# Patient Record
Sex: MALE | Race: WHITE | Employment: OTHER | URBAN - METROPOLITAN AREA
[De-identification: names, ages, dates, MRNs, and addresses within clinical notes are randomized per-mention and may not be internally consistent; named-entity substitution may affect disease eponyms.]

---

## 2016-07-27 LAB
ABS BASOPHILS: 0 CELLS/MM3 (ref 0–0.1)
ABS EOSINOPHILS: 0.1 CELLS/MM3 (ref 0.02–0.45)
ABS LYMPHOCYTES: 1.5 CELLS/MM3 (ref 1.1–3.4)
ABS MONOCYTE: 0.6 CELLS/MM3 (ref 0.2–0.88)
ABS NEUTROPHILS: 4.4 CELLS/MM3 (ref 1.6–6)
ANION GAP SERPL CALCULATED.3IONS-SCNC: 13.2 MMOL/L
ANION GAP SERPL CALCULATED.3IONS-SCNC: 13.2 MMOL/L (ref 9–19)
BASOPHILS NFR BLD AUTO: 0.4 %
BASOPHILS NFR BLD AUTO: 0.4 % (ref 0–2)
BUN SERPL-MCNC: 17 MG/DL
BUN SERPL-MCNC: 17 MG/DL (ref 7–18)
CALCIUM SERPL-MCNC: 8.9 MG/DL
CALCIUM SERPL-MCNC: 8.9 MG/DL (ref 8.5–10.1)
CHLORIDE SERPLBLD-SCNC: 103 MMOL/L
CHLORIDE SERPLBLD-SCNC: 103 MMOL/L (ref 98–107)
CO2 SERPL-SCNC: 26 MMOL/L
CO2 SERPL-SCNC: 26 MMOL/L (ref 21–32)
CREAT SERPL-MCNC: 1 MG/DL
CREAT SERPL-MCNC: 1 MG/DL (ref 0.6–1.3)
EOSINOPHIL NFR BLD AUTO: 1.5 %
EOSINOPHIL NFR BLD AUTO: 1.5 % (ref 0–6)
ERYTHROCYTE [DISTWIDTH] IN BLOOD BY AUTOMATED COUNT: 13.5 %
ERYTHROCYTE [DISTWIDTH] IN BLOOD BY AUTOMATED COUNT: 13.5 % (ref 11.5–14.5)
GFR SERPL CREATININE-BSD FRML MDRD: ABNORMAL ML/MIN/1.73M2
GFR SERPL CREATININE-BSD FRML MDRD: ABNORMAL ML/MIN/1.73M2
GLUCOSE SERPL-MCNC: 169 MG/DL (ref 70–99)
GLUCOSE SERPL-MCNC: 169 MG/DL (ref 70–99)
HCT VFR BLD AUTO: 45.6 %
HCT VFR BLD AUTO: 45.6 % (ref 40–51)
HEMOGLOBIN: 15.4 G/DL (ref 13.3–17.7)
HEMOGLOBIN: 15.4 G/DL (ref 13.3–17.7)
LYMPHOCYTES NFR BLD AUTO: 22.8 %
LYMPHOCYTES NFR BLD AUTO: 22.8 % (ref 20–46)
Lab: 1.42 NG/ML (ref 0.01–4)
MCH RBC QN AUTO: 30.9 PG
MCH RBC QN AUTO: 30.9 PG (ref 26.5–33)
MCHC RBC AUTO-ENTMCNC: 33.8 % (ref 31.5–36.5)
MCHC RBC AUTO-ENTMCNC: 33.8 G/DL
MCV RBC AUTO: 91.4 FL
MCV RBC AUTO: 91.4 FL (ref 78–99)
MONOCYTES NFR BLD AUTO: 8.9 %
MONOCYTES NFR BLD AUTO: 8.9 % (ref 0–12)
NEUTROPHILS NFR BLD AUTO: 66.1 %
NEUTROPHILS NFR BLD AUTO: 66.1 % (ref 38–70)
PLATELET # BLD AUTO: NORMAL 10^9/L
PLATELET COUNT - QUEST: NORMAL 10^9/L (ref 150–450)
PMV BLD AUTO: NORMAL FL
POTASSIUM SERPL-SCNC: 4.2 MMOL/L
POTASSIUM SERPL-SCNC: 4.2 MMOL/L (ref 3.5–5.1)
PSA SERPL-MCNC: 1.42 NG/ML (ref 0.13–4)
RBC # BLD AUTO: 4.99 10^12/L
RBC # BLD AUTO: 4.99 10^12/L (ref 4.4–5.7)
SODIUM SERPL-SCNC: 138 MMOL/L
SODIUM SERPL-SCNC: 138 MMOL/L (ref 136–145)
TSH SERPL-ACNC: 2.27 MCU/ML
TSH SERPL-ACNC: 2.27 MCU/ML (ref 0.36–3.74)
WBC # BLD AUTO: 6.7 10^9/L
WBC # BLD AUTO: 6.7 10^9/L (ref 4–10)

## 2016-08-06 LAB
HBA1C MFR BLD: 8.2 % (ref 0–5.7)
HBA1C MFR BLD: 8.2 % (ref 4.8–6)

## 2016-09-30 LAB
ALBUMIN SERPL-MCNC: 4.3 G/DL
ALBUMIN SERPL-MCNC: 4.3 G/DL (ref 3.4–5)
ALP SERPL-CCNC: 53 U/L
ALP SERPL-CCNC: 53 U/L (ref 0–116)
ALT SERPL-CCNC: 44 U/L
ALT SERPL-CCNC: 44 U/L (ref 0–78)
ANION GAP SERPL CALCULATED.3IONS-SCNC: ABNORMAL MMOL/L
ANION GAP SERPL CALCULATED.3IONS-SCNC: ABNORMAL MMOL/L
AST SERPL-CCNC: 23 U/L
AST SERPL-CCNC: 23 U/L (ref 0–37)
BILIRUB SERPL-MCNC: 2.05 MG/DL
BILIRUB SERPL-MCNC: 2.05 MG/DL (ref 0.2–1)
BUN SERPL-MCNC: 16 MG/DL
BUN SERPL-MCNC: 16 MG/DL (ref 7–18)
CALCIUM SERPL-MCNC: 9.2 MG/DL
CALCIUM SERPL-MCNC: 9.2 MG/DL (ref 8.5–10.1)
CHLORIDE SERPLBLD-SCNC: 100 MMOL/L
CHLORIDE SERPLBLD-SCNC: 100 MMOL/L (ref 98–107)
CHOLEST SERPL-MCNC: 170 MG/DL (ref 0–200)
CO2 SERPL-SCNC: ABNORMAL MMOL/L
CO2 SERPL-SCNC: ABNORMAL MMOL/L
CREAT SERPL-MCNC: 1 MG/DL
CREAT SERPL-MCNC: 1 MG/DL (ref 0.6–1.3)
GFR SERPL CREATININE-BSD FRML MDRD: ABNORMAL ML/MIN/1.73M2
GFR SERPL CREATININE-BSD FRML MDRD: ABNORMAL ML/MIN/1.73M2
GLUCOSE SERPL-MCNC: 134 MG/DL (ref 70–99)
GLUCOSE SERPL-MCNC: 134 MG/DL (ref 70–99)
HBA1C MFR BLD: 6.3 % (ref 0–5.7)
HBA1C MFR BLD: 6.3 % (ref 4.8–6)
HDLC SERPL-MCNC: 43 MG/DL (ref 40–96)
LDLC SERPL CALC-MCNC: 96 MG/DL (ref 0–130)
NONHDLC SERPL-MCNC: NORMAL MG/DL
POTASSIUM SERPL-SCNC: 4.7 MMOL/L
POTASSIUM SERPL-SCNC: 4.7 MMOL/L (ref 3.5–5.1)
PROT SERPL-MCNC: 7.8 G/DL
PROT SERPL-MCNC: 7.8 G/DL (ref 6.4–8.2)
SODIUM SERPL-SCNC: 140 MMOL/L
SODIUM SERPL-SCNC: 140 MMOL/L (ref 136–145)
TRIGL SERPL-MCNC: 155 MG/DL (ref 30–200)

## 2017-03-07 ENCOUNTER — OFFICE VISIT (OUTPATIENT)
Dept: INTERNAL MEDICINE | Facility: CLINIC | Age: 62
End: 2017-03-07

## 2017-03-07 VITALS
HEIGHT: 66 IN | RESPIRATION RATE: 16 BRPM | SYSTOLIC BLOOD PRESSURE: 130 MMHG | WEIGHT: 192.2 LBS | BODY MASS INDEX: 30.89 KG/M2 | OXYGEN SATURATION: 94 % | HEART RATE: 111 BPM | TEMPERATURE: 98.5 F | DIASTOLIC BLOOD PRESSURE: 84 MMHG

## 2017-03-07 DIAGNOSIS — K40.90 UNILATERAL INGUINAL HERNIA WITHOUT OBSTRUCTION OR GANGRENE, RECURRENCE NOT SPECIFIED: ICD-10-CM

## 2017-03-07 DIAGNOSIS — H91.93 DECREASED HEARING OF BOTH EARS: ICD-10-CM

## 2017-03-07 DIAGNOSIS — Z11.59 NEED FOR HEPATITIS C SCREENING TEST: ICD-10-CM

## 2017-03-07 DIAGNOSIS — E11.9 TYPE 2 DIABETES MELLITUS WITHOUT COMPLICATION, WITHOUT LONG-TERM CURRENT USE OF INSULIN (H): ICD-10-CM

## 2017-03-07 DIAGNOSIS — I25.10 CORONARY ARTERY DISEASE INVOLVING NATIVE CORONARY ARTERY OF NATIVE HEART WITHOUT ANGINA PECTORIS: ICD-10-CM

## 2017-03-07 DIAGNOSIS — K60.30 ANAL FISTULA: ICD-10-CM

## 2017-03-07 DIAGNOSIS — E11.9 TYPE 2 DIABETES MELLITUS WITHOUT COMPLICATION, WITHOUT LONG-TERM CURRENT USE OF INSULIN (H): Primary | ICD-10-CM

## 2017-03-07 DIAGNOSIS — K42.9 UMBILICAL HERNIA WITHOUT OBSTRUCTION AND WITHOUT GANGRENE: ICD-10-CM

## 2017-03-07 LAB
HBA1C MFR BLD: 7.1 % (ref 4.3–6)
TSH SERPL DL<=0.005 MIU/L-ACNC: 2.44 MU/L (ref 0.4–4)

## 2017-03-07 RX ORDER — LOSARTAN POTASSIUM 100 MG/1
TABLET ORAL
COMMUNITY
Start: 2016-09-12 | End: 2017-07-28

## 2017-03-07 RX ORDER — METFORMIN HCL 500 MG
TABLET, EXTENDED RELEASE 24 HR ORAL
COMMUNITY
Start: 2016-08-08 | End: 2017-03-30

## 2017-03-07 RX ORDER — ATORVASTATIN CALCIUM 80 MG/1
80 TABLET, FILM COATED ORAL
COMMUNITY
Start: 2016-07-27 | End: 2017-07-27

## 2017-03-07 ASSESSMENT — ACTIVITIES OF DAILY LIVING (ADL)
ARE_THERE_FIREARMS_IN_YOUR_HOME?: N
ARE_THERE_SMOKE_DETECTORS_IN_YOUR_HOME?: Y
ARE_THERE_CARBON_MONOXIDE_DETECTORS_IN_YOUR_HOME?: Y
DO_MEMBERS_OF_YOUR_HOUSEHOLD_USE_SUNSCREEN?: Y
DO_MEMBERS_OF_YOUR_HOUSEHOLD_WEAR_SEAT_BELTS?: Y
DO_MEMBERS_OF_YOUR_HOUSEHOLD_USE_SAFETY_HELMETS?: N

## 2017-03-07 ASSESSMENT — ENCOUNTER SYMPTOMS
HEADACHES: 0
INCREASED ENERGY: 0
SINUS PAIN: 0
SHORTNESS OF BREATH: 0
NERVOUS/ANXIOUS: 0
EYE PAIN: 0
SPUTUM PRODUCTION: 0
SMELL DISTURBANCE: 0
POLYPHAGIA: 0
POOR WOUND HEALING: 0
NIGHT SWEATS: 0
DIFFICULTY URINATING: 0
SYNCOPE: 0
TACHYCARDIA: 0
FATIGUE: 0
NECK PAIN: 0
WEAKNESS: 0
MYALGIAS: 0
HEMATURIA: 0
COUGH DISTURBING SLEEP: 0
SEIZURES: 0
FLANK PAIN: 0
DYSPNEA ON EXERTION: 0
EYE IRRITATION: 0
JOINT SWELLING: 0
SPEECH CHANGE: 0
ARTHRALGIAS: 0
SWOLLEN GLANDS: 0
LEG SWELLING: 0
MUSCLE WEAKNESS: 0
PARALYSIS: 0
POSTURAL DYSPNEA: 0
DECREASED APPETITE: 0
BLOOD IN STOOL: 0
INSOMNIA: 0
NAIL CHANGES: 0
DOUBLE VISION: 0
TINGLING: 0
DIZZINESS: 0
ORTHOPNEA: 0
ALTERED TEMPERATURE REGULATION: 0
BRUISES/BLEEDS EASILY: 0
WHEEZING: 0
SLEEP DISTURBANCES DUE TO BREATHING: 0
SNORES LOUDLY: 0
MEMORY LOSS: 0
VOMITING: 0
HEMOPTYSIS: 0
HALLUCINATIONS: 0
NECK MASS: 0
POLYDIPSIA: 0
EYE WATERING: 0
DYSURIA: 0
DEPRESSION: 0
CLAUDICATION: 0
STIFFNESS: 0
EXTREMITY NUMBNESS: 0
DIARRHEA: 0
BOWEL INCONTINENCE: 0
SORE THROAT: 0
SKIN CHANGES: 0
CHILLS: 0
BACK PAIN: 0
FEVER: 0
TROUBLE SWALLOWING: 0
DECREASED CONCENTRATION: 0
LOSS OF CONSCIOUSNESS: 0
JAUNDICE: 0
HYPERTENSION: 0
PALPITATIONS: 0
EYE REDNESS: 0
MUSCLE CRAMPS: 0
LIGHT-HEADEDNESS: 0
NAUSEA: 0
NUMBNESS: 0
RESPIRATORY PAIN: 0
COUGH: 0
BLOATING: 0
HYPOTENSION: 0
CONSTIPATION: 0
ABDOMINAL PAIN: 0
WEIGHT LOSS: 0
RECTAL BLEEDING: 0
RECTAL PAIN: 0
TREMORS: 0
SINUS CONGESTION: 0
LEG PAIN: 0
WEIGHT GAIN: 0
DISTURBANCES IN COORDINATION: 0
PANIC: 0
TASTE DISTURBANCE: 0
HEARTBURN: 0
HOARSE VOICE: 0
EXERCISE INTOLERANCE: 0

## 2017-03-07 ASSESSMENT — PAIN SCALES - GENERAL: PAINLEVEL: NO PAIN (0)

## 2017-03-07 NOTE — PROGRESS NOTES
HPI:       Anson Manriquez is a 61 year old male who presents for the following  Patient presents with: Establish Care (Here to establish care)      Cedric is a 61 yoM with a PMHx of T2DM, CAD (MI in 2007), HTN, HLD who is here to establish care. He previously as seen at [facility], but is transferring his care here b/c his wife is now working for the [employer]. He is doing well today and has no real concerns. Cedric works as a du. He was diagnosed with T2DM last year, and stopped drinking pop and started metformin. His A1c improved from ~8 to 6.3. He has continued to avoid pop. He also notes that he sees his metformin and atorvastatin un-dissolved in his stools. He is concerned he is not absorbing these medications.     Cedric also has a left inguinal hernia and an umbilical hernia that are bothering him. He previously had a R inguinal hernia repaired. He also has an anal fistula that has previously been evaluated by an outside colorectal surgeon. He reports being told it may be better to not have surgery than to try and fix this. He would like a second opinion.     Problem, Medication and Allergy Lists were reviewed and are current.  Patient is a new patient to this clinic and so  I reviewed/updated the Past Medical History, the Family History and the Social History.          Review of Systems:   Review of Systems     Constitutional:  Negative for fever, chills, weight loss, weight gain, fatigue, decreased appetite, night sweats, recent stressors, height gain, height loss, post-operative complications, incisional pain, hallucinations, increased energy, hyperactivity and confused.   HENT:  Negative for ear pain, hearing loss, tinnitus, nosebleeds, trouble swallowing, hoarse voice, mouth sores, sore throat, ear discharge, tooth pain, gum tenderness, taste disturbance, smell disturbance, hearing aid, bleeding gums, dry mouth, sinus pain, sinus congestion and neck mass.    Eyes:  Negative for  double vision, pain, redness, eye pain, decreased vision, eye watering, eye bulging, eye dryness, flashing lights, spots, floaters, strabismus, tunnel vision, jaundice and eye irritation.   Respiratory:   Negative for cough, hemoptysis, sputum production, shortness of breath, wheezing, sleep disturbances due to breathing, snores loudly, respiratory pain, dyspnea on exertion, cough disturbing sleep and postural dyspnea.    Cardiovascular:  Negative for chest pain, dyspnea on exertion, palpitations, orthopnea, claudication, leg swelling, fingers/toes turn blue, hypertension, hypotension, syncope, history of heart murmur, chest pain on exertion, chest pain at rest, pacemaker, few scattered varicosities, leg pain, sleep disturbances due to breathing, tachycardia, light-headedness, exercise intolerance and edema.   Gastrointestinal:  Negative for heartburn, nausea, vomiting, abdominal pain, diarrhea, constipation, blood in stool, melena, rectal pain, bloating, hemorrhoids, bowel incontinence, jaundice, rectal bleeding, coffee ground emesis and change in stool.   Genitourinary:  Negative for bladder incontinence, dysuria, urgency, hematuria, flank pain, difficulty urinating, nocturia, voiding less frequently, scrotal pain, ulcerations, penile discharge, male genitourinary complaint and reduced libido.   Musculoskeletal:  Negative for myalgias, back pain, joint swelling, arthralgias, stiffness, muscle cramps, neck pain, bone pain, muscle weakness and fracture.   Skin:  Negative for nail changes, itching, poor wound healing, rash, hair changes, skin changes, acne, warts, poor wound healing, scarring, flaky skin, Raynaud's phenomenon, sensitivity to sunlight and skin thickening.   Neurological:  Negative for dizziness, tingling, tremors, speech change, seizures, loss of consciousness, weakness, light-headedness, numbness, headaches, disturbances in coordination, extremity numbness, memory loss, difficulty walking and  "paralysis.   Endo/Heme:  Negative for anemia, swollen glands and bruises/bleeds easily.   Psychiatric/Behavioral:  Negative for depression, hallucinations, memory loss, decreased concentration, mood swings and panic attacks.    Endocrine:  Negative for altered temperature regulation, polyphagia, polydipsia, unwanted hair growth and change in facial hair.            Physical Exam:   /84 (BP Location: Right arm, Patient Position: Chair, Cuff Size: Adult Regular)  Pulse 111  Temp 98.5  F (36.9  C) (Oral)  Resp 16  Ht 1.676 m (5' 6\")  Wt 87.2 kg (192 lb 3.2 oz)  SpO2 94%  BMI 31.02 kg/m2  Body mass index is 31.02 kg/(m^2).  Vitals were reviewed       Physical Exam   Constitutional: He is oriented to person, place, and time and well-developed, well-nourished, and in no distress. No distress.   HENT:   Head: Normocephalic and atraumatic.   Right Ear: Tympanic membrane normal.   Left Ear: Tympanic membrane normal.   Mouth/Throat: Oropharynx is clear and moist.   Eyes: Conjunctivae and EOM are normal. Pupils are equal, round, and reactive to light. No scleral icterus.   Neck: Neck supple. No thyromegaly present.   Cardiovascular: Normal rate, regular rhythm, normal heart sounds and intact distal pulses.  Exam reveals no gallop and no friction rub.    No murmur heard.  Pulmonary/Chest: Effort normal and breath sounds normal. No respiratory distress.   Abdominal: Soft. Bowel sounds are normal. He exhibits no distension. There is no tenderness.   Umbilical hernia present and reducible   Genitourinary:   Genitourinary Comments: Large L inguinal hernia present   Musculoskeletal: Normal range of motion. He exhibits no edema.   Lymphadenopathy:     He has no cervical adenopathy.   Neurological: He is alert and oriented to person, place, and time. No cranial nerve deficit. He exhibits normal muscle tone. Gait normal.   Skin: Skin is warm and dry. No rash noted. He is not diaphoretic. No erythema.           Results: "     Pending  Assessment and Plan     Anson was seen today for establish care.    Diagnoses and all orders for this visit:    Type 2 diabetes mellitus without complication, without long-term current use of insulin (H)  -     TSH with free T4 reflex; Future  -     Hemoglobin A1c; Future   Continue metformin   On ASA   BP at goal   Non-smoker   On statin    Will check with our pharmacist about statin and metformin absorption.     Need for hepatitis C screening test  -     Hepatitis C Screen Reflex to HCV RNA Quant and Genotype    Anal fistula  -     COLORECTAL SURGERY REFERRAL    Umbilical hernia without obstruction and without gangrene  -     GENERAL SURG ADULT REFERRAL    Unilateral inguinal hernia without obstruction or gangrene, recurrence not specified  -     GENERAL SURG ADULT REFERRAL    Decreased hearing of both ears  -     AUDIOLOGY ADULT REFERRAL    Coronary artery disease involving native coronary artery of native heart without angina pectoris   Continue ASA, statin, bblocker, ARB      Options for treatment and follow-up care were reviewed with the patient. Anson Manriquez engaged in the decision making process and verbalized understanding of the options discussed and agreed with the final plan.    Taryn Lou MD  Mar 7, 2017

## 2017-03-07 NOTE — PATIENT INSTRUCTIONS
[facility] Medication Refill Request Information:  * Please contact your pharmacy regarding ANY request for medication refills.  ** [facility] Prescription Fax = [phone]  * Please allow 3 business days for routine medication refills.  * Please allow 5 business days for controlled substance medication refills.     [facility] Test Result notification information:  *You will be notified with in 7-10 days of your appointment day regarding the results of your test.  If you are on MyChart you will be notified as soon as the provider has reviewed the results and signed off on them.    [facility] [phone]     Go to lab today.   Let me know when you are ready for medication refills. We can send them to the pharmacy here.   Referral to general surgery and colorectal surgery.

## 2017-03-07 NOTE — NURSING NOTE
Chief Complaint   Patient presents with     Establish Care     Here to establish care     Baldemar Andrews CMA at 4:24 PM on 3/7/2017

## 2017-03-07 NOTE — MR AVS SNAPSHOT
After Visit Summary   3/7/2017    Anson Manriquez    MRN: [id number]           Patient Information     Date Of Birth          1955        Visit Information        Provider Department      3/7/2017 4:00 PM Taryn Jefferson MD [facility]        Today's Diagnoses     Type 2 diabetes mellitus without complication, without long-term current use of insulin (H)    -  1    Need for hepatitis C screening test        Anal fistula        Umbilical hernia without obstruction and without gangrene        Unilateral inguinal hernia without obstruction or gangrene, recurrence not specified        Decreased hearing of both ears          Care Instructions    [facility] Center Medication Refill Request Information:  * Please contact your pharmacy regarding ANY request for medication refills.  ** [facility] Prescription Fax = [phone]  * Please allow 3 business days for routine medication refills.  * Please allow 5 business days for controlled substance medication refills.     [facility] Test Result notification information:  *You will be notified with in 7-10 days of your appointment day regarding the results of your test.  If you are on MyChart you will be notified as soon as the provider has reviewed the results and signed off on them.    [facility] [phone]     Go to lab today.   Let me know when you are ready for medication refills. We can send them to the pharmacy here.   Referral to general surgery and colorectal surgery.         Follow-ups after your visit        Additional Services     AUDIOLOGY ADULT REFERRAL       Your provider has referred you to: [facility]: Audiology and Aural Rehab Services - Check [phone]   [email or web address]    Specialty Testing:  Hearing Aid Consultation            COLORECTAL SURGERY REFERRAL       Your provider has referred you to: P: [facility] -  [place] [phone]   [email or web address]    Referral Reason(s): Fistula  Special Concerns: None  This referral is: Elective (week +)  It is OK to leave a message on patient's voicemail.    Please be aware that coverage of these services is subject to the terms and limitations of your health insurance plan.  Call member services at your health plan with any benefit or coverage questions.      Please bring the following with you to your appointment:    (1) Any X-Rays, CTs or MRIs which have been performed.  Contact the facility where they were done to arrange for  prior to your scheduled appointment.    (2) List of current medications  (3) This referral request   (4) Any documents/labs given to you for this referral            GENERAL SURG ADULT REFERRAL       Your provider has referred you to: [facility]: General Surgery Clinic [facility] [phone]   [email or web address]    Please be aware that coverage of these services is subject to the terms and limitations of your health insurance plan.  Call member services at your health plan with any benefit or coverage questions.      Please bring the following with you to your appointment:    (1) Any X-Rays, CTs or MRIs which have been performed.  Contact the facility where they were done to arrange for  prior to your scheduled appointment.   (2) List of current medications   (3) This referral request   (4) Any documents/labs given to you for this referral                  Follow-up notes from your care team     Return in about 6 months (around 9/7/2017) for Diabetes, BP Recheck.      Your next 10 appointments already scheduled     Mar 07, 2017  5:15 PM CST   LAB with  LAB   [facility] [place])    [address]  1st Floor  [facility] [address] [phone]           Patient must bring picture ID.  Patient should be  prepared to give a urine specimen  Please do not eat 10-12 hours before your appointment if you are coming in fasting for labs on lipids, cholesterol, or glucose (sugar).  Pregnant women should follow their Care Team instructions. Water with medications is okay. Do not drink coffee or other fluids.   If you have concerns about taking  your medications, please ask at office or if scheduling via Neocoretechhart, send a message by clicking on Secure Messaging, Message Your Care Team.            Sep 07, 2017  4:30 PM CDT   (Arrive by 4:15 PM)   Return Visit with Taryn Lou MD   [facility] ([facility] [place])    [address]  4th Floor  [facility] [address] [phone]              Future tests that were ordered for you today     Open Future Orders        Priority Expected Expires Ordered    TSH with free T4 reflex Routine 3/7/2017 3/21/2017 3/7/2017    Hemoglobin A1c Routine 3/7/2017 3/21/2017 3/7/2017            Who to contact     Please call your clinic at [phone] to:    Ask questions about your health    Make or cancel appointments    Discuss your medicines    Learn about your test results    Speak to your doctor   If you have compliments or concerns about an experience at your clinic, or if you wish to file a complaint, please contact [facility] Patient Relations at [phone] or email us at [email or web address]         Additional Information About Your Visit        [employer]ocoretechhart Information     "Carmolex," is an electronic gateway that provides easy, online access to your medical records. With "Carmolex,", you can request a clinic appointment, read your test results, renew a prescription or communicate with your care team.     To sign up for ImageVisiont visit the website at [email or web address]   You will be asked to enter the access code listed below, as well as some personal information. Please follow the directions to  "create your username and password.     Your access code is: [email or web address]  Expires: 2017  6:30 AM     Your access code will  in 90 days. If you need help or a new code, please contact your [facility] or call [phone] for assistance.        Care EveryWhere ID     This is your Care EveryWhere ID. This could be used by other organizations to access your [place] medical records  [id number]        Your Vitals Were     Pulse Temperature Respirations Height Pulse Oximetry BMI (Body Mass Index)    111 98.5  F (36.9  C) (Oral) 16 1.676 m (5' 6\") 94% 31.02 kg/m2       Blood Pressure from Last 3 Encounters:   17 130/84   12 115/81   12 155/105    Weight from Last 3 Encounters:   17 87.2 kg (192 lb 3.2 oz)   12 83.9 kg (185 lb)   12 83.9 kg (185 lb)              We Performed the Following     AUDIOLOGY ADULT REFERRAL     COLORECTAL SURGERY REFERRAL     GENERAL SURG ADULT REFERRAL     Hepatitis C Screen Reflex to HCV RNA Quant and Genotype          Today's Medication Changes          These changes are accurate as of: 3/7/17  5:07 PM.  If you have any questions, ask your nurse or doctor.               Stop taking these medicines if you haven't already. Please contact your care team if you have questions.     BENICAR 40 MG tablet   Generic drug:  olmesartan   Stopped by:  Taryn Jefferson MD           oxyCODONE 5 MG IR tablet   Commonly known as:  ROXICODONE   Stopped by:  Taryn Jefferson MD           oxyCODONE-acetaminophen 5-325 MG per tablet   Commonly known as:  PERCOCET   Stopped by:  Taryn Jefferson MD                    Primary Care Provider Office Phone # Fax #    Taryn Lou [phone]       [address]  [facility] [address]        Thank you!     Thank you for choosing [facility]  for your care. Our goal is always to " provide you with excellent care. Hearing back from our patients is one way we can continue to improve our services. Please take a few minutes to complete the written survey that you may receive in the mail after your visit with us. Thank you!             Your Updated Medication List - Protect others around you: Learn how to safely use, store and throw away your medicines at www.disposemymeds.org.          This list is accurate as of: 3/7/17  5:07 PM.  Always use your most recent med list.                   Brand Name Dispense Instructions for use    aspirin 325 MG tablet      Take 325 mg by mouth daily.       atorvastatin 80 MG tablet    LIPITOR     Take 80 mg by mouth       carvedilol 6.25 MG tablet    COREG     Take 6.25 mg by mouth 2 times daily (with meals).       FUROSEMIDE PO      Take 40 mg by mouth daily.       losartan 100 MG tablet    COZAAR     Take 1 tablet by mouth daily       metFORMIN 500 MG 24 hr tablet    GLUCOPHAGE-XR         MULTIPLE VITAMIN PO      Take 1 tablet by mouth daily.

## 2017-03-07 NOTE — LETTER
Patient:  Anson Manriquez  :   1955  MRN:     [id number]        Mr. Anson Manriquez  [address] [address]        2017    Dear Mr. Manriquez,    Thank you for choosing the [facility] for your healthcare needs.  We appreciate the opportunity to serve you.    The following are your recent test results.     Anson--Your screen for hepatitis C is negative.    Results for orders placed or performed in visit on 17   Hepatitis C Screen Reflex to HCV RNA Quant and Genotype   Result Value Ref Range    Hepatitis C Antibody  NR     Nonreactive   Assay performance characteristics have not been established for newborns,   infants, and children     Lipid Profile   Result Value Ref Range    Cholesterol 170.0 0.0 - 200.0 mg/dL    Triglycerides 155.0 30.0 - 200.0 mg/dL    HDL Cholesterol 43.0 40.0 - 96.0 mg/dL    LDL Cholesterol Calculated 96.0 0.0 - 130.0 mg/dL    Non HDL Cholesterol  mg/dl   Basic metabolic panel   Result Value Ref Range    Sodium 138.0 136.0 - 145.0 mmol/L    Potassium 4.2 3.5 - 5.1 mmol/L    Chloride 103.0 98.0 - 107.0 mmol/L    Carbon Dioxide 26.0 21.0 - 32.0 mmol/L    Anion Gap 13.2 9.0 - 19.0 mmol/L    Glucose 169.0 (A) 70 - 99 mg/dL    Urea Nitrogen 17.0 7.0 - 18.0 mg/dL    Creatinine 1.0 0.6 - 1.3 mg/dL    Calcium 8.9 8.5 - 10.1 mg/dL    GFR Estimate  ml/min/1.73m2    GFR Estimate If Black  ml/min/1.73m2   CBC ([place])   Result Value Ref Range    WBC 6.7 4.0 - 10.0 10^9/L    RBC Count 4.99 4.40 - 5.70 10^12/L    Hemoglobin 15.4 13.3 - 17.7 g/dL    Hematocrit 45.6 40.0 - 51.0 %    MCV 91.4 78.0 - 99.0 fl    MCH 30.9 26.5 - 33.0 pg    MCHC [place] 33.8 31.5 - 36.5 %    RDW 13.5 11.5 - 14.5 %    Platelet Count  10^9/L    Mean Platelet Volume  fL    % Neutrophils 66.1 38.0 - 70.0 %    % Lymphocytes 22.8 20.0 - 46.0 %    % Monocytes 8.9 0.0 - 12.0 %    % Eosinophils 1.5 0.0 - 6.0 %    % Basophils 0.4 0.0 - 2.0 %    Abs Neutrophils 4.4 1.6 -  6.0 cells/mm3    Abs Lymphocytes 1.5 1.1 - 3.4 cells/mm3    Abs Monocytes 0.60 0.20 - 0.88 cells/mm3    Abs Eosinophils 0.10 0.02 - 0.45 cells/mm3    Abs Basophils 0.0 0.0 - 0.1 cells/mm3   Hemoglobin A1c   Result Value Ref Range    Hemoglobin A1C 6.3 (A) 4.8 - 6.0 %   Comprehensive metabolic panel   Result Value Ref Range    Sodium 140.0 136.0 - 145.0 mmol/L    Potassium 4.7 3.5 - 5.1 mmol/L    Chloride 100.0 98.0 - 107.0 mmol/L    Carbon Dioxide  mmol/L    Anion Gap  mmol/L    Glucose 134.0 (A) 70 - 99 mg/dL    Urea Nitrogen 16.0 7.0 - 18.0 mg/dL    Creatinine 1.0 0.6 - 1.3 mg/dL    Calcium 9.2 8.5 - 10.1 mg/dL    Protein Total 7.8 6.4 - 8.2 g/dL    Albumin 4.3 3.4 - 5.0 g/dL    Bilirubin Total 2.05 (A) 0.20 - 1.00 mg/dL    Alkaline Phosphatase 53.0 0.0 - 116.0 U/L    AST 23.0 0.0 - 37.0 U/L    ALT 44.0 0.0 - 78.0 U/L    GFR Estimate  ml/min/1.73m2    GFR Estimate If Black  ml/min/1.73m2   Hemoglobin A1c   Result Value Ref Range    Hemoglobin A1C 8.2 (A) 4.8 - 6.0 %   TSH   Result Value Ref Range    TSH 2.273 0.358 - 3.740 mcU/mL   PSA (CIM)   Result Value Ref Range    PSA 1.42 0.13 - 4.00 ng/mL       Please contact your provider if you have any questions or concerns.  We look forward to serving your needs in the future.      Sincerely,    Dr. Cadet

## 2017-03-08 ENCOUNTER — PRE VISIT (OUTPATIENT)
Dept: SURGERY | Facility: CLINIC | Age: 62
End: 2017-03-08

## 2017-03-08 LAB — HCV AB SERPL QL IA: NORMAL

## 2017-03-08 NOTE — TELEPHONE ENCOUNTER
1.  Date/reason for appt: 3/14/17 - Anal Fistula  2.  Referring provider: Dr. Cadet  3.  Call to patient (Yes / No - short description): no, internal referral  4.  Previous care at / records requested from: [facility] -- Records and referral in Epic

## 2017-03-09 PROBLEM — E78.2 MIXED HYPERLIPIDEMIA: Status: ACTIVE | Noted: 2017-03-09

## 2017-03-09 PROBLEM — I10 BENIGN ESSENTIAL HYPERTENSION: Status: ACTIVE | Noted: 2017-03-09

## 2017-03-09 PROBLEM — I25.10 CORONARY ARTERY DISEASE INVOLVING NATIVE CORONARY ARTERY OF NATIVE HEART WITHOUT ANGINA PECTORIS: Status: ACTIVE | Noted: 2017-03-09

## 2017-03-09 PROBLEM — K60.30 ANAL FISTULA: Status: ACTIVE | Noted: 2017-03-09

## 2017-03-09 PROBLEM — E11.9 TYPE 2 DIABETES MELLITUS WITHOUT COMPLICATION, WITHOUT LONG-TERM CURRENT USE OF INSULIN (H): Status: ACTIVE | Noted: 2017-03-09

## 2017-03-14 ENCOUNTER — TELEPHONE (OUTPATIENT)
Dept: SURGERY | Facility: CLINIC | Age: 62
End: 2017-03-14

## 2017-03-14 ENCOUNTER — OFFICE VISIT (OUTPATIENT)
Dept: SURGERY | Facility: CLINIC | Age: 62
End: 2017-03-14

## 2017-03-14 VITALS
DIASTOLIC BLOOD PRESSURE: 86 MMHG | TEMPERATURE: 98.3 F | SYSTOLIC BLOOD PRESSURE: 122 MMHG | WEIGHT: 192.8 LBS | HEART RATE: 99 BPM | OXYGEN SATURATION: 93 % | BODY MASS INDEX: 31.12 KG/M2

## 2017-03-14 DIAGNOSIS — K60.50 ANORECTAL FISTULA: Primary | ICD-10-CM

## 2017-03-14 ASSESSMENT — PAIN SCALES - GENERAL: PAINLEVEL: NO PAIN (0)

## 2017-03-14 NOTE — LETTER
3/14/2017       RE: Anson Manriquez  [address] [address]     Dear Colleague,    Thank you for referring your patient, Anson Manriquez, to the [facility] COLON AND RECTAL SURGERY at [facility]. Please see a copy of my visit note below.    Colon and Rectal Surgery Consult Clinic Note    Date: 3/14/2017     Referring provider:  Taryn Lou MD  [facility]  [address]  [place] [address]     RE: Anson Manriquez  : 1955  CHOLO: 3/14/2017    Anson Manriquez is a very pleasant 61 year old male with long-standing anorectal fistula who presents today for second opinion.     Cedric initially presented with a large horseshoe abscess in . He underwent an examination under anesthesia with incision and drainage with Dr. Weber. He underwent a normal colonoscopy in . In  he continued to have drainage and was noted to have a transsphincteric fistula and underwent examination under anesthesia with seton drain placement with Dr. Dey. In  he then underwent a fistula plug procedure. He initially did well after this procedure but has had continued drainage since that time. The he spoke with a family friend who is a physician who recommended not having any treatment of his fistula and he would like another opinion..  He does occasionally have some leakage of a small amount of stool but has never lost an entire bowel movement.    Assessment/Plan: 61 year old male with a past medical history of DMII, CAD, MI in , and HTN with long-standing transsphincteric anorectal fistula. He has previously undergone seton drain placement and a fistula plug with Dr. Lo in  in . He has a persistent fistula with bother some drainage and some incontinence of stool. We discussed examination under anesthesia with replacement of seton drain he states that he did not like the seton drain previously and would like more definitive  management of the fistula tract but is undecided how he would like to proceed or if he even wants any intervention. He would like to have his wife meets with the surgeon as well to discuss all of his options before making a decision if he would like any surgical intervention. I will set him up to meet with one of our surgeons with his wife in clinic. Advised him that in the meantime if he develops a recurrent abscess to return to clinic.  Patient's questions were answered to his stated satisfaction and he is in agreement with this plan.    Medical history:  Past Medical History   Diagnosis Date     Chronic infection      near rectum still leaking     Coronary artery disease      Diabetes mellitus, type 2 (H)      Heart attack (H) 4/17/2007     Hyperlipidemia      Hypertension      Stented coronary artery 2007       Surgical history:  Past Surgical History   Procedure Laterality Date     Stent, coronary, sara       Irrigation and debridement rectum, combined       abcess near rectum      Laparoscopic cholecystectomy  3/29/2012     Procedure:LAPAROSCOPIC CHOLECYSTECTOMY; LAPAROSCOPIC CHOLECYSTECTOMY; Surgeon:MARILYNN PRESSLEY; Location:[facility] OR     Hernia repair Right      Inguinal       Problem list:  Patient Active Problem List    Diagnosis Date Noted     Coronary artery disease involving native coronary artery of native heart without angina pectoris 03/09/2017     Priority: Medium     MI in 2007       Type 2 diabetes mellitus without complication, without long-term current use of insulin (H) 03/09/2017     Priority: Medium     Benign essential hypertension 03/09/2017     Priority: Medium     Mixed hyperlipidemia 03/09/2017     Priority: Medium     Anal fistula 03/09/2017     Priority: Medium       Medications:  Current Outpatient Prescriptions   Medication Sig Dispense Refill     atorvastatin (LIPITOR) 80 MG tablet Take 80 mg by mouth       metFORMIN (GLUCOPHAGE-XR) 500 MG 24 hr tablet        losartan (COZAAR) 100 MG  tablet Take 1 tablet by mouth daily       carvedilol (COREG) 6.25 MG tablet Take 6.25 mg by mouth 2 times daily (with meals).       aspirin 325 MG tablet Take 325 mg by mouth daily.       FUROSEMIDE PO Take 40 mg by mouth daily.       MULTIPLE VITAMIN PO Take 1 tablet by mouth daily.         Allergies:  No Known Allergies    Family history:  Family History   Problem Relation Age of Onset     Hypertension Mother      DIABETES Father        Social history:  Social History   Substance Use Topics     Smoking status: Never Smoker     Smokeless tobacco: Not on file     Alcohol use No    Marital status: .      Nursing Notes:   Arjun Mckoy LPN  3/14/2017  1:21 PM  Signed  No chief complaint on file.      Vitals:    03/14/17 1311   BP: 122/86   BP Location: Left arm   Pulse: 99   Temp: 98.3  F (36.8  C)   TempSrc: Oral   SpO2: 93%   Weight: 192 lb 12.8 oz       Body mass index is 31.12 kg/(m^2).      Emilee BRISENO LPN                             Physical Examination:  /86 (BP Location: Left arm)  Pulse 99  Temp 98.3  F (36.8  C) (Oral)  Wt 192 lb 12.8 oz  SpO2 93%  BMI 31.12 kg/m2  General: alert, oriented, in no acute distress, sitting comfortably  HEENT: mucous membranes moist  Perianal external examination:  External opening in the left posterior position which can be probed approximately 2 cm towards the anal opening. A small amount of purulent drainage is present. Surrounding scarring but no induration or fluctuance.  Digital rectal examination: Was performed.   Sphincter tone: Good.  Palpable lesions: Yes - Location: palpable ulceration in the posterior midline.  Prostate: Normal.  Other: None..    Anoscopy: Was performed.   Hemorrhoids: Yes. Scarring in the posterior midline with some active bleeding present. Unable to identify an internal opening on anoscopy due to presence of internal hemorrhoids as well.  Lesions: No.    Total face to face time was 30 minutes, >50% counseling.    Karina garcia  LLOYD Martinez, NP-C  Colon and Rectal Surgery   [facility]    This note was created using speech recognition software and may contain unintended word substitutions.      Again, thank you for allowing me to participate in the care of your patient.      Sincerely,    LLOYD Gomez CNP

## 2017-03-14 NOTE — PROGRESS NOTES
Colon and Rectal Surgery Consult Clinic Note    Date: 3/14/2017     Referring provider:  Taryn Lou MD  [address] [place] [id number]     RE: Anson Manriquez  : 1955  CHOLO: 3/14/2017    Anson Manriquez is a very pleasant 61 year old male with long-standing anorectal fistula who presents today for second opinion.     Cedric initially presented with a large horseshoe abscess in . He underwent an examination under anesthesia with incision and drainage with Dr. Weber. He underwent a normal colonoscopy in . In  he continued to have drainage and was noted to have a transsphincteric fistula and underwent examination under anesthesia with seton drain placement with Dr. Dey. In  he then underwent a fistula plug procedure. He initially did well after this procedure but has had continued drainage since that time. The he spoke with a family friend who is a physician who recommended not having any treatment of his fistula and he would like another opinion..  He does occasionally have some leakage of a small amount of stool but has never lost an entire bowel movement.    Assessment/Plan: 61 year old male with a past medical history of DMII, CAD, MI in , and HTN with long-standing transsphincteric anorectal fistula. He has previously undergone seton drain placement and a fistula plug with Dr. Lo in  in . He has a persistent fistula with bother some drainage and some incontinence of stool. We discussed examination under anesthesia with replacement of seton drain he states that he did not like the seton drain previously and would like more definitive management of the fistula tract but is undecided how he would like to proceed or if he even wants any intervention. He would like to have his wife meets with the surgeon as well to discuss all of his options before making a decision if he would like any surgical intervention. I will set him up  to meet with one of our surgeons with his wife in clinic. Advised him that in the meantime if he develops a recurrent abscess to return to clinic.  Patient's questions were answered to his stated satisfaction and he is in agreement with this plan.    Medical history:  Past Medical History   Diagnosis Date     Chronic infection      near rectum still leaking     Coronary artery disease      Diabetes mellitus, type 2 (H)      Heart attack (H) 4/17/2007     Hyperlipidemia      Hypertension      Stented coronary artery 2007       Surgical history:  Past Surgical History   Procedure Laterality Date     Stent, coronary, sara       Irrigation and debridement rectum, combined       abcess near rectum      Laparoscopic cholecystectomy  3/29/2012     Procedure:LAPAROSCOPIC CHOLECYSTECTOMY; LAPAROSCOPIC CHOLECYSTECTOMY; Surgeon:MARILYNN PRESSLEY; Location:[facility] OR     Hernia repair Right      Inguinal       Problem list:  Patient Active Problem List    Diagnosis Date Noted     Coronary artery disease involving native coronary artery of native heart without angina pectoris 03/09/2017     Priority: Medium     MI in 2007       Type 2 diabetes mellitus without complication, without long-term current use of insulin (H) 03/09/2017     Priority: Medium     Benign essential hypertension 03/09/2017     Priority: Medium     Mixed hyperlipidemia 03/09/2017     Priority: Medium     Anal fistula 03/09/2017     Priority: Medium       Medications:  Current Outpatient Prescriptions   Medication Sig Dispense Refill     atorvastatin (LIPITOR) 80 MG tablet Take 80 mg by mouth       metFORMIN (GLUCOPHAGE-XR) 500 MG 24 hr tablet        losartan (COZAAR) 100 MG tablet Take 1 tablet by mouth daily       carvedilol (COREG) 6.25 MG tablet Take 6.25 mg by mouth 2 times daily (with meals).       aspirin 325 MG tablet Take 325 mg by mouth daily.       FUROSEMIDE PO Take 40 mg by mouth daily.       MULTIPLE VITAMIN PO Take 1 tablet by mouth daily.          Allergies:  No Known Allergies    Family history:  Family History   Problem Relation Age of Onset     Hypertension Mother      DIABETES Father        Social history:  Social History   Substance Use Topics     Smoking status: Never Smoker     Smokeless tobacco: Not on file     Alcohol use No    Marital status: .      Nursing Notes:   Arjun Mckoy LPN  3/14/2017  1:21 PM  Signed  No chief complaint on file.      Vitals:    03/14/17 1311   BP: 122/86   BP Location: Left arm   Pulse: 99   Temp: 98.3  F (36.8  C)   TempSrc: Oral   SpO2: 93%   Weight: 192 lb 12.8 oz       Body mass index is 31.12 kg/(m^2).      Emilee RIZWANA BRISENO                             Physical Examination:  /86 (BP Location: Left arm)  Pulse 99  Temp 98.3  F (36.8  C) (Oral)  Wt 192 lb 12.8 oz  SpO2 93%  BMI 31.12 kg/m2  General: alert, oriented, in no acute distress, sitting comfortably  HEENT: mucous membranes moist  Perianal external examination:  External opening in the left posterior position which can be probed approximately 2 cm towards the anal opening. A small amount of purulent drainage is present. Surrounding scarring but no induration or fluctuance.  Digital rectal examination: Was performed.   Sphincter tone: Good.  Palpable lesions: Yes - Location: palpable ulceration in the posterior midline.  Prostate: Normal.  Other: None..    Anoscopy: Was performed.   Hemorrhoids: Yes. Scarring in the posterior midline with some active bleeding present. Unable to identify an internal opening on anoscopy due to presence of internal hemorrhoids as well.  Lesions: No.    Total face to face time was 30 minutes, >50% counseling.    LLOYD Mortensen, NP-C  Colon and Rectal Surgery   [facility]    This note was created using speech recognition software and may contain unintended word substitutions.

## 2017-03-14 NOTE — TELEPHONE ENCOUNTER
Pre Visit Call and Assessment    Date of call:  03/14/2017    Phone numbers:  Home number on file [phone] (home)    Reached patient/confirmed appointment:  Yes  Patient care team/Primary provider:  Taryn Jefferson  Preferred outpatient pharmacy:    [place], [place] - [address] [facility] [address]  [address] [facility] [address] [address]  Phone: [phone] Fax: [phone] Alternate Fax: [phone], [phone]    Referred to:  Dr. MARYLOU Tobias    Reason for visit:  Umbilical hernia     Problem List:    Patient Active Problem List   Diagnosis     Coronary artery disease involving native coronary artery of native heart without angina pectoris     Type 2 diabetes mellitus without complication, without long-term current use of insulin (H)     Benign essential hypertension     Mixed hyperlipidemia     Anal fistula       Allergies:   No Known Allergies    History:      Past Medical History   Diagnosis Date     Chronic infection      near rectum still leaking     Coronary artery disease      Diabetes mellitus, type 2 (H)      Heart attack (H) 4/17/2007     Hyperlipidemia      Hypertension      Stented coronary artery 2007       Past Surgical History   Procedure Laterality Date     Stent, coronary, sara       Irrigation and debridement rectum, combined       abcess near rectum      Laparoscopic cholecystectomy  3/29/2012     Procedure:LAPAROSCOPIC CHOLECYSTECTOMY; LAPAROSCOPIC CHOLECYSTECTOMY; Surgeon:MARILYNN PRESSLEY; Location:[facility] OR     Hernia repair Right      Inguinal       Family History   Problem Relation Age of Onset     Hypertension Mother      DIABETES Father        Social History   Substance Use Topics     Smoking status: Never Smoker     Smokeless tobacco: Not on file     Alcohol use No       Patient instructions:    *  Bring outside medical records, images/disc, and/or studies

## 2017-03-14 NOTE — MR AVS SNAPSHOT
After Visit Summary   3/14/2017    Anson Manriquez    MRN: [id number]           Patient Information     Date Of Birth          1955        Visit Information        Provider Department      3/14/2017 1:00 PM Karina Calle APRN [facility] Colon and Rectal Surgery         Follow-ups after your visit        Your next 10 appointments already scheduled     Mar 15, 2017 11:30 AM CDT   (Arrive by 11:15 AM)   NEW HERNIA SURGERY with Shwetha Tobias MD   [facility] General Surgery ([facility])    [address] [place] [address] [id number] [phone]           Do not wear perfume.            Apr 03, 2017  4:00 PM CDT   (Arrive by 3:45 PM)   New Patient Visit with Lexa Ching MD   [facility] Colon and Rectal Surgery ([facility])    [address] [place] [address] [id number] [phone]            Apr 17, 2017  1:00 PM CDT   (Arrive by 12:45 PM)   Hearing Evaluation with Lawanda Toledo   [facility] Audiology ([facility])    [address] [place] [address] [id number] [phone]           Please see your medical professional for ear cleaning prior to this appointment if you believe wax buildup may be an issue. All patients are required to have a physician's order stating the medical reason for the hearing test. Your doctor can send an electronic order, use their own form or we have provided a form (called Physician's Order for Audiology Services). It states that there is a medical reason for your exam. Without an order you may need to be rescheduled until the order can be obtained.            Sep 07, 2017  4:30 PM CDT   (Arrive by 4:15 PM)   Return Visit with Taryn Lou MD   [facility] Primary Care Clinic ([facility])    [address] [place] [address] [id number] [phone]               Who to contact     Please call your clinic at [phone] to:    Ask questions about your health    Make or cancel appointments    Discuss your medicines    Learn about your test results    Speak to your doctor   If you have compliments or concerns about an experience at your clinic, or if you wish to file a complaint, please contact [facility] Patient Relations at [phone] or email us at [email or web address]         Additional Information About Your Visit        Onc[employer] Information     [employer] is an electronic gateway that provides easy, online access to your medical records. With [employer], you can request a clinic appointment, read your test results, renew a prescription or communicate with your care team.     To sign up for [employer] visit the website at [email or web address]   You will be asked to enter the access code listed below, as well as some personal information. Please follow the directions to create your username and password.     Your access code is: [email or web address]  Expires: 2017  7:30 AM     Your access code will  in 90 days. If you need help or a new code, please contact your [facility] or call [phone] for assistance.        Care EveryWhere ID     This is your Care EveryWhere ID. This could be used by other organizations to access your [place] medical records  [id number]        Your Vitals Were     Pulse Temperature Pulse Oximetry BMI (Body Mass Index)          99 98.3  F (36.8  C) (Oral) 93% 31.12 kg/m2         Blood Pressure from Last 3 Encounters:   17 122/86   17 130/84   12 115/81    Weight from Last 3 Encounters:   17 192 lb 12.8 oz   17 192 lb 3.2 oz   12 185 lb              Today, you had the following     No orders found for display       Primary Care Provider Office Phone # Fax #    Taryn Lou [phone]       [facility]  [address]  [facility] [address]        Thank you!     Thank you for choosing [facility] COLON AND RECTAL SURGERY  for your care. Our goal is always to provide you with excellent care. Hearing back from our patients is one way we can continue to improve our services. Please take a few minutes to complete the written survey that you may receive in the mail after your visit with us. Thank you!             Your Updated Medication List - Protect others around you: Learn how to safely use, store and throw away your medicines at www.disposemymeds.org.          This list is accurate as of: 3/14/17  2:05 PM.  Always use your most recent med list.                   Brand Name Dispense Instructions for use    aspirin 325 MG tablet      Take 325 mg by mouth daily.       atorvastatin 80 MG tablet    LIPITOR     Take 80 mg by mouth       carvedilol 6.25 MG tablet    COREG     Take 6.25 mg by mouth 2 times daily (with meals).       FUROSEMIDE PO      Take 40 mg by mouth daily.       losartan 100 MG tablet    COZAAR     Take 1 tablet by mouth daily       metFORMIN 500 MG 24 hr tablet    GLUCOPHAGE-XR         MULTIPLE VITAMIN PO      Take 1 tablet by mouth daily.

## 2017-03-14 NOTE — NURSING NOTE
No chief complaint on file.      Vitals:    03/14/17 1311   BP: 122/86   BP Location: Left arm   Pulse: 99   Temp: 98.3  F (36.8  C)   TempSrc: Oral   SpO2: 93%   Weight: 192 lb 12.8 oz       Body mass index is 31.12 kg/(m^2).      Emilee BRISENO LPN

## 2017-03-15 ENCOUNTER — OFFICE VISIT (OUTPATIENT)
Dept: SURGERY | Facility: CLINIC | Age: 62
End: 2017-03-15

## 2017-03-15 VITALS
SYSTOLIC BLOOD PRESSURE: 132 MMHG | OXYGEN SATURATION: 94 % | HEART RATE: 92 BPM | WEIGHT: 133.6 LBS | HEIGHT: 66 IN | DIASTOLIC BLOOD PRESSURE: 85 MMHG | TEMPERATURE: 97.3 F | BODY MASS INDEX: 21.47 KG/M2

## 2017-03-15 DIAGNOSIS — K42.9 UMBILICAL HERNIA WITHOUT OBSTRUCTION AND WITHOUT GANGRENE: Primary | ICD-10-CM

## 2017-03-15 DIAGNOSIS — K40.90 LEFT INGUINAL HERNIA: ICD-10-CM

## 2017-03-15 ASSESSMENT — PAIN SCALES - GENERAL: PAINLEVEL: NO PAIN (0)

## 2017-03-15 NOTE — NURSING NOTE
Pre and Post op Patient Education/Teaching Flowsheet  Relevant Diagnosis:  hernias  Teaching Topic:  Pre and post op teaching  Person(s) Involved in teaching:  Patient     Motivation Level:  Asks Questions:  Yes  Eager to Learn:  Yes  Cooperative:  Yes  Receptive (willing/able to accept information):  Yes  Any cultural factors/Yazidi beliefs that may influence understanding or compliance?  No    Patient/caregiver/family demonstrates understanding of the following:  Reason for the appointment, diagnosis, and treatment plan:  Yes  Patient demonstrates understanding of the following:  Pre-op bowel prep:  No  Post-op pain management recommendations (medications, ice compress, binder/athletic supporter (if applicable), etc.:  Yes  Inguinal hernia patients:  Post-op urinary retention- discussed signs/symptoms and visit to ER for [place] catheter placement and to stay in place for at least 48 hours:  NA  Restrictions:  Yes  Medications to take the day of surgery:  Per PCP  Blood thinner medications discussed and when to stop (if applicable):  Yes  Wound care:  Yes  Diabetes medication management (if applicable):  Per PCP  Which situations necessitate calling provider and whom to contact:  Discussed how to contact the hospital, nurse, and clinic scheduling staff if necessary      Date and time of surgery:  Yes  Location of surgery:  [address] [place] [address]  History and Physical and any other testing necessary prior to surgery:  Yes  Required time line for completion of History and Physical and any pre-op testing:  Yes  Discuss need for someone to drive patient home and stay with them for 24 hours:  Yes  Pre-op showering/scrub information with PCMX Soap:  Yes  NPO Guidelines:  NPO per Anesthesia Guidelines      Infection Prevention: Patient demonstrates understanding of the following:  Patient instructed on hand hygiene:  Yes  Surgical procedure site care will be taught and will be reviewed at the time of discharge  Signs  and symptoms of infection taught:  Yes  Wound care reviewed and will be taught at the time of discharge  Central venous catheter care will be taught at the time of discharge (if applicable)    Post-op follow-up:  Instructional materials used/given/mailed:  [facility] Surgery Booklet, post op teaching sheet, Map, Soap, and arrival/location information    Surgical instructions mailed to patient

## 2017-03-15 NOTE — PROGRESS NOTES
"History and Physical - General Surgery Attending    NAME: Anson Manriquez,  61 year old male    PCP: Taryn Jefferson  MRN:  [id number]       #: [phone]    Date: March 15, 2017    History of Present Illness: Anson Manriquez 61 year old male with prediabetes and s/p AMI with subsequent stents presenting for evaluation of umbilical and Left groin hernias.  Pt states he has no pain or obstructive symptoms from either hernia.  His groin hernia has been present for \"many years\" but has not been bothersome.  Left groin hernia has gotten large over past year.  It spontaneously reduces and is not painful.  No urinary symptoms or testicular pain.  He had the Right inguinal hernia repaired several years ago.    Past Medical History:  Past Medical History   Diagnosis Date     Chronic infection      near rectum still leaking     Coronary artery disease      Diabetes mellitus, type 2 (H)      Heart attack (H) 4/17/2007     Hyperlipidemia      Hypertension      Stented coronary artery 2007     Past Surgical History:  Past Surgical History   Procedure Laterality Date     Stent, coronary, sara       Irrigation and debridement rectum, combined       abcess near rectum      Laparoscopic cholecystectomy  3/29/2012     Procedure:LAPAROSCOPIC CHOLECYSTECTOMY; LAPAROSCOPIC CHOLECYSTECTOMY; Surgeon:MARILYNN PRESSLEY; Location:[facility] OR     Hernia repair Right      Inguinal     Family History:  Family History   Problem Relation Age of Onset     Hypertension Mother      DIABETES Father      Social History:  Social History     Social History     Marital status:      Spouse name: N/A     Number of children: N/A     Years of education: N/A     Occupational History     Not on file.     Social History Main Topics     Smoking status: Never Smoker     Smokeless tobacco: Not on file     Alcohol use No     Drug use: No     Sexual activity: Not on file     Other Topics Concern     Not on file     Social History Narrative " "    Allergies:  No Known Allergies    Outpatient Medications:    Current Outpatient Prescriptions on File Prior to Visit:  atorvastatin (LIPITOR) 80 MG tablet Take 80 mg by mouth   metFORMIN (GLUCOPHAGE-XR) 500 MG 24 hr tablet    losartan (COZAAR) 100 MG tablet Take 1 tablet by mouth daily   carvedilol (COREG) 6.25 MG tablet Take 6.25 mg by mouth 2 times daily (with meals).   aspirin 325 MG tablet Take 325 mg by mouth daily.   FUROSEMIDE PO Take 40 mg by mouth daily.   MULTIPLE VITAMIN PO Take 1 tablet by mouth daily.     No current facility-administered medications on file prior to visit.   Current Outpatient Prescriptions   Medication Sig Dispense Refill     atorvastatin (LIPITOR) 80 MG tablet Take 80 mg by mouth       metFORMIN (GLUCOPHAGE-XR) 500 MG 24 hr tablet        losartan (COZAAR) 100 MG tablet Take 1 tablet by mouth daily       carvedilol (COREG) 6.25 MG tablet Take 6.25 mg by mouth 2 times daily (with meals).       aspirin 325 MG tablet Take 325 mg by mouth daily.       FUROSEMIDE PO Take 40 mg by mouth daily.       MULTIPLE VITAMIN PO Take 1 tablet by mouth daily.       Review of Systems (14 point review):  Pos for bulge at umbilicus and Left groin    Physical Exam:  Vitals: T: 97.3, P: 92, 132/85, R: Data Unavailable  Height: 5' 6\" Weight: 133 lbs 9.6 oz   Body mass index is 21.56 kg/(m^2).    General: Middle-aged M, comfortable, NAD    HEENT: <Head> NC/AT  <Eyes> Sclera anicteric, PERRLA, EOMI  <Ears> Pinna symmetric, nontender auditory canal, TMs intact b/l  <Nose> Atraumatic, no nasal discharge, WNL  <Throat> Oropharynx WNL, mucous membranes moist and pink, no petechiae, edema or exudates.  Uvula and tongue midline, good dentition    Neck:  Supple, no neck masses, no cervical or posterior lymphadenopathy   Thyroid midline, no thyromegaly or palpable nodules, no acanthosis nigricans    Cardiac: S1 S2 auscultated, no MRG.  No cardiac bruits appreciated.    Pulmonary: Clear to auscultation b/l, no WRR. "  Tactile fremitus WNL,  No dullness to percussion.    Chest/Back: Nontender chest, symmetrical rib cage, atraumatic.  No CVA tenderness.    Breast/Axilla: Symmetrical breast tissue, no breast lumps, no nipple discharge.        Axilla:  No supraclavicular or axillary lymphadenopathy.    Abdominal:    Obese, S/NT/ND   Bowel sounds: normal   Umbilical hernia present ~ 2cm in diameter at fascia; reducible & soft    Pelvic/Groin: Eight incisional scar, well healed.  Left Groin with large hernia present; partially reducible, nontender.  No femoral lymphadenopathy.    Genitourinary: External genitalia WNL for age.  Testes descended b/l, nontender.    Rectal:  Deferred    Extremity: Warm, well-perfused, no CCE    Vascular: +2 femoral, popliteal, PT/DP pulses b/l    Neurologic: Alert and oriented x3.  GCS 15.  Cranial Nerves II-XII grossly intact.    Gait WNL.    Psych: Appropriate affect     Imaging Data (I have personally reviewed the following):  None    Impression/Recommendations  62 yo M presenting for evaluation of umbilical and Left inguinal hernia.  Pt has not had obstructing symptoms or pain.  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      1. PACS referral  2. Schedule for both hernias to be repaired in open manner with mesh    Details of this case discussed in detail with:  __Pt.__    Total time spent counselling patient and/or family >50% of visit time. __25 mins__    Total visit time: 35 mins    H. Tamara Tobias MD  Pager - [phone]  Office - [phone]  Critical Care & Acute Care Surgery

## 2017-03-15 NOTE — NURSING NOTE
"Chief Complaint   Patient presents with     Consult     consult       Vitals:    03/15/17 1113   BP: 132/85   Pulse: 92   Temp: 97.3  F (36.3  C)   SpO2: 94%   Weight: 133 lb 9.6 oz   Height: 5' 6\"       Body mass index is 21.56 kg/(m^2).      Barbara Francis MA                          "

## 2017-03-15 NOTE — MR AVS SNAPSHOT
After Visit Summary   3/15/2017    Anson Manriquez    MRN: [id number]           Patient Information     Date Of Birth          1955        Visit Information        Provider Department      3/15/2017 11:30 AM Shwetha Tobias MD [facility] Surgery        Today's Diagnoses     Umbilical hernia without obstruction and without gangrene    -  1    Left inguinal hernia          Care Instructions    Open Inguinal and Umbilical Hernia Repair Teaching Sheet      1.  Wound care:  Remove gauze dressing 24 hours after surgery.  Leave the medical tape (Steri-strips) in place.  The tape should stay on for 5-7 days.  You may remove the Steri-Strips after one week.   Please wash your hands before touching your incisions or removing dressings.    2.  You may resume all of your home medications after surgery.  Please do not start Aspirin or blood thinners until the day after surgery.    3.  You may shower 24 hours after surgery. Do not submerge yourself in water for 7 days (bath, whirlpool, hot tub, pool, lake, etc).      4.  Restrictions:  No lifting in excess of 20 pounds for 3-4 weeks from the date of surgery.    5.  Pain management:  Apply ice pack to groin for 24 hours protecting the skin with a cloth.  Take prescribed pain medication as directed and only as needed.  Please do not take any additional Acetaminophen or Tylenol products while taking narcotic pain medications.  We encourage the use of Ibuprofen, Advil, or Motrin after surgery except if you have an allergy, ulcer, kidney problems, or it is contraindicated by a provider.  A TAP Block may be recommended the day of surgery (see information sheet below).    6.  Athletic supporter (male):  Wear for the first 3 days.  You may wear longer if you wish.    7.  Our office will call you 2-4 days after your procedure to review post-op teaching, answer questions, and help arrange after surgery care.    8.  Watch for signs of infection:  Redness of the  wound, drainage, increasing pain, and/or fever/chills (greater than 101 degrees F).    9.  Constipation:  Please take prescribed stool softener as directed.  You may stop taking it when you are no longer taking narcotic pain medications and your bowels have returned to normal.  If you become constipated it is safe to take an over-the-counter laxative as directed on the bottle.    10. Driving:  You may drive  when you are no longer taking prescription pain medications  and you feel comfortable operating a vehicle.       11. Diet:  You may resume your regular diet after surgery.      If you have questions or concerns please contact our office Monday through Friday during regular office hours at [phone].  If you are calling during nonbusiness hours, the weekend, or holiday please call the hospital  at [phone] and ask for the on-call General Surgeon.                  Follow-ups after your visit        Additional Services     PAC Visit Referral (For [facility] Only)       Does this visit require an Anesthesia consult?  Yes - Evaluate for medical necessity related to one of the following conditions:  H/o heart attack- length of surgery    H&P done by: PAC      Please be aware that coverage of these services is subject to the terms and limitations of your health insurance plan.  Call member services at your health plan with any benefit or coverage questions.      Please bring the following to your appointment:  >>   Any x-rays, CTs or MRIs which have been performed.  Contact the facility where they were done to arrange for  prior to your scheduled appointment.  Any new CT, MRI or other procedures ordered by your specialist must be performed at a [facility] facility or coordinated by your clinic's referral office.    >>   List of current medications  >>   This referral request   >>   Any documents/labs given to you for this referral                  Your next 10 appointments already scheduled     Apr 03,  2017  4:00 PM CDT   (Arrive by 3:45 PM)   New Patient Visit with Leax Ching MD   [facility] ([facility])    [address] [place] [id number] [phone]            Apr 17, 2017  1:00 PM CDT   (Arrive by 12:45 PM)   Hearing Evaluation with Lawanda Toledo   [facility] ([facility])    [address] [place] [id number] [phone]           Please see your medical professional for ear cleaning prior to this appointment if you believe wax buildup may be an issue. All patients are required to have a physician's order stating the medical reason for the hearing test. Your doctor can send an electronic order, use their own form or we have provided a form (called Physician's Order for Audiology Services). It states that there is a medical reason for your exam. Without an order you may need to be rescheduled until the order can be obtained.            Sep 07, 2017  4:30 PM CDT   (Arrive by 4:15 PM)   Return Visit with Taryn Lou MD   [facility] ([facility])    [address] [place] [id number] [phone]              Who to contact     Please call your clinic at [phone] to:    Ask questions about your health    Make or cancel appointments    Discuss your medicines    Learn about your test results    Speak to your doctor   If you have compliments or concerns about an experience at your clinic, or if you wish to file a complaint, please contact [facility] Patient Relations at [phone] or email us at [email or web address]         Additional Information About Your Visit        "[employer]hart Information     [email or web address] is an electronic gateway that provides easy, online access to your medical records. With [email or web address], you can request a clinic  "appointment, read your test results, renew a prescription or communicate with your care team.     To sign up for infirst Healthcarehart visit the website at [email or web address]   You will be asked to enter the access code listed below, as well as some personal information. Please follow the directions to create your username and password.     Your access code is: [email or web address]  Expires: 2017  7:30 AM     Your access code will  in 90 days. If you need help or a new code, please contact your [facility] or call [phone] for assistance.        Care EveryWhere ID     This is your Care EveryWhere ID. This could be used by other organizations to access your [facility] medical records  [id number]        Your Vitals Were     Pulse Temperature Height Pulse Oximetry BMI (Body Mass Index)       92 97.3  F (36.3  C) 1.676 m (5' 6\") 94% 21.56 kg/m2        Blood Pressure from Last 3 Encounters:   03/15/17 132/85   17 122/86   17 130/84    Weight from Last 3 Encounters:   03/15/17 60.6 kg (133 lb 9.6 oz)   17 87.5 kg (192 lb 12.8 oz)   17 87.2 kg (192 lb 3.2 oz)              We Performed the Following     PAC Visit Referral (For [facility] Only)     Jeanine-Operative Worksheet (Generic)        Primary Care Provider Office Phone # Fax #    Taryn Emma Lou [phone]       [address]  [facility] [address]        Thank you!     Thank you for choosing [facility] GENERAL SURGERY  for your care. Our goal is always to provide you with excellent care. Hearing back from our patients is one way we can continue to improve our services. Please take a few minutes to complete the written survey that you may receive in the mail after your visit with us. Thank you!             Your Updated Medication List - Protect others around you: Learn how to safely use, store and throw away your medicines at www.disposemymeds.org.          This list " is accurate as of: 3/15/17 11:59 PM.  Always use your most recent med list.                   Brand Name Dispense Instructions for use    aspirin 325 MG tablet      Take 325 mg by mouth daily.       atorvastatin 80 MG tablet    LIPITOR     Take 80 mg by mouth       carvedilol 6.25 MG tablet    COREG     Take 6.25 mg by mouth 2 times daily (with meals).       FUROSEMIDE PO      Take 40 mg by mouth daily.       losartan 100 MG tablet    COZAAR     Take 1 tablet by mouth daily       metFORMIN 500 MG 24 hr tablet    GLUCOPHAGE-XR         MULTIPLE VITAMIN PO      Take 1 tablet by mouth daily.

## 2017-03-15 NOTE — LETTER
"3/15/2017       RE: Anson Manriquez  [address] [address]     Dear Colleague,    Thank you for referring your patient, Anson Manriquez, to the [facility] GENERAL SURGERY at [facility]. Please see a copy of my visit note below.    History and Physical - General Surgery Attending    NAME: Anson Manriquez,  61 year old male    PCP: Taryn Jefferson  MRN:  [id number]       Ph#: [phone]    Date: March 15, 2017    History of Present Illness: Anson Manriquez 61 year old male with prediabetes and s/p AMI with subsequent stents presenting for evaluation of umbilical and Left groin hernias.  Pt states he has no pain or obstructive symptoms from either hernia.  His groin hernia has been present for \"many years\" but has not been bothersome.  Left groin hernia has gotten large over past year.  It spontaneously reduces and is not painful.  No urinary symptoms or testicular pain.  He had the Right inguinal hernia repaired several years ago.    Past Medical History:  Past Medical History   Diagnosis Date     Chronic infection      near rectum still leaking     Coronary artery disease      Diabetes mellitus, type 2 (H)      Heart attack (H) 4/17/2007     Hyperlipidemia      Hypertension      Stented coronary artery 2007     Past Surgical History:  Past Surgical History   Procedure Laterality Date     Stent, coronary, sara       Irrigation and debridement rectum, combined       abcess near rectum      Laparoscopic cholecystectomy  3/29/2012     Procedure:LAPAROSCOPIC CHOLECYSTECTOMY; LAPAROSCOPIC CHOLECYSTECTOMY; Surgeon:MARILYNN PRESSLEY; Location:[facility] OR     Hernia repair Right      Inguinal     Family History:  Family History   Problem Relation Age of Onset     Hypertension Mother      DIABETES Father      Social History:  Social History     Social History     Marital status:      Spouse name: N/A     Number of children: N/A     Years of education: N/A " "    Occupational History     Not on file.     Social History Main Topics     Smoking status: Never Smoker     Smokeless tobacco: Not on file     Alcohol use No     Drug use: No     Sexual activity: Not on file     Other Topics Concern     Not on file     Social History Narrative     Allergies:  No Known Allergies    Outpatient Medications:    Current Outpatient Prescriptions on File Prior to Visit:  atorvastatin (LIPITOR) 80 MG tablet Take 80 mg by mouth   metFORMIN (GLUCOPHAGE-XR) 500 MG 24 hr tablet    losartan (COZAAR) 100 MG tablet Take 1 tablet by mouth daily   carvedilol (COREG) 6.25 MG tablet Take 6.25 mg by mouth 2 times daily (with meals).   aspirin 325 MG tablet Take 325 mg by mouth daily.   FUROSEMIDE PO Take 40 mg by mouth daily.   MULTIPLE VITAMIN PO Take 1 tablet by mouth daily.     No current facility-administered medications on file prior to visit.   Current Outpatient Prescriptions   Medication Sig Dispense Refill     atorvastatin (LIPITOR) 80 MG tablet Take 80 mg by mouth       metFORMIN (GLUCOPHAGE-XR) 500 MG 24 hr tablet        losartan (COZAAR) 100 MG tablet Take 1 tablet by mouth daily       carvedilol (COREG) 6.25 MG tablet Take 6.25 mg by mouth 2 times daily (with meals).       aspirin 325 MG tablet Take 325 mg by mouth daily.       FUROSEMIDE PO Take 40 mg by mouth daily.       MULTIPLE VITAMIN PO Take 1 tablet by mouth daily.       Review of Systems (14 point review):  Pos for bulge at umbilicus and Left groin    Physical Exam:  Vitals: T: 97.3, P: 92, 132/85, R: Data Unavailable  Height: 5' 6\" Weight: 133 lbs 9.6 oz   Body mass index is 21.56 kg/(m^2).    General: Middle-aged M, comfortable, NAD    HEENT: <Head> NC/AT  <Eyes> Sclera anicteric, PERRLA, EOMI  <Ears> Pinna symmetric, nontender auditory canal, TMs intact b/l  <Nose> Atraumatic, no nasal discharge, WNL  <Throat> Oropharynx WNL, mucous membranes moist and pink, no petechiae, edema or exudates.  Uvula and tongue midline, good " dentition    Neck:  Supple, no neck masses, no cervical or posterior lymphadenopathy   Thyroid midline, no thyromegaly or palpable nodules, no acanthosis nigricans    Cardiac: S1 S2 auscultated, no MRG.  No cardiac bruits appreciated.    Pulmonary: Clear to auscultation b/l, no WRR.  Tactile fremitus WNL,  No dullness to percussion.    Chest/Back: Nontender chest, symmetrical rib cage, atraumatic.  No CVA tenderness.    Breast/Axilla: Symmetrical breast tissue, no breast lumps, no nipple discharge.        Axilla:  No supraclavicular or axillary lymphadenopathy.    Abdominal:    Obese, S/NT/ND   Bowel sounds: normal   Umbilical hernia present ~ 2cm in diameter at fascia; reducible & soft    Pelvic/Groin: Eight incisional scar, well healed.  Left Groin with large hernia present; partially reducible, nontender.  No femoral lymphadenopathy.    Genitourinary: External genitalia WNL for age.  Testes descended b/l, nontender.    Rectal:  Deferred    Extremity: Warm, well-perfused, no CCE    Vascular: +2 femoral, popliteal, PT/DP pulses b/l    Neurologic: Alert and oriented x3.  GCS 15.  Cranial Nerves II-XII grossly intact.    Gait WNL.    Psych: Appropriate affect     Imaging Data (I have personally reviewed the following):  None    Impression/Recommendations  62 yo M presenting for evaluation of umbilical and Left inguinal hernia.  Pt has not had obstructing symptoms or pain.  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      1. PACS referral  2. Schedule for both hernias to be repaired in open manner with mesh    Details of this case discussed in detail with:  __Pt.__    Total time spent counselling patient and/or family >50% of visit time. __25 mins__    Total visit time: 35 mins    H. Tamara Tobias MD  Pager - [phone]  Office - [phone]  Critical Care & Acute Care Surgery

## 2017-03-15 NOTE — PATIENT INSTRUCTIONS
Open Inguinal and Umbilical Hernia Repair Teaching Sheet      1.  Wound care:  Remove gauze dressing 24 hours after surgery.  Leave the medical tape (Steri-strips) in place.  The tape should stay on for 5-7 days.  You may remove the Steri-Strips after one week.   Please wash your hands before touching your incisions or removing dressings.    2.  You may resume all of your home medications after surgery.  Please do not start Aspirin or blood thinners until the day after surgery.    3.  You may shower 24 hours after surgery. Do not submerge yourself in water for 7 days (bath, whirlpool, hot tub, pool, lake, etc).      4.  Restrictions:  No lifting in excess of 20 pounds for 3-4 weeks from the date of surgery.    5.  Pain management:  Apply ice pack to groin for 24 hours protecting the skin with a cloth.  Take prescribed pain medication as directed and only as needed.  Please do not take any additional Acetaminophen or Tylenol products while taking narcotic pain medications.  We encourage the use of Ibuprofen, Advil, or Motrin after surgery except if you have an allergy, ulcer, kidney problems, or it is contraindicated by a provider.  A TAP Block may be recommended the day of surgery (see information sheet below).    6.  Athletic supporter (male):  Wear for the first 3 days.  You may wear longer if you wish.    7.  Our office will call you 2-4 days after your procedure to review post-op teaching, answer questions, and help arrange after surgery care.    8.  Watch for signs of infection:  Redness of the wound, drainage, increasing pain, and/or fever/chills (greater than 101 degrees F).    9.  Constipation:  Please take prescribed stool softener as directed.  You may stop taking it when you are no longer taking narcotic pain medications and your bowels have returned to normal.  If you become constipated it is safe to take an over-the-counter laxative as directed on the bottle.    10. Driving:  You may drive  when you are  no longer taking prescription pain medications  and you feel comfortable operating a vehicle.       11. Diet:  You may resume your regular diet after surgery.      If you have questions or concerns please contact our office Monday through Friday during regular office hours at [phone].  If you are calling during nonbusiness hours, the weekend, or holiday please call the hospital  at [phone] and ask for the on-call General Surgeon.

## 2017-03-30 ENCOUNTER — TELEPHONE (OUTPATIENT)
Dept: INTERNAL MEDICINE | Facility: CLINIC | Age: 62
End: 2017-03-30

## 2017-03-30 DIAGNOSIS — E11.9 DIABETES MELLITUS, TYPE 2 (H): Primary | ICD-10-CM

## 2017-03-30 RX ORDER — METFORMIN HCL 500 MG
500 TABLET, EXTENDED RELEASE 24 HR ORAL
Qty: 90 TABLET | Refills: 0 | Status: SHIPPED | OUTPATIENT
Start: 2017-03-30 | End: 2017-04-14

## 2017-03-30 NOTE — TELEPHONE ENCOUNTER
Clinic Action Needed: Yes. Please call patient at [phone]. OK to leave a message.    Reason for Call: Patient states his prescribed medications were to be transferred to a new pharmacy and not all were received at the pharmacy.     Patient is not certain of which Pharmacy and  which medications are missing, except Metformin. Patient states he is almost out of his medications and also is leaving for out of town tomorrow.      Patient has pharmacy phone number: [phone]. Writer attempted to verify with patient the pharmacy address, but  Patient is not certain if  the new pharmacy is the [facility] at  [address] [place] [address]. [place]       Patient Recommendations/Teaching:Please call back if you do not hear from clinic or any further concerns.       Routed to: RN Pool.     Thank you.     Yanni Fermin RN [facility]

## 2017-04-14 ENCOUNTER — TELEPHONE (OUTPATIENT)
Dept: INTERNAL MEDICINE | Facility: CLINIC | Age: 62
End: 2017-04-14

## 2017-04-14 DIAGNOSIS — E11.9 DIABETES MELLITUS, TYPE 2 (H): ICD-10-CM

## 2017-04-14 RX ORDER — METFORMIN HCL 500 MG
1000 TABLET, EXTENDED RELEASE 24 HR ORAL
Qty: 180 TABLET | Refills: 1 | Status: SHIPPED | OUTPATIENT
Start: 2017-04-14 | End: 2018-05-17

## 2017-04-14 NOTE — TELEPHONE ENCOUNTER
Spoke with pt, stated that he noticed his Metformin direction has changed to 1 tablet a day with his new refill dated 3/30/17 . Pt said he has been taking Metformin 500 mg 2 tablets daily up til today.  Discussed with  and authorized pt to go back to metformin 500 mg 2 tablets daily as before, new Rx sent to the pharmacy and pt notified.  Dulce Nevarez RN  -------------------------  .      ----- Message from Barbra Reyna sent at 4/14/2017  8:22 AM CDT -----  Regarding: Metformin change  Contact: [phone]  Patient calling stating he use to take 2 metformin pills and when he saw Dr. Cadet, she wrote it for 1 pill a day.  He is calling to find out why

## 2017-04-17 ENCOUNTER — OFFICE VISIT (OUTPATIENT)
Dept: AUDIOLOGY | Facility: CLINIC | Age: 62
End: 2017-04-17

## 2017-04-17 ENCOUNTER — TELEPHONE (OUTPATIENT)
Dept: NURSING | Facility: CLINIC | Age: 62
End: 2017-04-17

## 2017-04-17 DIAGNOSIS — H90.3 SENSORINEURAL HEARING LOSS, BILATERAL: Primary | ICD-10-CM

## 2017-04-17 NOTE — MR AVS SNAPSHOT
After Visit Summary   4/17/2017    Anson Manriquez    MRN: [id number]           Patient Information     Date Of Birth          1955        Visit Information        Provider Department      4/17/2017 1:00 PM Alexandra Khan AuD M [facility] Audiology         Follow-ups after your visit        Your next 10 appointments already scheduled     May 18, 2017  8:00 AM CDT   Hearing Aid Fitting with Lawanda Toledo [facility] ([facility])    [address] [place] [id number] [phone]            Jun 08, 2017  9:00 AM CDT   (Arrive by 8:45 AM)   Initial Review Program with Lawanda Toledo [facility] ([facility])    [address] [place] [id number] [phone]            Sep 07, 2017  4:30 PM CDT   (Arrive by 4:15 PM)   Return Visit with Taryn Lou MD   [facility] ([facility])    [address] [place] [id number] [phone]              Who to contact     Please call your clinic at [phone] to:    Ask questions about your health    Make or cancel appointments    Discuss your medicines    Learn about your test results    Speak to your doctor   If you have compliments or concerns about an experience at your clinic, or if you wish to file a complaint, please contact [facility] Patient Relations at [phone] or email us at [email or web address]         Additional Information About Your Visit        MyChart Information     threadsy is an electronic gateway that provides easy, online access to your medical records. With threadsy, you can request a clinic appointment, read your test results, renew a prescription or communicate with your care team.     To sign up for iRhythm [employer] visit the website at [email or web address]   You will  be asked to enter the access code listed below, as well as some personal information. Please follow the directions to create your username and password.     Your access code is: [email or web address]  Expires: 2017  7:30 AM     Your access code will  in 90 days. If you need help or a new code, please contact your [facility] or call [phone] for assistance.        Care EveryWhere ID     This is your Care EveryWhere ID. This could be used by other organizations to access your [facility] medical records  [id number]         Blood Pressure from Last 3 Encounters:   03/15/17 132/85   17 122/86   17 130/84    Weight from Last 3 Encounters:   03/15/17 60.6 kg (133 lb 9.6 oz)   17 87.5 kg (192 lb 12.8 oz)   17 87.2 kg (192 lb 3.2 oz)              Today, you had the following     No orders found for display       Primary Care Provider Office Phone # Fax #    Taryn Emma Lou [phone]       [address] [place] [address]        Thank you!     Thank you for choosing [facility] AUDIOLOGY  for your care. Our goal is always to provide you with excellent care. Hearing back from our patients is one way we can continue to improve our services. Please take a few minutes to complete the written survey that you may receive in the mail after your visit with us. Thank you!             Your Updated Medication List - Protect others around you: Learn how to safely use, store and throw away your medicines at www.disposemymeds.org.          This list is accurate as of: 17  2:24 PM.  Always use your most recent med list.                   Brand Name Dispense Instructions for use    aspirin 325 MG tablet      Take 325 mg by mouth daily.       atorvastatin 80 MG tablet    LIPITOR     Take 80 mg by mouth       carvedilol 6.25 MG tablet    COREG     Take 6.25 mg by mouth 2 times daily (with meals).       FUROSEMIDE PO       Take 40 mg by mouth daily.       losartan 100 MG tablet    COZAAR     Take 1 tablet by mouth daily       metFORMIN 500 MG 24 hr tablet    GLUCOPHAGE-XR    180 tablet    Take 2 tablets (1,000 mg) by mouth daily (with dinner)       MULTIPLE VITAMIN PO      Take 1 tablet by mouth daily.
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
## 2017-04-17 NOTE — TELEPHONE ENCOUNTER
Where is the audiology test done?  Please call him to give him directions. His appointment is at 12:45 today.   Leatha Dyer RN-[facility] Nurse Advisors

## 2017-05-18 ENCOUNTER — OFFICE VISIT (OUTPATIENT)
Dept: AUDIOLOGY | Facility: CLINIC | Age: 62
End: 2017-05-18

## 2017-05-18 DIAGNOSIS — H90.3 SENSORINEURAL HEARING LOSS, BILATERAL: Primary | ICD-10-CM

## 2017-05-18 NOTE — PROGRESS NOTES
AUDIOLOGY REPORT    SUBJECTIVE: Anson Manriquez is a 61 year old male who was seen in the Audiology Clinic at the [facility] for a fitting of binaural Signia Pure Px5 RITE hearing aids. Previous results have revealed a bilateral mild to moderately-severe sensorineural hearing loss with asymmetry noted at 2 kHz and asymmetry noted in the word recognition score.  An ENT appointment was recommended regarding the asymmetry but patient refused. The patient signed a waiver for medical examination prior to a hearing aid fitting.  He reports that he previously tried hearing aids but did not like them and they were not comfortable.    OBJECTIVE: A listening check revealed that the hearing aids sounded crisp and clear with no distortion or weakness noted.  The hearing aid conformity evaluation was completed.The hearing aids were placed and they provided a good fit. Real-ear-probe-microphone measurements were completed on the [employer] system and were a good match to NAL-NL1 target with soft sounds audible, moderate sounds comfortable, and loud sounds below discomfort. UCLs are verified through maximum power output measures and demonstrate appropriate limiting of loud inputs. Anson was oriented to proper hearing aid use, care, cleaning (no water, dry brush), batteries (size 312, insertion/removal, toxicity, low-battery signal), aid insertion/removal, user booklet, warranty information, storage cases, and other hearing aid details. The patient confirmed understanding of hearing aid use and care, and showed proper insertion of hearing aid and batteries while in the office today. Anson reported good volume and sound quality today. He reported that they were much more comfortable than his previous hearing aids.  Hearing aids were programmed as follows:  Program 1:Everyday  EARS FIT: Binaural  HEARING AID MODEL NAME:  Signia Pure 5px  HEARING AID STYLE: RITE  EARMOLDS/TIP/ LINK: Medium sleeve  click dome  SERIAL NUMBERS: Right: [id number]; Left: [id number]  WARRANTY END DATE: 5/18/2020    Anson did not want to set-up the phone application at today's visit in order to keep it simple for the first few weeks.    ASSESSMENT: Binaural hearing aids were fit today. Verification measures were performed. Anson signed the Hearing Aid Purchase Agreement and was given a copy, as well as details on his hearing aids.    PLAN:Anson will return for follow-up in 2-3 weeks for a hearing aid review appointment.  At the following appointment, use and cleaning of the hearing aids will be reviewed and the phone application will be reviewed.   Please call this clinic with questions regarding today s appointment.    Lawanda Toledo  Audiologist  [place] License  #[id number]

## 2017-05-18 NOTE — MR AVS SNAPSHOT
After Visit Summary   5/18/2017    Anson Manriquez    MRN: [id number]           Patient Information     Date Of Birth          1955        Visit Information        Provider Department      5/18/2017 8:00 AM Alexandra Khan AuD M [facility] Audiology        Today's Diagnoses     Sensorineural hearing loss, bilateral    -  1       Follow-ups after your visit        Your next 10 appointments already scheduled     Jun 08, 2017  9:00 AM CDT   (Arrive by 8:45 AM)   Initial Review Program with Lawanda Toledo [facility] Audiology ([facility])    [address] [place] [id number] [phone]            Sep 07, 2017  4:30 PM CDT   (Arrive by 4:15 PM)   Return Visit with Taryn Lou MD   [facility] ([facility])    [address] [place] [id number] [phone]              Who to contact     Please call your clinic at [phone] to:    Ask questions about your health    Make or cancel appointments    Discuss your medicines    Learn about your test results    Speak to your doctor   If you have compliments or concerns about an experience at your clinic, or if you wish to file a complaint, please contact [facility] Patient Relations at [phone] or email us at [email or web address]         Additional Information About Your Visit        MyChart Information     GPB Scientifict is an electronic gateway that provides easy, online access to your medical records. With [employer], you can request a clinic appointment, read your test results, renew a prescription or communicate with your care team.     To sign up for [employer] visit the website at [email or web address]   You will be asked to enter the access code listed below, as well as some personal information. Please follow the directions to create your username and password.      Your access code is: [email or web address]  Expires: 2017  7:30 AM     Your access code will  in 90 days. If you need help or a new code, please contact your [facility] or call [phone] for assistance.        Care EveryWhere ID     This is your Care EveryWhere ID. This could be used by other organizations to access your [place] medical records  [id number]         Blood Pressure from Last 3 Encounters:   03/15/17 132/85   17 122/86   17 130/84    Weight from Last 3 Encounters:   03/15/17 60.6 kg (133 lb 9.6 oz)   17 87.5 kg (192 lb 12.8 oz)   17 87.2 kg (192 lb 3.2 oz)              We Performed the Following     Hearing Aid Fitting        Primary Care Provider Office Phone # Fax #    Taryn Emma Lou [phone]       [address] [address]        Thank you!     Thank you for choosing [facility] AUDIOLOGY  for your care. Our goal is always to provide you with excellent care. Hearing back from our patients is one way we can continue to improve our services. Please take a few minutes to complete the written survey that you may receive in the mail after your visit with us. Thank you!             Your Updated Medication List - Protect others around you: Learn how to safely use, store and throw away your medicines at www.disposemymeds.org.          This list is accurate as of: 17  5:58 PM.  Always use your most recent med list.                   Brand Name Dispense Instructions for use    aspirin 325 MG tablet      Take 325 mg by mouth daily.       atorvastatin 80 MG tablet    LIPITOR     Take 80 mg by mouth       carvedilol 6.25 MG tablet    COREG     Take 6.25 mg by mouth 2 times daily (with meals).       FUROSEMIDE PO      Take 40 mg by mouth daily.       losartan 100 MG tablet    COZAAR     Take 1 tablet by mouth daily       metFORMIN 500 MG 24 hr tablet    GLUCOPHAGE-XR    180 tablet     Take 2 tablets (1,000 mg) by mouth daily (with dinner)       MULTIPLE VITAMIN PO      Take 1 tablet by mouth daily.

## 2017-06-08 ENCOUNTER — OFFICE VISIT (OUTPATIENT)
Dept: AUDIOLOGY | Facility: CLINIC | Age: 62
End: 2017-06-08

## 2017-06-08 DIAGNOSIS — H90.3 SENSORINEURAL HEARING LOSS, ASYMMETRICAL: Primary | ICD-10-CM

## 2017-06-08 NOTE — PROGRESS NOTES
AUDIOLOGY REPORT    BACKGROUND INFORMATION: Anson Manriquez was seen in the Audiology Clinic at the [facility] [place] on 6/8/2017 for follow-up.  The patient has been seen previously in this clinic on 4/17/2017 and results revealed a mild to moderately-severe sensorineural hearing loss in the right ear and a moderate to moderately-severe sensorineural hearing loss in the left ear.  The patient was fit with binaural Signia Pure 5px RITE hearing aids on 5/18/17.  The patient reports that things are going very well with the hearing aids.  He is very happy with the sound quality and is receiving benefit with the hearing aids.    TEST RESULTS AND PROCEDURES: A first follow-up was performed.  No adjustments were made at today's appointment.  The Flams[employer] Touch Control rodna was installed on his Android phone and a brief tutorial was performed.  A review of cleaning and hearing aid maintenance was performed, Anson was also given extra domes.    SUMMARY AND RECOMMENDATIONS: A first follow-up was performed today and the patient reports that things are going well.  He expressed that he will be keeping the hearing aids.  Patient was orientated to the phone ronda.  It was recommended that he return every 6-12 months for hearing aid programming and maintenance, sooner if concerns arise.  Call this clinic with questions regarding today s visit.    Lawanda Toledo  Audiologist  [place] License  #[id number]

## 2017-06-08 NOTE — MR AVS SNAPSHOT
After Visit Summary   2017    Anson Manriquez    MRN: [id number]           Patient Information     Date Of Birth          1955        Visit Information        Provider Department      2017 9:00 AM Alexandra Khan [facility] Audiology        Today's Diagnoses     Sensorineural hearing loss, asymmetrical    -  1       Follow-ups after your visit        Your next 10 appointments already scheduled     Sep 07, 2017  4:30 PM CDT   (Arrive by 4:15 PM)   Return Visit with Taryn Lou MD   [facility] ([facility])    [address] [place] [id number] [phone]              Who to contact     Please call your clinic at [phone] to:    Ask questions about your health    Make or cancel appointments    Discuss your medicines    Learn about your test results    Speak to your doctor   If you have compliments or concerns about an experience at your clinic, or if you wish to file a complaint, please contact [facility] Patient Relations at [phone] or email us at [email or web address]         Additional Information About Your Visit        MyChart Information     Uevoct is an electronic gateway that provides easy, online access to your medical records. With [employer], you can request a clinic appointment, read your test results, renew a prescription or communicate with your care team.     To sign up for [employer] visit the website at [email or web address]   You will be asked to enter the access code listed below, as well as some personal information. Please follow the directions to create your username and password.     Your access code is: [email or web address]  Expires: 2017  1:05 PM     Your access code will  in 90 days. If you need help or a new code, please contact your [facility] or call [phone] for assistance.        Care  EveryWhere ID     This is your Care EveryWhere ID. This could be used by other organizations to access your [place] medical records  [id number]         Blood Pressure from Last 3 Encounters:   03/15/17 132/85   03/14/17 122/86   03/07/17 130/84    Weight from Last 3 Encounters:   03/15/17 60.6 kg (133 lb 9.6 oz)   03/14/17 87.5 kg (192 lb 12.8 oz)   03/07/17 87.2 kg (192 lb 3.2 oz)              We Performed the Following     No Charge, Hearing Aid Clinic Visit        Primary Care Provider Office Phone # Fax #    Taryn Emma Lou [phone]       [address] [place] [address]        Thank you!     Thank you for choosing [facility] AUDIOLOGY  for your care. Our goal is always to provide you with excellent care. Hearing back from our patients is one way we can continue to improve our services. Please take a few minutes to complete the written survey that you may receive in the mail after your visit with us. Thank you!             Your Updated Medication List - Protect others around you: Learn how to safely use, store and throw away your medicines at www.disposemymeds.org.          This list is accurate as of: 6/8/17  1:05 PM.  Always use your most recent med list.                   Brand Name Dispense Instructions for use    aspirin 325 MG tablet      Take 325 mg by mouth daily.       atorvastatin 80 MG tablet    LIPITOR     Take 80 mg by mouth       carvedilol 6.25 MG tablet    COREG     Take 6.25 mg by mouth 2 times daily (with meals).       FUROSEMIDE PO      Take 40 mg by mouth daily.       losartan 100 MG tablet    COZAAR     Take 1 tablet by mouth daily       metFORMIN 500 MG 24 hr tablet    GLUCOPHAGE-XR    180 tablet    Take 2 tablets (1,000 mg) by mouth daily (with dinner)       MULTIPLE VITAMIN PO      Take 1 tablet by mouth daily.

## 2017-07-27 ENCOUNTER — NURSE TRIAGE (OUTPATIENT)
Dept: NURSING | Facility: CLINIC | Age: 62
End: 2017-07-27

## 2017-07-27 ENCOUNTER — TELEPHONE (OUTPATIENT)
Dept: NURSING | Facility: CLINIC | Age: 62
End: 2017-07-27

## 2017-07-27 DIAGNOSIS — E78.2 MIXED HYPERLIPIDEMIA: ICD-10-CM

## 2017-07-27 DIAGNOSIS — E11.9 TYPE 2 DIABETES MELLITUS WITHOUT COMPLICATION, WITHOUT LONG-TERM CURRENT USE OF INSULIN (H): Primary | ICD-10-CM

## 2017-07-27 DIAGNOSIS — I10 BENIGN ESSENTIAL HYPERTENSION: ICD-10-CM

## 2017-07-27 NOTE — TELEPHONE ENCOUNTER
Epic encounter sent to [facility], high priority.  Leatha Dyer RN-[facility]

## 2017-07-27 NOTE — TELEPHONE ENCOUNTER
atorvastatin (LIPITOR) 80 MG tablet   Last Written Prescription Date: 7/27/16  Last Fill Quantity: unknown,   # refills: unknown  Last Office Visit with [facility] or [facility] prescribing provider: 3/7/17  Future Office visit:   9/8/17    Routing refill request to provider for review/approval because:   atorvastatin (LIPITOR) 80 MG tablet. On med list as historical. If approved need MD entry, dx, qty, #rfs.  please verify entry.  thanks.

## 2017-07-27 NOTE — TELEPHONE ENCOUNTER
Losartan and lipitor med refills needed. Please call him.  Leatha Dyer RN-[facility] Nurse [facility]

## 2017-07-28 RX ORDER — LOSARTAN POTASSIUM 100 MG/1
100 TABLET ORAL DAILY
Qty: 90 TABLET | Refills: 1 | Status: SHIPPED | OUTPATIENT
Start: 2017-07-28 | End: 2018-03-27

## 2017-07-28 RX ORDER — ATORVASTATIN CALCIUM 80 MG/1
80 TABLET, FILM COATED ORAL DAILY
Qty: 90 TABLET | Refills: 1 | Status: SHIPPED | OUTPATIENT
Start: 2017-07-28 | End: 2018-02-16

## 2017-09-18 ENCOUNTER — OFFICE VISIT (OUTPATIENT)
Dept: INTERNAL MEDICINE | Facility: CLINIC | Age: 62
End: 2017-09-18

## 2017-09-18 VITALS
HEART RATE: 99 BPM | SYSTOLIC BLOOD PRESSURE: 133 MMHG | WEIGHT: 189 LBS | BODY MASS INDEX: 30.51 KG/M2 | DIASTOLIC BLOOD PRESSURE: 86 MMHG | RESPIRATION RATE: 18 BRPM

## 2017-09-18 DIAGNOSIS — E11.9 TYPE 2 DIABETES MELLITUS WITHOUT COMPLICATION, WITHOUT LONG-TERM CURRENT USE OF INSULIN (H): ICD-10-CM

## 2017-09-18 DIAGNOSIS — Z23 NEED FOR INFLUENZA VACCINATION: Primary | ICD-10-CM

## 2017-09-18 LAB — HBA1C MFR BLD: 7.2 % (ref 4.3–6)

## 2017-09-18 ASSESSMENT — PAIN SCALES - GENERAL: PAINLEVEL: NO PAIN (0)

## 2017-09-18 NOTE — PROGRESS NOTES
PRIMARY CARE CENTER       SUBJECTIVE:  Anson Manriquez is a 61 year old male with a PMHx of HTN and DMII presenting for follow up. He states he was initially diagnosed with prediabetes and after that stopped drinking soda. He lost 10-15 lbs and reportedly had benefit in his labs. A1C in Marchwas elevated to 7.1 so he was stated on metformin in April and has been using it since. He has no side effects from the medication and feels good.    BP today was initially elevated to 145/88. He states at home when he checks it is always in the 120s/80s. He misses medication about once a week when he forgets. He states he has not been doing as much exercise outside of work as he should and plans to increase this. Denies HA, CP, SOB or other complaints today.     Past Medical History:   Diagnosis Date     Chronic infection     near rectum still leaking     Coronary artery disease      Diabetes mellitus, type 2 (H)      Heart attack (H) 4/17/2007     Hyperlipidemia      Hypertension      Stented coronary artery 2007     Past Surgical History:   Procedure Laterality Date     HERNIA REPAIR Right     Inguinal     IRRIGATION AND DEBRIDEMENT RECTUM, COMBINED      abcess near rectum      LAPAROSCOPIC CHOLECYSTECTOMY  3/29/2012    Procedure:LAPAROSCOPIC CHOLECYSTECTOMY; LAPAROSCOPIC CHOLECYSTECTOMY; Surgeon:MARILYNN PRESSLEY; Location:[facility]     STENT, CORONARY, OLIVIA       Family History   Problem Relation Age of Onset     Hypertension Mother      DIABETES Father      Social History     Social History     Marital status:      Spouse name: N/A     Number of children: N/A     Years of education: N/A     Social History Main Topics     Smoking status: Never Smoker     Smokeless tobacco: None     Alcohol use No     Drug use: No     Sexual activity: Not Asked     Other Topics Concern     None     Social History Narrative     Current Outpatient Prescriptions   Medication     atorvastatin (LIPITOR) 80 MG tablet      losartan (COZAAR) 100 MG tablet     metFORMIN (GLUCOPHAGE-XR) 500 MG 24 hr tablet     carvedilol (COREG) 6.25 MG tablet     aspirin 325 MG tablet     FUROSEMIDE PO     MULTIPLE VITAMIN PO     No current facility-administered medications for this visit.       No Known Allergies     ROS:   Constitutional, HEENT, cardiovascular, pulmonary, gi and gu systems are negative, except as otherwise noted in HPI above      OBJECTIVE:  /88  Pulse 99  Resp 18  Wt 85.7 kg (189 lb)  BMI 30.51 kg/m2   Wt Readings from Last 1 Encounters:   09/18/17 85.7 kg (189 lb)       GENERAL APPEARANCE: healthy, alert and no distress     EYES: EOMI,  PERRL     HENT:  mouth without ulcers or lesions     NECK: no adenopathy, no asymmetry, masses, or scars and thyroid normal to palpation     RESP: lungs clear to auscultation - no rales, rhonchi or wheezes     CV: regular rate, borderline tachycardic, normal S1 S2, no S3 or S4 and no murmur, click or rub     ABDOMEN:  soft, nontender, no HSM or masses and bowel sounds normal. Reducible L of midline abdominal hernia      MS: ROM full, no edema, or deformities      SKIN: no suspicious lesions or rashes     NEURO: Normal strength and tone, sensory exam grossly normal, mentation intact and speech normal     PSYCH: mentation appears normal. and affect normal/bright    Diabetic Foot Screen:  Is there a history of foot ulcers?  no   Is there any redness or open areas? no   Does the foot have an abnormal shape? no   Are the nails thick, too long or ingrown? no     Sensation Testing done with monofilament   Right foot: Sensation to monofilament intact throughout (10/10 locations tested)  Left Foot: Sensation to monofilament intact throughout (10/10 locations tested)    Pulses  Right Dorsalis Pedis: Present  Right Posterior Tibial: Present  Left Dorsalis Pedis: Present  Left Posterior Tibial: Present      Foot exam check by: Sunshine Duran MD        ASSESSMENT/PLAN:  Anson GREG Declan is a 61 year old  male with a PMHx of HTN and DMII presenting for follow up. Problems and plans discussed today outlined below.      Type 2 diabetes mellitus without complication, without long-term current use of insulin (H)  Started on metformin earlier this year after A1c of 7.1 in March. Compliant with medication. No sx of SEs from meds. Discussed diet and exercise. Foot exam today w/o abnormalities. Refer for eye exam and will recheck a1c  -     OPHTHALMOLOGY ADULT REFERRAL  -     Hemoglobin A1c; Future     HTN  Initially elevated to 144/88, repeat BP of 136/86. Reports home pressures in 120s/80s. No change to meds at this time. Counseling on improving diet and exercise.       Need for influenza vaccination  -     FLU VACCINE PRESERVATIVE FREE, AGE >=3 YR      Pt should return to clinic for f/u with Dr. Cadet in 6 months    Sunshine Duran MD  Sep 18, 2017    Pt was seen and plan of care discussed with Dr. Cadet    Attestation:  I, Taryn Lou, saw this patient with the resident and agree with the resident s findings and plan of care as documented in the resident s note.      Taryn Lou MD

## 2017-09-18 NOTE — NURSING NOTE
Injectable Influenza Immunization Documentation      1.  Has the patient received the information for the injectable influenza vaccine? YES    2. Is the patient 6 months of age or older? YES    3. Does the patient have any of the following contraindications?          Severe allergy to eggs? No     Severe allergic reaction to previous influenza vaccines? No     Allergy to contact lens solution/thimerosol? No     History of Guillain-Stockton syndrome? No     Undergoing chemotherapy or radiation therapy?       (vaccine should be given at least 2 weeks prior or 3 weeks after)  No     Currently have moderate or severe illness? No         4.  The vaccine has been administered and the patient was instructed to wait 15 minutes before leaving the building in the event of an allergic reaction: YES      Administered Influenza Fluzone Quadrivalent (see Immunizations in Chart Review). Patient tolerated well.  Baldemar Andrews CMA at 5:56 PM on 9/18/2017

## 2017-09-18 NOTE — MR AVS SNAPSHOT
After Visit Summary   9/18/2017    Anson Manriquez    MRN: [id number]           Patient Information     Date Of Birth          1955        Visit Information        Provider Department      9/18/2017 5:00 PM Taryn Jefferson MD [facility]        Today's Diagnoses     Need for influenza vaccination    -  1    Type 2 diabetes mellitus without complication, without long-term current use of insulin (H)           Follow-ups after your visit        Additional Services     OPHTHALMOLOGY ADULT REFERRAL       Your provider has referred you to: [facility]: Eye Clinic [facility] [phone]   [email or web address]    Please be aware that coverage of these services is subject to the terms and limitations of your health insurance plan.  Call member services at your health plan with any benefit or coverage questions.      Please bring the following with you to your appointment:    (1) Any X-Rays, CTs or MRIs which have been performed.  Contact the facility where they were done to arrange for  prior to your scheduled appointment.    (2) List of current medications  (3) This referral request   (4) Any documents/labs given to you for this referral                  Future tests that were ordered for you today     Open Future Orders        Priority Expected Expires Ordered    Hemoglobin A1c Routine 9/18/2017 10/2/2017 9/18/2017            Who to contact     Please call your clinic at [phone] to:    Ask questions about your health    Make or cancel appointments    Discuss your medicines    Learn about your test results    Speak to your doctor   If you have compliments or concerns about an experience at your clinic, or if you wish to file a complaint, please contact [facility] Patient Relations at [phone] or email us at [email or web address]         Additional Information About Your Visit        Fani  Information     [employer] is an electronic gateway that provides easy, online access to your medical records. With [employer], you can request a clinic appointment, read your test results, renew a prescription or communicate with your care team.     To sign up for [employer] visit the website at [email or web address]   You will be asked to enter the access code listed below, as well as some personal information. Please follow the directions to create your username and password.     Your access code is: [email or web address]  Expires: 2017  5:50 PM     Your access code will  in 90 days. If you need help or a new code, please contact your [facility] or call [phone] for assistance.        Care EveryWhere ID     This is your Care EveryWhere ID. This could be used by other organizations to access your [facility] medical records  [id number]        Your Vitals Were     Pulse Respirations BMI (Body Mass Index)             99 18 30.51 kg/m2          Blood Pressure from Last 3 Encounters:   17 145/88   03/15/17 132/85   17 122/86    Weight from Last 3 Encounters:   17 85.7 kg (189 lb)   03/15/17 60.6 kg (133 lb 9.6 oz)   17 87.5 kg (192 lb 12.8 oz)              We Performed the Following     FLU VACCINE PRESERVATIVE FREE, AGE >=3 YR     OPHTHALMOLOGY ADULT REFERRAL        Primary Care Provider Office Phone # Fax #    Taryn Emma Lou [phone]       [address] [place] [address]        Equal Access to Services     [facility]: Hadii aad ku hadasho Soomaali, waaxda luqadaha, qaybta kaalmada israel, issac crump . So [facility] [phone].    ATENCIÓN: Si habla español, tiene a jordan disposición servicios gratuitos de asistencia lingüística. Llame al [phone].    We comply with applicable federal civil rights laws and [place] laws. We do not discriminate on the basis of race, color,  national origin, age, disability sex, sexual orientation or gender identity.            Thank you!     Thank you for choosing [facility]ARE CLINIC  for your care. Our goal is always to provide you with excellent care. Hearing back from our patients is one way we can continue to improve our services. Please take a few minutes to complete the written survey that you may receive in the mail after your visit with us. Thank you!             Your Updated Medication List - Protect others around you: Learn how to safely use, store and throw away your medicines at www.disposemymeds.org.          This list is accurate as of: 9/18/17  5:50 PM.  Always use your most recent med list.                   Brand Name Dispense Instructions for use Diagnosis    aspirin 325 MG tablet      Take 325 mg by mouth daily.        atorvastatin 80 MG tablet    LIPITOR    90 tablet    Take 1 tablet (80 mg) by mouth daily    Type 2 diabetes mellitus without complication, without long-term current use of insulin (H), Benign essential hypertension, Mixed hyperlipidemia       carvedilol 6.25 MG tablet    COREG     Take 6.25 mg by mouth 2 times daily (with meals).        FUROSEMIDE PO      Take 40 mg by mouth daily.        losartan 100 MG tablet    COZAAR    90 tablet    Take 1 tablet (100 mg) by mouth daily    Type 2 diabetes mellitus without complication, without long-term current use of insulin (H), Benign essential hypertension, Mixed hyperlipidemia       metFORMIN 500 MG 24 hr tablet    GLUCOPHAGE-XR    180 tablet    Take 2 tablets (1,000 mg) by mouth daily (with dinner)    Diabetes mellitus, type 2 (H)       MULTIPLE VITAMIN PO      Take 1 tablet by mouth daily.

## 2017-09-18 NOTE — NURSING NOTE
Chief Complaint   Patient presents with     Diabetes     Here to follow up diabetes     Baldemar Andrews CMA (AAMA) at 5:11 PM on 9/18/2017

## 2017-10-04 ENCOUNTER — OFFICE VISIT (OUTPATIENT)
Dept: OPHTHALMOLOGY | Facility: CLINIC | Age: 62
End: 2017-10-04
Attending: OPHTHALMOLOGY
Payer: COMMERCIAL

## 2017-10-04 DIAGNOSIS — E11.9 TYPE 2 DIABETES MELLITUS WITHOUT COMPLICATION, WITHOUT LONG-TERM CURRENT USE OF INSULIN (H): ICD-10-CM

## 2017-10-04 PROCEDURE — 99212 OFFICE O/P EST SF 10 MIN: CPT | Mod: ZF

## 2017-10-04 PROCEDURE — 92015 DETERMINE REFRACTIVE STATE: CPT | Mod: ZF

## 2017-10-04 ASSESSMENT — VISUAL ACUITY
OD_SC: 20/20
METHOD: SNELLEN - LINEAR
OS_SC: 20/20

## 2017-10-04 ASSESSMENT — TONOMETRY
IOP_METHOD: TONOPEN
OD_IOP_MMHG: 15
OS_IOP_MMHG: 15

## 2017-10-04 ASSESSMENT — REFRACTION_MANIFEST
OD_SPHERE: -0.50
OS_CYLINDER: +0.50
OS_SPHERE: -0.50
OD_CYLINDER: SPHERE
OS_AXIS: 100
OS_ADD: +2.50
OD_ADD: +2.50

## 2017-10-04 ASSESSMENT — CONF VISUAL FIELD
OS_NORMAL: 1
OD_NORMAL: 1
METHOD: COUNTING FINGERS

## 2017-10-04 ASSESSMENT — SLIT LAMP EXAM - LIDS
COMMENTS: NORMAL
COMMENTS: NORMAL

## 2017-10-04 ASSESSMENT — EXTERNAL EXAM - RIGHT EYE: OD_EXAM: NORMAL

## 2017-10-04 ASSESSMENT — EXTERNAL EXAM - LEFT EYE: OS_EXAM: NORMAL

## 2017-10-04 ASSESSMENT — CUP TO DISC RATIO
OS_RATIO: .6
OD_RATIO: .4

## 2017-10-04 NOTE — NURSING NOTE
"Chief Complaints and History of Present Illnesses   Patient presents with     Eye Exam For Diabetes     HPI    Affected eye(s):  Both   Symptoms:     No floaters   No flashes   No redness   No Dryness         Do you have eye pain now?:  No      Comments:  Pt states that vision is good in the distance. Pt uses OTC readers for near work. Pt notes that he plays \"Pool\" frequently and is not having trouble with his game.  DM2 BS: Pt doesn't check blood sugars.  Lab Results       Component                Value               Date                       A1C                      7.2                 09/18/2017                 A1C                      7.1                 03/07/2017                 A1C                      6.3                 09/30/2016                 A1C                      6.3                 09/30/2016                 A1C                      8.2                 08/06/2016              [facility] October 4, 2017 2:04 PM               "

## 2017-10-04 NOTE — PROGRESS NOTES
HPI  Anson Manriquez is a 62 year old male here for diabetic eye exam. He was diagnosed about a year ago and takes Metformin. He states his A1c numbers have been good. He stopped drinking soda and lost about 30 lbs. He denies changes in vision. No eye pain, redness, discharge. No flashes/floaters. He wears OTC readers occasionally.     Assessment & Plan      (E11.9) Type 2 diabetes mellitus without complication, without long-term current use of insulin (H)  Comment: Diagnosed around 2016. On oral hypoglycemics. Last a1c 7.2 9/18/17. No diabetic retinopathy.  Plan: Discussed the importance of tight blood glucose control in the prevention of diabetic retinopathy. Recommend yearly dilated eye exam.     -----------------------------------------------------------------------------------    Patient disposition:   Return in about 1 year (around 10/4/2018). or sooner as needed.    Teaching statement:  Complete documentation of historical and exam elements from today's encounter can be found in the full encounter summary report (not reduplicated in this progress note). I personally obtained the chief complaint(s) and history of present illness.  I confirmed and edited as necessary the review of systems, past medical/surgical history, family history, social history, and examination findings as documented by others; and I examined the patient myself. I personally reviewed the relevant tests, images, and reports as documented above.     I formulated and edited as necessary the assessment and plan and discussed the findings and management plan with the patient and family.    Aleyda Vitale MD  Comprehensive Ophthalmology & Ocular Pathology  Department of Ophthalmology and Visual Neurosciences  [email or web address]  Pager [phone]

## 2017-10-04 NOTE — MR AVS SNAPSHOT
After Visit Summary   10/4/2017    Anson Manriquez    MRN: [id number]           Patient Information     Date Of Birth          1955        Visit Information        Provider Department      10/4/2017 2:15 PM Aleyda Vitale MD [facility] Clinic        Today's Diagnoses     Type 2 diabetes mellitus without complication, without long-term current use of insulin (H)           Follow-ups after your visit        Follow-up notes from your care team     Return in about 1 year (around 10/4/2018).      Who to contact     Please call your clinic at [phone] to:    Ask questions about your health    Make or cancel appointments    Discuss your medicines    Learn about your test results    Speak to your doctor   If you have compliments or concerns about an experience at your clinic, or if you wish to file a complaint, please contact [facility] Patient Relations at [phone] or email us at [email or web address]         Additional Information About Your Visit        MyChart Information     [employer] is an electronic gateway that provides easy, online access to your medical records. With [employer], you can request a clinic appointment, read your test results, renew a prescription or communicate with your care team.     To sign up for [employer] visit the website at [email or web address]   You will be asked to enter the access code listed below, as well as some personal information. Please follow the directions to create your username and password.     Your access code is: [email or web address]  Expires: 2017  5:50 PM     Your access code will  in 90 days. If you need help or a new code, please contact your [facility] or call [phone] for assistance.        Care EveryWhere ID     This is your Care EveryWhere ID. This could be used by other organizations to access your [place] medical records  [id number]         Blood Pressure from Last 3  Encounters:   09/18/17 133/86   03/15/17 132/85   03/14/17 122/86    Weight from Last 3 Encounters:   09/18/17 85.7 kg (189 lb)   03/15/17 60.6 kg (133 lb 9.6 oz)   03/14/17 87.5 kg (192 lb 12.8 oz)              Today, you had the following     No orders found for display       Primary Care Provider Office Phone # Fax #    Taryn Emma Chichi Lou [phone]       [address] [place] [address] [address] [place] [address]        Equal Access to Services     [facility]: Hadii luis esteves hadfelipe Soyvette, wajacida mela, qaybta kaalmada israel, issac crump . So [facility] [phone].    ATENCIÓN: Si habla español, tiene a jordan disposición servicios gratuitos de asistencia lingüística. [facility] [phone].    We comply with applicable federal civil rights laws and [place] laws. We do not discriminate on the basis of race, color, national origin, age, disability, sex, sexual orientation, or gender identity.            Thank you!     Thank you for choosing EYE CLINIC  for your care. Our goal is always to provide you with excellent care. Hearing back from our patients is one way we can continue to improve our services. Please take a few minutes to complete the written survey that you may receive in the mail after your visit with us. Thank you!             Your Updated Medication List - Protect others around you: Learn how to safely use, store and throw away your medicines at www.disposemymeds.org.          This list is accurate as of: 10/4/17  4:54 PM.  Always use your most recent med list.                   Brand Name Dispense Instructions for use Diagnosis    aspirin 325 MG tablet      Take 325 mg by mouth daily.        atorvastatin 80 MG tablet    LIPITOR    90 tablet    Take 1 tablet (80 mg) by mouth daily    Type 2 diabetes mellitus without complication, without long-term current use of insulin (H), Benign essential hypertension, Mixed hyperlipidemia       carvedilol 6.25 MG  tablet    COREG     Take 6.25 mg by mouth 2 times daily (with meals).        FUROSEMIDE PO      Take 40 mg by mouth daily.        losartan 100 MG tablet    COZAAR    90 tablet    Take 1 tablet (100 mg) by mouth daily    Type 2 diabetes mellitus without complication, without long-term current use of insulin (H), Benign essential hypertension, Mixed hyperlipidemia       metFORMIN 500 MG 24 hr tablet    GLUCOPHAGE-XR    180 tablet    Take 2 tablets (1,000 mg) by mouth daily (with dinner)    Diabetes mellitus, type 2 (H)       MULTIPLE VITAMIN PO      Take 1 tablet by mouth daily.

## 2017-10-18 DIAGNOSIS — I25.10 CORONARY ARTERY DISEASE INVOLVING NATIVE CORONARY ARTERY OF NATIVE HEART WITHOUT ANGINA PECTORIS: Primary | ICD-10-CM

## 2017-10-18 RX ORDER — CARVEDILOL 6.25 MG/1
6.25 TABLET ORAL 2 TIMES DAILY WITH MEALS
Qty: 60 TABLET | Refills: 5 | Status: SHIPPED | OUTPATIENT
Start: 2017-10-18 | End: 2018-05-17

## 2017-10-18 NOTE — TELEPHONE ENCOUNTER
Last Written Prescription Date:    Last Fill Quantity: UNK,   # refills: UNK  Last Office Visit : 9/18/17  Future Office visit:  NONE  Routing refill request to provider for review/approval because:  Medication is reported/historical

## 2017-11-10 ENCOUNTER — TELEPHONE (OUTPATIENT)
Dept: SURGERY | Facility: CLINIC | Age: 62
End: 2017-11-10

## 2017-11-10 NOTE — TELEPHONE ENCOUNTER
----- Message from Ricci Garcia RN sent at 11/9/2017 12:37 PM CST -----  Regarding: RE: Surgery Scheduling   I think surgery would be fine if he is still interested.    He would need an H&P prior to surgery.    ThanksRicci    ----- Message -----     From: Delmy Lee     Sent: 11/9/2017  12:12 PM       To: Ricci Garcia RN  Subject: Surgery Scheduling                               Edgardo Wynne,    This patient was last seen 03/15/2017. Should I contact to schedule surgery or offer clinic appt?    ThanksDelmy

## 2017-11-10 NOTE — TELEPHONE ENCOUNTER
Per task, I called the patient and left a message for him to call me if he wants to schedule surgery with Dr. Tobias.

## 2018-02-16 ENCOUNTER — TELEPHONE (OUTPATIENT)
Dept: INTERNAL MEDICINE | Facility: CLINIC | Age: 63
End: 2018-02-16

## 2018-02-16 DIAGNOSIS — E78.2 MIXED HYPERLIPIDEMIA: ICD-10-CM

## 2018-02-16 DIAGNOSIS — I10 BENIGN ESSENTIAL HYPERTENSION: ICD-10-CM

## 2018-02-16 DIAGNOSIS — E11.9 TYPE 2 DIABETES MELLITUS WITHOUT COMPLICATION, WITHOUT LONG-TERM CURRENT USE OF INSULIN (H): ICD-10-CM

## 2018-02-16 RX ORDER — ATORVASTATIN CALCIUM 80 MG/1
80 TABLET, FILM COATED ORAL DAILY
Qty: 90 TABLET | Refills: 0 | Status: SHIPPED | OUTPATIENT
Start: 2018-02-16 | End: 2018-02-16

## 2018-02-16 RX ORDER — ATORVASTATIN CALCIUM 80 MG/1
80 TABLET, FILM COATED ORAL DAILY
Qty: 90 TABLET | Refills: 0 | Status: SHIPPED | OUTPATIENT
Start: 2018-02-16 | End: 2018-05-17

## 2018-02-16 NOTE — TELEPHONE ENCOUNTER
----- Message from Barbra Reyna sent at 2/16/2018  8:53 AM CST -----  Regarding: Med refill  Contact: [phone]  Patient calling for a refill of the following medication.  He has 2 pills left    atorvastatin (LIPITOR) 80 MG tablet    Please call it into [facility]

## 2018-03-27 DIAGNOSIS — I10 BENIGN ESSENTIAL HYPERTENSION: ICD-10-CM

## 2018-03-27 DIAGNOSIS — E78.2 MIXED HYPERLIPIDEMIA: ICD-10-CM

## 2018-03-27 DIAGNOSIS — E11.9 TYPE 2 DIABETES MELLITUS WITHOUT COMPLICATION, WITHOUT LONG-TERM CURRENT USE OF INSULIN (H): ICD-10-CM

## 2018-03-28 NOTE — TELEPHONE ENCOUNTER
losartan (COZAAR) 100 MG tablet  Last Written Prescription Date:  7/28/17  Last Fill Quantity: 90,   # refills: 1  Last Office Visit :9/18/17  Future Office visit: none      Routing  Because:  labs

## 2018-03-29 RX ORDER — LOSARTAN POTASSIUM 100 MG/1
100 TABLET ORAL DAILY
Qty: 90 TABLET | Refills: 0 | Status: SHIPPED | OUTPATIENT
Start: 2018-03-29 | End: 2018-05-17

## 2018-04-09 ENCOUNTER — TELEPHONE (OUTPATIENT)
Dept: AUDIOLOGY | Facility: CLINIC | Age: 63
End: 2018-04-09

## 2018-04-09 NOTE — TELEPHONE ENCOUNTER
Anson Manriquez was seen at the [facility] on 4/9/18 for hearing aid services.  He reported his hearing aids did not seem to be working.  Examination revealed wax in both domes.  The domes (Silk Medium Closed) were replaced as well as the  filters.  Both hearing aids were subsequently found to be working well.  Mr. Manriquez reported good sound quality and will return to the clinic as needed.

## 2018-04-17 DIAGNOSIS — I10 BENIGN ESSENTIAL HYPERTENSION: Primary | ICD-10-CM

## 2018-04-17 NOTE — TELEPHONE ENCOUNTER
FUROSEMIDE   Last Written Prescription Date:  unknown  Last Fill Quantity: unknown,   # refills: unknown  Last Office Visit : 9/18/17  Future Office visit:  none    Routing  Because: historical, dx.

## 2018-04-18 RX ORDER — FUROSEMIDE 20 MG
40 TABLET ORAL DAILY
Qty: 180 TABLET | Refills: 1 | Status: SHIPPED | OUTPATIENT
Start: 2018-04-18 | End: 2018-05-17

## 2018-05-17 ENCOUNTER — OFFICE VISIT (OUTPATIENT)
Dept: INTERNAL MEDICINE | Facility: CLINIC | Age: 63
End: 2018-05-17
Payer: COMMERCIAL

## 2018-05-17 VITALS
DIASTOLIC BLOOD PRESSURE: 80 MMHG | OXYGEN SATURATION: 97 % | HEIGHT: 66 IN | TEMPERATURE: 97.4 F | SYSTOLIC BLOOD PRESSURE: 125 MMHG | HEART RATE: 92 BPM | BODY MASS INDEX: 29.52 KG/M2 | WEIGHT: 183.7 LBS | RESPIRATION RATE: 16 BRPM

## 2018-05-17 DIAGNOSIS — E78.2 MIXED HYPERLIPIDEMIA: ICD-10-CM

## 2018-05-17 DIAGNOSIS — I25.10 CORONARY ARTERY DISEASE INVOLVING NATIVE CORONARY ARTERY OF NATIVE HEART WITHOUT ANGINA PECTORIS: ICD-10-CM

## 2018-05-17 DIAGNOSIS — I10 BENIGN ESSENTIAL HYPERTENSION: ICD-10-CM

## 2018-05-17 DIAGNOSIS — E11.9 TYPE 2 DIABETES MELLITUS WITHOUT COMPLICATION, WITHOUT LONG-TERM CURRENT USE OF INSULIN (H): ICD-10-CM

## 2018-05-17 DIAGNOSIS — Z00.00 ENCOUNTER FOR ROUTINE ADULT HEALTH EXAMINATION WITHOUT ABNORMAL FINDINGS: Primary | ICD-10-CM

## 2018-05-17 LAB
ALBUMIN SERPL-MCNC: 3.8 G/DL (ref 3.4–5)
ALP SERPL-CCNC: 50 U/L (ref 40–150)
ALT SERPL W P-5'-P-CCNC: 30 U/L (ref 0–70)
AST SERPL W P-5'-P-CCNC: 18 U/L (ref 0–45)
BILIRUB DIRECT SERPL-MCNC: 0.2 MG/DL (ref 0–0.2)
BILIRUB SERPL-MCNC: 1.4 MG/DL (ref 0.2–1.3)
CHOLEST SERPL-MCNC: 137 MG/DL
CREAT SERPL-MCNC: 0.81 MG/DL (ref 0.66–1.25)
CREAT UR-MCNC: 188 MG/DL
GFR SERPL CREATININE-BSD FRML MDRD: >90 ML/MIN/1.7M2
HBA1C MFR BLD: 7.8 % (ref 0–5.6)
HDLC SERPL-MCNC: 38 MG/DL
LDLC SERPL CALC-MCNC: 75 MG/DL
MICROALBUMIN UR-MCNC: 13 MG/L
MICROALBUMIN/CREAT UR: 6.76 MG/G CR (ref 0–17)
NONHDLC SERPL-MCNC: 98 MG/DL
PROT SERPL-MCNC: 7.4 G/DL (ref 6.8–8.8)
TRIGL SERPL-MCNC: 116 MG/DL

## 2018-05-17 RX ORDER — METFORMIN HCL 500 MG
500 TABLET, EXTENDED RELEASE 24 HR ORAL 2 TIMES DAILY WITH MEALS
Qty: 180 TABLET | Refills: 1 | Status: SHIPPED | OUTPATIENT
Start: 2018-05-17 | End: 2018-09-27

## 2018-05-17 RX ORDER — LOSARTAN POTASSIUM 100 MG/1
100 TABLET ORAL DAILY
Qty: 90 TABLET | Refills: 3 | Status: ON HOLD | OUTPATIENT
Start: 2018-05-17 | End: 2018-09-20

## 2018-05-17 RX ORDER — CARVEDILOL 6.25 MG/1
6.25 TABLET ORAL 2 TIMES DAILY WITH MEALS
Qty: 60 TABLET | Refills: 5 | Status: ON HOLD | OUTPATIENT
Start: 2018-05-17 | End: 2018-09-20

## 2018-05-17 RX ORDER — ATORVASTATIN CALCIUM 80 MG/1
80 TABLET, FILM COATED ORAL DAILY
Qty: 90 TABLET | Refills: 3 | Status: SHIPPED | OUTPATIENT
Start: 2018-05-17 | End: 2018-12-27

## 2018-05-17 RX ORDER — FUROSEMIDE 20 MG
40 TABLET ORAL DAILY
Qty: 180 TABLET | Refills: 1 | Status: ON HOLD | OUTPATIENT
Start: 2018-05-17 | End: 2018-09-20

## 2018-05-17 ASSESSMENT — PAIN SCALES - GENERAL: PAINLEVEL: NO PAIN (0)

## 2018-05-17 NOTE — PATIENT INSTRUCTIONS
[facility]         Internal Medicine Resident                   Continuity Clinic    Who We Are    Resident Continuity Clinic is a part of the [facility] Primary Care Clinic.  Resident physicians see patients independently and establish a relationship with them over the course of their three-year residency program.  As with the Primary Care Clinic, our Resident Continuity Clinic models a group practice.  If your doctor is not available, you will be able to see another resident physician.  At the end of a resident s training, patients will be transitioned to a new resident physician for ongoing care.     We treat patients with a wide array of medical needs from routine physicals, to acute illnesses, to diabetes and blood pressure management, to complex medical illness.  What is a Resident Physician?    Resident physicians hold medical degrees and are doctors. They are training to become specialists in Internal Medicine. They work under the supervision of board-certified faculty physicians.  Expectations for Your Care    We strive to provide accessible, quality care at all times.    In order to provide this care, it is best to see your primary care resident doctor consistently rather switching between providers.  In the event you do see another physician, you should schedule a follow-up visit with your usual primary care doctor.    If you are transitioning your care from another clinic, it is helpful to have your records available for your doctor to review.    We do not prescribe controlled substances, such as ADD medications or narcotic pain medications, on your first visit.  We will review your health records and concerns prior to devising a treatment plan with you in order to provide the best care.      Clinic Services     Extended clinic hours; patient  to help navigate your visit;  parking; laboratory and imaging services with evening and weekend hours    Multiple medical and  surgical specialties in one building    Complementary services, including Nutrition, Integrative Medicine, Pharmacy consultations, Mental and Behavioral Health, Sports Medicine and Physical Therapy    Thank You    We would like to thank you for choosing the [facility] Internal Medicine Resident Continuity Clinic for your primary care. You are making a priceless contribution to the training of the next generation of health care practitioners.     Contact us at [phone] for appointments or questions.    Resident Clinic Hours are Tuesdays and Thursdays, 7:30am-5:00pm    Residents  Grace Paul MD   (Female )   Sunshine Duran MD   (Female)   Mark Mcgill MD  (Male)   Adebayo Hanson MD  (Male)   Blanca Robertson MD   (Female)   Taiwo Michelle MD  (Male)    Prem Lou MD  (Male)   Jean-Paul Ghosh MD  (Male)   Adebayo Delaney MD (Male)   Jese Almanza MD  (Male)   Bree Lozano MD (Female)    Demetria Guevara MD (Female)   Mike Sanchez MD  (Male)   Sara Long MD(Female)   Chiqui Ramsey MD  (Female)    Supervising Physicians   MD Taryn Kirk MD Briar Duffy, MD James Langland, MD Mary Logeais, MD Tanya Melnik, MD Charles Moldow, MD Heather Thompson Buum, MD Kathleen Watson, MD            Primary Care Center: [phone]     [facility] Medication Refill Request Information:  * Please contact your pharmacy regarding ANY request for medication refills.  ** [facility] Prescription Fax = [phone]  * Please allow 3 business days for routine medication refills.  * Please allow 5 business days for controlled substance medication refills.     Primary Care Center Test Result notification information:  *You will be notified with in 7-10 days of your appointment day regarding the results of your test.  If you are on MyChart you will be notified as soon as the provider has reviewed the results and signed off on them.        -Follow up  with colorectal surgery. You were last seen by Karina Brewer

## 2018-05-17 NOTE — NURSING NOTE
Chief Complaint   Patient presents with     Physical     Patient is here for annual physical.        Yue Farrar LPN at 7:37 AM on 5/17/2018.

## 2018-05-17 NOTE — MR AVS SNAPSHOT
After Visit Summary   5/17/2018    Anson Manriquez    MRN: [id number]           Patient Information     Date Of Birth          1955        Visit Information        Provider Department      5/17/2018 7:30 AM Carissa Yu MD [facility]        Today's Diagnoses     Encounter for routine adult health examination without abnormal findings    -  1    Coronary artery disease involving native coronary artery of native heart without angina pectoris        Benign essential hypertension        Type 2 diabetes mellitus without complication, without long-term current use of insulin (H)        Mixed hyperlipidemia          Care Instructions           [facility]         Internal Medicine Resident                   Continuity Clinic    Who We Are    Resident Continuity Clinic is a part of the [facility] Primary Care [facility].  Resident physicians see patients independently and establish a relationship with them over the course of their three-year residency program.  As with the Primary Care Clinic, our Resident Continuity Clinic models a group practice.  If your doctor is not available, you will be able to see another resident physician.  At the end of a resident s training, patients will be transitioned to a new resident physician for ongoing care.     We treat patients with a wide array of medical needs from routine physicals, to acute illnesses, to diabetes and blood pressure management, to complex medical illness.  What is a Resident Physician?    Resident physicians hold medical degrees and are doctors. They are training to become specialists in Internal Medicine. They work under the supervision of board-certified faculty physicians.  Expectations for Your Care    We strive to provide accessible, quality care at all times.    In order to provide this care, it is best to see your primary care resident doctor consistently rather switching between providers.  In the event  you do see another physician, you should schedule a follow-up visit with your usual primary care doctor.    If you are transitioning your care from another clinic, it is helpful to have your records available for your doctor to review.    We do not prescribe controlled substances, such as ADD medications or narcotic pain medications, on your first visit.  We will review your health records and concerns prior to devising a treatment plan with you in order to provide the best care.      Clinic Services     Extended clinic hours; patient  to help navigate your visit;  parking; laboratory and imaging services with evening and weekend hours    Multiple medical and surgical specialties in one building    Complementary services, including Nutrition, Integrative Medicine, Pharmacy consultations, Mental and Behavioral Health, Sports Medicine and Physical Therapy    Thank You    We would like to thank you for choosing the [facility] Internal Medicine Resident Continuity Clinic for your primary care. You are making a priceless contribution to the training of the next generation of health care practitioners.     Contact us at [phone] for appointments or questions.    Resident Clinic Hours are Tuesdays and Thursdays, 7:30am-5:00pm    Residents  Grace Paul MD   (Female )   Sunshine Duran MD   (Female)   Mark Mcgill MD  (Male)   Adebayo Hanson MD  (Male)   Blanca Robertson MD   (Female)   Taiwo Michelle MD  (Male)    Prem Lou MD  (Male)   Jean-Paul Ghosh MD  (Male)   Adebayo Delaney MD (Male)   Jese Almanza MD  (Male)   Bree Lozano MD (Female)    Demetria Guevara MD (Female)   Mike Sanchez MD  (Male)   Sara Long MD(Female)   Chiqui Ramsey MD  (Female)    Supervising Physicians   MD Taryn Kirk MD Briar Duffy, MD James Langland, MD Mary Logeais, MD Tanya Melnik, MD Charles Moldow, MD Heather Thompson Buum,  MD Vera Lowery MD            Primary Care Center: [phone]     Primary Care Center Medication Refill Request Information:  * Please contact your pharmacy regarding ANY request for medication refills.  ** [facility] Prescription Fax = [phone]  * Please allow 3 business days for routine medication refills.  * Please allow 5 business days for controlled substance medication refills.     Primary Care Center Test Result notification information:  *You will be notified with in 7-10 days of your appointment day regarding the results of your test.  If you are on MyChart you will be notified as soon as the provider has reviewed the results and signed off on them.        -Follow up with colorectal surgery. You were last seen by Karina Brewer          Follow-ups after your visit        Follow-up notes from your care team     Return in about 6 months (around 11/17/2018).      Your next 10 appointments already scheduled     May 17, 2018  8:30 AM CDT   LAB with [facility] ([facility] [place])    [address] [place] [id number] [phone]           Please do not eat 10-12 hours before your appointment if you are coming in fasting for labs on lipids, cholesterol, or glucose (sugar). This does not apply to pregnant women. Water, hot tea and black coffee (with nothing added) are okay. Do not drink other fluids, diet soda or chew gum.              Future tests that were ordered for you today     Open Future Orders        Priority Expected Expires Ordered    CREATININE (Today) Routine 5/17/2018 5/17/2019 5/17/2018    HEMOGLOBIN A1C -(Today) Routine 5/17/2018 5/17/2019 5/17/2018    Lipid panel reflex to direct LDL Fasting Routine 5/17/2018 5/17/2019 5/17/2018    Albumin Random Urine Quantitative with Creat Ratio Routine 5/17/2018 5/17/2019 5/17/2018    Hepatic panel Routine 5/17/2018 5/31/2018 5/17/2018            Who to contact     Please call your clinic  "at [phone] to:    Ask questions about your health    Make or cancel appointments    Discuss your medicines    Learn about your test results    Speak to your doctor            Additional Information About Your Visit        [employer] Information     [employer] is an electronic gateway that provides easy, online access to your medical records. With [employer], you can request a clinic appointment, read your test results, renew a prescription or communicate with your care team.     To sign up for [employer] visit the website at [email or web address], [email or web address]"   You will be asked to enter the access code listed below, as well as some personal information. Please follow the directions to create your username and password.     Your access code is: [email or web address]  Expires: 2018  6:30 AM     Your access code will  in 90 days. If you need help or a new code, please contact your [facility] or call [phone] for assistance.        Care EveryWhere ID     This is your Care EveryWhere ID. This could be used by other organizations to access your [facility] medical records  [id number]        Your Vitals Were     Pulse Temperature Respirations Height Pulse Oximetry BMI (Body Mass Index)    92 97.4  F (36.3  C) (Oral) 16 1.676 m (5' 6\") 97% 29.65 kg/m2       Blood Pressure from Last 3 Encounters:   18 125/80   17 133/86   03/15/17 132/85    Weight from Last 3 Encounters:   18 83.3 kg (183 lb 11.2 oz)   17 85.7 kg (189 lb)   03/15/17 60.6 kg (133 lb 9.6 oz)                 Today's Medication Changes          These changes are accurate as of 18  8:16 AM.  If you have any questions, ask your nurse or doctor.               These medicines have changed or have updated prescriptions.        Dose/Directions    furosemide 20 MG tablet   Commonly known as:  LASIX   This may have changed:  Another medication with the same name was removed. Continue taking this medication, and follow " the directions you see here.   Used for:  Benign essential hypertension   Changed by:  Carissa Yu MD        Dose:  40 mg   Take 2 tablets (40 mg) by mouth daily   Quantity:  180 tablet   Refills:  1       metFORMIN 500 MG 24 hr tablet   Commonly known as:  GLUCOPHAGE-XR   This may have changed:    - how much to take  - when to take this   Used for:  Type 2 diabetes mellitus without complication, without long-term current use of insulin (H)   Changed by:  Carissa Yu MD        Dose:  500 mg   Take 1 tablet (500 mg) by mouth 2 times daily (with meals)   Quantity:  180 tablet   Refills:  1            Where to get your medicines      These medications were sent to [address] [address]     Phone:  [phone]     carvedilol 6.25 MG tablet    furosemide 20 MG tablet    losartan 100 MG tablet    metFORMIN 500 MG 24 hr tablet                Primary Care Provider Office Phone # Fax #    Taryn Emma Lou [phone]       [address] [place] [address] 741  [facility] [address]        Equal Access to Services     [facility] CenterAL[facility]INA AH: Hadii luis esteves hadasho Soyvette, waaxda luqadaha, qaybta kaalmada adefunmilayo, issac crump . So [facility] [phone].    ATENCIÓN: Si habla español, tiene a jordan disposición servicios gratuitos de asistencia lingüística. [facility] [phone].    We comply with applicable federal civil rights laws and [place] laws. We do not discriminate on the basis of race, color, national origin, age, disability, sex, sexual orientation, or gender identity.            Thank you!     Thank you for choosing [facility] PRIMARY CARE CLINIC  for your care. Our goal is always to provide you with excellent care. Hearing back from our patients is one way we can continue to improve our services. Please take a few minutes to complete the written survey that you may receive  in the mail after your visit with us. Thank you!             Your Updated Medication List - Protect others around you: Learn how to safely use, store and throw away your medicines at www.disposemymeds.org.          This list is accurate as of 5/17/18  8:16 AM.  Always use your most recent med list.                   Brand Name Dispense Instructions for use Diagnosis    aspirin 325 MG tablet      Take 325 mg by mouth daily.        atorvastatin 80 MG tablet    LIPITOR    90 tablet    Take 1 tablet (80 mg) by mouth daily Needs labs done prior to the next refill.    Type 2 diabetes mellitus without complication, without long-term current use of insulin (H), Benign essential hypertension, Mixed hyperlipidemia       carvedilol 6.25 MG tablet    COREG    60 tablet    Take 1 tablet (6.25 mg) by mouth 2 times daily (with meals)    Coronary artery disease involving native coronary artery of native heart without angina pectoris       furosemide 20 MG tablet    LASIX    180 tablet    Take 2 tablets (40 mg) by mouth daily    Benign essential hypertension       losartan 100 MG tablet    COZAAR    90 tablet    Take 1 tablet (100 mg) by mouth daily    Type 2 diabetes mellitus without complication, without long-term current use of insulin (H), Benign essential hypertension, Mixed hyperlipidemia       metFORMIN 500 MG 24 hr tablet    GLUCOPHAGE-XR    180 tablet    Take 1 tablet (500 mg) by mouth 2 times daily (with meals)    Type 2 diabetes mellitus without complication, without long-term current use of insulin (H)       MULTIPLE VITAMIN PO      Take 1 tablet by mouth daily.

## 2018-05-17 NOTE — PROGRESS NOTES
5/17/2018    63yo  Male with PMH HTN and DM2; here for routine care    States he is doing well since last clinic visit with . States his wife told him to come in.     Was having diarrhea with once daily metformin 1000mg; started taking 500mg BID with some improvement.    States he occasionally forgets PM meds: metformin and atorvastatin. Needs refills.     Continues to work as contractor. Occasionally checks BP at home and reports usually 120s/70s.    No new complaints. States he does not get SOB or chest pressure with walking up and down stairs or inclines at work.     Will have left inguinal hernia and umbilical hernia repair this summer.    Past Medical History:   Diagnosis Date     Chronic infection     near rectum still leaking     Coronary artery disease      Diabetes mellitus, type 2 (H)      Heart attack 4/17/2007     Hyperlipidemia      Hypertension      Stented coronary artery 2007     Past Surgical History:   Procedure Laterality Date     HERNIA REPAIR Right     Inguinal     IRRIGATION AND DEBRIDEMENT RECTUM, COMBINED      abcess near rectum      LAPAROSCOPIC CHOLECYSTECTOMY  3/29/2012    Procedure:LAPAROSCOPIC CHOLECYSTECTOMY; LAPAROSCOPIC CHOLECYSTECTOMY; Surgeon:MARILYNN PRESSLEY; Location:[facility] OR     STENT, CORONARY, OLIVIA       Social History     Social History     Marital status:      Spouse name: N/A     Number of children: N/A     Years of education: N/A     Occupational History     Not on file.     Social History Main Topics     Smoking status: Never Smoker     Smokeless tobacco: Never Used     Alcohol use No     Drug use: No     Sexual activity: Not on file     Other Topics Concern     Not on file     Social History Narrative     Family History   Problem Relation Age of Onset     Hypertension Mother      DIABETES Father      Glaucoma No family hx of      Macular Degeneration No family hx of       ROS: 10 point ROS neg other than the symptoms noted above in the HPI.    /80   "Pulse 92  Temp 97.4  F (36.3  C) (Oral)  Resp 16  Ht 1.676 m (5' 6\")  Wt 83.3 kg (183 lb 11.2 oz)  SpO2 97%  BMI 29.65 kg/m2     Physical Exam   Constitutional: He is oriented to person, place, and time and well-developed, well-nourished, and in no distress.   HENT:   Mouth/Throat: No oropharyngeal exudate.   Eyes: No scleral icterus.   Neck: No JVD present.   Cardiovascular: Normal rate, regular rhythm and normal heart sounds.  Exam reveals no gallop and no friction rub.    No murmur heard.  Pulmonary/Chest: Effort normal and breath sounds normal. No respiratory distress. He has no wheezes.   Abdominal: Soft. Bowel sounds are normal. There is no tenderness.   Musculoskeletal: He exhibits no edema.   Neurological: He is alert and oriented to person, place, and time.   Skin: Skin is warm and dry.       A&P      #Routine care  Up to date with colonoscopy and immunizations.    #HTN  Well controlled on current regimen: losartan, coreg, furosemide. No changes.     #Hx CAD  #Hx HLD  No medication changes, will check LFTs today and refill atorvastatin if normal.     #DM2  On metformin, has been taking BID due to diarrhea with 1000mg once daily. Last A1C was ~1yr ago prior to metformin, Will check A1C today. Foot exam on previous visit. Seen by ophthalmology in fall. Microalbuminuria check today and Cr, however patient on ARB already.      Anson was seen today for physical.    Diagnoses and all orders for this visit:    Encounter for routine adult health examination without abnormal findings    Coronary artery disease involving native coronary artery of native heart without angina pectoris  -     Lipid panel reflex to direct LDL Fasting; Future  -     carvedilol (COREG) 6.25 MG tablet; Take 1 tablet (6.25 mg) by mouth 2 times daily (with meals)    Benign essential hypertension  -     furosemide (LASIX) 20 MG tablet; Take 2 tablets (40 mg) by mouth daily  -     losartan (COZAAR) 100 MG tablet; Take 1 tablet (100 mg) " by mouth daily    Type 2 diabetes mellitus without complication, without long-term current use of insulin (H)  -     CREATININE (Today); Future  -     HEMOGLOBIN A1C -(Today); Future  -     Albumin Random Urine Quantitative with Creat Ratio; Future  -     losartan (COZAAR) 100 MG tablet; Take 1 tablet (100 mg) by mouth daily  -     metFORMIN (GLUCOPHAGE-XR) 500 MG 24 hr tablet; Take 1 tablet (500 mg) by mouth 2 times daily (with meals)    Mixed hyperlipidemia  -     Lipid panel reflex to direct LDL Fasting; Future  -     Hepatic panel; Future  -     losartan (COZAAR) 100 MG tablet; Take 1 tablet (100 mg) by mouth daily        Carissa Yu MD  Internal Medicine PGY3        Pt was seen and examined with Dr. Yu;  I agree with the A/P documentation above    Martha Ku MD

## 2018-09-13 ENCOUNTER — HOSPITAL ENCOUNTER (INPATIENT)
Facility: CLINIC | Age: 63
LOS: 6 days | Discharge: HOME | DRG: 270 | End: 2018-09-20
Attending: EMERGENCY MEDICINE | Admitting: INTERNAL MEDICINE
Payer: COMMERCIAL

## 2018-09-13 ENCOUNTER — APPOINTMENT (OUTPATIENT)
Dept: CARDIOLOGY | Facility: CLINIC | Age: 63
DRG: 270 | End: 2018-09-13
Attending: INTERNAL MEDICINE
Payer: COMMERCIAL

## 2018-09-13 DIAGNOSIS — I21.02 ST ELEVATION MYOCARDIAL INFARCTION INVOLVING LEFT ANTERIOR DESCENDING (LAD) CORONARY ARTERY (H): Primary | ICD-10-CM

## 2018-09-13 DIAGNOSIS — I50.22 CHRONIC SYSTOLIC CONGESTIVE HEART FAILURE (H): ICD-10-CM

## 2018-09-13 DIAGNOSIS — I46.9 CARDIAC ARREST (H): ICD-10-CM

## 2018-09-13 DIAGNOSIS — I48.91 ATRIAL FIBRILLATION, UNSPECIFIED TYPE (H): ICD-10-CM

## 2018-09-13 LAB
ALBUMIN SERPL-MCNC: 3.8 G/DL (ref 3.4–5)
ALP SERPL-CCNC: 59 U/L (ref 40–150)
ALT SERPL W P-5'-P-CCNC: 338 U/L (ref 0–70)
ANION GAP SERPL CALCULATED.3IONS-SCNC: 14 MMOL/L (ref 3–14)
AST SERPL W P-5'-P-CCNC: 319 U/L (ref 0–45)
B-HCG FREE SERPL-ACNC: 1.1 [IU]/L (ref 0.86–1.14)
BASOPHILS # BLD AUTO: 0 10E9/L (ref 0–0.2)
BASOPHILS NFR BLD AUTO: 0.2 %
BILIRUB SERPL-MCNC: 1.9 MG/DL (ref 0.2–1.3)
BUN SERPL-MCNC: 28 MG/DL (ref 7–30)
CA-I BLD-SCNC: 4.6 MG/DL (ref 4.4–5.2)
CALCIUM SERPL-MCNC: 8.8 MG/DL (ref 8.5–10.1)
CHLORIDE SERPL-SCNC: 103 MMOL/L (ref 94–109)
CO2 BLDCOV-SCNC: 21 MMOL/L (ref 21–28)
CO2 BLDCOV-SCNC: 22 MMOL/L (ref 21–28)
CO2 SERPL-SCNC: 21 MMOL/L (ref 20–32)
CREAT BLD-MCNC: 1.4 MG/DL (ref 0.66–1.25)
CREAT SERPL-MCNC: 1.35 MG/DL (ref 0.66–1.25)
DIFFERENTIAL METHOD BLD: ABNORMAL
EOSINOPHIL # BLD AUTO: 0.1 10E9/L (ref 0–0.7)
EOSINOPHIL NFR BLD AUTO: 0.7 %
ERYTHROCYTE [DISTWIDTH] IN BLOOD BY AUTOMATED COUNT: 14.9 % (ref 10–15)
GFR SERPL CREATININE-BSD FRML MDRD: 51 ML/MIN/1.7M2
GFR SERPL CREATININE-BSD FRML MDRD: 53 ML/MIN/1.7M2
GLUCOSE BLD-MCNC: 343 MG/DL (ref 70–99)
GLUCOSE SERPL-MCNC: 339 MG/DL (ref 70–99)
HCT VFR BLD AUTO: 45.8 % (ref 40–53)
HCT VFR BLD CALC: 43 %PCV (ref 40–53)
HGB BLD CALC-MCNC: 14.6 G/DL (ref 13.3–17.7)
HGB BLD-MCNC: 15.1 G/DL (ref 13.3–17.7)
IMM GRANULOCYTES # BLD: 0 10E9/L (ref 0–0.4)
IMM GRANULOCYTES NFR BLD: 0.3 %
LACTATE BLD-SCNC: 4.5 MMOL/L (ref 0.7–2.1)
LYMPHOCYTES # BLD AUTO: 2.5 10E9/L (ref 0.8–5.3)
LYMPHOCYTES NFR BLD AUTO: 18.6 %
MCH RBC QN AUTO: 30.9 PG (ref 26.5–33)
MCHC RBC AUTO-ENTMCNC: 33 G/DL (ref 31.5–36.5)
MCV RBC AUTO: 94 FL (ref 78–100)
MONOCYTES # BLD AUTO: 0.8 10E9/L (ref 0–1.3)
MONOCYTES NFR BLD AUTO: 5.9 %
NEUTROPHILS # BLD AUTO: 10.1 10E9/L (ref 1.6–8.3)
NEUTROPHILS NFR BLD AUTO: 74.3 %
NRBC # BLD AUTO: 0 10*3/UL
NRBC BLD AUTO-RTO: 0 /100
PCO2 BLDV: 38 MM HG (ref 40–50)
PCO2 BLDV: 40 MM HG (ref 40–50)
PH BLDV: 7.35 PH (ref 7.32–7.43)
PH BLDV: 7.36 PH (ref 7.32–7.43)
PLATELET # BLD AUTO: 260 10E9/L (ref 150–450)
PO2 BLDV: 34 MM HG (ref 25–47)
PO2 BLDV: 40 MM HG (ref 25–47)
POTASSIUM BLD-SCNC: 3.9 MMOL/L (ref 3.4–5.3)
POTASSIUM SERPL-SCNC: 3.9 MMOL/L (ref 3.4–5.3)
PROT SERPL-MCNC: 7.4 G/DL (ref 6.8–8.8)
RBC # BLD AUTO: 4.88 10E12/L (ref 4.4–5.9)
SAO2 % BLDV FROM PO2: 62 %
SAO2 % BLDV FROM PO2: 73 %
SODIUM BLD-SCNC: 141 MMOL/L (ref 133–144)
SODIUM SERPL-SCNC: 138 MMOL/L (ref 133–144)
TROPONIN I BLD-MCNC: 0.14 UG/L (ref 0–0.1)
TROPONIN I SERPL-MCNC: 0.28 UG/L (ref 0–0.04)
WBC # BLD AUTO: 13.6 10E9/L (ref 4–11)

## 2018-09-13 PROCEDURE — 82330 ASSAY OF CALCIUM: CPT

## 2018-09-13 PROCEDURE — 33967 INSERT I-AORT PERCUT DEVICE: CPT

## 2018-09-13 PROCEDURE — C9600 PERC DRUG-EL COR STENT SING: HCPCS | Mod: LD

## 2018-09-13 PROCEDURE — 94002 VENT MGMT INPAT INIT DAY: CPT

## 2018-09-13 PROCEDURE — 85014 HEMATOCRIT: CPT

## 2018-09-13 PROCEDURE — 40000497 ZZHCL STATISTIC SODIUM ED POCT

## 2018-09-13 PROCEDURE — 27210760 ZZH DEVICE INFLATION CR7

## 2018-09-13 PROCEDURE — 4A023N7 MEASUREMENT OF CARDIAC SAMPLING AND PRESSURE, LEFT HEART, PERCUTANEOUS APPROACH: ICD-10-PCS | Performed by: INTERNAL MEDICINE

## 2018-09-13 PROCEDURE — 27211089 ZZH KIT ACIST INJECTOR CR3

## 2018-09-13 PROCEDURE — 40000281 ZZH STATISTIC TRANSPORT TIME EA 15 MIN

## 2018-09-13 PROCEDURE — 99152 MOD SED SAME PHYS/QHP 5/>YRS: CPT

## 2018-09-13 PROCEDURE — 82803 BLOOD GASES ANY COMBINATION: CPT

## 2018-09-13 PROCEDURE — B2151ZZ FLUOROSCOPY OF LEFT HEART USING LOW OSMOLAR CONTRAST: ICD-10-PCS | Performed by: INTERNAL MEDICINE

## 2018-09-13 PROCEDURE — 31500 INSERT EMERGENCY AIRWAY: CPT | Mod: Z6 | Performed by: EMERGENCY MEDICINE

## 2018-09-13 PROCEDURE — 5A1945Z RESPIRATORY VENTILATION, 24-96 CONSECUTIVE HOURS: ICD-10-PCS | Performed by: EMERGENCY MEDICINE

## 2018-09-13 PROCEDURE — 40000502 ZZHCL STATISTIC GLUCOSE ED POCT

## 2018-09-13 PROCEDURE — 25000125 ZZHC RX 250: Performed by: INTERNAL MEDICINE

## 2018-09-13 PROCEDURE — 84484 ASSAY OF TROPONIN QUANT: CPT

## 2018-09-13 PROCEDURE — 25000128 H RX IP 250 OP 636: Performed by: INTERNAL MEDICINE

## 2018-09-13 PROCEDURE — 33967 INSERT I-AORT PERCUT DEVICE: CPT | Mod: XU | Performed by: INTERNAL MEDICINE

## 2018-09-13 PROCEDURE — 84484 ASSAY OF TROPONIN QUANT: CPT | Performed by: EMERGENCY MEDICINE

## 2018-09-13 PROCEDURE — 84295 ASSAY OF SERUM SODIUM: CPT

## 2018-09-13 PROCEDURE — 25000132 ZZH RX MED GY IP 250 OP 250 PS 637: Performed by: INTERNAL MEDICINE

## 2018-09-13 PROCEDURE — 27210998 ZZH ACCESS HEART CATH CR6

## 2018-09-13 PROCEDURE — 99221 1ST HOSP IP/OBS SF/LOW 40: CPT | Mod: 25 | Performed by: INTERNAL MEDICINE

## 2018-09-13 PROCEDURE — 36556 INSERT NON-TUNNEL CV CATH: CPT

## 2018-09-13 PROCEDURE — 85347 COAGULATION TIME ACTIVATED: CPT

## 2018-09-13 PROCEDURE — 82947 ASSAY GLUCOSE BLOOD QUANT: CPT

## 2018-09-13 PROCEDURE — 5A02210 ASSISTANCE WITH CARDIAC OUTPUT USING BALLOON PUMP, CONTINUOUS: ICD-10-PCS | Performed by: INTERNAL MEDICINE

## 2018-09-13 PROCEDURE — 31500 INSERT EMERGENCY AIRWAY: CPT | Performed by: EMERGENCY MEDICINE

## 2018-09-13 PROCEDURE — C1757 CATH, THROMBECTOMY/EMBOLECT: HCPCS

## 2018-09-13 PROCEDURE — C1894 INTRO/SHEATH, NON-LASER: HCPCS

## 2018-09-13 PROCEDURE — 40000498 ZZHCL STATISTIC POTASSIUM ED POCT

## 2018-09-13 PROCEDURE — 27210946 ZZH KIT HC TOTES DISP CR8

## 2018-09-13 PROCEDURE — C1887 CATHETER, GUIDING: HCPCS

## 2018-09-13 PROCEDURE — 93458 L HRT ARTERY/VENTRICLE ANGIO: CPT | Mod: XU

## 2018-09-13 PROCEDURE — 85610 PROTHROMBIN TIME: CPT

## 2018-09-13 PROCEDURE — 92941 PRQ TRLML REVSC TOT OCCL AMI: CPT | Mod: LD | Performed by: INTERNAL MEDICINE

## 2018-09-13 PROCEDURE — C1769 GUIDE WIRE: HCPCS

## 2018-09-13 PROCEDURE — 84132 ASSAY OF SERUM POTASSIUM: CPT

## 2018-09-13 PROCEDURE — 40000501 ZZHCL STATISTIC HEMATOCRIT ED POCT

## 2018-09-13 PROCEDURE — 82565 ASSAY OF CREATININE: CPT

## 2018-09-13 PROCEDURE — 25000128 H RX IP 250 OP 636: Performed by: EMERGENCY MEDICINE

## 2018-09-13 PROCEDURE — B2111ZZ FLUOROSCOPY OF MULTIPLE CORONARY ARTERIES USING LOW OSMOLAR CONTRAST: ICD-10-PCS | Performed by: INTERNAL MEDICINE

## 2018-09-13 PROCEDURE — 27210787 ZZH MANIFOLD CR2

## 2018-09-13 PROCEDURE — 85025 COMPLETE CBC W/AUTO DIFF WBC: CPT | Performed by: EMERGENCY MEDICINE

## 2018-09-13 PROCEDURE — 92921 ZZHC PRQ TRLUML CORONARY ANGIOPLASTY ADDL BRANCH: CPT

## 2018-09-13 PROCEDURE — 83605 ASSAY OF LACTIC ACID: CPT

## 2018-09-13 PROCEDURE — C9606 PERC D-E COR REVASC W AMI S: HCPCS

## 2018-09-13 PROCEDURE — C1725 CATH, TRANSLUMIN NON-LASER: HCPCS

## 2018-09-13 PROCEDURE — 80053 COMPREHEN METABOLIC PANEL: CPT | Performed by: EMERGENCY MEDICINE

## 2018-09-13 PROCEDURE — 93005 ELECTROCARDIOGRAM TRACING: CPT | Mod: 76 | Performed by: EMERGENCY MEDICINE

## 2018-09-13 PROCEDURE — 93458 L HRT ARTERY/VENTRICLE ANGIO: CPT | Mod: 26 | Performed by: INTERNAL MEDICINE

## 2018-09-13 PROCEDURE — 99291 CRITICAL CARE FIRST HOUR: CPT | Mod: 25 | Performed by: EMERGENCY MEDICINE

## 2018-09-13 PROCEDURE — 99153 MOD SED SAME PHYS/QHP EA: CPT

## 2018-09-13 PROCEDURE — 02703ZZ DILATION OF CORONARY ARTERY, ONE ARTERY, PERCUTANEOUS APPROACH: ICD-10-PCS | Performed by: INTERNAL MEDICINE

## 2018-09-13 PROCEDURE — 93005 ELECTROCARDIOGRAM TRACING: CPT | Performed by: EMERGENCY MEDICINE

## 2018-09-13 PROCEDURE — C1874 STENT, COATED/COV W/DEL SYS: HCPCS

## 2018-09-13 PROCEDURE — 027035Z DILATION OF CORONARY ARTERY, ONE ARTERY WITH TWO DRUG-ELUTING INTRALUMINAL DEVICES, PERCUTANEOUS APPROACH: ICD-10-PCS | Performed by: INTERNAL MEDICINE

## 2018-09-13 PROCEDURE — 02C03ZZ EXTIRPATION OF MATTER FROM CORONARY ARTERY, ONE ARTERY, PERCUTANEOUS APPROACH: ICD-10-PCS | Performed by: INTERNAL MEDICINE

## 2018-09-13 PROCEDURE — C9460 INJECTION, CANGRELOR: HCPCS | Performed by: INTERNAL MEDICINE

## 2018-09-13 PROCEDURE — 99285 EMERGENCY DEPT VISIT HI MDM: CPT | Mod: 25 | Performed by: EMERGENCY MEDICINE

## 2018-09-13 RX ORDER — PROTAMINE SULFATE 10 MG/ML
1-5 INJECTION, SOLUTION INTRAVENOUS
Status: DISCONTINUED | OUTPATIENT
Start: 2018-09-13 | End: 2018-09-14 | Stop reason: HOSPADM

## 2018-09-13 RX ORDER — FENTANYL CITRATE 50 UG/ML
50 INJECTION, SOLUTION INTRAMUSCULAR; INTRAVENOUS ONCE
Status: COMPLETED | OUTPATIENT
Start: 2018-09-13 | End: 2018-09-13

## 2018-09-13 RX ORDER — NICARDIPINE HYDROCHLORIDE 2.5 MG/ML
100 INJECTION INTRAVENOUS
Status: DISCONTINUED | OUTPATIENT
Start: 2018-09-13 | End: 2018-09-14 | Stop reason: HOSPADM

## 2018-09-13 RX ORDER — LORAZEPAM 2 MG/ML
.5-2 INJECTION INTRAMUSCULAR EVERY 4 HOURS PRN
Status: DISCONTINUED | OUTPATIENT
Start: 2018-09-13 | End: 2018-09-14 | Stop reason: HOSPADM

## 2018-09-13 RX ORDER — DIPHENHYDRAMINE HYDROCHLORIDE 50 MG/ML
25-50 INJECTION INTRAMUSCULAR; INTRAVENOUS
Status: DISCONTINUED | OUTPATIENT
Start: 2018-09-13 | End: 2018-09-14 | Stop reason: HOSPADM

## 2018-09-13 RX ORDER — EPTIFIBATIDE 2 MG/ML
2 INJECTION, SOLUTION INTRAVENOUS CONTINUOUS PRN
Status: DISCONTINUED | OUTPATIENT
Start: 2018-09-13 | End: 2018-09-14 | Stop reason: HOSPADM

## 2018-09-13 RX ORDER — DOPAMINE HYDROCHLORIDE 160 MG/100ML
2-20 INJECTION, SOLUTION INTRAVENOUS CONTINUOUS PRN
Status: DISCONTINUED | OUTPATIENT
Start: 2018-09-13 | End: 2018-09-14 | Stop reason: HOSPADM

## 2018-09-13 RX ORDER — DOBUTAMINE HYDROCHLORIDE 200 MG/100ML
5-40 INJECTION INTRAVENOUS CONTINUOUS PRN
Status: DISCONTINUED | OUTPATIENT
Start: 2018-09-13 | End: 2018-09-14 | Stop reason: HOSPADM

## 2018-09-13 RX ORDER — LORAZEPAM 2 MG/ML
.5-2 INJECTION INTRAMUSCULAR EVERY 10 MIN PRN
Status: DISCONTINUED | OUTPATIENT
Start: 2018-09-13 | End: 2018-09-14 | Stop reason: HOSPADM

## 2018-09-13 RX ORDER — NIFEDIPINE 10 MG/1
10 CAPSULE ORAL
Status: DISCONTINUED | OUTPATIENT
Start: 2018-09-13 | End: 2018-09-14 | Stop reason: HOSPADM

## 2018-09-13 RX ORDER — HEPARIN SODIUM 1000 [USP'U]/ML
1000-10000 INJECTION, SOLUTION INTRAVENOUS; SUBCUTANEOUS EVERY 5 MIN PRN
Status: DISCONTINUED | OUTPATIENT
Start: 2018-09-13 | End: 2018-09-14 | Stop reason: HOSPADM

## 2018-09-13 RX ORDER — ARGATROBAN 1 MG/ML
150 INJECTION, SOLUTION INTRAVENOUS
Status: DISCONTINUED | OUTPATIENT
Start: 2018-09-13 | End: 2018-09-14 | Stop reason: HOSPADM

## 2018-09-13 RX ORDER — SODIUM NITROPRUSSIDE 25 MG/ML
100-200 INJECTION INTRAVENOUS
Status: DISCONTINUED | OUTPATIENT
Start: 2018-09-13 | End: 2018-09-14 | Stop reason: HOSPADM

## 2018-09-13 RX ORDER — NALOXONE HYDROCHLORIDE 0.4 MG/ML
0.4 INJECTION, SOLUTION INTRAMUSCULAR; INTRAVENOUS; SUBCUTANEOUS EVERY 5 MIN PRN
Status: DISCONTINUED | OUTPATIENT
Start: 2018-09-13 | End: 2018-09-14 | Stop reason: HOSPADM

## 2018-09-13 RX ORDER — EPINEPHRINE 1 MG/ML
0.3 INJECTION, SOLUTION, CONCENTRATE INTRAVENOUS
Status: DISCONTINUED | OUTPATIENT
Start: 2018-09-13 | End: 2018-09-14 | Stop reason: HOSPADM

## 2018-09-13 RX ORDER — FENTANYL CITRATE 50 UG/ML
25-50 INJECTION, SOLUTION INTRAMUSCULAR; INTRAVENOUS
Status: DISCONTINUED | OUTPATIENT
Start: 2018-09-13 | End: 2018-09-14 | Stop reason: HOSPADM

## 2018-09-13 RX ORDER — DOBUTAMINE HYDROCHLORIDE 200 MG/100ML
2-20 INJECTION INTRAVENOUS CONTINUOUS PRN
Status: DISCONTINUED | OUTPATIENT
Start: 2018-09-13 | End: 2018-09-14 | Stop reason: HOSPADM

## 2018-09-13 RX ORDER — METHYLPREDNISOLONE SODIUM SUCCINATE 125 MG/2ML
125 INJECTION, POWDER, LYOPHILIZED, FOR SOLUTION INTRAMUSCULAR; INTRAVENOUS
Status: DISCONTINUED | OUTPATIENT
Start: 2018-09-13 | End: 2018-09-14 | Stop reason: HOSPADM

## 2018-09-13 RX ORDER — EPTIFIBATIDE 2 MG/ML
180 INJECTION, SOLUTION INTRAVENOUS EVERY 10 MIN PRN
Status: DISCONTINUED | OUTPATIENT
Start: 2018-09-13 | End: 2018-09-14 | Stop reason: HOSPADM

## 2018-09-13 RX ORDER — ATROPINE SULFATE 0.1 MG/ML
.5-1 INJECTION INTRAVENOUS
Status: DISCONTINUED | OUTPATIENT
Start: 2018-09-13 | End: 2018-09-14 | Stop reason: HOSPADM

## 2018-09-13 RX ORDER — ENALAPRILAT 1.25 MG/ML
1.25-2.5 INJECTION INTRAVENOUS
Status: DISCONTINUED | OUTPATIENT
Start: 2018-09-13 | End: 2018-09-14 | Stop reason: HOSPADM

## 2018-09-13 RX ORDER — POTASSIUM CHLORIDE 7.45 MG/ML
10 INJECTION INTRAVENOUS
Status: DISCONTINUED | OUTPATIENT
Start: 2018-09-13 | End: 2018-09-14 | Stop reason: HOSPADM

## 2018-09-13 RX ORDER — ONDANSETRON 2 MG/ML
4 INJECTION INTRAMUSCULAR; INTRAVENOUS EVERY 4 HOURS PRN
Status: DISCONTINUED | OUTPATIENT
Start: 2018-09-13 | End: 2018-09-14 | Stop reason: HOSPADM

## 2018-09-13 RX ORDER — METOPROLOL TARTRATE 1 MG/ML
5 INJECTION, SOLUTION INTRAVENOUS EVERY 5 MIN PRN
Status: DISCONTINUED | OUTPATIENT
Start: 2018-09-13 | End: 2018-09-14 | Stop reason: HOSPADM

## 2018-09-13 RX ORDER — KETOROLAC TROMETHAMINE 15 MG/ML
15-30 INJECTION, SOLUTION INTRAMUSCULAR; INTRAVENOUS
Status: DISCONTINUED | OUTPATIENT
Start: 2018-09-13 | End: 2018-09-14 | Stop reason: HOSPADM

## 2018-09-13 RX ORDER — PROTAMINE SULFATE 10 MG/ML
25-100 INJECTION, SOLUTION INTRAVENOUS EVERY 5 MIN PRN
Status: DISCONTINUED | OUTPATIENT
Start: 2018-09-13 | End: 2018-09-14 | Stop reason: HOSPADM

## 2018-09-13 RX ORDER — NITROGLYCERIN 5 MG/ML
100-500 VIAL (ML) INTRAVENOUS
Status: DISCONTINUED | OUTPATIENT
Start: 2018-09-13 | End: 2018-09-14 | Stop reason: HOSPADM

## 2018-09-13 RX ORDER — PROTAMINE SULFATE 10 MG/ML
5-40 INJECTION, SOLUTION INTRAVENOUS EVERY 10 MIN PRN
Status: DISCONTINUED | OUTPATIENT
Start: 2018-09-13 | End: 2018-09-14 | Stop reason: HOSPADM

## 2018-09-13 RX ORDER — ARGATROBAN 1 MG/ML
350 INJECTION, SOLUTION INTRAVENOUS
Status: DISCONTINUED | OUTPATIENT
Start: 2018-09-13 | End: 2018-09-14 | Stop reason: HOSPADM

## 2018-09-13 RX ORDER — POTASSIUM CHLORIDE 29.8 MG/ML
20 INJECTION INTRAVENOUS
Status: DISCONTINUED | OUTPATIENT
Start: 2018-09-13 | End: 2018-09-14 | Stop reason: HOSPADM

## 2018-09-13 RX ORDER — FUROSEMIDE 10 MG/ML
20-100 INJECTION INTRAMUSCULAR; INTRAVENOUS
Status: COMPLETED | OUTPATIENT
Start: 2018-09-13 | End: 2018-09-13

## 2018-09-13 RX ORDER — NITROGLYCERIN 5 MG/ML
100-200 VIAL (ML) INTRAVENOUS
Status: DISCONTINUED | OUTPATIENT
Start: 2018-09-13 | End: 2018-09-14 | Stop reason: HOSPADM

## 2018-09-13 RX ORDER — PRASUGREL 10 MG/1
10-60 TABLET, FILM COATED ORAL
Status: DISCONTINUED | OUTPATIENT
Start: 2018-09-13 | End: 2018-09-14 | Stop reason: HOSPADM

## 2018-09-13 RX ORDER — ADENOSINE 3 MG/ML
12-12000 INJECTION, SOLUTION INTRAVENOUS
Status: DISCONTINUED | OUTPATIENT
Start: 2018-09-13 | End: 2018-09-14 | Stop reason: HOSPADM

## 2018-09-13 RX ORDER — LIDOCAINE HYDROCHLORIDE 10 MG/ML
30 INJECTION, SOLUTION EPIDURAL; INFILTRATION; INTRACAUDAL; PERINEURAL
Status: DISCONTINUED | OUTPATIENT
Start: 2018-09-13 | End: 2018-09-14 | Stop reason: HOSPADM

## 2018-09-13 RX ORDER — CLOPIDOGREL BISULFATE 75 MG/1
300-600 TABLET ORAL
Status: DISCONTINUED | OUTPATIENT
Start: 2018-09-13 | End: 2018-09-14 | Stop reason: HOSPADM

## 2018-09-13 RX ORDER — MAGNESIUM HYDROXIDE 1200 MG/15ML
1000 LIQUID ORAL CONTINUOUS PRN
Status: DISCONTINUED | OUTPATIENT
Start: 2018-09-13 | End: 2018-09-14 | Stop reason: HOSPADM

## 2018-09-13 RX ORDER — HYDRALAZINE HYDROCHLORIDE 20 MG/ML
10-20 INJECTION INTRAMUSCULAR; INTRAVENOUS
Status: DISCONTINUED | OUTPATIENT
Start: 2018-09-13 | End: 2018-09-14 | Stop reason: HOSPADM

## 2018-09-13 RX ORDER — CLOPIDOGREL BISULFATE 75 MG/1
75 TABLET ORAL
Status: DISCONTINUED | OUTPATIENT
Start: 2018-09-13 | End: 2018-09-14 | Stop reason: HOSPADM

## 2018-09-13 RX ORDER — FLUMAZENIL 0.1 MG/ML
0.2 INJECTION, SOLUTION INTRAVENOUS
Status: DISCONTINUED | OUTPATIENT
Start: 2018-09-13 | End: 2018-09-14 | Stop reason: HOSPADM

## 2018-09-13 RX ORDER — IOPAMIDOL 755 MG/ML
140 INJECTION, SOLUTION INTRAVASCULAR ONCE
Status: COMPLETED | OUTPATIENT
Start: 2018-09-14 | End: 2018-09-14

## 2018-09-13 RX ORDER — DEXTROSE MONOHYDRATE 25 G/50ML
12.5-5 INJECTION, SOLUTION INTRAVENOUS EVERY 30 MIN PRN
Status: DISCONTINUED | OUTPATIENT
Start: 2018-09-13 | End: 2018-09-14 | Stop reason: HOSPADM

## 2018-09-13 RX ORDER — VERAPAMIL HYDROCHLORIDE 2.5 MG/ML
1-5 INJECTION, SOLUTION INTRAVENOUS
Status: DISCONTINUED | OUTPATIENT
Start: 2018-09-13 | End: 2018-09-14 | Stop reason: HOSPADM

## 2018-09-13 RX ORDER — ASPIRIN 81 MG/1
81-324 TABLET, CHEWABLE ORAL
Status: COMPLETED | OUTPATIENT
Start: 2018-09-13 | End: 2018-09-13

## 2018-09-13 RX ORDER — NITROGLYCERIN 0.4 MG/1
0.4 TABLET SUBLINGUAL EVERY 5 MIN PRN
Status: DISCONTINUED | OUTPATIENT
Start: 2018-09-13 | End: 2018-09-14 | Stop reason: HOSPADM

## 2018-09-13 RX ORDER — NITROGLYCERIN 20 MG/100ML
.07-2 INJECTION INTRAVENOUS CONTINUOUS PRN
Status: DISCONTINUED | OUTPATIENT
Start: 2018-09-13 | End: 2018-09-14 | Stop reason: HOSPADM

## 2018-09-13 RX ORDER — PHENYLEPHRINE HCL IN 0.9% NACL 1 MG/10 ML
20-100 SYRINGE (ML) INTRAVENOUS
Status: DISCONTINUED | OUTPATIENT
Start: 2018-09-13 | End: 2018-09-14 | Stop reason: HOSPADM

## 2018-09-13 RX ORDER — ASPIRIN 325 MG
325 TABLET ORAL
Status: DISCONTINUED | OUTPATIENT
Start: 2018-09-13 | End: 2018-09-14 | Stop reason: HOSPADM

## 2018-09-13 RX ORDER — FUROSEMIDE 10 MG/ML
20-100 INJECTION INTRAMUSCULAR; INTRAVENOUS
Status: DISCONTINUED | OUTPATIENT
Start: 2018-09-13 | End: 2018-09-14 | Stop reason: HOSPADM

## 2018-09-13 RX ORDER — ADENOSINE 3 MG/ML
6-12 INJECTION, SOLUTION INTRAVENOUS EVERY 5 MIN PRN
Status: DISCONTINUED | OUTPATIENT
Start: 2018-09-13 | End: 2018-09-14 | Stop reason: HOSPADM

## 2018-09-13 RX ADMIN — Medication 200 MCG: at 23:15

## 2018-09-13 RX ADMIN — MIDAZOLAM 5 MG/HR: 5 INJECTION INTRAMUSCULAR; INTRAVENOUS at 23:53

## 2018-09-13 RX ADMIN — FENTANYL CITRATE 50 MCG: 50 INJECTION, SOLUTION INTRAMUSCULAR; INTRAVENOUS at 22:35

## 2018-09-13 RX ADMIN — Medication 200 MCG: at 23:19

## 2018-09-13 RX ADMIN — AMIODARONE HYDROCHLORIDE 300 MG: 50 INJECTION, SOLUTION INTRAVENOUS at 23:28

## 2018-09-13 RX ADMIN — HEPARIN SODIUM 10000 UNITS: 1000 INJECTION, SOLUTION INTRAVENOUS; SUBCUTANEOUS at 23:09

## 2018-09-13 RX ADMIN — FENTANYL CITRATE 200 MCG: 50 INJECTION, SOLUTION INTRAMUSCULAR; INTRAVENOUS at 23:08

## 2018-09-13 RX ADMIN — FUROSEMIDE 160 MG: 10 INJECTION, SOLUTION INTRAVENOUS at 23:35

## 2018-09-13 RX ADMIN — NOREPINEPHRINE BITARTRATE 0.2 MCG/KG/MIN: 1 INJECTION INTRAVENOUS at 23:34

## 2018-09-13 RX ADMIN — HEPARIN SODIUM 500 UNITS: 1000 INJECTION, SOLUTION INTRAVENOUS; SUBCUTANEOUS at 23:29

## 2018-09-13 RX ADMIN — HEPARIN SODIUM 1500 UNITS: 1000 INJECTION, SOLUTION INTRAVENOUS; SUBCUTANEOUS at 23:29

## 2018-09-13 RX ADMIN — HEPARIN SODIUM 3000 UNITS: 1000 INJECTION, SOLUTION INTRAVENOUS; SUBCUTANEOUS at 23:37

## 2018-09-13 RX ADMIN — MIDAZOLAM HYDROCHLORIDE 5 MG: 1 INJECTION, SOLUTION INTRAMUSCULAR; INTRAVENOUS at 22:35

## 2018-09-13 RX ADMIN — Medication 200 MCG: at 23:10

## 2018-09-13 RX ADMIN — TICAGRELOR 180 MG: 90 TABLET ORAL at 23:56

## 2018-09-13 RX ADMIN — FENTANYL CITRATE 50 MCG: 50 INJECTION, SOLUTION INTRAMUSCULAR; INTRAVENOUS at 22:34

## 2018-09-13 RX ADMIN — CANGRELOR 4 MCG/KG/MIN: 50 INJECTION, POWDER, LYOPHILIZED, FOR SOLUTION INTRAVENOUS at 23:24

## 2018-09-13 RX ADMIN — ASPIRIN 81 MG 324 MG: 81 TABLET ORAL at 23:55

## 2018-09-13 RX ADMIN — MIDAZOLAM HYDROCHLORIDE 4 MG: 1 INJECTION, SOLUTION INTRAMUSCULAR; INTRAVENOUS at 23:09

## 2018-09-13 ASSESSMENT — ENCOUNTER SYMPTOMS
DIAPHORESIS: 1
VOMITING: 1
SHORTNESS OF BREATH: 1
DIARRHEA: 1

## 2018-09-13 NOTE — IP AVS SNAPSHOT
[address] [address]    Phone:  [phone]                                       After Visit Summary   9/13/2018    Anson Manriquez    MRN: [id number]           After Visit Summary Signature Page     I have received my discharge instructions, and my questions have been answered. I have discussed any challenges I see with this plan with the nurse or doctor.    ..........................................................................................................................................  Patient/Patient Representative Signature      ..........................................................................................................................................  Patient Representative Print Name and Relationship to Patient    ..................................................               ................................................  Date                                   Time    ..........................................................................................................................................  Reviewed by Signature/Title    ...................................................              ..............................................  Date                                               Time          22EPIC Rev 08/18

## 2018-09-13 NOTE — LETTER
Transition Communication Hand-off for Care Transitions to Next Level of Care Provider    Name: Anson Manriquez  : 1955  MRN #: [id number]  Primary Care Provider: Taryn Lou     Primary Clinic: [address] [place] [address] 741  [facility] [address]     Reason for Hospitalization:  Cardiac arrest (H) [I46.9]  Admit Date/Time: 2018 10:29 PM  Discharge Date: 2018  Payor Source: Payor: MEDICA / Plan: MEDICA ESSENTIAL / Product Type: Indemnity /     Readmission Assessment Measure (MANDO) Risk Score/category: Average    Reason for Communication Hand-off Referral: Continuity of care    Discharge Plan: Discharge to home with life vest and CORE clinic f/u       Concern for non-adherence with plan of care:   No  Discharge Needs Assessment:  Needs       Most Recent Value    Equipment Currently Used at Home none    Transportation Available car, family or friend will provide        Follow-up specialty is recommended: Yes    Follow-up plan:  Future Appointments  Date Time Provider Department Center   2018 7:55 AM Daniele Tucker MD [facility]   10/2/2018 6:30 AM  LAB [facility]   10/2/2018 7:00 AM Nela March NP [facility]   10/31/2018 7:30 AM Jason Matos MD [facility]   10/31/2018 7:00 PM  LAB [facility]       Any outstanding tests or procedures:        Referrals     Future Labs/Procedures    Cardiac Rehab Referral     Process Instructions:    Advance to Wellness and Exercise for Life (WEL) Program or to another maintenance exercise program upon completion of supervised exercise therapy.    Weight mgt program is only offered at [facility].    *This therapy referral will be filtered to a centralized scheduling office at [facility] and the patient will receive a call to schedule an appointment at a [place] location most convenient for them. *        Comments:    If you have not heard from the scheduling office within 2 business days, please call  [phone] for all locations, with the exception of [place], please call [phone] and [place], please call [phone].    Please be aware that coverage of these services is subject to the terms and limitations of your health insurance plan.  Call member services at your health plan with any benefit or coverage questions.            Key Recommendations:  Post hospitalization follow up  Smitha Mcintosh RN BSN  6C Unit Care Coordinator  Phone number: [phone]  Pager: [phone]

## 2018-09-13 NOTE — IP AVS SNAPSHOT
MRN:[id number]                      After Visit Summary   9/13/2018    Anson Manriquez    MRN: [id number]           Thank you!     Thank you for choosing [facility] for your care. Our goal is always to provide you with excellent care. Hearing back from our patients is one way we can continue to improve our services. Please take a few minutes to complete the written survey that you may receive in the mail after you visit with us. Thank you!        Patient Information     Date Of Birth          1955        Designated Caregiver       Most Recent Value    Caregiver    Will someone help with your care after discharge? yes    Name of designated caregiver Jolly    Phone number of caregiver [phone]    Caregiver address home      About your hospital stay     You were admitted on:  September 14, 2018 You last received care in the:  [address] [facility]    You were discharged on:  September 20, 2018        Reason for your hospital stay       You presented with a heart attack that caused a cardiac arrest. Your ejection fraction is low; therefore, you will be started on medications to help improve your heart  Function. You will be on 1. Coreg 3.125mg twice a day, 2. Lisinopril 10mg in the morning and 5 mg in the evening 3. Lasix 40mg twice a day 4. Aspirin 81mg once a day 5. Brilinta 90mg twice a day.     You will also have a Lifevest to help prevent sudden cardiac death. Also, you will be enrolled into Cardiac Rehab.                  Who to Call     For medical emergencies, please call 911.  For non-urgent questions about your medical care, please call your primary care provider or clinic, [phone]          Attending Provider     Provider Specialty    Marcy Faust MD Emergency Medicine    Jomar Georges MD Cardiology       Primary Care Provider Office Phone # Fax #    Taryn Emma Lou [phone]      After Care Instructions     Activity       Your activity  upon discharge: activity as tolerated            Diet       Follow this diet upon discharge: Cardiac                  Follow-up Appointments     Adult [facility] Follow-up and recommended labs and tests       Follow up with primary care provider, Taryn Lou, within 7 days for hospital follow- up.  The following labs/tests are recommended: BMP.      Appointments on [place] and/or [facility] (with [facility] or [facility] provider or service). Call [phone] if you haven't heard regarding these appointments within 7 days of discharge.                  Your next 10 appointments already scheduled     Sep 27, 2018  7:55 AM CDT   (Arrive by 7:40 AM)   Return Visit with Daniele Tucker MD   [facility] ([facility])    [address] [place] [address]  4th Floor  [facility] [id number] [phone]            Oct 02, 2018  6:30 AM CDT   Lab with [facility]   [facility] ([facility])    [address] [place] [address]  1st Floor  [facility] [id number] [phone]            Oct 02, 2018  7:00 AM CDT   (Arrive by 6:45 AM)   CORE NEW with Nela March NP   [facility] ([facility])    [address] [place] [address]  [address]  [facility] [address] [phone]            Oct 31, 2018  7:30 AM CDT   (Arrive by 7:15 AM)   NEW HEART FAILURE with Jason Matos MD   [facility] ([facility])    [address] [place] [address]  [address]  [facility] [id number] [phone]            Oct 31, 2018  7:00 PM CDT   Lab with [facility]    [facility] ([facility])    [address] [place] [address]  1st Floor  [facility] [id number] [phone]              Additional Services     Cardiac Rehab Referral       If you have not heard from the scheduling office within 2 business days, please call [phone] for all locations, with the exception of [place], please call [phone] and [place]  please call [phone].    Please be aware that coverage of these services is subject to the terms and limitations of your health insurance plan.  Call member services at your health plan with any benefit or coverage questions.                  General Recommendations To Control Heart Failure When You Get Home     Heart Failure Instructions for Patients and Families: Please read and check off each of these important instructions as you do them when you get home.     Weight and Symptoms    ___ Put a scale in your bathroom.    ___ Post a weight chart or calendar next to your scale.    ___ Weigh yourself everyday as soon as you get up in the morning (before breakfast).  You should only be wearing your pajamas.  Write your weight on the chart/calendar.    ___ Bring your weight chart/calendar with you to all appointments.    ___ Call your doctor or nurse practitioner if you gain 2 pounds (in 1 day) or 5 pounds in (1 week) from your goal  good  weight.  Your good weight is also called your  dry  weight.  Your doctor or nurse will tell you what your good weight should be.    ___ Call your doctor or nurse practitioner if you have shortness of breath that gets worse over time, leg swelling or fatigue.    Medications and Diet    ___ Make sure to take your medication as prescribed.    ___ Bring a current list of your medication and all of your medicine bottles with you to all appointments.    ___ Limit fluids if you still have swelling or shortness of breath, or if your doctor tells you to do so.      ___ Eat less than 2000 mg of sodium (salt) every day. Read food labels, and do not add salt to meals. Remember, if you eat less salt you retain less fluid.    ___ Follow a heart healthy diet that is low in saturated fat.         Activity and Suggested Lifestyle Changes   ___ Stay active. Talk to your doctor about an exercise program that is safe for your heart.    ___ Stop smoking. Reduce alcohol use.      ___ Lose weight if  you are overweight. Extra weight puts a lot of stress on the heart.    Control for Leg Swelling  ___ Keep your legs elevated (raised) as needed for swelling. If swelling is uncomfortable or elevation doesn t help, ask your doctor about using ACE wraps or support stockings.      What is the C.O.R.E. Clinic?     Cardiomyopathy  Optimization  Rehabilitation  Education    The C.O.R.E. Clinic is a heart failure specialty clinic within [facility].  It is an outpatient disease management program that is based on a phase-by-phase approach, which is tailored to each patient s individual needs.  The cardiologist, nurse practitioner, physician assistant and nurses provide an ongoing outpatient care and treatment plan that guides heart failure and cardiomyopathy patients from evaluation and education to stabilization. This team works with your current primary care doctor and cardiologist to help you:      Avoid hospitalizations    Slow the progression of the disease    Improve length and quality of life    Know who and when to call if heart failure symptoms appear    Receive easy access to quality health care and advice    Better understand your condition and treatment    Decrease the tremendous cost burden of heart failure care    Detect future heart problems before they become life threatening    Your C.O.R.E. Clinic Team will continue to educate you on your heart failure and may adjust medications based on your vital signs, lab work, and how you are feeling.  Therefore, it is very important to bring the following to all C.O.R.E. appointments:    - An accurate list of your medications  - Your medicine bottles  - Your weight chart/calendar    [facility]):    [phone]  [facility]):    [phone]  [facility] ([place]):  [phone]        Pending Results     Date and Time Order Name Status Description    9/15/2018 0754 Blood culture  "Preliminary     9/15/2018 0754 Blood culture Preliminary             Statement of Approval     Ordered          18 1353  I have reviewed and agree with all the recommendations and orders detailed in this document.  EFFECTIVE NOW     Approved and electronically signed by:  Javi Ortiz MD             Admission Information     Date & Time Provider Department Dept. Phone    2018 Jomar Georges MD [address] [facility] [phone]      Your Vitals Were     Blood Pressure Pulse Temperature Respirations Height Weight    120/86 219 98.1  F (36.7  C) (Oral) 18 1.676 m (5' 6\") 78.5 kg (173 lb 1.6 oz)    Pulse Oximetry BMI (Body Mass Index)                92% 27.94 kg/m2          MyChart Information     [employer] lets you send messages to your doctor, view your test results, renew your prescriptions, schedule appointments and more. To sign up, go to [email or web address] . Click on \"Log in\" on the left side of the screen, which will take you to the Welcome page. Then click on \"Sign up Now\" on the right side of the page.     You will be asked to enter the access code listed below, as well as some personal information. Please follow the directions to create your username and password.     Your access code is: [email or web address]  Expires: 2018  1:06 PM     Your access code will  in 90 days. If you need help or a new code, please call your [place] clinic or [phone].        Care EveryWhere ID     This is your Care EveryWhere ID. This could be used by other organizations to access your [facility] medical records  [id number]        Equal Access to Services     [facility] : Hadii luis Reeder, [address], qasarah venturaalissac [place]. So [facility] [phone].    ATENCIÓN: Si habla español, tiene a jordan disposición servicios gratuitos de asistencia lingüística. Llame al [phone].    We comply with applicable federal civil rights laws and [place] " laws. We do not discriminate on the basis of race, color, national origin, age, disability, sex, sexual orientation, or gender identity.               Review of your medicines      START taking        Dose / Directions    aspirin 81 MG chewable tablet   Replaces:  aspirin 325 MG tablet        Dose:  81 mg   Start taking on:  9/21/2018   Take 1 tablet (81 mg) by mouth daily   Quantity:  30 tablet   Refills:  11       lisinopril 5 MG tablet   Commonly known as:  PRINIVIL/ZESTRIL        Take 10 mg in the morning and 5mg at night   Quantity:  90 tablet   Refills:  3       ticagrelor 90 MG tablet   Commonly known as:  BRILINTA   Used for:  Cardiac arrest (H)        Dose:  90 mg   Take 1 tablet (90 mg) by mouth 2 times daily   Quantity:  60 tablet   Refills:  3         CONTINUE these medicines which may have CHANGED, or have new prescriptions. If we are uncertain of the size of tablets/capsules you have at home, strength may be listed as something that might have changed.        Dose / Directions    carvedilol 3.125 MG tablet   Commonly known as:  COREG   This may have changed:    - medication strength  - how much to take  - how to take this        Dose:  3.125 mg   1 tablet (3.125 mg) by Oral or Feeding Tube route 2 times daily (with meals)   Quantity:  60 tablet   Refills:  3       furosemide 40 MG tablet   Commonly known as:  LASIX   This may have changed:    - medication strength  - when to take this   Used for:  Chronic systolic congestive heart failure (H)        Dose:  40 mg   Take 1 tablet (40 mg) by mouth 2 times daily   Quantity:  60 tablet   Refills:  3         CONTINUE these medicines which have NOT CHANGED        Dose / Directions    atorvastatin 80 MG tablet   Commonly known as:  LIPITOR   Used for:  Type 2 diabetes mellitus without complication, without long-term current use of insulin (H), Benign essential hypertension, Mixed hyperlipidemia        Dose:  80 mg   Take 1 tablet (80 mg) by mouth daily Needs  labs done prior to the next refill.   Quantity:  90 tablet   Refills:  3       metFORMIN 500 MG 24 hr tablet   Commonly known as:  GLUCOPHAGE-XR   Used for:  Type 2 diabetes mellitus without complication, without long-term current use of insulin (H)        Dose:  500 mg   Take 1 tablet (500 mg) by mouth 2 times daily (with meals)   Quantity:  180 tablet   Refills:  1       MULTIPLE VITAMIN PO        Dose:  1 tablet   Take 1 tablet by mouth daily.   Refills:  0         STOP taking     aspirin 325 MG tablet   Replaced by:  aspirin 81 MG chewable tablet           losartan 100 MG tablet   Commonly known as:  COZAAR                Where to get your medicines      These medications were sent to [facility] [place], [place] - [address]  [address] [address]     Phone:  [phone]     aspirin 81 MG chewable tablet    carvedilol 3.125 MG tablet    furosemide 40 MG tablet    lisinopril 5 MG tablet    ticagrelor 90 MG tablet                Protect others around you: Learn how to safely use, store and throw away your medicines at www.disposemymeds.org.             Medication List: This is a list of all your medications and when to take them. Check marks below indicate your daily home schedule. Keep this list as a reference.      Medications           Morning Afternoon Evening Bedtime As Needed    aspirin 81 MG chewable tablet   Take 1 tablet (81 mg) by mouth daily   Start taking on:  9/21/2018   Last time this was given:  81 mg on 9/20/2018  8:06 AM                                atorvastatin 80 MG tablet   Commonly known as:  LIPITOR   Take 1 tablet (80 mg) by mouth daily Needs labs done prior to the next refill.   Last time this was given:  80 mg on 9/19/2018  9:55 PM                                carvedilol 3.125 MG tablet   Commonly known as:  COREG   1 tablet (3.125 mg) by Oral or Feeding Tube route 2 times daily (with meals)   Last time this was given:  3.125 mg on  9/20/2018  8:06 AM                                furosemide 40 MG tablet   Commonly known as:  LASIX   Take 1 tablet (40 mg) by mouth 2 times daily   Last time this was given:  40 mg on 9/20/2018  8:06 AM                                lisinopril 5 MG tablet   Commonly known as:  PRINIVIL/ZESTRIL   Take 10 mg in the morning and 5mg at night   Last time this was given:  10 mg on 9/20/2018  8:10 AM                                metFORMIN 500 MG 24 hr tablet   Commonly known as:  GLUCOPHAGE-XR   Take 1 tablet (500 mg) by mouth 2 times daily (with meals)                                MULTIPLE VITAMIN PO   Take 1 tablet by mouth daily.                                ticagrelor 90 MG tablet   Commonly known as:  BRILINTA   Take 1 tablet (90 mg) by mouth 2 times daily   Last time this was given:  90 mg on 9/20/2018  8:06 AM

## 2018-09-14 ENCOUNTER — APPOINTMENT (OUTPATIENT)
Dept: CARDIOLOGY | Facility: CLINIC | Age: 63
DRG: 270 | End: 2018-09-14
Attending: INTERNAL MEDICINE
Payer: COMMERCIAL

## 2018-09-14 ENCOUNTER — APPOINTMENT (OUTPATIENT)
Dept: CT IMAGING | Facility: CLINIC | Age: 63
DRG: 270 | End: 2018-09-14
Attending: INTERNAL MEDICINE
Payer: COMMERCIAL

## 2018-09-14 ENCOUNTER — APPOINTMENT (OUTPATIENT)
Dept: GENERAL RADIOLOGY | Facility: CLINIC | Age: 63
DRG: 270 | End: 2018-09-14
Attending: INTERNAL MEDICINE
Payer: COMMERCIAL

## 2018-09-14 ENCOUNTER — APPOINTMENT (OUTPATIENT)
Dept: GENERAL RADIOLOGY | Facility: CLINIC | Age: 63
DRG: 270 | End: 2018-09-14
Payer: COMMERCIAL

## 2018-09-14 ENCOUNTER — APPOINTMENT (OUTPATIENT)
Dept: CARDIOLOGY | Facility: CLINIC | Age: 63
DRG: 270 | End: 2018-09-14
Payer: COMMERCIAL

## 2018-09-14 PROBLEM — I21.3 STEMI (ST ELEVATION MYOCARDIAL INFARCTION) (H): Status: ACTIVE | Noted: 2018-09-14

## 2018-09-14 LAB
ABO + RH BLD: NORMAL
ABO + RH BLD: NORMAL
ALBUMIN SERPL-MCNC: 3.4 G/DL (ref 3.4–5)
ALBUMIN SERPL-MCNC: 3.4 G/DL (ref 3.4–5)
ALBUMIN SERPL-MCNC: 3.6 G/DL (ref 3.4–5)
ALBUMIN UR-MCNC: 10 MG/DL
ALP SERPL-CCNC: 52 U/L (ref 40–150)
ALP SERPL-CCNC: 55 U/L (ref 40–150)
ALP SERPL-CCNC: 60 U/L (ref 40–150)
ALT SERPL W P-5'-P-CCNC: 327 U/L (ref 0–70)
ALT SERPL W P-5'-P-CCNC: 338 U/L (ref 0–70)
ALT SERPL W P-5'-P-CCNC: 393 U/L (ref 0–70)
AMYLASE SERPL-CCNC: 38 U/L (ref 30–110)
ANION GAP SERPL CALCULATED.3IONS-SCNC: 10 MMOL/L (ref 3–14)
ANION GAP SERPL CALCULATED.3IONS-SCNC: 10 MMOL/L (ref 3–14)
ANION GAP SERPL CALCULATED.3IONS-SCNC: 11 MMOL/L (ref 3–14)
ANION GAP SERPL CALCULATED.3IONS-SCNC: 12 MMOL/L (ref 3–14)
APPEARANCE UR: ABNORMAL
AST SERPL W P-5'-P-CCNC: 289 U/L (ref 0–45)
AST SERPL W P-5'-P-CCNC: 332 U/L (ref 0–45)
AST SERPL W P-5'-P-CCNC: 392 U/L (ref 0–45)
BASE DEFICIT BLDA-SCNC: 3.2 MMOL/L
BASE DEFICIT BLDV-SCNC: 0.7 MMOL/L
BASE DEFICIT BLDV-SCNC: 1.8 MMOL/L
BASE EXCESS BLDA CALC-SCNC: 2.1 MMOL/L
BASE EXCESS BLDA CALC-SCNC: 3.1 MMOL/L
BASE EXCESS BLDA CALC-SCNC: 4.2 MMOL/L
BASE EXCESS BLDA CALC-SCNC: 4.8 MMOL/L
BASE EXCESS BLDV CALC-SCNC: 2.3 MMOL/L
BASE EXCESS BLDV CALC-SCNC: 2.9 MMOL/L
BASE EXCESS BLDV CALC-SCNC: 3 MMOL/L
BASE EXCESS BLDV CALC-SCNC: 6.7 MMOL/L
BASOPHILS # BLD AUTO: 0 10E9/L (ref 0–0.2)
BASOPHILS # BLD AUTO: 0 10E9/L (ref 0–0.2)
BASOPHILS NFR BLD AUTO: 0 %
BASOPHILS NFR BLD AUTO: 0.2 %
BILIRUB DIRECT SERPL-MCNC: 0.4 MG/DL (ref 0–0.2)
BILIRUB DIRECT SERPL-MCNC: 0.6 MG/DL (ref 0–0.2)
BILIRUB SERPL-MCNC: 1.6 MG/DL (ref 0.2–1.3)
BILIRUB SERPL-MCNC: 2.1 MG/DL (ref 0.2–1.3)
BILIRUB SERPL-MCNC: 2.4 MG/DL (ref 0.2–1.3)
BILIRUB UR QL STRIP: NEGATIVE
BLD GP AB SCN SERPL QL: NORMAL
BLOOD BANK CMNT PATIENT-IMP: NORMAL
BUN SERPL-MCNC: 24 MG/DL (ref 7–30)
BUN SERPL-MCNC: 24 MG/DL (ref 7–30)
BUN SERPL-MCNC: 29 MG/DL (ref 7–30)
BUN SERPL-MCNC: 30 MG/DL (ref 7–30)
CA-I BLD-MCNC: 4.2 MG/DL (ref 4.4–5.2)
CA-I BLD-MCNC: 4.5 MG/DL (ref 4.4–5.2)
CA-I BLD-MCNC: 4.5 MG/DL (ref 4.4–5.2)
CA-I BLD-SCNC: 4.1 MG/DL (ref 4.4–5.2)
CA-I BLD-SCNC: 4.6 MG/DL (ref 4.4–5.2)
CALCIUM SERPL-MCNC: 7.8 MG/DL (ref 8.5–10.1)
CALCIUM SERPL-MCNC: 7.9 MG/DL (ref 8.5–10.1)
CALCIUM SERPL-MCNC: 8.1 MG/DL (ref 8.5–10.1)
CALCIUM SERPL-MCNC: 8.5 MG/DL (ref 8.5–10.1)
CHLORIDE SERPL-SCNC: 103 MMOL/L (ref 94–109)
CHLORIDE SERPL-SCNC: 105 MMOL/L (ref 94–109)
CHLORIDE SERPL-SCNC: 107 MMOL/L (ref 94–109)
CHLORIDE SERPL-SCNC: 98 MMOL/L (ref 94–109)
CK SERPL-CCNC: 335 U/L (ref 30–300)
CO2 BLD-SCNC: 18 MMOL/L (ref 21–28)
CO2 BLD-SCNC: 27 MMOL/L (ref 21–28)
CO2 SERPL-SCNC: 25 MMOL/L (ref 20–32)
CO2 SERPL-SCNC: 26 MMOL/L (ref 20–32)
CO2 SERPL-SCNC: 28 MMOL/L (ref 20–32)
CO2 SERPL-SCNC: 29 MMOL/L (ref 20–32)
COLOR UR AUTO: ABNORMAL
CREAT SERPL-MCNC: 1.29 MG/DL (ref 0.66–1.25)
CREAT SERPL-MCNC: 1.45 MG/DL (ref 0.66–1.25)
CREAT SERPL-MCNC: 1.47 MG/DL (ref 0.66–1.25)
CREAT SERPL-MCNC: 1.54 MG/DL (ref 0.66–1.25)
DIFFERENTIAL METHOD BLD: ABNORMAL
DIFFERENTIAL METHOD BLD: ABNORMAL
EOSINOPHIL # BLD AUTO: 0 10E9/L (ref 0–0.7)
EOSINOPHIL # BLD AUTO: 0 10E9/L (ref 0–0.7)
EOSINOPHIL NFR BLD AUTO: 0.1 %
EOSINOPHIL NFR BLD AUTO: 0.2 %
ERYTHROCYTE [DISTWIDTH] IN BLOOD BY AUTOMATED COUNT: 15.2 % (ref 10–15)
ERYTHROCYTE [DISTWIDTH] IN BLOOD BY AUTOMATED COUNT: 15.4 % (ref 10–15)
ERYTHROCYTE [DISTWIDTH] IN BLOOD BY AUTOMATED COUNT: 15.5 % (ref 10–15)
ERYTHROCYTE [DISTWIDTH] IN BLOOD BY AUTOMATED COUNT: 15.5 % (ref 10–15)
FIBRINOGEN PPP-MCNC: 226 MG/DL (ref 200–420)
GFR SERPL CREATININE-BSD FRML MDRD: 46 ML/MIN/1.7M2
GFR SERPL CREATININE-BSD FRML MDRD: 48 ML/MIN/1.7M2
GFR SERPL CREATININE-BSD FRML MDRD: 49 ML/MIN/1.7M2
GFR SERPL CREATININE-BSD FRML MDRD: 56 ML/MIN/1.7M2
GLUCOSE BLD-MCNC: 365 MG/DL (ref 70–99)
GLUCOSE BLD-MCNC: 413 MG/DL (ref 70–99)
GLUCOSE BLDC GLUCOMTR-MCNC: 118 MG/DL (ref 70–99)
GLUCOSE BLDC GLUCOMTR-MCNC: 123 MG/DL (ref 70–99)
GLUCOSE BLDC GLUCOMTR-MCNC: 124 MG/DL (ref 70–99)
GLUCOSE BLDC GLUCOMTR-MCNC: 126 MG/DL (ref 70–99)
GLUCOSE BLDC GLUCOMTR-MCNC: 132 MG/DL (ref 70–99)
GLUCOSE BLDC GLUCOMTR-MCNC: 140 MG/DL (ref 70–99)
GLUCOSE BLDC GLUCOMTR-MCNC: 157 MG/DL (ref 70–99)
GLUCOSE BLDC GLUCOMTR-MCNC: 162 MG/DL (ref 70–99)
GLUCOSE BLDC GLUCOMTR-MCNC: 166 MG/DL (ref 70–99)
GLUCOSE BLDC GLUCOMTR-MCNC: 181 MG/DL (ref 70–99)
GLUCOSE BLDC GLUCOMTR-MCNC: 247 MG/DL (ref 70–99)
GLUCOSE BLDC GLUCOMTR-MCNC: 265 MG/DL (ref 70–99)
GLUCOSE BLDC GLUCOMTR-MCNC: 340 MG/DL (ref 70–99)
GLUCOSE BLDC GLUCOMTR-MCNC: 403 MG/DL (ref 70–99)
GLUCOSE BLDC GLUCOMTR-MCNC: 425 MG/DL (ref 70–99)
GLUCOSE BLDC GLUCOMTR-MCNC: 80 MG/DL (ref 70–99)
GLUCOSE SERPL-MCNC: 120 MG/DL (ref 70–99)
GLUCOSE SERPL-MCNC: 149 MG/DL (ref 70–99)
GLUCOSE SERPL-MCNC: 256 MG/DL (ref 70–99)
GLUCOSE SERPL-MCNC: 428 MG/DL (ref 70–99)
GLUCOSE UR STRIP-MCNC: NEGATIVE MG/DL
GRAM STN SPEC: NORMAL
GRAM STN SPEC: NORMAL
HBA1C MFR BLD: 8.4 % (ref 0–5.6)
HCO3 BLD-SCNC: 22 MMOL/L (ref 21–28)
HCO3 BLD-SCNC: 26 MMOL/L (ref 21–28)
HCO3 BLD-SCNC: 27 MMOL/L (ref 21–28)
HCO3 BLD-SCNC: 29 MMOL/L (ref 21–28)
HCO3 BLD-SCNC: 29 MMOL/L (ref 21–28)
HCO3 BLDV-SCNC: 26 MMOL/L (ref 21–28)
HCO3 BLDV-SCNC: 27 MMOL/L (ref 21–28)
HCO3 BLDV-SCNC: 27 MMOL/L (ref 21–28)
HCO3 BLDV-SCNC: 28 MMOL/L (ref 21–28)
HCO3 BLDV-SCNC: 30 MMOL/L (ref 21–28)
HCO3 BLDV-SCNC: 32 MMOL/L (ref 21–28)
HCT VFR BLD AUTO: 40.1 % (ref 40–53)
HCT VFR BLD AUTO: 41.6 % (ref 40–53)
HCT VFR BLD AUTO: 42.4 % (ref 40–53)
HCT VFR BLD AUTO: 42.4 % (ref 40–53)
HCT VFR BLD CALC: 37 %PCV (ref 40–53)
HCT VFR BLD CALC: 38 %PCV (ref 40–53)
HGB BLD CALC-MCNC: 12.6 G/DL (ref 13.3–17.7)
HGB BLD CALC-MCNC: 12.9 G/DL (ref 13.3–17.7)
HGB BLD-MCNC: 13.3 G/DL (ref 13.3–17.7)
HGB BLD-MCNC: 13.9 G/DL (ref 13.3–17.7)
HGB BLD-MCNC: 13.9 G/DL (ref 13.3–17.7)
HGB BLD-MCNC: 14.1 G/DL (ref 13.3–17.7)
HGB UR QL STRIP: ABNORMAL
IMM GRANULOCYTES # BLD: 0 10E9/L (ref 0–0.4)
IMM GRANULOCYTES # BLD: 0.1 10E9/L (ref 0–0.4)
IMM GRANULOCYTES NFR BLD: 0.3 %
IMM GRANULOCYTES NFR BLD: 0.4 %
INR PPP: 1.34 (ref 0.86–1.14)
INTERPRETATION ECG - MUSE: NORMAL
KCT BLD-ACNC: 229 SEC (ref 75–150)
KETONES UR STRIP-MCNC: NEGATIVE MG/DL
LACTATE BLD-SCNC: 1 MMOL/L (ref 0.7–2)
LACTATE BLD-SCNC: 1.1 MMOL/L (ref 0.7–2)
LACTATE BLD-SCNC: 3.1 MMOL/L (ref 0.7–2)
LACTATE BLD-SCNC: 4.3 MMOL/L (ref 0.7–2)
LACTATE BLD-SCNC: 4.6 MMOL/L (ref 0.7–2)
LEUKOCYTE ESTERASE UR QL STRIP: NEGATIVE
LIPASE SERPL-CCNC: 310 U/L (ref 73–393)
LMWH PPP CHRO-ACNC: 0.29 IU/ML
LMWH PPP CHRO-ACNC: 0.81 IU/ML
LYMPHOCYTES # BLD AUTO: 1.4 10E9/L (ref 0.8–5.3)
LYMPHOCYTES # BLD AUTO: 1.5 10E9/L (ref 0.8–5.3)
LYMPHOCYTES NFR BLD AUTO: 11 %
LYMPHOCYTES NFR BLD AUTO: 11.2 %
MAGNESIUM SERPL-MCNC: 2.1 MG/DL (ref 1.6–2.3)
MAGNESIUM SERPL-MCNC: 2.2 MG/DL (ref 1.6–2.3)
MAGNESIUM SERPL-MCNC: 2.2 MG/DL (ref 1.6–2.3)
MAGNESIUM SERPL-MCNC: 2.4 MG/DL (ref 1.6–2.3)
MCH RBC QN AUTO: 30.6 PG (ref 26.5–33)
MCH RBC QN AUTO: 30.7 PG (ref 26.5–33)
MCH RBC QN AUTO: 30.8 PG (ref 26.5–33)
MCH RBC QN AUTO: 31.1 PG (ref 26.5–33)
MCHC RBC AUTO-ENTMCNC: 32.8 G/DL (ref 31.5–36.5)
MCHC RBC AUTO-ENTMCNC: 33.2 G/DL (ref 31.5–36.5)
MCHC RBC AUTO-ENTMCNC: 33.3 G/DL (ref 31.5–36.5)
MCHC RBC AUTO-ENTMCNC: 33.4 G/DL (ref 31.5–36.5)
MCV RBC AUTO: 92 FL (ref 78–100)
MCV RBC AUTO: 92 FL (ref 78–100)
MCV RBC AUTO: 93 FL (ref 78–100)
MCV RBC AUTO: 94 FL (ref 78–100)
MONOCYTES # BLD AUTO: 1.1 10E9/L (ref 0–1.3)
MONOCYTES # BLD AUTO: 1.6 10E9/L (ref 0–1.3)
MONOCYTES NFR BLD AUTO: 12 %
MONOCYTES NFR BLD AUTO: 8.8 %
MRSA DNA SPEC QL NAA+PROBE: NEGATIVE
MUCOUS THREADS #/AREA URNS LPF: PRESENT /LPF
NEUTROPHILS # BLD AUTO: 10.4 10E9/L (ref 1.6–8.3)
NEUTROPHILS # BLD AUTO: 9.9 10E9/L (ref 1.6–8.3)
NEUTROPHILS NFR BLD AUTO: 76.3 %
NEUTROPHILS NFR BLD AUTO: 79.5 %
NITRATE UR QL: NEGATIVE
NRBC # BLD AUTO: 0 10*3/UL
NRBC BLD AUTO-RTO: 0 /100
O2/TOTAL GAS SETTING VFR VENT: 100 %
O2/TOTAL GAS SETTING VFR VENT: 100 %
O2/TOTAL GAS SETTING VFR VENT: 40 %
O2/TOTAL GAS SETTING VFR VENT: 40 %
O2/TOTAL GAS SETTING VFR VENT: 50 %
O2/TOTAL GAS SETTING VFR VENT: 50 %
O2/TOTAL GAS SETTING VFR VENT: 60 %
OXYHGB MFR BLD: 95 % (ref 92–100)
OXYHGB MFR BLD: 97 % (ref 92–100)
OXYHGB MFR BLDV: 31 %
OXYHGB MFR BLDV: 38 %
OXYHGB MFR BLDV: 41 %
OXYHGB MFR BLDV: 55 %
OXYHGB MFR BLDV: 64 %
OXYHGB MFR BLDV: 70 %
PCO2 BLD: 36 MM HG (ref 35–45)
PCO2 BLD: 37 MM HG (ref 35–45)
PCO2 BLD: 38 MM HG (ref 35–45)
PCO2 BLD: 40 MM HG (ref 35–45)
PCO2 BLD: 42 MM HG (ref 35–45)
PCO2 BLD: 52 MM HG (ref 35–45)
PCO2 BLD: 53 MM HG (ref 35–45)
PCO2 BLDV: 39 MM HG (ref 40–50)
PCO2 BLDV: 42 MM HG (ref 40–50)
PCO2 BLDV: 49 MM HG (ref 40–50)
PCO2 BLDV: 53 MM HG (ref 40–50)
PCO2 BLDV: 56 MM HG (ref 40–50)
PCO2 BLDV: 58 MM HG (ref 40–50)
PH BLD: 7.15 PH (ref 7.35–7.45)
PH BLD: 7.32 PH (ref 7.35–7.45)
PH BLD: 7.37 PH (ref 7.35–7.45)
PH BLD: 7.45 PH (ref 7.35–7.45)
PH BLD: 7.46 PH (ref 7.35–7.45)
PH BLD: 7.47 PH (ref 7.35–7.45)
PH BLD: 7.47 PH (ref 7.35–7.45)
PH BLDV: 7.27 PH (ref 7.32–7.43)
PH BLDV: 7.31 PH (ref 7.32–7.43)
PH BLDV: 7.33 PH (ref 7.32–7.43)
PH BLDV: 7.43 PH (ref 7.32–7.43)
PH BLDV: 7.43 PH (ref 7.32–7.43)
PH BLDV: 7.45 PH (ref 7.32–7.43)
PH UR STRIP: 5 PH (ref 5–7)
PHOSPHATE SERPL-MCNC: 4 MG/DL (ref 2.5–4.5)
PHOSPHATE SERPL-MCNC: 4.6 MG/DL (ref 2.5–4.5)
PHOSPHATE SERPL-MCNC: 5.4 MG/DL (ref 2.5–4.5)
PLATELET # BLD AUTO: 193 10E9/L (ref 150–450)
PLATELET # BLD AUTO: 215 10E9/L (ref 150–450)
PLATELET # BLD AUTO: 232 10E9/L (ref 150–450)
PLATELET # BLD AUTO: 245 10E9/L (ref 150–450)
PO2 BLD: 153 MM HG (ref 80–105)
PO2 BLD: 208 MM HG (ref 80–105)
PO2 BLD: 66 MM HG (ref 80–105)
PO2 BLD: 69 MM HG (ref 80–105)
PO2 BLD: 74 MM HG (ref 80–105)
PO2 BLD: 84 MM HG (ref 80–105)
PO2 BLD: 94 MM HG (ref 80–105)
PO2 BLDV: 24 MM HG (ref 25–47)
PO2 BLDV: 26 MM HG (ref 25–47)
PO2 BLDV: 27 MM HG (ref 25–47)
PO2 BLDV: 31 MM HG (ref 25–47)
PO2 BLDV: 35 MM HG (ref 25–47)
PO2 BLDV: 39 MM HG (ref 25–47)
POTASSIUM BLD-SCNC: 5.3 MMOL/L (ref 3.4–5.3)
POTASSIUM BLD-SCNC: 5.5 MMOL/L (ref 3.4–5.3)
POTASSIUM BLD-SCNC: 6.1 MMOL/L (ref 3.4–5.3)
POTASSIUM SERPL-SCNC: 3.3 MMOL/L (ref 3.4–5.3)
POTASSIUM SERPL-SCNC: 4 MMOL/L (ref 3.4–5.3)
POTASSIUM SERPL-SCNC: 4.1 MMOL/L (ref 3.4–5.3)
POTASSIUM SERPL-SCNC: 6.4 MMOL/L (ref 3.4–5.3)
PROT SERPL-MCNC: 6.6 G/DL (ref 6.8–8.8)
PROT SERPL-MCNC: 6.7 G/DL (ref 6.8–8.8)
PROT SERPL-MCNC: 7 G/DL (ref 6.8–8.8)
RBC # BLD AUTO: 4.34 10E12/L (ref 4.4–5.9)
RBC # BLD AUTO: 4.51 10E12/L (ref 4.4–5.9)
RBC # BLD AUTO: 4.53 10E12/L (ref 4.4–5.9)
RBC # BLD AUTO: 4.54 10E12/L (ref 4.4–5.9)
RBC #/AREA URNS AUTO: 3 /HPF (ref 0–2)
SAO2 % BLDA FROM PO2: 100 % (ref 92–100)
SAO2 % BLDA FROM PO2: 92 % (ref 92–100)
SODIUM BLD-SCNC: 128 MMOL/L (ref 133–144)
SODIUM BLD-SCNC: 138 MMOL/L (ref 133–144)
SODIUM SERPL-SCNC: 135 MMOL/L (ref 133–144)
SODIUM SERPL-SCNC: 141 MMOL/L (ref 133–144)
SODIUM SERPL-SCNC: 143 MMOL/L (ref 133–144)
SODIUM SERPL-SCNC: 144 MMOL/L (ref 133–144)
SOURCE: ABNORMAL
SP GR UR STRIP: 1.03 (ref 1–1.03)
SPECIMEN EXP DATE BLD: NORMAL
SPECIMEN SOURCE: NORMAL
SPECIMEN SOURCE: NORMAL
TROPONIN I SERPL-MCNC: 12.24 UG/L (ref 0–0.04)
TROPONIN I SERPL-MCNC: 17.77 UG/L (ref 0–0.04)
TROPONIN I SERPL-MCNC: 7.81 UG/L (ref 0–0.04)
URATE CRY #/AREA URNS HPF: ABNORMAL /HPF
UROBILINOGEN UR STRIP-MCNC: NORMAL MG/DL (ref 0–2)
WBC # BLD AUTO: 13 10E9/L (ref 4–11)
WBC # BLD AUTO: 13.2 10E9/L (ref 4–11)
WBC # BLD AUTO: 13.5 10E9/L (ref 4–11)
WBC # BLD AUTO: 15.1 10E9/L (ref 4–11)
WBC #/AREA URNS AUTO: 3 /HPF (ref 0–5)

## 2018-09-14 PROCEDURE — 86901 BLOOD TYPING SEROLOGIC RH(D): CPT | Performed by: INTERNAL MEDICINE

## 2018-09-14 PROCEDURE — 85027 COMPLETE CBC AUTOMATED: CPT | Performed by: INTERNAL MEDICINE

## 2018-09-14 PROCEDURE — 70450 CT HEAD/BRAIN W/O DYE: CPT

## 2018-09-14 PROCEDURE — 93306 TTE W/DOPPLER COMPLETE: CPT | Mod: 26 | Performed by: INTERNAL MEDICINE

## 2018-09-14 PROCEDURE — 93503 INSERT/PLACE HEART CATHETER: CPT

## 2018-09-14 PROCEDURE — 86900 BLOOD TYPING SEROLOGIC ABO: CPT | Performed by: INTERNAL MEDICINE

## 2018-09-14 PROCEDURE — 99291 CRITICAL CARE FIRST HOUR: CPT | Mod: GC | Performed by: INTERNAL MEDICINE

## 2018-09-14 PROCEDURE — 87205 SMEAR GRAM STAIN: CPT | Performed by: INTERNAL MEDICINE

## 2018-09-14 PROCEDURE — 86850 RBC ANTIBODY SCREEN: CPT | Performed by: INTERNAL MEDICINE

## 2018-09-14 PROCEDURE — 93325 DOPPLER ECHO COLOR FLOW MAPG: CPT | Mod: 26 | Performed by: INTERNAL MEDICINE

## 2018-09-14 PROCEDURE — 83605 ASSAY OF LACTIC ACID: CPT | Performed by: INTERNAL MEDICINE

## 2018-09-14 PROCEDURE — 85025 COMPLETE CBC W/AUTO DIFF WBC: CPT | Performed by: INTERNAL MEDICINE

## 2018-09-14 PROCEDURE — 40000081 ZZH STATISTIC INSERT IABP

## 2018-09-14 PROCEDURE — 40000014 ZZH STATISTIC ARTERIAL MONITORING DAILY

## 2018-09-14 PROCEDURE — C9113 INJ PANTOPRAZOLE SODIUM, VIA: HCPCS | Performed by: INTERNAL MEDICINE

## 2018-09-14 PROCEDURE — 87640 STAPH A DNA AMP PROBE: CPT | Performed by: INTERNAL MEDICINE

## 2018-09-14 PROCEDURE — 99291 CRITICAL CARE FIRST HOUR: CPT | Mod: 25 | Performed by: INTERNAL MEDICINE

## 2018-09-14 PROCEDURE — 36415 COLL VENOUS BLD VENIPUNCTURE: CPT | Performed by: INTERNAL MEDICINE

## 2018-09-14 PROCEDURE — 25000125 ZZHC RX 250: Performed by: INTERNAL MEDICINE

## 2018-09-14 PROCEDURE — 93308 TTE F-UP OR LMTD: CPT

## 2018-09-14 PROCEDURE — 80076 HEPATIC FUNCTION PANEL: CPT | Performed by: INTERNAL MEDICINE

## 2018-09-14 PROCEDURE — 40000986 XR CHEST PORT 1 VW

## 2018-09-14 PROCEDURE — 83690 ASSAY OF LIPASE: CPT | Performed by: INTERNAL MEDICINE

## 2018-09-14 PROCEDURE — 4A1239Z MONITORING OF CARDIAC OUTPUT, PERCUTANEOUS APPROACH: ICD-10-PCS | Performed by: INTERNAL MEDICINE

## 2018-09-14 PROCEDURE — 40000048 ZZH STATISTIC DAILY SWAN MONITORING

## 2018-09-14 PROCEDURE — 25000128 H RX IP 250 OP 636: Performed by: INTERNAL MEDICINE

## 2018-09-14 PROCEDURE — 36620 INSERTION CATHETER ARTERY: CPT | Mod: GC | Performed by: INTERNAL MEDICINE

## 2018-09-14 PROCEDURE — 93005 ELECTROCARDIOGRAM TRACING: CPT

## 2018-09-14 PROCEDURE — 85384 FIBRINOGEN ACTIVITY: CPT | Performed by: INTERNAL MEDICINE

## 2018-09-14 PROCEDURE — 84100 ASSAY OF PHOSPHORUS: CPT | Performed by: INTERNAL MEDICINE

## 2018-09-14 PROCEDURE — 85520 HEPARIN ASSAY: CPT | Performed by: INTERNAL MEDICINE

## 2018-09-14 PROCEDURE — 74176 CT ABD & PELVIS W/O CONTRAST: CPT

## 2018-09-14 PROCEDURE — 82803 BLOOD GASES ANY COMBINATION: CPT | Performed by: INTERNAL MEDICINE

## 2018-09-14 PROCEDURE — 87641 MR-STAPH DNA AMP PROBE: CPT | Performed by: INTERNAL MEDICINE

## 2018-09-14 PROCEDURE — 4A133B3 MONITORING OF ARTERIAL PRESSURE, PULMONARY, PERCUTANEOUS APPROACH: ICD-10-PCS | Performed by: INTERNAL MEDICINE

## 2018-09-14 PROCEDURE — 84132 ASSAY OF SERUM POTASSIUM: CPT | Performed by: INTERNAL MEDICINE

## 2018-09-14 PROCEDURE — 83735 ASSAY OF MAGNESIUM: CPT | Performed by: INTERNAL MEDICINE

## 2018-09-14 PROCEDURE — 36620 INSERTION CATHETER ARTERY: CPT

## 2018-09-14 PROCEDURE — 3E043XZ INTRODUCTION OF VASOPRESSOR INTO CENTRAL VEIN, PERCUTANEOUS APPROACH: ICD-10-PCS | Performed by: INTERNAL MEDICINE

## 2018-09-14 PROCEDURE — 40000076 ZZH STATISTIC IABP MONITORING

## 2018-09-14 PROCEDURE — 82330 ASSAY OF CALCIUM: CPT | Performed by: INTERNAL MEDICINE

## 2018-09-14 PROCEDURE — 00000146 ZZHCL STATISTIC GLUCOSE BY METER IP

## 2018-09-14 PROCEDURE — 80048 BASIC METABOLIC PNL TOTAL CA: CPT | Performed by: INTERNAL MEDICINE

## 2018-09-14 PROCEDURE — 93010 ELECTROCARDIOGRAM REPORT: CPT | Performed by: INTERNAL MEDICINE

## 2018-09-14 PROCEDURE — 84484 ASSAY OF TROPONIN QUANT: CPT | Performed by: INTERNAL MEDICINE

## 2018-09-14 PROCEDURE — 40000196 ZZH STATISTIC RAPCV CVP MONITORING

## 2018-09-14 PROCEDURE — 85610 PROTHROMBIN TIME: CPT | Performed by: INTERNAL MEDICINE

## 2018-09-14 PROCEDURE — 82150 ASSAY OF AMYLASE: CPT | Performed by: INTERNAL MEDICINE

## 2018-09-14 PROCEDURE — 25500064 ZZH RX 255 OP 636: Performed by: INTERNAL MEDICINE

## 2018-09-14 PROCEDURE — 27210136 ZZH KIT CATH ARTERIAL EXT SUPPLY

## 2018-09-14 PROCEDURE — 93321 DOPPLER ECHO F-UP/LMTD STD: CPT | Mod: 26 | Performed by: INTERNAL MEDICINE

## 2018-09-14 PROCEDURE — 83036 HEMOGLOBIN GLYCOSYLATED A1C: CPT | Performed by: INTERNAL MEDICINE

## 2018-09-14 PROCEDURE — 25000128 H RX IP 250 OP 636: Performed by: EMERGENCY MEDICINE

## 2018-09-14 PROCEDURE — 36600 WITHDRAWAL OF ARTERIAL BLOOD: CPT

## 2018-09-14 PROCEDURE — 80053 COMPREHEN METABOLIC PANEL: CPT | Performed by: INTERNAL MEDICINE

## 2018-09-14 PROCEDURE — 25000128 H RX IP 250 OP 636

## 2018-09-14 PROCEDURE — 71045 X-RAY EXAM CHEST 1 VIEW: CPT

## 2018-09-14 PROCEDURE — 82550 ASSAY OF CK (CPK): CPT | Performed by: INTERNAL MEDICINE

## 2018-09-14 PROCEDURE — 93503 INSERT/PLACE HEART CATHETER: CPT | Mod: 59 | Performed by: INTERNAL MEDICINE

## 2018-09-14 PROCEDURE — 94003 VENT MGMT INPAT SUBQ DAY: CPT

## 2018-09-14 PROCEDURE — 87070 CULTURE OTHR SPECIMN AEROBIC: CPT | Performed by: INTERNAL MEDICINE

## 2018-09-14 PROCEDURE — 02HQ32Z INSERTION OF MONITORING DEVICE INTO RIGHT PULMONARY ARTERY, PERCUTANEOUS APPROACH: ICD-10-PCS | Performed by: INTERNAL MEDICINE

## 2018-09-14 PROCEDURE — 82805 BLOOD GASES W/O2 SATURATION: CPT | Performed by: INTERNAL MEDICINE

## 2018-09-14 PROCEDURE — 93308 TTE F-UP OR LMTD: CPT | Mod: 26 | Performed by: INTERNAL MEDICINE

## 2018-09-14 PROCEDURE — 27210140 ZZH KIT CATH SWAN VIP SUPPLY

## 2018-09-14 PROCEDURE — 85004 AUTOMATED DIFF WBC COUNT: CPT | Performed by: INTERNAL MEDICINE

## 2018-09-14 PROCEDURE — 25000132 ZZH RX MED GY IP 250 OP 250 PS 637: Performed by: INTERNAL MEDICINE

## 2018-09-14 PROCEDURE — 40000275 ZZH STATISTIC RCP TIME EA 10 MIN

## 2018-09-14 PROCEDURE — 81001 URINALYSIS AUTO W/SCOPE: CPT | Performed by: INTERNAL MEDICINE

## 2018-09-14 PROCEDURE — 87040 BLOOD CULTURE FOR BACTERIA: CPT | Performed by: INTERNAL MEDICINE

## 2018-09-14 PROCEDURE — 20000004 ZZH R&B ICU UMMC

## 2018-09-14 RX ORDER — POTASSIUM CHLORIDE 29.8 MG/ML
20 INJECTION INTRAVENOUS
Status: DISCONTINUED | OUTPATIENT
Start: 2018-09-14 | End: 2018-09-20 | Stop reason: HOSPADM

## 2018-09-14 RX ORDER — MIDAZOLAM (PF) 1 MG/ML IN 0.9 % SODIUM CHLORIDE INTRAVENOUS SOLUTION
1-8 CONTINUOUS
Status: DISCONTINUED | OUTPATIENT
Start: 2018-09-14 | End: 2018-09-18

## 2018-09-14 RX ORDER — FENTANYL CITRATE 50 UG/ML
INJECTION, SOLUTION INTRAMUSCULAR; INTRAVENOUS
Status: COMPLETED
Start: 2018-09-14 | End: 2018-09-14

## 2018-09-14 RX ORDER — PIPERACILLIN SODIUM, TAZOBACTAM SODIUM 4; .5 G/20ML; G/20ML
4.5 INJECTION, POWDER, LYOPHILIZED, FOR SOLUTION INTRAVENOUS EVERY 6 HOURS
Status: DISCONTINUED | OUTPATIENT
Start: 2018-09-14 | End: 2018-09-18

## 2018-09-14 RX ORDER — FUROSEMIDE 10 MG/ML
80 INJECTION INTRAMUSCULAR; INTRAVENOUS ONCE
Status: COMPLETED | OUTPATIENT
Start: 2018-09-14 | End: 2018-09-14

## 2018-09-14 RX ORDER — HEPARIN SODIUM 10000 [USP'U]/100ML
0-3500 INJECTION, SOLUTION INTRAVENOUS CONTINUOUS
Status: DISCONTINUED | OUTPATIENT
Start: 2018-09-14 | End: 2018-09-16 | Stop reason: CLARIF

## 2018-09-14 RX ORDER — DOPAMINE HYDROCHLORIDE 160 MG/100ML
INJECTION, SOLUTION INTRAVENOUS
Status: COMPLETED
Start: 2018-09-14 | End: 2018-09-14

## 2018-09-14 RX ORDER — POTASSIUM CL/LIDO/0.9 % NACL 10MEQ/0.1L
10 INTRAVENOUS SOLUTION, PIGGYBACK (ML) INTRAVENOUS
Status: DISCONTINUED | OUTPATIENT
Start: 2018-09-14 | End: 2018-09-20 | Stop reason: HOSPADM

## 2018-09-14 RX ORDER — AMOXICILLIN 250 MG
2 CAPSULE ORAL 2 TIMES DAILY PRN
Status: DISCONTINUED | OUTPATIENT
Start: 2018-09-14 | End: 2018-09-20 | Stop reason: HOSPADM

## 2018-09-14 RX ORDER — ASPIRIN 81 MG/1
81 TABLET, CHEWABLE ORAL DAILY
Status: DISCONTINUED | OUTPATIENT
Start: 2018-09-14 | End: 2018-09-20 | Stop reason: HOSPADM

## 2018-09-14 RX ORDER — NICOTINE POLACRILEX 4 MG
15-30 LOZENGE BUCCAL
Status: DISCONTINUED | OUTPATIENT
Start: 2018-09-14 | End: 2018-09-18

## 2018-09-14 RX ORDER — ATORVASTATIN CALCIUM 80 MG/1
80 TABLET, FILM COATED ORAL EVERY EVENING
Status: DISCONTINUED | OUTPATIENT
Start: 2018-09-14 | End: 2018-09-20 | Stop reason: HOSPADM

## 2018-09-14 RX ORDER — AMOXICILLIN 250 MG
1 CAPSULE ORAL 2 TIMES DAILY PRN
Status: DISCONTINUED | OUTPATIENT
Start: 2018-09-14 | End: 2018-09-20 | Stop reason: HOSPADM

## 2018-09-14 RX ORDER — POTASSIUM CHLORIDE 750 MG/1
20-40 TABLET, EXTENDED RELEASE ORAL
Status: DISCONTINUED | OUTPATIENT
Start: 2018-09-14 | End: 2018-09-20 | Stop reason: HOSPADM

## 2018-09-14 RX ORDER — NALOXONE HYDROCHLORIDE 0.4 MG/ML
.1-.4 INJECTION, SOLUTION INTRAMUSCULAR; INTRAVENOUS; SUBCUTANEOUS
Status: DISCONTINUED | OUTPATIENT
Start: 2018-09-14 | End: 2018-09-14

## 2018-09-14 RX ORDER — POTASSIUM CHLORIDE 1.5 G/1.58G
20-40 POWDER, FOR SOLUTION ORAL
Status: DISCONTINUED | OUTPATIENT
Start: 2018-09-14 | End: 2018-09-20 | Stop reason: HOSPADM

## 2018-09-14 RX ORDER — POTASSIUM CHLORIDE 7.45 MG/ML
10 INJECTION INTRAVENOUS
Status: DISCONTINUED | OUTPATIENT
Start: 2018-09-14 | End: 2018-09-20 | Stop reason: HOSPADM

## 2018-09-14 RX ORDER — NALOXONE HYDROCHLORIDE 0.4 MG/ML
.1-.4 INJECTION, SOLUTION INTRAMUSCULAR; INTRAVENOUS; SUBCUTANEOUS
Status: DISCONTINUED | OUTPATIENT
Start: 2018-09-14 | End: 2018-09-20 | Stop reason: HOSPADM

## 2018-09-14 RX ORDER — ALBUTEROL SULFATE 0.83 MG/ML
2.5 SOLUTION RESPIRATORY (INHALATION)
Status: DISCONTINUED | OUTPATIENT
Start: 2018-09-14 | End: 2018-09-20 | Stop reason: HOSPADM

## 2018-09-14 RX ORDER — DOPAMINE HYDROCHLORIDE 160 MG/100ML
2.5 INJECTION, SOLUTION INTRAVENOUS CONTINUOUS
Status: DISCONTINUED | OUTPATIENT
Start: 2018-09-14 | End: 2018-09-14

## 2018-09-14 RX ORDER — DEXTROSE MONOHYDRATE 25 G/50ML
25-50 INJECTION, SOLUTION INTRAVENOUS
Status: DISCONTINUED | OUTPATIENT
Start: 2018-09-14 | End: 2018-09-18

## 2018-09-14 RX ADMIN — Medication 3 MG/HR: at 18:04

## 2018-09-14 RX ADMIN — Medication 50 MCG/HR: at 01:56

## 2018-09-14 RX ADMIN — PIPERACILLIN AND TAZOBACTAM 4.5 G: 4; .5 INJECTION, POWDER, FOR SOLUTION INTRAVENOUS at 04:08

## 2018-09-14 RX ADMIN — Medication 50 MCG: at 23:15

## 2018-09-14 RX ADMIN — ATORVASTATIN CALCIUM 80 MG: 80 TABLET, FILM COATED ORAL at 03:36

## 2018-09-14 RX ADMIN — MIDAZOLAM 2 MG: 1 INJECTION INTRAMUSCULAR; INTRAVENOUS at 22:00

## 2018-09-14 RX ADMIN — PIPERACILLIN AND TAZOBACTAM 4.5 G: 4; .5 INJECTION, POWDER, FOR SOLUTION INTRAVENOUS at 16:59

## 2018-09-14 RX ADMIN — PIPERACILLIN AND TAZOBACTAM 4.5 G: 4; .5 INJECTION, POWDER, FOR SOLUTION INTRAVENOUS at 09:35

## 2018-09-14 RX ADMIN — EPINEPHRINE 0.03 MCG/KG/MIN: 1 INJECTION PARENTERAL at 02:37

## 2018-09-14 RX ADMIN — FENTANYL CITRATE 50 MCG: 50 INJECTION, SOLUTION INTRAMUSCULAR; INTRAVENOUS at 01:32

## 2018-09-14 RX ADMIN — HEPARIN SODIUM 1100 UNITS/HR: 10000 INJECTION, SOLUTION INTRAVENOUS at 01:50

## 2018-09-14 RX ADMIN — ATORVASTATIN CALCIUM 80 MG: 80 TABLET, FILM COATED ORAL at 19:29

## 2018-09-14 RX ADMIN — ASPIRIN 81 MG CHEWABLE TABLET 81 MG: 81 TABLET CHEWABLE at 07:28

## 2018-09-14 RX ADMIN — PIPERACILLIN AND TAZOBACTAM 4.5 G: 4; .5 INJECTION, POWDER, FOR SOLUTION INTRAVENOUS at 22:08

## 2018-09-14 RX ADMIN — VANCOMYCIN HYDROCHLORIDE 2000 MG: 10 INJECTION, POWDER, LYOPHILIZED, FOR SOLUTION INTRAVENOUS at 03:07

## 2018-09-14 RX ADMIN — DOPAMINE HYDROCHLORIDE 2.5 MCG/KG/MIN: 160 INJECTION, SOLUTION INTRAVENOUS at 05:45

## 2018-09-14 RX ADMIN — SODIUM NITROPRUSSIDE 0.25 MCG/KG/MIN: 25 INJECTION INTRAVENOUS at 16:47

## 2018-09-14 RX ADMIN — HUMAN ALBUMIN MICROSPHERES AND PERFLUTREN 6 ML: 10; .22 INJECTION, SOLUTION INTRAVENOUS at 07:45

## 2018-09-14 RX ADMIN — AMIODARONE HYDROCHLORIDE 1 MG/MIN: 50 INJECTION, SOLUTION INTRAVENOUS at 02:16

## 2018-09-14 RX ADMIN — POTASSIUM CHLORIDE 40 MEQ: 1.5 POWDER, FOR SOLUTION ORAL at 19:29

## 2018-09-14 RX ADMIN — FUROSEMIDE 80 MG: 10 INJECTION, SOLUTION INTRAVENOUS at 11:11

## 2018-09-14 RX ADMIN — TICAGRELOR 90 MG: 90 TABLET ORAL at 07:28

## 2018-09-14 RX ADMIN — HUMAN INSULIN 6 UNITS/HR: 100 INJECTION, SOLUTION SUBCUTANEOUS at 07:02

## 2018-09-14 RX ADMIN — TICAGRELOR 90 MG: 90 TABLET ORAL at 19:29

## 2018-09-14 RX ADMIN — SODIUM NITROPRUSSIDE 2.5 MCG/KG/MIN: 25 INJECTION INTRAVENOUS at 23:51

## 2018-09-14 RX ADMIN — MIDAZOLAM 2 MG: 1 INJECTION INTRAMUSCULAR; INTRAVENOUS at 21:00

## 2018-09-14 RX ADMIN — PANTOPRAZOLE SODIUM 40 MG: 40 INJECTION, POWDER, FOR SOLUTION INTRAVENOUS at 07:28

## 2018-09-14 RX ADMIN — MIDAZOLAM 2 MG: 1 INJECTION INTRAMUSCULAR; INTRAVENOUS at 23:00

## 2018-09-14 RX ADMIN — HUMAN INSULIN 4 UNITS/HR: 100 INJECTION, SOLUTION SUBCUTANEOUS at 02:02

## 2018-09-14 RX ADMIN — IOPAMIDOL 140 ML: 755 INJECTION, SOLUTION INTRAVASCULAR at 00:00

## 2018-09-14 ASSESSMENT — ACTIVITIES OF DAILY LIVING (ADL)
ADLS_ACUITY_SCORE: 12
ADLS_ACUITY_SCORE: 13
ADLS_ACUITY_SCORE: 14
ADLS_ACUITY_SCORE: 13
ADLS_ACUITY_SCORE: 13

## 2018-09-14 NOTE — PROCEDURES
PROCEDURES PERFORMED:   Coronary Angiography  Percutaneous Coronary Intervention of LAD  Left heart catheterization  IABP  Femoral central line placement    PHYSICIANS:  Attending Physician: Dr. Georges  Interventional Cardiology Fellow: BLAIR Tate    INDICATION:  60 y/o male with a history of type 2 diabetes, MI in 4/2007, hypertension, CAD, hyperlipidemia, prior PCI to the proximal LAD.  The patient, per the ER notes, was with his friends earlier tonight when he developed nausea chest pain and shortness of breath. Then he went home, complained of increased chest pain then lost consciousness.  The wife called EMS on their arrival he had no pulse, CPR was initiated, he was shocked twice and had ROSC. He was awake and conversant in the ER. Then, he had worsening pulmonary edema, and was intubated before coming to the cath lab.    DESCRIPTION:  1. Consent obtained with discussion of risks.  All questions were answered.  2. Sterile prep and procedure.  3. Location with Sheaths:   8 Israeli RFA  4. Access: Local anesthetic with lidocaine.  A micropuncture 18 gauge needle with ultrasound guidance was used to establish vascular access using a modified Seldinger technique.  5. Diagnostic Catheters:   6 Fr 3DRC and JL 4, XB 3.5 guide  6. Estimated blood loss: < 5 ml    The procedure was performed under conscious sedation for 52 minutes from 2308 to 0000.  The patient was assessed immediately before the first sedation medication was administered.  Midazolam 4 mg and Fentanyl 200 mcg were administered.  Heart rate, BP, respiration, oxygen saturation and patient responses were monitored throughout the procedure with the assistance of the RN under my supervision.    CORONARY ANGIOGRAM:   -Both coronary arteries arise from their respective cusps.  -Dominance: Right  -LM has no evidence of coronary artery disease.  -LAD: Type 3 [LAD supplies the entire apex]. The LAD gives rise to septal perforators, multiple diagonal  branches. The proximal LAD has an acute thrombotic lesion within the prior stent. Distal to the stent, there is a 70-80% stenosis. The distal vessel has HALIMA 2 flow. The D1 has thrombus at the ostium likely from embolization. The apical LAD also has thrombus from embolization.  -LCX gives rise to a large branching OM. The prox Cx has a 20% stenosis. The rest of the Cx system has mild disease.  -RCA (dominant) gives rise to PL branches and supplies PDA. There is minimal disease in the RCA system.    PERCUTANEOUS CORONARY INTERVENTION:  Prox LAD  XB 3.5    A kristie blue wire was advanced across the proximal LAD and positioned in the apical LAD.  A Pronto aspiration thrombectomy catheter was used to evacuate the clots in the proximal LAD which resulted in improved flow.  There was thrombus, likely from embolization in the ostium of the first diagonal branch.  Because of this we placed a second kristie blue wire into the D1 which was jailed by the prior stent in 2007.  We placed a 2.0 x 12 mm emerge balloon across the ostium of the diagonal and dilated.  This resulted in better flow down the diagonal branch.  We then placed a 2.75 x 28 mm Synergy STEVO across the mid LAD, inflation to 12 johanne.  We placed a 4.0 x 12 mm Synergy STEVO proximal and overlapping with the initial stent, beginning at the ostium of the LAD.  We inflated this to 12 johanne.  Final angiography showed no evidence of perforation or dissection with residual stenosis of less than 10% and HALIMA-3 flow.  No complications.    Left heart catheterization:  We used an angled pigtailed across the aortic valve into the left ventricle.  LVEDP of 40 mmHg    IABP placement:  The 6 Armenian RFA sheath was traded out for a 9 Armenian sheath.  A 50 cc balloon pump was placed through the sheath and positioned in the proximal descending aorta, set at 1:1.    Central line placement:  The right femoral vein was accessed with an 18-gauge needle.  A triple-lumen central line was  placed.    Sheath Removal:  The balloon pump 9 Vincentian sheath and RFV triple-lumen central line were sutured in place.    Contrast: Isovue, 140 ml     Fluoroscopy Time: 12.8 min; 0.530Gy    COMPLICATIONS:  None    SUMMARY:   Single-vessel CAD.  Acute thrombotic lesion in the proximal LAD.  PCI of the LAD.  Elevated LVEDP- 160 milligrams of IV Lasix were given in the Cath Lab.  IABP to help offload the LV and provide hemodynamic support.  IV Cangrelor was used because no P2Y12 agent was given due to lack of access.  After OG tube was placed, 180 mg of ticagrelor was given.  Aspirin 325 given Cath Lab.  Patient placed on ACS protocol heparin drip while balloon pump is in place.    After the balloon pump was placed, the patient had decreasing pressor requirements. Norepi was decreased from 0.2 to 0.1mcg/kg/min.    PLAN:   ASA 81mg daily  High dose statin  Bedrest per protocol  No BB or ACE given need for hemodynamic support.    Patient admitted to CCU on CSI service    The attending interventional cardiologist was present and supervised all critical aspects the procedure.    See CVIS report for final draft.    BLAIR Tate MD, PhD   Cardiology Fellow

## 2018-09-14 NOTE — PROGRESS NOTES
[facility], [place]  Procedure Note           Intubation:       Anson Manriquez  MRN# [id number]   September 13, 2018, 9:32 PM Indication: Cardiac arrest           Patient intubated at: September 13, 2018, 9:55 PM   Patient and family informed of: Why intubation was required   Informed consent: Unknown   Cervical spine: Was stabilized during the procedure   Sedative medication: Was administered during the procedure   Technique used: Fiberoptic visualization with GlideScope   Endotracheal tube size: 8.0 cm with cuff   Number of attempts: 1   Placement confirmed by: Visualization of bilateral chest wall rise  End-tidal CO2 monitor   ET tube repositioning: Was edith performed   Tube secured at: 26 cm      This procedure was performed without difficulty and he tolerated the procedure fairly well with no complications.      Recorded by Edward Wilson

## 2018-09-14 NOTE — H&P
Cardiology CSI Admission Note    CC: STEMI, Wide Complex Tachycardia    HPI: 62M with HTN, DM, iCM (EF 30), MI in 2007,prior remote pLAD stenting, admitted with pLAD STEMI and likely ventricular tachycardia. The patient was in his usual state of health until this evening. He was out playing pool with friends and felt nauseated and chest pain for 40 mins. He then went home and complained of severe chest pain and shortly after became unconscious and EMS was called. Bystander CPR No. On arrival EMS gave about 30 sec CPR then AED administered 1 shock. He was brought to [facility] in [place] with a pulse for most of transport. During the last 5 mins en route to ED, pt went into wide complex tachycardia at 220 bpm likely ventricular tachycardia though he maintained a pulse, acceptable BP, and mental status. In ER, Amiodarone 150 was administered, he was still intact but appearing more gray and more hypoxic, so he was given 100 fentanyl and 5 versed in preparation for cardioversion, then self-converted to sinus rhythm. He was intubated at bedside. He was then transported to [facility].     In [facility], he was found to have acute thrombotic lesion of the proximal LAD within the prior stent with HALIMA 2 flow distally and thrombus in the D1 ostium. This was treated with aspiration thrombectomy and STEVO x2 to prox and mid LAD and POBA to D1. LVEDP was 40 mmHg. IABP was placed.R fem  TLC was placed. Pt was given an additional 300 mg amiodarone, Lasix 160 mg,  Cangrelor gtt (since no Asa or P2Y12 had been given), Asa 325, Brilinta 180 (after the case), and required NE infusion to maintain MAP.     He went to CT scanner, then to  for further management.    SHx: Lives in community, unable to obtain additional details  FHx: NC to CC  NKDA  Home meds:   Asa 325  Lipitor 80  Coreg 6.25 BID  Losartan 100  Metformin      Current IABP 1:1  Vent 500/22/100/10    /48  Pulse (!) 219  Temp (!) 36.6  F (2.6  C)  Resp 15  Wt 90.3 kg (199 lb 1.6 oz)  SpO2  98%  BMI 32.14 kg/m2  Intubated, sedated  Bluish-red discoloration of upper chest and head improving  Pupils 4mm and reactive  Equal breath sounds B/L  S1s2, IABP sounds  SNTND  No cce warm    Labs and studies reviewed, notable for:  Cr 1.5 from 1.3  K 6.4  Lactate 5  AST/ALT 300s  Trop 8  VBG pending  ABG pending  15>14<232  INR 1.3  CT head and CAP pending: prelim nothing acute    Prior echo 2012 EF 25-30  EKG 03/2012: SR, QRS 100ms, non spec lateral ST-T  EKG (WCT): wide qrs tachycardia , RBBB morphology, LAD  EKG (SR pre angio): ST, LAD,  NIVD, 1mm lateral ELEAZAR and nonspecific changes anteriorly  EKG (SR post angio): SR LAD LVH with QRS widening    A/P: 62M with HTN, DM, iCM (30) and prior remote LAD stenting, admitted with prox LAD STEMI and wide complex tachycardia likely VT, now s/p DESx2 to prox and mid LAD, IABP, intubated, in 4E ICU for further management.    # Neuro  - pt was alert in ER and made spontaneous grabs towards tube on way up from cath lab  - CT head prelim nothing acute  - cont versed and fentanyl to RAAS -3--4 while intubated, daily sedation holiday    # CV-- STEMI with WCT and mixed cardiogenic and distributive shock s/p STEVO to prox and mid LAD  - IABP 1:1    - currently on NE 0.04 to maintain MAP> 65 but minimal pulsatility on O2 waveform when IABP paused and SvO2 from Fem TLC only 31. Change to epi for B1. Reassess lactate and SvO2 shortly. Low threshold to escalate MCS   - decrease amio to 0.5 to avoid negative inotropy and complete 24 hrs infusion then discontinue if no further VT  - complete cangrelor gtt  - cont asa 81 , brilinta 90 BID, atorvastatin 80  - no BB or ACEi due to CS  - diuresed with lasix 160 in cath lab,  in 1 hour since arrival  - low intensity heparin for IABP    # Vent  - /22/100/10, oxygenating well, check ABG    # ANETTE  - monitor Cr and UOP    # DM, a1c 8, FS 400s  - Insulin gtt    # ID  - Start Vanc and Edis for possible aspiration pna  and distributive shock    # PPx  - PPI  - on hep for IABP    # CS  - Full Code    # Dispo  - admit to I in 4E ICU    D/w Dr Destini Hernandez MD  Cardiology Fellow  [phone]    I have seen and examined the patient and agree with the finding and plan.       Jomar Georges MD  Cardiology-[facility]  [phone]

## 2018-09-14 NOTE — PLAN OF CARE
Problem: Patient Care Overview  Goal: Plan of Care/Patient Progress Review  Outcome: Improving  Patient arrived on unit from cathlab/cardiac intervention post placement of 2 stents at 0115. Arrived on Amioderone,  levophed and versed infusions, heparin in line with instructions to start, IABP 1:1. Levophed switched to Epinephrine and low dose dopamine added for cardiac support, amiodarone turned to 0.5 per cardiologist bedside, to be stopped after 24hour per orders. Critical potassium and elevated lactate discussed with cardiology bedside, labs trending down, continued monitoring. Insulin infusion initiated. Heparin per order, on hold to restart at 0740.  Radial Arterial line placement attempted, to be attempted later today, consent in chart. Patient woke gripped hands, denied pain, wiggled toes upon request, became restless and shook with decreased sedation, sedation resumed at prior rate. Urinary output increased with epinephrine infusion. Wife, son, and daughter bedside and updated by medical team. Bedside ECHO completed overnight and planned for again today. Plan to rest and optimize as tolerates. Patient will continue to be monitored and assessed. Please see MAR, flow sheets, and remainder of chart for further details.

## 2018-09-14 NOTE — PROGRESS NOTES
[facility], [place]  Procedure Note          Intra-Aortic Balloon Pump Insertion:       Anson Manriquez  MRN# [id number]   September 14, 2018, 1:08 AM Indication: Cardiogenic shock           Procedure performed: September 13, 2018, 23:08 PM   Location: Cath lab   Catheter size: 8fr     Inserted: 11 inch introducer sheath   Catheter placed: Right femoral artery   Complications:: None   Assist initiated: 1:1 ratio   Percent augmentation: 100   Timing adjusted: Adjusted to achieve optimal assist   Verification of position: Fluoroscopy   Comments: None      Recorded by Edward Wilson

## 2018-09-14 NOTE — ED TRIAGE NOTES
Pt was out playing pool with friends tonight when he felt nauseated. Pt went home and developed chest pain, SOB, and had diarrhea. Pt was able to go to bed and per pt's wife was complaining of severe chest pain. Shortly after, pt became unconscious and EMS was called. At 2138 pt arrested and was found without a pulse. CPR was started and given for approx 30 seconds. Pt was shocked by AED and achieved ROSC at 2140. En route to ED, pt EKG went from sinus tach with PVCs to v-tach. Pt is alert and oriented x4 upon arrival. EKG shows v-tach with a rate of 219. Amiodarone bolus started at 2227.

## 2018-09-14 NOTE — ED NOTES
ETT placed by Dr. Faust, bilateral breath sounds present, positive color change. OG tube placed by Dr. Burger prior to transfer to cath lab.

## 2018-09-14 NOTE — PROGRESS NOTES
Admitted/transferred from: Cath lab    Reason for admission/transfer: cardiac arrest    Patient status upon admission/transfer: critical stable  Interventions: MD bedside, orders patrick entered, family updated by MD   Plan: IABP support, Rest overnight  2 RN skin assessment: completed by Eddie Monsalve RN and Haritha Mchugh, RN on 9/14/2018   Result of skin assessment and interventions/actions:RN noted: skin tear right leg, dusky head and limbs, fistula at rectum, lines see flow sheet , sacral mepilex placed, skin cleansed with yunior bath pack  Height, weight, drug calc weight: done  Patient belongings: socks , given to wife   MDRO education (if applicable): N/A

## 2018-09-14 NOTE — PROCEDURES
"Procedure/Surgery Information   [facility], [place]    Bedside Procedure Note  Date of Service (when I performed the procedure): 09/14/2018    Anson Manriquez is a 62 year old male patient.  1. ST elevation myocardial infarction involving left anterior descending (LAD) coronary artery (H)    2. Cardiac arrest (H)      Past Medical History:   Diagnosis Date     Chronic infection     near rectum still leaking     Coronary artery disease      Diabetes mellitus, type 2 (H)      Heart attack 4/17/2007     Hyperlipidemia      Hypertension      Stented coronary artery 2007     Temp: 100.6  F (38.1  C) Temp src: Bladder BP: (!) 89/69 Pulse: (!) 219 Heart Rate: 93 Resp: 16 SpO2: 94 % O2 Device: Mechanical Ventilator Oxygen Delivery: 3 LPM    Insert arterial line  Date/Time: 9/14/2018 5:11 PM  Performed by: MIKE NAVARRO  Authorized by: MIKE NAVARRO   Consent: The procedure was performed in an emergent situation.  Patient identity confirmed: arm band  Time out: Immediately prior to procedure a \"time out\" was called to verify the correct patient, procedure, equipment, support staff and site/side marked as required.  Preparation: Patient was prepped and draped in the usual sterile fashion.  Indications: multiple ABGs and hemodynamic monitoring  Location: left radial    Sedation:  Patient sedated: yes  Sedation type: moderate (conscious) sedation  Sedatives: midazolam  Analgesia: fentanyl  Vitals: Vital signs were monitored during sedation.  Alexander's test normal: yes  Needle gauge: 20  Seldinger technique: Seldinger technique used  Number of attempts: 2  Post-procedure: line sutured and dressing applied  Post-procedure CMS: normal and unchanged  Patient tolerance: Patient tolerated the procedure well with no immediate complications  Comments: Procedure was ultrasound-guided.           Mike Navarro MD  Internal Medicine, PGY1  Pager [phone]  "

## 2018-09-14 NOTE — PROCEDURES
Procedure/Surgery Information   [facility], [place]    Bedside Procedure Note  Date of Service (when I performed the procedure): 09/14/2018     Indication: Cardiogenic Shock     Anson Manriquez is a 62 year old male patient.  1. Cardiac arrest (H)      Past Medical History:   Diagnosis Date     Chronic infection     near rectum still leaking     Coronary artery disease      Diabetes mellitus, type 2 (H)      Heart attack 4/17/2007     Hyperlipidemia      Hypertension      Stented coronary artery 2007     Temp: (!) 36.9  F (2.7  C) Temp src: Bladder BP: 90/62 Pulse: (!) 219 Heart Rate: 81 Resp: 16 SpO2: 90 % O2 Device: Mechanical Ventilator Oxygen Delivery: 3 LPM    Central line  Date/Time: 9/14/2018 4:05 PM  Performed by: JAVI ORTIZ  Authorized by: JAVI ORTIZ   Consent: Verbal consent obtained. Written consent obtained.  Consent given by: power of   Patient understanding: patient states understanding of the procedure being performed  Patient consent: the patient's understanding of the procedure matches consent given  Procedure consent: procedure consent matches procedure scheduled  Relevant documents: relevant documents present and verified  Test results: test results available and properly labeled  Site marked: the operative site was marked  Imaging studies: imaging studies available  Patient identity confirmed: provided demographic data  Indications: central pressure monitoring    Anesthesia:  Local Anesthetic: lidocaine 1% without epinephrine    Sedation:  Patient sedated: no  Location details: right internal jugular  Patient position: flat  Ultrasound guidance: yes  Number of attempts: 1  Successful placement: yes  Post-procedure: line sutured  Assessment: blood return through all parts  Comments: After the Cordis was placed, a SG catheter was placed using Fluro guidance. After PCWP was confirmed, balloon was deflated and catheter was secured.            Javi Ortiz

## 2018-09-14 NOTE — ED NOTES
100mcg fentanyl and 5mg versed given in preparation for cardioversion. Prior to the procedure, pt's rhythm changed and pulse dropped to 110s. EKG shows sinus tachycardia. Pt sats began to drop. Oral airway placed. RSI began

## 2018-09-14 NOTE — ED PROVIDER NOTES
History     Chief Complaint   Patient presents with     Cardiac Arrest     HPI  Anson Manriquez is a 62 year old male with a history of type II diabetes, MI (4/17/2007), hypertension, CAD and hyperlipidemia, who presents to the Emergency Department by ambulance in cardiac arrest. Patient was reportedly at the pool [place] with his friends earlier tonight, during which time he developed nausea. He reportedly played another round before going home and once there, he felt more nauseous and began to experience chest pain and shortness of breath. He had one episode of diarrhea. Patient's wife reported to paramedics that patient went to bed and complained of increased chest pain and became diaphoretic before losing consciousness, prompting her to contact EMS. Upon their arrival, patient was found to have no pulse around 21:38, and CPR was subsequently initiated. Patient was shocked once with AED, so initial rhythm unknown.  Subsequent rhythm was sinus tachycardia with PVCs then later went into stable ventricular tachycardia with a rate of 213. He continued to have mentation and a measurable BP with this.  His blood glucose was 318 just prior to his arrival. Patient was alert upon his arrival and endorses some chest pain and feeling diaphoretic as well as having discomfort in a supine position.     I have reviewed the Medications, Allergies, Past Medical and Surgical History, and Social History in the [employer] system.     PAST MEDICAL HISTORY:   Past Medical History:   Diagnosis Date     Chronic infection     near rectum still leaking     Coronary artery disease      Diabetes mellitus, type 2 (H)      Heart attack 4/17/2007     Hyperlipidemia      Hypertension      Stented coronary artery 2007       PAST SURGICAL HISTORY:   Past Surgical History:   Procedure Laterality Date     HERNIA REPAIR Right     Inguinal     IRRIGATION AND DEBRIDEMENT RECTUM, COMBINED      abcess near rectum      LAPAROSCOPIC CHOLECYSTECTOMY  3/29/2012     Procedure:LAPAROSCOPIC CHOLECYSTECTOMY; LAPAROSCOPIC CHOLECYSTECTOMY; Surgeon:MARILYNN PRESSLEY; Location:[facility] OR     STENT, CORONARY, OLIVIA         FAMILY HISTORY:   Family History   Problem Relation Age of Onset     Hypertension Mother      Diabetes Father      Glaucoma No family hx of      Macular Degeneration No family hx of        SOCIAL HISTORY:   Social History   Substance Use Topics     Smoking status: Never Smoker     Smokeless tobacco: Never Used     Alcohol use No     Current Facility-Administered Medications   Medication     albuterol neb solution 2.5 mg     amiodarone (NEXTERONE) 250 mg in D5W 250 mL infusion     amiodarone (NEXTERONE) 250 mg in D5W 250 mL infusion     amiodarone (NEXTERONE) bolus 150 mg     aspirin chewable tablet 81 mg     atorvastatin (LIPITOR) tablet 80 mg     cangrelor (KENGREAL) 50 mg in sodium chloride 0.9 % 250 mL infusion     dextrose 10 % 1,000 mL infusion     glucose gel 15-30 g    Or     dextrose 50 % injection 25-50 mL    Or     glucagon injection 1 mg     fentaNYL (SUBLIMAZE) bolus from infusion pump-ADULT 25-50 mcg     fentaNYL (SUBLIMAZE) infusion     heparin  drip 25,000 units in 0.45% NaCl 250 mL (see additional administration details for dose)     heparin bolus from infusion pump     insulin 1 unit/mL in saline (NovoLIN, HumuLIN Regular) drip - ADULT IV Infusion     Medication Considerations - To maintain platelet inhibition after discontinuation of cangrelor (KENGREAL) [see admin instructions]     midazolam (VERSED) 100 mg in sodium chloride 0.9% 100 mL pre-mix infusion     midazolam (VERSED) injection 1-3 mg     naloxone (NARCAN) injection 0.1-0.4 mg     naloxone (NARCAN) injection 0.1-0.4 mg     norepinephrine (LEVOPHED) 16 mg in D5W 250 mL infusion     pantoprazole (PROTONIX) 40 mg IV push injection     Patient is already receiving anticoagulation with heparin, enoxaparin (LOVENOX), warfarin (COUMADIN)  or other anticoagulant medication     senna-docusate  (SENOKOT-S;PERICOLACE) 8.6-50 MG per tablet 1 tablet    Or     senna-docusate (SENOKOT-S;PERICOLACE) 8.6-50 MG per tablet 2 tablet     ticagrelor (BRILINTA) tablet 90 mg      No Known Allergies    Review of Systems   Constitutional: Positive for diaphoresis.   Respiratory: Positive for shortness of breath.    Cardiovascular: Positive for chest pain.   Gastrointestinal: Positive for diarrhea (x1) and vomiting.   All other systems reviewed and are negative.      Physical Exam   BP: 105/65  Pulse: (!) 219  Heart Rate: 197  Temp: 97.6  F (36.4  C)  Resp: 27  Weight: 90.3 kg (199 lb 1.6 oz)  SpO2: 96 %      Physical Exam   Gen: alert, diaphoretic, pale  HEENT:PERRL, no facial tenderness or wounds, head atraumatic  CV: Marked tachycardia, regular rate.  PULM:Clear to auscultation bilaterally  Abd:soft, nontender, nondistended. Bowel sounds present and normal  UE:No traumatic injuries, skin normal, perfusion poor  LE:no traumatic injuries, skin normal  Neuro:CN II-XII intact, strength 5/5 throughout  Skin: no rashes or ecchymoses      ED Course     ED Course     Procedures    EKG #1: Wide complex, regular tachycardia, concerning for ventricular tachycardia with rate 218.  EKG #2: NSR without STEMI, slight widening of the QRS.     Critical Care time:  was 40 minutes for this patient excluding procedures.     The Lactic acid level is elevated due to cardiac arrect, at this time there is no sign of severe sepsis or septic shock.          Rapid Sequence Intubation Procedure note         Indication:  Respirator Failure      The patient was premedicated with Midazolam (Versed) and Fentanyl, followed by etomidate and succinylcholine, and     intubated by Dr. Faust with a     #8.0 cuffed, ET Tube and a     CMAC.  Following intubation the patient's breath     sounds were equal and absent over the epigastrium.  ET Tube placement    was  confirmed with auscultation and CO2 detector device.     MONITORING:   Monitoring consisted of :  heart rate, cardiac monitor, continuous pulse oximetry, blood pressure checks, level of consciousness, IV access, constant attendance by RN, and MD attendance until patient stable or transfer of care    PATIENT STATUS:   Complications from this procedure included transient hypoxia. Oxygen saturations following intubation were 92%.          Labs Ordered and Resulted from Time of ED Arrival Up to the Time of Departure from the ED   CREATININE POCT - Abnormal; Notable for the following:        Result Value    Creatinine 1.4 (*)     GFR Estimate 51 (*)     All other components within normal limits   CBC WITH PLATELETS DIFFERENTIAL - Abnormal; Notable for the following:     WBC 13.6 (*)     Absolute Neutrophil 10.1 (*)     All other components within normal limits   ISTAT  GASES LACTATE GERMANIA POCT - Abnormal; Notable for the following:     Lactic Acid 4.5 (*)     All other components within normal limits   TROPONIN POCT - Abnormal; Notable for the following:     Troponin I 0.14 (*)     All other components within normal limits   ISTAT GASES ELEC ICA GLUC GERMANIA POCT - Abnormal; Notable for the following:     PCO2 Venous 38 (*)     Glucose 343 (*)     All other components within normal limits   ISTAT INR POCT            Assessments & Plan (with Medical Decision Making)   62-year-old male with a history of atrial fibrillation, coronary artery disease, diabetes.  Presenting with cardiac arrest.  He presented to the ED after chest pain followed by witnessed cardiac arrest. Treated in the field with CPR, defibrillation ×1.  Had ROSC, and was alert and mentating on arrival despite returning to a wide complex tachycardia concerning for stable VT.  Started on 150 mg IV amiodarone bolus followed by infusion.  He had worsening mentation, diaphoresis, pallor, and chest pain so we prepared to sedate and cardiovert.  He was treated with IV fentanyl and Versed.  Sedation was successful and he spontaneously converted back to normal sinus  rhythm, but he had apnea with hypoxia. Treated with BVM ventilation, preoxygenated, and intubated by RSI while performing epic oxygenation with nasal cannula.  Cardiology consulted and evaluated the patient in the ED.  Taken to the Cath Lab.      I have reviewed the nursing notes.    I have reviewed the findings, diagnosis, plan and need for follow up with the patient.    Current Discharge Medication List          Final diagnoses:   Cardiac arrest (H)     I, Jane Roper, am serving as a trained medical scribe to document services personally performed by Marcy Faust MD, based on the provider's statements to me.   I, Marcy Faust MD, was physically present and have reviewed and verified the accuracy of this note documented by Jane Roper.    9/13/2018   [facility], [place], EMERGENCY DEPARTMENT    MD TONY Kendrick Katrina Anne, MD  09/14/18 0405

## 2018-09-14 NOTE — PLAN OF CARE
Problem: Patient Care Overview  Goal: Plan of Care/Patient Progress Review  PT 4E: PT orders received. Pt with IABP, not appropriate for OOB mobility, PT will reschedule.

## 2018-09-14 NOTE — PHARMACY-VANCOMYCIN DOSING SERVICE
Pharmacy Vancomycin Initial Note  Date of Service 2018  Patient's  1955  62 year old, male    Indication: Aspiration Pneumonia    Current estimated CrCl = Estimated Creatinine Clearance: 54.8 mL/min (based on Cr of 1.47).    Creatinine for last 3 days  2018: 10:30 PM Creatinine 1.35 mg/dL  2018:  1:02 AM Creatinine 1.47 mg/dL    Recent Vancomycin Level(s) for last 3 days  No results found for requested labs within last 72 hours.      Vancomycin IV Administrations (past 72 hours)      No vancomycin orders with administrations in past 72 hours.                Nephrotoxins and other renal medications (Future)    Start     Dose/Rate Route Frequency Ordered Stop    18 1530  vancomycin (VANCOCIN) 1,750 mg in sodium chloride 0.9 % 500 mL intermittent infusion      1,750 mg  over 2 Hours Intravenous EVERY 12 HOURS 18 0241      18 0330  vancomycin (VANCOCIN) 2,000 mg in sodium chloride 0.9 % 500 mL intermittent infusion      2,000 mg  over 2 Hours Intravenous ONCE 18 0235      18 0300  piperacillin-tazobactam (ZOSYN) 4.5 g vial to attach to  mL bag      4.5 g  over 30 Minutes Intravenous EVERY 6 HOURS 18 0230      18 2345  norepinephrine (LEVOPHED) 16 mg in D5W 250 mL infusion      0.03-0.4 mcg/kg/min × 90.3 kg  2.5-33.9 mL/hr  Intravenous CONTINUOUS 18 2333            Contrast Orders - past 72 hours (72h ago through future)    Start     Dose/Rate Route Frequency Ordered Stop    18 0000  iopamidol (ISOVUE-370) solution 140 mL      140 mL Intravenous ONCE 18 2357 18 0000        Plan:  1.  Start vancomycin 2000 mg IV x 1 dose then, 1750 mg IV q12h.   2.  Goal Trough Level: 15-20 mg/L   3.  Pharmacy will check trough levels as appropriate in 1-3 Days.    4. Serum creatinine levels will be ordered daily for the first week of therapy and at least twice weekly for subsequent weeks.    5. Boyd method utilized to dose  vancomycin therapy: Method 2    Marielena Jimenez, PharmD, BCPS      ADDENDUM 9/14/18 at 6:10am  - Patient's SCr worsening - empiric dose change from q12h regimen to q24h regimen. Will obtain level prior to third dose.     Valencia Espinoza, PharmD, BCPS

## 2018-09-14 NOTE — PLAN OF CARE
Problem: Patient Care Overview  Goal: Plan of Care/Patient Progress Review  OT 4E: OT orders received. Pt with IABP, not appropriate for OOB mobility, OT will reschedule.

## 2018-09-14 NOTE — CONSULTS
"[facility] Pulmonology Consultation    Anson Manriquez MRN# [id number]   Age: 62 year old YOB: 1955     Date of Admission: 9/13/2018    Reason for consult: Hypoxia       Requesting physician: Cardiology       Level of consult: Consult and follow for daily recommendations           Impression and Plan:   Assessment:           1. Acute Hypoxic Respiratory Failure secondary to Congestive Heart Failure    2. Ventricular Tachycardia with Cardiac Arrest    3. Myocardial infarction with LAD lesion post STEVO x 2    4. Status Post Intra-aortic Balloon Pump placement       Recommendations:   -Current Vent settings Tidal Volume 450, Set Rate 16, FIO2 100%, PEEP 12    -Wean FIO2 and PEEP as tolerated. Patient is PEEP responsive and may require higher PEEP temporarily to oxygenate    -I lowered the Tidal Volume on the vent from 500 to 450. 450 is approximately 7 mL/kg for his predicted body weight of 140 pounds for a 5'6\" individual. His lung compliance is good, plateau pressures in mid 20s. He is overbreathing the set rate on the vent of 14, which is fine based on below ABG.     -ABG 7.45/42/94/29 which is also good. We lowered the PEEP to 12 after seeing this result, O2 sats remained in high 90s. Goal O2 should be > 90%. Our feeling is his hypoxia is driven by his being significantly overweight as we all as having some pulmonary edema and he is likely requiring a higher PEEP to oxygenate. CXR surprisingly is not bad.     -Agree with plan for continued diuresis.      -If hypoxia worsens, would recommend paralysis as next step to improve oxygenation             Chief Complaint:   Myocardial Infarction with Hypoxic Respiratory Failure     HPI - The patient is unable to provide a HPI secondary to intubation and sedation. I discussed his current situation with his son/wife. He is a 62 year old male with a history of HTN,DM, and ischemic cardiomyopathy with a known EF of 25%. He apparently had an MI approximately " 10 years ago and was stented x 1. Last night he developed some chest pain and nausea and shortly thereafter became unconscious and EMS was called. Bystander CPR was started and an AED administered a shock. He was brought to the [facility] in [place] and was hemodynamically stable during the transport. During his transport to the ED he went into a wide complex tachycardia that was presumed to be ventricular tachycardia and he maintained a pulse. In the ER he was given amiodarone and in preparation for cardioversion he converted into sinus rhythm. He was transported to the cath lab. He was found to have an acute thrombotic lesion of the proximal LAD. The prior stent was identified. HALIMA 2 flow distal ot the stent. He was given STEVO x 2 in the LAD and POBA to D1. LVEDP were 40. An IABP was placed in the right femoral artery. He was given amiodarone, lasix, and placed on pressors. He is currently on dopamine for pressor and amiodarone drip.           Past Medical History:   I have reviewed this patient's past medical history  This patient  has a past medical history of Chronic infection; Coronary artery disease; Diabetes mellitus, type 2 (H); Heart attack (4/17/2007); Hyperlipidemia; Hypertension; and Stented coronary artery (2007).          Past Surgical History:   I have reviewed this patient's past surgical history  This patient  has a past surgical history that includes stent, coronary, sara; Irrigation and debridement rectum, combined; Laparoscopic cholecystectomy (3/29/2012); and hernia repair (Right).          Social History:     I have reviewed this patient's social history  Social History   Substance Use Topics     Smoking status: Never Smoker     Smokeless tobacco: Never Used     Alcohol use No             Family History:     I have reviewed this patient's family history  Family History   Problem Relation Age of Onset     Hypertension Mother      Diabetes Father      Glaucoma No family hx of      Macular Degeneration No  family hx of      Family history reviewed and updated in [facility]          Immunizations:     Immunization History   Administered Date(s) Administered     Influenza (IIV3) PF 12/14/2006, 10/22/2007, 12/09/2009, 10/09/2010, 11/10/2014, 09/18/2017     Influenza (intradermal) 11/27/2012, 10/03/2013     Influenza Vaccine, 3 YRS +, IM (QUADRIVALENT W/PRESERVATIVES) 03/14/2016, 09/19/2016     Pneumo Conj 13-V (2010&after) 03/14/2016     Pneumococcal 23 valent 01/26/2010     TDAP Vaccine (Boostrix) 10/28/2013     Zoster vaccine, live 10/28/2013             Allergies:   All allergies reviewed and addressed  This patient is allergic to has No Known Allergies.          Medications:     Current Facility-Administered Medications   Medication     albuterol neb solution 2.5 mg     amiodarone (NEXTERONE) 250 mg in D5W 250 mL infusion     aspirin chewable tablet 81 mg     atorvastatin (LIPITOR) tablet 80 mg     dextrose 10 % 1,000 mL infusion     glucose gel 15-30 g    Or     dextrose 50 % injection 25-50 mL    Or     glucagon injection 1 mg     DOPamine 400 mg in dextrose 5% 250 mL (adult std) - premix     EPINEPHrine (ADRENALIN) 5 mg in sodium chloride 0.9 % 250 mL infusion     fentaNYL (SUBLIMAZE) bolus from infusion pump-ADULT 25-50 mcg     fentaNYL (SUBLIMAZE) infusion     heparin  drip 25,000 units in 0.45% NaCl 250 mL (see additional administration details for dose)     heparin bolus from infusion pump     insulin 1 unit/mL in saline (NovoLIN, HumuLIN Regular) drip - ADULT IV Infusion     Medication Considerations - To maintain platelet inhibition after discontinuation of cangrelor (KENGREAL) [see admin instructions]     midazolam (VERSED) 100 mg in sodium chloride 0.9% 100 mL pre-mix infusion     midazolam (VERSED) injection 1-3 mg     naloxone (NARCAN) injection 0.1-0.4 mg     norepinephrine (LEVOPHED) 16 mg in D5W 250 mL infusion     [START ON 9/15/2018] pantoprazole (PROTONIX) 2 mg/mL suspension 40 mg     Patient is already  "receiving anticoagulation with heparin, enoxaparin (LOVENOX), warfarin (COUMADIN)  or other anticoagulant medication     piperacillin-tazobactam (ZOSYN) 4.5 g vial to attach to  mL bag     senna-docusate (SENOKOT-S;PERICOLACE) 8.6-50 MG per tablet 1 tablet    Or     senna-docusate (SENOKOT-S;PERICOLACE) 8.6-50 MG per tablet 2 tablet     sodium chloride (PF) 0.9% PF flush 10 mL     ticagrelor (BRILINTA) tablet 90 mg     [START ON 9/15/2018] vancomycin (VANCOCIN) 1,750 mg in sodium chloride 0.9 % 250 mL intermittent infusion             Review of Systems:   Review of systems is not obtainable due to patient factors - intubation          Physical Exam:     Vitals were reviewed  Patient Vitals for the past 8 hrs:   BP Temp Temp src Heart Rate Resp SpO2 Height   09/14/18 0800 90/62 (!) 36.9  F (2.7  C) Bladder 81 16 90 % -   09/14/18 0745 132/66 - - - - - -   09/14/18 0730 121/61 (!) 36.8  F (2.7  C) - 78 - 90 % -   09/14/18 0715 119/63 (!) 36.8  F (2.7  C) - 76 - - -   09/14/18 0700 125/60 (!) 36.8  F (2.7  C) - 75 14 91 % -   09/14/18 0645 122/65 (!) 36.7  F (2.6  C) - 79 - 92 % -   09/14/18 0630 119/69 (!) 36.8  F (2.7  C) - 73 - 92 % -   09/14/18 0615 99/64 (!) 36.9  F (2.7  C) - 75 - 91 % -   09/14/18 0600 (!) 76/56 (!) 36.8  F (2.7  C) - 76 14 92 % 1.676 m (5' 6\")   09/14/18 0545 120/66 (!) 36.8  F (2.7  C) - 74 - 94 % -     Constitutional: Intubated, sedated  Lungs: Breathing above set rate on vent. Lungs are mostly clear anteriorly. No wheezing, rhonchi, or rales.  Cardiovascular: IABP sounds noted. Rate 70s and regular. Could not appreciate a murmur   Abdomen: Overweight abdomen, no obvious tenderness  Musculoskeletal: Extremities present x 4 and grossly normal. I do not see significant peripheral edema  Neurologic: Intubated, sedated, eyes do not open to command  Vascular: Pulses are faint but palpable and symmetric in bilateral upper extremities  Skin: No rashes noted            Data:   All laboratory " data reviewed    Lab Results   Component Value Date    WBC 13.2 (H) 09/14/2018    HGB 14.1 09/14/2018    HCT 42.4 09/14/2018     09/14/2018     09/14/2018    POTASSIUM 4.0 09/14/2018    CHLORIDE 103 09/14/2018    CO2 29 09/14/2018    BUN 30 09/14/2018    CR 1.54 (H) 09/14/2018     (H) 09/14/2018    TROPONIN 0.14 (HH) 09/13/2018    TROPI 17.774 (HH) 09/14/2018     (H) 09/14/2018     (H) 09/14/2018    ALKPHOS 60 09/14/2018    BILITOTAL 2.1 (H) 09/14/2018    INR 1.34 (H) 09/14/2018     EKG -  Rate 78, regular, , NH interval 210. Non specific intraventricular conduction delay. ST elevation of 1 mm and upsloping in Septal leads.     Imaging -  CXR - Bilateral perihilar airspace opacities.     Laboratory - Above    Attestation:  I have reviewed today's vital signs, notes, medications, labs and imaging.    Julius Cote MD

## 2018-09-15 ENCOUNTER — APPOINTMENT (OUTPATIENT)
Dept: GENERAL RADIOLOGY | Facility: CLINIC | Age: 63
DRG: 270 | End: 2018-09-15
Attending: INTERNAL MEDICINE
Payer: COMMERCIAL

## 2018-09-15 ENCOUNTER — APPOINTMENT (OUTPATIENT)
Dept: ULTRASOUND IMAGING | Facility: CLINIC | Age: 63
DRG: 270 | End: 2018-09-15
Attending: INTERNAL MEDICINE
Payer: COMMERCIAL

## 2018-09-15 LAB
ALBUMIN SERPL-MCNC: 3.1 G/DL (ref 3.4–5)
ALBUMIN SERPL-MCNC: 3.4 G/DL (ref 3.4–5)
ALBUMIN UR-MCNC: 30 MG/DL
ALP SERPL-CCNC: 46 U/L (ref 40–150)
ALP SERPL-CCNC: 51 U/L (ref 40–150)
ALT SERPL W P-5'-P-CCNC: 240 U/L (ref 0–70)
ALT SERPL W P-5'-P-CCNC: 297 U/L (ref 0–70)
ANION GAP SERPL CALCULATED.3IONS-SCNC: 10 MMOL/L (ref 3–14)
ANION GAP SERPL CALCULATED.3IONS-SCNC: 8 MMOL/L (ref 3–14)
APPEARANCE UR: ABNORMAL
AST SERPL W P-5'-P-CCNC: 129 U/L (ref 0–45)
AST SERPL W P-5'-P-CCNC: 203 U/L (ref 0–45)
BASE EXCESS BLDA CALC-SCNC: 1.2 MMOL/L
BASE EXCESS BLDA CALC-SCNC: 1.2 MMOL/L
BASE EXCESS BLDA CALC-SCNC: 1.7 MMOL/L
BASE EXCESS BLDA CALC-SCNC: 2.6 MMOL/L
BASE EXCESS BLDA CALC-SCNC: 2.8 MMOL/L
BASE EXCESS BLDA CALC-SCNC: 2.9 MMOL/L
BASE EXCESS BLDA CALC-SCNC: 3 MMOL/L
BASE EXCESS BLDA CALC-SCNC: 3.4 MMOL/L
BASE EXCESS BLDA CALC-SCNC: 3.7 MMOL/L
BASE EXCESS BLDA CALC-SCNC: 3.7 MMOL/L
BASE EXCESS BLDA CALC-SCNC: 3.8 MMOL/L
BASE EXCESS BLDV CALC-SCNC: 0.9 MMOL/L
BASE EXCESS BLDV CALC-SCNC: 1.6 MMOL/L
BASE EXCESS BLDV CALC-SCNC: 2.2 MMOL/L
BASE EXCESS BLDV CALC-SCNC: 3 MMOL/L
BASE EXCESS BLDV CALC-SCNC: 3.4 MMOL/L
BASE EXCESS BLDV CALC-SCNC: 3.8 MMOL/L
BASOPHILS # BLD AUTO: 0 10E9/L (ref 0–0.2)
BASOPHILS # BLD AUTO: 0 10E9/L (ref 0–0.2)
BASOPHILS NFR BLD AUTO: 0.2 %
BASOPHILS NFR BLD AUTO: 0.2 %
BILIRUB DIRECT SERPL-MCNC: 0.3 MG/DL (ref 0–0.2)
BILIRUB DIRECT SERPL-MCNC: 0.4 MG/DL (ref 0–0.2)
BILIRUB SERPL-MCNC: 1.5 MG/DL (ref 0.2–1.3)
BILIRUB SERPL-MCNC: 2 MG/DL (ref 0.2–1.3)
BILIRUB UR QL STRIP: NEGATIVE
BUN SERPL-MCNC: 25 MG/DL (ref 7–30)
BUN SERPL-MCNC: 28 MG/DL (ref 7–30)
CA-I BLD-MCNC: 4.6 MG/DL (ref 4.4–5.2)
CALCIUM SERPL-MCNC: 7.9 MG/DL (ref 8.5–10.1)
CALCIUM SERPL-MCNC: 8.2 MG/DL (ref 8.5–10.1)
CHLORIDE SERPL-SCNC: 108 MMOL/L (ref 94–109)
CHLORIDE SERPL-SCNC: 111 MMOL/L (ref 94–109)
CHOLEST SERPL-MCNC: 121 MG/DL
CO2 SERPL-SCNC: 26 MMOL/L (ref 20–32)
CO2 SERPL-SCNC: 27 MMOL/L (ref 20–32)
COLOR UR AUTO: YELLOW
CREAT SERPL-MCNC: 1.31 MG/DL (ref 0.66–1.25)
CREAT SERPL-MCNC: 1.44 MG/DL (ref 0.66–1.25)
DIFFERENTIAL METHOD BLD: ABNORMAL
DIFFERENTIAL METHOD BLD: ABNORMAL
EOSINOPHIL # BLD AUTO: 0 10E9/L (ref 0–0.7)
EOSINOPHIL # BLD AUTO: 0 10E9/L (ref 0–0.7)
EOSINOPHIL NFR BLD AUTO: 0.2 %
EOSINOPHIL NFR BLD AUTO: 0.3 %
ERYTHROCYTE [DISTWIDTH] IN BLOOD BY AUTOMATED COUNT: 15.5 % (ref 10–15)
ERYTHROCYTE [DISTWIDTH] IN BLOOD BY AUTOMATED COUNT: 15.7 % (ref 10–15)
GFR SERPL CREATININE-BSD FRML MDRD: 50 ML/MIN/1.7M2
GFR SERPL CREATININE-BSD FRML MDRD: 55 ML/MIN/1.7M2
GLUCOSE BLDC GLUCOMTR-MCNC: 108 MG/DL (ref 70–99)
GLUCOSE BLDC GLUCOMTR-MCNC: 114 MG/DL (ref 70–99)
GLUCOSE BLDC GLUCOMTR-MCNC: 116 MG/DL (ref 70–99)
GLUCOSE BLDC GLUCOMTR-MCNC: 121 MG/DL (ref 70–99)
GLUCOSE BLDC GLUCOMTR-MCNC: 132 MG/DL (ref 70–99)
GLUCOSE BLDC GLUCOMTR-MCNC: 133 MG/DL (ref 70–99)
GLUCOSE BLDC GLUCOMTR-MCNC: 134 MG/DL (ref 70–99)
GLUCOSE BLDC GLUCOMTR-MCNC: 139 MG/DL (ref 70–99)
GLUCOSE BLDC GLUCOMTR-MCNC: 144 MG/DL (ref 70–99)
GLUCOSE BLDC GLUCOMTR-MCNC: 145 MG/DL (ref 70–99)
GLUCOSE BLDC GLUCOMTR-MCNC: 150 MG/DL (ref 70–99)
GLUCOSE BLDC GLUCOMTR-MCNC: 158 MG/DL (ref 70–99)
GLUCOSE BLDC GLUCOMTR-MCNC: 163 MG/DL (ref 70–99)
GLUCOSE BLDC GLUCOMTR-MCNC: 96 MG/DL (ref 70–99)
GLUCOSE BLDC GLUCOMTR-MCNC: 98 MG/DL (ref 70–99)
GLUCOSE SERPL-MCNC: 103 MG/DL (ref 70–99)
GLUCOSE SERPL-MCNC: 130 MG/DL (ref 70–99)
GLUCOSE UR STRIP-MCNC: NEGATIVE MG/DL
HCO3 BLD-SCNC: 26 MMOL/L (ref 21–28)
HCO3 BLD-SCNC: 27 MMOL/L (ref 21–28)
HCO3 BLD-SCNC: 28 MMOL/L (ref 21–28)
HCO3 BLD-SCNC: 29 MMOL/L (ref 21–28)
HCO3 BLD-SCNC: 29 MMOL/L (ref 21–28)
HCO3 BLDV-SCNC: 26 MMOL/L (ref 21–28)
HCO3 BLDV-SCNC: 26 MMOL/L (ref 21–28)
HCO3 BLDV-SCNC: 28 MMOL/L (ref 21–28)
HCO3 BLDV-SCNC: 28 MMOL/L (ref 21–28)
HCO3 BLDV-SCNC: 29 MMOL/L (ref 21–28)
HCO3 BLDV-SCNC: 29 MMOL/L (ref 21–28)
HCT VFR BLD AUTO: 38 % (ref 40–53)
HCT VFR BLD AUTO: 39.7 % (ref 40–53)
HDLC SERPL-MCNC: 41 MG/DL
HGB BLD-MCNC: 12.4 G/DL (ref 13.3–17.7)
HGB BLD-MCNC: 12.9 G/DL (ref 13.3–17.7)
HGB UR QL STRIP: ABNORMAL
IMM GRANULOCYTES # BLD: 0 10E9/L (ref 0–0.4)
IMM GRANULOCYTES # BLD: 0 10E9/L (ref 0–0.4)
IMM GRANULOCYTES NFR BLD: 0.2 %
IMM GRANULOCYTES NFR BLD: 0.2 %
INR PPP: 1.24 (ref 0.86–1.14)
KETONES UR STRIP-MCNC: 10 MG/DL
LACTATE BLD-SCNC: 0.9 MMOL/L (ref 0.7–2)
LACTATE BLD-SCNC: 1 MMOL/L (ref 0.7–2)
LACTATE BLD-SCNC: 1.1 MMOL/L (ref 0.7–2)
LDLC SERPL CALC-MCNC: 48 MG/DL
LEUKOCYTE ESTERASE UR QL STRIP: ABNORMAL
LMWH PPP CHRO-ACNC: 0.1 IU/ML
LMWH PPP CHRO-ACNC: 0.2 IU/ML
LYMPHOCYTES # BLD AUTO: 1.2 10E9/L (ref 0.8–5.3)
LYMPHOCYTES # BLD AUTO: 1.3 10E9/L (ref 0.8–5.3)
LYMPHOCYTES NFR BLD AUTO: 11 %
LYMPHOCYTES NFR BLD AUTO: 12.3 %
MAGNESIUM SERPL-MCNC: 2.4 MG/DL (ref 1.6–2.3)
MAGNESIUM SERPL-MCNC: 2.5 MG/DL (ref 1.6–2.3)
MCH RBC QN AUTO: 30.4 PG (ref 26.5–33)
MCH RBC QN AUTO: 30.8 PG (ref 26.5–33)
MCHC RBC AUTO-ENTMCNC: 32.5 G/DL (ref 31.5–36.5)
MCHC RBC AUTO-ENTMCNC: 32.6 G/DL (ref 31.5–36.5)
MCV RBC AUTO: 93 FL (ref 78–100)
MCV RBC AUTO: 95 FL (ref 78–100)
MONOCYTES # BLD AUTO: 1 10E9/L (ref 0–1.3)
MONOCYTES # BLD AUTO: 1.1 10E9/L (ref 0–1.3)
MONOCYTES NFR BLD AUTO: 8.9 %
MONOCYTES NFR BLD AUTO: 9.8 %
MUCOUS THREADS #/AREA URNS LPF: PRESENT /LPF
NEUTROPHILS # BLD AUTO: 7.5 10E9/L (ref 1.6–8.3)
NEUTROPHILS # BLD AUTO: 9.6 10E9/L (ref 1.6–8.3)
NEUTROPHILS NFR BLD AUTO: 77.2 %
NEUTROPHILS NFR BLD AUTO: 79.5 %
NITRATE UR QL: NEGATIVE
NONHDLC SERPL-MCNC: 79 MG/DL
NRBC # BLD AUTO: 0 10*3/UL
NRBC # BLD AUTO: 0 10*3/UL
NRBC BLD AUTO-RTO: 0 /100
NRBC BLD AUTO-RTO: 0 /100
O2/TOTAL GAS SETTING VFR VENT: 50 %
O2/TOTAL GAS SETTING VFR VENT: 70 %
OXYHGB MFR BLD: 92 % (ref 92–100)
OXYHGB MFR BLD: 94 % (ref 92–100)
OXYHGB MFR BLD: 96 % (ref 92–100)
OXYHGB MFR BLDV: 56 %
OXYHGB MFR BLDV: 58 %
OXYHGB MFR BLDV: 60 %
OXYHGB MFR BLDV: 62 %
OXYHGB MFR BLDV: 63 %
OXYHGB MFR BLDV: 66 %
PCO2 BLD: 39 MM HG (ref 35–45)
PCO2 BLD: 40 MM HG (ref 35–45)
PCO2 BLD: 40 MM HG (ref 35–45)
PCO2 BLD: 41 MM HG (ref 35–45)
PCO2 BLD: 42 MM HG (ref 35–45)
PCO2 BLD: 43 MM HG (ref 35–45)
PCO2 BLDV: 41 MM HG (ref 40–50)
PCO2 BLDV: 43 MM HG (ref 40–50)
PCO2 BLDV: 45 MM HG (ref 40–50)
PCO2 BLDV: 46 MM HG (ref 40–50)
PCO2 BLDV: 47 MM HG (ref 40–50)
PCO2 BLDV: 47 MM HG (ref 40–50)
PH BLD: 7.41 PH (ref 7.35–7.45)
PH BLD: 7.41 PH (ref 7.35–7.45)
PH BLD: 7.42 PH (ref 7.35–7.45)
PH BLD: 7.42 PH (ref 7.35–7.45)
PH BLD: 7.43 PH (ref 7.35–7.45)
PH BLD: 7.44 PH (ref 7.35–7.45)
PH BLD: 7.44 PH (ref 7.35–7.45)
PH BLD: 7.45 PH (ref 7.35–7.45)
PH BLD: 7.45 PH (ref 7.35–7.45)
PH BLDV: 7.39 PH (ref 7.32–7.43)
PH BLDV: 7.39 PH (ref 7.32–7.43)
PH BLDV: 7.4 PH (ref 7.32–7.43)
PH BLDV: 7.4 PH (ref 7.32–7.43)
PH BLDV: 7.42 PH (ref 7.32–7.43)
PH BLDV: 7.42 PH (ref 7.32–7.43)
PH UR STRIP: 5.5 PH (ref 5–7)
PHOSPHATE SERPL-MCNC: 4.3 MG/DL (ref 2.5–4.5)
PLATELET # BLD AUTO: 156 10E9/L (ref 150–450)
PLATELET # BLD AUTO: 168 10E9/L (ref 150–450)
PO2 BLD: 108 MM HG (ref 80–105)
PO2 BLD: 112 MM HG (ref 80–105)
PO2 BLD: 129 MM HG (ref 80–105)
PO2 BLD: 69 MM HG (ref 80–105)
PO2 BLD: 70 MM HG (ref 80–105)
PO2 BLD: 77 MM HG (ref 80–105)
PO2 BLD: 82 MM HG (ref 80–105)
PO2 BLD: 83 MM HG (ref 80–105)
PO2 BLD: 84 MM HG (ref 80–105)
PO2 BLD: 89 MM HG (ref 80–105)
PO2 BLD: 92 MM HG (ref 80–105)
PO2 BLDV: 32 MM HG (ref 25–47)
PO2 BLDV: 33 MM HG (ref 25–47)
PO2 BLDV: 34 MM HG (ref 25–47)
PO2 BLDV: 36 MM HG (ref 25–47)
POTASSIUM BLD-SCNC: 3.8 MMOL/L (ref 3.4–5.3)
POTASSIUM BLD-SCNC: 4.4 MMOL/L (ref 3.4–5.3)
POTASSIUM BLD-SCNC: NORMAL MMOL/L (ref 3.4–5.3)
POTASSIUM SERPL-SCNC: 3.4 MMOL/L (ref 3.4–5.3)
POTASSIUM SERPL-SCNC: 4 MMOL/L (ref 3.4–5.3)
PROT SERPL-MCNC: 6.3 G/DL (ref 6.8–8.8)
PROT SERPL-MCNC: 6.7 G/DL (ref 6.8–8.8)
RBC # BLD AUTO: 4.02 10E12/L (ref 4.4–5.9)
RBC # BLD AUTO: 4.25 10E12/L (ref 4.4–5.9)
RBC #/AREA URNS AUTO: >182 /HPF (ref 0–2)
SODIUM SERPL-SCNC: 145 MMOL/L (ref 133–144)
SODIUM SERPL-SCNC: 145 MMOL/L (ref 133–144)
SOURCE: ABNORMAL
SP GR UR STRIP: 1.02 (ref 1–1.03)
SQUAMOUS #/AREA URNS AUTO: 7 /HPF (ref 0–1)
TRANS CELLS #/AREA URNS HPF: 2 /HPF (ref 0–1)
TRIGL SERPL-MCNC: 158 MG/DL
TROPONIN I SERPL-MCNC: 10.79 UG/L (ref 0–0.04)
TROPONIN I SERPL-MCNC: 5.91 UG/L (ref 0–0.04)
UROBILINOGEN UR STRIP-MCNC: NORMAL MG/DL (ref 0–2)
WBC # BLD AUTO: 12.1 10E9/L (ref 4–11)
WBC # BLD AUTO: 9.7 10E9/L (ref 4–11)
WBC #/AREA URNS AUTO: >182 /HPF (ref 0–5)

## 2018-09-15 PROCEDURE — 80048 BASIC METABOLIC PNL TOTAL CA: CPT | Performed by: INTERNAL MEDICINE

## 2018-09-15 PROCEDURE — 80076 HEPATIC FUNCTION PANEL: CPT | Performed by: INTERNAL MEDICINE

## 2018-09-15 PROCEDURE — 85025 COMPLETE CBC W/AUTO DIFF WBC: CPT | Performed by: INTERNAL MEDICINE

## 2018-09-15 PROCEDURE — 25000128 H RX IP 250 OP 636: Performed by: INTERNAL MEDICINE

## 2018-09-15 PROCEDURE — 25000128 H RX IP 250 OP 636: Performed by: STUDENT IN AN ORGANIZED HEALTH CARE EDUCATION/TRAINING PROGRAM

## 2018-09-15 PROCEDURE — 25000132 ZZH RX MED GY IP 250 OP 250 PS 637: Performed by: INTERNAL MEDICINE

## 2018-09-15 PROCEDURE — 84132 ASSAY OF SERUM POTASSIUM: CPT | Performed by: INTERNAL MEDICINE

## 2018-09-15 PROCEDURE — 83735 ASSAY OF MAGNESIUM: CPT | Performed by: INTERNAL MEDICINE

## 2018-09-15 PROCEDURE — 82805 BLOOD GASES W/O2 SATURATION: CPT | Performed by: INTERNAL MEDICINE

## 2018-09-15 PROCEDURE — 85520 HEPARIN ASSAY: CPT | Performed by: INTERNAL MEDICINE

## 2018-09-15 PROCEDURE — 84484 ASSAY OF TROPONIN QUANT: CPT | Performed by: INTERNAL MEDICINE

## 2018-09-15 PROCEDURE — 40000076 ZZH STATISTIC IABP MONITORING

## 2018-09-15 PROCEDURE — 83605 ASSAY OF LACTIC ACID: CPT | Performed by: INTERNAL MEDICINE

## 2018-09-15 PROCEDURE — 81001 URINALYSIS AUTO W/SCOPE: CPT | Performed by: INTERNAL MEDICINE

## 2018-09-15 PROCEDURE — 94644 CONT INHLJ TX 1ST HOUR: CPT

## 2018-09-15 PROCEDURE — 25000125 ZZHC RX 250: Performed by: INTERNAL MEDICINE

## 2018-09-15 PROCEDURE — 99291 CRITICAL CARE FIRST HOUR: CPT | Mod: GC | Performed by: INTERNAL MEDICINE

## 2018-09-15 PROCEDURE — 40000196 ZZH STATISTIC RAPCV CVP MONITORING

## 2018-09-15 PROCEDURE — 80061 LIPID PANEL: CPT | Performed by: INTERNAL MEDICINE

## 2018-09-15 PROCEDURE — 36415 COLL VENOUS BLD VENIPUNCTURE: CPT | Performed by: INTERNAL MEDICINE

## 2018-09-15 PROCEDURE — 00000146 ZZHCL STATISTIC GLUCOSE BY METER IP

## 2018-09-15 PROCEDURE — 27211040 ZZH CONTINUOUS NEBULIZER MICRO PUMP

## 2018-09-15 PROCEDURE — 94003 VENT MGMT INPAT SUBQ DAY: CPT

## 2018-09-15 PROCEDURE — 82330 ASSAY OF CALCIUM: CPT | Performed by: INTERNAL MEDICINE

## 2018-09-15 PROCEDURE — 87633 RESP VIRUS 12-25 TARGETS: CPT | Performed by: INTERNAL MEDICINE

## 2018-09-15 PROCEDURE — 71045 X-RAY EXAM CHEST 1 VIEW: CPT

## 2018-09-15 PROCEDURE — 82803 BLOOD GASES ANY COMBINATION: CPT | Performed by: INTERNAL MEDICINE

## 2018-09-15 PROCEDURE — 94645 CONT INHLJ TX EACH ADDL HOUR: CPT

## 2018-09-15 PROCEDURE — 93970 EXTREMITY STUDY: CPT

## 2018-09-15 PROCEDURE — 87040 BLOOD CULTURE FOR BACTERIA: CPT | Performed by: INTERNAL MEDICINE

## 2018-09-15 PROCEDURE — 84100 ASSAY OF PHOSPHORUS: CPT | Performed by: INTERNAL MEDICINE

## 2018-09-15 PROCEDURE — 40000048 ZZH STATISTIC DAILY SWAN MONITORING

## 2018-09-15 PROCEDURE — 87070 CULTURE OTHR SPECIMN AEROBIC: CPT | Performed by: INTERNAL MEDICINE

## 2018-09-15 PROCEDURE — 20000004 ZZH R&B ICU UMMC

## 2018-09-15 PROCEDURE — 85610 PROTHROMBIN TIME: CPT | Performed by: INTERNAL MEDICINE

## 2018-09-15 PROCEDURE — 85347 COAGULATION TIME ACTIVATED: CPT

## 2018-09-15 PROCEDURE — 40000275 ZZH STATISTIC RCP TIME EA 10 MIN

## 2018-09-15 PROCEDURE — 87086 URINE CULTURE/COLONY COUNT: CPT | Performed by: INTERNAL MEDICINE

## 2018-09-15 PROCEDURE — 40000014 ZZH STATISTIC ARTERIAL MONITORING DAILY

## 2018-09-15 RX ORDER — HEPARIN SODIUM 10000 [USP'U]/100ML
0-3500 INJECTION, SOLUTION INTRAVENOUS CONTINUOUS
Status: DISCONTINUED | OUTPATIENT
Start: 2018-09-15 | End: 2018-09-15

## 2018-09-15 RX ORDER — DOBUTAMINE HCL IN DEXTROSE 5 % 500MG/250
2.5 INTRAVENOUS SOLUTION INTRAVENOUS CONTINUOUS
Status: DISCONTINUED | OUTPATIENT
Start: 2018-09-15 | End: 2018-09-15

## 2018-09-15 RX ORDER — FUROSEMIDE 10 MG/ML
120 INJECTION INTRAMUSCULAR; INTRAVENOUS ONCE
Status: COMPLETED | OUTPATIENT
Start: 2018-09-15 | End: 2018-09-15

## 2018-09-15 RX ORDER — DOPAMINE HYDROCHLORIDE 160 MG/100ML
2-20 INJECTION, SOLUTION INTRAVENOUS CONTINUOUS
Status: DISCONTINUED | OUTPATIENT
Start: 2018-09-15 | End: 2018-09-16

## 2018-09-15 RX ORDER — ACETAMINOPHEN 325 MG/1
325 TABLET ORAL EVERY 6 HOURS PRN
Status: DISCONTINUED | OUTPATIENT
Start: 2018-09-15 | End: 2018-09-20 | Stop reason: HOSPADM

## 2018-09-15 RX ORDER — DEXMEDETOMIDINE HYDROCHLORIDE 4 UG/ML
.2-1.2 INJECTION, SOLUTION INTRAVENOUS CONTINUOUS
Status: DISCONTINUED | OUTPATIENT
Start: 2018-09-15 | End: 2018-09-19

## 2018-09-15 RX ADMIN — POTASSIUM CHLORIDE 20 MEQ: 29.8 INJECTION, SOLUTION INTRAVENOUS at 04:47

## 2018-09-15 RX ADMIN — HEPARIN SODIUM 800 UNITS/HR: 10000 INJECTION, SOLUTION INTRAVENOUS at 06:08

## 2018-09-15 RX ADMIN — DEXMEDETOMIDINE HYDROCHLORIDE 0.7 MCG/KG/HR: 4 INJECTION, SOLUTION INTRAVENOUS at 08:17

## 2018-09-15 RX ADMIN — PIPERACILLIN AND TAZOBACTAM 4.5 G: 4; .5 INJECTION, POWDER, FOR SOLUTION INTRAVENOUS at 10:10

## 2018-09-15 RX ADMIN — ACETAMINOPHEN 325 MG: 325 TABLET, FILM COATED ORAL at 01:23

## 2018-09-15 RX ADMIN — DOBUTAMINE HYDROCHLORIDE 2.5 MCG/KG/MIN: 400 INJECTION INTRAVENOUS at 17:26

## 2018-09-15 RX ADMIN — TICAGRELOR 90 MG: 90 TABLET ORAL at 07:55

## 2018-09-15 RX ADMIN — ACETAMINOPHEN 325 MG: 325 TABLET, FILM COATED ORAL at 17:12

## 2018-09-15 RX ADMIN — MIDAZOLAM 2 MG: 1 INJECTION INTRAMUSCULAR; INTRAVENOUS at 23:00

## 2018-09-15 RX ADMIN — Medication 2 MG/HR: at 11:38

## 2018-09-15 RX ADMIN — SODIUM NITROPRUSSIDE 1.75 MCG/KG/MIN: 25 INJECTION INTRAVENOUS at 06:08

## 2018-09-15 RX ADMIN — POTASSIUM CHLORIDE 20 MEQ: 1.5 POWDER, FOR SOLUTION ORAL at 18:43

## 2018-09-15 RX ADMIN — PANTOPRAZOLE SODIUM 40 MG: 40 TABLET, DELAYED RELEASE ORAL at 07:55

## 2018-09-15 RX ADMIN — PIPERACILLIN AND TAZOBACTAM 4.5 G: 4; .5 INJECTION, POWDER, FOR SOLUTION INTRAVENOUS at 22:03

## 2018-09-15 RX ADMIN — ATORVASTATIN CALCIUM 80 MG: 80 TABLET, FILM COATED ORAL at 19:34

## 2018-09-15 RX ADMIN — POTASSIUM CHLORIDE 20 MEQ: 1.5 POWDER, FOR SOLUTION ORAL at 19:35

## 2018-09-15 RX ADMIN — MIDAZOLAM 3 MG: 1 INJECTION INTRAMUSCULAR; INTRAVENOUS at 11:40

## 2018-09-15 RX ADMIN — FUROSEMIDE 80 MG: 10 INJECTION, SOLUTION INTRAVENOUS at 18:48

## 2018-09-15 RX ADMIN — ASPIRIN 81 MG CHEWABLE TABLET 81 MG: 81 TABLET CHEWABLE at 07:55

## 2018-09-15 RX ADMIN — EPOPROSTENOL SODIUM 10 NG/KG/MIN: 1500000 INJECTION, POWDER, LYOPHILIZED, FOR SOLUTION INTRAVENOUS at 00:27

## 2018-09-15 RX ADMIN — DOPAMINE HYDROCHLORIDE 2.5 MCG/KG/MIN: 160 INJECTION, SOLUTION INTRAVENOUS at 14:54

## 2018-09-15 RX ADMIN — MIDAZOLAM 2 MG: 1 INJECTION INTRAMUSCULAR; INTRAVENOUS at 22:00

## 2018-09-15 RX ADMIN — SODIUM NITROPRUSSIDE 1 MCG/KG/MIN: 25 INJECTION INTRAVENOUS at 20:15

## 2018-09-15 RX ADMIN — POTASSIUM CHLORIDE 20 MEQ: 1.5 POWDER, FOR SOLUTION ORAL at 04:50

## 2018-09-15 RX ADMIN — Medication 50 MCG: at 11:43

## 2018-09-15 RX ADMIN — TICAGRELOR 90 MG: 90 TABLET ORAL at 19:34

## 2018-09-15 RX ADMIN — Medication 50 MCG/HR: at 02:25

## 2018-09-15 RX ADMIN — AMIODARONE HYDROCHLORIDE 0.5 MG/MIN: 50 INJECTION, SOLUTION INTRAVENOUS at 11:05

## 2018-09-15 RX ADMIN — FUROSEMIDE 40 MG: 10 INJECTION, SOLUTION INTRAVENOUS at 20:14

## 2018-09-15 RX ADMIN — VANCOMYCIN HYDROCHLORIDE 1750 MG: 10 INJECTION, POWDER, LYOPHILIZED, FOR SOLUTION INTRAVENOUS at 04:43

## 2018-09-15 RX ADMIN — HUMAN INSULIN 1.5 UNITS/HR: 100 INJECTION, SOLUTION SUBCUTANEOUS at 09:54

## 2018-09-15 RX ADMIN — PIPERACILLIN AND TAZOBACTAM 4.5 G: 4; .5 INJECTION, POWDER, FOR SOLUTION INTRAVENOUS at 17:05

## 2018-09-15 RX ADMIN — PIPERACILLIN AND TAZOBACTAM 4.5 G: 4; .5 INJECTION, POWDER, FOR SOLUTION INTRAVENOUS at 04:04

## 2018-09-15 RX ADMIN — POTASSIUM CHLORIDE 20 MEQ: 1.5 POWDER, FOR SOLUTION ORAL at 10:36

## 2018-09-15 ASSESSMENT — ACTIVITIES OF DAILY LIVING (ADL)
ADLS_ACUITY_SCORE: 14
ADLS_ACUITY_SCORE: 15
ADLS_ACUITY_SCORE: 15
ADLS_ACUITY_SCORE: 13
ADLS_ACUITY_SCORE: 15
ADLS_ACUITY_SCORE: 13

## 2018-09-15 NOTE — PROGRESS NOTES
[facility] Cardiology Progress Note           Assessment and Plan:   62 year old with a PMHx of ICM (EF around 30%) from LAD MI s/p LAD PCI, HTN, DM2 who presented with anterior STEMI and cardiac arrest. Patient complained of chest pain at home and had a cardiac arrest. There was no bystander CPR, but EMS arrived and delivered one shock with ROSC. However, right before arrival, patient went into VT with pulse. Amio was given and patient was cardioverted. Patient then went to the cath lab where he was found to have proximal LAD STEMI in the previous stent and thrombus in the D1 ostium. Patient underwent STEVO of the LAD and POBA to D1. LVEDP was 40, so IABP was placed. Patient was given an additional Amio bolus, Lasix 160mg, started on Cangrelor gtt, ASA, and Brillenta. When patient was on the floor, he was in cardiogenic shock and required DPA and Epi for cardiac support.      SG catheter was placed and showed RA at 9, PA 36/24, MVO2 70, CO 5.6, and CI 2.8. Patient's Epi and DPA were stopped. Patient was started on Nipride with improvement in wedge and hemodynamics.     Plan:   1. Continue Nitroprusside.   2. Stop Flolan. Wean down Ventilator settings.   3. IABP to 1:3, if patient tolerates this, will then pull IABP today.     #Cardiogenic Shock   -EF around 23% with LAD WMA.   -Continue Nitroprusside to help wean off the IABP.  -Likely pull IABP today.   -Continue Vanc and Zosyn. Will stop if Blood Cultures are negative.     #Acute Respiratory Failure   -Likely 2/2 pulmonary edema and elevated PCWP. However, could be related to infection given fevers.   -Continue afterload reduction and diuresis.   -Wean ventilator Settings.   -Wean off Flolan today.  -Continue Vanc and Zosyn    #STEMI  -s/p LAD stent.   -ECHO showed unchanged EF around 23% with LAD WMA.  -Continue ASA and Brilinta.  -Start GDMT once patient is stable.     #Fevers   -Check Blood Cultures today.  -Continue Vanc/Zosyn  -Check UA      #ANETTE  -Improving.   -NTD     Javi Angel PGY-4   Cardiology Fellow   Pager: [phone]      Patient was seen by and the above plan discussed with Dr. Matos.     Subjective:     Overnight, patients's pao2 were in the 60's. Patient's saturations were above 90%. Flolan was started.            Review of Systems:   A comprehensive review of systems was performed and found to be negative except as described in this note          Medications:     Current Facility-Administered Medications   Medication     acetaminophen (TYLENOL) tablet 325 mg     albuterol neb solution 2.5 mg     aspirin chewable tablet 81 mg     atorvastatin (LIPITOR) tablet 80 mg     dexmedetomidine (PRECEDEX) 400 mcg in 0.9% sodium chloride 100 mL     dextrose 10 % 1,000 mL infusion     glucose gel 15-30 g    Or     dextrose 50 % injection 25-50 mL    Or     glucagon injection 1 mg     epoprostenol (VELETRI) 20 mcg/mL in sterile water inhalation solution     fentaNYL (SUBLIMAZE) infusion     heparin  drip 25,000 units in 0.45% NaCl 250 mL (see additional administration details for dose)     heparin bolus from infusion pump     insulin 1 unit/mL in saline (NovoLIN, HumuLIN Regular) drip - ADULT IV Infusion     Medication Considerations - To maintain platelet inhibition after discontinuation of cangrelor (KENGREAL) [see admin instructions]     midazolam (VERSED) 100 mg in sodium chloride 0.9% 100 mL pre-mix infusion     midazolam (VERSED) injection 1-3 mg     naloxone (NARCAN) injection 0.1-0.4 mg     nitroPRUsside (NIPRIDE) 50 mg, sodium thiosulfate 500 mg in D5W 125 mL IV infusion (conc: 0.4mg/mL)     pantoprazole (PROTONIX) 2 mg/mL suspension 40 mg     Patient is already receiving anticoagulation with heparin, enoxaparin (LOVENOX), warfarin (COUMADIN)  or other anticoagulant medication     piperacillin-tazobactam (ZOSYN) 4.5 g vial to attach to  mL bag     potassium chloride (KLOR-CON) Packet 20-40 mEq     potassium chloride 10 mEq in 100 mL  intermittent infusion with 10 mg lidocaine     potassium chloride 10 mEq in 100 mL sterile water intermittent infusion (premix)     potassium chloride 20 mEq in 50 mL intermittent infusion     potassium chloride SA (K-DUR/KLOR-CON M) CR tablet 20-40 mEq     senna-docusate (SENOKOT-S;PERICOLACE) 8.6-50 MG per tablet 1 tablet    Or     senna-docusate (SENOKOT-S;PERICOLACE) 8.6-50 MG per tablet 2 tablet     sodium chloride (PF) 0.9% PF flush 10 mL     ticagrelor (BRILINTA) tablet 90 mg     vancomycin (VANCOCIN) 1,750 mg in sodium chloride 0.9 % 250 mL intermittent infusion               Objective:   Temp: 99  F (37.2  C) Temp src: Bladder BP: 109/61  Heart Rate: 98 Resp: 14 SpO2: 93 % O2 Device: Mechanical Ventilator      Gen: Intubated and Sedated   HEENT: ET Tube in place   CV: Regular Rate and Rhythm   Pulm: Equal Chest Rise   Abdomen: s/nt/nd   Ext: No edema. IABP in the right leg.           Data:     Lab Results   Component Value Date    WBC 12.1 (H) 09/15/2018    WBC 13.5 (H) 09/14/2018    WBC 13.0 (H) 09/14/2018    HGB 12.9 (L) 09/15/2018    HGB 13.3 09/14/2018    HGB 13.9 09/14/2018    HCT 39.7 (L) 09/15/2018    HCT 40.1 09/14/2018    HCT 41.6 09/14/2018     09/15/2018     09/14/2018     09/14/2018     (H) 09/15/2018     09/14/2018     09/14/2018    POTASSIUM 3.8 09/15/2018    POTASSIUM  09/15/2018     Results questioned - new specimen has been requested    POTASSIUM 3.4 09/15/2018    CHLORIDE 108 09/15/2018    CHLORIDE 107 09/14/2018    CHLORIDE 105 09/14/2018    CO2 27 09/15/2018    CO2 25 09/14/2018    CO2 28 09/14/2018    BUN 25 09/15/2018    BUN 24 09/14/2018    BUN 24 09/14/2018    CR 1.44 (H) 09/15/2018    CR 1.45 (H) 09/14/2018    CR 1.29 (H) 09/14/2018     (H) 09/15/2018     (H) 09/14/2018     (H) 09/14/2018    TROPONIN 0.14 (HH) 09/13/2018    TROPI 10.788 () 09/15/2018    TROPI 12.241 () 09/14/2018    TROPI 17.774 () 09/14/2018    AST  203 (H) 09/15/2018     (H) 09/14/2018     (H) 09/14/2018     (H) 09/15/2018     (H) 09/14/2018     (H) 09/14/2018    ALKPHOS 51 09/15/2018    ALKPHOS 52 09/14/2018    ALKPHOS 60 09/14/2018    BILITOTAL 2.0 (H) 09/15/2018    BILITOTAL 1.6 (H) 09/14/2018    BILITOTAL 2.1 (H) 09/14/2018    INR 1.24 (H) 09/15/2018    INR 1.34 (H) 09/14/2018    INR 1.24 (H) 09/05/2005

## 2018-09-15 NOTE — PLAN OF CARE
Problem: Patient Care Overview  Goal: Plan of Care/Patient Progress Review  OT: Per discussion w/ RN pt not appropriate for therapy at this time.  OT wrote pager on board for RN if pt becomes appropriate for therapy today. OT will reschedule for 9/16/18

## 2018-09-15 NOTE — PLAN OF CARE
Problem: Patient Care Overview  Goal: Plan of Care/Patient Progress Review  Outcome: No Change  Assessment:   Cardiac: [place]/ST 90-110s with occasional PVCs, CVP 6, PA 20/10s, -100, CO 4.9, CI 2.4. Nipride titrated to keep MAP 60-70. IABP 1:1 via EKG. Heparin gtt infusing.      Resp: CMV 70%/450/14/8, iFlolan @ 10 ng/kg/min.  Neuro: Fentanyl and Versed providing adequate sedation. Moves all extremities to command.   : [place] patent, UO 25-55 cc/hr.  GI: OG to LIS.  Endocrine: Insulin gtt infusing.     Interventions: See previous note for events and interventions done during shift.       Plan: Will continue to monitor/assess and update MD as needed.

## 2018-09-15 NOTE — PROGRESS NOTES
2030 Dr. Ortiz notified hemodynamic numbers mainly CI 1.7 via VIVI, SvO2  55, and PO2 74. Plan to increasing FiO2 to 50% and increase Nipride gtt for MAP goal 65-70. Plan to recheck hemodynamic numbers and ABG at 2200.     2215 Dr. Manzo notified patient PO2 66, decreased from previous ABG despite increasing FiO2. Dr. Manzo notified hemodynamic numbers, CVP 7. CXR done at 2000 d/t [place] repositioning. Plan to increase FiO2 to 60% and will send sputum sample. Will recheck ABG at 2300.     2305 Dr. Manzo updated that PO2 69, plan to start on iFlolan @ 10. Updated son, Shaheen who will notified wife, Jolly.     0420 Dr. Manzo notified ABG PO2 84, downtrending on 70% and 10 of iFlolan. Plan to recheck ABG at 0600.    0500 Dr. Manzo notified pt having increase ectopy, longest was run of 8 beats of VT, actively replacing K.     0605 Dr. Manzo notified ABG PO2 83, no changes made to current plan of care.

## 2018-09-15 NOTE — PROGRESS NOTES
[facility] Cardiology Progress Note           Assessment and Plan:   62 year old with a PMHx of ICM (EF around 30%) from LAD MI s/p LAD PCI, HTN, DM2 who presented with anterior STEMI and cardiac arrest. Patient complained of chest pain at home and had a cardiac arrest. There was no bystander CPR, but EMS arrived and delivered one shock with ROSC. However, right before arrival, patient went into VT with pulse. Amio was given and patient was cardioverted. Patient then went to the cath lab where he was found to have proximal LAD STEMI in the previous stent and thrombus in the D1 ostium. Patient underwent STEVO of the LAD and POBA to D1. LVEDP was 40, so IABP was placed. Patient was given an additional Amio bolus, Lasix 160mg, started on Cangrelor gtt, ASA, and Brillenta. When patient was on the floor, he was in cardiogenic shock and required DPA and Epi for cardiac support.      SG catheter was placed and showed RA at 9, PA 36/24, MVO2 70, CO 5.6, and CI 2.8. Patient's Epi and DPA were stopped. Wedge increased from 18 to 24 when IABP was stopped.     Plan:   1. SG Catheter placement  2. Stop DPA and EPI  3. Start Nipride. Can add  if low CI.   4. Wean IABP tomorrow.     #Cardiogenic Shock   -EF around 23% with LAD WMA.   -Based on [place] Numbers, patient has normal CI. Therefore, Epi and DPA were stopped.  -Start Nitroprusside to help wean off the IABP.  -Wean IABP tomorrow.   -If patient's CI drops, will start .   -Continue Vanc and Zosyn. Will stop if Blood Cultures are negative.     #Acute Respiratory Failure   -Likely 2/2 pulmonary edema and elevated PCWP.  -Continue afterload reduction and diuresis.   -Wean ventilator Settings.     #STEMI  -s/p LAD stent.   -ECHO showed unchanged EF around 23% with LAD WMA.  -Continue ASA and Brilinta.  -Start GDMT once patient is stable.     #ANETTE  -Improving.   -SAI Ortiz PGY-4   Cardiology Fellow   Pager: [phone]      Patient was seen by and the above  plan discussed with Dr. Matos.     Subjective:     See above for interval events.            Review of Systems:   A comprehensive review of systems was performed and found to be negative except as described in this note          Medications:     Current Facility-Administered Medications   Medication     albuterol neb solution 2.5 mg     aspirin chewable tablet 81 mg     atorvastatin (LIPITOR) tablet 80 mg     dextrose 10 % 1,000 mL infusion     glucose gel 15-30 g    Or     dextrose 50 % injection 25-50 mL    Or     glucagon injection 1 mg     fentaNYL (SUBLIMAZE) bolus from infusion pump-ADULT 25-50 mcg     fentaNYL (SUBLIMAZE) infusion     heparin  drip 25,000 units in 0.45% NaCl 250 mL (see additional administration details for dose)     heparin bolus from infusion pump     insulin 1 unit/mL in saline (NovoLIN, HumuLIN Regular) drip - ADULT IV Infusion     Medication Considerations - To maintain platelet inhibition after discontinuation of cangrelor (KENGREAL) [see admin instructions]     midazolam (VERSED) 100 mg in sodium chloride 0.9% 100 mL pre-mix infusion     midazolam (VERSED) injection 1-3 mg     naloxone (NARCAN) injection 0.1-0.4 mg     nitroPRUsside (NIPRIDE) 50 mg, sodium thiosulfate 500 mg in D5W 125 mL IV infusion (conc: 0.4mg/mL)     [START ON 9/15/2018] pantoprazole (PROTONIX) 2 mg/mL suspension 40 mg     Patient is already receiving anticoagulation with heparin, enoxaparin (LOVENOX), warfarin (COUMADIN)  or other anticoagulant medication     piperacillin-tazobactam (ZOSYN) 4.5 g vial to attach to  mL bag     potassium chloride (KLOR-CON) Packet 20-40 mEq     potassium chloride 10 mEq in 100 mL intermittent infusion with 10 mg lidocaine     potassium chloride 10 mEq in 100 mL sterile water intermittent infusion (premix)     potassium chloride 20 mEq in 50 mL intermittent infusion     potassium chloride SA (K-DUR/KLOR-CON M) CR tablet 20-40 mEq     senna-docusate (SENOKOT-S;PERICOLACE) 8.6-50  MG per tablet 1 tablet    Or     senna-docusate (SENOKOT-S;PERICOLACE) 8.6-50 MG per tablet 2 tablet     sodium chloride (PF) 0.9% PF flush 10 mL     ticagrelor (BRILINTA) tablet 90 mg     [START ON 9/15/2018] vancomycin (VANCOCIN) 1,750 mg in sodium chloride 0.9 % 250 mL intermittent infusion               Objective:   Temp: 100.6  F (38.1  C) Temp src: Bladder BP: (!) 70/54 Pulse: (!) 219 Heart Rate: 94 Resp: 14 SpO2: 91 % O2 Device: Mechanical Ventilator Oxygen Delivery: 3 LPM    Gen: Intubated and Sedated   HEENT: ET Tube in place   CV: Regular Rate and Rhythm   Pulm: Equal Chest Rise   Abdomen: s/nt/nd   Ext: No edema. IABP in the right leg.           Data:     Lab Results   Component Value Date    WBC 13.0 (H) 09/14/2018    WBC 13.2 (H) 09/14/2018    WBC 15.1 (H) 09/14/2018    HGB 13.9 09/14/2018    HGB 14.1 09/14/2018    HGB 13.9 09/14/2018    HCT 41.6 09/14/2018    HCT 42.4 09/14/2018    HCT 42.4 09/14/2018     09/14/2018     09/14/2018     09/14/2018     09/14/2018     09/14/2018     09/14/2018    POTASSIUM 3.3 (L) 09/14/2018    POTASSIUM 4.0 09/14/2018    POTASSIUM 6.1 (HH) 09/14/2018    CHLORIDE 105 09/14/2018    CHLORIDE 103 09/14/2018    CHLORIDE 98 09/14/2018    CO2 28 09/14/2018    CO2 29 09/14/2018    CO2 26 09/14/2018    BUN 24 09/14/2018    BUN 30 09/14/2018    BUN 29 09/14/2018    CR 1.29 (H) 09/14/2018    CR 1.54 (H) 09/14/2018    CR 1.47 (H) 09/14/2018     (H) 09/14/2018     (H) 09/14/2018     (H) 09/14/2018    TROPONIN 0.14 (HH) 09/13/2018    TROPI 12.241 (HH) 09/14/2018    TROPI 17.774 (HH) 09/14/2018    TROPI 7.811 (HH) 09/14/2018     (H) 09/14/2018     (H) 09/14/2018     (H) 09/14/2018     (H) 09/14/2018     (H) 09/14/2018     (H) 09/14/2018    ALKPHOS 52 09/14/2018    ALKPHOS 60 09/14/2018    ALKPHOS 55 09/14/2018    BILITOTAL 1.6 (H) 09/14/2018    BILITOTAL 2.1 (H) 09/14/2018    BILITOTAL  2.4 (H) 09/14/2018    INR 1.34 (H) 09/14/2018    INR 1.24 (H) 09/05/2005

## 2018-09-15 NOTE — PLAN OF CARE
Problem: Patient Care Overview  Goal: Plan of Care/Patient Progress Review  PT 4E: PT continues to have IABP, not appropriate for OOB activity, PT will reschedule.

## 2018-09-16 ENCOUNTER — APPOINTMENT (OUTPATIENT)
Dept: GENERAL RADIOLOGY | Facility: CLINIC | Age: 63
DRG: 270 | End: 2018-09-16
Attending: INTERNAL MEDICINE
Payer: COMMERCIAL

## 2018-09-16 LAB
ALBUMIN SERPL-MCNC: 3 G/DL (ref 3.4–5)
ALBUMIN SERPL-MCNC: 3.2 G/DL (ref 3.4–5)
ALP SERPL-CCNC: 47 U/L (ref 40–150)
ALP SERPL-CCNC: 51 U/L (ref 40–150)
ALT SERPL W P-5'-P-CCNC: 169 U/L (ref 0–70)
ALT SERPL W P-5'-P-CCNC: 205 U/L (ref 0–70)
ANION GAP SERPL CALCULATED.3IONS-SCNC: 10 MMOL/L (ref 3–14)
ANION GAP SERPL CALCULATED.3IONS-SCNC: 7 MMOL/L (ref 3–14)
AST SERPL W P-5'-P-CCNC: 67 U/L (ref 0–45)
AST SERPL W P-5'-P-CCNC: 94 U/L (ref 0–45)
BACTERIA SPEC CULT: NO GROWTH
BACTERIA SPEC CULT: NORMAL
BASE EXCESS BLDA CALC-SCNC: 1.8 MMOL/L
BASE EXCESS BLDA CALC-SCNC: 2.4 MMOL/L
BASE EXCESS BLDA CALC-SCNC: 2.8 MMOL/L
BASE EXCESS BLDA CALC-SCNC: 4.2 MMOL/L
BASE EXCESS BLDV CALC-SCNC: 0.4 MMOL/L
BASE EXCESS BLDV CALC-SCNC: 2 MMOL/L
BASE EXCESS BLDV CALC-SCNC: 2.7 MMOL/L
BASE EXCESS BLDV CALC-SCNC: 2.9 MMOL/L
BASE EXCESS BLDV CALC-SCNC: 3.6 MMOL/L
BASOPHILS # BLD AUTO: 0 10E9/L (ref 0–0.2)
BASOPHILS # BLD AUTO: 0 10E9/L (ref 0–0.2)
BASOPHILS NFR BLD AUTO: 0.2 %
BASOPHILS NFR BLD AUTO: 0.2 %
BILIRUB DIRECT SERPL-MCNC: 0.3 MG/DL (ref 0–0.2)
BILIRUB DIRECT SERPL-MCNC: 0.4 MG/DL (ref 0–0.2)
BILIRUB SERPL-MCNC: 1.4 MG/DL (ref 0.2–1.3)
BILIRUB SERPL-MCNC: 2 MG/DL (ref 0.2–1.3)
BUN SERPL-MCNC: 25 MG/DL (ref 7–30)
BUN SERPL-MCNC: 27 MG/DL (ref 7–30)
CALCIUM SERPL-MCNC: 7.8 MG/DL (ref 8.5–10.1)
CALCIUM SERPL-MCNC: 8.1 MG/DL (ref 8.5–10.1)
CHLORIDE SERPL-SCNC: 111 MMOL/L (ref 94–109)
CHLORIDE SERPL-SCNC: 112 MMOL/L (ref 94–109)
CO2 SERPL-SCNC: 26 MMOL/L (ref 20–32)
CO2 SERPL-SCNC: 28 MMOL/L (ref 20–32)
CREAT SERPL-MCNC: 1.18 MG/DL (ref 0.66–1.25)
CREAT SERPL-MCNC: 1.32 MG/DL (ref 0.66–1.25)
DIFFERENTIAL METHOD BLD: ABNORMAL
DIFFERENTIAL METHOD BLD: ABNORMAL
EOSINOPHIL # BLD AUTO: 0.1 10E9/L (ref 0–0.7)
EOSINOPHIL # BLD AUTO: 0.1 10E9/L (ref 0–0.7)
EOSINOPHIL NFR BLD AUTO: 0.6 %
EOSINOPHIL NFR BLD AUTO: 1.1 %
ERYTHROCYTE [DISTWIDTH] IN BLOOD BY AUTOMATED COUNT: 15.3 % (ref 10–15)
ERYTHROCYTE [DISTWIDTH] IN BLOOD BY AUTOMATED COUNT: 15.3 % (ref 10–15)
FLUAV H1 2009 PAND RNA SPEC QL NAA+PROBE: NEGATIVE
FLUAV H1 RNA SPEC QL NAA+PROBE: NEGATIVE
FLUAV H3 RNA SPEC QL NAA+PROBE: NEGATIVE
FLUAV RNA SPEC QL NAA+PROBE: NEGATIVE
FLUBV RNA SPEC QL NAA+PROBE: NEGATIVE
GFR SERPL CREATININE-BSD FRML MDRD: 55 ML/MIN/1.7M2
GFR SERPL CREATININE-BSD FRML MDRD: 62 ML/MIN/1.7M2
GLUCOSE BLDC GLUCOMTR-MCNC: 119 MG/DL (ref 70–99)
GLUCOSE BLDC GLUCOMTR-MCNC: 126 MG/DL (ref 70–99)
GLUCOSE BLDC GLUCOMTR-MCNC: 129 MG/DL (ref 70–99)
GLUCOSE BLDC GLUCOMTR-MCNC: 132 MG/DL (ref 70–99)
GLUCOSE BLDC GLUCOMTR-MCNC: 137 MG/DL (ref 70–99)
GLUCOSE BLDC GLUCOMTR-MCNC: 137 MG/DL (ref 70–99)
GLUCOSE BLDC GLUCOMTR-MCNC: 139 MG/DL (ref 70–99)
GLUCOSE BLDC GLUCOMTR-MCNC: 140 MG/DL (ref 70–99)
GLUCOSE BLDC GLUCOMTR-MCNC: 144 MG/DL (ref 70–99)
GLUCOSE BLDC GLUCOMTR-MCNC: 144 MG/DL (ref 70–99)
GLUCOSE BLDC GLUCOMTR-MCNC: 150 MG/DL (ref 70–99)
GLUCOSE BLDC GLUCOMTR-MCNC: 152 MG/DL (ref 70–99)
GLUCOSE SERPL-MCNC: 118 MG/DL (ref 70–99)
GLUCOSE SERPL-MCNC: 137 MG/DL (ref 70–99)
HADV DNA SPEC QL NAA+PROBE: NEGATIVE
HADV DNA SPEC QL NAA+PROBE: NEGATIVE
HCO3 BLD-SCNC: 27 MMOL/L (ref 21–28)
HCO3 BLD-SCNC: 28 MMOL/L (ref 21–28)
HCO3 BLD-SCNC: 28 MMOL/L (ref 21–28)
HCO3 BLD-SCNC: 30 MMOL/L (ref 21–28)
HCO3 BLDV-SCNC: 26 MMOL/L (ref 21–28)
HCO3 BLDV-SCNC: 28 MMOL/L (ref 21–28)
HCO3 BLDV-SCNC: 29 MMOL/L (ref 21–28)
HCT VFR BLD AUTO: 36 % (ref 40–53)
HCT VFR BLD AUTO: 38.2 % (ref 40–53)
HGB BLD-MCNC: 11.5 G/DL (ref 13.3–17.7)
HGB BLD-MCNC: 12.3 G/DL (ref 13.3–17.7)
HMPV RNA SPEC QL NAA+PROBE: NEGATIVE
HPIV1 RNA SPEC QL NAA+PROBE: NEGATIVE
HPIV2 RNA SPEC QL NAA+PROBE: NEGATIVE
HPIV3 RNA SPEC QL NAA+PROBE: NEGATIVE
IMM GRANULOCYTES # BLD: 0 10E9/L (ref 0–0.4)
IMM GRANULOCYTES # BLD: 0 10E9/L (ref 0–0.4)
IMM GRANULOCYTES NFR BLD: 0.2 %
IMM GRANULOCYTES NFR BLD: 0.2 %
INR PPP: 1.25 (ref 0.86–1.14)
KCT BLD-ACNC: 127 SEC (ref 75–150)
KCT BLD-ACNC: 140 SEC (ref 75–150)
LMWH PPP CHRO-ACNC: 0.25 IU/ML
LYMPHOCYTES # BLD AUTO: 0.9 10E9/L (ref 0.8–5.3)
LYMPHOCYTES # BLD AUTO: 1.4 10E9/L (ref 0.8–5.3)
LYMPHOCYTES NFR BLD AUTO: 10.3 %
LYMPHOCYTES NFR BLD AUTO: 12.3 %
Lab: NORMAL
MAGNESIUM SERPL-MCNC: 2.4 MG/DL (ref 1.6–2.3)
MAGNESIUM SERPL-MCNC: 2.4 MG/DL (ref 1.6–2.3)
MCH RBC QN AUTO: 30.7 PG (ref 26.5–33)
MCH RBC QN AUTO: 30.8 PG (ref 26.5–33)
MCHC RBC AUTO-ENTMCNC: 31.9 G/DL (ref 31.5–36.5)
MCHC RBC AUTO-ENTMCNC: 32.2 G/DL (ref 31.5–36.5)
MCV RBC AUTO: 95 FL (ref 78–100)
MCV RBC AUTO: 97 FL (ref 78–100)
MICROBIOLOGIST REVIEW: NORMAL
MONOCYTES # BLD AUTO: 1.2 10E9/L (ref 0–1.3)
MONOCYTES # BLD AUTO: 1.2 10E9/L (ref 0–1.3)
MONOCYTES NFR BLD AUTO: 11.3 %
MONOCYTES NFR BLD AUTO: 13.2 %
NEUTROPHILS # BLD AUTO: 6.7 10E9/L (ref 1.6–8.3)
NEUTROPHILS # BLD AUTO: 8.3 10E9/L (ref 1.6–8.3)
NEUTROPHILS NFR BLD AUTO: 75 %
NEUTROPHILS NFR BLD AUTO: 75.4 %
NRBC # BLD AUTO: 0 10*3/UL
NRBC # BLD AUTO: 0 10*3/UL
NRBC BLD AUTO-RTO: 0 /100
NRBC BLD AUTO-RTO: 0 /100
O2/TOTAL GAS SETTING VFR VENT: 40 %
O2/TOTAL GAS SETTING VFR VENT: 40 %
O2/TOTAL GAS SETTING VFR VENT: 50 %
OXYHGB MFR BLD: 92 % (ref 92–100)
OXYHGB MFR BLD: 92 % (ref 92–100)
OXYHGB MFR BLD: 93 % (ref 92–100)
OXYHGB MFR BLDV: 63 %
OXYHGB MFR BLDV: 67 %
OXYHGB MFR BLDV: 67 %
OXYHGB MFR BLDV: 68 %
OXYHGB MFR BLDV: 68 %
PCO2 BLD: 42 MM HG (ref 35–45)
PCO2 BLD: 43 MM HG (ref 35–45)
PCO2 BLD: 45 MM HG (ref 35–45)
PCO2 BLD: 48 MM HG (ref 35–45)
PCO2 BLDV: 44 MM HG (ref 40–50)
PCO2 BLDV: 45 MM HG (ref 40–50)
PCO2 BLDV: 45 MM HG (ref 40–50)
PCO2 BLDV: 46 MM HG (ref 40–50)
PCO2 BLDV: 48 MM HG (ref 40–50)
PH BLD: 7.38 PH (ref 7.35–7.45)
PH BLD: 7.41 PH (ref 7.35–7.45)
PH BLD: 7.42 PH (ref 7.35–7.45)
PH BLD: 7.42 PH (ref 7.35–7.45)
PH BLDV: 7.37 PH (ref 7.32–7.43)
PH BLDV: 7.39 PH (ref 7.32–7.43)
PH BLDV: 7.39 PH (ref 7.32–7.43)
PH BLDV: 7.4 PH (ref 7.32–7.43)
PH BLDV: 7.41 PH (ref 7.32–7.43)
PHOSPHATE SERPL-MCNC: 4 MG/DL (ref 2.5–4.5)
PLATELET # BLD AUTO: 148 10E9/L (ref 150–450)
PLATELET # BLD AUTO: 157 10E9/L (ref 150–450)
PO2 BLD: 67 MM HG (ref 80–105)
PO2 BLD: 72 MM HG (ref 80–105)
PO2 BLD: 74 MM HG (ref 80–105)
PO2 BLD: 76 MM HG (ref 80–105)
PO2 BLDV: 35 MM HG (ref 25–47)
PO2 BLDV: 37 MM HG (ref 25–47)
PO2 BLDV: 38 MM HG (ref 25–47)
POTASSIUM BLD-SCNC: 3.5 MMOL/L (ref 3.4–5.3)
POTASSIUM SERPL-SCNC: 3.7 MMOL/L (ref 3.4–5.3)
POTASSIUM SERPL-SCNC: 4 MMOL/L (ref 3.4–5.3)
PROT SERPL-MCNC: 6.5 G/DL (ref 6.8–8.8)
PROT SERPL-MCNC: 6.8 G/DL (ref 6.8–8.8)
RBC # BLD AUTO: 3.73 10E12/L (ref 4.4–5.9)
RBC # BLD AUTO: 4.01 10E12/L (ref 4.4–5.9)
RHINOVIRUS RNA SPEC QL NAA+PROBE: NEGATIVE
RSV RNA SPEC QL NAA+PROBE: NEGATIVE
RSV RNA SPEC QL NAA+PROBE: NEGATIVE
SODIUM SERPL-SCNC: 147 MMOL/L (ref 133–144)
SODIUM SERPL-SCNC: 147 MMOL/L (ref 133–144)
SPECIMEN SOURCE: NORMAL
TROPONIN I SERPL-MCNC: 4.28 UG/L (ref 0–0.04)
TROPONIN I SERPL-MCNC: 4.7 UG/L (ref 0–0.04)
VANCOMYCIN SERPL-MCNC: 7.5 MG/L
WBC # BLD AUTO: 11 10E9/L (ref 4–11)
WBC # BLD AUTO: 8.9 10E9/L (ref 4–11)

## 2018-09-16 PROCEDURE — 80076 HEPATIC FUNCTION PANEL: CPT | Performed by: INTERNAL MEDICINE

## 2018-09-16 PROCEDURE — 82803 BLOOD GASES ANY COMBINATION: CPT | Performed by: INTERNAL MEDICINE

## 2018-09-16 PROCEDURE — 40000048 ZZH STATISTIC DAILY SWAN MONITORING

## 2018-09-16 PROCEDURE — 40000076 ZZH STATISTIC IABP MONITORING

## 2018-09-16 PROCEDURE — 25000125 ZZHC RX 250: Performed by: INTERNAL MEDICINE

## 2018-09-16 PROCEDURE — 20000004 ZZH R&B ICU UMMC

## 2018-09-16 PROCEDURE — 85520 HEPARIN ASSAY: CPT | Performed by: INTERNAL MEDICINE

## 2018-09-16 PROCEDURE — 71045 X-RAY EXAM CHEST 1 VIEW: CPT

## 2018-09-16 PROCEDURE — 85610 PROTHROMBIN TIME: CPT | Performed by: INTERNAL MEDICINE

## 2018-09-16 PROCEDURE — 84100 ASSAY OF PHOSPHORUS: CPT | Performed by: INTERNAL MEDICINE

## 2018-09-16 PROCEDURE — 84484 ASSAY OF TROPONIN QUANT: CPT | Performed by: INTERNAL MEDICINE

## 2018-09-16 PROCEDURE — 25000128 H RX IP 250 OP 636: Performed by: STUDENT IN AN ORGANIZED HEALTH CARE EDUCATION/TRAINING PROGRAM

## 2018-09-16 PROCEDURE — 33968 REMOVE AORTIC ASSIST DEVICE: CPT | Performed by: INTERNAL MEDICINE

## 2018-09-16 PROCEDURE — 00000146 ZZHCL STATISTIC GLUCOSE BY METER IP

## 2018-09-16 PROCEDURE — 94003 VENT MGMT INPAT SUBQ DAY: CPT

## 2018-09-16 PROCEDURE — 85347 COAGULATION TIME ACTIVATED: CPT

## 2018-09-16 PROCEDURE — 25000132 ZZH RX MED GY IP 250 OP 250 PS 637: Performed by: INTERNAL MEDICINE

## 2018-09-16 PROCEDURE — 33968 REMOVE AORTIC ASSIST DEVICE: CPT

## 2018-09-16 PROCEDURE — 83735 ASSAY OF MAGNESIUM: CPT | Performed by: INTERNAL MEDICINE

## 2018-09-16 PROCEDURE — 25000128 H RX IP 250 OP 636: Performed by: INTERNAL MEDICINE

## 2018-09-16 PROCEDURE — 25000132 ZZH RX MED GY IP 250 OP 250 PS 637: Performed by: STUDENT IN AN ORGANIZED HEALTH CARE EDUCATION/TRAINING PROGRAM

## 2018-09-16 PROCEDURE — 82805 BLOOD GASES W/O2 SATURATION: CPT | Performed by: INTERNAL MEDICINE

## 2018-09-16 PROCEDURE — 40000014 ZZH STATISTIC ARTERIAL MONITORING DAILY

## 2018-09-16 PROCEDURE — 40000275 ZZH STATISTIC RCP TIME EA 10 MIN

## 2018-09-16 PROCEDURE — 40000196 ZZH STATISTIC RAPCV CVP MONITORING

## 2018-09-16 PROCEDURE — 80202 ASSAY OF VANCOMYCIN: CPT | Performed by: INTERNAL MEDICINE

## 2018-09-16 PROCEDURE — 99291 CRITICAL CARE FIRST HOUR: CPT | Mod: 25 | Performed by: INTERNAL MEDICINE

## 2018-09-16 PROCEDURE — 80048 BASIC METABOLIC PNL TOTAL CA: CPT | Performed by: INTERNAL MEDICINE

## 2018-09-16 PROCEDURE — 85025 COMPLETE CBC W/AUTO DIFF WBC: CPT | Performed by: INTERNAL MEDICINE

## 2018-09-16 RX ORDER — FUROSEMIDE 10 MG/ML
120 INJECTION INTRAMUSCULAR; INTRAVENOUS ONCE
Status: COMPLETED | OUTPATIENT
Start: 2018-09-16 | End: 2018-09-16

## 2018-09-16 RX ORDER — HYDRALAZINE HYDROCHLORIDE 25 MG/1
25 TABLET, FILM COATED ORAL 4 TIMES DAILY
Status: DISCONTINUED | OUTPATIENT
Start: 2018-09-16 | End: 2018-09-17

## 2018-09-16 RX ADMIN — PIPERACILLIN AND TAZOBACTAM 4.5 G: 4; .5 INJECTION, POWDER, FOR SOLUTION INTRAVENOUS at 10:16

## 2018-09-16 RX ADMIN — PIPERACILLIN AND TAZOBACTAM 4.5 G: 4; .5 INJECTION, POWDER, FOR SOLUTION INTRAVENOUS at 04:03

## 2018-09-16 RX ADMIN — MIDAZOLAM 3 MG: 1 INJECTION INTRAMUSCULAR; INTRAVENOUS at 03:00

## 2018-09-16 RX ADMIN — PANTOPRAZOLE SODIUM 40 MG: 40 TABLET, DELAYED RELEASE ORAL at 07:59

## 2018-09-16 RX ADMIN — VANCOMYCIN HYDROCHLORIDE 1500 MG: 10 INJECTION, POWDER, LYOPHILIZED, FOR SOLUTION INTRAVENOUS at 15:49

## 2018-09-16 RX ADMIN — PIPERACILLIN AND TAZOBACTAM 4.5 G: 4; .5 INJECTION, POWDER, FOR SOLUTION INTRAVENOUS at 21:50

## 2018-09-16 RX ADMIN — PIPERACILLIN AND TAZOBACTAM 4.5 G: 4; .5 INJECTION, POWDER, FOR SOLUTION INTRAVENOUS at 16:23

## 2018-09-16 RX ADMIN — SODIUM NITROPRUSSIDE 2 MCG/KG/MIN: 25 INJECTION INTRAVENOUS at 16:02

## 2018-09-16 RX ADMIN — TICAGRELOR 90 MG: 90 TABLET ORAL at 19:31

## 2018-09-16 RX ADMIN — POTASSIUM CHLORIDE 40 MEQ: 1.5 POWDER, FOR SOLUTION ORAL at 21:58

## 2018-09-16 RX ADMIN — SODIUM NITROPRUSSIDE 2 MCG/KG/MIN: 25 INJECTION INTRAVENOUS at 20:14

## 2018-09-16 RX ADMIN — SODIUM NITROPRUSSIDE 2 MCG/KG/MIN: 25 INJECTION INTRAVENOUS at 10:38

## 2018-09-16 RX ADMIN — SODIUM NITROPRUSSIDE 1.25 MCG/KG/MIN: 25 INJECTION INTRAVENOUS at 04:34

## 2018-09-16 RX ADMIN — MIDAZOLAM 3 MG: 1 INJECTION INTRAMUSCULAR; INTRAVENOUS at 05:00

## 2018-09-16 RX ADMIN — Medication 4 MG/HR: at 06:42

## 2018-09-16 RX ADMIN — Medication 50 MCG/HR: at 02:46

## 2018-09-16 RX ADMIN — FUROSEMIDE 120 MG: 10 INJECTION, SOLUTION INTRAVENOUS at 20:50

## 2018-09-16 RX ADMIN — MIDAZOLAM 2 MG: 1 INJECTION INTRAMUSCULAR; INTRAVENOUS at 01:00

## 2018-09-16 RX ADMIN — Medication 100 MCG/HR: at 19:41

## 2018-09-16 RX ADMIN — POTASSIUM CHLORIDE 20 MEQ: 1.5 POWDER, FOR SOLUTION ORAL at 18:33

## 2018-09-16 RX ADMIN — POTASSIUM CHLORIDE 40 MEQ: 1.5 POWDER, FOR SOLUTION ORAL at 01:31

## 2018-09-16 RX ADMIN — ASPIRIN 81 MG CHEWABLE TABLET 81 MG: 81 TABLET CHEWABLE at 07:59

## 2018-09-16 RX ADMIN — TICAGRELOR 90 MG: 90 TABLET ORAL at 07:59

## 2018-09-16 RX ADMIN — MIDAZOLAM 2 MG: 1 INJECTION INTRAMUSCULAR; INTRAVENOUS at 00:00

## 2018-09-16 RX ADMIN — VANCOMYCIN HYDROCHLORIDE 1750 MG: 10 INJECTION, POWDER, LYOPHILIZED, FOR SOLUTION INTRAVENOUS at 05:54

## 2018-09-16 RX ADMIN — ATORVASTATIN CALCIUM 80 MG: 80 TABLET, FILM COATED ORAL at 19:31

## 2018-09-16 RX ADMIN — ACETAMINOPHEN 325 MG: 325 TABLET, FILM COATED ORAL at 23:02

## 2018-09-16 RX ADMIN — HYDRALAZINE HYDROCHLORIDE 25 MG: 25 TABLET ORAL at 20:50

## 2018-09-16 RX ADMIN — MIDAZOLAM 3 MG: 1 INJECTION INTRAMUSCULAR; INTRAVENOUS at 06:00

## 2018-09-16 ASSESSMENT — ACTIVITIES OF DAILY LIVING (ADL)
ADLS_ACUITY_SCORE: 17
ADLS_ACUITY_SCORE: 17
ADLS_ACUITY_SCORE: 14
ADLS_ACUITY_SCORE: 14
ADLS_ACUITY_SCORE: 17
ADLS_ACUITY_SCORE: 17

## 2018-09-16 NOTE — PLAN OF CARE
Problem: Patient Care Overview  Goal: Plan of Care/Patient Progress Review  Outcome: Improving  S:  Pt neurologically intact. Precedex started and Versed weaned off. Pt became agitated requiring 5 staff members to restrain pt and avoid harm to self and discontinuation of equipment/lines so Precedex d/c'd and Versed restarted.  Able to wean vent settings to Fi02 from 70-50% and PEEP from 10-5.  Flolan discontinued.  Nipride off and Dopamine started for low MAP's. SVR increased to 2143-0266 with CI 1.7-1.9. Dopamine stopped and Dobutamine initiated.  Within ~30minutes pt became hyperdynamic with MAP's in 90's-100's, PA 50's-30's, CVP 12 and wedge 30.  Dobutamine stopped and MAP's slowly came down to 70's with IABP on hold.  MAP's now 90's with IABP 1:1, lasix given for CVP 9, and wedge 30.  BM x1. Troponin, WBC, Creatinine improving.  Tmax 100.6. K 20meq po given x2.  Amio restarted this am for occasional short runs VT overnight.  B:  Pt s/p MI with stents, subsequent cardiogenic shock requiring IABP.  A:  Pt's respiratory status improved throughout shift.  Pt's wedge, MAP's, CVP and PA all increased dramatically with initiation of Dobutamine.  Pt given lasix for CVP 12 and wedge 30. Pt calm and easily arousable with RASS of -2 on 2mg Versed/hour at change of shift.  SR with occasional-frequent PAC's.    P:  Continue to monitor hemodynamics, respiratory status, labs, neuro status and cardiac rhythm closely overnight.

## 2018-09-16 NOTE — PROGRESS NOTES
SPIRITUAL HEALTH SERVICES  SPIRITUAL ASSESSMENT Progress Note  [facility] ([place] Unit 4E   ON-CALL VISIT    REFERRAL SOURCE: Epic consult order (family request)     I visited with the patient's family in the patient's room. Patient was asleep, and family asked that he not be woken.  Family members said they would like a prayer before they leave the hospital. I provided support and prayer. I invited the family to join in the Lord's Prayer, in closing, which they did. Family expressed gratitude for the visit.    PLAN: Will advise the unit  of the visit, for any follow-up.    Maxim Simon  Chaplain Resident  Pager [phone]

## 2018-09-16 NOTE — PROGRESS NOTES
IABP Removal Note:     With RN and Respiratory therapy in the room, IABP was paused. IABP was removed by me with some bleed back to prevent thrombus from going distally. Manual pressure was held for 30 minutes. There was no hematoma post-removal. Patient had dopplerable pulse during the IABP pull.     Javi Ortiz PGY-5   Cardiology Fellow   Pager: [phone]

## 2018-09-16 NOTE — PLAN OF CARE
Problem: Patient Care Overview  Goal: Plan of Care/Patient Progress Review  OT: Per discussion w/ RN pt not appropriate for therapy at this time, plan for balloon pump to be pulled later today at which pt will be on bed rest for 4 hours after.  OT wrote pager on board for RN if pt becomes appropriate for therapy today. OT will reschedule for 9/17/18

## 2018-09-16 NOTE — PLAN OF CARE
Problem: Patient Care Overview  Goal: Plan of Care/Patient Progress Review  PT 4E: Pt continues to have IABP and be inappropriate for therapy or out of bed activity. PT will hold at this time, OT will continue to check in and alert PT when services are indicated.

## 2018-09-16 NOTE — PROGRESS NOTES
[facility] Cardiology Progress Note           Assessment and Plan:   62 year old with a PMHx of ICM (EF around 30%) from LAD MI s/p LAD PCI, HTN, DM2 who presented with anterior STEMI and cardiac arrest. Patient complained of chest pain at home and had a cardiac arrest. There was no bystander CPR, but EMS arrived and delivered one shock with ROSC. However, right before arrival, patient went into VT with pulse. Amio was given and patient was cardioverted. Patient then went to the cath lab where he was found to have proximal LAD STEMI in the previous stent and thrombus in the D1 ostium. Patient underwent STEVO of the LAD and POBA to D1. LVEDP was 40, so IABP was placed. Patient was given an additional Amio bolus, Lasix 160mg, started on Cangrelor gtt, ASA, and Brillenta. When patient was on the floor, he was in cardiogenic shock and required DPA and Epi for cardiac support.     Patient was able to be weaned off pressors. However, when IABP was turned down, patient became hypotensive and PCWP increased. Therefore, IABP returned to 1:1. Patient was restarted on Nitroprusside and . PCWP remained around 10-14 when IABP was weaned off and pressure remained stable. Therefore, IABP was removed on 9/16.     Plan:   1. Pull IABP at bedside. Bedrest for 6 hours afterwards   2. Will wean off  depending on swan numbers.     #Cardiogenic Shock   -EF around 23% with LAD WMA.   -Removed IABP. Will check a set of numbers afterwards.   -Continue Vanc and Zosyn for likely 7 day course given fevers.     #Acute Respiratory Failure   -Likely 2/2 pulmonary edema and elevated PCWP. Has improved with most recent PCWP around 10-14.   -Continue afterload reduction and diuresis. Goal CVP around 8.   -Wean ventilator Settings. Will likely extubate tomorrow.     #STEMI  -s/p LAD stent.   -ECHO showed unchanged EF around 23% with LAD WMA.  -Continue ASA and Brilinta.  -Start GDMT once patient is stable.     #Fevers   -Continue  Osito/Zosyn  -Follow up cultures     #ANETTE  -Improving.   -NTD     Javi Angel PGY-5  Cardiology Fellow   Pager: [phone]      Patient was seen by and the above plan discussed with Dr. Matos.     Subjective:     No acute events overnight.          Review of Systems:   A comprehensive review of systems was performed and found to be negative except as described in this note          Medications:     Current Facility-Administered Medications   Medication     acetaminophen (TYLENOL) tablet 325 mg     albuterol neb solution 2.5 mg     amiodarone (NEXTERONE) 1,500 mg in D5W 250 mL infusion     aspirin chewable tablet 81 mg     atorvastatin (LIPITOR) tablet 80 mg     dexmedetomidine (PRECEDEX) 400 mcg in 0.9% sodium chloride 100 mL     dextrose 10 % 1,000 mL infusion     glucose gel 15-30 g    Or     dextrose 50 % injection 25-50 mL    Or     glucagon injection 1 mg     DOBUTamine (DOBUTREX) 1000 mg in dextrose 5% 250 mL (adult MAX CONC) premix     DOPamine 400 mg in dextrose 5% 250 mL (adult std) - premix     EPINEPHrine (ADRENALIN) 5 mg in sodium chloride 0.9 % 250 mL infusion     fentaNYL (SUBLIMAZE) infusion     heparin  drip 25,000 units in 0.45% NaCl 250 mL (see additional administration details for dose)     heparin bolus from infusion pump     insulin 1 unit/mL in saline (NovoLIN, HumuLIN Regular) drip - ADULT IV Infusion     Medication Considerations - To maintain platelet inhibition after discontinuation of cangrelor (KENGREAL) [see admin instructions]     midazolam (VERSED) 100 mg in sodium chloride 0.9% 100 mL pre-mix infusion     midazolam (VERSED) injection 1-3 mg     naloxone (NARCAN) injection 0.1-0.4 mg     nitroPRUsside (NIPRIDE) 50 mg, sodium thiosulfate 500 mg in D5W 125 mL IV infusion (conc: 0.4mg/mL)     pantoprazole (PROTONIX) 2 mg/mL suspension 40 mg     Patient is already receiving anticoagulation with heparin, enoxaparin (LOVENOX), warfarin (COUMADIN)  or other anticoagulant medication      piperacillin-tazobactam (ZOSYN) 4.5 g vial to attach to  mL bag     potassium chloride (KLOR-CON) Packet 20-40 mEq     potassium chloride 10 mEq in 100 mL intermittent infusion with 10 mg lidocaine     potassium chloride 10 mEq in 100 mL sterile water intermittent infusion (premix)     potassium chloride 20 mEq in 50 mL intermittent infusion     potassium chloride SA (K-DUR/KLOR-CON M) CR tablet 20-40 mEq     senna-docusate (SENOKOT-S;PERICOLACE) 8.6-50 MG per tablet 1 tablet    Or     senna-docusate (SENOKOT-S;PERICOLACE) 8.6-50 MG per tablet 2 tablet     sodium chloride (PF) 0.9% PF flush 10 mL     ticagrelor (BRILINTA) tablet 90 mg     vancomycin (VANCOCIN) 1,500 mg in sodium chloride 0.9 % 250 mL intermittent infusion               Objective:   Temp: 99  F (37.2  C) Temp src: Bladder    Heart Rate: 95 Resp: 13 SpO2: 94 % O2 Device: Mechanical Ventilator      Gen: Intubated and Sedated   HEENT: ET Tube in place   CV: Regular Rate and Rhythm   Pulm: Equal Chest Rise   Abdomen: s/nt/nd   Ext: No edema. IABP in the right leg.           Data:     Lab Results   Component Value Date    WBC 8.9 09/16/2018    WBC 11.0 09/16/2018    WBC 9.7 09/15/2018    HGB 11.5 (L) 09/16/2018    HGB 12.3 (L) 09/16/2018    HGB 12.4 (L) 09/15/2018    HCT 36.0 (L) 09/16/2018    HCT 38.2 (L) 09/16/2018    HCT 38.0 (L) 09/15/2018     (L) 09/16/2018     09/16/2018     09/15/2018     (H) 09/16/2018     (H) 09/15/2018     (H) 09/15/2018    POTASSIUM 4.0 09/16/2018    POTASSIUM 3.5 09/15/2018    POTASSIUM 4.4 09/15/2018    CHLORIDE 111 (H) 09/16/2018    CHLORIDE 111 (H) 09/15/2018    CHLORIDE 108 09/15/2018    CO2 26 09/16/2018    CO2 26 09/15/2018    CO2 27 09/15/2018    BUN 27 09/16/2018    BUN 28 09/15/2018    BUN 25 09/15/2018    CR 1.32 (H) 09/16/2018    CR 1.31 (H) 09/15/2018    CR 1.44 (H) 09/15/2018     (H) 09/16/2018     (H) 09/15/2018     (H) 09/15/2018    TROPONIN 0.14  (HH) 09/13/2018    TROPI 4.700 (HH) 09/16/2018    TROPI 5.913 (HH) 09/15/2018    TROPI 10.788 (HH) 09/15/2018    AST 94 (H) 09/16/2018     (H) 09/15/2018     (H) 09/15/2018     (H) 09/16/2018     (H) 09/15/2018     (H) 09/15/2018    ALKPHOS 51 09/16/2018    ALKPHOS 46 09/15/2018    ALKPHOS 51 09/15/2018    BILITOTAL 2.0 (H) 09/16/2018    BILITOTAL 1.5 (H) 09/15/2018    BILITOTAL 2.0 (H) 09/15/2018    INR 1.25 (H) 09/16/2018    INR 1.24 (H) 09/15/2018    INR 1.34 (H) 09/14/2018

## 2018-09-16 NOTE — PLAN OF CARE
Problem: Patient Care Overview  Goal: Plan of Care/Patient Progress Review  Outcome: No Change  Assessment:   Cardiac: SR/ST occasional PVCs, CVP 3-6 Dr. Matos aware. PA 20s/10s. Nipride MAP 65-80. Dobutamine gtt started at 0600 per v/o from Dr. Matos, see MAR/doc flow sheet. IABP 1:1 via EKG, Heparin gtt infusing.   Resp: CMV 50%/450/10/5.  Neuro: Fentanyl and Versed providing adequate sedation.  : Haider patent, UO  cc/hr.  GI: OG to LIS.  Endocrine: Insulin gtt infusing.    Interventions: No significant events overnight.     Plan: Will continue to monitor/assess and update MD as needed.      Problem: Restraint for Non-Violent/Non-Self-Destructive Behavior  Goal: Prevent/Manage Potential Problems  Maintain safety of patient and others during period of restraint.  Promote psychological and physical wellbeing.  Prevent injury to skin and involved body parts.  Promote nutrition, hydration, and elimination.   Outcome: No Change  Pt remains intubated, sedated, and restrained.  Restraints monitored q2hrs, continue use of restraints for patient safety.

## 2018-09-16 NOTE — PHARMACY-VANCOMYCIN DOSING SERVICE
Pharmacy Vancomycin Note  Date of Service 2018  Patient's  1955   62 year old, male    Indication: Aspiration Pneumonia  Goal Trough Level: 15-20 mg/L  Day of Therapy: 3  Current Vancomycin regimen:  1750 mg IV q24h    Estimated Creatinine Clearance: 58.8 mL/min (based on Cr of 1.32).    Creatinine for last 3 days  2018: 10:30 PM Creatinine 1.35 mg/dL  2018:  1:02 AM Creatinine 1.47 mg/dL;  5:35 AM Creatinine 1.54 mg/dL;  2:46 PM Creatinine 1.29 mg/dL; 10:01 PM Creatinine 1.45 mg/dL  9/15/2018:  3:55 AM Creatinine 1.44 mg/dL;  4:21 PM Creatinine 1.31 mg/dL  2018:  3:55 AM Creatinine 1.32 mg/dL    Recent Vancomycin Levels (past 3 days)  2018:  3:55 AM Vancomycin Level 7.5 mg/L    Vancomycin IV Administrations (past 72 hours)                   vancomycin (VANCOCIN) 1,750 mg in sodium chloride 0.9 % 250 mL intermittent infusion (mg) 1,750 mg New Bag 18 0554     1,750 mg New Bag 09/15/18 0443                Nephrotoxins and other renal medications (Future)    Start     Dose/Rate Route Frequency Ordered Stop    18 1500  vancomycin (VANCOCIN) 1,500 mg in sodium chloride 0.9 % 250 mL intermittent infusion      1,500 mg  over 90 Minutes Intravenous EVERY 12 HOURS 18 0743      18 0300  piperacillin-tazobactam (ZOSYN) 4.5 g vial to attach to  mL bag      4.5 g  over 30 Minutes Intravenous EVERY 6 HOURS 18 0230               Contrast Orders - past 72 hours (72h ago through future)    Start     Dose/Rate Route Frequency Ordered Stop    18 0745  perflutren diluted 1mL to 2mL with saline (OPTISON) diluted injection 6 mL      6 mL Intravenous ONCE 18 0733 18 0745    18 0000  iopamidol (ISOVUE-370) solution 140 mL      140 mL Intravenous ONCE 18 2357 18 0000          Interpretation of levels and current regimen:  Trough level is  Subtherapeutic    Has serum creatinine changed > 50% in last 72 hours: No    Urine output:   good urine output    Renal Function: Stable    Plan:  1.  Increase Dose to 1500 mg IV q12h  2.  Pharmacy will check trough levels as appropriate in 1-3 Days.    3. Serum creatinine levels will be ordered daily for the first week of therapy and at least twice weekly for subsequent weeks.          Eleanor Broadbent, PharmD, [facility]  PGY-2 Infectious Diseases Pharmacy Resident          .

## 2018-09-17 ENCOUNTER — APPOINTMENT (OUTPATIENT)
Dept: GENERAL RADIOLOGY | Facility: CLINIC | Age: 63
DRG: 270 | End: 2018-09-17
Attending: THORACIC SURGERY (CARDIOTHORACIC VASCULAR SURGERY)
Payer: COMMERCIAL

## 2018-09-17 LAB
ALBUMIN SERPL-MCNC: 3.2 G/DL (ref 3.4–5)
ALBUMIN SERPL-MCNC: 3.4 G/DL (ref 3.4–5)
ALP SERPL-CCNC: 53 U/L (ref 40–150)
ALP SERPL-CCNC: 59 U/L (ref 40–150)
ALT SERPL W P-5'-P-CCNC: 128 U/L (ref 0–70)
ALT SERPL W P-5'-P-CCNC: 149 U/L (ref 0–70)
ANION GAP SERPL CALCULATED.3IONS-SCNC: 6 MMOL/L (ref 3–14)
ANION GAP SERPL CALCULATED.3IONS-SCNC: 7 MMOL/L (ref 3–14)
AST SERPL W P-5'-P-CCNC: 45 U/L (ref 0–45)
AST SERPL W P-5'-P-CCNC: 51 U/L (ref 0–45)
BACTERIA SPEC CULT: NO GROWTH
BASE DEFICIT BLDV-SCNC: NORMAL MMOL/L
BASE EXCESS BLDA CALC-SCNC: 3.8 MMOL/L
BASE EXCESS BLDA CALC-SCNC: 4.5 MMOL/L
BASE EXCESS BLDA CALC-SCNC: 4.5 MMOL/L
BASE EXCESS BLDV CALC-SCNC: 0.8 MMOL/L
BASE EXCESS BLDV CALC-SCNC: 1.4 MMOL/L
BASE EXCESS BLDV CALC-SCNC: 3.2 MMOL/L
BASE EXCESS BLDV CALC-SCNC: 3.9 MMOL/L
BASE EXCESS BLDV CALC-SCNC: 3.9 MMOL/L
BASE EXCESS BLDV CALC-SCNC: 4.6 MMOL/L
BASE EXCESS BLDV CALC-SCNC: 4.9 MMOL/L
BASE EXCESS BLDV CALC-SCNC: 5.1 MMOL/L
BASE EXCESS BLDV CALC-SCNC: NORMAL MMOL/L
BASOPHILS # BLD AUTO: 0 10E9/L (ref 0–0.2)
BASOPHILS # BLD AUTO: 0 10E9/L (ref 0–0.2)
BASOPHILS NFR BLD AUTO: 0.2 %
BASOPHILS NFR BLD AUTO: 0.3 %
BILIRUB DIRECT SERPL-MCNC: 0.3 MG/DL (ref 0–0.2)
BILIRUB DIRECT SERPL-MCNC: 0.3 MG/DL (ref 0–0.2)
BILIRUB SERPL-MCNC: 1.7 MG/DL (ref 0.2–1.3)
BILIRUB SERPL-MCNC: 1.8 MG/DL (ref 0.2–1.3)
BUN SERPL-MCNC: 20 MG/DL (ref 7–30)
BUN SERPL-MCNC: 23 MG/DL (ref 7–30)
C DIFF TOX B STL QL: NEGATIVE
CALCIUM SERPL-MCNC: 8.4 MG/DL (ref 8.5–10.1)
CALCIUM SERPL-MCNC: 8.5 MG/DL (ref 8.5–10.1)
CHLORIDE SERPL-SCNC: 110 MMOL/L (ref 94–109)
CHLORIDE SERPL-SCNC: 112 MMOL/L (ref 94–109)
CO2 SERPL-SCNC: 27 MMOL/L (ref 20–32)
CO2 SERPL-SCNC: 29 MMOL/L (ref 20–32)
CREAT SERPL-MCNC: 1.23 MG/DL (ref 0.66–1.25)
CREAT SERPL-MCNC: 1.3 MG/DL (ref 0.66–1.25)
DIFFERENTIAL METHOD BLD: ABNORMAL
DIFFERENTIAL METHOD BLD: ABNORMAL
EOSINOPHIL # BLD AUTO: 0.2 10E9/L (ref 0–0.7)
EOSINOPHIL # BLD AUTO: 0.2 10E9/L (ref 0–0.7)
EOSINOPHIL NFR BLD AUTO: 1.5 %
EOSINOPHIL NFR BLD AUTO: 2 %
ERYTHROCYTE [DISTWIDTH] IN BLOOD BY AUTOMATED COUNT: 15.2 % (ref 10–15)
ERYTHROCYTE [DISTWIDTH] IN BLOOD BY AUTOMATED COUNT: 15.5 % (ref 10–15)
GFR SERPL CREATININE-BSD FRML MDRD: 56 ML/MIN/1.7M2
GFR SERPL CREATININE-BSD FRML MDRD: 59 ML/MIN/1.7M2
GLUCOSE BLDC GLUCOMTR-MCNC: 103 MG/DL (ref 70–99)
GLUCOSE BLDC GLUCOMTR-MCNC: 104 MG/DL (ref 70–99)
GLUCOSE BLDC GLUCOMTR-MCNC: 116 MG/DL (ref 70–99)
GLUCOSE BLDC GLUCOMTR-MCNC: 116 MG/DL (ref 70–99)
GLUCOSE BLDC GLUCOMTR-MCNC: 123 MG/DL (ref 70–99)
GLUCOSE BLDC GLUCOMTR-MCNC: 130 MG/DL (ref 70–99)
GLUCOSE BLDC GLUCOMTR-MCNC: 130 MG/DL (ref 70–99)
GLUCOSE BLDC GLUCOMTR-MCNC: 136 MG/DL (ref 70–99)
GLUCOSE BLDC GLUCOMTR-MCNC: 138 MG/DL (ref 70–99)
GLUCOSE BLDC GLUCOMTR-MCNC: 146 MG/DL (ref 70–99)
GLUCOSE BLDC GLUCOMTR-MCNC: 151 MG/DL (ref 70–99)
GLUCOSE BLDC GLUCOMTR-MCNC: 160 MG/DL (ref 70–99)
GLUCOSE BLDC GLUCOMTR-MCNC: 161 MG/DL (ref 70–99)
GLUCOSE BLDC GLUCOMTR-MCNC: 166 MG/DL (ref 70–99)
GLUCOSE BLDC GLUCOMTR-MCNC: 176 MG/DL (ref 70–99)
GLUCOSE BLDC GLUCOMTR-MCNC: 90 MG/DL (ref 70–99)
GLUCOSE BLDC GLUCOMTR-MCNC: 95 MG/DL (ref 70–99)
GLUCOSE SERPL-MCNC: 127 MG/DL (ref 70–99)
GLUCOSE SERPL-MCNC: 138 MG/DL (ref 70–99)
HCO3 BLD-SCNC: 27 MMOL/L (ref 21–28)
HCO3 BLD-SCNC: 29 MMOL/L (ref 21–28)
HCO3 BLD-SCNC: 29 MMOL/L (ref 21–28)
HCO3 BLDV-SCNC: 25 MMOL/L (ref 21–28)
HCO3 BLDV-SCNC: 25 MMOL/L (ref 21–28)
HCO3 BLDV-SCNC: 28 MMOL/L (ref 21–28)
HCO3 BLDV-SCNC: 29 MMOL/L (ref 21–28)
HCO3 BLDV-SCNC: 30 MMOL/L (ref 21–28)
HCO3 BLDV-SCNC: NORMAL MMOL/L (ref 21–28)
HCT VFR BLD AUTO: 38.1 % (ref 40–53)
HCT VFR BLD AUTO: 38.9 % (ref 40–53)
HGB BLD-MCNC: 12.2 G/DL (ref 13.3–17.7)
HGB BLD-MCNC: 12.8 G/DL (ref 13.3–17.7)
IMM GRANULOCYTES # BLD: 0 10E9/L (ref 0–0.4)
IMM GRANULOCYTES # BLD: 0 10E9/L (ref 0–0.4)
IMM GRANULOCYTES NFR BLD: 0.3 %
IMM GRANULOCYTES NFR BLD: 0.4 %
INR PPP: 1.13 (ref 0.86–1.14)
INTERPRETATION ECG - MUSE: NORMAL
LACTATE BLD-SCNC: 1.4 MMOL/L (ref 0.7–2)
LYMPHOCYTES # BLD AUTO: 1.3 10E9/L (ref 0.8–5.3)
LYMPHOCYTES # BLD AUTO: 1.3 10E9/L (ref 0.8–5.3)
LYMPHOCYTES NFR BLD AUTO: 12.7 %
LYMPHOCYTES NFR BLD AUTO: 13.1 %
Lab: NORMAL
MAGNESIUM SERPL-MCNC: 2.5 MG/DL (ref 1.6–2.3)
MAGNESIUM SERPL-MCNC: 2.5 MG/DL (ref 1.6–2.3)
MCH RBC QN AUTO: 30.3 PG (ref 26.5–33)
MCH RBC QN AUTO: 31.1 PG (ref 26.5–33)
MCHC RBC AUTO-ENTMCNC: 32 G/DL (ref 31.5–36.5)
MCHC RBC AUTO-ENTMCNC: 32.9 G/DL (ref 31.5–36.5)
MCV RBC AUTO: 95 FL (ref 78–100)
MCV RBC AUTO: 95 FL (ref 78–100)
MONOCYTES # BLD AUTO: 1.1 10E9/L (ref 0–1.3)
MONOCYTES # BLD AUTO: 1.2 10E9/L (ref 0–1.3)
MONOCYTES NFR BLD AUTO: 11.4 %
MONOCYTES NFR BLD AUTO: 11.7 %
NEUTROPHILS # BLD AUTO: 7.1 10E9/L (ref 1.6–8.3)
NEUTROPHILS # BLD AUTO: 7.2 10E9/L (ref 1.6–8.3)
NEUTROPHILS NFR BLD AUTO: 73 %
NEUTROPHILS NFR BLD AUTO: 73.4 %
NRBC # BLD AUTO: 0 10*3/UL
NRBC # BLD AUTO: 0 10*3/UL
NRBC BLD AUTO-RTO: 0 /100
NRBC BLD AUTO-RTO: 0 /100
O2/TOTAL GAS SETTING VFR VENT: 40 %
O2/TOTAL GAS SETTING VFR VENT: ABNORMAL %
O2/TOTAL GAS SETTING VFR VENT: NORMAL %
OXYHGB MFR BLD: 91 % (ref 92–100)
OXYHGB MFR BLD: 94 % (ref 92–100)
OXYHGB MFR BLDV: 59 %
OXYHGB MFR BLDV: 60 %
OXYHGB MFR BLDV: 61 %
OXYHGB MFR BLDV: 63 %
OXYHGB MFR BLDV: 66 %
OXYHGB MFR BLDV: 68 %
OXYHGB MFR BLDV: NORMAL %
PCO2 BLD: 34 MM HG (ref 35–45)
PCO2 BLD: 40 MM HG (ref 35–45)
PCO2 BLD: 41 MM HG (ref 35–45)
PCO2 BLDV: 37 MM HG (ref 40–50)
PCO2 BLDV: 38 MM HG (ref 40–50)
PCO2 BLDV: 40 MM HG (ref 40–50)
PCO2 BLDV: 41 MM HG (ref 40–50)
PCO2 BLDV: 42 MM HG (ref 40–50)
PCO2 BLDV: 43 MM HG (ref 40–50)
PCO2 BLDV: 43 MM HG (ref 40–50)
PCO2 BLDV: 44 MM HG (ref 40–50)
PCO2 BLDV: NORMAL MM HG (ref 40–50)
PH BLD: 7.45 PH (ref 7.35–7.45)
PH BLD: 7.46 PH (ref 7.35–7.45)
PH BLD: 7.5 PH (ref 7.35–7.45)
PH BLDV: 7.43 PH (ref 7.32–7.43)
PH BLDV: 7.43 PH (ref 7.32–7.43)
PH BLDV: 7.44 PH (ref 7.32–7.43)
PH BLDV: 7.45 PH (ref 7.32–7.43)
PH BLDV: 7.45 PH (ref 7.32–7.43)
PH BLDV: 7.47 PH (ref 7.32–7.43)
PH BLDV: NORMAL PH (ref 7.32–7.43)
PLATELET # BLD AUTO: 133 10E9/L (ref 150–450)
PLATELET # BLD AUTO: 145 10E9/L (ref 150–450)
PO2 BLD: 120 MM HG (ref 80–105)
PO2 BLD: 63 MM HG (ref 80–105)
PO2 BLD: 81 MM HG (ref 80–105)
PO2 BLDV: 31 MM HG (ref 25–47)
PO2 BLDV: 32 MM HG (ref 25–47)
PO2 BLDV: 33 MM HG (ref 25–47)
PO2 BLDV: 34 MM HG (ref 25–47)
PO2 BLDV: 35 MM HG (ref 25–47)
PO2 BLDV: 36 MM HG (ref 25–47)
PO2 BLDV: NORMAL MM HG (ref 25–47)
POTASSIUM BLD-SCNC: 3.8 MMOL/L (ref 3.4–5.3)
POTASSIUM SERPL-SCNC: 3.4 MMOL/L (ref 3.4–5.3)
POTASSIUM SERPL-SCNC: 4.2 MMOL/L (ref 3.4–5.3)
PROT SERPL-MCNC: 7.2 G/DL (ref 6.8–8.8)
PROT SERPL-MCNC: 7.4 G/DL (ref 6.8–8.8)
RBC # BLD AUTO: 4.03 10E12/L (ref 4.4–5.9)
RBC # BLD AUTO: 4.11 10E12/L (ref 4.4–5.9)
SODIUM SERPL-SCNC: 144 MMOL/L (ref 133–144)
SODIUM SERPL-SCNC: 148 MMOL/L (ref 133–144)
SPECIMEN SOURCE: NORMAL
SPECIMEN SOURCE: NORMAL
TROPONIN I SERPL-MCNC: 2.54 UG/L (ref 0–0.04)
TROPONIN I SERPL-MCNC: 3.32 UG/L (ref 0–0.04)
WBC # BLD AUTO: 9.7 10E9/L (ref 4–11)
WBC # BLD AUTO: 9.9 10E9/L (ref 4–11)

## 2018-09-17 PROCEDURE — 85025 COMPLETE CBC W/AUTO DIFF WBC: CPT | Performed by: INTERNAL MEDICINE

## 2018-09-17 PROCEDURE — 25000128 H RX IP 250 OP 636: Performed by: INTERNAL MEDICINE

## 2018-09-17 PROCEDURE — 94003 VENT MGMT INPAT SUBQ DAY: CPT

## 2018-09-17 PROCEDURE — 99291 CRITICAL CARE FIRST HOUR: CPT | Mod: GC | Performed by: INTERNAL MEDICINE

## 2018-09-17 PROCEDURE — 25000132 ZZH RX MED GY IP 250 OP 250 PS 637: Performed by: INTERNAL MEDICINE

## 2018-09-17 PROCEDURE — 25000132 ZZH RX MED GY IP 250 OP 250 PS 637: Performed by: STUDENT IN AN ORGANIZED HEALTH CARE EDUCATION/TRAINING PROGRAM

## 2018-09-17 PROCEDURE — 25000128 H RX IP 250 OP 636

## 2018-09-17 PROCEDURE — 80076 HEPATIC FUNCTION PANEL: CPT | Performed by: INTERNAL MEDICINE

## 2018-09-17 PROCEDURE — 83605 ASSAY OF LACTIC ACID: CPT | Performed by: INTERNAL MEDICINE

## 2018-09-17 PROCEDURE — 93005 ELECTROCARDIOGRAM TRACING: CPT

## 2018-09-17 PROCEDURE — 87493 C DIFF AMPLIFIED PROBE: CPT | Performed by: STUDENT IN AN ORGANIZED HEALTH CARE EDUCATION/TRAINING PROGRAM

## 2018-09-17 PROCEDURE — 00000146 ZZHCL STATISTIC GLUCOSE BY METER IP

## 2018-09-17 PROCEDURE — 71045 X-RAY EXAM CHEST 1 VIEW: CPT

## 2018-09-17 PROCEDURE — 82805 BLOOD GASES W/O2 SATURATION: CPT | Performed by: INTERNAL MEDICINE

## 2018-09-17 PROCEDURE — 25000125 ZZHC RX 250: Performed by: INTERNAL MEDICINE

## 2018-09-17 PROCEDURE — 84132 ASSAY OF SERUM POTASSIUM: CPT | Performed by: INTERNAL MEDICINE

## 2018-09-17 PROCEDURE — 20000004 ZZH R&B ICU UMMC

## 2018-09-17 PROCEDURE — 40000196 ZZH STATISTIC RAPCV CVP MONITORING

## 2018-09-17 PROCEDURE — 82803 BLOOD GASES ANY COMBINATION: CPT | Performed by: INTERNAL MEDICINE

## 2018-09-17 PROCEDURE — 85610 PROTHROMBIN TIME: CPT | Performed by: INTERNAL MEDICINE

## 2018-09-17 PROCEDURE — 40000275 ZZH STATISTIC RCP TIME EA 10 MIN

## 2018-09-17 PROCEDURE — 83735 ASSAY OF MAGNESIUM: CPT | Performed by: INTERNAL MEDICINE

## 2018-09-17 PROCEDURE — 84484 ASSAY OF TROPONIN QUANT: CPT | Performed by: INTERNAL MEDICINE

## 2018-09-17 PROCEDURE — 40000048 ZZH STATISTIC DAILY SWAN MONITORING

## 2018-09-17 PROCEDURE — 80048 BASIC METABOLIC PNL TOTAL CA: CPT | Performed by: INTERNAL MEDICINE

## 2018-09-17 PROCEDURE — 93010 ELECTROCARDIOGRAM REPORT: CPT | Mod: 76 | Performed by: INTERNAL MEDICINE

## 2018-09-17 PROCEDURE — 40000014 ZZH STATISTIC ARTERIAL MONITORING DAILY

## 2018-09-17 RX ORDER — HYDRALAZINE HYDROCHLORIDE 25 MG/1
25 TABLET, FILM COATED ORAL EVERY 8 HOURS SCHEDULED
Status: DISCONTINUED | OUTPATIENT
Start: 2018-09-17 | End: 2018-09-17

## 2018-09-17 RX ORDER — PROPOFOL 10 MG/ML
5-75 INJECTION, EMULSION INTRAVENOUS CONTINUOUS
Status: DISCONTINUED | OUTPATIENT
Start: 2018-09-17 | End: 2018-09-18

## 2018-09-17 RX ORDER — HEPARIN SODIUM 5000 [USP'U]/.5ML
5000 INJECTION, SOLUTION INTRAVENOUS; SUBCUTANEOUS EVERY 8 HOURS
Status: DISCONTINUED | OUTPATIENT
Start: 2018-09-17 | End: 2018-09-20 | Stop reason: HOSPADM

## 2018-09-17 RX ORDER — QUETIAPINE FUMARATE 25 MG/1
25 TABLET, FILM COATED ORAL 2 TIMES DAILY
Status: DISCONTINUED | OUTPATIENT
Start: 2018-09-17 | End: 2018-09-20

## 2018-09-17 RX ORDER — PROPOFOL 10 MG/ML
INJECTION, EMULSION INTRAVENOUS
Status: COMPLETED
Start: 2018-09-17 | End: 2018-09-17

## 2018-09-17 RX ORDER — FUROSEMIDE 10 MG/ML
60 INJECTION INTRAMUSCULAR; INTRAVENOUS ONCE
Status: COMPLETED | OUTPATIENT
Start: 2018-09-17 | End: 2018-09-17

## 2018-09-17 RX ORDER — ISOSORBIDE DINITRATE 10 MG/1
10 TABLET ORAL 3 TIMES DAILY
Status: DISCONTINUED | OUTPATIENT
Start: 2018-09-17 | End: 2018-09-17

## 2018-09-17 RX ADMIN — HYDRALAZINE HYDROCHLORIDE 25 MG: 25 TABLET ORAL at 13:14

## 2018-09-17 RX ADMIN — PROPOFOL 15 MCG/KG/MIN: 10 INJECTION, EMULSION INTRAVENOUS at 12:04

## 2018-09-17 RX ADMIN — MIDAZOLAM 3 MG: 1 INJECTION INTRAMUSCULAR; INTRAVENOUS at 06:00

## 2018-09-17 RX ADMIN — ACETAMINOPHEN 650 MG: 325 SOLUTION ORAL at 13:14

## 2018-09-17 RX ADMIN — PANTOPRAZOLE SODIUM 40 MG: 40 TABLET, DELAYED RELEASE ORAL at 08:10

## 2018-09-17 RX ADMIN — Medication 2 MG/HR: at 04:01

## 2018-09-17 RX ADMIN — ACETAMINOPHEN 325 MG: 325 TABLET, FILM COATED ORAL at 06:35

## 2018-09-17 RX ADMIN — POTASSIUM CHLORIDE 20 MEQ: 1.5 POWDER, FOR SOLUTION ORAL at 02:07

## 2018-09-17 RX ADMIN — FUROSEMIDE 60 MG: 10 INJECTION, SOLUTION INTRAVENOUS at 09:41

## 2018-09-17 RX ADMIN — TICAGRELOR 90 MG: 90 TABLET ORAL at 21:11

## 2018-09-17 RX ADMIN — VANCOMYCIN HYDROCHLORIDE 1500 MG: 10 INJECTION, POWDER, LYOPHILIZED, FOR SOLUTION INTRAVENOUS at 02:46

## 2018-09-17 RX ADMIN — PIPERACILLIN AND TAZOBACTAM 4.5 G: 4; .5 INJECTION, POWDER, FOR SOLUTION INTRAVENOUS at 16:08

## 2018-09-17 RX ADMIN — QUETIAPINE FUMARATE 25 MG: 25 TABLET ORAL at 08:10

## 2018-09-17 RX ADMIN — PIPERACILLIN AND TAZOBACTAM 4.5 G: 4; .5 INJECTION, POWDER, FOR SOLUTION INTRAVENOUS at 22:06

## 2018-09-17 RX ADMIN — PIPERACILLIN AND TAZOBACTAM 4.5 G: 4; .5 INJECTION, POWDER, FOR SOLUTION INTRAVENOUS at 09:41

## 2018-09-17 RX ADMIN — SODIUM NITROPRUSSIDE 2 MCG/KG/MIN: 25 INJECTION INTRAVENOUS at 00:11

## 2018-09-17 RX ADMIN — HUMAN INSULIN 1.5 UNITS/HR: 100 INJECTION, SOLUTION SUBCUTANEOUS at 03:45

## 2018-09-17 RX ADMIN — ATORVASTATIN CALCIUM 80 MG: 80 TABLET, FILM COATED ORAL at 21:11

## 2018-09-17 RX ADMIN — HYDRALAZINE HYDROCHLORIDE 25 MG: 25 TABLET ORAL at 21:11

## 2018-09-17 RX ADMIN — HUMAN INSULIN 1 UNITS/HR: 100 INJECTION, SOLUTION SUBCUTANEOUS at 21:11

## 2018-09-17 RX ADMIN — MIDAZOLAM 3 MG: 1 INJECTION INTRAMUSCULAR; INTRAVENOUS at 04:01

## 2018-09-17 RX ADMIN — TICAGRELOR 90 MG: 90 TABLET ORAL at 08:10

## 2018-09-17 RX ADMIN — Medication 37.5 MG: at 08:11

## 2018-09-17 RX ADMIN — ACETAMINOPHEN 650 MG: 325 SOLUTION ORAL at 08:43

## 2018-09-17 RX ADMIN — Medication 37.5 MG: at 02:34

## 2018-09-17 RX ADMIN — DEXMEDETOMIDINE HYDROCHLORIDE 0.7 MCG/KG/HR: 4 INJECTION, SOLUTION INTRAVENOUS at 04:23

## 2018-09-17 RX ADMIN — DEXMEDETOMIDINE HYDROCHLORIDE 1.2 MCG/KG/HR: 4 INJECTION, SOLUTION INTRAVENOUS at 13:56

## 2018-09-17 RX ADMIN — PIPERACILLIN AND TAZOBACTAM 4.5 G: 4; .5 INJECTION, POWDER, FOR SOLUTION INTRAVENOUS at 03:53

## 2018-09-17 RX ADMIN — Medication 12.5 MG: at 23:13

## 2018-09-17 RX ADMIN — VANCOMYCIN HYDROCHLORIDE 1500 MG: 10 INJECTION, POWDER, LYOPHILIZED, FOR SOLUTION INTRAVENOUS at 15:11

## 2018-09-17 RX ADMIN — POTASSIUM CHLORIDE 20 MEQ: 29.8 INJECTION, SOLUTION INTRAVENOUS at 17:16

## 2018-09-17 RX ADMIN — HEPARIN SODIUM 5000 UNITS: 5000 INJECTION, SOLUTION INTRAVENOUS; SUBCUTANEOUS at 23:13

## 2018-09-17 RX ADMIN — ASPIRIN 81 MG CHEWABLE TABLET 81 MG: 81 TABLET CHEWABLE at 08:10

## 2018-09-17 RX ADMIN — HEPARIN SODIUM 5000 UNITS: 5000 INJECTION, SOLUTION INTRAVENOUS; SUBCUTANEOUS at 16:07

## 2018-09-17 ASSESSMENT — ACTIVITIES OF DAILY LIVING (ADL)
ADLS_ACUITY_SCORE: 17
ADLS_ACUITY_SCORE: 14
ADLS_ACUITY_SCORE: 17
ADLS_ACUITY_SCORE: 14
ADLS_ACUITY_SCORE: 17
ADLS_ACUITY_SCORE: 17

## 2018-09-17 NOTE — PLAN OF CARE
Problem: Patient Care Overview  Goal: Plan of Care/Patient Progress Review  Outcome: Improving  D/I: Patient has been weaned down to 2 liters NC and tolerating it well. O2 sats at 96% and respiratory rate is regular. MAP's have been 10, post extubation and sedation off. PAP last 48/21. Patient making about 50ml urine output per hour. Blood sugars stable on insulin gtt. Patient tolerating water swabs by mouth anf able to swallow small amount of water on swab without any concerns. Patient is becoming more awake and alert now as precedex has been weaned off. He has been asking appropriate questions and is starting to remember more. Remains calm so far off of precedex. Temperature now down to 98.6.  A: Patient improving mental status.  P: Continue with treatment plan. Continue to monitor and assess.

## 2018-09-17 NOTE — PLAN OF CARE
Problem: Cardiac: ACS (Acute Coronary Syndrome) (Adult)  Goal: Signs and Symptoms of Listed Potential Problems Will be Absent, Minimized or Managed (Cardiac: ACS)  Signs and symptoms of listed potential problems will be absent, minimized or managed by discharge/transition of care (reference Cardiac: ACS (Acute Coronary Syndrome) (Adult) CPG).   Outcome: Improving   09/17/18 1600   Cardiac: ACS (Acute Coronary Syndrome)   Problems Assessed (Acute Coronary Syndrome) all   Problems Present (Acute Coronary Syn) heart failure/shock;situational response     D/I/A:  STEMI 9/13 s/p PCI, IABP. Somnolent w/ sedation in AM. Versed, precedex off by 7:30. Fent decreased to 25 then off. Aggressively agitated once responsive (~12:00), resedated w/ versed. Switched to propofol. Agitated again ~14:00, able to get team, RT available, extubated to oxy plus at 5 lpm. Precedex increased to 1.2. Restraints DC'ed. SR/ST (, <140 when agitated). Tmax 101, improving w/ PRN tylenol. PAOP 18, CVP 8. Diuresed well w/ 60 mg IV lasix. Voiding well via Haider. Incontinent of stool.     Plan:  Monitor HR/R and respiratory status, manage mentation, wean precedex, encourage rehab once appropriate. Notify provider of changes.

## 2018-09-17 NOTE — PLAN OF CARE
Problem: Patient Care Overview  Goal: Plan of Care/Patient Progress Review  OT: Per discussion w/ RN, pt not medically appropriate at this time, pt extubated, OT will reschedule for 9/18/18

## 2018-09-17 NOTE — PLAN OF CARE
Problem: Patient Care Overview  Goal: Plan of Care/Patient Progress Review  Outcome: Improving  S:  Pt neurologically intact, following commands, nodding yes and no to questions.  IABP out @1500.  Gtt's unchanged after IABP d/c'd.  Afebrile.  SR/ST occasional PAC's.  CVP 6, PA 38/20 with SV02 68 post IABP.  LS clear/dim.  Incontinent of stool x2 this shift.  Fentanyl gtt increased to 100mcg/hour for restlessness and agitation.    B: Pt s/p MI with cardiogenic shock requiring IABP.  A:  Pt hemodynamically stable and swan numbers unchanged after IABP d/c'd.  Fi02 weaned to 40% on vent.  Pt's cardiac rhythm stable.    P:  Pressure support in am if pt's respiratory status adequate. Continue to monitor closely overnight.

## 2018-09-17 NOTE — PROGRESS NOTES
Care Coordinator Progress Note    Admission Date/Time:  9/13/2018  Attending MD:  Jomar Georges MD    Data  Chart reviewed, discussed with interdisciplinary team.   Patient was admitted for:    Cardiac arrest (H)  ST elevation myocardial infarction involving left anterior descending (LAD) coronary artery (H)  Atrial fibrillation, unspecified type (H).    Concerns with insurance coverage for discharge needs: None.  Current Living Situation: Patient lives with spouse.  Support System: Spouse, son and dtr- Supportive and Involved  Services Involved: None  Transportation at Discharge: Family or friend will provide  Barriers to Discharge: Pt is not medically ready for discharge.    Assessment  Pt in ICU vented and sedated.  Pt IABP d/c'd yesterday.  Met pt spouse, dtr and son to introduce RNCC role and for support.  RNCC role discussed and contact info provided.  Pt family stated the team have been updating them well about the plan of care.     Plan  Anticipated Discharge Date:  TBD.  Anticipated Discharge Plan:   TBD.  RNCC will cont to follow plan of care.      Gerri Pierce RN, PHN, BSN  4A and 4E/ ICU  Care Coordinator  Phone: [phone]  Pager: [phone]

## 2018-09-17 NOTE — PLAN OF CARE
Problem: Patient Care Overview  Goal: Plan of Care/Patient Progress Review  Outcome: No Change  Assessment:   Cardiac: SR/ST, CVP 4, PA 40s/10s, SVR 1000, CO 5.7, CI 2.9. Nipride MAP < 80. Started on oral hydralazine. Amiodarone gtt infusing, see MAR/doc flow sheet.  Resp: CMV 40%/450/10/5, unable to PS at 0600 due to extreme agitation and MAP 100s  Neuro: Precedex started in attempt to wean off Fentanyl and Versed gtt, unsuccessful.  : Haider patent, given 120 mg IV lasix at 2050 for a wedge of 24.  GI: OG to LIS.  Endocrine: Insulin gtt infusing.    Interventions:  One time dose of lasix given and oral hydralazine given.     Plan:  Will continue to monitor/assess and update MD as needed.    Problem: Restraint for Non-Violent/Non-Self-Destructive Behavior  Goal: Prevent/Manage Potential Problems  Maintain safety of patient and others during period of restraint.  Promote psychological and physical wellbeing.  Prevent injury to skin and involved body parts.  Promote nutrition, hydration, and elimination.   Outcome: No Change  Pt remains intubated, sedated, and restrained.  Restraints monitored q2hrs, continue use of restraints for patient safety.

## 2018-09-17 NOTE — PROGRESS NOTES
[facility] Cardiology Progress Note           Assessment and Plan:   62 year old with a PMHx of ICM (EF around 30%) from LAD MI s/p LAD PCI, HTN, DM2 who presented with anterior STEMI and cardiac arrest. Patient complained of chest pain at home and had a cardiac arrest. There was no bystander CPR, but EMS arrived and delivered one shock with ROSC. However, right before arrival, patient went into VT with pulse. Amio was given and patient was cardioverted. Patient then went to the cath lab where he was found to have proximal LAD STEMI in the previous stent and thrombus in the D1 ostium. Patient underwent STEVO of the LAD and POBA to D1. LVEDP was 40, so IABP was placed. Patient was given an additional Amio bolus, Lasix 160mg, started on Cangrelor gtt, ASA, and Brillenta. When patient was on the floor, he was in cardiogenic shock and required DPA and Epi for cardiac support.     Patient was able to be weaned off pressors. However, when IABP was turned down, patient became hypotensive and PCWP increased. Therefore, IABP returned to 1:1. Patient was restarted on Nitroprusside and . PCWP remained around 10-14 when IABP was weaned off and pressure remained stable. Therefore, IABP was removed on 9/16.     Plan:   1. Extubate     #Cardiogenic Shock c/b Cardiac Arrest from Ischemia   -EF around 23% with LAD WMA.   -s/p IABP. Patient is currently stable off ionotropes.   -Continue Vanc and Zosyn for likely 7 day course given fevers.     #Acute Respiratory Failure   -Likely 2/2 pulmonary edema and elevated PCWP. Has improved with most recent PCWP around 10-14.   -Continue afterload reduction and diuresis. Goal CVP around 8.   -Extubate today.     #STEMI  -s/p LAD stent.   -ECHO showed unchanged EF around 23% with LAD WMA.  -Continue ASA and Brilinta.  -Start GDMT once patient is stable.     #Fevers   -Continue Vanc/Zosyn  -Follow up cultures     #ANETTE  -Improving.   -SAI Ortiz PGY-5  Cardiology Fellow   Pager:  [phone]      Patient was seen by and the above plan discussed with Dr. Matos.     Subjective:     No acute events overnight.          Review of Systems:   A comprehensive review of systems was performed and found to be negative except as described in this note          Medications:     Current Facility-Administered Medications   Medication     acetaminophen (TYLENOL) solution 650 mg     acetaminophen (TYLENOL) tablet 325 mg     albuterol neb solution 2.5 mg     amiodarone (NEXTERONE) 1,500 mg in D5W 250 mL infusion     aspirin chewable tablet 81 mg     atorvastatin (LIPITOR) tablet 80 mg     dexmedetomidine (PRECEDEX) 400 mcg in 0.9% sodium chloride 100 mL     dextrose 10 % 1,000 mL infusion     glucose gel 15-30 g    Or     dextrose 50 % injection 25-50 mL    Or     glucagon injection 1 mg     DOBUTamine (DOBUTREX) 1000 mg in dextrose 5% 250 mL (adult MAX CONC) premix     fentaNYL (SUBLIMAZE) infusion     heparin sodium PF injection 5,000 Units     hydrALAZINE (APRESOLINE) tablet 25 mg     insulin 1 unit/mL in saline (NovoLIN, HumuLIN Regular) drip - ADULT IV Infusion     Medication Considerations - To maintain platelet inhibition after discontinuation of cangrelor (KENGREAL) [see admin instructions]     midazolam (VERSED) 100 mg in sodium chloride 0.9% 100 mL pre-mix infusion     midazolam (VERSED) injection 1-3 mg     naloxone (NARCAN) injection 0.1-0.4 mg     nitroPRUsside (NIPRIDE) 50 mg, sodium thiosulfate 500 mg in D5W 125 mL IV infusion (conc: 0.4mg/mL)     pantoprazole (PROTONIX) 2 mg/mL suspension 40 mg     Patient is already receiving anticoagulation with heparin, enoxaparin (LOVENOX), warfarin (COUMADIN)  or other anticoagulant medication     piperacillin-tazobactam (ZOSYN) 4.5 g vial to attach to  mL bag     potassium chloride (KLOR-CON) Packet 20-40 mEq     potassium chloride 10 mEq in 100 mL intermittent infusion with 10 mg lidocaine     potassium chloride 10 mEq in 100 mL sterile water  intermittent infusion (premix)     potassium chloride 20 mEq in 50 mL intermittent infusion     potassium chloride SA (K-DUR/KLOR-CON M) CR tablet 20-40 mEq     propofol (DIPRIVAN) infusion     QUEtiapine (SEROquel) tablet 25 mg     senna-docusate (SENOKOT-S;PERICOLACE) 8.6-50 MG per tablet 1 tablet    Or     senna-docusate (SENOKOT-S;PERICOLACE) 8.6-50 MG per tablet 2 tablet     sodium chloride (PF) 0.9% PF flush 10 mL     ticagrelor (BRILINTA) tablet 90 mg     vancomycin (VANCOCIN) 1,500 mg in sodium chloride 0.9 % 250 mL intermittent infusion               Objective:   Temp: 99.7  F (37.6  C) Temp src: Bladder    Heart Rate: 78 Resp: 16 SpO2: 96 % O2 Device: Nasal cannula Oxygen Delivery: 4 LPM    Gen: Intubated and Sedated   HEENT: ET Tube in place   CV: Regular Rate and Rhythm   Pulm: Equal Chest Rise   Abdomen: s/nt/nd   Ext: No edema. IABP in the right leg.           Data:     Lab Results   Component Value Date    WBC 9.7 09/17/2018    WBC 8.9 09/16/2018    WBC 11.0 09/16/2018    HGB 12.2 (L) 09/17/2018    HGB 11.5 (L) 09/16/2018    HGB 12.3 (L) 09/16/2018    HCT 38.1 (L) 09/17/2018    HCT 36.0 (L) 09/16/2018    HCT 38.2 (L) 09/16/2018     (L) 09/17/2018     (L) 09/16/2018     09/16/2018     09/17/2018     (H) 09/16/2018     (H) 09/16/2018    POTASSIUM 4.2 09/17/2018    POTASSIUM 3.8 09/17/2018    POTASSIUM 3.7 09/16/2018    CHLORIDE 110 (H) 09/17/2018    CHLORIDE 112 (H) 09/16/2018    CHLORIDE 111 (H) 09/16/2018    CO2 27 09/17/2018    CO2 28 09/16/2018    CO2 26 09/16/2018    BUN 20 09/17/2018    BUN 25 09/16/2018    BUN 27 09/16/2018    CR 1.23 09/17/2018    CR 1.18 09/16/2018    CR 1.32 (H) 09/16/2018     (H) 09/17/2018     (H) 09/16/2018     (H) 09/16/2018    TROPONIN 0.14 (HH) 09/13/2018    TROPI 3.324 (HH) 09/17/2018    TROPI 4.280 (HH) 09/16/2018    TROPI 4.700 (HH) 09/16/2018    AST 51 (H) 09/17/2018    AST 67 (H) 09/16/2018    AST 94 (H)  09/16/2018     (H) 09/17/2018     (H) 09/16/2018     (H) 09/16/2018    ALKPHOS 53 09/17/2018    ALKPHOS 47 09/16/2018    ALKPHOS 51 09/16/2018    BILITOTAL 1.7 (H) 09/17/2018    BILITOTAL 1.4 (H) 09/16/2018    BILITOTAL 2.0 (H) 09/16/2018    INR 1.13 09/17/2018    INR 1.25 (H) 09/16/2018    INR 1.24 (H) 09/15/2018

## 2018-09-18 ENCOUNTER — APPOINTMENT (OUTPATIENT)
Dept: SPEECH THERAPY | Facility: CLINIC | Age: 63
DRG: 270 | End: 2018-09-18
Attending: INTERNAL MEDICINE
Payer: COMMERCIAL

## 2018-09-18 ENCOUNTER — APPOINTMENT (OUTPATIENT)
Dept: GENERAL RADIOLOGY | Facility: CLINIC | Age: 63
DRG: 270 | End: 2018-09-18
Attending: INTERNAL MEDICINE
Payer: COMMERCIAL

## 2018-09-18 ENCOUNTER — APPOINTMENT (OUTPATIENT)
Dept: OCCUPATIONAL THERAPY | Facility: CLINIC | Age: 63
DRG: 270 | End: 2018-09-18
Attending: INTERNAL MEDICINE
Payer: COMMERCIAL

## 2018-09-18 LAB
ALBUMIN SERPL-MCNC: 3 G/DL (ref 3.4–5)
ALP SERPL-CCNC: 56 U/L (ref 40–150)
ALT SERPL W P-5'-P-CCNC: 100 U/L (ref 0–70)
ANION GAP SERPL CALCULATED.3IONS-SCNC: 10 MMOL/L (ref 3–14)
AST SERPL W P-5'-P-CCNC: 33 U/L (ref 0–45)
BASE DEFICIT BLDV-SCNC: 0.3 MMOL/L
BASE EXCESS BLDV CALC-SCNC: 1.3 MMOL/L
BASE EXCESS BLDV CALC-SCNC: 1.7 MMOL/L
BASOPHILS # BLD AUTO: 0 10E9/L (ref 0–0.2)
BASOPHILS NFR BLD AUTO: 0.1 %
BILIRUB DIRECT SERPL-MCNC: 0.3 MG/DL (ref 0–0.2)
BILIRUB SERPL-MCNC: 1.8 MG/DL (ref 0.2–1.3)
BUN SERPL-MCNC: 17 MG/DL (ref 7–30)
BUN SERPL-MCNC: 19 MG/DL (ref 7–30)
BUN SERPL-MCNC: 20 MG/DL (ref 7–30)
CALCIUM SERPL-MCNC: 7.3 MG/DL (ref 8.5–10.1)
CALCIUM SERPL-MCNC: 8 MG/DL (ref 8.5–10.1)
CALCIUM SERPL-MCNC: 8.4 MG/DL (ref 8.5–10.1)
CHLORIDE SERPL-SCNC: 110 MMOL/L (ref 94–109)
CHLORIDE SERPL-SCNC: 111 MMOL/L (ref 94–109)
CHLORIDE SERPL-SCNC: 113 MMOL/L (ref 94–109)
CO2 SERPL-SCNC: 21 MMOL/L (ref 20–32)
CO2 SERPL-SCNC: 22 MMOL/L (ref 20–32)
CO2 SERPL-SCNC: 22 MMOL/L (ref 20–32)
CREAT SERPL-MCNC: 0.86 MG/DL (ref 0.66–1.25)
CREAT SERPL-MCNC: 0.94 MG/DL (ref 0.66–1.25)
CREAT SERPL-MCNC: 1.01 MG/DL (ref 0.66–1.25)
DIFFERENTIAL METHOD BLD: ABNORMAL
EOSINOPHIL # BLD AUTO: 0.2 10E9/L (ref 0–0.7)
EOSINOPHIL NFR BLD AUTO: 2.4 %
ERYTHROCYTE [DISTWIDTH] IN BLOOD BY AUTOMATED COUNT: 14.9 % (ref 10–15)
GFR SERPL CREATININE-BSD FRML MDRD: 75 ML/MIN/1.7M2
GFR SERPL CREATININE-BSD FRML MDRD: 81 ML/MIN/1.7M2
GFR SERPL CREATININE-BSD FRML MDRD: 89 ML/MIN/1.7M2
GLUCOSE BLDC GLUCOMTR-MCNC: 108 MG/DL (ref 70–99)
GLUCOSE BLDC GLUCOMTR-MCNC: 116 MG/DL (ref 70–99)
GLUCOSE BLDC GLUCOMTR-MCNC: 133 MG/DL (ref 70–99)
GLUCOSE BLDC GLUCOMTR-MCNC: 134 MG/DL (ref 70–99)
GLUCOSE BLDC GLUCOMTR-MCNC: 146 MG/DL (ref 70–99)
GLUCOSE BLDC GLUCOMTR-MCNC: 99 MG/DL (ref 70–99)
GLUCOSE SERPL-MCNC: 111 MG/DL (ref 70–99)
GLUCOSE SERPL-MCNC: 125 MG/DL (ref 70–99)
GLUCOSE SERPL-MCNC: 128 MG/DL (ref 70–99)
HCO3 BLDV-SCNC: 23 MMOL/L (ref 21–28)
HCO3 BLDV-SCNC: 25 MMOL/L (ref 21–28)
HCO3 BLDV-SCNC: 25 MMOL/L (ref 21–28)
HCT VFR BLD AUTO: 38.6 % (ref 40–53)
HGB BLD-MCNC: 12.4 G/DL (ref 13.3–17.7)
IMM GRANULOCYTES # BLD: 0 10E9/L (ref 0–0.4)
IMM GRANULOCYTES NFR BLD: 0.3 %
INR PPP: 1.12 (ref 0.86–1.14)
INTERPRETATION ECG - MUSE: NORMAL
LYMPHOCYTES # BLD AUTO: 1.1 10E9/L (ref 0.8–5.3)
LYMPHOCYTES NFR BLD AUTO: 12.3 %
MAGNESIUM SERPL-MCNC: 2.3 MG/DL (ref 1.6–2.3)
MCH RBC QN AUTO: 30.7 PG (ref 26.5–33)
MCHC RBC AUTO-ENTMCNC: 32.1 G/DL (ref 31.5–36.5)
MCV RBC AUTO: 96 FL (ref 78–100)
MONOCYTES # BLD AUTO: 1 10E9/L (ref 0–1.3)
MONOCYTES NFR BLD AUTO: 10.8 %
NEUTROPHILS # BLD AUTO: 6.7 10E9/L (ref 1.6–8.3)
NEUTROPHILS NFR BLD AUTO: 74.1 %
NRBC # BLD AUTO: 0 10*3/UL
NRBC BLD AUTO-RTO: 0 /100
O2/TOTAL GAS SETTING VFR VENT: 21 %
O2/TOTAL GAS SETTING VFR VENT: ABNORMAL %
O2/TOTAL GAS SETTING VFR VENT: ABNORMAL %
OXYHGB MFR BLDV: 66 %
OXYHGB MFR BLDV: 70 %
OXYHGB MFR BLDV: 75 %
PCO2 BLDV: 32 MM HG (ref 40–50)
PCO2 BLDV: 33 MM HG (ref 40–50)
PCO2 BLDV: 37 MM HG (ref 40–50)
PH BLDV: 7.44 PH (ref 7.32–7.43)
PH BLDV: 7.46 PH (ref 7.32–7.43)
PH BLDV: 7.48 PH (ref 7.32–7.43)
PLATELET # BLD AUTO: 134 10E9/L (ref 150–450)
PO2 BLDV: 36 MM HG (ref 25–47)
PO2 BLDV: 38 MM HG (ref 25–47)
PO2 BLDV: 41 MM HG (ref 25–47)
POTASSIUM SERPL-SCNC: 2.8 MMOL/L (ref 3.4–5.3)
POTASSIUM SERPL-SCNC: 3.1 MMOL/L (ref 3.4–5.3)
POTASSIUM SERPL-SCNC: 3.9 MMOL/L (ref 3.4–5.3)
PROT SERPL-MCNC: 6.8 G/DL (ref 6.8–8.8)
RBC # BLD AUTO: 4.04 10E12/L (ref 4.4–5.9)
SODIUM SERPL-SCNC: 142 MMOL/L (ref 133–144)
SODIUM SERPL-SCNC: 144 MMOL/L (ref 133–144)
SODIUM SERPL-SCNC: 144 MMOL/L (ref 133–144)
TROPONIN I SERPL-MCNC: 1.91 UG/L (ref 0–0.04)
VANCOMYCIN SERPL-MCNC: 16.9 MG/L
WBC # BLD AUTO: 9 10E9/L (ref 4–11)

## 2018-09-18 PROCEDURE — 25000132 ZZH RX MED GY IP 250 OP 250 PS 637: Performed by: STUDENT IN AN ORGANIZED HEALTH CARE EDUCATION/TRAINING PROGRAM

## 2018-09-18 PROCEDURE — 93010 ELECTROCARDIOGRAM REPORT: CPT | Performed by: INTERNAL MEDICINE

## 2018-09-18 PROCEDURE — 25000132 ZZH RX MED GY IP 250 OP 250 PS 637: Performed by: INTERNAL MEDICINE

## 2018-09-18 PROCEDURE — 97166 OT EVAL MOD COMPLEX 45 MIN: CPT | Mod: GO | Performed by: OCCUPATIONAL THERAPIST

## 2018-09-18 PROCEDURE — 84484 ASSAY OF TROPONIN QUANT: CPT | Performed by: INTERNAL MEDICINE

## 2018-09-18 PROCEDURE — 20600001 ZZH R&B ICU INTERMEDIATE UMMC

## 2018-09-18 PROCEDURE — 97535 SELF CARE MNGMENT TRAINING: CPT | Mod: GO | Performed by: OCCUPATIONAL THERAPIST

## 2018-09-18 PROCEDURE — 97110 THERAPEUTIC EXERCISES: CPT | Mod: GO | Performed by: OCCUPATIONAL THERAPIST

## 2018-09-18 PROCEDURE — 80048 BASIC METABOLIC PNL TOTAL CA: CPT | Performed by: INTERNAL MEDICINE

## 2018-09-18 PROCEDURE — 85610 PROTHROMBIN TIME: CPT | Performed by: INTERNAL MEDICINE

## 2018-09-18 PROCEDURE — 25000128 H RX IP 250 OP 636: Performed by: INTERNAL MEDICINE

## 2018-09-18 PROCEDURE — 25000125 ZZHC RX 250: Performed by: INTERNAL MEDICINE

## 2018-09-18 PROCEDURE — 80076 HEPATIC FUNCTION PANEL: CPT | Performed by: INTERNAL MEDICINE

## 2018-09-18 PROCEDURE — 85025 COMPLETE CBC W/AUTO DIFF WBC: CPT | Performed by: INTERNAL MEDICINE

## 2018-09-18 PROCEDURE — 99233 SBSQ HOSP IP/OBS HIGH 50: CPT | Mod: 25 | Performed by: INTERNAL MEDICINE

## 2018-09-18 PROCEDURE — 00000146 ZZHCL STATISTIC GLUCOSE BY METER IP

## 2018-09-18 PROCEDURE — 83735 ASSAY OF MAGNESIUM: CPT | Performed by: INTERNAL MEDICINE

## 2018-09-18 PROCEDURE — 40000225 ZZH STATISTIC SLP WARD VISIT

## 2018-09-18 PROCEDURE — 80202 ASSAY OF VANCOMYCIN: CPT | Performed by: INTERNAL MEDICINE

## 2018-09-18 PROCEDURE — 99221 1ST HOSP IP/OBS SF/LOW 40: CPT | Mod: GC | Performed by: INTERNAL MEDICINE

## 2018-09-18 PROCEDURE — 92610 EVALUATE SWALLOWING FUNCTION: CPT | Mod: GN

## 2018-09-18 PROCEDURE — 40000133 ZZH STATISTIC OT WARD VISIT: Performed by: OCCUPATIONAL THERAPIST

## 2018-09-18 PROCEDURE — 40000556 ZZH STATISTIC PERIPHERAL IV START W US GUIDANCE

## 2018-09-18 PROCEDURE — 97530 THERAPEUTIC ACTIVITIES: CPT | Mod: GO | Performed by: OCCUPATIONAL THERAPIST

## 2018-09-18 PROCEDURE — 92526 ORAL FUNCTION THERAPY: CPT | Mod: GN

## 2018-09-18 PROCEDURE — 82805 BLOOD GASES W/O2 SATURATION: CPT | Performed by: INTERNAL MEDICINE

## 2018-09-18 PROCEDURE — 40000893 ZZH STATISTIC PT IP EVAL DEFER

## 2018-09-18 PROCEDURE — 71045 X-RAY EXAM CHEST 1 VIEW: CPT

## 2018-09-18 PROCEDURE — 93005 ELECTROCARDIOGRAM TRACING: CPT

## 2018-09-18 RX ORDER — DEXTROSE MONOHYDRATE 25 G/50ML
25-50 INJECTION, SOLUTION INTRAVENOUS
Status: DISCONTINUED | OUTPATIENT
Start: 2018-09-18 | End: 2018-09-20 | Stop reason: HOSPADM

## 2018-09-18 RX ORDER — CAPTOPRIL 25 MG/1
25 TABLET ORAL 3 TIMES DAILY
Status: DISCONTINUED | OUTPATIENT
Start: 2018-09-18 | End: 2018-09-19

## 2018-09-18 RX ORDER — NICOTINE POLACRILEX 4 MG
15-30 LOZENGE BUCCAL
Status: DISCONTINUED | OUTPATIENT
Start: 2018-09-18 | End: 2018-09-20 | Stop reason: HOSPADM

## 2018-09-18 RX ORDER — ONDANSETRON 2 MG/ML
4 INJECTION INTRAMUSCULAR; INTRAVENOUS EVERY 6 HOURS PRN
Status: DISCONTINUED | OUTPATIENT
Start: 2018-09-18 | End: 2018-09-20 | Stop reason: HOSPADM

## 2018-09-18 RX ORDER — CAPTOPRIL 12.5 MG/1
12.5 TABLET ORAL ONCE
Status: COMPLETED | OUTPATIENT
Start: 2018-09-18 | End: 2018-09-18

## 2018-09-18 RX ORDER — CAPTOPRIL 12.5 MG/1
12.5 TABLET ORAL 3 TIMES DAILY
Status: DISCONTINUED | OUTPATIENT
Start: 2018-09-18 | End: 2018-09-18

## 2018-09-18 RX ORDER — CARVEDILOL 3.12 MG/1
3.12 TABLET ORAL 2 TIMES DAILY WITH MEALS
Status: DISCONTINUED | OUTPATIENT
Start: 2018-09-18 | End: 2018-09-20 | Stop reason: HOSPADM

## 2018-09-18 RX ORDER — HYDRALAZINE HYDROCHLORIDE 50 MG/1
50 TABLET, FILM COATED ORAL EVERY 8 HOURS SCHEDULED
Status: DISCONTINUED | OUTPATIENT
Start: 2018-09-18 | End: 2018-09-18

## 2018-09-18 RX ORDER — LOPERAMIDE HCL 2 MG
2 CAPSULE ORAL 4 TIMES DAILY PRN
Status: DISCONTINUED | OUTPATIENT
Start: 2018-09-18 | End: 2018-09-20 | Stop reason: HOSPADM

## 2018-09-18 RX ORDER — FUROSEMIDE 10 MG/ML
80 INJECTION INTRAMUSCULAR; INTRAVENOUS ONCE
Status: COMPLETED | OUTPATIENT
Start: 2018-09-18 | End: 2018-09-18

## 2018-09-18 RX ADMIN — TICAGRELOR 90 MG: 90 TABLET ORAL at 08:01

## 2018-09-18 RX ADMIN — CARVEDILOL 3.12 MG: 3.12 TABLET, FILM COATED ORAL at 18:32

## 2018-09-18 RX ADMIN — HEPARIN SODIUM 5000 UNITS: 5000 INJECTION, SOLUTION INTRAVENOUS; SUBCUTANEOUS at 23:30

## 2018-09-18 RX ADMIN — CAPTOPRIL 12.5 MG: 12.5 TABLET ORAL at 08:01

## 2018-09-18 RX ADMIN — VANCOMYCIN HYDROCHLORIDE 1500 MG: 10 INJECTION, POWDER, LYOPHILIZED, FOR SOLUTION INTRAVENOUS at 02:41

## 2018-09-18 RX ADMIN — POTASSIUM CHLORIDE 20 MEQ: 750 TABLET, EXTENDED RELEASE ORAL at 19:42

## 2018-09-18 RX ADMIN — POTASSIUM CHLORIDE 20 MEQ: 29.8 INJECTION, SOLUTION INTRAVENOUS at 08:09

## 2018-09-18 RX ADMIN — ONDANSETRON 4 MG: 2 INJECTION INTRAMUSCULAR; INTRAVENOUS at 15:50

## 2018-09-18 RX ADMIN — HYDRALAZINE HYDROCHLORIDE 75 MG: 50 TABLET ORAL at 22:00

## 2018-09-18 RX ADMIN — VANCOMYCIN HYDROCHLORIDE 1500 MG: 10 INJECTION, POWDER, LYOPHILIZED, FOR SOLUTION INTRAVENOUS at 15:16

## 2018-09-18 RX ADMIN — POTASSIUM CHLORIDE 20 MEQ: 29.8 INJECTION, SOLUTION INTRAVENOUS at 05:37

## 2018-09-18 RX ADMIN — CAPTOPRIL 25 MG: 25 TABLET ORAL at 14:07

## 2018-09-18 RX ADMIN — HYDRALAZINE HYDROCHLORIDE 50 MG: 50 TABLET ORAL at 06:17

## 2018-09-18 RX ADMIN — LOPERAMIDE HYDROCHLORIDE 2 MG: 2 CAPSULE ORAL at 23:34

## 2018-09-18 RX ADMIN — QUETIAPINE FUMARATE 25 MG: 25 TABLET ORAL at 00:41

## 2018-09-18 RX ADMIN — CAPTOPRIL 25 MG: 25 TABLET ORAL at 19:42

## 2018-09-18 RX ADMIN — FUROSEMIDE 80 MG: 10 INJECTION, SOLUTION INTRAVENOUS at 08:36

## 2018-09-18 RX ADMIN — QUETIAPINE FUMARATE 25 MG: 25 TABLET ORAL at 22:01

## 2018-09-18 RX ADMIN — CAPTOPRIL 12.5 MG: 12.5 TABLET ORAL at 11:28

## 2018-09-18 RX ADMIN — HEPARIN SODIUM 5000 UNITS: 5000 INJECTION, SOLUTION INTRAVENOUS; SUBCUTANEOUS at 08:10

## 2018-09-18 RX ADMIN — POTASSIUM CHLORIDE 40 MEQ: 1.5 POWDER, FOR SOLUTION ORAL at 15:12

## 2018-09-18 RX ADMIN — POTASSIUM CHLORIDE 20 MEQ: 29.8 INJECTION, SOLUTION INTRAVENOUS at 14:11

## 2018-09-18 RX ADMIN — TICAGRELOR 90 MG: 90 TABLET ORAL at 19:42

## 2018-09-18 RX ADMIN — LOPERAMIDE HYDROCHLORIDE 2 MG: 2 CAPSULE ORAL at 04:16

## 2018-09-18 RX ADMIN — PANTOPRAZOLE SODIUM 40 MG: 40 TABLET, DELAYED RELEASE ORAL at 08:07

## 2018-09-18 RX ADMIN — PIPERACILLIN AND TAZOBACTAM 4.5 G: 4; .5 INJECTION, POWDER, FOR SOLUTION INTRAVENOUS at 09:42

## 2018-09-18 RX ADMIN — HYDRALAZINE HYDROCHLORIDE 50 MG: 50 TABLET ORAL at 14:07

## 2018-09-18 RX ADMIN — ASPIRIN 81 MG CHEWABLE TABLET 81 MG: 81 TABLET CHEWABLE at 08:01

## 2018-09-18 RX ADMIN — HEPARIN SODIUM 5000 UNITS: 5000 INJECTION, SOLUTION INTRAVENOUS; SUBCUTANEOUS at 15:56

## 2018-09-18 RX ADMIN — ATORVASTATIN CALCIUM 80 MG: 80 TABLET, FILM COATED ORAL at 19:42

## 2018-09-18 RX ADMIN — PIPERACILLIN AND TAZOBACTAM 4.5 G: 4; .5 INJECTION, POWDER, FOR SOLUTION INTRAVENOUS at 04:16

## 2018-09-18 RX ADMIN — SODIUM NITROPRUSSIDE 0.25 MCG/KG/MIN: 25 INJECTION INTRAVENOUS at 07:57

## 2018-09-18 ASSESSMENT — ACTIVITIES OF DAILY LIVING (ADL)
ADLS_ACUITY_SCORE: 15
ADLS_ACUITY_SCORE: 14
ADLS_ACUITY_SCORE: 14
ADLS_ACUITY_SCORE: 13

## 2018-09-18 NOTE — PLAN OF CARE
Problem: Patient Care Overview  Goal: Plan of Care/Patient Progress Review  Outcome: Improving  D: STEMI  I/A: Pt AOx4 today. VSS. Nipride turned off this morning, MAP stable around 80's this afternoon. Lasix given this morning. Arterial, and central line discontinued today. Afebrile. Room air. Low fat diet, poor appetite. Haider pulled, pt voiding, see flowsheet for output. Pt walked in hallway with therapy today, tolerated well. 6C transfer orders placed.  P: Continue plan of care. Report to be given to oncoming RN.

## 2018-09-18 NOTE — PROGRESS NOTES
SPIRITUAL HEALTH SERVICES  SPIRITUAL ASSESSMENT Progress Note  [facility] ([place]) 4E     Pt had been seen per pt/family request by on-call  over the weekend. This was a follow-up 4E unit  visit with pt's spouse - spouse said this was not a good time to visit with pt as he was having cares done, but spouse came out of room to talk with me. Spouse shared that she feels pt is making good progress. Ongoing  support is appreciated.    PLAN:  Will check in on pt/family again later this week.    Kyler Escamilla) Kiana Lawrence M.Div., [facility]  Staff   Pager [phone]

## 2018-09-18 NOTE — PROGRESS NOTES
[facility] Cardiology Progress Note           Assessment and Plan:   62 year old with a PMHx of ICM (EF around 30%) from LAD MI s/p LAD PCI, HTN, DM2 who presented with anterior STEMI and cardiac arrest. Patient complained of chest pain at home and had a cardiac arrest. There was no bystander CPR, but EMS arrived and delivered one shock with ROSC. However, right before arrival, patient went into VT with pulse. Amio was given and patient was cardioverted. Patient then went to the cath lab where he was found to have proximal LAD STEMI in the previous stent and thrombus in the D1 ostium. Patient underwent STEVO of the LAD and POBA to D1. LVEDP was 40, so IABP was placed. Patient was given an additional Amio bolus, Lasix 160mg, started on Cangrelor gtt, ASA, and Brillenta. When patient was on the floor, he was in cardiogenic shock and required DPA and Epi for cardiac support.     SG was placed and showed patient to have elevated PCWP. Patient was started on Nitroprusside and low dose . Patient was able to be extubated. Currently, patient is hypertensive with mildly elevated biventricular filling pressures and normal cardiac index.     Plan:   1. Restart Nitroprusside given elevated PCWP (around 20) in order to allow oral medications time to work.  2. Increase Hydralazine to 50mg TID   3. Start Captopril 12.5 mg TID  4. Give intermittent Lasix 80mg doses to help decrease CVP and PCWP.     #Cardiogenic Shock c/b Cardiac Arrest from Ischemia   -EF around 23% with LAD WMA.   -Patient is now out of cardiogenic shock given his normal index and signs of end organ perfusion.    -Continue Vanc and Zosyn for likely 7 day course given fevers.    #Ischemic Cardiomyopathy   -Given that patient is more hemodynamically stable, will increase GDMT. Will increase Hydralazine to 50mg TID.   -Start Captopril 12.5mg TID given normal renal function.   -Lasix 80mg IV to help decrease CVP and PCWP. Will likely need low dose lasix to  discharge home.   -If BP is stable, will increase Captopril.   -Nitroprusside to help keep MAP <80 given elevated PCWP and recent intubation.     #Acute Respiratory Failure   -Likely 2/2 pulmonary edema and elevated PCWP. Once numbers were better controlled, patient was able to be extubated.   -PCWP is starting to increase with elevated BP.  -Afterload management as above.    #STEMI c/b Cardiac Arrest   -s/p LAD stent in the area of previous instent thrombosis. Patient's peak troponin was around 17. This is likely due to LAD territory being non-viable after initial MI years ago.   -ECHO showed unchanged EF around 23% with LAD WMA.  -Continue ASA and Brilinta.    #Fevers   -Likely due to post-arrest inflammation. Cultures have been negative.   -Continue Vanc/Zosyn. Given persistent fevers, would treat empirically for 7 days.     Javi Ortiz PGY-5  Cardiology Fellow   Pager: [phone]      Patient was seen by and the above plan discussed with Dr. Matos.     Subjective:     Patient was extubated yesterday. He is now alert and partially oriented. He is responsive to most questions.          Review of Systems:   A comprehensive review of systems was performed and found to be negative except as described in this note          Medications:     Current Facility-Administered Medications   Medication     acetaminophen (TYLENOL) solution 650 mg     acetaminophen (TYLENOL) tablet 325 mg     albuterol neb solution 2.5 mg     aspirin chewable tablet 81 mg     atorvastatin (LIPITOR) tablet 80 mg     captopril (CAPOTEN) tablet 12.5 mg     dexmedetomidine (PRECEDEX) 400 mcg in 0.9% sodium chloride 100 mL     dextrose 10 % 1,000 mL infusion     glucose gel 15-30 g    Or     dextrose 50 % injection 25-50 mL    Or     glucagon injection 1 mg     DOBUTamine (DOBUTREX) 1000 mg in dextrose 5% 250 mL (adult MAX CONC) premix     furosemide (LASIX) injection 80 mg     heparin sodium PF injection 5,000 Units     hydrALAZINE (APRESOLINE)  tablet 50 mg     insulin 1 unit/mL in saline (NovoLIN, HumuLIN Regular) drip - ADULT IV Infusion     loperamide (IMODIUM) capsule 2 mg     Medication Considerations - To maintain platelet inhibition after discontinuation of cangrelor (KENGREAL) [see admin instructions]     midazolam (VERSED) injection 1-3 mg     naloxone (NARCAN) injection 0.1-0.4 mg     nitroPRUsside (NIPRIDE) 50 mg, sodium thiosulfate 500 mg in D5W 125 mL IV infusion (conc: 0.4mg/mL)     pantoprazole (PROTONIX) 2 mg/mL suspension 40 mg     Patient is already receiving anticoagulation with heparin, enoxaparin (LOVENOX), warfarin (COUMADIN)  or other anticoagulant medication     piperacillin-tazobactam (ZOSYN) 4.5 g vial to attach to  mL bag     potassium chloride (KLOR-CON) Packet 20-40 mEq     potassium chloride 10 mEq in 100 mL intermittent infusion with 10 mg lidocaine     potassium chloride 10 mEq in 100 mL sterile water intermittent infusion (premix)     potassium chloride 20 mEq in 50 mL intermittent infusion     potassium chloride SA (K-DUR/KLOR-CON M) CR tablet 20-40 mEq     QUEtiapine (SEROquel) tablet 25 mg     senna-docusate (SENOKOT-S;PERICOLACE) 8.6-50 MG per tablet 1 tablet    Or     senna-docusate (SENOKOT-S;PERICOLACE) 8.6-50 MG per tablet 2 tablet     sodium chloride (PF) 0.9% PF flush 10 mL     ticagrelor (BRILINTA) tablet 90 mg     vancomycin (VANCOCIN) 1,500 mg in sodium chloride 0.9 % 250 mL intermittent infusion               Objective:   Temp: 99  F (37.2  C) Temp src: Bladder    Heart Rate: 100 Resp: 14 SpO2: 97 % O2 Device: Nasal cannula Oxygen Delivery: 3 LPM    Gen: No acute distress   HEENT: NC/AT   CV: Regular Rate and Rhythm   Pulm: Equal Chest Rise   Abdomen: s/nt/nd   Ext: No edema.           Data:     Lab Results   Component Value Date    WBC 9.0 09/18/2018    WBC 9.9 09/17/2018    WBC 9.7 09/17/2018    HGB 12.4 (L) 09/18/2018    HGB 12.8 (L) 09/17/2018    HGB 12.2 (L) 09/17/2018    HCT 38.6 (L) 09/18/2018     HCT 38.9 (L) 09/17/2018    HCT 38.1 (L) 09/17/2018     (L) 09/18/2018     (L) 09/17/2018     (L) 09/17/2018     09/18/2018     (H) 09/17/2018     09/17/2018    POTASSIUM 3.1 (L) 09/18/2018    POTASSIUM 3.4 09/17/2018    POTASSIUM 4.2 09/17/2018    CHLORIDE 111 (H) 09/18/2018    CHLORIDE 112 (H) 09/17/2018    CHLORIDE 110 (H) 09/17/2018    CO2 22 09/18/2018    CO2 29 09/17/2018    CO2 27 09/17/2018    BUN 19 09/18/2018    BUN 23 09/17/2018    BUN 20 09/17/2018    CR 0.94 09/18/2018    CR 1.30 (H) 09/17/2018    CR 1.23 09/17/2018     (H) 09/18/2018     (H) 09/17/2018     (H) 09/17/2018    TROPONIN 0.14 (HH) 09/13/2018    TROPI 1.914 (HH) 09/18/2018    TROPI 2.542 (HH) 09/17/2018    TROPI 3.324 (HH) 09/17/2018    AST 33 09/18/2018    AST 45 09/17/2018    AST 51 (H) 09/17/2018     (H) 09/18/2018     (H) 09/17/2018     (H) 09/17/2018    ALKPHOS 56 09/18/2018    ALKPHOS 59 09/17/2018    ALKPHOS 53 09/17/2018    BILITOTAL 1.8 (H) 09/18/2018    BILITOTAL 1.8 (H) 09/17/2018    BILITOTAL 1.7 (H) 09/17/2018    INR 1.12 09/18/2018    INR 1.13 09/17/2018    INR 1.25 (H) 09/16/2018

## 2018-09-18 NOTE — PLAN OF CARE
Problem: Patient Care Overview  Goal: Plan of Care/Patient Progress Review  4E - Per chart review and discussion with OT, Pt moving well with CGA, no unsteadiness or LOB noted, able to complete quick stop and look around in hallways without LOB. Pt ambulated 300' with CGA and no AD. OT following for cardiac rehab and education on precautions as appropriate. Pt with no skilled inpatient PT needs, Will complete consult and defer evaluation, please reconsult as appropriate if patient has decline in functional mobility requiring further skilled inpatient PT needs.

## 2018-09-18 NOTE — PLAN OF CARE
Problem: Patient Care Overview  Goal: Plan of Care/Patient Progress Review  Discharge Planner SLP   Patient plan for discharge: none stated  Current status: SLP: Clinical swallow eval completed per MD orders. Pt presents with minimal oropharyngeal dysphagia s/p extubation. Pt was only agreeable to small amount of PO trials. Pt tolerated thin liquids via straw, pureed textures, and regular solid textures with no overt s/sx of aspiration. Regular solid textures resulted in mildly prolonged but functional time for mastication. No evidence of significant oral residuals. Recommend pt initiate regular textures and thin liquids. Pt should be fully upright for all PO, take small single sips/bites, slow pacing, and alternate between consistencies. ST to continue to follow for 1-2 follow up sessions to assess diet tolerance. Anticipate pt will meet goals prior to discharge.   Barriers to return to prior living situation: none per ST  Recommendations for discharge: anticipate pt will meet goals prior to discharge  Rationale for recommendations: suspect pt is close to baseline swallow function        Entered by: Lesa Kaufman 09/18/2018 11:34 AM

## 2018-09-18 NOTE — PLAN OF CARE
Problem: Patient Care Overview  Goal: Plan of Care/Patient Progress Review  Discharge Planner OT   Patient plan for discharge: home  Current status: Pt ambulated 300ft w/o AD w/ CGA. Pt w/ no overt LOBs throughout. Pt's pre-vitals /87, HR 116bpm, Pt's HR up to 121bpm noted as higher per monitor throughout ambulation. After 100ft /98, HR 119bpm, Post vitals /94, HR 113bpm. After a few minutes vitals rechecked and /56, HR 105bpm. Pt asymptomatic beyond light fatigue throughout.   Barriers to return to prior living situation: medical status  Recommendations for discharge: Home w/ OP CR  Rationale for recommendations: to increase ind in ADLS/IADLS       Entered by: Tiffanie Smith 09/18/2018 11:51 AM

## 2018-09-18 NOTE — PROGRESS NOTES
"   09/18/18 1000   Quick Adds   Type of Visit Initial Occupational Therapy Evaluation   Living Environment   Lives With spouse;child(brijesh), dependent   Living Arrangements house   Home Accessibility stairs to enter home   Number of Stairs to Enter Home 5   Number of Stairs Within Home (1 flight to basement)   Transportation Available car;family or friend will provide   Self-Care   Dominant Hand right   Usual Activity Tolerance good   Current Activity Tolerance moderate   Equipment Currently Used at Home none   Functional Level Prior   Ambulation 0-->independent   Transferring 0-->independent   Toileting 0-->independent   Bathing 0-->independent   Dressing 0-->independent   Eating 0-->independent   Communication 0-->understands/communicates without difficulty   Swallowing 0-->swallows foods/liquids without difficulty   Cognition 0 - no cognition issues reported   Fall history within last six months no   Which of the above functional risks had a recent onset or change? none   General Information   Onset of Illness/Injury or Date of Surgery - Date 09/13/18   Referring Physician Chris Hernandez MD   Additional Occupational Profile Info/Pertinent History of Current Problem per chart pt \"62 year old with a PMHx of ICM (EF around 30%) from LAD MI s/p LAD PCI, HTN, DM2 who presented with anterior STEMI and cardiac arrest. Patient complained of chest pain at home and had a cardiac arrest. There was no bystander CPR, but EMS arrived and delivered one shock with ROSC. However, right before arrival, patient went into VT with pulse. Amio was given and patient was cardioverted. Patient then went to the cath lab where he was found to have proximal LAD STEMI in the previous stent and thrombus in the D1 ostium. Patient underwent STEVO of the LAD and POBA to D1. LVEDP was 40, so IABP was placed. Patient was given an additional Amio bolus, Lasix 160mg, started on Cangrelor gtt, ASA, and Brillenta. When patient was on the floor, he was in " "cardiogenic shock and required DPA and Epi for cardiac support. \"   Precautions/Limitations no known precautions/limitations   Cognitive Status Examination   Orientation orientation to person, place and time   Cognitive Comment OT: No concerns at this time   Visual Perception   Visual Perception No deficits were identified   Range of Motion (ROM)   ROM Comment OT: WFL   Strength   Strength Comments OT: Overall deconditioned 2/2 hospitalization   Muscle Tone Assessment   Muscle Tone Comments OT: WNL   Mobility   Bed Mobility Comments OT: Min A   Transfer Skills   Transfer Comments OT: CGA   Balance   Balance Comments OT: CGA in room and in stuart ambulation w/o AD   Instrumental Activities of Daily Living (IADL)   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   Activities of Daily Living Analysis   Impairments Contributing to Impaired Activities of Daily Living balance impaired;strength decreased   General Therapy Interventions   Planned Therapy Interventions ADL retraining;IADL retraining;balance training;strengthening;transfer training;home program guidelines;progressive activity/exercise   Clinical Impression   Criteria for Skilled Therapeutic Interventions Met yes, treatment indicated   OT Diagnosis decreased ind in ADLS/IADLS   Influenced by the following impairments generalized weakness    Assessment of Occupational Performance 3-5 Performance Deficits   Identified Performance Deficits decreased ind in ADLS/IADLS   Clinical Decision Making (Complexity) Moderate complexity   Therapy Frequency daily   Predicted Duration of Therapy Intervention (days/wks) 9/25/18   Anticipated Discharge Disposition Home with Assist;Home with Outpatient Therapy   Risks and Benefits of Treatment have been explained. Yes   Patient, Family & other staff in agreement with plan of care Yes   [facility] AM-PAC TM \"6 Clicks\"   2016, Trustees of [facility], under license to [employer].  All rights reserved.   6 " "Clicks Short Forms Daily Activity Inpatient Short Form   [facility] AM-PAC  \"6 Clicks\" Daily Activity Inpatient Short Form   1. Putting on and taking off regular lower body clothing? 4 - None   2. Bathing (including washing, rinsing, drying)? 4 - None   3. Toileting, which includes using toilet, bedpan or urinal? 4 - None   4. Putting on and taking off regular upper body clothing? 4 - None   5. Taking care of personal grooming such as brushing teeth? 4 - None   6. Eating meals? 4 - None   Daily Activity Raw Score (Score out of 24.Lower scores equate to lower levels of function) 24   Total Evaluation Time   Total Evaluation Time (Minutes) 5     "

## 2018-09-18 NOTE — PROGRESS NOTES
09/18/18 1127   General Information   Onset Date 09/13/18   Start of Care Date 09/18/18   Referring Physician Antoni Venegas MD   Patient Profile Review/OT: Additional Occupational Profile Info See Profile for full history and prior level of function   Patient/Family Goals Statement Pt did not state   Swallowing Evaluation Bedside swallow evaluation   Behaviorial Observations Alert   Mode of current nutrition NPO   Respiratory Status Room air   Comments 62 year old with a PMHx of ICM (EF around 30%) from LAD MI s/p LAD PCI, HTN, DM2 who presented with anterior STEMI and cardiac arrest. Patient complained of chest pain at home and had a cardiac arrest. There was no bystander CPR, but EMS arrived and delivered one shock with ROSC. However, right before arrival, patient went into VT with pulse. Amio was given and patient was cardioverted. Patient then went to the cath lab where he was found to have proximal LAD STEMI in the previous stent and thrombus in the D1 ostium. Patient underwent STEVO of the LAD and POBA to D1. LVEDP was 40, so IABP was placed. Patient was given an additional Amio bolus, Lasix 160mg, started on Cangrelor gtt, ASA, and Brillenta. When patient was on the floor, he was in cardiogenic shock and required DPA and Epi for cardiac support. Pt was extubated yestday 9/17 and RN reports good tolerance of thin liquids. Pt denies any trouble swallowing PTA. Clinical swallow eval completed per MD orders to further assess oropharyngeal swallow function.    Clinical Swallow Evaluation   Oral Musculature generally intact   Structural Abnormalities none present   Dentition present and adequate   Mucosal Quality adequate   Mandibular Strength and Mobility intact   Oral Labial Strength and Mobility WFL   Lingual Strength and Mobility WFL   Velar Elevation intact   Buccal Strength and Mobility intact   Laryngeal Function Cough;Throat clear;Swallow;Voicing initiated   Additional Documentation Yes   Clinical  Swallow Eval: Thin Liquid Texture Trial   Mode of Presentation, Thin Liquids straw;self-fed   Volume of Liquid or Food Presented 4 oz   Oral Phase of Swallow WFL   Pharyngeal Phase of Swallow intact   Diagnostic Statement Pt tolerated thin liquids via straw with no overt s/sx of aspiration    Clinical Swallow Eval: Puree Solid Texture Trial   Mode of Presentation, Puree spoon;self-fed   Volume of Puree Presented 3 tbsp   Oral Phase, Puree WFL   Pharyngeal Phase, Puree intact   Diagnostic Statement Pt tolerated pureed textures via spoon with no overt s/sx of aspiration    Clinical Swallow Eval: Solid Food Texture Trial   Mode of Presentation, Solid self-fed   Volume of Solid Food Presented 2 tbsp   Oral Phase, Solid other (see comments)  (prolonged but functional time for mastication)   Pharyngeal Phase, Solid intact   Diagnostic Statement Pt tolerated regular solid textures with no overt s/sx of aspiration. Pt required mildly prolonged but functioanl time for mastication   VFSS Evaluation   VFSS Additional Documentation No   FEES Evaluation   Additional Documentation No   Swallow Compensations   Swallow Compensations Alternate viscosity of consistencies;Pacing;Multiple swallow;Reduce amounts   Results Oral difficulties only   General Therapy Interventions   Planned Therapy Interventions Dysphagia Treatment   Dysphagia treatment Compensatory strategies for swallowing;Instruction of safe swallow strategies   Swallow Eval: Clinical Impressions   Skilled Criteria for Therapy Intervention Skilled criteria met.  Treatment indicated.   Functional Assessment Scale (FAS) 6   Treatment Diagnosis Minimal oropharyngeal dysphagia   Diet texture recommendations Regular diet;Thin liquids   Recommended Feeding/Eating Techniques alternate between small bites and sips of food/liquid;check mouth frequently for oral residue/pocketing;hard swallow w/ each bite or sip;maintain upright posture during/after eating for 30 mins;small  sips/bites   Demonstrates Need for Referral to Another Service occupational therapy;physical therapy   Therapy Frequency 3 times/wk   Predicted Duration of Therapy Intervention (days/wks) 1 week   Anticipated Discharge Disposition other (see comments)  (defer to PT/OT)   Risks and Benefits of Treatment have been explained. Yes   Patient, family and/or staff in agreement with Plan of Care Yes   Clinical Impression Comments SLP: Clinical swallow eval completed per MD orders. Pt presents with minimal oropharyngeal dysphagia s/p extubation. Pt was only agreeable to small amount of PO trials. Pt tolerated thin liquids via straw, pureed textures, and regular solid textures with no overt s/sx of aspiration. Regular solid textures resulted in mildly prolonged but functional time for mastication. No evidence of significant oral residuals. Recommend pt initiate regular textures and thin liquids. Pt should be fully upright for all PO, take small single sips/bites, slow pacing, and alternate between consistencies. ST to continue to follow for 1-2 follow up sessions to assess diet tolerance. Anticipate pt will meet goals prior to discharge.    Total Evaluation Time   Total Evaluation Time (Minutes) 8

## 2018-09-18 NOTE — CONSULTS
Cardiac Electrophysiology Consultation    Reason for consultation:  Consideration of primary prevention ICD for ischemic cardiomyopathy.  HPI:  Mr. Manriquez is a 63 yo male with an ischemic cardiomyopathy, LV EF 20-25% on echocardiography in 2012 following an MI in 2007, who was hospitalized with an anterior STEMI on 9/14/18 complicated by VT arrest and cardiogenic shock.  Coronary angiography showed an acute occlusion of the proximal LAD.  He underwent PCI of the culprit vessel and subsequently has done well - he is no longer in shock and has not had any NSVT or VT beyond 48 hours after his MI.  Echocardiography on 9/14 following PCI, on pressors and an IABP, showed an LV EF of <20%.    He reports feeling well prior to this hospitalization, performing some construction work, able to walk a block holding his infant grandson, and able to climb a flight of stairs without limitation.  He denied experiencing chest pain, increasing dyspnea or falling exercise capacity, palpitations, syncope, peripheral edema, and orthopnea.      No current facility-administered medications on file prior to encounter.   Current Outpatient Prescriptions on File Prior to Encounter:  atorvastatin (LIPITOR) 80 MG tablet Take 1 tablet (80 mg) by mouth daily Needs labs done prior to the next refill.   carvedilol (COREG) 6.25 MG tablet Take 1 tablet (6.25 mg) by mouth 2 times daily (with meals)   furosemide (LASIX) 20 MG tablet Take 2 tablets (40 mg) by mouth daily   losartan (COZAAR) 100 MG tablet Take 1 tablet (100 mg) by mouth daily   metFORMIN (GLUCOPHAGE-XR) 500 MG 24 hr tablet Take 1 tablet (500 mg) by mouth 2 times daily (with meals)   aspirin 325 MG tablet Take 325 mg by mouth daily.   MULTIPLE VITAMIN PO Take 1 tablet by mouth daily.     No Known Allergies    Past Medical History:   Diagnosis Date     Chronic infection     near rectum still leaking     Coronary artery disease      Diabetes mellitus, type 2 (H)      Heart attack  "4/17/2007     Hyperlipidemia      Hypertension      Stented coronary artery 2007     Past Surgical History:   Procedure Laterality Date     HERNIA REPAIR Right     Inguinal     IRRIGATION AND DEBRIDEMENT RECTUM, COMBINED      abcess near rectum      LAPAROSCOPIC CHOLECYSTECTOMY  3/29/2012    Procedure:LAPAROSCOPIC CHOLECYSTECTOMY; LAPAROSCOPIC CHOLECYSTECTOMY; Surgeon:MARILYNN PRESSLEY; Location:[facility] OR     STENT, CORONARY, OLIVIA       Family History   Problem Relation Age of Onset     Hypertension Mother      Diabetes Father      Glaucoma No family hx of      Macular Degeneration No family hx of      Social History     Social History     Marital status:      Spouse name: N/A     Number of children: N/A     Years of education: N/A     Occupational History     Not on file.     Social History Main Topics     Smoking status: Never Smoker     Smokeless tobacco: Never Used     Alcohol use No     Drug use: No     Sexual activity: Not on file     Other Topics Concern     Not on file     Social History Narrative       A complete review of systems is negative except as noted above in the HPI.    Physical Examination:  Vitals: /84  Pulse (!) 219  Temp 99.1  F (37.3  C) (Axillary)  Resp 18  Ht 1.676 m (5' 6\")  Wt 81.6 kg (179 lb 14.3 oz)  SpO2 94%  BMI 29.04 kg/m2  GENERAL APPEARANCE: comfortable appearing male lying in bed with unlabored breathing.  HEENT: no scleral icterus, no xanthelasmas  CHEST: lungs clear to auscultation  CARDIOVASCULAR:  RRR with normal s1 and s2.  No murmur or rub.  Warm extremities with no peripheral edema.  ABDOMEN: soft, not tender  NEURO: alert and fully oriented.  Intact short & long term memory.  PSYCH:  Pleasant & cooperative.  SKIN: no peripheral petechiae/ecchymoses, not jaundiced.      Labs, imaging, and procedures were reviewed:  Recent Labs   Lab Test  09/18/18   1631  09/18/18   1330  09/18/18   0429  09/17/18   1559   03/07/17   1721   CR  1.01  0.86  0.94  1.30*   < >   -- "    BUN  20  17  19  23   < >   --    TSH   --    --    --    --    --   2.44   HGB   --    --   12.4*  12.8*   < >   --    INR   --    --   1.12   --    < >   --     < > = values in this interval not displayed.     Echocardiogram, limited 9/14/18:  STEMI s/p prox-mid LAD PCI c/b cardiogenic shock and vasoplegia. Bedside echo performed in ICU on IABP 1:1, NE and Epi.     LV function is severely reduced, LVEF <20%.  RV function cannot be assessed.  Mild MR.  No pericardial effusion.     Echocardiogram, complete 9/14/18:  Ischemic cardiomyopathy, LVEF=23% with extensive regional wall motion abnormalities as described below. Resting wall motion abnormalities and LV function are not significantly changed from the rest images on the 11/27/12 stress echocardiogram.     Mildly dilated LV with severely reduced LV function, LVEF=23%. There is akinesis involving the [id number] anteroseptal, [id number] anterior, mid anterolateral, and all apical myocardial segments. The basal anterior, basal anterolateral, and basal inferolateral segments are relatively spared.  RV size and function are normal.  No significant valvular abnormalities.  No pericardial effusion.  Normal IVC with abnormal respiratory variation, estimated RA pressure 8 mmHg.    EKG today showed sinus tachycardia at 112 bpm, IVCD with QRS of 130-140 msec, and evidence of prior anterior infarction without acute ischemic changes.  QTc was 510 msec without accounting for the IVCD.        Impression:  63 yo male with long-standing ischemic cardiomyopathy, LV EF 25-30% on optimal medical therapy therapy prior to admission, probably NYHA class I-II, who presented with an anterior STEMI on 9/14, went into VT arrest with a monomorphic VT at 220 bpm which spontaneously terminated, underwent coronary angiography and stenting of an acute proximal LAD occlusion, and has been recovering well since.  We were consulted to provide an opinion on whether he meets indication for a  primary prevention ICD and/or CRT-D now on the basis of his LV EF prior to the current MI (and his current conduction delay).     Recommendations:  Although he would have met indication for ICD implantation prior to his presentation with an MI, and very likely stands to benefit from ICD implantation for primary prevention of sudden cardiac death, the 2017 HRS guidelines on use of implantable devices to manage ventricular arrhythmias recommend waiting until 40 days following an MI or 90 days following revascularization, on the basis of two randomized controlled trials that showed no survival benefit from ICDs implanted sooner after MI or revascularization.  While this may be partially attributed to some of those patients recovering LV function with medical therapy and thus not needing ICD therapy, the observed result may also be due to an attenuated impact of ICDs on all cause mortality in the short term following myocardial infarctions.  We therefore recommend eventual discharge with a LifeVest and follow up in EP clinic 3 months after discharge to re-evaluate LV EF on echo and discuss ICD implantation.  We did review the indication for ICD, procedural details and risks, and alternatives to ICD implantation.  He is leaning toward having an ICD implanted eventually.    He has a weak indication for CRT based on his QRS between 120-150 and his IVCD.  We will repeat an EKG when he returns to clinic and make a determination on whether he will benefit from a CRT-D device or ICD alone.    It was our pleasure to assist in Mr. Manriquez's care.  Please page [phone] with any questions.    I discussed the patient with Dr. Mick Mckeon.    Arjun Colindres MD  Cardiovascular disease fellow    EP STAFF NOTE  Patient seen and examined and management plan personally reviewed with the patient. I agree with the note above by the CV/EP fellow.  Mick Mckeon MD [facility]  Cardiology - Electrophysiology

## 2018-09-18 NOTE — PLAN OF CARE
Problem: Patient Care Overview  Goal: Plan of Care/Patient Progress Review  Outcome: Improving  DX: STEMI    Neuro: Alert, confused at times about time and situation. Able to move self in bed with little assistance.  CV: SR 80-90's, map to be between 65-80, hydralazine PO increased, Seroquel given for anxiousness and unable to sleep. Nipride restarted per MD's advice. Increased PO hydralazine again this AM. Afebrile. See caridad flow sheets.  Pulm: 2-3L NC, LS clear.   GI: Tolerating water with pills. Numerous loose BM's, incontinent. C.Diff negative. Giving Imodium PRN.   : Haider in place with adequate UOP.  IV: RIJ with swan at 58. Left arterial. Right PIV SL. Insulin gtt off at 2200, Nipride started at 0145.   Skin: right shin abrasion, right groin site from IABP.  Pain: No complaints.    Plan: Increase BP medications, wean off Nipride. Given potassium for 3.1 result. Remove swan if able and transfer to floor.

## 2018-09-18 NOTE — PROGRESS NOTES
Antimicrobial Stewardship Team Note    Antimicrobial Stewardship Program - A joint venture between [facility] and  Physicians to optimize antibiotic management.  NOT a formal consult - Restricted Antimicrobial Review     Patient: Anson Manriquez  MRN: [id number]  Allergies: Review of patient's allergies indicates no known allergies.    Brief Summary:   DG is a 62 year old with a PMHx of ICM (EF around 30%) from LAD MI s/p LAD PCI, HTN, and DM2 who presented with anterior STEMI and cardiac arrest. Patient complained of chest pain at home and had a cardiac arrest. There was no bystander CPR, but EMS arrived and delivered one shock with ROSC. Right before arrival, patient went into VT with pulse. Amio was given and patient was cardioverted. Patient then went to the cath lab where he was found to have proximal LAD STEMI in the previous stent and thrombus in the D1 ostium. Patient underwent STEVO of the LAD and POBA to D1. IABP was placed. When patient was on the floor, he was in cardiogenic shock and required dopa and epinephrine for cardiac support. On 9/14 pt was initiated on vancomycin and piperacillin-tazobactam for possible aspiration pneumonia and distibutive shock (Tmax 100.9). Blood cultures were drawn after initiation of antibiotics. Pt now extubated and off pressors, and is hypertensive on nitroprusside. Has remained afebrile. WBC cound has decreased from 13.6 on 9/13 to 9.0 on 9/18. Lactate has also downtrended from 4.1 on 9/13 to 1.4 on 9/17. A urinalysis from 9/15 had >182 RBCs and WBCs, large LE, (-) nitrites. Culture data including blood, sputum, and urine cultures as well as a respiratory virus panel and C.diff toxin PCR were negative. MRSA nares swab is negative. CT Chest from 9/14 showed moderate dependent pulmonary opacities suggesting moderate atelectasis, versus aspiration or less likely infection. CXR from 9//15 showed stable to slightly decreased biperihilar opacities suggesting  improving atelectasis or pulmonary edema.         Active Anti-infective Medications                Start     Stop      09/16/18 1500  vancomycin (VANCOCIN) injection  1,500 mg,   Intravenous,   EVERY 12 HOURS     Aspiration Pneumonia        --    09/14/18 0300  piperacillin-tazobactam  4.5 g,   Intravenous,   EVERY 6 HOURS     Aspiration Pneumonia        --          Assessment:   #Aspiration PNA i/s/o cardiac arrest: Given downtrending WBC count, negative culture data, and imaging findings suggesting atelectasis over infection, would favor discontinuation of broad spectrum antibiotics at this time.      Recommendations:  1. Discontinue piperacillin-tazobactam  2. Discontinue vancomycin     Discussed with ID Staff: Luci Banegas MD; Jerilyn Bowels, PharmD        Eleanor Broadbent, PharmD, [facility]  PGY-2 Infectious Diseases Pharmacy Resident  Pager: [phone]  Ext: [phone]    Vital Signs/Clinical Features:  Vitals       09/16 0700  -  09/17 0659 09/17 0700  -  09/18 0659 09/18 0700  -  09/18 1513   Most Recent    Temp ( F) 98.4 -  101.3    98.2 -  102    99 -  99.1     99.1 (37.3)    Heart Rate 86 -  110    71 -  116    100 -  115     109    Resp 12 -  19    12 -  (!)33      18     18    BP     121/74 -  164/84     164/84    SpO2 (%) (!)89 -  98    90 -  98    94 -  97     95          Labs  Estimated Creatinine Clearance: 89.3 mL/min (based on Cr of 0.86).  Recent Labs   Lab Test  09/16/18   0355  09/16/18   1538  09/17/18   0350  09/17/18   1559  09/18/18   0429  09/18/18   1330   CR  1.32*  1.18  1.23  1.30*  0.94  0.86       Recent Labs   Lab Test  09/15/18   1621  09/16/18   0355  09/16/18   1538  09/17/18   0350  09/17/18   1559  09/18/18   0429   WBC  9.7  11.0  8.9  9.7  9.9  9.0   ANEU  7.5  8.3  6.7  7.1  7.2  6.7   ALYM  1.2  1.4  0.9  1.3  1.3  1.1   YENNI  1.0  1.2  1.2  1.1  1.2  1.0   AEOS  0.0  0.1  0.1  0.2  0.2  0.2   HGB  12.4*  12.3*  11.5*  12.2*  12.8*  12.4*   HCT  38.0*  38.2*  36.0*  38.1*  38.9*   38.6*   MCV  95  95  97  95  95  96   PLT  156  157  148*  145*  133*  134*       Recent Labs   Lab Test  09/15/18   1621  09/16/18   0355  09/16/18   1538  09/17/18   0350  09/17/18   1559  09/18/18   0429   BILITOTAL  1.5*  2.0*  1.4*  1.7*  1.8*  1.8*   ALKPHOS  46  51  47  53  59  56   ALBUMIN  3.1*  3.2*  3.0*  3.4  3.2*  3.0*   AST  129*  94*  67*  51*  45  33   ALT  240*  205*  169*  149*  128*  100*       Recent Labs   Lab Test  09/14/18   1446  09/14/18   2201  09/15/18   0001  09/15/18   0355  09/15/18   1810  09/17/18   0201   LACT  1.1  1.0  1.1  1.0  0.9  1.4       Recent Labs   Lab Test  09/18/18   1330   VANCOMYCIN  16.9       Culture Results:  7-Day Micro Results     Procedure Component Value Units Date/Time    Clostridium difficile toxin B PCR [[id number]] Collected:  09/17/18 2241    Order Status:  Completed Lab Status:  Final result Updated:  09/17/18 2349    Specimen:  Feces      Specimen Description Feces     C Diff Toxin B PCR Negative      Negative: Clostridium difficile target DNA sequences NOT detected, presumed   negative for Clostridium difficile toxin B or the number of bacteria present   may be below the limit of detection for the test.  FDA approved assay performed using [employer] GeneXpert real-time PCR.  A negative result does not exclude actual disease due to Clostridium difficile   and may be due to improper collection, handling and storage of the specimen   or the number of organisms in the specimen is below the detection limit of the   assay.         Sputum Culture Aerobic Bacterial [[id number]] Collected:  09/15/18 1049    Order Status:  Completed Lab Status:  Final result Updated:  09/17/18 0913    Specimen:  Sputum      Specimen Description Sputum     Special Requests Endotracheal     Culture Micro No growth    Respiratory Virus Panel by PCR [[id number]] Collected:  09/15/18 1046    Order Status:  Completed Lab Status:  Final result Updated:  09/16/18 1359    Specimen:  Nasopharyngeal       Respiratory Virus Source Nasopharyngeal     Influenza A Negative     Influenza A, H1 Negative     Influenza A, H3 Negative     Influenza A 2009 H1N1 Negative     Influenza B Negative     Respiratory Syncytial Virus A Negative     Respiratory Syncytial Virus B Negative     Parainfluenza Virus 1 Negative     Parainfluenza Virus 2 Negative     Parainfluenza Virus 3 Negative     Human Metapneumovirus Negative     Human Rhinovirus Negative     Adenovirus Species B/E Negative     Adenovirus Species C Negative     Respiratory Virus Comment See Comment Below         The [employer] Respiratory Viral Panel (RVP) is a qualitative, multiplex,   diagnostic test for simultaneous detection and identification of multiple   respiratory viral nucleic acids in individuals exhibiting signs and symptoms   of respiratory infection. It uses innovative eSensor technology to provide   sensitive and specific respiratory virus detection.  The test detects Influenza A (H1 and H3), Influenza A 2009 H1N1, Influenza B,   Respiratory Syncytial Virus A, Respiratory Syncytial Virus B, Parainfluenza   Virus (1, 2 and 3), Human Metapneumovirus, Human Rhinovirus (HRV), Adenovirus   B/E and Adenovirus C.  The assay has received FDA approval for the testing of nasopharyngeal (NP)   swabs only. The [facility] Diagnostic Laboratory at [facility], [place], has validated the performance   characteristics of the [employer] Respiratory Viral Panel for NP swabs, bronchial   alveolar lavage/wash, nasopharyngeal aspirate/wash and nasal wash.          Blood culture [[id number]] Collected:  09/15/18 1001    Order Status:  Completed Lab Status:  Preliminary result Updated:  09/18/18 0315    Specimen:  Blood      Specimen Description Blood Left Hand     Special Requests Received in aerobic bottle only     Culture Micro No growth after 3 days    Blood culture [[id number]] Collected:  09/15/18 0954    Order Status:  Completed Lab Status:   Preliminary result Updated:  09/18/18 0315    Specimen:  Blood      Specimen Description Blood Right Hand     Special Requests Received in aerobic bottle only     Culture Micro No growth after 3 days    Urine Culture Aerobic Bacterial [[id number]] Collected:  09/15/18 0908    Order Status:  Completed Lab Status:  Final result Updated:  09/16/18 2114    Specimen:  Catheterized Urine      Specimen Description Catheterized Urine     Special Requests Specimen received in preservative     Culture Micro No growth    Blood culture     Order Status:  Canceled Lab Status:  No result     Specimen:  Blood     Blood culture     Order Status:  Canceled Lab Status:  No result     Specimen:  Blood     Sputum Culture Aerobic Bacterial [[id number]] Collected:  09/14/18 2223    Order Status:  Completed Lab Status:  Final result Updated:  09/16/18 1048    Specimen:  Sputum      Specimen Description Sputum     Culture Micro Light growth  Normal ivett      Gram stain [[id number]] Collected:  09/14/18 2223    Order Status:  Completed Lab Status:  Final result Updated:  09/14/18 2352    Specimen:  Sputum      Specimen Description Sputum Endotracheal     Gram Stain >25 PMNs/low power field      No organisms seen    Blood culture [] Collected:  09/14/18 0504    Order Status:  Completed Lab Status:  Preliminary result Updated:  09/18/18 0314    Specimen:  Blood      Specimen Description Blood Left Hand     Special Requests Received in aerobic bottle only     Culture Micro No growth after 4 days    Blood culture [] Collected:  09/14/18 0456    Order Status:  Completed Lab Status:  Preliminary result Updated:  09/18/18 0314    Specimen:  Blood      Specimen Description Blood Right Hand     Special Requests Received in aerobic bottle only     Culture Micro No growth after 4 days    Methicillin Resist/Sens S. aureus PCR [[id number]] Collected:  09/14/18 0108    Order Status:  Completed Lab Status:  Final result Updated:  09/14/18 0344    Specimen:   Nares      Specimen Description Nares     Methicillin Resist/Sens S. aureus PCR Negative      MRSA Negative: SA Negative  MRSA and Staphylococcus aureus target DNA not   detected, presumed negative for MRSA and SA colonization or the number of   bacteria present may be below the limit of detection for the assay. FDA   approved assay performed using [employer] GeneXpert(R) real-time PCR.               Recent Labs   Lab Test  03/04/12   0225  09/14/18   0535  09/15/18   0908   URINEPH  7.0  5.0  5.5   NITRITE  Negative  Negative  Negative   LEUKEST  Negative  Negative  Large*   WBCU  1  3  >182*             Recent Labs   Lab Test  09/15/18   1046   RVSPEC  Nasopharyngeal   IFLUA  Negative   FLUAH1  Negative   FLUAH3  Negative   QC4511  Negative   IFLUB  Negative   RSVA  Negative   RSVB  Negative   PIV1  Negative   PIV2  Negative   PIV3  Negative   HMPV  Negative   HRVS  Negative   ADVBE  Negative   ADVC  Negative       Recent Labs   Lab Test  09/17/18   2241   CDBPCT  Negative       Imaging: Xr Chest 1 View    Result Date: 9/16/2018  EXAM: XR CHEST 1 VW  9/16/2018 4:04 AM  HISTORY: intubated; COMPARISON: 9/15/2018 FINDINGS: AP image of the chest. Endotracheal tube tip projects 4.2 cm from the avani. Right IJ [place]Susan catheter tip projects over the right pulmonary artery. Stable position of intra-aortic balloon pump marker. Enteric tube courses down the margins of the film. The trachea is midline. The cardiac silhouette is enlarged. No pleural effusion or pneumothorax. Slightly decreased biperihilar opacities and right upper lobe opacities, especially given degree of underaeration. Upper abdomen is unremarkable. Cholecystectomy clips. No acute bony findings.     IMPRESSION: 1. Stable support devices. 2. Low lung volumes with decreasing biperihilar opacities compatible with improving atelectasis and/or pulmonary edema. 3. Stable basilar opacities, likely atelectasis. I have personally reviewed the examination and initial  interpretation and I agree with the findings. RUBEN MEDEIROS MD    Xr Chest 1 View    Result Date: 9/15/2018  EXAM: XR CHEST 1 VW  9/15/2018 3:55 AM  HISTORY: intubated; COMPARISON: 9/14/2018 FINDINGS: AP image of the chest. Endotracheal tube tip projects 6.3 cm from the avani. Right IJ [place]-Susan catheter tip projects over the right pulmonary artery. Enteric tube courses beyond the margins of the film. Unchanged position of intra-aortic balloon pump marker. The trachea is midline. The cardiac silhouette is mildly enlarged. Shallow inspiration. Stable to slightly decreased biperihilar opacities. Upper abdomen is unremarkable. No acute bony findings.     IMPRESSION: 1. Stable support devices. 2. Stable to slightly decreased biperihilar opacities suggesting improving atelectasis or pulmonary edema. I have personally reviewed the examination and initial interpretation and I agree with the findings. RUBEN MEDEIROS MD    Us Lower Extremity Venous Duplex Bilateral    Result Date: 9/15/2018  EXAMINATION: DOPPLER VENOUS ULTRASOUND OF BILATERAL LOWER EXTREMITIES, 9/15/2018 8:02 AM COMPARISON: None available. HISTORY: Hypoxia and concern for pulmonary embolism. TECHNIQUE:  Gray-scale evaluation with compression, spectral flow and color Doppler assessment of the deep venous system of both legs from groin to knee, and then at the ankles. FINDINGS: In both lower extremities, the femoral, popliteal and posterior tibial veins demonstrate normal compressibility and blood flow. The left common femoral vein demonstrates normal compressibility and blood flow. The right common femoral vein is not visualized due to overlying bandages.     IMPRESSION: No evidence of deep vein thrombosis in either lower extremity although the right common femoral vein is not visualized. I have personally reviewed the examination and initial interpretation and I agree with the findings. RUBEN MEDEIROS MD    Xr Chest Port 1 View    Result Date:  9/18/2018  Exam: 1 view chest x-ray, 9/18/2018 5:46 AM Indication: Intubated Comparison: 9/17/2018 Findings: Portable AP view of the chest. Interval extubation. Right IJ central venous catheter tip in the right pulmonary artery. Interval removal of the gastric tube. Improved lung volumes decreased basilar atelectasis. No pneumothorax or substantial pleural fluid. The heart size is unchanged.     Impression: Interval extubation with decreased bibasilar atelectasis. I have personally reviewed the examination and initial interpretation and I agree with the findings. HILDA CORTES MD    Xr Chest Port 1 View    Result Date: 9/17/2018  XR CHEST PORT 1 VW  9/17/2018 2:09 AM  HISTORY: ett; COMPARISON: 9/16/2018 FINDINGS: Portable AP view the chest. Endotracheal tube tip in the mid thoracic trachea. Right IJ central venous catheter tip projects over the right pulmonary artery. Gastric tube coursing below the field-of-view of this study. Streaky bibasilar opacities. No pneumothorax or substantial pleural fluid. Heart size is normal.     IMPRESSION: 1. Interval removal of the intra-aortic balloon pump. Otherwise stable support devices. 2. Streaky bibasilar atelectasis or infection. I have personally reviewed the examination and initial interpretation and I agree with the findings. LASHAUN AHN MD    Xr Chest Port 1 View    Result Date: 9/14/2018  Exam: XR CHEST PORT 1 VW, 9/14/2018 8:00 PM Indication: PA Catheter Positioning, IABP Positioning; Comparison: Earlier same day Findings: Single AP view of the chest. Stable positioning of the endotracheal tube in the mid thoracic trachea. The intra-aortic balloon pump marker now projects within the aortic arch. The right IJ [place]-Susan catheter has been retracted, and is now likely in the main right pulmonary artery. Enteric tube courses below the diaphragm, with tip collimated out of the field-of-view. The trachea is midline. The cardiomediastinal silhouette is unchanged. Stable  perihilar predominant airspace opacities and bibasilar atelectasis. No substantial pleural effusion or pneumothorax.     Impression: 1. Right IJ [place]-Susan catheter tip has been retracted, and is now in appropriate position within the right main pulmonary artery. 2. Improved positioning of the intra-aortic balloon pump, now with radiopaque marker projecting at the aortic arch. 3. Unchanged perihilar airspace opacities, favored to represent pulmonary edema versus infection. 4. Persistent low lung volumes and bibasilar linear opacities, favored to represent atelectasis. I have personally reviewed the examination and initial interpretation and I agree with the findings. LASHAUN AHN MD    Xr Chest Port 1 View    Result Date: 9/14/2018  Exam: XR CHEST PORT 1 VW, 9/14/2018 5:30 PM Indication: [place] susan placed, evaluate position; Comparison: Earlier same day Findings: Single AP view of the chest. Stable positioning of endotracheal tube in the mid thoracic trachea. Stable positioning of intra-aortic balloon pump, low in position approximately 7 cm below the aortic arch, this likely is including the renal arteries.. Right IJ [place]-Susan catheter has been placed, with tip in the right interlobar pulmonary artery. Enteric tube courses below the diaphragm, tip collimated out of the field-of-view. The trachea is midline. The cardiomediastinal silhouette is unchanged. No substantial change in perihilar predominant airspace opacities and bibasilar atelectasis. No substantial pleural effusion or pneumothorax.     Impression: 1. Right IJ [place]-Susan catheter tip projects in the interlobar right pulmonary artery. Recommend retraction about a centimeter. 2. Unchanged bilateral perihilar airspace opacities, favored to represent pulmonary edema versus infection. 3. IABP device should be repositioned as the tip is about 7 cm below the aortic arch and there is likely renal artery partial obstruction. 4. Unchanged low lung volumes with  bibasilar linear opacities, favored to represent atelectasis. I have personally reviewed the examination and initial interpretation and I agree with the findings. LASHAUN AHN MD    Xr Chest Port 1 View    Result Date: 9/14/2018  Exam: XR CHEST PORT 1 VW, 9/14/2018 10:38 AM Indication: increased fio2; Comparison: 9/14/2018 Findings: AP view of the chest. Endotracheal tube tip projects 5.2 cm above the avani. Gastric tube tip courses beyond field-of-view. Aortic balloon pump marker projects 1 cm below the avani. The trachea is midline. The cardiomediastinal silhouette is enlarged but stable. Pulmonary vasculature is indistinct. Low lung volumes. Improved bilateral perihilar airspace opacities. Bibasilar atelectasis. No pneumothorax.     Impression: 1. Intra-aortic balloon pump marker is approximately 1 cm to low, consider advancing. 2. Improved bilateral perihilar airspace opacities, favor resolving pulmonary edema versus infection. 3. Low lung volumes with bibasilar linear opacities, likely representing atelectasis. I have personally reviewed the examination and initial interpretation and I agree with the findings. BETO MENDOZA MD    Xr Chest Port 1 View    Result Date: 9/14/2018  EXAM: XR CHEST PORT 1 VW  9/14/2018 12:50 AM  HISTORY: intubated; COMPARISON: CT CAP 9/14/2018 FINDINGS: AP supine image of the chest. Endotracheal tube tip projects 3.5 cm from the avani. Intra-aortic balloon pump. Enteric tube courses down the margins of the film. The trachea is midline. The cardiac silhouette is enlarged. No pleural effusion or pneumothorax. Patchy bibasilar right midlung airspace opacities, correlating with the dependent opacities seen on earlier CT. Upper abdomen is unremarkable.     IMPRESSION: 1. Endotracheal tube tip projects 3.5 cm from the avani. 2. Patchy airspace opacities bilaterally suggesting atelectasis, versus aspiration or infection. 3. IABP tip is approximately 2 cm too low. Consider advancing  1.5 cm. I have personally reviewed the examination and initial interpretation and I agree with the findings. LASHAUN AHN MD    Ct Chest Abdomen Pelvis W/o Contrast    Result Date: 9/14/2018  EXAMINATION: CT CHEST ABDOMEN PELVIS W/O CONTRAST, 9/14/2018 12:57 AM TECHNIQUE:  Helical CT images from the thoracic inlet through the symphysis pubis were obtained without IV contrast. COMPARISON: CT abdomen 9/6/2005, chest x-ray 3/4/2012 HISTORY: Post cardiac arrest FINDINGS: CHEST: LUNGS: Patchy dependent consolidative opacities in the lung bases bilaterally with air bronchograms and mild peribronchial cuffing. No pleural effusion or pneumothorax. Endotracheal tube tip terminates about 3.3 cm from the avani. MEDIASTINUM: Cardiomegaly. Intra-aortic balloon pump. Stent in the left anterior descending artery. Apical myocardial calcifications in the left ventricle. No pericardial effusion. Normal caliber of the thoracic aorta and main pulmonary artery. No enlarged mediastinal lymph nodes. CHEST WALL: No enlarged axillary lymph nodes. Chest wall soft tissues are unremarkable. ABDOMEN/PELVIS: LIVER: Geographic hypoattenuation throughout the left lobe and posterior right lobe segments. BILIARY: Status post cholecystectomy. No biliary ductal dilatation. PANCREAS: Within normal limits. SPLEEN: Within normal limits. ADRENAL GLANDS: Within normal limits. URINARY TRACT: Increased attenuation of the renal cortices bilaterally with patchy areas of scarring. Calcifications in the calyceal tips. No obstructing stone identified. No hydronephrosis. Bladder is decompressed with Haider catheter present. REPRODUCTIVE ORGANS: Within normal limits. GI/PERITONEUM: Normal caliber of the small large bowel. Small periumbilical hernia which contains fat and fluid with 1.9 cm neck. Large left inguinal hernia containing loops of nondilated small bowel. No peritoneal fluid collection. No retroperitoneal fluid collection or hematoma. VESSELS: Right  femoral line tip terminates in the right common iliac vein. Moderate aortoiliac atherosclerotic disease. LYMPH NODES: Mildly conspicuous nonenlarged retroperitoneal lymph nodes. No enlarged lymph nodes identified. BONES/SOFT TISSUES: No acute fracture. Multilevel degenerative changes of the spine.     IMPRESSION: 1. Moderate dependent pulmonary opacities suggesting moderate atelectasis, versus aspiration or less likely infection. 2. Slightly low intra-aortic balloon pump, which extends below the renal arteries. This could be raised 2 cm. 3. No hematoma or fluid collection in the chest, abdomen, or pelvis. 4. Retained contrast in the kidneys bilaterally suggesting shocked kidney. 5. Hepatic steatosis. 6. Incidental large left inguinal hernia containing loops of nonobstructed small bowel.  I have personally reviewed the examination and initial interpretation and I agree with the findings. LASHAUN AHN MD    Ct Head W/o Contrast    Result Date: 9/14/2018  CT HEAD W/O CONTRAST 9/14/2018 1:06 AM Provided History: cardiac arrest, now stable, coming from cath lab; Comparison: None.. Technique: Using multidetector thin collimation helical acquisition technique, axial, coronal and sagittal CT images from the skull base to the vertex were obtained without intravenous contrast. Findings:  No intracranial hemorrhage, mass effect, or midline shift. The ventricles are proportionate to the cerebral sulci. The gray to white matter differentiation of the cerebral hemispheres is preserved. The basal cisterns are patent. Prominence of the extra-axial space in the posterior cranial fossa, possibly a thinly walled arachnoid cyst with small septations (series 3, image 23). Bilateral medial antrectomies. Bilaterally there is moderate mucosal thickening in the maxillary sinuses... The mastoid air cells are clear.      Impression: No acute intracranial pathology. I have personally reviewed the examination and initial interpretation and I  agree with the findings. ATIF HUMMEL MD

## 2018-09-19 ENCOUNTER — APPOINTMENT (OUTPATIENT)
Dept: OCCUPATIONAL THERAPY | Facility: CLINIC | Age: 63
DRG: 270 | End: 2018-09-19
Payer: COMMERCIAL

## 2018-09-19 ENCOUNTER — APPOINTMENT (OUTPATIENT)
Dept: SPEECH THERAPY | Facility: CLINIC | Age: 63
DRG: 270 | End: 2018-09-19
Payer: COMMERCIAL

## 2018-09-19 LAB
ALBUMIN SERPL-MCNC: 3.4 G/DL (ref 3.4–5)
ALP SERPL-CCNC: 60 U/L (ref 40–150)
ALT SERPL W P-5'-P-CCNC: 81 U/L (ref 0–70)
ANION GAP SERPL CALCULATED.3IONS-SCNC: 8 MMOL/L (ref 3–14)
ANION GAP SERPL CALCULATED.3IONS-SCNC: 8 MMOL/L (ref 3–14)
AST SERPL W P-5'-P-CCNC: 26 U/L (ref 0–45)
BASOPHILS # BLD AUTO: 0 10E9/L (ref 0–0.2)
BASOPHILS NFR BLD AUTO: 0.3 %
BILIRUB DIRECT SERPL-MCNC: 0.3 MG/DL (ref 0–0.2)
BILIRUB SERPL-MCNC: 1.6 MG/DL (ref 0.2–1.3)
BUN SERPL-MCNC: 20 MG/DL (ref 7–30)
BUN SERPL-MCNC: 24 MG/DL (ref 7–30)
CALCIUM SERPL-MCNC: 8.7 MG/DL (ref 8.5–10.1)
CALCIUM SERPL-MCNC: 8.7 MG/DL (ref 8.5–10.1)
CHLORIDE SERPL-SCNC: 109 MMOL/L (ref 94–109)
CHLORIDE SERPL-SCNC: 111 MMOL/L (ref 94–109)
CO2 SERPL-SCNC: 21 MMOL/L (ref 20–32)
CO2 SERPL-SCNC: 23 MMOL/L (ref 20–32)
CREAT SERPL-MCNC: 0.92 MG/DL (ref 0.66–1.25)
CREAT SERPL-MCNC: 0.98 MG/DL (ref 0.66–1.25)
DIFFERENTIAL METHOD BLD: ABNORMAL
EOSINOPHIL # BLD AUTO: 0.2 10E9/L (ref 0–0.7)
EOSINOPHIL NFR BLD AUTO: 2.2 %
ERYTHROCYTE [DISTWIDTH] IN BLOOD BY AUTOMATED COUNT: 15.5 % (ref 10–15)
GFR SERPL CREATININE-BSD FRML MDRD: 78 ML/MIN/1.7M2
GFR SERPL CREATININE-BSD FRML MDRD: 84 ML/MIN/1.7M2
GLUCOSE BLDC GLUCOMTR-MCNC: 153 MG/DL (ref 70–99)
GLUCOSE BLDC GLUCOMTR-MCNC: 177 MG/DL (ref 70–99)
GLUCOSE BLDC GLUCOMTR-MCNC: 204 MG/DL (ref 70–99)
GLUCOSE SERPL-MCNC: 138 MG/DL (ref 70–99)
GLUCOSE SERPL-MCNC: 150 MG/DL (ref 70–99)
HCT VFR BLD AUTO: 41.6 % (ref 40–53)
HGB BLD-MCNC: 13.6 G/DL (ref 13.3–17.7)
IMM GRANULOCYTES # BLD: 0 10E9/L (ref 0–0.4)
IMM GRANULOCYTES NFR BLD: 0.5 %
INR PPP: 1.09 (ref 0.86–1.14)
INTERPRETATION ECG - MUSE: NORMAL
LYMPHOCYTES # BLD AUTO: 0.7 10E9/L (ref 0.8–5.3)
LYMPHOCYTES NFR BLD AUTO: 9.4 %
MAGNESIUM SERPL-MCNC: 2.6 MG/DL (ref 1.6–2.3)
MCH RBC QN AUTO: 30.8 PG (ref 26.5–33)
MCHC RBC AUTO-ENTMCNC: 32.7 G/DL (ref 31.5–36.5)
MCV RBC AUTO: 94 FL (ref 78–100)
MONOCYTES # BLD AUTO: 0.8 10E9/L (ref 0–1.3)
MONOCYTES NFR BLD AUTO: 9.6 %
NEUTROPHILS # BLD AUTO: 6.2 10E9/L (ref 1.6–8.3)
NEUTROPHILS NFR BLD AUTO: 78 %
NRBC # BLD AUTO: 0 10*3/UL
NRBC BLD AUTO-RTO: 0 /100
PLATELET # BLD AUTO: 163 10E9/L (ref 150–450)
POTASSIUM SERPL-SCNC: 3.7 MMOL/L (ref 3.4–5.3)
POTASSIUM SERPL-SCNC: 4.3 MMOL/L (ref 3.4–5.3)
PROT SERPL-MCNC: 7.8 G/DL (ref 6.8–8.8)
RBC # BLD AUTO: 4.41 10E12/L (ref 4.4–5.9)
SODIUM SERPL-SCNC: 140 MMOL/L (ref 133–144)
SODIUM SERPL-SCNC: 141 MMOL/L (ref 133–144)
WBC # BLD AUTO: 7.9 10E9/L (ref 4–11)

## 2018-09-19 PROCEDURE — 99232 SBSQ HOSP IP/OBS MODERATE 35: CPT | Mod: GC | Performed by: INTERNAL MEDICINE

## 2018-09-19 PROCEDURE — 80076 HEPATIC FUNCTION PANEL: CPT | Performed by: INTERNAL MEDICINE

## 2018-09-19 PROCEDURE — 80048 BASIC METABOLIC PNL TOTAL CA: CPT | Performed by: INTERNAL MEDICINE

## 2018-09-19 PROCEDURE — 25000128 H RX IP 250 OP 636: Performed by: INTERNAL MEDICINE

## 2018-09-19 PROCEDURE — 25000132 ZZH RX MED GY IP 250 OP 250 PS 637: Performed by: INTERNAL MEDICINE

## 2018-09-19 PROCEDURE — 25000132 ZZH RX MED GY IP 250 OP 250 PS 637: Performed by: STUDENT IN AN ORGANIZED HEALTH CARE EDUCATION/TRAINING PROGRAM

## 2018-09-19 PROCEDURE — 97535 SELF CARE MNGMENT TRAINING: CPT | Mod: GO | Performed by: OCCUPATIONAL THERAPIST

## 2018-09-19 PROCEDURE — 21400006 ZZH R&B CCU INTERMEDIATE UMMC

## 2018-09-19 PROCEDURE — 97530 THERAPEUTIC ACTIVITIES: CPT | Mod: GO | Performed by: OCCUPATIONAL THERAPIST

## 2018-09-19 PROCEDURE — 25000128 H RX IP 250 OP 636: Performed by: STUDENT IN AN ORGANIZED HEALTH CARE EDUCATION/TRAINING PROGRAM

## 2018-09-19 PROCEDURE — 40000133 ZZH STATISTIC OT WARD VISIT: Performed by: OCCUPATIONAL THERAPIST

## 2018-09-19 PROCEDURE — 36415 COLL VENOUS BLD VENIPUNCTURE: CPT | Performed by: INTERNAL MEDICINE

## 2018-09-19 PROCEDURE — 85610 PROTHROMBIN TIME: CPT | Performed by: INTERNAL MEDICINE

## 2018-09-19 PROCEDURE — 25000131 ZZH RX MED GY IP 250 OP 636 PS 637: Performed by: INTERNAL MEDICINE

## 2018-09-19 PROCEDURE — 92526 ORAL FUNCTION THERAPY: CPT | Mod: GN

## 2018-09-19 PROCEDURE — 83735 ASSAY OF MAGNESIUM: CPT | Performed by: INTERNAL MEDICINE

## 2018-09-19 PROCEDURE — 40000225 ZZH STATISTIC SLP WARD VISIT

## 2018-09-19 PROCEDURE — 00000146 ZZHCL STATISTIC GLUCOSE BY METER IP

## 2018-09-19 PROCEDURE — 85025 COMPLETE CBC W/AUTO DIFF WBC: CPT | Performed by: INTERNAL MEDICINE

## 2018-09-19 RX ORDER — HYDRALAZINE HYDROCHLORIDE 20 MG/ML
5 INJECTION INTRAMUSCULAR; INTRAVENOUS ONCE
Status: COMPLETED | OUTPATIENT
Start: 2018-09-19 | End: 2018-09-19

## 2018-09-19 RX ORDER — HYDRALAZINE HYDROCHLORIDE 25 MG/1
25 TABLET, FILM COATED ORAL EVERY 8 HOURS SCHEDULED
Status: DISCONTINUED | OUTPATIENT
Start: 2018-09-19 | End: 2018-09-19

## 2018-09-19 RX ORDER — PANTOPRAZOLE SODIUM 40 MG/1
40 TABLET, DELAYED RELEASE ORAL
Status: DISCONTINUED | OUTPATIENT
Start: 2018-09-20 | End: 2018-09-19

## 2018-09-19 RX ORDER — HYDRALAZINE HYDROCHLORIDE 25 MG/1
25 TABLET, FILM COATED ORAL 4 TIMES DAILY
Status: DISCONTINUED | OUTPATIENT
Start: 2018-09-19 | End: 2018-09-19

## 2018-09-19 RX ORDER — LISINOPRIL 10 MG/1
10 TABLET ORAL DAILY
Status: DISCONTINUED | OUTPATIENT
Start: 2018-09-19 | End: 2018-09-20

## 2018-09-19 RX ORDER — HYDRALAZINE HYDROCHLORIDE 50 MG/1
50 TABLET, FILM COATED ORAL EVERY 8 HOURS SCHEDULED
Status: DISCONTINUED | OUTPATIENT
Start: 2018-09-19 | End: 2018-09-19

## 2018-09-19 RX ORDER — PANTOPRAZOLE SODIUM 40 MG/1
40 TABLET, DELAYED RELEASE ORAL
Status: DISCONTINUED | OUTPATIENT
Start: 2018-09-19 | End: 2018-09-20 | Stop reason: HOSPADM

## 2018-09-19 RX ORDER — CAPTOPRIL 50 MG/1
50 TABLET ORAL 3 TIMES DAILY
Status: DISCONTINUED | OUTPATIENT
Start: 2018-09-19 | End: 2018-09-19

## 2018-09-19 RX ORDER — HYDRALAZINE HYDROCHLORIDE 50 MG/1
100 TABLET, FILM COATED ORAL EVERY 8 HOURS SCHEDULED
Status: DISCONTINUED | OUTPATIENT
Start: 2018-09-19 | End: 2018-09-19

## 2018-09-19 RX ORDER — HYDRALAZINE HYDROCHLORIDE 25 MG/1
25 TABLET, FILM COATED ORAL ONCE
Status: COMPLETED | OUTPATIENT
Start: 2018-09-19 | End: 2018-09-19

## 2018-09-19 RX ORDER — FUROSEMIDE 40 MG
40 TABLET ORAL
Status: DISCONTINUED | OUTPATIENT
Start: 2018-09-19 | End: 2018-09-20 | Stop reason: HOSPADM

## 2018-09-19 RX ADMIN — FUROSEMIDE 40 MG: 40 TABLET ORAL at 16:08

## 2018-09-19 RX ADMIN — ONDANSETRON 4 MG: 2 INJECTION INTRAMUSCULAR; INTRAVENOUS at 07:47

## 2018-09-19 RX ADMIN — POTASSIUM CHLORIDE 20 MEQ: 750 TABLET, EXTENDED RELEASE ORAL at 05:04

## 2018-09-19 RX ADMIN — CARVEDILOL 3.12 MG: 3.12 TABLET, FILM COATED ORAL at 08:14

## 2018-09-19 RX ADMIN — INSULIN ASPART 1 UNITS: 100 INJECTION, SOLUTION INTRAVENOUS; SUBCUTANEOUS at 11:37

## 2018-09-19 RX ADMIN — HYDRALAZINE HYDROCHLORIDE 5 MG: 20 INJECTION INTRAMUSCULAR; INTRAVENOUS at 00:15

## 2018-09-19 RX ADMIN — CARVEDILOL 3.12 MG: 3.12 TABLET, FILM COATED ORAL at 18:10

## 2018-09-19 RX ADMIN — HEPARIN SODIUM 5000 UNITS: 5000 INJECTION, SOLUTION INTRAVENOUS; SUBCUTANEOUS at 08:19

## 2018-09-19 RX ADMIN — PANTOPRAZOLE SODIUM 40 MG: 40 TABLET, DELAYED RELEASE ORAL at 10:07

## 2018-09-19 RX ADMIN — HYDRALAZINE HYDROCHLORIDE 100 MG: 50 TABLET ORAL at 05:37

## 2018-09-19 RX ADMIN — ASPIRIN 81 MG CHEWABLE TABLET 81 MG: 81 TABLET CHEWABLE at 08:14

## 2018-09-19 RX ADMIN — TICAGRELOR 90 MG: 90 TABLET ORAL at 08:14

## 2018-09-19 RX ADMIN — QUETIAPINE FUMARATE 25 MG: 25 TABLET ORAL at 21:55

## 2018-09-19 RX ADMIN — FUROSEMIDE 40 MG: 40 TABLET ORAL at 10:07

## 2018-09-19 RX ADMIN — HYDRALAZINE HYDROCHLORIDE 25 MG: 25 TABLET ORAL at 01:12

## 2018-09-19 RX ADMIN — CAPTOPRIL 50 MG: 50 TABLET ORAL at 08:15

## 2018-09-19 RX ADMIN — TICAGRELOR 90 MG: 90 TABLET ORAL at 21:55

## 2018-09-19 RX ADMIN — LISINOPRIL 10 MG: 10 TABLET ORAL at 13:47

## 2018-09-19 RX ADMIN — HEPARIN SODIUM 5000 UNITS: 5000 INJECTION, SOLUTION INTRAVENOUS; SUBCUTANEOUS at 16:06

## 2018-09-19 RX ADMIN — ATORVASTATIN CALCIUM 80 MG: 80 TABLET, FILM COATED ORAL at 21:55

## 2018-09-19 ASSESSMENT — ACTIVITIES OF DAILY LIVING (ADL)
ADLS_ACUITY_SCORE: 11

## 2018-09-19 NOTE — PROGRESS NOTES
Care Coordinator - Discharge Planning    Admission Date/Time:  9/13/2018  Attending MD:  Jomar Georges MD     Data  Date of initial CC assessment:  9/17/2018  Chart reviewed, discussed with interdisciplinary team.   Patient was admitted for:   1. ST elevation myocardial infarction involving left anterior descending (LAD) coronary artery (H)    2. Cardiac arrest (H)    3. Atrial fibrillation, unspecified type (H)         Assessment   Full assessment completed in previous note.    Pt extubated on 9/17.  Pt has been evaluated by PT/OT and home with OP CR is rec.  Cards 2 team reported pt will be going home with Life vest and need life vest ordered.    Coordination of Care and Referrals:   RNCC visited pt and spouse to discuss about the plan.  Pt and spouse stated the team have been updating them well about the plan of care.  RNCC discussed about Life vest and referral process.  Pt and spouse are in agreement with the plan.  Life vest referral have been faxed to [facility].  RNCC called [facility] rep # [phone] and [facility] regarding the referral.    Plan  Anticipated Discharge Date:  1-2 days  Anticipated Discharge Plan:   Home with OP CR and life vest.  Referral have been sent to [facility] for life vest.  Awaiting insurance approval and fitting of Life vest.  RNCC will cont to follow plan of care.      Gerri Pierce RN, PHN, BSN  4A and 4E/ ICU  Care Coordinator  Phone: [phone]  Pager: [phone]

## 2018-09-19 NOTE — PLAN OF CARE
Problem: Patient Care Overview  Goal: Plan of Care/Patient Progress Review  Outcome: No Change  Neuro - A/Ox4, afebrile, denies pain  Cardiac - -130's, goal MAP less than 80, MAPs 's, MD notified multiple times (increased hydralazine, IV hydralazine given). K replaced.  Respiratory - 2L NC while sleeping  GI - several BM's during shift, loose/watery, imodium given x1, low fat diet.   - voiding adequately  Skin - coccyx reddened, barrier applied.  Family - updated  Plan - Awaiting bed on 6c. Continue to monitor and notify MD of questions or concerns.

## 2018-09-19 NOTE — PLAN OF CARE
Problem: Patient Care Overview  Goal: Plan of Care/Patient Progress Review  Outcome: Improving  D: STEMI  I/A: Pt AOx4. Denying pain. Afebrile. Room air while awake, requiring 2L nasal cannula while asleep. VSS. Pt walked in hallway with therapy, tolerated well. Low fat diet, poor appetite today but tolerating solid food.  P: Continue plan of care. Possible discharge tomorrow.

## 2018-09-19 NOTE — PROGRESS NOTES
[facility] Cardiology Progress Note           Assessment and Plan:   62 year old with a PMHx of ICM (EF around 30%) from LAD MI s/p LAD PCI, HTN, DM2 who presented with anterior STEMI and cardiac arrest. Patient complained of chest pain at home and had a cardiac arrest. There was no bystander CPR, but EMS arrived and delivered one shock with ROSC. However, right before arrival, patient went into VT with pulse. Amio was given and patient was cardioverted. Patient then went to the cath lab where he was found to have proximal LAD STEMI in the previous stent and thrombus in the D1 ostium. Patient underwent STEVO of the LAD and POBA to D1. LVEDP was 40, so IABP was placed. Patient was given an additional Amio bolus, Lasix 160mg, started on Cangrelor gtt, ASA, and Brillenta. When patient was on the floor, he was in cardiogenic shock and required DPA and Epi for cardiac support.     SG was placed and showed patient to have elevated PCWP. Patient was started on Nitroprusside and low dose . Patient was able to be extubated. Currently, patient is hypertensive with mildly elevated biventricular filling pressures and normal cardiac index.     Plan:   1. Increase Captopril to 50mg TID and decrease Hydralazine to 50mg TID   2. Start Lasix 40mg PO BID.     #Cardiogenic Shock c/b Cardiac Arrest from Ischemia   -EF around 23% with LAD WMA.   -Patient is now out of cardiogenic shock given his normal index and signs of end organ perfusion.   -Last set of Elim Numbers before pull showed RA of 3, PA 30/15, MVO2 70%, CI 2.9.   -Will hold diuretics today.     #Ischemic Cardiomyopathy   -Given that patient is more hemodynamically stable, will increase GDMT.   -Increase Captopril to 50mg TID. Decrease Hydralazine to 50mg TID.   -Start Captopril 12.5mg TID given normal renal function.   -Start Lasix 40mg PO BID.   -If BP is stable, will increase Captopril.     #STEMI c/b Cardiac Arrest   -s/p LAD stent in the area of previous instent  thrombosis. Patient's peak troponin was around 17. This is likely due to LAD territory being non-viable after initial MI years ago.   -ECHO showed unchanged EF around 23% with LAD WMA.  -EP was consulted about ICD. Given recent MI and PCI, they recommend LifeVest for 3 months.   -Continue ASA and Brilinta.    #Fevers   -Likely due to post-arrest inflammation. Cultures have been negative.   -Stopping Abx at this time.     Javi Ortiz PGY-5  Cardiology Fellow   Pager: [phone]      Patient was seen by and the above plan discussed with Dr. Matos.     Subjective:     No acute events.          Review of Systems:   A comprehensive review of systems was performed and found to be negative except as described in this note          Medications:     Current Facility-Administered Medications   Medication     acetaminophen (TYLENOL) solution 650 mg     acetaminophen (TYLENOL) tablet 325 mg     albuterol neb solution 2.5 mg     aspirin chewable tablet 81 mg     atorvastatin (LIPITOR) tablet 80 mg     captopril (CAPOTEN) tablet 50 mg     carvedilol (COREG) tablet 3.125 mg     dexmedetomidine (PRECEDEX) 400 mcg in 0.9% sodium chloride 100 mL     dextrose 10 % 1,000 mL infusion     glucose gel 15-30 g    Or     dextrose 50 % injection 25-50 mL    Or     glucagon injection 1 mg     heparin sodium PF injection 5,000 Units     hydrALAZINE (APRESOLINE) tablet 50 mg     insulin aspart (NovoLOG) inj (RAPID ACTING)     insulin aspart (NovoLOG) inj (RAPID ACTING)     loperamide (IMODIUM) capsule 2 mg     Medication Considerations - To maintain platelet inhibition after discontinuation of cangrelor (KENGREAL) [see admin instructions]     midazolam (VERSED) injection 1-3 mg     naloxone (NARCAN) injection 0.1-0.4 mg     nitroPRUsside (NIPRIDE) 50 mg, sodium thiosulfate 500 mg in D5W 125 mL IV infusion (conc: 0.4mg/mL)     ondansetron (ZOFRAN) injection 4 mg     pantoprazole (PROTONIX) 2 mg/mL suspension 40 mg     Patient is already  receiving anticoagulation with heparin, enoxaparin (LOVENOX), warfarin (COUMADIN)  or other anticoagulant medication     potassium chloride (KLOR-CON) Packet 20-40 mEq     potassium chloride 10 mEq in 100 mL intermittent infusion with 10 mg lidocaine     potassium chloride 10 mEq in 100 mL sterile water intermittent infusion (premix)     potassium chloride 20 mEq in 50 mL intermittent infusion     potassium chloride SA (K-DUR/KLOR-CON M) CR tablet 20-40 mEq     QUEtiapine (SEROquel) tablet 25 mg     senna-docusate (SENOKOT-S;PERICOLACE) 8.6-50 MG per tablet 1 tablet    Or     senna-docusate (SENOKOT-S;PERICOLACE) 8.6-50 MG per tablet 2 tablet     sodium chloride (PF) 0.9% PF flush 10 mL     ticagrelor (BRILINTA) tablet 90 mg               Objective:   Temp: 98.6  F (37  C) Temp src: Oral BP: 135/77  Heart Rate: 115 Resp: 18 SpO2: 96 % O2 Device: Nasal cannula Oxygen Delivery: 2 LPM    Gen: No acute distress   HEENT: NC/AT   CV: Regular Rate and Rhythm   Pulm: Equal Chest Rise   Abdomen: s/nt/nd   Ext: No edema.           Data:     Lab Results   Component Value Date    WBC 7.9 09/19/2018    WBC 9.0 09/18/2018    WBC 9.9 09/17/2018    HGB 13.6 09/19/2018    HGB 12.4 (L) 09/18/2018    HGB 12.8 (L) 09/17/2018    HCT 41.6 09/19/2018    HCT 38.6 (L) 09/18/2018    HCT 38.9 (L) 09/17/2018     09/19/2018     (L) 09/18/2018     (L) 09/17/2018     09/19/2018     09/18/2018     09/18/2018    POTASSIUM 3.7 09/19/2018    POTASSIUM 3.9 09/18/2018    POTASSIUM 2.8 (L) 09/18/2018    CHLORIDE 111 (H) 09/19/2018    CHLORIDE 110 (H) 09/18/2018    CHLORIDE 113 (H) 09/18/2018    CO2 21 09/19/2018    CO2 22 09/18/2018    CO2 21 09/18/2018    BUN 20 09/19/2018    BUN 20 09/18/2018    BUN 17 09/18/2018    CR 0.92 09/19/2018    CR 1.01 09/18/2018    CR 0.86 09/18/2018     (H) 09/19/2018     (H) 09/18/2018     (H) 09/18/2018    TROPONIN 0.14 (HH) 09/13/2018    TROPI 1.914 ()  09/18/2018    TROPI 2.542 (HH) 09/17/2018    TROPI 3.324 (HH) 09/17/2018    AST 26 09/19/2018    AST 33 09/18/2018    AST 45 09/17/2018    ALT 81 (H) 09/19/2018     (H) 09/18/2018     (H) 09/17/2018    ALKPHOS 60 09/19/2018    ALKPHOS 56 09/18/2018    ALKPHOS 59 09/17/2018    BILITOTAL 1.6 (H) 09/19/2018    BILITOTAL 1.8 (H) 09/18/2018    BILITOTAL 1.8 (H) 09/17/2018    INR 1.09 09/19/2018    INR 1.12 09/18/2018    INR 1.13 09/17/2018

## 2018-09-19 NOTE — PLAN OF CARE
Problem: Patient Care Overview  Goal: Plan of Care/Patient Progress Review  Discharge Planner SLP   Patient plan for discharge: Unknown  Current status: Pt tolerating current diet. No overt s/sx of aspiration in today's session. Regular diet/thin liquids remains appropriate. Encourage pt to sit upright for all PO, take small bites/sips, and slow rate. No additional SLP services indicated.  Barriers to return to prior living situation: None from ST perspective  Recommendations for discharge: Defer to PT/OT  Rationale for recommendations: Baseline swallow function       Entered by: Kalyn Coburn 09/19/2018 12:18 PM     Speech Language Therapy Discharge Summary    Reason for therapy discharge:    All goals and outcomes met, no further needs identified.    Progress towards therapy goal(s). See goals on Care Plan in [facility] electronic health record for goal details.  Goals met    Therapy recommendation(s):    No further therapy is recommended. Regular diet/thin liquids.

## 2018-09-19 NOTE — PLAN OF CARE
Problem: Patient Care Overview  Goal: Plan of Care/Patient Progress Review  Discharge Planner OT   Patient plan for discharge: home w/ OP rehab  Current status: Pt completed full shower w/ min A for washing/drying back and BLEs. Pt ambulated 600ft w/ VSS throughout on RA, HR 110s up to 124bpm at the highest.   Barriers to return to prior living situation: medical status  Recommendations for discharge: home w/ OP CR  Rationale for recommendations: to increase ind in ADLS/IADLS       Entered by: Tiffanie Smith 09/19/2018 1:59 PM

## 2018-09-20 ENCOUNTER — PATIENT OUTREACH (OUTPATIENT)
Dept: CARE COORDINATION | Facility: CLINIC | Age: 63
End: 2018-09-20

## 2018-09-20 ENCOUNTER — APPOINTMENT (OUTPATIENT)
Dept: OCCUPATIONAL THERAPY | Facility: CLINIC | Age: 63
DRG: 270 | End: 2018-09-20
Payer: COMMERCIAL

## 2018-09-20 VITALS
RESPIRATION RATE: 18 BRPM | SYSTOLIC BLOOD PRESSURE: 120 MMHG | TEMPERATURE: 98.1 F | OXYGEN SATURATION: 92 % | WEIGHT: 173.1 LBS | HEART RATE: 219 BPM | DIASTOLIC BLOOD PRESSURE: 86 MMHG | HEIGHT: 66 IN | BODY MASS INDEX: 27.82 KG/M2

## 2018-09-20 DIAGNOSIS — I25.5 ISCHEMIC CARDIOMYOPATHY: Primary | ICD-10-CM

## 2018-09-20 LAB
ALBUMIN SERPL-MCNC: 3.4 G/DL (ref 3.4–5)
ALP SERPL-CCNC: 60 U/L (ref 40–150)
ALT SERPL W P-5'-P-CCNC: 68 U/L (ref 0–70)
ANION GAP SERPL CALCULATED.3IONS-SCNC: 9 MMOL/L (ref 3–14)
ANISOCYTOSIS BLD QL SMEAR: SLIGHT
AST SERPL W P-5'-P-CCNC: 28 U/L (ref 0–45)
BACTERIA SPEC CULT: NO GROWTH
BACTERIA SPEC CULT: NO GROWTH
BASOPHILS # BLD AUTO: 0 10E9/L (ref 0–0.2)
BASOPHILS NFR BLD AUTO: 0.3 %
BILIRUB DIRECT SERPL-MCNC: 0.2 MG/DL (ref 0–0.2)
BILIRUB SERPL-MCNC: 1.1 MG/DL (ref 0.2–1.3)
BUN SERPL-MCNC: 24 MG/DL (ref 7–30)
CALCIUM SERPL-MCNC: 8.7 MG/DL (ref 8.5–10.1)
CHLORIDE SERPL-SCNC: 110 MMOL/L (ref 94–109)
CO2 SERPL-SCNC: 23 MMOL/L (ref 20–32)
CREAT SERPL-MCNC: 0.91 MG/DL (ref 0.66–1.25)
DIFFERENTIAL METHOD BLD: ABNORMAL
EOSINOPHIL # BLD AUTO: 0.3 10E9/L (ref 0–0.7)
EOSINOPHIL NFR BLD AUTO: 3 %
ERYTHROCYTE [DISTWIDTH] IN BLOOD BY AUTOMATED COUNT: 15.5 % (ref 10–15)
GFR SERPL CREATININE-BSD FRML MDRD: 84 ML/MIN/1.7M2
GLUCOSE BLDC GLUCOMTR-MCNC: 150 MG/DL (ref 70–99)
GLUCOSE BLDC GLUCOMTR-MCNC: 174 MG/DL (ref 70–99)
GLUCOSE BLDC GLUCOMTR-MCNC: 198 MG/DL (ref 70–99)
GLUCOSE SERPL-MCNC: 173 MG/DL (ref 70–99)
HCT VFR BLD AUTO: 43 % (ref 40–53)
HGB BLD-MCNC: 13.8 G/DL (ref 13.3–17.7)
IMM GRANULOCYTES # BLD: 0 10E9/L (ref 0–0.4)
IMM GRANULOCYTES NFR BLD: 0.3 %
INR PPP: 1.04 (ref 0.86–1.14)
LYMPHOCYTES # BLD AUTO: 1.3 10E9/L (ref 0.8–5.3)
LYMPHOCYTES NFR BLD AUTO: 14 %
Lab: NORMAL
Lab: NORMAL
MAGNESIUM SERPL-MCNC: 2.5 MG/DL (ref 1.6–2.3)
MCH RBC QN AUTO: 30.6 PG (ref 26.5–33)
MCHC RBC AUTO-ENTMCNC: 32.1 G/DL (ref 31.5–36.5)
MCV RBC AUTO: 95 FL (ref 78–100)
MONOCYTES # BLD AUTO: 1 10E9/L (ref 0–1.3)
MONOCYTES NFR BLD AUTO: 10.9 %
NEUTROPHILS # BLD AUTO: 6.7 10E9/L (ref 1.6–8.3)
NEUTROPHILS NFR BLD AUTO: 71.5 %
NRBC # BLD AUTO: 0 10*3/UL
NRBC BLD AUTO-RTO: 0 /100
PLATELET # BLD AUTO: 210 10E9/L (ref 150–450)
PLATELET # BLD EST: ABNORMAL 10*3/UL
POTASSIUM SERPL-SCNC: 3.8 MMOL/L (ref 3.4–5.3)
PROT SERPL-MCNC: 7.8 G/DL (ref 6.8–8.8)
RBC # BLD AUTO: 4.51 10E12/L (ref 4.4–5.9)
SODIUM SERPL-SCNC: 142 MMOL/L (ref 133–144)
SPECIMEN SOURCE: NORMAL
SPECIMEN SOURCE: NORMAL
WBC # BLD AUTO: 9.4 10E9/L (ref 4–11)

## 2018-09-20 PROCEDURE — 80076 HEPATIC FUNCTION PANEL: CPT | Performed by: INTERNAL MEDICINE

## 2018-09-20 PROCEDURE — 85025 COMPLETE CBC W/AUTO DIFF WBC: CPT | Performed by: INTERNAL MEDICINE

## 2018-09-20 PROCEDURE — 99239 HOSP IP/OBS DSCHRG MGMT >30: CPT | Mod: GC | Performed by: INTERNAL MEDICINE

## 2018-09-20 PROCEDURE — 25000132 ZZH RX MED GY IP 250 OP 250 PS 637: Performed by: INTERNAL MEDICINE

## 2018-09-20 PROCEDURE — 00000146 ZZHCL STATISTIC GLUCOSE BY METER IP

## 2018-09-20 PROCEDURE — 36415 COLL VENOUS BLD VENIPUNCTURE: CPT | Performed by: INTERNAL MEDICINE

## 2018-09-20 PROCEDURE — 25000128 H RX IP 250 OP 636: Performed by: INTERNAL MEDICINE

## 2018-09-20 PROCEDURE — 97110 THERAPEUTIC EXERCISES: CPT | Mod: GO | Performed by: OCCUPATIONAL THERAPIST

## 2018-09-20 PROCEDURE — 80048 BASIC METABOLIC PNL TOTAL CA: CPT | Performed by: INTERNAL MEDICINE

## 2018-09-20 PROCEDURE — 40000133 ZZH STATISTIC OT WARD VISIT: Performed by: OCCUPATIONAL THERAPIST

## 2018-09-20 PROCEDURE — 90682 RIV4 VACC RECOMBINANT DNA IM: CPT | Performed by: INTERNAL MEDICINE

## 2018-09-20 PROCEDURE — 85610 PROTHROMBIN TIME: CPT | Performed by: INTERNAL MEDICINE

## 2018-09-20 PROCEDURE — 83735 ASSAY OF MAGNESIUM: CPT | Performed by: INTERNAL MEDICINE

## 2018-09-20 RX ORDER — LISINOPRIL 10 MG/1
10 TABLET ORAL DAILY
Status: DISCONTINUED | OUTPATIENT
Start: 2018-09-21 | End: 2018-09-20 | Stop reason: HOSPADM

## 2018-09-20 RX ORDER — LISINOPRIL 10 MG/1
10 TABLET ORAL 2 TIMES DAILY
Status: DISCONTINUED | OUTPATIENT
Start: 2018-09-20 | End: 2018-09-20

## 2018-09-20 RX ORDER — LISINOPRIL 5 MG/1
5 TABLET ORAL DAILY
Status: DISCONTINUED | OUTPATIENT
Start: 2018-09-20 | End: 2018-09-20 | Stop reason: HOSPADM

## 2018-09-20 RX ORDER — CARVEDILOL 3.12 MG/1
3.12 TABLET ORAL 2 TIMES DAILY WITH MEALS
Qty: 60 TABLET | Refills: 3 | Status: SHIPPED | OUTPATIENT
Start: 2018-09-20 | End: 2018-10-02

## 2018-09-20 RX ORDER — ASPIRIN 81 MG/1
81 TABLET, CHEWABLE ORAL DAILY
Qty: 30 TABLET | Refills: 11 | Status: SHIPPED | OUTPATIENT
Start: 2018-09-21 | End: 2018-12-27

## 2018-09-20 RX ORDER — FUROSEMIDE 40 MG
40 TABLET ORAL
Qty: 60 TABLET | Refills: 3 | Status: SHIPPED | OUTPATIENT
Start: 2018-09-20 | End: 2018-12-27

## 2018-09-20 RX ORDER — LISINOPRIL 5 MG/1
TABLET ORAL
Qty: 90 TABLET | Refills: 3 | Status: SHIPPED | OUTPATIENT
Start: 2018-09-20 | End: 2018-10-02

## 2018-09-20 RX ADMIN — PANTOPRAZOLE SODIUM 40 MG: 40 TABLET, DELAYED RELEASE ORAL at 08:06

## 2018-09-20 RX ADMIN — FUROSEMIDE 40 MG: 40 TABLET ORAL at 08:06

## 2018-09-20 RX ADMIN — POTASSIUM CHLORIDE 20 MEQ: 750 TABLET, EXTENDED RELEASE ORAL at 09:37

## 2018-09-20 RX ADMIN — CARVEDILOL 3.12 MG: 3.12 TABLET, FILM COATED ORAL at 08:06

## 2018-09-20 RX ADMIN — TICAGRELOR 90 MG: 90 TABLET ORAL at 08:06

## 2018-09-20 RX ADMIN — HEPARIN SODIUM 5000 UNITS: 5000 INJECTION, SOLUTION INTRAVENOUS; SUBCUTANEOUS at 08:06

## 2018-09-20 RX ADMIN — INFLUENZA A VIRUS A/MICHIGAN/45/2015 (H1N1) RECOMBINANT HEMAGGLUTININ ANTIGEN, INFLUENZA A VIRUS A/SINGAPORE/INFIMH-16-0019/2016 (H3N2) RECOMBINANT HEMAGGLUTININ ANTIGEN, INFLUENZA B VIRUS B/MARYLAND/15/2016 RECOMBINANT HEMAGGLUTININ ANTIGEN, AND INFLUENZA B VIRUS B/PHUKET/3073/2013 RECOMBINANT HEMAGGLUTININ ANTIGEN 0.5 ML: 45; 45; 45; 45 INJECTION INTRAMUSCULAR at 14:08

## 2018-09-20 RX ADMIN — ASPIRIN 81 MG CHEWABLE TABLET 81 MG: 81 TABLET CHEWABLE at 08:06

## 2018-09-20 RX ADMIN — LISINOPRIL 10 MG: 10 TABLET ORAL at 08:10

## 2018-09-20 RX ADMIN — HEPARIN SODIUM 5000 UNITS: 5000 INJECTION, SOLUTION INTRAVENOUS; SUBCUTANEOUS at 00:33

## 2018-09-20 ASSESSMENT — ACTIVITIES OF DAILY LIVING (ADL)
ADLS_ACUITY_SCORE: 11
ADLS_ACUITY_SCORE: 11
ADLS_ACUITY_SCORE: 12
ADLS_ACUITY_SCORE: 12

## 2018-09-20 NOTE — PLAN OF CARE
Problem: Patient Care Overview  Goal: Plan of Care/Patient Progress Review  Outcome: Improving    [facility] 4. VSSA. Denies pain. SR 80s. Patient pleasant and cooperative with cares. 1-2 L NC when asleep. Patient pleasant and cooperatives with cares.   P: Continue to monitor. Notify MD with any changes. Life vest fitting prior to discharge.

## 2018-09-20 NOTE — PLAN OF CARE
Problem: Patient Care Overview  Goal: Plan of Care/Patient Progress Review  Outcome: Improving  Pt admitted 9/13 with CP at home and went into cardiac arrest.  S/p STEVO to LAD and POBA to DI c/b cardiogenic shock s/p Epi.  SR, VS'S on RA and denied pain.  Right ankle skin tear on Mepilex on.  Forgetful.  Plan is to discharge home with life vest for three months.  Otherwise, pt slept well and up SBA.  Continue to monitor and with POC.

## 2018-09-20 NOTE — PROGRESS NOTES
DISCHARGE   Discharged to: Home  Via: Automobile  Accompanied by: Wife  Discharge Instructions: diet, activity, medications, follow up appointments, when to call the MD, and what to watchout for (i.e. s/s of infection, increasing SOB, palpitations, chest pain,)  Prescriptions: To be filled by [facility] pharmacy per pt's request; medication list reviewed & sent with pt  Follow Up Appointments: arranged; information given  Belongings: All sent with pt  IV: out  Telemetry: off  Pt exhibits understanding of above discharge instructions; all questions answered. Patient requested printout of PTA medications to compare to new medications. Instructed patient importance of taking all medications and pill box for home.  Discharge Paperwork: faxed    Patient admitted for CP and cardiac arrest. Patient SR on tele, no chest pain or other pain this am. VSS, room air, sats stable. No other complaints at this time. Up SBA, voiding. Replaced K per protocol x1 (3.8). BG a little high this am patient states he ate quite a few grapes early in the am. Patient to be fitted with LifeVest at 11 am with discharge to follow today. Continue to monitor and notify Cards 2 with changes/concerns.

## 2018-09-20 NOTE — PROGRESS NOTES
[facility] Discharge Summary    Anson Manriquez MRN# [id number]   Age: 63 year old YOB: 1955     Date of Admission:  9/13/2018  Date of Discharge::  9/20/2018  3:20 PM  Admitting Physician:  Jomar Georges MD  Discharge Physician:  Javi Ortiz MD           Discharge Diagnosis:   Out of hospital Cardiac arrest  STEMI, LAD  Cardiogenic Shock requiring mechanical support and inotrope therapy  Acute respiratory failure requiring mechanical ventilation  Post-MI non sustained ventricular tachycardia  Hyperlipidemia          Procedures:   Coronary Angiogram, thrombectomy, LAD territory PCI  SG Placement   IABP Placement   Mechanical ventilation          Discharge Medications:      Anson Manriquez   Home Medication Instructions ANTONETTE:[id number]    Printed on:09/20/18 3125   Medication Information                      aspirin 81 MG chewable tablet  Take 1 tablet (81 mg) by mouth daily             atorvastatin (LIPITOR) 80 MG tablet  Take 1 tablet (80 mg) by mouth daily Needs labs done prior to the next refill.             carvedilol (COREG) 3.125 MG tablet  1 tablet (3.125 mg) by Oral or Feeding Tube route 2 times daily (with meals)             furosemide (LASIX) 40 MG tablet  Take 1 tablet (40 mg) by mouth 2 times daily             lisinopril (PRINIVIL/ZESTRIL) 5 MG tablet  Take 10 mg in the morning and 5mg at night             metFORMIN (GLUCOPHAGE-XR) 500 MG 24 hr tablet  Take 1 tablet (500 mg) by mouth 2 times daily (with meals)             MULTIPLE VITAMIN PO  Take 1 tablet by mouth daily.             ticagrelor (BRILINTA) 90 MG tablet  Take 1 tablet (90 mg) by mouth 2 times daily                       Brief History of Illness:   HPI: 62M with HTN, DM, iCM (EF 30), MI in 2007,prior remote pLAD stenting, admitted with pLAD STEMI and likely ventricular tachycardia. The patient was in his usual state of health until this evening. He was out playing pool with friends and felt nauseated and chest  pain for 40 mins. He then went home and complained of severe chest pain and shortly after became unconscious and EMS was called. Bystander CPR No. On arrival EMS gave about 30 sec CPR then AED administered 1 shock. He was brought to [facility] in [place] with a pulse for most of transport. During the last 5 mins en route to ED, pt went into wide complex tachycardia at 220 bpm likely ventricular tachycardia though he maintained a pulse, acceptable BP, and mental status. In ER, Amiodarone 150 was administered, he was still intact but appearing more gray and more hypoxic, so he was given 100 fentanyl and 5 versed in preparation for cardioversion, then self-converted to sinus rhythm. He was intubated at bedside. He was then transported to [facility]      In CCL, he was found to have acute thrombotic lesion of the proximal LAD within the prior stent with AHLIMA 2 flow distally and thrombus in the D1 ostium. This was treated with aspiration thrombectomy and STEVO x2 to prox and mid LAD and POBA to D1. LVEDP was 40 mmHg. IABP was placed.R fem  TLC was placed. Pt was given an additional 300 mg amiodarone, Lasix 160 mg,  Cangrelor gtt (since no Asa or P2Y12 had been given), Asa 325, Brilinta 180 (after the case), and required NE infusion to maintain MAP.      He went to CT scanner, then to  for further management.     SHx: Lives in community, unable to obtain additional details  FHx: NC to CC  NKDA  Home meds:   Asa 325  Lipitor 80  Coreg 6.25 BID  Losartan 100  Metformin          Hospital Course:     As stated above, patient presented with an acute STEMI. Peak troponin was around 17 and patient had an ECHO which showed an EF of 23% with LAD territory akinesis. Patient's previous ECHO showed similar findings in 2012. Therefore, patient's LAD likely had very limited viability from a prevous MI which explains the relatively low troponin and unchanged EF. During the case, patient had an elevated LVEDP; therefore, IABP was placed. Patient was also  on ionotropes to help support heart function. Patient had SG cath placed and showed elevated SVR. Patient was started on afterload reduction and switched to  and was able to decrease his PCWP and facilitate extubation. He was able to transition to oral medications including Lisinopril 10/5mg BID and Coreg 3.125mg BID. Regarding his CAD, he will be on DAPT with ASA and Brillenta as well as Atorvastatin 80mg. Regarding his volume status, patient will on Lasix 40mg BID at home.     EP was consulted for ICD placement. Given recent MI, patient will discharge with [facility] with repeat ECHO in 3 months.     He will need to follow with Core for uptitration of CHF medications.        Discharge diet: Cardiac   Discharge activity: Activity as tolerated   Discharge follow-up: Follow up with primary care provider in 7 days           Discharge Disposition:   Discharged to home      Javi Ortiz MD     Thank you for allowing me to care for this patient. This has been discussed with the attending physician.    Javi Ortiz PGY-4   Cardiology Fellow   Pager: [phone]      I have seen and examined the patient on September 20, 2018 and agree with the above outlined hospital course and discharge plan.    Greater than 30 minutes were spent in discharge planning between patient education, medication teaching and in he coordination of care with outpatient providers.    Jason Matos MD   of Medicine  [facility] Division of Cardiology

## 2018-09-20 NOTE — PROGRESS NOTES
Care Coordinator - Discharge Planning    Admission Date/Time:  9/13/2018  Attending MD:  Jomar Georges MD     Data  Date of initial CC assessment: 9/17/2018  Chart reviewed, discussed with interdisciplinary team.   Patient was admitted for:   1. ST elevation myocardial infarction involving left anterior descending (LAD) coronary artery (H)    2. Cardiac arrest (H)    3. Atrial fibrillation, unspecified type (H)         Assessment   Full assessment completed in previous note    Coordination of Care and Referrals: Provided patient/family with options for home life vest. This writer received update from cardiology team that pt is ready for discharge today and asked this writer to follow up on life vest referral that was sent by RNCC yesterday. This writer spoke with Shannon Leyva [facility] (Ph: [phone]) who confirmed they received script and plan is to fit today at 11am.   OT recommending discharge to home with OP CR.    Plan  Anticipated Discharge Date: 9/20/2018  Anticipated Discharge Plan: Discharge to home with OP CR  CC will continue to monitor patient's medical condition and progress towards discharge.    Smitha Micntosh RN BSN  6C Unit Care Coordinator  Phone number: [phone]  Pager: [phone]

## 2018-09-20 NOTE — CONSULTS
"Anson is a 63 year old male presenting with anterior STEMI and cardiac arrest. CORE consult requested. Anson is currently admitted with STEMI c/b cardiac arrest, ischemic cardiomyopathy    I reviewed the importance of daily weights, 2 gm sodium diet, 2L fluid restriction and compliance with medications upon hospital discharge.  CORE contact phone numbers provided and patient is encouraged to call with any questions or concerns, including any weight gain or loss of 2 or more pounds in 24 hours or 5 or more pounds in 1 week.      Appointment made in CORE clinic on  at 7:00am with labs at 6:30. I also scheduled him to see Dr Jason Matos on  at 7:30 with labs at 7:00.  I will follow-up with the patient at that time, sooner if needed.  Thank you for the consult.    Haritha Lovell RN CHFN  Cardiology Care Coordinator - [facility]  Questions and [phone]  First press #1 for the [employer] and then press #3 for \"Medical Advise\" to reach us Cardiology Nurses.    "

## 2018-09-20 NOTE — PLAN OF CARE
Problem: Patient Care Overview  Goal: Plan of Care/Patient Progress Review  OT/6C:  Discharge Planner OT   Patient plan for discharge: home  Current status: Pt completed 10 minutes on NuStep, SBA ambulation. Pt requiring min VCs for sequencing and pacing.  Barriers to return to prior living situation: medical status  Recommendations for discharge: home with OP Cardiac Rehab   Rationale for recommendations: Pt would benefit from OP Cardiac Rehab to progress cardiac endurance and activity tolerance.        Entered by: Cari Domínguez 09/20/2018 10:25 AM

## 2018-09-21 LAB
BACTERIA SPEC CULT: NO GROWTH
BACTERIA SPEC CULT: NO GROWTH
Lab: NORMAL
Lab: NORMAL
SPECIMEN SOURCE: NORMAL
SPECIMEN SOURCE: NORMAL

## 2018-09-24 ENCOUNTER — CARE COORDINATION (OUTPATIENT)
Dept: CARDIOLOGY | Facility: CLINIC | Age: 63
End: 2018-09-24

## 2018-09-24 NOTE — PROGRESS NOTES
Patient was recently in the hospital for anterior STEMI and cardiac arrest.  He was found to have acute thrombotic lesion of the proximal LAD within the prior stent with HALIMA 2 flow distally and thrombus in the D1 ostium. This was treated with aspiration thrombectomy and STEVO x2 to prox and mid LAD and POBA to D1. Patient was called and voice message left with request to call back to discuss symptoms and to answer any questions or concerns. Upcoming appointment information left on voice mail. Awaiting a call back.

## 2018-09-27 ENCOUNTER — OFFICE VISIT (OUTPATIENT)
Dept: INTERNAL MEDICINE | Facility: CLINIC | Age: 63
End: 2018-09-27
Payer: COMMERCIAL

## 2018-09-27 VITALS
RESPIRATION RATE: 16 BRPM | BODY MASS INDEX: 28.88 KG/M2 | HEART RATE: 89 BPM | OXYGEN SATURATION: 97 % | SYSTOLIC BLOOD PRESSURE: 130 MMHG | WEIGHT: 178.9 LBS | DIASTOLIC BLOOD PRESSURE: 83 MMHG

## 2018-09-27 DIAGNOSIS — E11.9 TYPE 2 DIABETES MELLITUS WITHOUT COMPLICATION, WITHOUT LONG-TERM CURRENT USE OF INSULIN (H): ICD-10-CM

## 2018-09-27 RX ORDER — METFORMIN HCL 500 MG
1000 TABLET, EXTENDED RELEASE 24 HR ORAL 2 TIMES DAILY WITH MEALS
Qty: 180 TABLET | Refills: 1 | Status: SHIPPED | OUTPATIENT
Start: 2018-09-27 | End: 2018-12-27

## 2018-09-27 ASSESSMENT — PAIN SCALES - GENERAL: PAINLEVEL: NO PAIN (0)

## 2018-09-27 NOTE — NURSING NOTE
Chief Complaint   Patient presents with     Hospital F/U     pt is here for a hospital follow up        Grace Astorga CMA at 7:48 AM on 9/27/2018

## 2018-09-27 NOTE — MR AVS SNAPSHOT
After Visit Summary   9/27/2018    Anson Manriqeuz    MRN: [id number]           Patient Information     Date Of Birth          1955        Visit Information        Provider Department      9/27/2018 7:55 AM Danilee Tucker MD [facility]        Today's Diagnoses     Type 2 diabetes mellitus without complication, without long-term current use of insulin (H)          Care Instructions    Primary Care Center Phone Number [phone]  Primary Care Center Medication Refill Request Information:  * Please contact your pharmacy regarding ANY request for medication refills.  ** [facility] Prescription Fax = [phone]  * Please allow 3 business days for routine medication refills.  * Please allow 5 business days for controlled substance medication refills.     Primary Care Center Test Result notification information:  *You will be notified with in 7-10 days of your appointment day regarding the results of your test.  If you are on MyChart you will be notified as soon as the provider has reviewed the results and signed off on them.                 [facility]         Internal Medicine Resident                   Continuity Clinic    Who We Are    Resident Continuity Clinic is a part of the [facility].  Resident physicians see patients independently and establish a relationship with them over the course of their three-year residency program.  As with the Primary Care Clinic, our Resident Continuity Clinic models a group practice.  If your doctor is not available, you will be able to see another resident physician.  At the end of a resident s training, patients will be transitioned to a new resident physician for ongoing care.     We treat patients with a wide array of medical needs from routine physicals, to acute illnesses, to diabetes and blood pressure management, to complex medical illness.  What is a Resident Physician?    Resident physicians hold  medical degrees and are doctors. They are training to become specialists in Internal Medicine. They work under the supervision of board-certified faculty physicians.  Expectations for Your Care    We strive to provide accessible, quality care at all times.    In order to provide this care, it is best to see your primary care resident doctor consistently rather switching between providers.  In the event you do see another physician, you should schedule a follow-up visit with your usual primary care doctor.    If you are transitioning your care from another clinic, it is helpful to have your records available for your doctor to review.    We do not prescribe controlled substances, such as ADD medications or narcotic pain medications, on your first visit.  We will review your health records and concerns prior to devising a treatment plan with you in order to provide the best care.      Clinic Services     Extended clinic hours; patient  to help navigate your visit;  parking; laboratory and imaging services with evening and weekend hours    Multiple medical and surgical specialties in one building    Complementary services, including Nutrition, Integrative Medicine, Pharmacy consultations, Mental and Behavioral Health, Sports Medicine and Physical Therapy    Thank You    We would like to thank you for choosing the [facility] Resident Continuity Clinic for your primary care. You are making a priceless contribution to the training of the next generation of health care practitioners.     Contact us at [phone] for appointments or questions.    Resident Clinic Hours are Tuesdays and Thursdays, 7:30am-5:00pm    Residents  Grace Paul MD   (Female )   Sunshine Duran MD   (Female)   Mark Mcgill MD  (Male)   Adebayo Hanson MD  (Male)   Blanca Robertson MD   (Female)   Taiwo Michelle MD  (Male)    Prem Lou MD  (Male)   Jean-Paul Ghosh MD  (Male)   Adebayo Delaney,  MD (Male)   Jese Almanza MD  (Male)   Bree Lozano MD (Female)    Demetria Guevara MD (Female)   Mike Sanchez MD  (Male)   Sara Long MD(Female)   Chiqui Ramsey MD  (Female)    Supervising Physicians   MD Taryn Kirk, MD Eros Mahan, MD Gutierrez Chacon, MD Nancy Martines, MD Stephanie Doan, MD Martha Grewal MD Kathleen Watson, MD                    Follow-ups after your visit        Follow-up notes from your care team     Return in about 2 months (around 11/27/2018).      Your next 10 appointments already scheduled     Oct 02, 2018  6:30 AM CDT   Lab with  LAB    [facility] ([facility])    [address] [place] [id number]   [phone]            Oct 02, 2018  7:00 AM CDT   (Arrive by 6:45 AM)   CORE NEW with Nela March NP   [facility] ([facility])    [address] [place] [id number]   [phone]            Oct 31, 2018  7:30 AM CDT   (Arrive by 7:15 AM)   NEW HEART FAILURE with Jason Matos MD   [facility] ([facility])    [address] [place] [id number]   [phone]            Oct 31, 2018  7:00 PM CDT   Lab with [facility]    [facility] ([facility])    [address] [place] [id number]   [phone]            Jan 02, 2019  8:00 AM CST   Ech Complete with [id number]    Health Echo ([facility])    [address] [place] [id number]   [phone]           1.  Please bring or wear a comfortable two-piece outfit. 2.  You may eat, drink and take your normal medicines. 3.  For any questions that cannot be answered, please contact the ordering physician 4.  Please do not wear perfumes or scented lotions on the day of your exam.            Jan 02, 2019   9:00 AM CST   (Arrive by 8:45 AM)   NEW ARRHYTHMIA with Mick Mckeon MD   [facility] [[email or web address] [place])    [address] [facility]  [address] [place] [address] [phone]              Who to contact     Please call your clinic at [phone] to:    Ask questions about your health    Make or cancel appointments    Discuss your medicines    Learn about your test results    Speak to your doctor            Additional Information About Your Visit        MyChart Information     [employer] is an electronic gateway that provides easy, online access to your medical records. With TERMINALFOUR, you can request a clinic appointment, read your test results, renew a prescription or communicate with your care team.     To sign up for [employer]OUR visit the website at [email or web address]   You will be asked to enter the access code listed below, as well as some personal information. Please follow the directions to create your username and password.     Your access code is: [email or web address]  Expires: 2018  1:06 PM     Your access code will  in 90 days. If you need help or a new code, please contact your [facility] or call [phone] for assistance.        Care EveryWhere ID     This is your Care EveryWhere ID. This could be used by other organizations to access your [place] medical records  [id number]        Your Vitals Were     Pulse Respirations Pulse Oximetry BMI (Body Mass Index)          89 16 97% 28.88 kg/m2         Blood Pressure from Last 3 Encounters:   18 130/83   18 120/86   18 125/80    Weight from Last 3 Encounters:   18 81.1 kg (178 lb 14.4 oz)   18 78.5 kg (173 lb 1.6 oz)   18 83.3 kg (183 lb 11.2 oz)              Today, you had the following     No orders found for display         Today's Medication Changes          These changes are accurate as of 18  8:34 AM.  If you have any questions, ask your  nurse or doctor.               These medicines have changed or have updated prescriptions.        Dose/Directions    metFORMIN 500 MG 24 hr tablet   Commonly known as:  GLUCOPHAGE-XR   This may have changed:  how much to take   Used for:  Type 2 diabetes mellitus without complication, without long-term current use of insulin (H)   Changed by:  Daniele Tucker MD        Dose:  1000 mg   Take 2 tablets (1,000 mg) by mouth 2 times daily (with meals)   Quantity:  180 tablet   Refills:  1            Where to get your medicines      These medications were sent to [facility] [address] [address]     Phone:  [phone]     metFORMIN 500 MG 24 hr tablet                Primary Care Provider Office Phone # Fax #    Taryn Emma Chichi Lou [phone]       [address] [place] [phone]  [facility] [address]        Equal Access to Services     [facility]: Hadii luis rhoadeso Soyvette, waaxda luqadaha, qaybta kaalmada adebeckyyateresita, issac crump . So [facility] [phone].    ATENCIÓN: Si habla español, tiene a jordan disposición servicios gratuitos de asistencia lingüística. Danishkatharine al [phone].    We comply with applicable federal civil rights laws and [place] laws. We do not discriminate on the basis of race, color, national origin, age, disability, sex, sexual orientation, or gender identity.            Thank you!     Thank you for choosing [facility]  for your care. Our goal is always to provide you with excellent care. Hearing back from our patients is one way we can continue to improve our services. Please take a few minutes to complete the written survey that you may receive in the mail after your visit with us. Thank you!             Your Updated Medication List - Protect others around you: Learn how to safely use, store and throw away your medicines at www.disposemymeds.org.           This list is accurate as of 9/27/18  8:34 AM.  Always use your most recent med list.                   Brand Name Dispense Instructions for use Diagnosis    aspirin 81 MG chewable tablet     30 tablet    Take 1 tablet (81 mg) by mouth daily    ST elevation myocardial infarction involving left anterior descending (LAD) coronary artery (H)       atorvastatin 80 MG tablet    LIPITOR    90 tablet    Take 1 tablet (80 mg) by mouth daily Needs labs done prior to the next refill.    Type 2 diabetes mellitus without complication, without long-term current use of insulin (H), Benign essential hypertension, Mixed hyperlipidemia       carvedilol 3.125 MG tablet    COREG    60 tablet    1 tablet (3.125 mg) by Oral or Feeding Tube route 2 times daily (with meals)    ST elevation myocardial infarction involving left anterior descending (LAD) coronary artery (H)       furosemide 40 MG tablet    LASIX    60 tablet    Take 1 tablet (40 mg) by mouth 2 times daily    Chronic systolic congestive heart failure (H)       lisinopril 5 MG tablet    PRINIVIL/ZESTRIL    90 tablet    Take 10 mg in the morning and 5mg at night    ST elevation myocardial infarction involving left anterior descending (LAD) coronary artery (H)       metFORMIN 500 MG 24 hr tablet    GLUCOPHAGE-XR    180 tablet    Take 2 tablets (1,000 mg) by mouth 2 times daily (with meals)    Type 2 diabetes mellitus without complication, without long-term current use of insulin (H)       MULTIPLE VITAMIN PO      Take 1 tablet by mouth daily.        ticagrelor 90 MG tablet    BRILINTA    60 tablet    Take 1 tablet (90 mg) by mouth 2 times daily    Cardiac arrest (H), ST elevation myocardial infarction involving left anterior descending (LAD) coronary artery (H)

## 2018-09-27 NOTE — PROGRESS NOTES
Anson Manriquez MRN# [id number]   Age: 63 year old YOB: 1955     Date of Visit: September 27, 2018    Chief Complaint:  Hospital discharge follow up        History of Present Illness:   Pt is a 63 y/o male with a PMH of HTN, DM, MI in 2007 with prior remote pLAD stenting who was recently admitted on 9/13/18 for pLAD STEMI s/p aspiration thrombectomy and STEVO x2 who presents for follow up after hospital discharge.  Pt was discharged with a life vest with plans for an Echo in 3 months with possible ICD placement after.  Pt reports that he has been feeling good and having increased energy since he left the hospital.  He reports that he initially had a sore throat after the intubation but say that it has improved.  He denies fevers, chills, chest pain, SOB, lightheadedness, syncope, abdominal pain, n/v.          Past Medical History:     Past Medical History:   Diagnosis Date     Chronic infection     near rectum still leaking     Coronary artery disease      Diabetes mellitus, type 2 (H)      Heart attack 4/17/2007     Hyperlipidemia      Hypertension      STEMI (ST elevation myocardial infarction) (H) 09/13/2018    s/p STEVO x2     Stented coronary artery 2007           Past Surgical History:     Past Surgical History:   Procedure Laterality Date     HERNIA REPAIR Right     Inguinal     IRRIGATION AND DEBRIDEMENT RECTUM, COMBINED      abcess near rectum      LAPAROSCOPIC CHOLECYSTECTOMY  3/29/2012    Procedure:LAPAROSCOPIC CHOLECYSTECTOMY; LAPAROSCOPIC CHOLECYSTECTOMY; Surgeon:MARILYNN PRESSLEY; Location:[facility] OR     STENT, CORONARY, OLIVIA           Allergies:    No Known Allergies     Home Medications:     Current Outpatient Prescriptions   Medication     aspirin 81 MG chewable tablet     atorvastatin (LIPITOR) 80 MG tablet     carvedilol (COREG) 3.125 MG tablet     furosemide (LASIX) 40 MG tablet     lisinopril (PRINIVIL/ZESTRIL) 5 MG tablet     metFORMIN (GLUCOPHAGE-XR) 500 MG 24 hr tablet      MULTIPLE VITAMIN PO     ticagrelor (BRILINTA) 90 MG tablet     No current facility-administered medications for this visit.             Family History:     Family History   Problem Relation Age of Onset     Hypertension Mother      Diabetes Father      Glaucoma No family hx of      Macular Degeneration No family hx of           Social History:     Social History     Social History     Marital status:      Spouse name: N/A     Number of children: N/A     Years of education: N/A     Occupational History     Not on file.     Social History Main Topics     Smoking status: Never Smoker     Smokeless tobacco: Never Used     Alcohol use No     Drug use: No     Sexual activity: Not on file     Other Topics Concern     Not on file     Social History Narrative          Review of Systems:   10 point ROS was negative except as noted in HPI          Physical Exam:   Vitals:  /83  Pulse 89  Resp 16  Wt 81.1 kg (178 lb 14.4 oz)  SpO2 97%  BMI 28.88 kg/m2    Wt Readings from Last 4 Encounters:   09/27/18 81.1 kg (178 lb 14.4 oz)   09/20/18 78.5 kg (173 lb 1.6 oz)   05/17/18 83.3 kg (183 lb 11.2 oz)   09/18/17 85.7 kg (189 lb)       Gen:  NAD, sitting up in chair conversing easily   HEENT: nc/at, EOMI, PERRL, anicteric sclera  Resp: non-labored respirations, lungs CTAB, no wheezing, crackles   CV: RRR, nml s1/s2, no m/r/g appreciated, life vest in place  Abdominal: Soft, nontender, nondistended, reducible umbilical hernia  Skin: No rashes or cyanosis  Extremities: wwp, no edema in BLE  Neurological: alert and oriented, grossly moving al extremities         Assessment and Plan:   Pt is a 63 y/o male with a PMH of HTN, DM, MI in 2007 with prior remote pLAD stenting who was recently admitted on 9/13/18 for pLAD STEMI s/p aspiration thrombectomy and STEVO x2 who presents for follow up after hospital discharge.    #discharge f/u for  pLAD STEMI s/p aspiration thrombectomy and STEVO x2   #hx of MI in 2007  - pt denies  any complaints since his discharge.  No signs or episodes of angina, SOB, syncope since he was discharged    - currently on ASA 81mg daily, ticagrelor 90mg BID, carvedilol 3.125 BID, lisinopril 10mg qAM and 5mg qPM, and furosemide 40mg BID per cardiology recs  - Plan  -- continue current management per cardiology with ASA, ticagrelor, carvedilol, lisinopril, and furosemide  -- pt has f/u appointment with cardiology on 10/02    #DM2  - A1C of 8.4 on 9/20/18, up from 7.8 on 5/17/18  - currently on metformin 500mg BID  - Plan  -- increase metformin 500mg BID to 1000mg BID    RTC: 2 months    Daniele Tucker MD  Internal Medicine, PGY-1  [phone]      Pt was seen and examined with Dr. Tucker;  I agree with the A/P documentation above    Martha Ku MD

## 2018-09-28 ENCOUNTER — TELEPHONE (OUTPATIENT)
Dept: INTERNAL MEDICINE | Facility: CLINIC | Age: 63
End: 2018-09-28

## 2018-09-28 DIAGNOSIS — I25.5 ISCHEMIC CARDIOMYOPATHY: Primary | ICD-10-CM

## 2018-09-28 NOTE — TELEPHONE ENCOUNTER
[facility]    Phone Message    May a detailed message be left on voicemail: yes    Reason for Call: Other: pt concerned about his medication furosemide (LASIX) 40 MG tablet making him Urinate more. He was up 7 times during the nite. What time should he be taking the pills? please call pt to discuss.     Action Taken: Message routed to:  [facility] ([facility] Primary Care

## 2018-09-28 NOTE — TELEPHONE ENCOUNTER
Spoke to patient regarding taking Lasix in early morning and at noon to avoid frequent urination at at bedtime.  He verbalized understanding and agreed to this.  Shaheen Joseph LPN at 1:38 PM on 9/28/2018

## 2018-10-02 ENCOUNTER — OFFICE VISIT (OUTPATIENT)
Dept: CARDIOLOGY | Facility: CLINIC | Age: 63
End: 2018-10-02
Attending: NURSE PRACTITIONER
Payer: COMMERCIAL

## 2018-10-02 VITALS
SYSTOLIC BLOOD PRESSURE: 120 MMHG | BODY MASS INDEX: 28.33 KG/M2 | HEART RATE: 88 BPM | WEIGHT: 176.3 LBS | DIASTOLIC BLOOD PRESSURE: 85 MMHG | HEIGHT: 66 IN | OXYGEN SATURATION: 96 %

## 2018-10-02 DIAGNOSIS — I21.02 ST ELEVATION MYOCARDIAL INFARCTION INVOLVING LEFT ANTERIOR DESCENDING (LAD) CORONARY ARTERY (H): ICD-10-CM

## 2018-10-02 DIAGNOSIS — I25.5 ISCHEMIC CARDIOMYOPATHY: ICD-10-CM

## 2018-10-02 LAB
ANION GAP SERPL CALCULATED.3IONS-SCNC: 8 MMOL/L (ref 3–14)
BUN SERPL-MCNC: 18 MG/DL (ref 7–30)
CALCIUM SERPL-MCNC: 9 MG/DL (ref 8.5–10.1)
CHLORIDE SERPL-SCNC: 100 MMOL/L (ref 94–109)
CO2 SERPL-SCNC: 26 MMOL/L (ref 20–32)
CREAT SERPL-MCNC: 0.79 MG/DL (ref 0.66–1.25)
GFR SERPL CREATININE-BSD FRML MDRD: >90 ML/MIN/1.7M2
GLUCOSE SERPL-MCNC: 164 MG/DL (ref 70–99)
NT-PROBNP SERPL-MCNC: 812 PG/ML (ref 0–125)
POTASSIUM SERPL-SCNC: 4.5 MMOL/L (ref 3.4–5.3)
SODIUM SERPL-SCNC: 134 MMOL/L (ref 133–144)

## 2018-10-02 PROCEDURE — 80048 BASIC METABOLIC PNL TOTAL CA: CPT | Performed by: NURSE PRACTITIONER

## 2018-10-02 PROCEDURE — 83880 ASSAY OF NATRIURETIC PEPTIDE: CPT | Performed by: NURSE PRACTITIONER

## 2018-10-02 PROCEDURE — G0463 HOSPITAL OUTPT CLINIC VISIT: HCPCS | Mod: ZF

## 2018-10-02 PROCEDURE — 99214 OFFICE O/P EST MOD 30 MIN: CPT | Mod: ZP | Performed by: NURSE PRACTITIONER

## 2018-10-02 PROCEDURE — 36415 COLL VENOUS BLD VENIPUNCTURE: CPT | Performed by: NURSE PRACTITIONER

## 2018-10-02 RX ORDER — CARVEDILOL 3.12 MG/1
6.25 TABLET ORAL 2 TIMES DAILY WITH MEALS
Qty: 120 TABLET | Refills: 1 | Status: SHIPPED | OUTPATIENT
Start: 2018-10-02 | End: 2018-10-31

## 2018-10-02 RX ORDER — LISINOPRIL 10 MG/1
10 TABLET ORAL 2 TIMES DAILY
Qty: 60 TABLET | Refills: 1 | Status: SHIPPED | OUTPATIENT
Start: 2018-10-02 | End: 2018-10-02

## 2018-10-02 RX ORDER — LISINOPRIL 10 MG/1
10 TABLET ORAL 2 TIMES DAILY
Qty: 60 TABLET | Refills: 1 | Status: SHIPPED | OUTPATIENT
Start: 2018-10-02 | End: 2018-10-31

## 2018-10-02 ASSESSMENT — ENCOUNTER SYMPTOMS
PALPITATIONS: 0
SYNCOPE: 0
POLYDIPSIA: 0
ALTERED TEMPERATURE REGULATION: 0
SLEEP DISTURBANCES DUE TO BREATHING: 0
EXERCISE INTOLERANCE: 0
WEIGHT LOSS: 0
FATIGUE: 0
LIGHT-HEADEDNESS: 0
FEVER: 0
NIGHT SWEATS: 0
INCREASED ENERGY: 0
ORTHOPNEA: 0
POLYPHAGIA: 0
HYPOTENSION: 0
HYPERTENSION: 1
CHILLS: 0
DECREASED APPETITE: 0
HALLUCINATIONS: 0
WEIGHT GAIN: 0
LEG PAIN: 0

## 2018-10-02 ASSESSMENT — PAIN SCALES - GENERAL: PAINLEVEL: NO PAIN (0)

## 2018-10-02 NOTE — NURSING NOTE
Diet: Patient instructed regarding a heart failure healthy diet, including discussion of reduced fat and 2000 mg daily sodium restriction, daily weights, medication purpose and compliance, fluid restrictions and resources for patient and family to access for assistance with heart failure management.       Labs: Patient was given results of the laboratory testing obtained today and patient was instructed about when to return for the next laboratory testing.     Med Reconcile: Reviewed and verified all current medications with the patient. The updated medication list was printed and given to the patient. Increase coreg to 6.25mg BID. Increase lisinopril on 10/9 to 10mg BID    Return Appointment: Patient given instructions regarding scheduling next clinic visit. Dr Matos on 10/31 as scheduled    Patient stated he understood all health information given and agreed to call with further questions or concerns.       Meli Alfred RN

## 2018-10-02 NOTE — LETTER
10/2/2018      RE: Anson Manriquez  [address] [facility] [address] [facility] [address]       Dear Colleague,    Thank you for the opportunity to participate in the care of your patient, Anson Manriquez, at the [facility]. Please see a copy of my visit note below.    Advanced Heart Failure Clinic -- CORE Evaluation  October 2, 2018    HPI:   Mr. Anson Manriquez is a 63yr old male with a history of HTN, DMII, CAD (s/p MI in 2007, prior remote pLAD stenting, and recent STEMI 9/2018), and ICM who presents to CORE clinic for evaluation following recent hospital discharge.    On 9/13, Mr. Manriquez was was out with friends and developed chest pain and nausea.  He went home, where he developed severe chest pain and became unconscious.  EMS was called, and on arrival provided ~30 seconds of CPR and AED delivered 1 shock.  He was then brought to [facility] in sinus rhythm with a pulse for most of the transport.  During the last 5 mins en route to the ED, he went into a wide-complex tachycardia at a rate of 220bpm, likely VT.  He maintained a pulse, acceptable BP, and mental status.  He received amiodarone 150mg in the ED, and converted to NSR as he was being prepared for cardioversion.  He was then sent to the cath lab, where he was found to have an acute thrombotic lesion of the pLAD within the prior stent.  He was treated with aspiration thrombectomy, and STEVO x2 to pLAD and mLAD, and POBA to D1.  His LVEDP was 40mmHg, so IABP was placed and he was started on NE to maintain adequate BPs.    His peak troponin was ~17.  Echo showed LVEF 23% with LAD territory akinesis, consistent with echo results from 2012.  Therefore, his LAD had limited viability from his prior MI, which explains his relatively low troponin and unchanged LVEF.    He was ultimately started on DAPT with aspirin and Brilinta, lisinopril, carvedilol, and lasix, and discharged home on 9/20/18.    Since discharge, he states that  he feels much better, especially when compared to his prior heart attack.  He is not tired anymore, and notes that he is sleeping well and feels more rested.  He has been weighing himself daily, and notes that his weight has been stable at 173-175#.  He has been trying to monitor his salt intake, and states that he does not add salt to any foods.  He drinks less than 2L per day.  He notes that his abdominal distension has greatly improved, and denies LE edema.  He can lie flat, with no PND or orthopnea.  He has been walking, and can walk 8 blocks without exertional concerns.  He has not started cardiac rehab.  He has a good appetite, with no nausea, vomiting, diarrhea, or constipation.  He denies concerns with his mood.  He otherwise denies chest pain, palpitations, shortness of breath, dizziness, headaches, fevers, chills, cough, and sore throat.      PAST MEDICAL HISTORY:  Past Medical History:   Diagnosis Date     Chronic infection     near rectum still leaking     Coronary artery disease      Diabetes mellitus, type 2 (H)      Heart attack 4/17/2007     Hyperlipidemia      Hypertension      STEMI (ST elevation myocardial infarction) (H) 09/13/2018    s/p STEVO x2     Stented coronary artery 2007       FAMILY HISTORY:  Family History   Problem Relation Age of Onset     Hypertension Mother      Diabetes Father      Glaucoma No family hx of      Macular Degeneration No family hx of        SOCIAL HISTORY:  Social History     Social History     Marital status:      Spouse name: N/A     Number of children: N/A     Years of education: N/A     Social History Main Topics     Smoking status: Never Smoker     Smokeless tobacco: Never Used     Alcohol use No     Drug use: No     Sexual activity: Not on file     Other Topics Concern     Not on file     Social History Narrative       CURRENT MEDICATIONS:  Current Outpatient Prescriptions   Medication Sig Dispense Refill     aspirin 81 MG chewable tablet Take 1 tablet (81  "mg) by mouth daily 30 tablet 11     atorvastatin (LIPITOR) 80 MG tablet Take 1 tablet (80 mg) by mouth daily Needs labs done prior to the next refill. 90 tablet 3     carvedilol (COREG) 3.125 MG tablet 1 tablet (3.125 mg) by Oral or Feeding Tube route 2 times daily (with meals) 60 tablet 3     furosemide (LASIX) 40 MG tablet Take 1 tablet (40 mg) by mouth 2 times daily 60 tablet 3     lisinopril (PRINIVIL/ZESTRIL) 5 MG tablet Take 10 mg in the morning and 5mg at night 90 tablet 3     metFORMIN (GLUCOPHAGE-XR) 500 MG 24 hr tablet Take 2 tablets (1,000 mg) by mouth 2 times daily (with meals) 180 tablet 1     MULTIPLE VITAMIN PO Take 1 tablet by mouth daily.       ticagrelor (BRILINTA) 90 MG tablet Take 1 tablet (90 mg) by mouth 2 times daily 60 tablet 3       ROS:   Constitutional: No fever, chills, or sweats. Stable weight.   ENT: No visual disturbance, ear ache, epistaxis, sore throat.   Allergies/Immunologic: Negative.   Respiratory: No cough, hemoptysis.   Cardiovascular: As per HPI.   GI: No nausea, vomiting, hematemesis, melena, or hematochezia.   : No urinary frequency, dysuria, or hematuria.   Integument: Negative.   Psychiatric: Negative.   Neuro: Negative.   Endocrinology: Negative.   Musculoskeletal: Negative.    EXAM:  /85 (BP Location: Left arm, Patient Position: Chair, Cuff Size: Adult Regular)  Pulse 88  Ht 1.676 m (5' 6\")  Wt 80 kg (176 lb 4.8 oz)  SpO2 96%  BMI 28.46 kg/m2  General: appears comfortable, alert and articulate  Head: normocephalic, atraumatic  Eyes: anicteric sclera, EOMI  Neck: no adenopathy  Orophyarynx: moist mucosa, no lesions, dentition intact  Heart: regular, S1/S2, no murmur, gallop, rub, JVD at clavicle when sitting upright  Lungs: clear, no rales or wheezing  Abdomen: soft, non-tender, bowel sounds present, no hepatosplenomegaly  Extremities: no clubbing, cyanosis or LE edema  Neurological: normal speech and affect, no gross motor deficits    Labs:  CBC " RESULTS:  Lab Results   Component Value Date    WBC 9.4 09/20/2018    RBC 4.51 09/20/2018    HGB 13.8 09/20/2018    HCT 43.0 09/20/2018    MCV 95 09/20/2018    MCH 30.6 09/20/2018    MCHC 32.1 09/20/2018    RDW 15.5 (H) 09/20/2018     09/20/2018       CMP RESULTS:  Lab Results   Component Value Date     09/20/2018    POTASSIUM 3.8 09/20/2018    CHLORIDE 110 (H) 09/20/2018    CO2 23 09/20/2018    ANIONGAP 9 09/20/2018     (H) 09/20/2018    BUN 24 09/20/2018    CR 0.91 09/20/2018    GFRESTIMATED 84 09/20/2018    GFRESTBLACK >90 09/20/2018    RENO 8.7 09/20/2018    BILITOTAL 1.1 09/20/2018    ALBUMIN 3.4 09/20/2018    ALKPHOS 60 09/20/2018    ALT 68 09/20/2018    AST 28 09/20/2018        INR RESULTS:  Lab Results   Component Value Date    INR 1.04 09/20/2018       Lab Results   Component Value Date    MAG 2.5 (H) 09/20/2018       Lab Results   Component Value Date    NTBNP 812 (H) 10/02/2018       Procedures:  Coronary angio:  9/13/18  -Both coronary arteries arise from their respective cusps.  -Dominance: Right  -LM has no evidence of coronary artery disease.  -LAD: Type 3 [LAD supplies the entire apex]. The LAD gives rise to septal perforators, multiple diagonal branches. The proximal LAD has an acute thrombotic lesion within the prior stent. Distal to the stent, there is a 70-80% stenosis. The distal vessel has HALIMA 2 flow. The D1 has thrombus at the ostium likely from embolization. The apical LAD also has thrombus from embolization.  -LCX gives rise to a large branching OM. The prox Cx has a 20% stenosis. The rest of the Cx system has mild disease.  -RCA (dominant) gives rise to PL branches and supplies PDA. There is minimal disease in the RCA system.    Echo:  9/14/18  Ischemic cardiomyopathy, LVEF=23% with extensive regional wall motion abnormalities as described below. Resting wall motion abnormalities and LV function are not significantly changed from the rest images on the 11/27/12 stress  echocardiogram.  Mildly dilated LV with severely reduced LV function, LVEF=23%. There is akinesis involving the zcwvo-uf-jso anteroseptal, ngz-zj-uzewja anterior, mid anterolateral, and all apical myocardial segments. The basal anterior, basal anterolateral, and basal inferolateral segments are relatively spared. RV size and function are normal. No significant valvular abnormalities. No pericardial effusion. Normal IVC with abnormal respiratory variation, estimated RA pressure 8 mmHg.      Assessment and Plan:   Mr. Anson Manriquez is a 63yr old male with a history of HTN, DMII, CAD (s/p MI in 2007, prior remote pLAD stenting, and recent STEMI 9/2018), and ICM who presents to CORE clinic for evaluation following recent hospital discharge.    On 9/13, Mr. Manriquez was was out with friends and developed chest pain and nausea.  He went home, where he developed severe chest pain and became unconscious.  EMS was called, and on arrival provided ~30 seconds of CPR and AED delivered 1 shock.  He was then brought to [facility] in sinus rhythm with a pulse for most of the transport.  During the last 5 mins en route to the ED, he went into a wide-complex tachycardia at a rate of 220bpm, likely VT.  He maintained a pulse, acceptable BP, and mental status.  He received amiodarone 150mg in the ED, and converted to NSR as he was being prepared for cardioversion.  He was then sent to the cath lab, where he was found to have an acute thrombotic lesion of the pLAD within the prior stent.  He was treated with aspiration thrombectomy, and STEVO x2 to pLAD and mLAD, and POBA to D1.  His LVEDP was 40mmHg, so IABP was placed and he was started on NE to maintain adequate BPs.    His peak troponin was ~17.  Echo showed LVEF 23% with LAD territory akinesis, consistent with echo results from 2012.  Therefore, his LAD had limited viability from his prior MI, which explains his relatively low troponin and unchanged LVEF.    Labs today show stable  electrolytes and kidney function.  His weight remains stable, and he is euvolemic on current diuretics.  His BP remains stable.  He was not entirely sure of his medication dosages, but reports that he is taking what his discharge instructions outlined.  Discussed that he should increase carvedilol to 6.25mg twice daily, starting today.  If he remains asymptomatic, discussed that he increase lisinopril to 10mg twice daily in one week.  He will follow up with Dr. Matos 10/31, and will return to CORE clinic thereafter.    He states that he has been wearing his LifeVest consistently.  He will follow up with EP in January 2019, with echocardiogram prior, where decision re: ICD implantation will be reviewed.    Chronic systolic heart failure secondary to ICM  Stage C  NYHA Class II-III  ACEi/ARB:  Yes, increase lisinopril to 10mg po twice daily (in one week)  BB:  Yes, increase carvedilol to 6.25mg po twice daily  Aldosterone antagonist:  Deferred during medication titration  SCD prophylaxis:  Life-Vest.  Scheduled to follow up with Dr. Mckeon (re: ICD placement) 1/2019.  Fluid status:  Euvolemic, continue lasix 40mg po twice daily  Follow-up:  Dr. Matos, 10/31      CAD  Remains on DAPT (aspirin 81mg daily and ticagrelor 90mg twice daily), carvedilol, and atorvastatin.      DMII  Note that HgbA1c 9/14/18 was 8.4%.  He saw his PCP recently, who doubled his metformin.  He will follow up with PCP in 3 months.  Discussed importance of glycemic control.          The above was reviewed with Mr. Manriquez, who verbalized understanding and will call with further questions/concerns.    Nela March, DNP, APRN, FNP-BC  Advanced Heart Failure Nurse Practitioner  [facility]  [phone]      CC  Patient Care Team:  Taryn Jefferson MD as PCP - General (Internal Medicine)  Nela March NP as Nurse Practitioner (Nurse Practitioner)  Jason Matos MD as MD (Internal Medicine)  Meli Alfred RN as  Nurse Coordinator (Cardiology)

## 2018-10-02 NOTE — PROGRESS NOTES
Advanced Heart Failure Clinic -- CORE Evaluation  October 2, 2018    HPI:   Mr. Anson Manriquez is a 63yr old male with a history of HTN, DMII, CAD (s/p MI in 2007, prior remote pLAD stenting, and recent STEMI 9/2018), and ICM who presents to CORE clinic for evaluation following recent hospital discharge.    On 9/13, Mr. Manriquez was was out with friends and developed chest pain and nausea.  He went home, where he developed severe chest pain and became unconscious.  EMS was called, and on arrival provided ~30 seconds of CPR and AED delivered 1 shock.  He was then brought to [facility] in sinus rhythm with a pulse for most of the transport.  During the last 5 mins en route to the ED, he went into a wide-complex tachycardia at a rate of 220bpm, likely VT.  He maintained a pulse, acceptable BP, and mental status.  He received amiodarone 150mg in the ED, and converted to NSR as he was being prepared for cardioversion.  He was then sent to the cath lab, where he was found to have an acute thrombotic lesion of the pLAD within the prior stent.  He was treated with aspiration thrombectomy, and STEVO x2 to pLAD and mLAD, and POBA to D1.  His LVEDP was 40mmHg, so IABP was placed and he was started on NE to maintain adequate BPs.    His peak troponin was ~17.  Echo showed LVEF 23% with LAD territory akinesis, consistent with echo results from 2012.  Therefore, his LAD had limited viability from his prior MI, which explains his relatively low troponin and unchanged LVEF.    He was ultimately started on DAPT with aspirin and Brilinta, lisinopril, carvedilol, and lasix, and discharged home on 9/20/18.    Since discharge, he states that he feels much better, especially when compared to his prior heart attack.  He is not tired anymore, and notes that he is sleeping well and feels more rested.  He has been weighing himself daily, and notes that his weight has been stable at 173-175#.  He has been trying to monitor his salt intake, and states that  he does not add salt to any foods.  He drinks less than 2L per day.  He notes that his abdominal distension has greatly improved, and denies LE edema.  He can lie flat, with no PND or orthopnea.  He has been walking, and can walk 8 blocks without exertional concerns.  He has not started cardiac rehab.  He has a good appetite, with no nausea, vomiting, diarrhea, or constipation.  He denies concerns with his mood.  He otherwise denies chest pain, palpitations, shortness of breath, dizziness, headaches, fevers, chills, cough, and sore throat.      PAST MEDICAL HISTORY:  Past Medical History:   Diagnosis Date     Chronic infection     near rectum still leaking     Coronary artery disease      Diabetes mellitus, type 2 (H)      Heart attack 4/17/2007     Hyperlipidemia      Hypertension      STEMI (ST elevation myocardial infarction) (H) 09/13/2018    s/p STEVO x2     Stented coronary artery 2007       FAMILY HISTORY:  Family History   Problem Relation Age of Onset     Hypertension Mother      Diabetes Father      Glaucoma No family hx of      Macular Degeneration No family hx of        SOCIAL HISTORY:  Social History     Social History     Marital status:      Spouse name: N/A     Number of children: N/A     Years of education: N/A     Social History Main Topics     Smoking status: Never Smoker     Smokeless tobacco: Never Used     Alcohol use No     Drug use: No     Sexual activity: Not on file     Other Topics Concern     Not on file     Social History Narrative       CURRENT MEDICATIONS:  Current Outpatient Prescriptions   Medication Sig Dispense Refill     aspirin 81 MG chewable tablet Take 1 tablet (81 mg) by mouth daily 30 tablet 11     atorvastatin (LIPITOR) 80 MG tablet Take 1 tablet (80 mg) by mouth daily Needs labs done prior to the next refill. 90 tablet 3     carvedilol (COREG) 3.125 MG tablet 1 tablet (3.125 mg) by Oral or Feeding Tube route 2 times daily (with meals) 60 tablet 3     furosemide (LASIX)  "40 MG tablet Take 1 tablet (40 mg) by mouth 2 times daily 60 tablet 3     lisinopril (PRINIVIL/ZESTRIL) 5 MG tablet Take 10 mg in the morning and 5mg at night 90 tablet 3     metFORMIN (GLUCOPHAGE-XR) 500 MG 24 hr tablet Take 2 tablets (1,000 mg) by mouth 2 times daily (with meals) 180 tablet 1     MULTIPLE VITAMIN PO Take 1 tablet by mouth daily.       ticagrelor (BRILINTA) 90 MG tablet Take 1 tablet (90 mg) by mouth 2 times daily 60 tablet 3       ROS:   Constitutional: No fever, chills, or sweats. Stable weight.   ENT: No visual disturbance, ear ache, epistaxis, sore throat.   Allergies/Immunologic: Negative.   Respiratory: No cough, hemoptysis.   Cardiovascular: As per HPI.   GI: No nausea, vomiting, hematemesis, melena, or hematochezia.   : No urinary frequency, dysuria, or hematuria.   Integument: Negative.   Psychiatric: Negative.   Neuro: Negative.   Endocrinology: Negative.   Musculoskeletal: Negative.    EXAM:  /85 (BP Location: Left arm, Patient Position: Chair, Cuff Size: Adult Regular)  Pulse 88  Ht 1.676 m (5' 6\")  Wt 80 kg (176 lb 4.8 oz)  SpO2 96%  BMI 28.46 kg/m2  General: appears comfortable, alert and articulate  Head: normocephalic, atraumatic  Eyes: anicteric sclera, EOMI  Neck: no adenopathy  Orophyarynx: moist mucosa, no lesions, dentition intact  Heart: regular, S1/S2, no murmur, gallop, rub, JVD at clavicle when sitting upright  Lungs: clear, no rales or wheezing  Abdomen: soft, non-tender, bowel sounds present, no hepatosplenomegaly  Extremities: no clubbing, cyanosis or LE edema  Neurological: normal speech and affect, no gross motor deficits    Labs:  CBC RESULTS:  Lab Results   Component Value Date    WBC 9.4 09/20/2018    RBC 4.51 09/20/2018    HGB 13.8 09/20/2018    HCT 43.0 09/20/2018    MCV 95 09/20/2018    MCH 30.6 09/20/2018    MCHC 32.1 09/20/2018    RDW 15.5 (H) 09/20/2018     09/20/2018       CMP RESULTS:  Lab Results   Component Value Date     " 09/20/2018    POTASSIUM 3.8 09/20/2018    CHLORIDE 110 (H) 09/20/2018    CO2 23 09/20/2018    ANIONGAP 9 09/20/2018     (H) 09/20/2018    BUN 24 09/20/2018    CR 0.91 09/20/2018    GFRESTIMATED 84 09/20/2018    GFRESTBLACK >90 09/20/2018    RENO 8.7 09/20/2018    BILITOTAL 1.1 09/20/2018    ALBUMIN 3.4 09/20/2018    ALKPHOS 60 09/20/2018    ALT 68 09/20/2018    AST 28 09/20/2018        INR RESULTS:  Lab Results   Component Value Date    INR 1.04 09/20/2018       Lab Results   Component Value Date    MAG 2.5 (H) 09/20/2018       Lab Results   Component Value Date    NTBNP 812 (H) 10/02/2018       Procedures:  Coronary angio:  9/13/18  -Both coronary arteries arise from their respective cusps.  -Dominance: Right  -LM has no evidence of coronary artery disease.  -LAD: Type 3 [LAD supplies the entire apex]. The LAD gives rise to septal perforators, multiple diagonal branches. The proximal LAD has an acute thrombotic lesion within the prior stent. Distal to the stent, there is a 70-80% stenosis. The distal vessel has HALIMA 2 flow. The D1 has thrombus at the ostium likely from embolization. The apical LAD also has thrombus from embolization.  -LCX gives rise to a large branching OM. The prox Cx has a 20% stenosis. The rest of the Cx system has mild disease.  -RCA (dominant) gives rise to PL branches and supplies PDA. There is minimal disease in the RCA system.    Echo:  9/14/18  Ischemic cardiomyopathy, LVEF=23% with extensive regional wall motion abnormalities as described below. Resting wall motion abnormalities and LV function are not significantly changed from the rest images on the 11/27/12 stress echocardiogram.  Mildly dilated LV with severely reduced LV function, LVEF=23%. There is akinesis involving the hnehz-zm-ezv anteroseptal, cbe-yo-rmjkab anterior, mid anterolateral, and all apical myocardial segments. The basal anterior, basal anterolateral, and basal inferolateral segments are relatively spared. RV  size and function are normal. No significant valvular abnormalities. No pericardial effusion. Normal IVC with abnormal respiratory variation, estimated RA pressure 8 mmHg.      Assessment and Plan:   Mr. Anson Manriquez is a 63yr old male with a history of HTN, DMII, CAD (s/p MI in 2007, prior remote pLAD stenting, and recent STEMI 9/2018), and ICM who presents to CORE clinic for evaluation following recent hospital discharge.    On 9/13, Mr. Manriquez was was out with friends and developed chest pain and nausea.  He went home, where he developed severe chest pain and became unconscious.  EMS was called, and on arrival provided ~30 seconds of CPR and AED delivered 1 shock.  He was then brought to [facility] in sinus rhythm with a pulse for most of the transport.  During the last 5 mins en route to the ED, he went into a wide-complex tachycardia at a rate of 220bpm, likely VT.  He maintained a pulse, acceptable BP, and mental status.  He received amiodarone 150mg in the ED, and converted to NSR as he was being prepared for cardioversion.  He was then sent to the cath lab, where he was found to have an acute thrombotic lesion of the pLAD within the prior stent.  He was treated with aspiration thrombectomy, and STEVO x2 to pLAD and mLAD, and POBA to D1.  His LVEDP was 40mmHg, so IABP was placed and he was started on NE to maintain adequate BPs.    His peak troponin was ~17.  Echo showed LVEF 23% with LAD territory akinesis, consistent with echo results from 2012.  Therefore, his LAD had limited viability from his prior MI, which explains his relatively low troponin and unchanged LVEF.    Labs today show stable electrolytes and kidney function.  His weight remains stable, and he is euvolemic on current diuretics.  His BP remains stable.  He was not entirely sure of his medication dosages, but reports that he is taking what his discharge instructions outlined.  Discussed that he should increase carvedilol to 6.25mg twice daily,  starting today.  If he remains asymptomatic, discussed that he increase lisinopril to 10mg twice daily in one week.  He will follow up with Dr. Matos 10/31, and will return to CORE clinic thereafter.    He states that he has been wearing his LifeVest consistently.  He will follow up with EP in January 2019, with echocardiogram prior, where decision re: ICD implantation will be reviewed.    Chronic systolic heart failure secondary to ICM  Stage C  NYHA Class II-III  ACEi/ARB:  Yes, increase lisinopril to 10mg po twice daily (in one week)  BB:  Yes, increase carvedilol to 6.25mg po twice daily  Aldosterone antagonist:  Deferred during medication titration  SCD prophylaxis:  Life-Vest.  Scheduled to follow up with Dr. Mckeon (re: ICD placement) 1/2019.  Fluid status:  Euvolemic, continue lasix 40mg po twice daily  Follow-up:  Dr. Matos, 10/31      CAD  Remains on DAPT (aspirin 81mg daily and ticagrelor 90mg twice daily), carvedilol, and atorvastatin.      DMII  Note that HgbA1c 9/14/18 was 8.4%.  He saw his PCP recently, who doubled his metformin.  He will follow up with PCP in 3 months.  Discussed importance of glycemic control.          The above was reviewed with  Declan, who verbalized understanding and will call with further questions/concerns.    Nela Herrera DNP, APRN, FNP-BC  Advanced Heart Failure Nurse Practitioner  [facility]  [phone]      CC  Patient Care Team:  Taryn Jefferson MD as PCP - General (Internal Medicine)  Nela Herrera, NP as Nurse Practitioner (Nurse Practitioner)  Jason Matos MD as MD (Internal Medicine)  Meli Alfred RN as Nurse Coordinator (Cardiology)  NELA HERRERA

## 2018-10-02 NOTE — DISCHARGE SUMMARY
[facility] Discharge Summary    Anson Manriquez MRN# [id number]   Age: 63 year old YOB: 1955     Date of Admission:  9/13/2018  Date of Discharge::  9/20/2018  3:20 PM  Admitting Physician:  Jomar Georges MD  Discharge Physician:  Jason Matos MD           Discharge Diagnosis:   Out of hospital Cardiac arrest  STEMI, LAD  Cardiogenic Shock requiring mechanical support and inotrope therapy  Acute respiratory failure requiring mechanical ventilation  Post-MI non sustained ventricular tachycardia  Hyperlipidemia          Procedures:   Coronary Angiogram, thrombectomy, LAD territory PCI  SG Placement   IABP Placement   Mechanical ventilation          Discharge Medications:      Anson Manriquez   Home Medication Instructions ANTONETTE:[id number]    Printed on:09/20/18 0332   Medication Information                      aspirin 81 MG chewable tablet  Take 1 tablet (81 mg) by mouth daily             atorvastatin (LIPITOR) 80 MG tablet  Take 1 tablet (80 mg) by mouth daily Needs labs done prior to the next refill.             carvedilol (COREG) 3.125 MG tablet  1 tablet (3.125 mg) by Oral or Feeding Tube route 2 times daily (with meals)             furosemide (LASIX) 40 MG tablet  Take 1 tablet (40 mg) by mouth 2 times daily             lisinopril (PRINIVIL/ZESTRIL) 5 MG tablet  Take 10 mg in the morning and 5mg at night             metFORMIN (GLUCOPHAGE-XR) 500 MG 24 hr tablet  Take 1 tablet (500 mg) by mouth 2 times daily (with meals)             MULTIPLE VITAMIN PO  Take 1 tablet by mouth daily.             ticagrelor (BRILINTA) 90 MG tablet  Take 1 tablet (90 mg) by mouth 2 times daily                       Brief History of Illness:   HPI: 62M with HTN, DM, iCM (EF 30), MI in 2007,prior remote pLAD stenting, admitted with pLAD STEMI and likely ventricular tachycardia. The patient was in his usual state of health until this evening. He was out playing pool with friends and felt nauseated and chest  pain for 40 mins. He then went home and complained of severe chest pain and shortly after became unconscious and EMS was called. Bystander CPR No. On arrival EMS gave about 30 sec CPR then AED administered 1 shock. He was brought to [facility] in [place] with a pulse for most of transport. During the last 5 mins en route to ED, pt went into wide complex tachycardia at 220 bpm likely ventricular tachycardia though he maintained a pulse, acceptable BP, and mental status. In ER, Amiodarone 150 was administered, he was still intact but appearing more gray and more hypoxic, so he was given 100 fentanyl and 5 versed in preparation for cardioversion, then self-converted to sinus rhythm. He was intubated at bedside. He was then transported to [facility].      In CCL, he was found to have acute thrombotic lesion of the proximal LAD within the prior stent with HALIMA 2 flow distally and thrombus in the D1 ostium. This was treated with aspiration thrombectomy and STEVO x2 to prox and mid LAD and POBA to D1. LVEDP was 40 mmHg. IABP was placed.R fem  TLC was placed. Pt was given an additional 300 mg amiodarone, Lasix 160 mg,  Cangrelor gtt (since no Asa or P2Y12 had been given), Asa 325, Brilinta 180 (after the case), and required NE infusion to maintain MAP.      He went to CT scanner, then to  for further management.     SHx: Lives in community, unable to obtain additional details  FHx: NC to CC  NKDA  Home meds:   Asa 325  Lipitor 80  Coreg 6.25 BID  Losartan 100  Metformin          Hospital Course:     As stated above, patient presented with an acute STEMI. Peak troponin was around 17 and patient had an ECHO which showed an EF of 23% with LAD territory akinesis. Patient's previous ECHO showed similar findings in 2012. Therefore, patient's LAD likely had very limited viability from a prevous MI which explains the relatively low troponin and unchanged EF. During the case, patient had an elevated LVEDP; therefore, IABP was placed. Patient was also  on ionotropes to help support heart function. Patient had SG cath placed and showed elevated SVR. Patient was started on afterload reduction and switched to  and was able to decrease his PCWP and facilitate extubation. He was able to transition to oral medications including Lisinopril 10/5mg BID and Coreg 3.125mg BID. Regarding his CAD, he will be on DAPT with ASA and Brillenta as well as Atorvastatin 80mg. Regarding his volume status, patient will on Lasix 40mg BID at home.     EP was consulted for ICD placement. Given recent MI, patient will discharge with [facility] with repeat ECHO in 3 months.     He will need to follow with [facility] for uptitration of CHF medications.        Discharge diet: Cardiac   Discharge activity: Activity as tolerated   Discharge follow-up: Follow up with primary care provider in 7 days           Discharge Disposition:   Discharged to home      Jason Matos MD     Thank you for allowing me to care for this patient. This has been discussed with the attending physician.    Javi Ortiz PGY-4   Cardiology Fellow   Pager: [phone]      I have seen and examined the patient on September 20, 2018 and agree with the above outlined hospital course and discharge plan.    Greater than 30 minutes were spent in discharge planning between patient education, medication teaching and in he coordination of care with outpatient providers.    Jason Matos MD   of Medicine  [facility] Division of Cardiology

## 2018-10-02 NOTE — MR AVS SNAPSHOT
After Visit Summary   10/2/2018    Kelsy Nolan    MRN: [id number]           Patient Information     Date Of Birth          1955        Visit Information        Provider Department      10/2/2018 7:00 AM Nela March NP [facility]        Today's Diagnoses     ST elevation myocardial infarction involving left anterior descending (LAD) coronary artery (H)          Care Instructions    You were seen today in the Cardiovascular Clinic at the [facility].     Cardiology Providers you saw during your visit: Nela March NP     Follow up and medication changes:  1. Increase coreg (carvedilol) to 6.25mg twice daily  2. In one week (Tuesday 10/9), increase lisinopril to 10mg twice daily  3. Follow up with your primary care provider re: diabetes  4. Return to clinic to see Dr. Matos 10/31.      Results for KELSY NOLAN (MRN [id number]) as of 10/2/2018 07:15   Ref. Range 10/2/2018 06:46   Sodium Latest Ref Range: 133 - 144 mmol/L 134   Potassium Latest Ref Range: 3.4 - 5.3 mmol/L 4.5   Chloride Latest Ref Range: 94 - 109 mmol/L 100   Carbon Dioxide Latest Ref Range: 20 - 32 mmol/L 26   Urea Nitrogen Latest Ref Range: 7 - 30 mg/dL 18   Creatinine Latest Ref Range: 0.66 - 1.25 mg/dL 0.79   GFR Estimate Latest Ref Range: >60 mL/min/1.7m2 >90   GFR Estimate If Black Latest Ref Range: >60 mL/min/1.7m2 >90   Calcium Latest Ref Range: 8.5 - 10.1 mg/dL 9.0   Anion Gap Latest Ref Range: 3 - 14 mmol/L 8   N-Terminal Pro Bnp Latest Ref Range: 0 - 125 pg/mL 812 (H)   Glucose Latest Ref Range: 70 - 99 mg/dL 164 (H)       Please limit your fluid intake to 2 L (68 ounces) daily.  2 Liters a day = 8.5 cups, or 68 ounces.  Please limit your salt intake to 2 grams a day or less.     If you gain 2# in 24 hours or 5# in one week call Meli Alfred RN so we can adjust your medications as needed over the phone.     Please feel free to call me with any questions or concerns.       Meli Alfred  "RN  [facility]  Cardiology Care Coordinator-Heart Failure Clinic     Questions and [phone].   First press #1 for the University and then press #3 for \"Medical Questions\" to reach us Cardiology Nurses.      On Call Cardiologist for after hours or on weekends: [phone]   option #4 and ask to speak to the on-call Cardiologist. Inform them you are a CORE/heart failure patient at the [facility].        If you need a medication refill please contact your pharmacy.  Please allow 3 business days for your refill to be completed.  _______________________________________________________  C.O.R.E. CLINIC Cardiomyopathy, Optimization, Rehabilitation, Education   The C.O.R.E. CLINIC is a heart failure specialty clinic within the [facility] where you will work with specialized nurse practitioners dedicated to helping patients with heart failure carefully adjust medications, receive education, and learn who and when to call if symptoms develop. They specialize in helping you better understand your condition, slow the progression of your disease, improve the length and quality of your life, help you detect future heart problems before they become life threatening, and avoid hospitalizations.  As always, thank you for trusting us with your health care needs!          Follow-ups after your visit        Your next 10 appointments already scheduled     Oct 31, 2018  7:30 AM CDT   (Arrive by 7:15 AM)   NEW HEART FAILURE with Jason Matos MD   [facility] ([facility])    [address]  [address]  [place] [facility] [place] [facility] [id number] [phone]            Oct 31, 2018  7:00 PM CDT   Lab with  LAB   [facility] ([facility])    [address]  1st Floor  [place] [id number] [phone]            2018  3:30 PM CST   (Arrive by 3:15 PM)   Return Visit with Taryn Lou, " "MD   [facility] ([facility])    [address]  4th Floor  [facility] [id number] [phone]            Jan 02, 2019  8:00 AM CST   Ech Complete with [id number]   [facility] ([facility])    [address]  [address] [facility] [id number] [phone]           1.  Please bring or wear a comfortable two-piece outfit. 2.  You may eat, drink and take your normal medicines. 3.  For any questions that cannot be answered, please contact the ordering physician 4.  Please do not wear perfumes or scented lotions on the day of your exam.            Jan 02, 2019  9:00 AM CST   (Arrive by 8:45 AM)   NEW ARRHYTHMIA with Mick Mckeon MD   [facility] ([facility])    [address] [place] [address]  [address]  [facility] [id number] [phone]              Who to contact     If you have questions or need follow up information about today's clinic visit or your schedule please contact [facility] directly at [phone].  Normal or non-critical lab and imaging results will be communicated to you by [facility], letter or phone within 4 business days after the clinic has received the results. If you do not hear from us within 7 days, please contact the clinic through [employer] or phone. If you have a critical or abnormal lab result, we will notify you by phone as soon as possible.  Submit refill requests through [employer] or call your pharmacy and they will forward the refill request to us. Please allow 3 business days for your refill to be completed.          Additional Information About Your Visit        WebLayers Information     WebLayers lets you send messages to your doctor, view your test results, renew your prescriptions, schedule appointments and more. To sign up, go to [email or web address] . Click on \"Log in\" on the left side of the screen, which will take you to the Welcome page. Then click on \"Sign " "up Now\" on the right side of the page.     You will be asked to enter the access code listed below, as well as some personal information. Please follow the directions to create your username and password.     Your access code is: [email or web address]  Expires: 2018  1:06 PM     Your access code will  in 90 days. If you need help or a new code, please call your [facility] clinic or [phone].        Care EveryWhere ID     This is your Care EveryWhere ID. This could be used by other organizations to access your [facility] medical records  [id number]        Your Vitals Were     Pulse Height Pulse Oximetry BMI (Body Mass Index)          88 1.676 m (5' 6\") 96% 28.46 kg/m2         Blood Pressure from Last 3 Encounters:   10/02/18 120/85   18 130/83   18 120/86    Weight from Last 3 Encounters:   10/02/18 80 kg (176 lb 4.8 oz)   18 81.1 kg (178 lb 14.4 oz)   18 78.5 kg (173 lb 1.6 oz)              Today, you had the following     No orders found for display         Today's Medication Changes          These changes are accurate as of 10/2/18  7:59 AM.  If you have any questions, ask your nurse or doctor.               These medicines have changed or have updated prescriptions.        Dose/Directions    carvedilol 3.125 MG tablet   Commonly known as:  COREG   This may have changed:  how much to take   Used for:  ST elevation myocardial infarction involving left anterior descending (LAD) coronary artery (H)   Changed by:  Nela March NP        Dose:  6.25 mg   2 tablets (6.25 mg) by Oral or Feeding Tube route 2 times daily (with meals)   Quantity:  120 tablet   Refills:  1       lisinopril 10 MG tablet   Commonly known as:  PRINIVIL/ZESTRIL   This may have changed:    - medication strength  - how much to take  - how to take this  - when to take this   Used for:  ST elevation myocardial infarction involving left anterior descending (LAD) coronary artery (H)   Changed by:  Nela March NP "        Dose:  10 mg   Take 1 tablet (10 mg) by mouth 2 times daily Take 10 mg in the morning and 5mg at night   Quantity:  60 tablet   Refills:  1            Where to get your medicines      These medications were sent to [facility] [place], [place] - [address]  [address], [facility] [address]     Phone:  [phone]     carvedilol 3.125 MG tablet    lisinopril 10 MG tablet                Primary Care Provider Office Phone # Fax #    Taryn Carter Chichi Lou [phone]       [address]  [place] [place] [address]        Equal Access to Services     [facility]: Hadii luis esteves hadfelipe Soyvette, waaxda lukeyannaadaha, qaybta kaalmada adebeckyyada, issac mina. So [facility] [phone].    ATENCIÓN: Si habla español, tiene a jordan disposición servicios gratuitos de asistencia lingüística. [facility] [phone].    We comply with applicable federal civil rights laws and [place] laws. We do not discriminate on the basis of race, color, national origin, age, disability, sex, sexual orientation, or gender identity.            Thank you!     Thank you for choosing [facility]  for your care. Our goal is always to provide you with excellent care. Hearing back from our patients is one way we can continue to improve our services. Please take a few minutes to complete the written survey that you may receive in the mail after your visit with us. Thank you!             Your Updated Medication List - Protect others around you: Learn how to safely use, store and throw away your medicines at www.disposemymeds.org.          This list is accurate as of 10/2/18  7:59 AM.  Always use your most recent med list.                   Brand Name Dispense Instructions for use Diagnosis    aspirin 81 MG chewable tablet     30 tablet    Take 1 tablet (81 mg) by mouth daily    ST elevation myocardial infarction involving left anterior descending (LAD) coronary  artery (H)       atorvastatin 80 MG tablet    LIPITOR    90 tablet    Take 1 tablet (80 mg) by mouth daily Needs labs done prior to the next refill.    Type 2 diabetes mellitus without complication, without long-term current use of insulin (H), Benign essential hypertension, Mixed hyperlipidemia       carvedilol 3.125 MG tablet    COREG    120 tablet    2 tablets (6.25 mg) by Oral or Feeding Tube route 2 times daily (with meals)    ST elevation myocardial infarction involving left anterior descending (LAD) coronary artery (H)       furosemide 40 MG tablet    LASIX    60 tablet    Take 1 tablet (40 mg) by mouth 2 times daily    Chronic systolic congestive heart failure (H)       lisinopril 10 MG tablet    PRINIVIL/ZESTRIL    60 tablet    Take 1 tablet (10 mg) by mouth 2 times daily Take 10 mg in the morning and 5mg at night    ST elevation myocardial infarction involving left anterior descending (LAD) coronary artery (H)       metFORMIN 500 MG 24 hr tablet    GLUCOPHAGE-XR    180 tablet    Take 2 tablets (1,000 mg) by mouth 2 times daily (with meals)    Type 2 diabetes mellitus without complication, without long-term current use of insulin (H)       MULTIPLE VITAMIN PO      Take 1 tablet by mouth daily.        ticagrelor 90 MG tablet    BRILINTA    60 tablet    Take 1 tablet (90 mg) by mouth 2 times daily    Cardiac arrest (H), ST elevation myocardial infarction involving left anterior descending (LAD) coronary artery (H)

## 2018-10-02 NOTE — NURSING NOTE
Chief Complaint   Patient presents with     New Patient     Reason for visit: NEW CORE: 63 year old male presents s/p STEMI and cardiac arrest with ICM for establishment of care with labs prior.     Vitals were taken and medications were reconciled.     JAROD Calderón  7:03 AM

## 2018-10-17 ENCOUNTER — TELEPHONE (OUTPATIENT)
Dept: CARDIOLOGY | Facility: CLINIC | Age: 63
End: 2018-10-17

## 2018-10-17 NOTE — TELEPHONE ENCOUNTER
Called Cedric to discuss. He states his wife is taking care of this and that it should be discussed with her. Called Jolly to discuss. Per message received, patient needs PT orders. 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 Per wife Jolly, therapy was discussed at visit with Nela March NP. After discussion with Nela March NP; patient should be scheduled for Cardiac Rehab. Let Jolly know that we can fax the cardiac rehab order/referral to anywhere. She will discuss with their insurance and call us back to let us know where they would like the order faxed. Will await call back.

## 2018-10-17 NOTE — TELEPHONE ENCOUNTER
Health Call Center    Phone Message    May a detailed message be left on voicemail: no    Reason for Call: Other: Pt's wife Jolly called to request PT orders sent to [facility] in [place],  # [phone]. Please send urgently as Pt has not had PT yet. Please call Pt on his cell phone, [phone]    Action Taken: Message routed to:  [facility]ARDIOLOGY ADULT [facility]

## 2018-10-17 NOTE — TELEPHONE ENCOUNTER
[facility]    Phone Message    May a detailed message be left on voicemail: yes    Reason for Call: Other: Pt's wife would like a call back regarding the orders. She said pt gets confused so please call her back. Thanks!     Action Taken: Message routed to:  [facility] Cardiology

## 2018-10-26 ENCOUNTER — OFFICE VISIT (OUTPATIENT)
Dept: AUDIOLOGY | Facility: CLINIC | Age: 63
End: 2018-10-26
Payer: COMMERCIAL

## 2018-10-26 DIAGNOSIS — H90.3 SENSORINEURAL HEARING LOSS, ASYMMETRICAL: Primary | ICD-10-CM

## 2018-10-26 NOTE — MR AVS SNAPSHOT
After Visit Summary   10/26/2018    Anson Manriquez    MRN: [id number]           Patient Information     Date Of Birth          1955        Visit Information        Provider Department      10/26/2018 11:30 AM Alexandra Khan AuD [facility] Audiology        Today's Diagnoses     Sensorineural hearing loss, asymmetrical    -  1       Follow-ups after your visit        Your next 10 appointments already scheduled     Oct 30, 2018  9:30 AM CDT   Cardiac Evaluation with  Cardiac Rehab 1   [facility] [place], Cardiac Rehabilitation ([facility])    [facility] 1st Floor 19  [facility] [address]   [phone]            Oct 31, 2018  7:30 AM CDT   (Arrive by 7:15 AM)   NEW HEART FAILURE with Jason Matos MD   [facility] Heart Care ([facility])    [address] [place] [address]  [address] [place] [address]   [phone]            Oct 31, 2018  7:00 PM CDT   Lab with  LAB   [facility] Lab ([facility])    [address] [place] [address]  1st [facility] [address]   [phone]            Nov 27, 2018  3:30 PM CST   (Arrive by 3:15 PM)   Return Visit with Taryn Lou MD   [facility] ([facility])    [address] [place] [address]  4th Floor  [facility] [address]   [phone]            Jan 02, 2019  8:00 AM CST   Ech Complete with UCECHCR4   [facility] Echo ([facility])    [address] [place] [address]  3rd Floor  [facility] [address] [phone]           1.  Please bring or wear a comfortable two-piece outfit. 2.  You may eat, drink and take your normal medicines. 3.  For any questions that cannot be answered, please contact the ordering physician 4.  Please do not wear perfumes or scented lotions on the day of your exam.            Jan 02, 2019  9:00 AM CST   (Arrive by 8:45 AM)   NEW ARRHYTHMIA with  Mick Mckeon MD   [facility] ([facility])    [address] [facility] [address] [facility] [address]  [address]  [facility] [address] [phone]              Who to contact     Please call your clinic at [phone] to:    Ask questions about your health    Make or cancel appointments    Discuss your medicines    Learn about your test results    Speak to your doctor            Additional Information About Your Visit        MyChart Information     [employer] is an electronic gateway that provides easy, online access to your medical records. With [employer], you can request a clinic appointment, read your test results, renew a prescription or communicate with your care team.     To sign up for [employer] visit the website at [email or web address]   You will be asked to enter the access code listed below, as well as some personal information. Please follow the directions to create your username and password.     Your access code is: [email or web address]  Expires: 2018  1:06 PM     Your access code will  in 90 days. If you need help or a new code, please contact your [facility] or call [phone] for assistance.        Care EveryWhere ID     This is your Care EveryWhere ID. This could be used by other organizations to access your [place] medical records  [id number]         Blood Pressure from Last 3 Encounters:   10/02/18 120/85   18 130/83   18 120/86    Weight from Last 3 Encounters:   10/02/18 80 kg (176 lb 4.8 oz)   18 81.1 kg (178 lb 14.4 oz)   18 78.5 kg (173 lb 1.6 oz)              We Performed the Following     No Charge, Hearing Aid Clinic Visit        Primary Care Provider Office Phone # Fax #    Taryn Lou [phone]       [address] [place] [address]        Equal Access to Services     JEFFREY KEYS AH: Lillian Reeder, hannah plaza, petrona wood, issac belcher  dang moyangocsummer salas'aan ah. So [facility] [phone].    ATENCIÓN: Si emilla tami, tiene a jordan disposición servicios gratuitos de asistencia lingüística. Toan al [phone].    We comply with applicable federal civil rights laws and [place] laws. We do not discriminate on the basis of race, color, national origin, age, disability, sex, sexual orientation, or gender identity.            Thank you!     Thank you for choosing [facility] AUDIOLOGY  for your care. Our goal is always to provide you with excellent care. Hearing back from our patients is one way we can continue to improve our services. Please take a few minutes to complete the written survey that you may receive in the mail after your visit with us. Thank you!             Your Updated Medication List - Protect others around you: Learn how to safely use, store and throw away your medicines at www.disposemymeds.org.          This list is accurate as of 10/26/18  4:44 PM.  Always use your most recent med list.                   Brand Name Dispense Instructions for use Diagnosis    aspirin 81 MG chewable tablet     30 tablet    Take 1 tablet (81 mg) by mouth daily    ST elevation myocardial infarction involving left anterior descending (LAD) coronary artery (H)       atorvastatin 80 MG tablet    LIPITOR    90 tablet    Take 1 tablet (80 mg) by mouth daily Needs labs done prior to the next refill.    Type 2 diabetes mellitus without complication, without long-term current use of insulin (H), Benign essential hypertension, Mixed hyperlipidemia       carvedilol 3.125 MG tablet    COREG    120 tablet    2 tablets (6.25 mg) by Oral or Feeding Tube route 2 times daily (with meals)    ST elevation myocardial infarction involving left anterior descending (LAD) coronary artery (H)       furosemide 40 MG tablet    LASIX    60 tablet    Take 1 tablet (40 mg) by mouth 2 times daily    Chronic systolic congestive heart failure (H)       lisinopril 10 MG tablet     PRINIVIL/ZESTRIL    60 tablet    Take 1 tablet (10 mg) by mouth 2 times daily    ST elevation myocardial infarction involving left anterior descending (LAD) coronary artery (H)       metFORMIN 500 MG 24 hr tablet    GLUCOPHAGE-XR    180 tablet    Take 2 tablets (1,000 mg) by mouth 2 times daily (with meals)    Type 2 diabetes mellitus without complication, without long-term current use of insulin (H)       MULTIPLE VITAMIN PO      Take 1 tablet by mouth daily.        ticagrelor 90 MG tablet    BRILINTA    60 tablet    Take 1 tablet (90 mg) by mouth 2 times daily    Cardiac arrest (H), ST elevation myocardial infarction involving left anterior descending (LAD) coronary artery (H)

## 2018-10-26 NOTE — PROGRESS NOTES
"AUDIOLOGY REPORT    BACKGROUND INFORMATION: Anson Manriquez was seen in the Audiology Clinic at the [facility] on 10/26/2018 for follow-up.  The patient has been seen previously in this clinic on 4/17/17 and results revealed a bilateral asymmetric sensorineural hearing loss.  The patient was fit with binaural Signia Pure 5px on 5/18/17.  The patient reports that the hearing aids are not working. He presented to the clinic initially to use the \"walk-in\" services.    TEST RESULTS AND PROCEDURES: A hearing aid check was performed. The hearing aids were thoroughly cleaned, debris was noted to be in the microphones.  Once the debris was removed, the hearing aids sounded crisp and clear with no distortion or weakness noted.    Following the cleaning, he reported good sound quality and did not want any changes made to the programming.     He had questions regarding using the devices with his new cell phone. The hearing aids were connected and worked with the phone ronda.    SUMMARY AND RECOMMENDATIONS: A hearing aid check was performed today and the patient reports the devices are not working.  Adjustments were made as noted above and the patient will return as needed or at least every 4-6 months for cleaning and assessment of hearing aid.  Call this clinic with questions regarding today s visit.      Lawanda Toledo  Audiologist  [place] License  #[id number]    "

## 2018-10-29 ENCOUNTER — CARE COORDINATION (OUTPATIENT)
Dept: CARDIOLOGY | Facility: CLINIC | Age: 63
End: 2018-10-29

## 2018-10-29 DIAGNOSIS — I42.9 CARDIOMYOPATHY, UNSPECIFIED TYPE (H): Primary | ICD-10-CM

## 2018-10-30 ENCOUNTER — PATIENT OUTREACH (OUTPATIENT)
Dept: CARE COORDINATION | Facility: CLINIC | Age: 63
End: 2018-10-30

## 2018-10-31 ENCOUNTER — TELEPHONE (OUTPATIENT)
Dept: CARDIOLOGY | Facility: CLINIC | Age: 63
End: 2018-10-31

## 2018-10-31 ENCOUNTER — OFFICE VISIT (OUTPATIENT)
Dept: CARDIOLOGY | Facility: CLINIC | Age: 63
End: 2018-10-31
Attending: INTERNAL MEDICINE
Payer: COMMERCIAL

## 2018-10-31 VITALS
BODY MASS INDEX: 29.17 KG/M2 | DIASTOLIC BLOOD PRESSURE: 78 MMHG | OXYGEN SATURATION: 98 % | WEIGHT: 181.5 LBS | SYSTOLIC BLOOD PRESSURE: 125 MMHG | HEIGHT: 66 IN | HEART RATE: 87 BPM

## 2018-10-31 DIAGNOSIS — I21.02 ST ELEVATION MYOCARDIAL INFARCTION INVOLVING LEFT ANTERIOR DESCENDING (LAD) CORONARY ARTERY (H): ICD-10-CM

## 2018-10-31 DIAGNOSIS — N18.30 CKD (CHRONIC KIDNEY DISEASE) STAGE 3, GFR 30-59 ML/MIN (H): ICD-10-CM

## 2018-10-31 DIAGNOSIS — E78.2 MIXED HYPERLIPIDEMIA: ICD-10-CM

## 2018-10-31 DIAGNOSIS — I50.20 HEART FAILURE WITH REDUCED EJECTION FRACTION, NYHA CLASS III (H): Primary | ICD-10-CM

## 2018-10-31 DIAGNOSIS — I42.9 CARDIOMYOPATHY, UNSPECIFIED TYPE (H): ICD-10-CM

## 2018-10-31 DIAGNOSIS — I10 BENIGN ESSENTIAL HYPERTENSION: ICD-10-CM

## 2018-10-31 LAB
ALBUMIN SERPL-MCNC: 4.1 G/DL (ref 3.4–5)
ALP SERPL-CCNC: 53 U/L (ref 40–150)
ALT SERPL W P-5'-P-CCNC: 26 U/L (ref 0–70)
ANION GAP SERPL CALCULATED.3IONS-SCNC: 10 MMOL/L (ref 3–14)
AST SERPL W P-5'-P-CCNC: 16 U/L (ref 0–45)
BILIRUB SERPL-MCNC: 0.7 MG/DL (ref 0.2–1.3)
BUN SERPL-MCNC: 18 MG/DL (ref 7–30)
CALCIUM SERPL-MCNC: 9 MG/DL (ref 8.5–10.1)
CHLORIDE SERPL-SCNC: 105 MMOL/L (ref 94–109)
CO2 SERPL-SCNC: 24 MMOL/L (ref 20–32)
CREAT SERPL-MCNC: 0.88 MG/DL (ref 0.66–1.25)
ERYTHROCYTE [DISTWIDTH] IN BLOOD BY AUTOMATED COUNT: 14.6 % (ref 10–15)
GFR SERPL CREATININE-BSD FRML MDRD: 88 ML/MIN/1.7M2
GLUCOSE SERPL-MCNC: 152 MG/DL (ref 70–99)
HCT VFR BLD AUTO: 39 % (ref 40–53)
HGB BLD-MCNC: 12.9 G/DL (ref 13.3–17.7)
MCH RBC QN AUTO: 30.9 PG (ref 26.5–33)
MCHC RBC AUTO-ENTMCNC: 33.1 G/DL (ref 31.5–36.5)
MCV RBC AUTO: 93 FL (ref 78–100)
NT-PROBNP SERPL-MCNC: 934 PG/ML (ref 0–125)
PLATELET # BLD AUTO: 257 10E9/L (ref 150–450)
POTASSIUM SERPL-SCNC: 4.4 MMOL/L (ref 3.4–5.3)
PROT SERPL-MCNC: 8 G/DL (ref 6.8–8.8)
RBC # BLD AUTO: 4.18 10E12/L (ref 4.4–5.9)
SODIUM SERPL-SCNC: 139 MMOL/L (ref 133–144)
WBC # BLD AUTO: 8.6 10E9/L (ref 4–11)

## 2018-10-31 PROCEDURE — G0463 HOSPITAL OUTPT CLINIC VISIT: HCPCS | Mod: ZF

## 2018-10-31 PROCEDURE — 36415 COLL VENOUS BLD VENIPUNCTURE: CPT | Performed by: INTERNAL MEDICINE

## 2018-10-31 PROCEDURE — 99214 OFFICE O/P EST MOD 30 MIN: CPT | Mod: ZP | Performed by: INTERNAL MEDICINE

## 2018-10-31 PROCEDURE — 83880 ASSAY OF NATRIURETIC PEPTIDE: CPT | Performed by: INTERNAL MEDICINE

## 2018-10-31 PROCEDURE — 85027 COMPLETE CBC AUTOMATED: CPT | Performed by: INTERNAL MEDICINE

## 2018-10-31 PROCEDURE — 80053 COMPREHEN METABOLIC PANEL: CPT | Performed by: INTERNAL MEDICINE

## 2018-10-31 RX ORDER — CARVEDILOL 12.5 MG/1
12.5 TABLET ORAL 2 TIMES DAILY WITH MEALS
Qty: 90 TABLET | Refills: 3 | Status: SHIPPED | OUTPATIENT
Start: 2018-10-31 | End: 2018-12-27

## 2018-10-31 RX ORDER — CARVEDILOL 12.5 MG/1
12.5 TABLET ORAL 2 TIMES DAILY WITH MEALS
Qty: 90 TABLET | Refills: 3 | Status: SHIPPED | OUTPATIENT
Start: 2018-10-31 | End: 2018-10-31

## 2018-10-31 ASSESSMENT — PAIN SCALES - GENERAL: PAINLEVEL: NO PAIN (0)

## 2018-10-31 NOTE — NURSING NOTE
Chief Complaint   Patient presents with     Follow Up For     history of HTN, DMII, CAD (s/p MI in 2007, prior remote pLAD stenting, and recent STEMI 9/2018), and ICM     Vitals were taken and medications were reconciled.    Demetria Reed MA    7:22 AM

## 2018-10-31 NOTE — TELEPHONE ENCOUNTER
Prior Authorization Retail Medication Request    Medication/Dose: Entresto 24-26 MG twice daily.  ICD code (if different than what is on RX):    Previously Tried and Failed:  Switching from Lisinopril for better effect  Rationale:  Better effect    Insurance Name:    Primary Payor: [employer] Plan: [employer]   Subscriber: Jolly Manriquez Group #: [id number]   Subscriber Sex: Female       Subscriber : 1958 Patient Rel'ship: Spouse   Subscriber Effective Date:   Member Effective Date: 2017    Subscriber ID [id number] Member ID: [id number]       Insurance ID:        Pharmacy Information (if different than what is on RX)  Name:    Phone:

## 2018-10-31 NOTE — MR AVS SNAPSHOT
After Visit Summary   10/31/2018    Anson Manriquez    MRN: [id number]           Patient Information     Date Of Birth          1955        Visit Information        Provider Department      10/31/2018 7:30 AM Jason Matos MD [facility] Care        Today's Diagnoses     Heart failure with reduced ejection fraction, NYHA class III (H)    -  1    ST elevation myocardial infarction involving left anterior descending (LAD) coronary artery (H)          Care Instructions    Please increase Coreg to 12.5 MG twice daily.    Please stop taking Lisinopril today.  Start taking Entresto twice daily on Saturday.    Thank you for your visit today.  Please call me with any questions or concerns.   Vladimir Servin RN  Cardiology Care Coordinator  [phone], press option 1 then option 3          Follow-ups after your visit        Additional Services     Follow-Up with Electrophysiologist       ICD placement assessment/discussion            Follow-Up with Cardiologist       Ignore the order for EP appointment,  patient is already scheduled. Please schedule a follow up with Dr. Matos at the end of January.                  Your next 10 appointments already scheduled     Oct 31, 2018  7:00 PM CDT   Lab with  LAB   [facility] Lab ([facility])    [address] [place] [address]   [phone]            Nov 01, 2018  3:00 PM CDT   Cardiac Evaluation with  Cardiac Rehab 1   [facility] [place], Cardiac Rehabilitation ([facility])    [address] [place] [address] 1st Floor 19  [facility] [address]   [phone]            Nov 27, 2018  3:30 PM CST   (Arrive by 3:15 PM)   Return Visit with Taryn Lou MD   [facility] Clinic ([facility])    [address] [place] [address]  4th Floor  [facility] [address]   [phone]            Jan 02, 2019  8:00 AM CST   Ech  Complete with UCECHCR4    Health Echo ([facility])    [address]  3rd Floor  [facility] [address] [phone]           1.  Please bring or wear a comfortable two-piece outfit. 2.  You may eat, drink and take your normal medicines. 3.  For any questions that cannot be answered, please contact the ordering physician 4.  Please do not wear perfumes or scented lotions on the day of your exam.            Jan 02, 2019  9:00 AM CST   (Arrive by 8:45 AM)   NEW ARRHYTHMIA with Mick Mckeon MD   [facility] Heart Care ([facility])    [address]  [address]  [facility] [address] [phone]            Jan 31, 2019 10:00 AM CST   (Arrive by 9:45 AM)   RETURN HEART FAILURE with Jason Matos MD   [facility] Heart Care ([facility])    [address]  [address]  [facility] [id number] [phone]              Future tests that were ordered for you today     Open Future Orders        Priority Expected Expires Ordered    Follow-Up with Cardiologist Routine 1/29/2019 10/31/2019 10/31/2018    Follow-Up with Electrophysiologist Routine 11/7/2018 10/31/2019 10/31/2018            Who to contact     If you have questions or need follow up information about today's clinic visit or your schedule please contact [facility] directly at [phone].  Normal or non-critical lab and imaging results will be communicated to you by MyChart, letter or phone within 4 business days after the clinic has received the results. If you do not hear from us within 7 days, please contact the clinic through MyChart or phone. If you have a critical or abnormal lab result, we will notify you by phone as soon as possible.  Submit refill requests through [employer] or call your pharmacy and they will forward the refill request to us. Please allow 3 business days for your refill to be completed.          Additional Information About Your  "Visit        Care EveryWhere ID     This is your Care EveryWhere ID. This could be used by other organizations to access your [place] medical records  [id number]        Your Vitals Were     Pulse Height Pulse Oximetry BMI (Body Mass Index)          87 1.676 m (5' 6\") 98% 29.29 kg/m2         Blood Pressure from Last 3 Encounters:   10/31/18 125/78   10/02/18 120/85   09/27/18 130/83    Weight from Last 3 Encounters:   10/31/18 82.3 kg (181 lb 8 oz)   10/02/18 80 kg (176 lb 4.8 oz)   09/27/18 81.1 kg (178 lb 14.4 oz)                 Today's Medication Changes          These changes are accurate as of 10/31/18  8:48 AM.  If you have any questions, ask your nurse or doctor.               Start taking these medicines.        Dose/Directions    carvedilol 12.5 MG tablet   Commonly known as:  COREG   Used for:  ST elevation myocardial infarction involving left anterior descending (LAD) coronary artery (H), Heart failure with reduced ejection fraction, NYHA class III (H)   Started by:  Jason Matos MD        Dose:  12.5 mg   1 tablet (12.5 mg) by Oral or Feeding Tube route 2 times daily (with meals)   Quantity:  90 tablet   Refills:  3       sacubitril-valsartan 24-26 MG per tablet   Commonly known as:  ENTRESTO   Used for:  Heart failure with reduced ejection fraction, NYHA class III (H)   Started by:  Jason Matos MD        Dose:  1 tablet   Take 1 tablet by mouth 2 times daily   Quantity:  180 tablet   Refills:  1         Stop taking these medicines if you haven't already. Please contact your care team if you have questions.     lisinopril 10 MG tablet   Commonly known as:  PRINIVIL/ZESTRIL   Stopped by:  Jason Matos MD                Where to get your medicines      These medications were sent to [facility] [place], [place] - [address] [place] [address]     Phone:  [phone]     carvedilol 12.5 MG tablet         These medications were sent to [facility] " [place], [place] - [address]  [address], [facility] [address]    Hours:  TRANSPLANT PHONE NUMBER [phone] Phone:  [phone]     sacubitril-valsartan 24-26 MG per tablet                Primary Care Provider Office Phone # Fax #    Taryn Emma Lou [phone]       [address] [facility] [address]        Equal Access to Services     JEFFREY KEYS : Hadii aad ku hadasho Soomaali, waaxda luqadaha, qaybta kaalmada adeegyada, waxay idiin hayaan adeeg kharash lageri mina. So [facility] [phone].    ATENCIÓN: Si habla español, tiene a jordan disposición servicios gratuitos de asistencia lingüística. Llame al [phone].    We comply with applicable federal civil rights laws and [place] laws. We do not discriminate on the basis of race, color, national origin, age, disability, sex, sexual orientation, or gender identity.            Thank you!     Thank you for choosing [facility]  for your care. Our goal is always to provide you with excellent care. Hearing back from our patients is one way we can continue to improve our services. Please take a few minutes to complete the written survey that you may receive in the mail after your visit with us. Thank you!             Your Updated Medication List - Protect others around you: Learn how to safely use, store and throw away your medicines at www.disposemymeds.org.          This list is accurate as of 10/31/18  8:48 AM.  Always use your most recent med list.                   Brand Name Dispense Instructions for use Diagnosis    aspirin 81 MG chewable tablet     30 tablet    Take 1 tablet (81 mg) by mouth daily    ST elevation myocardial infarction involving left anterior descending (LAD) coronary artery (H)       atorvastatin 80 MG tablet    LIPITOR    90 tablet    Take 1 tablet (80 mg) by mouth daily Needs labs done prior to the next refill.    Type 2 diabetes mellitus without  complication, without long-term current use of insulin (H), Benign essential hypertension, Mixed hyperlipidemia       carvedilol 12.5 MG tablet    COREG    90 tablet    1 tablet (12.5 mg) by Oral or Feeding Tube route 2 times daily (with meals)    ST elevation myocardial infarction involving left anterior descending (LAD) coronary artery (H), Heart failure with reduced ejection fraction, NYHA class III (H)       furosemide 40 MG tablet    LASIX    60 tablet    Take 1 tablet (40 mg) by mouth 2 times daily    Chronic systolic congestive heart failure (H)       metFORMIN 500 MG 24 hr tablet    GLUCOPHAGE-XR    180 tablet    Take 2 tablets (1,000 mg) by mouth 2 times daily (with meals)    Type 2 diabetes mellitus without complication, without long-term current use of insulin (H)       MULTIPLE VITAMIN PO      Take 1 tablet by mouth daily.        sacubitril-valsartan 24-26 MG per tablet    ENTRESTO    180 tablet    Take 1 tablet by mouth 2 times daily    Heart failure with reduced ejection fraction, NYHA class III (H)       ticagrelor 90 MG tablet    BRILINTA    60 tablet    Take 1 tablet (90 mg) by mouth 2 times daily    Cardiac arrest (H), ST elevation myocardial infarction involving left anterior descending (LAD) coronary artery (H)       vitamin B complex with vitamin C Tabs tablet      Take 1 tablet by mouth daily

## 2018-10-31 NOTE — LETTER
10/31/2018      RE: Anson Manriquez  [address] [facility] [address]       Dear Colleague,    Thank you for the opportunity to participate in the care of your patient, Anson Manriquez, at the [facility] HE[facility]. Please see a copy of my visit note below.    October 31, 2018    Mr. Anson Manriquez is a 63yr old male with a history of HTN, DMII, CAD (s/p STEMI in 2007 with pLAD stenting, and recent STEMI 9/2018), and ICM complicated by cardiogenic shock requiring IABP support with slow wean. Mr. Manriquez was was out with friends and developed acute chest pain and after going home his pain worsened and had syncope, then cardiac arrest. EMS was called, and on arrival provided ~30 seconds of CPR and AED delivered 1 shock.  He was then brought to [facility] in sinus rhythm with a pulse for most of the transport.  During the last 5 mins en route to the ED, he went into a wide-complex tachycardia at a rate of 220bpm, likely VT.  He maintained a pulse, acceptable BP, and mental status.  He received amiodarone 150mg in the ED, and converted to NSR as he was being prepared for cardioversion.  He was then sent to the cath lab, where he was found to have an acute thrombotic lesion of the pLAD within the prior stent.  He was treated with aspiration thrombectomy, and STEVO x2 to pLAD and mLAD, and POBA to D1.  His LVEDP was 40mmHg. Peak troponin was ~17.  Echo showed LVEF 23% with LAD territory akinesis, consistent with echo results from 2012.  Therefore, his LAD had limited viability from his prior MI, which explains his relatively low troponin and unchanged LVEF. He was ultimately started on DAPT with aspirin and Brilinta, lisinopril, carvedilol, and lasix, and discharged home on 9/20/18. His medications were further titrated in core clinic recently.  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  He takes it easy not getting heavy  equipment however feels that he is almost back at his baseline prior to the most recent STEMI.  His weight has been stable and denies any issues with taking medications.  No nausea or dizziness no lightheadedness no syncope and no shocks from the LifeVest.  He maintains no fluid intake.  He is able to lay flat no PND no orthopnea and he has not done cardiac rehab yet.  He has good appetite no other new complaints.      PAST MEDICAL HISTORY:  Past Medical History:   Diagnosis Date     Chronic infection     near rectum still leaking     Coronary artery disease      Diabetes mellitus, type 2 (H)      Heart attack (H) 4/17/2007     Hyperlipidemia      Hypertension      STEMI (ST elevation myocardial infarction) (H) 09/13/2018    s/p STEVO x2     Stented coronary artery 2007     FAMILY HISTORY:  Family History   Problem Relation Age of Onset     Hypertension Mother      Diabetes Father      Glaucoma No family hx of      Macular Degeneration No family hx of       SOCIAL HISTORY:  Social History     Social History     Marital status:      Spouse name: N/A     Number of children: N/A     Years of education: N/A     Social History Main Topics     Smoking status: Never Smoker     Smokeless tobacco: Never Used     Alcohol use No     Drug use: No     Sexual activity: Not on file     Other Topics Concern     Not on file     Social History Narrative     CURRENT MEDICATIONS:  Current Outpatient Prescriptions   Medication     atorvastatin (LIPITOR) 80 MG tablet     aspirin 81 MG chewable tablet     carvedilol (COREG) 3.125 MG tablet     furosemide (LASIX) 40 MG tablet     lisinopril (PRINIVIL/ZESTRIL) 10 MG tablet     metFORMIN (GLUCOPHAGE-XR) 500 MG 24 hr tablet     MULTIPLE VITAMIN PO     ticagrelor (BRILINTA) 90 MG tablet     No current facility-administered medications for this visit.      ROS:   Constitutional: No fever, chills, or sweats. Weight is 181 lbs 8 oz  ENT: No visual disturbance, ear ache, epistaxis, sore  "throat.   Allergies/Immunologic: Negative.   Respiratory: No cough, hemoptysis.   Cardiovascular: As per HPI.   GI: No nausea, vomiting, hematemesis, melena, or hematochezia.   : No urinary frequency, dysuria, or hematuria.   Integument: Negative.   Psychiatric: Pleasant, no major depression noted  Neuro: No focal neurological deficits noted  Endocrinology: Negative.   Musculoskeletal: As per HPI.      EXAM:  Ht 1.676 m (5' 6\")  Wt 82.3 kg (181 lb 8 oz)  BMI 29.29 kg/m2  General: appears comfortable, alert and oriented  Head: normocephalic, atraumatic  Eyes: anicteric sclera, EOMI , PERRL  Neck: no adenopathy  Orophyarynx: moist mucosa, no lesions noted  Heart: regular, S1/S2, no murmurs, rubs or gallop. Estimated JVP at 5 cmH2O  Lungs: CTAB, No wheezing.   Abdomen: soft, non-tender, bowel sounds present, no hepatosplenomegaly  Extremities: No LE edema today  Skin: no open lesions noted  Neuro: grossly non-focal     Labs:  Lab Results   Component Value Date    WBC 8.6 10/31/2018    HGB 12.9 (L) 10/31/2018    HCT 39.0 (L) 10/31/2018     10/31/2018     10/02/2018    POTASSIUM 4.5 10/02/2018    CHLORIDE 100 10/02/2018    CO2 26 10/02/2018    BUN 18 10/02/2018    CR 0.79 10/02/2018     (H) 10/02/2018    NTBNP 812 (H) 10/02/2018    TROPONIN 0.14 (HH) 09/13/2018    TROPI 1.914 () 09/18/2018    AST 28 09/20/2018    ALT 68 09/20/2018    ALKPHOS 60 09/20/2018    BILITOTAL 1.1 09/20/2018    INR 1.04 09/20/2018     Procedures:  Coronary angio:  9/13/18  -Both coronary arteries arise from their respective cusps.  -Dominance: Right  -LM has no evidence of coronary artery disease.  -LAD: Type 3 [LAD supplies the entire apex]. The LAD gives rise to septal perforators, multiple diagonal branches. The proximal LAD has an acute thrombotic lesion within the prior stent. Distal to the stent, there is a 70-80% stenosis. The distal vessel has HALIMA 2 flow. The D1 has thrombus at the ostium likely from " embolization. The apical LAD also has thrombus from embolization.  -LCX gives rise to a large branching OM. The prox Cx has a 20% stenosis. The rest of the Cx system has mild disease.  -RCA (dominant) gives rise to PL branches and supplies PDA. There is minimal disease in the RCA system.     Echo:  9/14/18  Ischemic cardiomyopathy, LVEF=23% with extensive regional wall motion abnormalities as described below. Resting wall motion abnormalities and LV function are not significantly changed from the rest images on the 11/27/12 stress echocardiogram.  Mildly dilated LV with severely reduced LV function, LVEF=23%. There is akinesis involving the fpsco-tr-acj anteroseptal, rei-xj-ecbyav anterior, mid anterolateral, and all apical myocardial segments. The basal anterior, basal anterolateral, and basal inferolateral segments are relatively spared. RV size and function are normal. No significant valvular abnormalities. No pericardial effusion. Normal IVC with abnormal respiratory variation, estimated RA pressure 8 mmHg.      ASSESSMENT AND PLAN:  Assessment and Plan:   Mr. Anson Manriquez is a 63yr old male with a history of HTN, DMII, CAD (s/p MI in 2007, prior remote pLAD stenting, and recent STEMI 9/2018), ICM complicated by cardiac arrest and ventricular tachycardia who presented to the cardiology clinic for further outpatient management and medication titration.  He is followed in core clinic.   Labs from today are essentially in the normal range and offers no complaints.  We will continue to titrate his medications, we will increase his carvedilol to 12.5 mg twice daily dosing as he tolerated the recent increase well.  In addition we will discontinue his lisinopril and start him on Entresto 24 mg twice daily dosing.  This is a little bit on the dosing of the Entresto compared to the lisinopril he tolerated however we will also increase his carvedilol.  As such if his blood pressure remains normal or there is room to  further titrate and I would increase his Entresto to 49 mg in the next 2 weeks.  We discussed that he is to hold lisinopril for 2 days prior to initiating Entresto.  Patient will have repeat echocardiogram early January and afterwards he  Will need to follow-up with our electrophysiology colleagues to see defibrillator options.  I do believe he will need defibrillator implantation given his ejection fraction is very unlikely to improve.  Until that time he is continue his LifeVest.    Continue current diuretic dose and all medications for his ischemic cardiomyopathy and recent PCI including aspirin and Brilinta.  He reports no bleeding.  He does have a small hernia that he seeks surgical treatment options for in the near future however we discussed that the he will need to stay on Brilinta for a year after the PCI.  DMII: HgbA1c 9/14/18 was 8.4%.  He saw his PCP recently, who doubled his metformin.  He will follow up with PCP in 3 months.      I appreciate the opportunity to participate in the care of Anson Manriquez . Please do not hesitate to contact me with any further questions.    Sincerely,   Jason Matos MD     [facility] Division of Cardiology

## 2018-10-31 NOTE — TELEPHONE ENCOUNTER
Central Prior Authorization Team   Phone: [phone]      [place] Initiation    Medication: Entresto 24-26 MG twice daily.  Insurance Company: [employer] - Phone [phone] Fax [phone]  Pharmacy Filling the Rx: [facility] [place], [place] - [address] [place] [address] [phone]  Filling Pharmacy Phone: [phone]  Filling Pharmacy Fax:    Start Date: 10/31/2018

## 2018-10-31 NOTE — PATIENT INSTRUCTIONS
Please increase Coreg to 12.5 MG twice daily.    Please stop taking Lisinopril today.  Start taking Entresto twice daily on Saturday.    Thank you for your visit today.  Please call me with any questions or concerns.   Vladimir Servin RN  Cardiology Care Coordinator  [phone], press option 1 then option 3

## 2018-10-31 NOTE — PROGRESS NOTES
October 31, 2018    Mr. Anson Manriquez is a 63yr old male with a history of HTN, DMII, CAD (s/p STEMI in 2007 with pLAD stenting, and recent STEMI 9/2018), and ICM complicated by cardiogenic shock requiring IABP support with slow wean. Mr. Manriquez was was out with friends and developed acute chest pain and after going home his pain worsened and had syncope, then cardiac arrest. EMS was called, and on arrival provided ~30 seconds of CPR and AED delivered 1 shock.  He was then brought to [facility] in sinus rhythm with a pulse for most of the transport.  During the last 5 mins en route to the ED, he went into a wide-complex tachycardia at a rate of 220bpm, likely VT.  He maintained a pulse, acceptable BP, and mental status.  He received amiodarone 150mg in the ED, and converted to NSR as he was being prepared for cardioversion.  He was then sent to the cath lab, where he was found to have an acute thrombotic lesion of the pLAD within the prior stent.  He was treated with aspiration thrombectomy, and STEVO x2 to pLAD and mLAD, and POBA to D1.  His LVEDP was 40mmHg. Peak troponin was ~17.  Echo showed LVEF 23% with LAD territory akinesis, consistent with echo results from 2012.  Therefore, his LAD had limited viability from his prior MI, which explains his relatively low troponin and unchanged LVEF. He was ultimately started on DAPT with aspirin and Brilinta, lisinopril, carvedilol, and lasix, and discharged home on 9/20/18. His medications were further titrated in core clinic recently.  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  His weight has been stable and denies any issues with taking medications.  No nausea or dizziness no lightheadedness no syncope and no shocks from the LifeVest.  He maintains no fluid intake.  He is able to lay flat no  PND no orthopnea and he has not done cardiac rehab yet.  He has good appetite no other new complaints.      PAST MEDICAL HISTORY:  Past Medical History:   Diagnosis Date     Chronic infection     near rectum still leaking     Coronary artery disease      Diabetes mellitus, type 2 (H)      Heart attack (H) 4/17/2007     Hyperlipidemia      Hypertension      STEMI (ST elevation myocardial infarction) (H) 09/13/2018    s/p STEVO x2     Stented coronary artery 2007     FAMILY HISTORY:  Family History   Problem Relation Age of Onset     Hypertension Mother      Diabetes Father      Glaucoma No family hx of      Macular Degeneration No family hx of       SOCIAL HISTORY:  Social History     Social History     Marital status:      Spouse name: N/A     Number of children: N/A     Years of education: N/A     Social History Main Topics     Smoking status: Never Smoker     Smokeless tobacco: Never Used     Alcohol use No     Drug use: No     Sexual activity: Not on file     Other Topics Concern     Not on file     Social History Narrative     CURRENT MEDICATIONS:  Current Outpatient Prescriptions   Medication     atorvastatin (LIPITOR) 80 MG tablet     aspirin 81 MG chewable tablet     carvedilol (COREG) 3.125 MG tablet     furosemide (LASIX) 40 MG tablet     lisinopril (PRINIVIL/ZESTRIL) 10 MG tablet     metFORMIN (GLUCOPHAGE-XR) 500 MG 24 hr tablet     MULTIPLE VITAMIN PO     ticagrelor (BRILINTA) 90 MG tablet     No current facility-administered medications for this visit.      ROS:   Constitutional: No fever, chills, or sweats. Weight is 181 lbs 8 oz  ENT: No visual disturbance, ear ache, epistaxis, sore throat.   Allergies/Immunologic: Negative.   Respiratory: No cough, hemoptysis.   Cardiovascular: As per HPI.   GI: No nausea, vomiting, hematemesis, melena, or hematochezia.   : No urinary frequency, dysuria, or hematuria.   Integument: Negative.   Psychiatric: Pleasant, no major depression noted  Neuro: No focal  "neurological deficits noted  Endocrinology: Negative.   Musculoskeletal: As per HPI.      EXAM:  Ht 1.676 m (5' 6\")  Wt 82.3 kg (181 lb 8 oz)  BMI 29.29 kg/m2  General: appears comfortable, alert and oriented  Head: normocephalic, atraumatic  Eyes: anicteric sclera, EOMI , PERRL  Neck: no adenopathy  Orophyarynx: moist mucosa, no lesions noted  Heart: regular, S1/S2, no murmurs, rubs or gallop. Estimated JVP at 5 cmH2O  Lungs: CTAB, No wheezing.   Abdomen: soft, non-tender, bowel sounds present, no hepatosplenomegaly  Extremities: No LE edema today  Skin: no open lesions noted  Neuro: grossly non-focal     Labs:  Lab Results   Component Value Date    WBC 8.6 10/31/2018    HGB 12.9 (L) 10/31/2018    HCT 39.0 (L) 10/31/2018     10/31/2018     10/02/2018    POTASSIUM 4.5 10/02/2018    CHLORIDE 100 10/02/2018    CO2 26 10/02/2018    BUN 18 10/02/2018    CR 0.79 10/02/2018     (H) 10/02/2018    NTBNP 812 (H) 10/02/2018    TROPONIN 0.14 (HH) 09/13/2018    TROPI 1.914 () 09/18/2018    AST 28 09/20/2018    ALT 68 09/20/2018    ALKPHOS 60 09/20/2018    BILITOTAL 1.1 09/20/2018    INR 1.04 09/20/2018     Procedures:  Coronary angio:  9/13/18  -Both coronary arteries arise from their respective cusps.  -Dominance: Right  -LM has no evidence of coronary artery disease.  -LAD: Type 3 [LAD supplies the entire apex]. The LAD gives rise to septal perforators, multiple diagonal branches. The proximal LAD has an acute thrombotic lesion within the prior stent. Distal to the stent, there is a 70-80% stenosis. The distal vessel has HALIMA 2 flow. The D1 has thrombus at the ostium likely from embolization. The apical LAD also has thrombus from embolization.  -LCX gives rise to a large branching OM. The prox Cx has a 20% stenosis. The rest of the Cx system has mild disease.  -RCA (dominant) gives rise to PL branches and supplies PDA. There is minimal disease in the RCA system.     Echo:  9/14/18  Ischemic " cardiomyopathy, LVEF=23% with extensive regional wall motion abnormalities as described below. Resting wall motion abnormalities and LV function are not significantly changed from the rest images on the 11/27/12 stress echocardiogram.  Mildly dilated LV with severely reduced LV function, LVEF=23%. There is akinesis involving the yuouq-gh-zpn anteroseptal, ogu-nj-wagzpa anterior, mid anterolateral, and all apical myocardial segments. The basal anterior, basal anterolateral, and basal inferolateral segments are relatively spared. RV size and function are normal. No significant valvular abnormalities. No pericardial effusion. Normal IVC with abnormal respiratory variation, estimated RA pressure 8 mmHg.      ASSESSMENT AND PLAN:  Assessment and Plan:   Mr. Anson Manriquez is a 63yr old male with a history of HTN, DMII, CAD (s/p MI in 2007, prior remote pLAD stenting, and recent STEMI 9/2018), ICM complicated by cardiac arrest and ventricular tachycardia who presented to the cardiology clinic for further outpatient management and medication titration.  He is followed in core clinic.   Labs from today are essentially in the normal range and offers no complaints.  We will continue to titrate his medications, we will increase his carvedilol to 12.5 mg twice daily dosing as he tolerated the recent increase well.  In addition we will discontinue his lisinopril and start him on Entresto 24 mg twice daily dosing.  This is a little bit on the dosing of the Entresto compared to the lisinopril he tolerated however we will also increase his carvedilol.  As such if his blood pressure remains normal or there is room to further titrate and I would increase his Entresto to 49 mg in the next 2 weeks.  We discussed that he is to hold lisinopril for 2 days prior to initiating Entresto.  Patient will have repeat echocardiogram early January and afterwards he  Will need to follow-up with our electrophysiology colleagues to see defibrillator  options.  I do believe he will need defibrillator implantation given his ejection fraction is very unlikely to improve.  Until that time he is continue his LifeVest.    Continue current diuretic dose and all medications for his ischemic cardiomyopathy and recent PCI including aspirin and Brilinta.  He reports no bleeding.  He does have a small hernia that he seeks surgical treatment options for in the near future however we discussed that the he will need to stay on Brilinta for a year after the PCI.  DMII: HgbA1c 9/14/18 was 8.4%.  He saw his PCP recently, who doubled his metformin.  He will follow up with PCP in 3 months.      I appreciate the opportunity to participate in the care of Anson Manriquez . Please do not hesitate to contact me with any further questions.    Sincerely,   Jason Matos MD     [facility] Division of Cardiology

## 2018-10-31 NOTE — NURSING NOTE
Med Reconcile: Reviewed and verified all current medications with the patient. The updated medication list was printed and given to the patient.  New Medication: Entresto 24-26 MG twice daily. Patient was educated regarding newly prescribed medication, including discussion of  the indication, administration, side effects, and when to report to MD or RN. Patient demonstrated understanding of this information and agreed to call with further questions or concerns.  Return Appointment:Follow up in 10 weeks.  Patient given instructions regarding scheduling next clinic visit. Patient demonstrated understanding of this information and agreed to call with further questions or concerns.  Medication Change: Increase Coreg to 12.5 MG twice daily, discontinue Lisinopril. Patient was educated regarding prescribed medication change, including discussion of the indication, administration, side effects, and when to report to MD or RN. Patient demonstrated understanding of this information and agreed to call with further questions or concerns.  Patient stated he understood all health information given and agreed to call with further questions or concerns.

## 2018-11-01 ENCOUNTER — HOSPITAL ENCOUNTER (OUTPATIENT)
Dept: CARDIAC REHAB | Facility: CLINIC | Age: 63
End: 2018-11-01
Attending: INTERNAL MEDICINE
Payer: COMMERCIAL

## 2018-11-01 VITALS — BODY MASS INDEX: 29.39 KG/M2 | HEIGHT: 66 IN | WEIGHT: 182.9 LBS

## 2018-11-01 PROCEDURE — 40000116 ZZH STATISTIC OP CR VISIT

## 2018-11-01 PROCEDURE — 93797 PHYS/QHP OP CAR RHAB WO ECG: CPT

## 2018-11-01 PROCEDURE — 93798 PHYS/QHP OP CAR RHAB W/ECG: CPT

## 2018-11-01 PROCEDURE — 40000575 ZZH STATISTIC OP CARDIAC VISIT #2

## 2018-11-01 ASSESSMENT — 6 MINUTE WALK TEST (6MWT)
FEMALE CALC: 1529.13
GENDER SELECTION: MALE
PREDICTED: 1641.6
TOTAL DISTANCE WALKED (FT): 1375
MALE CALC: 1631.65

## 2018-11-01 NOTE — PROGRESS NOTES
Physician cosignature/electronic signature indicates approval of this ITP document. I have established, reviewed and made necessary changes to the individualized treatment plan and exercise prescription for this patient.     11/01/18 1400   Session   Session Initial Evaluation and Exercise Prescription   Certified through this date 11/30/18   Cardiac Rehab Assessment   Cardiac Rehab Assessment Pt presents today s/p STEMI, Stent placement to mLAD and D1, and ICD placement, and Intra-aortic balloon pump placed. Pt wife had called EMS after syncope episode. Pt went into cardiac arrest,  CPR performed and EMS administered AED shock.  En route to ED pt went into probable V tach. Upon arrival to ED Pt was intubated at bedside and transferred to cath where lesion of proximal LD within prior stent and Thrombus in D1.  Pt has h/o HTN, DM, ICM, previous MI (2007). 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 pt notes he is very active with work but 'knows his limits'. Pt was unsure about rehab but is motivated to increase his confidence in his return to work and his walking routine with his wife. The patient's history and clinical status including hemodynamics and ECG were evaluated. The patient was assessed to be stable and appropriate to begin exercise.   The patient's functional capacity and exercise prescription were determined by the completion of the 6 minute walk test. See results above. The patient was oriented to the program.  Risk factor profile was completed. Goals and objectives were discussed. CV response was WNL. No symptoms, complaints or pain were reported. Good prognosis for reaching goals below. Skilled therapy is necessary in order to monitor CV response to exercise, educate pt on exercise principles, and assist pt in establishing home exercise per rehab intensity.   Plan to progress to 30-40 minutes of exercise prior to discharge from cardiac rehab.  Initial THR of 20-30 beats above RHR;  Effort rating of 4-6. Initiate muscle conditioning as appropriate. Provide risk factor education and behavior change counseling.    General Information   Treatment Diagnosis STEMI   Secondary Treatment Diagnosis Stent  External Defibrillator, HFrEF   Significant Past CV History Previous MI;Previous PCI   Comorbidities DM   Lead up symptoms Syncope, Cardiac Arrest   Hospital Location [facility]   Hospital Discharge Date 09/20/18   Signs and Symptoms Post Hospital Discharge None   Outpatient Cardiac Rehab Start Date 11/01/18   Primary Physician Dr. Chichi Lou   Primary Physician Follow Up Completed   Ejection Fraction 23   Risk Stratification High   Living and Work Status    Living Arrangements and Social Status house   Support System Live with an adult   Return to Employment Yes   Occupation Contractor- Supervisor   Preventative Medications   CMS recommended medications Ace inhibitors;Antiplatelets;Beta Blocker;Lipid Lowering   Fall Risk Screen   Fall screen completed by Cardiac Rehab   Have you fallen 2 or more times in the past year? No   Have you fallen and had an injury in the past year? No   Is patient a fall risk? No   Abuse Risk Screen   QUESTION TO PATIENT:  Has a member of your family or a partner(now or in the past) intimidated, hurt, manipulated, or controlled you in any way? no   QUESTION TO PATIENT: Do you feel safe going back to the place where you are living? yes   OBSERVATION: Is there reason to believe there has been maltreatment of a vulnerable adult (ie. Physical/Sexual/Emotional abuse, self neglect, lack of adequate food, shelter, medical care, or financial exploitation)? no   Pain   Patient Currently in Pain No   Physical Assessments   Incisions WNL   Edema None   Right Lung Sounds not assessed   Left Lung Sounds not assessed   Limitations No limitations   Individualized Treatment Plan   Monitored Sessions Scheduled 18   Monitored Sessions Attended 1   Oxygen   Supplemental Oxygen needed No  "  Nutrition Management - Weight Management   Assessment Initial Assessment   Age 63   Weight 83 kg (182 lb 14.4 oz)   Height 1.676 m (5' 6\")   BMI (Calculated) 29.58   Initial Rate Your Plate Score. Dietary tool to assess eating patterns. Scores range from 24 to 72. The higher the score the healthier the eating pattern. 61   Weight Management Comments Pt notes he has lost a lot of weight over the past years and is satisfied with his current weight. Discussed importance of weight management.    Nutrition Management - Lipids   Lipids Labs Available   Date 09/13/18   Total Cholesterol 121   Triglycerides 158   HDL 41   LDL 48   Prescribed Lipid Medication Yes   Statin Intensity High Intensity   Nutrition Management - Diabetes   Diabetes Type II   Do you Monitor BS at Home? No   Diabetes Medication Prescribed Yes, Oral Medication   Nutrition Management Summary   Dietary Recommendations Low Fat;Low Sodium;Low Cholesterol   Interventions Planned Educate on Benefits of Exercise   Nutrition Target Outcome Weight loss .5-1 lb/week (if BMI > 25);BMI < 25   Psychosocial Management   Psychosocial Assessment Initial   Is there history of clinical depression or increased risk of depression? No previous history   Current Level of Stress per Patient Report Moderate    Current Coping Skills Has Positive Support System   Initial Patient Health Questionnaire -9 Score (PHQ-9) for depression. 5-9 Minimal symptoms, 10-14 Minor depression, 15-19 Major depression, moderately severe, > 20 Major depression, severe  0   Initial [facility] Survey score.  Quality of Life:   If total score > 25 review individual areas where patient rated a 4 or 5.  Consider patients current medical condition and what role that plays on the score.   Adjust treatment protocol to improve areas of concern.  Consider the following:  PHQ9 score, DASI, and re-assessment within the next 30 days to assist with developing treatments.  10   Stages of Change Maintenance "   Interventions Planned Patient to verbalize understanding of behavioral assessment results;Patient to verbalize understanding of negative impact of stress to personal health   Psychosocial Target Outcome Maximize coping skills   Other Core Components - Hypertension   History of or Diagnosis of Hypertension Yes   Currently taking Anti-Hypertensives Yes;Beta blocker;Ace Inhibitor   Other Core Components - Tobacco   History of Tobacco Use Never   Other Core Components Summary   Interventions Planned Instruct and educate to self manage Heart Failure symptoms;Educate on signs/symptoms and when to call the doctor (i.e. increased edema, dyspnea, fatigue, dizziness/lightheadedness, change in sleep patterns, chest discomfort);Educate on the use of 'Stop Light' tool;Educate on importance of monitoring daily weight   Activity/Exercise History   Activity/Exercise Assessment Initial   Activity/Exercise Status prior to event? Was Physically Active   Number of Days Currently participating in Moderate Physical Activity? 7   Number of Days Currently performing  Aerobic Exercise (including rehab)? 0   Number of Minutes per Session Currently of Aerobic Exercise (average)? 0   Current Stage of Change (Physical Activity) Maintenance   Current Stage of Change (Aerobic Exercise) Preparation   Patient Goals Goal #1;Goal #2   Goal #1 Description Pt would like to establish exercise with his wife 3x/week before completing rehab program   Goal #1 Target Date 01/03/19   Goal #2 Description Pt would like to tolerate 30 minutes of walking with his wife at brisk pace of 3mph  per rehab intensity by participating in rehab 2x/week.    Goal #2 Target Date 01/03/19   Activity/Exercise Target Outcome An Accumulation of 150  Minutes of Aerobic Activity per Week   Exercise Assessment   6 Minute Walk Predicted - Gender Selection Male   6 Minute Walk Predicted (Male) 1631.65   6 Minute Walk Predicted (Female) 1529.13   Initial 6 Minute Walk Distance  (Feet) 1375 ft   Resting HR 79 bpm   Exercise  bpm   Post Exercise  bpm   Resting /62   Exercise /64   Post Exercise /64   Pre  mg/dL   Effort Rating 5   Current MET Level 3.0   MET Level Goal 5   ECG Rhythm Sinus rhythm;Right bundle branch block   Ectopy PVCs   Current Symptoms Denies symptoms   Limitations/Restrictions None   Exercise Prescription   Mode Treadmill;Nustep;Recumbent bike;Weights;LE strengthening   Duration/Time 15-30 min;Intermittent bouts   Frequency 3 daysweek   THR (85% of age predicted max HR) 133.45   OMNI Effort Rating (0-10 Scale) 4-6/10   Progression Intermittent bouts;Total exercise time of 20-30 minutes;Progress peak intensity by 1/4 MET per week   Recommended Home Exercise   Type of Exercise Walking   Frequency (days per week) Daily on non rehab days   Duration (minutes per session) 15-30 min;Intermittent   Effort Rating Recommended 4-6/10   Current Home Exercise   Type of Exercise None   Frequency (days per week) 0   Duration (minutes per session) 0   Follow-up/On-going Support   Provider follow-up needed on the following No follow-up needed   Learning Assessment   Learner Patient   Primary Language English   Preferred Learning Style Listening;Reading;Demonstration   Barriers to Learning No barriers noted   Patient Education   Education recommended Exercise Principles;Medication Overview;Anatomy and Physiology of the Heart;Patient Attended Education in the Past

## 2018-11-02 NOTE — TELEPHONE ENCOUNTER
Prior Authorization Approval    Authorization Effective Date: 10/31/2018  Authorization Expiration Date: 10/31/2019  Medication: Entresto 24-26 MG twice daily- Approved  Approved Dose/Quantity:   Reference #:     Insurance Company: [employer] - Phone [phone] Fax [phone]  Expected CoPay:       CoPay Card Available:      Foundation Assistance Needed:    Which Pharmacy is filling the prescription (Not needed for infusion/clinic administered): [facility] [address] [phone]  Pharmacy Notified: Yes  Patient Notified: Yes

## 2018-11-08 ENCOUNTER — HOSPITAL ENCOUNTER (OUTPATIENT)
Dept: CARDIAC REHAB | Facility: CLINIC | Age: 63
End: 2018-11-08
Attending: INTERNAL MEDICINE
Payer: COMMERCIAL

## 2018-11-08 PROCEDURE — 40000116 ZZH STATISTIC OP CR VISIT: Performed by: REHABILITATION PRACTITIONER

## 2018-11-08 PROCEDURE — 93798 PHYS/QHP OP CAR RHAB W/ECG: CPT | Performed by: REHABILITATION PRACTITIONER

## 2018-11-13 ENCOUNTER — HOSPITAL ENCOUNTER (OUTPATIENT)
Dept: CARDIAC REHAB | Facility: CLINIC | Age: 63
End: 2018-11-13
Attending: INTERNAL MEDICINE
Payer: COMMERCIAL

## 2018-11-13 VITALS — WEIGHT: 182.2 LBS | BODY MASS INDEX: 29.41 KG/M2

## 2018-11-13 PROCEDURE — 93798 PHYS/QHP OP CAR RHAB W/ECG: CPT | Performed by: REHABILITATION PRACTITIONER

## 2018-11-13 PROCEDURE — 40000116 ZZH STATISTIC OP CR VISIT: Performed by: REHABILITATION PRACTITIONER

## 2018-11-13 ASSESSMENT — 6 MINUTE WALK TEST (6MWT)
GENDER SELECTION: MALE
TOTAL DISTANCE WALKED (FT): 1375

## 2018-11-14 NOTE — PROGRESS NOTES
"Physician cosignature/electronic signature indicates approval of this ITP document. I have established, reviewed and made necessary changes to the individualized treatment plan and exercise prescription for this patient.   11/13/18 1200   Session   Session 30 Day Individualized Treatment Plan   Certified through this date 12/12/18   Cardiac Rehab Assessment   Cardiac Rehab Assessment Pt presents today s/p STEMI, Stent placement to mLAD and D1, and ICD placement, and Intra-aortic baloon pump placed. Pt wife had called EMS after syncope episode. Pt went into cardiac arrest,  CPR performed and EMS administered AED shock.  En route to ED pt went into probable V tach. Upon arrival to ED Pt was intubated at bedside and transfered to cath where lesion of proximal LD within prior stent and Thrombus in D1.  Pt has h/o HTN, DM, iCM, previous MI (2007). 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 pt notes he is very active with work but 'knows his limits'. Pt was unsure about rehab but is motivated to increase his confidence in his return to work and his walking routine with his wife. 11/13 Pt has attended 3 sessions, now states \"I really like coming here\". Appears to have gained confidence and comfort level in participating in program. Pt will benefit from skilled services to provide ex progression per stable cv response, ed on risk factors and HF and counseling to make changes in lifestyle.    General Information   Treatment Diagnosis STEMI   Date of Treatment Diagnosis 09/13/18   Secondary Treatment Diagnosis Stent  (External Defibrillator)   Significant Past CV History Previous MI;Previous PCI;History of Heart Failure   Comorbidities DM   Lead up symptoms Syncope, Cardiac Arrest   Hospital [facility]   Hospital Discharge Date 09/20/18   Signs and Symptoms Post Hospital Discharge None   Outpatient Cardiac Rehab Start Date 11/01/18   Primary Physician Dr. Chichi Lou   Primary Physician Follow Up " Completed   Surgeon    Cardiologist Follow Up Advised to schedule appointment   Ejection Fraction 23   Risk Stratification High   Summary of Cath Report   Summary of Cath Report Available   Date Performed 09/13/18   LAD pLAD 80%, restented 0%   D1 90%, Resulted to 10%   LCX pCx 20% stenosis   RCA minimal disease   Living and Work Status    Living Arrangements and Social Status house   Support System Live with an adult   Return to Employment Yes   Occupation Contractor- Supervisor   Preventative Medications   CMS recommended medications Ace inhibitors;Antiplatelets;Beta Blocker;Lipid Lowering   Fall Risk Screen   Fall screen completed by Cardiac Rehab   Have you fallen 2 or more times in the past year? No   Have you fallen and had an injury in the past year? No   Is patient a fall risk? No   Pain   Patient Currently in Pain No   Physical Assessments   Incisions WNL   Edema None   Right Lung Sounds not assessed   Left Lung Sounds not assessed   Limitations No limitations   Individualized Treatment Plan   Monitored Sessions Scheduled 18   Monitored Sessions Attended 3   Oxygen   Supplemental Oxygen needed No   Nutrition Management - Weight Management   Assessment Re-assessment   Age 63   Weight 82.6 kg (182 lb 3.2 oz)   Initial Rate Your Plate Score. Dietary tool to assess eating patterns. Scores range from 24 to 72. The higher the score the healthier the eating pattern. 61   Weight Management Comments Pt notes he has lost a lot of weight over the past years and is satisfied with his current weight. Discussed importance of weight management.    Nutrition Management - Lipids   Lipids Labs Available   Date 09/13/18   Total Cholesterol 121   Triglycerides 158   HDL 41   LDL 48   Prescribed Lipid Medication Yes   Statin Intensity High Intensity   Nutrition Management - Diabetes   Diabetes Type II   Do you Monitor BS at Home? No   Diabetes Medication Prescribed Yes, Oral Medication   Nutrition Management  Summary   Dietary Recommendations Low Fat;Low Sodium;Low Cholesterol   Interventions Planned Educate on Benefits of Exercise   Nutrition Target Outcome Weight loss .5-1 lb/week (if BMI > 25);BMI < 25   Psychosocial Management   Psychosocial Assessment Re-assessment   Is there history of clinical depression or increased risk of depression? No previous history   Current Level of Stress per Patient Report Moderate    Current Coping Skills Has Positive Support System   Initial Patient Health Questionnaire -9 Score (PHQ-9) for depression. 5-9 Minimal symptoms, 10-14 Minor depression, 15-19 Major depression, moderately severe, > 20 Major depression, severe  0   Initial [facility] Survey score.  Quality of Life:   If total score > 25 review individual areas where patient rated a 4 or 5.  Consider patients current medical condition and what role that plays on the score.   Adjust treatment protocol to improve areas of concern.  Consider the following:  PHQ9 score, DASI, and re-assessment within the next 30 days to assist with developing treatments.  10   Stages of Change Maintenance   Interventions Planned Patient to verbalize understanding of behavioral assessment results;Patient to verbalize understanding of negative impact of stress to personal health   Psychosocial Target Outcome Maximize coping skills   Other Core Components - Hypertension   History of or Diagnosis of Hypertension Yes   Currently taking Anti-Hypertensives Yes;Beta blocker;Ace Inhibitor   Other Core Components - Tobacco   History of Tobacco Use Never   Other Core Components Summary   Interventions Planned Instruct and educate to self manage Heart Failure symptoms;Educate on signs/symptoms and when to call the doctor (i.e. increased edema, dyspnea, fatigue, dizziness/lightheadedness, change in sleep patterns, chest discomfort);Educate on the use of 'Stop Light' tool;Educate on importance of monitoring daily weight   Activity/Exercise History    Activity/Exercise Assessment Re-assessment   Activity/Exercise Status prior to event? Was Physically Active   Number of Days Currently participating in Moderate Physical Activity? 7   Number of Days Currently performing  Aerobic Exercise (including rehab)? 2   Number of Minutes per Session Currently of Aerobic Exercise (average)? 20   Current Stage of Change (Physical Activity) Maintenance   Current Stage of Change (Aerobic Exercise) Action   Patient Goals Goal #1;Goal #2   Goal #1 Description Pt would like to establish exercise with his wife 3x/week before completing rehab program   Goal #1 Target Date 01/03/19   Goal #1 Progress Towards Goal 11/13 no indep exercise yet.    Goal #2 Description Pt would like to tolerate 30 minutes of walking with his wife at brisk pace of 3mph  per rehab intensity by participating in rehab 2x/week.    Goal #2 Target Date 01/03/19   Goal #2 Progress Towards Goal 11/13 Pt has started regular ex in CR walking at 2mph with stable cv response.    Activity/Exercise Target Outcome An Accumulation of 150  Minutes of Aerobic Activity per Week   Exercise Assessment   6 Minute Walk Predicted - Gender Selection Male   Initial 6 Minute Walk Distance (Feet) 1375 ft   Resting  bpm   Exercise  bpm   Post Exercise  bpm   Resting /62   Exercise /64   Post Exercise /70   Pre  mg/dL   Effort Rating 5   Current MET Level 2.5   MET Level Goal 5   ECG Rhythm Sinus rhythm;Right bundle branch block   Ectopy None   Current Symptoms Denies symptoms   Limitations/Restrictions None   Exercise Prescription   Mode Treadmill;Nustep;Recumbant bike;Weights;LE strengthening   Duration/Time 15-30 min;Intermittent bouts   Frequency 3 daysweek   THR (85% of age predicted max HR) 133.45   OMNI Effort Rating (0-10 Scale) 4-6/10   Progression Intermittent bouts;Total exercise time of 20-30 minutes;Progress peak intensity by 1/4 MET per week   Recommended Home Exercise   Type of  Exercise Walking   Frequency (days per week) Daily on non rehab daysj   Duration (minutes per session) 15-30 min;Intermittent   Effort Rating Recommended 4-6/10   Current Home Exercise   Type of Exercise None   Frequency (days per week) 0   Duration (minutes per session) 0   Follow-up/On-going Support   Provider follow-up needed on the following No follow-up needed   Learning Assessment   Learner Patient   Primary Language English   Preferred Learning Style Listening;Reading;Demonstration   Barriers to Learning No barriers noted   Patient Education   Education recommended Exercise Principles;Medication Overview;Anatomy and Physiology of the Heart;Patient Attended Education in the Past

## 2018-11-15 ENCOUNTER — HOSPITAL ENCOUNTER (OUTPATIENT)
Dept: CARDIAC REHAB | Facility: CLINIC | Age: 63
End: 2018-11-15
Attending: INTERNAL MEDICINE
Payer: COMMERCIAL

## 2018-11-15 PROCEDURE — 93798 PHYS/QHP OP CAR RHAB W/ECG: CPT | Performed by: REHABILITATION PRACTITIONER

## 2018-11-15 PROCEDURE — 40000116 ZZH STATISTIC OP CR VISIT: Performed by: REHABILITATION PRACTITIONER

## 2018-12-04 ENCOUNTER — HOSPITAL ENCOUNTER (OUTPATIENT)
Dept: CARDIAC REHAB | Facility: CLINIC | Age: 63
End: 2018-12-04
Attending: INTERNAL MEDICINE
Payer: COMMERCIAL

## 2018-12-04 PROCEDURE — 93798 PHYS/QHP OP CAR RHAB W/ECG: CPT

## 2018-12-04 PROCEDURE — 40000116 ZZH STATISTIC OP CR VISIT

## 2018-12-06 ENCOUNTER — HOSPITAL ENCOUNTER (EMERGENCY)
Facility: CLINIC | Age: 63
Discharge: HOME | End: 2018-12-06
Attending: EMERGENCY MEDICINE | Admitting: EMERGENCY MEDICINE
Payer: COMMERCIAL

## 2018-12-06 VITALS
RESPIRATION RATE: 18 BRPM | BODY MASS INDEX: 27.92 KG/M2 | WEIGHT: 173 LBS | DIASTOLIC BLOOD PRESSURE: 68 MMHG | SYSTOLIC BLOOD PRESSURE: 122 MMHG | HEART RATE: 99 BPM | OXYGEN SATURATION: 98 % | TEMPERATURE: 98 F

## 2018-12-06 DIAGNOSIS — R04.0 EPISTAXIS: ICD-10-CM

## 2018-12-06 LAB
APTT PPP: 29 SEC (ref 22–37)
BASOPHILS # BLD AUTO: 0 10E9/L (ref 0–0.2)
BASOPHILS NFR BLD AUTO: 0.3 %
DIFFERENTIAL METHOD BLD: ABNORMAL
EOSINOPHIL # BLD AUTO: 0.1 10E9/L (ref 0–0.7)
EOSINOPHIL NFR BLD AUTO: 1.5 %
ERYTHROCYTE [DISTWIDTH] IN BLOOD BY AUTOMATED COUNT: 14.9 % (ref 10–15)
HCT VFR BLD AUTO: 40 % (ref 40–53)
HGB BLD-MCNC: 13.3 G/DL (ref 13.3–17.7)
IMM GRANULOCYTES # BLD: 0 10E9/L (ref 0–0.4)
IMM GRANULOCYTES NFR BLD: 0.2 %
INR PPP: 1.01 (ref 0.86–1.14)
LYMPHOCYTES # BLD AUTO: 1.5 10E9/L (ref 0.8–5.3)
LYMPHOCYTES NFR BLD AUTO: 17.5 %
MCH RBC QN AUTO: 31.8 PG (ref 26.5–33)
MCHC RBC AUTO-ENTMCNC: 33.3 G/DL (ref 31.5–36.5)
MCV RBC AUTO: 96 FL (ref 78–100)
MONOCYTES # BLD AUTO: 0.7 10E9/L (ref 0–1.3)
MONOCYTES NFR BLD AUTO: 8.3 %
NEUTROPHILS # BLD AUTO: 6.3 10E9/L (ref 1.6–8.3)
NEUTROPHILS NFR BLD AUTO: 72.2 %
NRBC # BLD AUTO: 0 10*3/UL
NRBC BLD AUTO-RTO: 0 /100
PLATELET # BLD AUTO: 267 10E9/L (ref 150–450)
RBC # BLD AUTO: 4.18 10E12/L (ref 4.4–5.9)
WBC # BLD AUTO: 8.7 10E9/L (ref 4–11)

## 2018-12-06 PROCEDURE — 85025 COMPLETE CBC W/AUTO DIFF WBC: CPT | Performed by: STUDENT IN AN ORGANIZED HEALTH CARE EDUCATION/TRAINING PROGRAM

## 2018-12-06 PROCEDURE — 99282 EMERGENCY DEPT VISIT SF MDM: CPT | Mod: GC | Performed by: EMERGENCY MEDICINE

## 2018-12-06 PROCEDURE — 85610 PROTHROMBIN TIME: CPT | Performed by: STUDENT IN AN ORGANIZED HEALTH CARE EDUCATION/TRAINING PROGRAM

## 2018-12-06 PROCEDURE — 99283 EMERGENCY DEPT VISIT LOW MDM: CPT | Performed by: EMERGENCY MEDICINE

## 2018-12-06 PROCEDURE — 85730 THROMBOPLASTIN TIME PARTIAL: CPT | Performed by: STUDENT IN AN ORGANIZED HEALTH CARE EDUCATION/TRAINING PROGRAM

## 2018-12-06 ASSESSMENT — ENCOUNTER SYMPTOMS
CHEST TIGHTNESS: 0
BRUISES/BLEEDS EASILY: 0
FEVER: 0
PALPITATIONS: 0
LIGHT-HEADEDNESS: 0
HEADACHES: 0
FACIAL ASYMMETRY: 0
ABDOMINAL PAIN: 0
CHILLS: 0
DIZZINESS: 0
WEAKNESS: 0
CONFUSION: 0

## 2018-12-06 NOTE — ED AVS SNAPSHOT
[facility], Emergency Department    [address] [facility] [address]    Phone:  [phone]                                       Anson Manriquez   MRN: [id number]    Department:  [facility], Emergency Department   Date of Visit:  12/6/2018           Patient Information     Date Of Birth          1955        Your diagnoses for this visit were:     Epistaxis        You were seen by Ron Conway MD.        Discharge Instructions         Use your ointment and Vaseline at least 2x daily to help keep your scab moisturized in your left nose. A nighttime humidifier will be helpful to keep the air in your room full of moisture and help your nosebleeds. Do not pick your scab, let it heal naturally with the help of the ointments!    Nosebleed (Adult)    Bleeding from the nose most commonly occurs because of injury or drying and cracking of the inner lining of the nose. Most nosebleeds are because of dry air or nose-picking. They can occur during a common cold or an allergy attack. They can also occur on a very hot day, or from dry air in the winter.  If the bleeding site is found, it may be cauterized. This means it is treated to cause a blood clot to form. This may be done with a chemical, heat, or electricity. If the bleeding continues after the site is cauterized, or if the site cannot be found, packing may be put in your nose. This is to apply pressure and stop the bleeding. The packing may be made of gauze or sponge. A small balloon catheter is sometimes used. These must be removed by your healthcare provider. Some types of packing dissolve on their own. If you are taking blood thinning (anticoagulant) medicine, you may have a blood test.  Home care    If packing was put in your nose, unless told otherwise, do not pull on it or try to remove it yourself. You will be given an appointment to have it removed. You may also have been given antibiotics to prevent a sinus infection. If so, finish  all of the medicine.    Don't blow your nose for 12 hours after the bleeding stops. This will allow a strong blood clot to form. Don't pick your nose. This may restart bleeding.    Don't drink alcohol or hot liquids for the next 2 days. Alcohol or hot liquids in your mouth can dilate blood vessels in your nose. This can cause bleeding to start again.    Don't take ibuprofen, naproxen, or medicines that contain aspirin. These thin the blood and may cause your nose to bleed. You may take acetaminophen for pain, unless another pain medicine was prescribed.    If the bleeding starts again, sit up and lean forward to prevent swallowing blood. Pinch your nose tightly on both sides, as shown above, for 10 to 15 minutes. Time yourself. Don t release the pressure on your nose until 10 minutes is up. If bleeding does not stop, continue to pinch your nose and call your healthcare provider or return to this facility.    If you have a cold, allergies, or dry nasal membranes, lubricate the nasal passages. Apply a small amount of petroleum jelly inside the nose with a cotton swab twice a day (morning and night).    Don't overheat your home. This can dry the air and make your condition worse.    Put a humidifier in the room where you sleep. This will add moisture to the air. Clean the humidifier as advised by the .    Use a saline nasal spray to keep nasal passages moist.    Don't pick your nose. Keep fingernails trimmed to decrease risk of bleeds.    Don't smoke.  Follow-up care  Follow up with your healthcare provider, or as advised. Nasal packing should be rechecked or removed within 2 to 3 days.  When to seek medical advice  Call your healthcare provider right away if any of these occur.    You have another nosebleed that you cannot control    Dizziness, weakness, or fainting    You become tired or confused    Fever of 100.4 F (38 C) or higher, or as directed by your healthcare provider    Headache    Sinus or  facial pain    Shortness of breath or trouble breathing  Date Last Reviewed: 11/1/2017 2000-2018 [employer]. [address] [place] [address], [place] [address]. All rights reserved. This information is not intended as a substitute for professional medical care. Always follow your healthcare professional's instructions.          Your next 10 appointments already scheduled     Dec 11, 2018  4:30 PM CST   Cardiac Treatment with Ur Cardiac Rehab 1   [facility], Cardiac Rehabilitation ([facility])    [address] [place] [address] 1st Floor [address] [address]   [phone]            Dec 13, 2018  4:00 PM CST   CONSULT with Ur Cardiac Rehab 2   [facility], Cardiac Rehabilitation ([facility])    [address] [place] [address] 1st Floor [address] [address]   [phone]            Dec 13, 2018  4:30 PM CST   Cardiac Treatment with Ur Cardiac Rehab 1   [facility], Cardiac Rehabilitation ([facility])    [address] [place] [address] 1st Floor [address] [address]   [phone]            Dec 18, 2018  4:30 PM CST   Cardiac Treatment with Ur Cardiac Rehab 1   [facility])    [address] [place] [address] 1st Floor [address] [address]   [phone]            Dec 20, 2018  4:30 PM CST   Cardiac Treatment with Ur Cardiac Rehab 1   [facility])    [address] [place] [address] 1st Floor [address] [address]   [phone]            Dec 27, 2018  4:30 PM CST   Cardiac Treatment with Ur Cardiac Rehab 1   [facility], Cardiac Rehabilitation ([facility])    [facility]  [address]st [place] [id number]  [facility] [address]   [phone]            Jan 02, 2019  8:00 AM CST   Echo Complete with [email or web address]   [facility] ([facility])    [address]  3rd Floor  [facility] [address]   [phone]           1. Please bring or wear a comfortable two-piece outfit. 2. You may eat, drink and take your normal medicines. 3. For any questions that cannot be answered, please contact the ordering physician            Jan 02, 2019  9:00 AM CST   (Arrive by 8:45 AM)   NEW ARRHYTHMIA with Mick Mckeon MD   [facility] ([facility])    [address]  [address] [place] [address]   [phone]            Jan 03, 2019  4:30 PM CST   Cardiac Treatment with Ur Cardiac Rehab 1   [facility], Cardiac Rehabilitation ([facility])    [address] [place] [address]   [phone]            Jan 08, 2019  4:30 PM CST   Cardiac Treatment with Ur Cardiac Rehab 1   [facility], Cardiac Rehabilitation ([facility])    [address] [place] [address]   [phone]              24 Hour Appointment Hotline       To make an appointment at any [facility], call [phone] ([phone]). If you don't have a family doctor or clinic, we will help you find one. [facility] are conveniently located to serve the needs of you and your family.             Review of your medicines      Our records show that you are taking the medicines listed below. If these are incorrect, please call your family doctor or clinic.        Dose / Directions Last dose taken    aspirin 81 MG chewable tablet   Commonly known as:  ASA   Dose:  81 mg   Quantity:  30 tablet        Take 1 tablet (81 mg) by mouth daily   Refills:   11        atorvastatin 80 MG tablet   Commonly known as:  LIPITOR   Dose:  80 mg   Quantity:  90 tablet        Take 1 tablet (80 mg) by mouth daily Needs labs done prior to the next refill.   Refills:  3        carvedilol 12.5 MG tablet   Commonly known as:  COREG   Dose:  12.5 mg   Quantity:  90 tablet        1 tablet (12.5 mg) by Oral or Feeding Tube route 2 times daily (with meals)   Refills:  3        furosemide 40 MG tablet   Commonly known as:  LASIX   Dose:  40 mg   Quantity:  60 tablet        Take 1 tablet (40 mg) by mouth 2 times daily   Refills:  3        metFORMIN 500 MG 24 hr tablet   Commonly known as:  GLUCOPHAGE-XR   Dose:  1000 mg   Quantity:  180 tablet        Take 2 tablets (1,000 mg) by mouth 2 times daily (with meals)   Refills:  1        MULTIPLE VITAMIN PO   Dose:  1 tablet        Take 1 tablet by mouth daily.   Refills:  0        sacubitril-valsartan 24-26 MG per tablet   Commonly known as:  ENTRESTO   Dose:  1 tablet   Quantity:  180 tablet        Take 1 tablet by mouth 2 times daily   Refills:  1        ticagrelor 90 MG tablet   Commonly known as:  BRILINTA   Dose:  90 mg   Quantity:  60 tablet        Take 1 tablet (90 mg) by mouth 2 times daily   Refills:  3        vitamin B complex with vitamin C tablet   Dose:  1 tablet        Take 1 tablet by mouth daily   Refills:  0                Procedures and tests performed during your visit     CBC with platelets differential    INR    Partial thromboplastin time      Orders Needing Specimen Collection     None      Pending Results     Date and Time Order Name Status Description    12/6/2018 1741 Partial thromboplastin time In process     12/6/2018 1741 INR In process             Pending Culture Results     No orders found from 12/4/2018 to 12/7/2018.            Pending Results Instructions     If you had any lab results that were not finalized at the time of your Discharge, you can call the ED Lab Result RN at [phone]. You will be contacted  by this team for any positive Lab results or changes in treatment. The nurses are available 7 days a week from 10A to 6:30P.  You can leave a message 24 hours per day and they will return your call.        Thank you for choosing [facility]       Thank you for choosing [facility] for your care. Our goal is always to provide you with excellent care. Hearing back from our patients is one way we can continue to improve our services. Please take a few minutes to complete the written survey that you may receive in the mail after you visit with us. Thank you!        Care EveryWhere ID     This is your Care EveryWhere ID. This could be used by other organizations to access your [facility] medical records  [id number]        Equal Access to Services     JEFFREY KEYS : Lillian Reeder, hannah plaza, petrona wood, issac mina. So [facility] [phone].    ATENCIÓN: Si habla español, tiene a jordan disposición servicios gratuitos de asistencia lingüística. Llame al [phone].    We comply with applicable federal civil rights laws and [place] laws. We do not discriminate on the basis of race, color, national origin, age, disability, sex, sexual orientation, or gender identity.            After Visit Summary       This is your record. Keep this with you and show to your community pharmacist(s) and doctor(s) at your next visit.

## 2018-12-06 NOTE — ED PROVIDER NOTES
"  History     Chief Complaint   Patient presents with     Epistaxis     HPI  Anson Manriquez is a 63 year old male who presents to the ED with epistaxis. He has a pertinent medical history of Type II DM, hypertension, CAD s/p STEMI in 2007 and STEMI 09/2018 on Brilinta and 81 mg ASA daily. He reports a 2 month history of occasional epistaxis whenever he \"picks off this scab\" in his left nasal passage. He reports the scab beginning after he was last hospitalized in 09/2018 for his STEMI and he required \"something up my nose\". Now, it will periodically bleed after he picks off his scab. He picked off his scab 2 days ago and had an episode of bleeding that lasted 2 hours yesterday and stopped with pressure. Today, he experienced an episode of bleeding at 4 PM and lasted 30 minutes and he came to the ED for evaulation. He was in the car heading to Cardiac Rehab.     I have reviewed the Medications, Allergies, Past Medical and Surgical History, and Social History in the Epic system.    Review of Systems   Constitutional: Negative for chills and fever.   Respiratory: Negative for chest tightness.    Cardiovascular: Negative for chest pain and palpitations.   Gastrointestinal: Negative for abdominal pain.   Neurological: Negative for dizziness, facial asymmetry, weakness, light-headedness and headaches.   Hematological: Does not bruise/bleed easily (but on ASA and brilinta).   Psychiatric/Behavioral: Negative for confusion.     Physical Exam   BP: 120/66  Pulse: 99  Temp: 98  F (36.7  C)  Resp: 18  Weight: 78.5 kg (173 lb)  SpO2: 99 %    Physical Exam   Constitutional: He is oriented to person, place, and time. He appears well-developed and well-nourished. No distress.   HENT:   Nose: No mucosal edema, sinus tenderness or septal deviation. Epistaxis (anterior lesion on lower septum, no active bleeding on left side ) is observed.  No foreign bodies.   Mouth/Throat: Uvula is midline, oropharynx is clear and moist and mucous " membranes are normal.   Dried appearing blood in posterior oropharyngeal region, no active bleeding   Eyes: EOM are normal.   Pulmonary/Chest: Effort normal.   Musculoskeletal: He exhibits no edema.   Neurological: He is alert and oriented to person, place, and time.   Skin: Skin is warm and dry. He is not diaphoretic.   Dried blood present on lips   Psychiatric: He has a normal mood and affect. His behavior is normal.   Vitals reviewed.    ED Course     ED Course     Procedures          Critical Care time:  none     Labs Ordered and Resulted from Time of ED Arrival Up to the Time of Departure from the ED   CBC WITH PLATELETS DIFFERENTIAL - Abnormal; Notable for the following:        Result Value    RBC Count 4.18 (*)     All other components within normal limits   INR   PARTIAL THROMBOPLASTIN TIME        Assessments & Plan (with Medical Decision Making)   Anson Manriquez is a 63 year old male who presents to the ED with epistaxis. He has a pertinent medical history of Type II DM, hypertension, CAD s/p STEMI in 2007 and STEMI 09/2018 on Brilinta and 81 mg ASA daily.    Patient has no active bleeding while in the ED. No difficulty breathing. Anterior bleeding source identified on left nasal exam, no active bleeding and no need for packing or cautery. His hgb is normal at 13.3. INR and PTT ordered. Patient would like to go home to make it to his pool league tonight.     Given bacitracin ointment and advised to use Vaseline as well for optimal scab healing. Advised to not pick scab and to not insert his finger into his nose unless it is to lightly apply the ointment. Recommended a humidifier to use at night in room to help keep air full of moisture.     This data collected with the Resident working in the Emergency Department.  Patient was seen and evaluated by myself and I repeated the history and physical exam with the patient.  The plan of care was discussed with them.  The key portions of the note including the  entire assessment and plan reflect my documentation.      The patient has recurrent nosebleeds a week which come back after he scratches the scab off and abrasion on the inferior anterior septum.  The bleeding is stopped now.  We talked about different options including cautery and packing but at this point he is comfortable going home and using ointment to keep the area moist.  He was instructed to not scratch the area and allow it to heal.  He should stay on the Brilinta and will follow up with his cardiologist for the duration of dosing.  He will use a vaporizer at home.  We went over the technique to hold pressure on the nose and that he will need to hold pressure for at least 45 minutes given his use of the blood thinner.    I have reviewed the nursing notes.    I have reviewed the findings, diagnosis, plan and need for follow up with the patient.  New Prescriptions    No medications on file     Final diagnoses:   Epistaxis     Jeromy Fernandez MD   Family Medicine PGY-1    12/6/2018   [facility], [place], EMERGENCY DEPARTMENT     Ron Conway MD  12/09/18 1129

## 2018-12-06 NOTE — ED AVS SNAPSHOT
[facility], [place], Emergency Department    [address] [place] [address]    Phone:  [phone]                                       Anson Manriquez   MRN: [id number]    Department:  [facility], Emergency Department   Date of Visit:  12/6/2018           After Visit Summary Signature Page     I have received my discharge instructions, and my questions have been answered. I have discussed any challenges I see with this plan with the nurse or doctor.    ..........................................................................................................................................  Patient/Patient Representative Signature      ..........................................................................................................................................  Patient Representative Print Name and Relationship to Patient    ..................................................               ................................................  Date                                   Time    ..........................................................................................................................................  Reviewed by Signature/Title    ...................................................              ..............................................  Date                                               Time          22EPIC Rev 08/18

## 2018-12-07 NOTE — DISCHARGE INSTRUCTIONS
Use your ointment and Vaseline at least 2x daily to help keep your scab moisturized in your left nose. A nighttime humidifier will be helpful to keep the air in your room full of moisture and help your nosebleeds. Do not pick your scab, let it heal naturally with the help of the ointments!    Nosebleed (Adult)    Bleeding from the nose most commonly occurs because of injury or drying and cracking of the inner lining of the nose. Most nosebleeds are because of dry air or nose-picking. They can occur during a common cold or an allergy attack. They can also occur on a very hot day, or from dry air in the winter.  If the bleeding site is found, it may be cauterized. This means it is treated to cause a blood clot to form. This may be done with a chemical, heat, or electricity. If the bleeding continues after the site is cauterized, or if the site cannot be found, packing may be put in your nose. This is to apply pressure and stop the bleeding. The packing may be made of gauze or sponge. A small balloon catheter is sometimes used. These must be removed by your healthcare provider. Some types of packing dissolve on their own. If you are taking blood thinning (anticoagulant) medicine, you may have a blood test.  Home care    If packing was put in your nose, unless told otherwise, do not pull on it or try to remove it yourself. You will be given an appointment to have it removed. You may also have been given antibiotics to prevent a sinus infection. If so, finish all of the medicine.    Don't blow your nose for 12 hours after the bleeding stops. This will allow a strong blood clot to form. Don't pick your nose. This may restart bleeding.    Don't drink alcohol or hot liquids for the next 2 days. Alcohol or hot liquids in your mouth can dilate blood vessels in your nose. This can cause bleeding to start again.    Don't take ibuprofen, naproxen, or medicines that contain aspirin. These thin the blood and may cause your nose  to bleed. You may take acetaminophen for pain, unless another pain medicine was prescribed.    If the bleeding starts again, sit up and lean forward to prevent swallowing blood. Pinch your nose tightly on both sides, as shown above, for 10 to 15 minutes. Time yourself. Don t release the pressure on your nose until 10 minutes is up. If bleeding does not stop, continue to pinch your nose and call your healthcare provider or return to this facility.    If you have a cold, allergies, or dry nasal membranes, lubricate the nasal passages. Apply a small amount of petroleum jelly inside the nose with a cotton swab twice a day (morning and night).    Don't overheat your home. This can dry the air and make your condition worse.    Put a humidifier in the room where you sleep. This will add moisture to the air. Clean the humidifier as advised by the .    Use a saline nasal spray to keep nasal passages moist.    Don't pick your nose. Keep fingernails trimmed to decrease risk of bleeds.    Don't smoke.  Follow-up care  Follow up with your healthcare provider, or as advised. Nasal packing should be rechecked or removed within 2 to 3 days.  When to seek medical advice  Call your healthcare provider right away if any of these occur.    You have another nosebleed that you cannot control    Dizziness, weakness, or fainting    You become tired or confused    Fever of 100.4 F (38 C) or higher, or as directed by your healthcare provider    Headache    Sinus or facial pain    Shortness of breath or trouble breathing  Date Last Reviewed: 11/1/2017 2000-2018 [employer] [address] [place] [address], [place] [address]. All rights reserved. This information is not intended as a substitute for professional medical care. Always follow your healthcare professional's instructions.

## 2018-12-11 ENCOUNTER — HOSPITAL ENCOUNTER (OUTPATIENT)
Dept: CARDIAC REHAB | Facility: CLINIC | Age: 63
End: 2018-12-11
Attending: INTERNAL MEDICINE
Payer: COMMERCIAL

## 2018-12-11 VITALS — BODY MASS INDEX: 28.67 KG/M2 | HEIGHT: 66 IN | WEIGHT: 178.4 LBS

## 2018-12-11 PROCEDURE — 93798 PHYS/QHP OP CAR RHAB W/ECG: CPT | Performed by: REHABILITATION PRACTITIONER

## 2018-12-11 PROCEDURE — 40000116 ZZH STATISTIC OP CR VISIT: Performed by: REHABILITATION PRACTITIONER

## 2018-12-11 ASSESSMENT — 6 MINUTE WALK TEST (6MWT)
PREDICTED: 1652.47
MALE CALC: 1642.45
GENDER SELECTION: MALE
TOTAL DISTANCE WALKED (FT): 1375
FEMALE CALC: 1544.21

## 2018-12-11 ASSESSMENT — MIFFLIN-ST. JEOR: SCORE: 1546.72

## 2018-12-11 NOTE — PROGRESS NOTES
"Physician cosignature/electronic signature indicates approval of this ITP document. I have established, reviewed and made necessary changes to the individualized treatment plan and exercise prescription for this patient.       12/11/18 1700   Session   Session 60 Day Individualized Treatment Plan   Certified through this date 01/09/18   Cardiac Rehab Assessment   Cardiac Rehab Assessment Pt presents today s/p STEMI, Stent placement to mLAD and D1, and ICD placement, and Intra-aortic baloon pump placed. Pt wife had called EMS after syncope episode. Pt went into cardiac arrest,  CPR performed and EMS administered AED shock.  En route to ED pt went into probable V tach. Upon arrival to ED Pt was intubated at bedside and transfered to cath where lesion of proximal LD within prior stent and Thrombus in D1.  Pt has h/o HTN, DM, iCM, previous MI (2007). 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 pt notes he is very active with work but 'knows his limits'. Pt was unsure about rehab but is motivated to increase his confidence in his return to work and his walking routine with his wife. 11/13 Pt has attended 3 sessions, now states \"I really like coming here\". Appears to have gained confidence and comfort level in participating in program.  12/11 Pt has struggled with rehab attendance due to work. Pt states some days he is unable to get away from work. Pt continues to progess with rehab. Will continue to progess as tolerated.  Skilled therapy beneficial to monitor cv response to exercise, assist pt in establishing home exercise routine, and educate pt on risk factor management.    General Information   Treatment Diagnosis STEMI   Date of Treatment Diagnosis 09/13/18   Secondary Treatment Diagnosis Stent  (External Defibrillator)   Significant Past CV History Previous MI;Previous PCI;History of Heart Failure   Comorbidities DM   Lead up symptoms Syncope, Cardiac Arrest   Hospital Location [facility] " "Discharge Date 09/20/18   Signs and Symptoms Post Hospital Discharge None   Outpatient Cardiac Rehab Start Date 11/01/18   Primary Physician Dr. Chichi Lou   Primary Physician Follow Up Completed   Surgeon    Cardiologist Follow Up Advised to schedule appointment   Ejection Fraction 23   Risk Stratification High   Summary of Cath Report   Summary of Cath Report Available   Date Performed 09/13/18   LAD pLAD 80%, restented 0%   D1 90%, Resulted to 10%   LCX pCx 20% stenosis   RCA minimal disease   Living and Work Status    Living Arrangements and Social Status house   Support System Live with an adult   Return to Employment Yes   Occupation Contractor- Supervisor   Preventative Medications   CMS recommended medications Ace inhibitors;Antiplatelets;Beta Blocker;Lipid Lowering   Fall Risk Screen   Fall screen completed by Cardiac Rehab   Have you fallen 2 or more times in the past year? No   Have you fallen and had an injury in the past year? No   Is patient a fall risk? No   Pain   Patient Currently in Pain No   Physical Assessments   Incisions WNL   Edema None   Right Lung Sounds not assessed   Left Lung Sounds not assessed   Limitations No limitations   Individualized Treatment Plan   Monitored Sessions Scheduled 18   Monitored Sessions Attended 6   Oxygen   Supplemental Oxygen needed No   Nutrition Management - Weight Management   Assessment Re-assessment   Age 63   Weight 80.9 kg (178 lb 6.4 oz)   Height 1.676 m (5' 5.98\")   BMI (Calculated) 28.87   Initial Rate Your Plate Score. Dietary tool to assess eating patterns. Scores range from 24 to 72. The higher the score the healthier the eating pattern. 61   Weight Management Comments Pt notes he has lost a lot of weight over the past years and is satisfied with his current weight. Discussed importance of weight management.    Nutrition Management - Lipids   Lipids Labs Available   Date 09/13/18   Total Cholesterol 121   Triglycerides 158   HDL 41 "   LDL 48   Prescribed Lipid Medication Yes   Statin Intensity High Intensity   Nutrition Management - Diabetes   Diabetes Type II   Do you Monitor BS at Home? No   Diabetes Medication Prescribed Yes, Oral Medication   Nutrition Management Summary   Dietary Recommendations Low Fat;Low Sodium;Low Cholesterol   Interventions Planned Educate on Benefits of Exercise   Nutrition Target Outcome Weight loss .5-1 lb/week (if BMI > 25);BMI < 25   Psychosocial Management   Psychosocial Assessment Re-assessment   Is there history of clinical depression or increased risk of depression? No previous history   Current Level of Stress per Patient Report Moderate    Current Coping Skills Has Positive Support System   Initial Patient Health Questionnaire -9 Score (PHQ-9) for depression. 5-9 Minimal symptoms, 10-14 Minor depression, 15-19 Major depression, moderately severe, > 20 Major depression, severe  0   Initial [facility] Survey score.  Quality of Life:   If total score > 25 review individual areas where patient rated a 4 or 5.  Consider patients current medical condition and what role that plays on the score.   Adjust treatment protocol to improve areas of concern.  Consider the following:  PHQ9 score, DASI, and re-assessment within the next 30 days to assist with developing treatments.  10   Stages of Change Maintenance   Interventions Planned Patient to verbalize understanding of behavioral assessment results;Patient to verbalize understanding of negative impact of stress to personal health   Psychosocial Target Outcome Maximize coping skills   Other Core Components - Hypertension   History of or Diagnosis of Hypertension Yes   Currently taking Anti-Hypertensives Yes;Beta blocker;Ace Inhibitor   Other Core Components - Tobacco   History of Tobacco Use Never   Other Core Components Summary   Interventions Planned Instruct and educate to self manage Heart Failure symptoms;Educate on signs/symptoms and when to call the doctor  (i.e. increased edema, dyspnea, fatigue, dizziness/lightheadedness, change in sleep patterns, chest discomfort);Educate on the use of 'Stop Light' tool;Educate on importance of monitoring daily weight   Activity/Exercise History   Activity/Exercise Assessment Re-assessment   Activity/Exercise Status prior to event? Was Physically Active   Number of Days Currently participating in Moderate Physical Activity? 7   Number of Days Currently performing  Aerobic Exercise (including rehab)? 1-2   Number of Minutes per Session Currently of Aerobic Exercise (average)? 30   Current Stage of Change (Physical Activity) Maintenance   Current Stage of Change (Aerobic Exercise) Action   Patient Goals Goal #1;Goal #2   Goal #1 Description Pt would like to establish exercise with his wife 3x/week before completing rehab program   Goal #1 Target Date 01/03/19   Goal #1 Progress Towards Goal 11/13 no indep exercise yet.  12/11 Pt states he has not returned to any home exercise   Goal #2 Description Pt would like to tolerate 30 minutes of walking with his wife at brisk pace of 3mph  per rehab intensity by participating in rehab 2x/week.    Goal #2 Target Date 01/03/19   Goal #2 Progress Towards Goal 11/13 Pt has started regular ex in CR walking at 2mph with stable cv response.  12/11 Pt continues to progess workloads. Currently working at 3.2 METS. 2.4mph, 1% for 15 minutes   Activity/Exercise Target Outcome An Accumulation of 150  Minutes of Aerobic Activity per Week   Exercise Assessment   6 Minute Walk Predicted - Gender Selection Male   6 Minute Walk Predicted (Male) 1642.45   6 Minute Walk Predicted (Female) 1544.21   Initial 6 Minute Walk Distance (Feet) 1375 ft   Resting  bpm   Exercise  bpm   Post Exercise HR 92 bpm   Resting /74   Exercise /78   Post Exercise /72   Pre  mg/dL   Effort Rating 5   Current MET Level 3.2   MET Level Goal 5   ECG Rhythm Sinus rhythm;Right bundle branch block    Ectopy PVCs   Current Symptoms Denies symptoms   Limitations/Restrictions None   Exercise Prescription   Mode Treadmill;Nustep;Recumbant bike;Weights;LE strengthening   Duration/Time 15-30 min;Intermittent bouts   Frequency 3 daysweek   THR (85% of age predicted max HR) 133.45   OMNI Effort Rating (0-10 Scale) 4-6/10   Progression Intermittent bouts;Total exercise time of 20-30 minutes;Progress peak intensity by 1/4 MET per week   Recommended Home Exercise   Type of Exercise Walking   Frequency (days per week) Daily on non rehab daysj   Duration (minutes per session) 15-30 min;Intermittent   Effort Rating Recommended 4-6/10   Current Home Exercise   Type of Exercise None   Frequency (days per week) 0   Duration (minutes per session) 0   Follow-up/On-going Support   Provider follow-up needed on the following No follow-up needed   Learning Assessment   Learner Patient   Primary Language English   Preferred Learning Style Listening;Reading;Demonstration   Barriers to Learning No barriers noted   Patient Education   Education recommended Exercise Principles;Medication Overview;Anatomy and Physiology of the Heart;Patient Attended Education in the Past

## 2018-12-18 ENCOUNTER — HOSPITAL ENCOUNTER (OUTPATIENT)
Dept: CARDIAC REHAB | Facility: CLINIC | Age: 63
End: 2018-12-18
Attending: INTERNAL MEDICINE
Payer: COMMERCIAL

## 2018-12-18 PROCEDURE — 40000116 ZZH STATISTIC OP CR VISIT

## 2018-12-18 PROCEDURE — 93798 PHYS/QHP OP CAR RHAB W/ECG: CPT

## 2018-12-24 ENCOUNTER — TELEPHONE (OUTPATIENT)
Dept: AUDIOLOGY | Facility: CLINIC | Age: 63
End: 2018-12-24

## 2018-12-24 NOTE — TELEPHONE ENCOUNTER
"Spoke to patient who expressed frustration about issues with his hearing aid. He reports that he has friends and family who never need to clean their hearing aids and they work just fine. He stated that he doesn't wear his hearing aid often because it does not work. Discussed that people are different and so it is not uncommon that some people need to clean their hearing aids more frequently than others.     He expressed that he previously tried another hearing aid that he lost that did not break down as frequently.  Patient was frustrated that he has been needing services \"frequently\". (Of note patient has only been seen for walk-in services 2 times since his initial review of the hearing aids).  It was discussed that the hearing aids could not be exchanged at this point because he has had them for well over a year at this point.  He expressed understanding.    Patient asked if he could come in today, unfortunately there is no availability today and no walk-in services as the clinic is closing early. Recommended a hearing aid check appointment to thoroughly review cleaning of the hearing aid so that he can do that at home. He was in agreement with the plan. Offered appointment for 12/27/18 but patient reported that would not work with his scheduled, patient will call to schedule appointment.    Lawanda Toledo  Audiologist  [place] License  #[id number]        [facility]    Phone Message    May a detailed message be left on voicemail: yes    Reason for Call: Other: patient hearing aids are not working. patient says he's tired new batteries and brought them in multiple times to have them fixed, The hearing aids work for a little bit and then stop. Please call pt. to see what options he has.   Patient wanted me to let the clinic know that he is very frustrated and needs something different.   Action Taken: Message routed to:  [facility] ([facility] LAWANDA    "

## 2018-12-27 ENCOUNTER — HOSPITAL ENCOUNTER (OUTPATIENT)
Dept: CARDIAC REHAB | Facility: CLINIC | Age: 63
End: 2018-12-27
Attending: INTERNAL MEDICINE
Payer: COMMERCIAL

## 2018-12-27 ENCOUNTER — OFFICE VISIT (OUTPATIENT)
Dept: INTERNAL MEDICINE | Facility: CLINIC | Age: 63
End: 2018-12-27
Payer: COMMERCIAL

## 2018-12-27 VITALS
DIASTOLIC BLOOD PRESSURE: 73 MMHG | OXYGEN SATURATION: 95 % | WEIGHT: 179 LBS | HEART RATE: 91 BPM | BODY MASS INDEX: 28.91 KG/M2 | SYSTOLIC BLOOD PRESSURE: 107 MMHG

## 2018-12-27 DIAGNOSIS — E78.2 MIXED HYPERLIPIDEMIA: ICD-10-CM

## 2018-12-27 DIAGNOSIS — E11.9 TYPE 2 DIABETES MELLITUS WITHOUT COMPLICATION, WITHOUT LONG-TERM CURRENT USE OF INSULIN (H): ICD-10-CM

## 2018-12-27 DIAGNOSIS — I10 BENIGN ESSENTIAL HYPERTENSION: ICD-10-CM

## 2018-12-27 DIAGNOSIS — I46.9 CARDIAC ARREST (H): ICD-10-CM

## 2018-12-27 DIAGNOSIS — I50.20 HEART FAILURE WITH REDUCED EJECTION FRACTION, NYHA CLASS III (H): ICD-10-CM

## 2018-12-27 DIAGNOSIS — I21.02 ST ELEVATION MYOCARDIAL INFARCTION INVOLVING LEFT ANTERIOR DESCENDING (LAD) CORONARY ARTERY (H): ICD-10-CM

## 2018-12-27 DIAGNOSIS — I50.22 CHRONIC SYSTOLIC CONGESTIVE HEART FAILURE (H): ICD-10-CM

## 2018-12-27 PROCEDURE — 93798 PHYS/QHP OP CAR RHAB W/ECG: CPT

## 2018-12-27 PROCEDURE — 40000116 ZZH STATISTIC OP CR VISIT

## 2018-12-27 RX ORDER — METFORMIN HCL 500 MG
1000 TABLET, EXTENDED RELEASE 24 HR ORAL 2 TIMES DAILY WITH MEALS
Qty: 180 TABLET | Refills: 1 | Status: SHIPPED | OUTPATIENT
Start: 2018-12-27 | End: 2019-04-16

## 2018-12-27 RX ORDER — FUROSEMIDE 40 MG
40 TABLET ORAL
Qty: 60 TABLET | Refills: 3 | Status: SHIPPED | OUTPATIENT
Start: 2018-12-27 | End: 2019-06-20

## 2018-12-27 RX ORDER — ATORVASTATIN CALCIUM 80 MG/1
80 TABLET, FILM COATED ORAL DAILY
Qty: 90 TABLET | Refills: 3 | Status: SHIPPED | OUTPATIENT
Start: 2018-12-27 | End: 2020-02-17

## 2018-12-27 RX ORDER — CARVEDILOL 12.5 MG/1
12.5 TABLET ORAL 2 TIMES DAILY WITH MEALS
Qty: 90 TABLET | Refills: 3 | Status: SHIPPED | OUTPATIENT
Start: 2018-12-27 | End: 2019-01-31

## 2018-12-27 RX ORDER — ASPIRIN 81 MG/1
81 TABLET, CHEWABLE ORAL DAILY
Qty: 30 TABLET | Refills: 11 | Status: SHIPPED | OUTPATIENT
Start: 2018-12-27 | End: 2020-01-03

## 2018-12-27 ASSESSMENT — PAIN SCALES - GENERAL: PAINLEVEL: NO PAIN (0)

## 2018-12-27 NOTE — NURSING NOTE
Chief Complaint   Patient presents with     Medication Follow-up     Pt is here to follow up on medications.       .Yue Farrar LPN at 7:55 AM on 12/27/2018.

## 2018-12-27 NOTE — PROGRESS NOTES
PRIMARY CARE CENTER       SUBJECTIVE:  Anson Manriquez is a 63 year old male with a PMHx of CAD with recent STEMI in 9/2018 with DESx2, HTN. DM, presenting for medication refill. He states he is doing well. Won a pool tournament Saturday.  No chest pain or issues from a cardiac perspective recently. 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 Denies SOB. Can walk up at least a couple flights of stairs without having to rest.  Has not had defibrillator placed yet, scheduled to get an echo in January prior to final decision. Notes he has a hernia he hopes to get fixed prior to that but may need to wait.      He notes that he had some issues with bleeding nose, was given suave which helped, no further bleeding. Weights are stable at 175. Has stopped drinking soda. Eats lean meats. Lots of fruits and veggies. Does not check blood sugars at home, has been taking all medications as prescribed. No other concerns.         Past medical, surgical and social hx reviewed as were Medications and allergies     ROS:   Constitutional, HEENT, cardiovascular, pulmonary, gi and gu systems are negative, except as otherwise noted.    OBJECTIVE:  /73   Pulse 91   Wt 81.2 kg (179 lb)   SpO2 95%   BMI 28.91 kg/m     Wt Readings from Last 1 Encounters:   12/11/18 80.9 kg (178 lb 6.4 oz)       GENERAL APPEARANCE:alert and no distress     HEENT: MMM, EOMI,  mouth without ulcers or lesions     NECK: no adenopathy, no asymmetry     RESP: lungs clear to auscultation - no rales, rhonchi or wheezes     CV: regular rates and rhythm, and no murmur, click or rub     ABDOMEN:  soft, nontender,bowel sounds normal, midline umbilical hernia,reducible     MS: extremities normal, no swelling     SKIN: no suspicious lesions or rashes     NEURO: Normal strength and tone, no focal deficits, mentation intact and speech normal     PSYCH: mentation appears normal. and affect normal     ASSESSMENT/PLAN:    Anson was seen  today for medication follow-up.    Diagnoses and all orders for this visit:    #ST elevation myocardial infarction involving left anterior descending (LAD) coronary artery (H)  #CAD  #CHF, EF<20%  #HTN  Following with cardiology, plan for repeat echo and likely defibrillator placement in January. Refills of all medications today  -     aspirin (ASA) 81 MG chewable tablet; Take 1 tablet (81 mg) by mouth daily  -     carvedilol (COREG) 12.5 MG tablet; 1 tablet (12.5 mg) by Oral or Feeding Tube route 2 times daily (with meals)  -     sacubitril-valsartan (ENTRESTO) 24-26 MG per tablet; Take 1 tablet by mouth 2 times daily  -     ticagrelor (BRILINTA) 90 MG tablet; Take 1 tablet (90 mg) by mouth 2 times daily  -     furosemide (LASIX) 40 MG tablet; Take 1 tablet (40 mg) by mouth 2 times daily  -     atorvastatin (LIPITOR) 80 MG tablet; Take 1 tablet (80 mg) by mouth daily Needs labs done prior to the next refill.      Type 2 diabetes mellitus without complication, without long-term current use of insulin (H)  Pt states he has made dietary changes since MI. Continue to encourage healthy eating, exercise and medication adherence. Plan to have A1C checked at later visit.  -     metFORMIN (GLUCOPHAGE-XR) 500 MG 24 hr tablet; Take 2 tablets (1,000 mg) by mouth 2 times daily (with meals)    Other orders  -     ZOSTER VACCINE RECOMBINANT ADJUVANTED IM NJX        Pt should return to clinic for f/u in 3-6 months    Sunshine Duran MD  Dec 27, 2018    Pt and plan of care discussed with Dr Chacon   While the patient was in clinic, I reviewed the pertinent medical history and results.  I discussed the current findings on physical examination, as well as the patient s diagnosis and treatment plan with the resident and agree with the information as documented with the following exceptions: none.  Gutierrez Chacon

## 2018-12-27 NOTE — PATIENT INSTRUCTIONS
[facility] Medication Refill Request Information:  * Please contact your pharmacy regarding ANY request for medication refills.  ** [facility] Prescription Fax = [phone]  * Please allow 3 business days for routine medication refills.  * Please allow 5 business days for controlled substance medication refills.     [facility] Test Result notification information:  *You will be notified with in 7-10 days of your appointment day regarding the results of your test.  If you are on MyChart you will be notified as soon as the provider has reviewed the results and signed off on them.    [facility]: [phone]            [facility]         Internal Medicine Resident                   Continuity Clinic    Who We Are    Resident Continuity Clinic is a part of the [facility] Primary Care Clinic.  Resident physicians see patients independently and establish a relationship with them over the course of their three-year residency program.  As with the Primary Care Clinic, our Resident Continuity Clinic models a group practice.  If your doctor is not available, you will be able to see another resident physician.  At the end of a resident s training, patients will be transitioned to a new resident physician for ongoing care.     We treat patients with a wide array of medical needs from routine physicals, to acute illnesses, to diabetes and blood pressure management, to complex medical illness.  What is a Resident Physician?    Resident physicians hold medical degrees and are doctors. They are training to become specialists in Internal Medicine. They work under the supervision of board-certified faculty physicians.  Expectations for Your Care    We strive to provide accessible, quality care at all times.    In order to provide this care, it is best to see your primary care resident doctor consistently rather switching between providers.  In the event you do see another physician, you should  schedule a follow-up visit with your usual primary care doctor.    If you are transitioning your care from another clinic, it is helpful to have your records available for your doctor to review.    We do not prescribe controlled substances, such as ADD medications or narcotic pain medications, on your first visit.  We will review your health records and concerns prior to devising a treatment plan with you in order to provide the best care.      Clinic Services     Extended clinic hours; patient  to help navigate your visit;  parking; laboratory and imaging services with evening and weekend hours    Multiple medical and surgical specialties in one building    Complementary services, including Nutrition, Integrative Medicine, Pharmacy consultations, Mental and Behavioral Health, Sports Medicine and Physical Therapy    Thank You    We would like to thank you for choosing the [facility] Internal Medicine Resident Continuity Clinic for your primary care. You are making a priceless contribution to the training of the next generation of health care practitioners.     Contact us at [phone] for appointments or questions.    Resident Clinic Hours are Tuesdays and Thursdays, 7:30am-5:00pm    Residents   Daniele Tucker MD   (Male)   Christina Avitia MD  (Female)  Grace Paul MD   (Female)   Sunshine Duran MD   (Female)   Mark Mcgill MD  (Male)   Adebayo Hanson MD  (Male)   Blanca Robertson MD    (Female)   Bradley Chau MD  (Male)   Taiwo Michelle MD  (Male)    Analy Boswell MD  (Female)   Prem Lou MD  (Male)   Leanne Bran MD  (Female)   Bruna Cummings MD    (Female)   Jean-Paul Ghosh MD  (Male)   Jan Bailey MD  (Male)   Adebayo Delaney MD (Male)   Jese Almanza MD  (Male)   Bree Lozano MD (Female)    Demetria Guevara MD (Female)   Mike Sanchez MD  (Male)   Sara Long MD    (Female)   Chiqui Ramsey MD  (Female)    Supervising Physicians   Joelle  Matthew, MD Taryn Lou, MD Eros Mahan, MD Gutierrez Chacon, MD Nancy Martines, MD Stephanie Doan, MD Caio Frazier, MD Martha Bertrand, MD Vera Lowery MD

## 2019-01-14 NOTE — TELEPHONE ENCOUNTER
FUTURE VISIT INFORMATION:    Date: 1/16/19    Time: 0900    Location:  [facility]  REFERRAL INFORMATION:    Referring provider:  Dr. Matos    Referring providers clinic:  [facility]    Reason for visit/diagnosis:  Eval. For ICD      All records in epic.

## 2019-01-16 ENCOUNTER — PRE VISIT (OUTPATIENT)
Dept: CARDIOLOGY | Facility: CLINIC | Age: 64
End: 2019-01-16

## 2019-01-17 ENCOUNTER — HOSPITAL ENCOUNTER (OUTPATIENT)
Dept: CARDIAC REHAB | Facility: CLINIC | Age: 64
End: 2019-01-17
Attending: INTERNAL MEDICINE
Payer: COMMERCIAL

## 2019-01-17 PROCEDURE — 93798 PHYS/QHP OP CAR RHAB W/ECG: CPT | Performed by: REHABILITATION PRACTITIONER

## 2019-01-17 PROCEDURE — 40000116 ZZH STATISTIC OP CR VISIT: Performed by: REHABILITATION PRACTITIONER

## 2019-01-22 ENCOUNTER — HOSPITAL ENCOUNTER (OUTPATIENT)
Dept: CARDIAC REHAB | Facility: CLINIC | Age: 64
End: 2019-01-22
Attending: INTERNAL MEDICINE
Payer: COMMERCIAL

## 2019-01-22 PROCEDURE — 40000116 ZZH STATISTIC OP CR VISIT

## 2019-01-22 PROCEDURE — 93798 PHYS/QHP OP CAR RHAB W/ECG: CPT

## 2019-01-23 ENCOUNTER — TELEPHONE (OUTPATIENT)
Dept: AUDIOLOGY | Facility: CLINIC | Age: 64
End: 2019-01-23

## 2019-01-23 NOTE — TELEPHONE ENCOUNTER
Anson Manriquez's wife brought his hearing aids to the [facility] on 1/23/19 for cleaning.  Domes (medium closed sleeve) and trudi locks were replaced bilaterally.  The left hearing aid was found to be working well and was returned to Mr. Manriquez's wife.  The right hearing aid was non-functional and a new  did not resolve the issue.  It is being sent to the  for warranty repair.  The patient's wife will be contacted to  the repaired device.

## 2019-01-30 ENCOUNTER — OFFICE VISIT (OUTPATIENT)
Dept: CARDIOLOGY | Facility: CLINIC | Age: 64
End: 2019-01-30
Attending: INTERNAL MEDICINE
Payer: COMMERCIAL

## 2019-01-30 ENCOUNTER — ANCILLARY PROCEDURE (OUTPATIENT)
Dept: CARDIOLOGY | Facility: CLINIC | Age: 64
End: 2019-01-30
Payer: COMMERCIAL

## 2019-01-30 VITALS
OXYGEN SATURATION: 93 % | BODY MASS INDEX: 30.7 KG/M2 | DIASTOLIC BLOOD PRESSURE: 81 MMHG | HEART RATE: 97 BPM | HEIGHT: 66 IN | WEIGHT: 191 LBS | SYSTOLIC BLOOD PRESSURE: 123 MMHG

## 2019-01-30 DIAGNOSIS — I50.20 HEART FAILURE WITH REDUCED EJECTION FRACTION, NYHA CLASS III (H): ICD-10-CM

## 2019-01-30 DIAGNOSIS — I25.5 ISCHEMIC CARDIOMYOPATHY: ICD-10-CM

## 2019-01-30 DIAGNOSIS — I25.5 ISCHEMIC CARDIOMYOPATHY: Primary | ICD-10-CM

## 2019-01-30 PROCEDURE — 93010 ELECTROCARDIOGRAM REPORT: CPT | Mod: ZP | Performed by: INTERNAL MEDICINE

## 2019-01-30 PROCEDURE — 93005 ELECTROCARDIOGRAM TRACING: CPT | Mod: ZF

## 2019-01-30 PROCEDURE — 40000141 ZZH STATISTIC PERIPHERAL IV START W/O US GUIDANCE: Mod: ZF

## 2019-01-30 PROCEDURE — G0463 HOSPITAL OUTPT CLINIC VISIT: HCPCS | Mod: 25,ZF

## 2019-01-30 PROCEDURE — 99214 OFFICE O/P EST MOD 30 MIN: CPT | Mod: 25 | Performed by: INTERNAL MEDICINE

## 2019-01-30 RX ADMIN — Medication 10 ML: at 09:00

## 2019-01-30 ASSESSMENT — PAIN SCALES - GENERAL: PAINLEVEL: NO PAIN (0)

## 2019-01-30 ASSESSMENT — MIFFLIN-ST. JEOR: SCORE: 1604.12

## 2019-01-30 NOTE — LETTER
1/30/2019      RE: Anson Manriquez  [address] [address]       Dear Colleague,    Thank you for the opportunity to participate in the care of your patient, Anson Manriquez, at the [facility] at [facility]. Please see a copy of my visit note below.          Electrophysiology Clinic Follow-up      HPI:  62 yo male with long-standing ischemic cardiomyopathy (EF 25-30%), NYHA class II, CAD with recent anterior STEMI on 9/14 s/p PCI to LAD, MMVT after MI (within 48 hours post MI), CKD, DM-II, HLD, HTN who we are following for consideration of ICD and / or CRT. He presents today for 3-months follow-up after revascularization following anterior STEMI on 9/14/18 complicated by VT arrest and cardiogenic shock. Coronary angiography showed an acute occlusion of the proximal LAD prompting PCI of the culprit vessel and subsequently has done well. EP had consulted on him for question of the need for ICD / CRT given the MMVT and chronically low EF. EP concluded to follow-up 90 days post MI for that determination, but recommended LifeVest in interim. Of note, the patient had no NSVT or VT beyond 48 hours after his MI.     EP Visit Today (1/30/2019): He presents for clinic follow-up after hospitalization for anterior STEMI. Reports doing well. 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 Mild SOB with prolonged walking and stair climbing, but does not necessarily have to stop. No chest pain, syncope, presyncope, dizziness, orthostatic symptoms. Tolerating meds well. Has chronic hernia into scrotum that has slowly limited some function (no severe pain however). Presenting EKG: VR 88 bpm, NSR, LBBB,  ms,  ms, QTc 491 ms. Cardiac meds: asa, Lipitor, coreg, Entresto, lasix.      Current Outpatient Medications on File Prior to Visit:  aspirin (ASA) 81 MG chewable tablet Take 1 tablet (81 mg) by mouth daily   atorvastatin (LIPITOR) 80 MG  tablet Take 1 tablet (80 mg) by mouth daily Needs labs done prior to the next refill.   carvedilol (COREG) 12.5 MG tablet 1 tablet (12.5 mg) by Oral or Feeding Tube route 2 times daily (with meals)   furosemide (LASIX) 40 MG tablet Take 1 tablet (40 mg) by mouth 2 times daily   metFORMIN (GLUCOPHAGE-XR) 500 MG 24 hr tablet Take 2 tablets (1,000 mg) by mouth 2 times daily (with meals)   sacubitril-valsartan (ENTRESTO) 24-26 MG per tablet Take 1 tablet by mouth 2 times daily   ticagrelor (BRILINTA) 90 MG tablet Take 1 tablet (90 mg) by mouth 2 times daily   vitamin B complex with vitamin C (VITAMIN  B COMPLEX) TABS tablet Take 1 tablet by mouth daily   MULTIPLE VITAMIN PO Take 1 tablet by mouth daily.     Current Facility-Administered Medications on File Prior to Visit:  [COMPLETED] perflutren diluted in saline (DEFINITY) injection 10 mL   sodium chloride (PF) 0.9% PF flush 10 mL     No Known Allergies    Past Medical History:   Diagnosis Date     Chronic infection     near rectum still leaking     Coronary artery disease      Diabetes mellitus, type 2 (H)      Heart attack (H) 4/17/2007     Hyperlipidemia      Hypertension      STEMI (ST elevation myocardial infarction) (H) 09/13/2018    s/p STEVO x2     Stented coronary artery 2007     Past Surgical History:   Procedure Laterality Date     HERNIA REPAIR Right     Inguinal     IRRIGATION AND DEBRIDEMENT RECTUM, COMBINED      abcess near rectum      LAPAROSCOPIC CHOLECYSTECTOMY  3/29/2012    Procedure:LAPAROSCOPIC CHOLECYSTECTOMY; LAPAROSCOPIC CHOLECYSTECTOMY; Surgeon:MARILYNN PRESSLEY; Location:[facility] OR     STENT, CORONARY, OLIVIA       Family History   Problem Relation Age of Onset     Hypertension Mother      Diabetes Father      Glaucoma No family hx of      Macular Degeneration No family hx of      Social History     Socioeconomic History     Marital status:      Spouse name: Not on file     Number of children: Not on file     Years of education: Not on file      "Highest education level: Not on file   Social Needs     Financial resource strain: Not on file     Food insecurity - worry: Not on file     Food insecurity - inability: Not on file     Transportation needs - medical: Not on file     Transportation needs - non-medical: Not on file   Occupational History     Not on file   Tobacco Use     Smoking status: Never Smoker     Smokeless tobacco: Never Used   Substance and Sexual Activity     Alcohol use: No     Drug use: No     Sexual activity: Not on file   Other Topics Concern     Not on file   Social History Narrative     Not on file       A complete review of systems is negative except as noted above in the HPI.    Physical Examination:  Vitals: /81 (BP Location: Right arm, Patient Position: Chair, Cuff Size: Adult Regular)   Pulse 97   Ht 1.676 m (5' 6\")   Wt 86.6 kg (191 lb)   SpO2 93%   BMI 30.83 kg/m     GENERAL APPEARANCE: comfortable appearing male lying in bed with unlabored breathing.  HEENT: no scleral icterus, no xanthelasmas  CHEST: lungs clear to auscultation  CARDIOVASCULAR:  RRR with normal s1 and s2.  No murmur or rub.  Warm extremities with no peripheral edema.  ABDOMEN: soft, not tender  NEURO: alert and fully oriented.  Intact short & long term memory.  PSYCH:  Pleasant & cooperative.  SKIN: no peripheral petechiae/ecchymoses, not jaundiced.      Labs, imaging, and procedures were reviewed:  Recent Labs   Lab Test  09/18/18   1631  09/18/18   1330  09/18/18   0429  09/17/18   1559   03/07/17   1721   CR  1.01  0.86  0.94  1.30*   < >   --    BUN  20  17  19  23   < >   --    TSH   --    --    --    --    --   2.44   HGB   --    --   12.4*  12.8*   < >   --    INR   --    --   1.12   --    < >   --     < > = values in this interval not displayed.     Echocardiogram (9/2018):  Ischemic cardiomyopathy, LVEF=23% with extensive regional wall motion  abnormalities as described below. Resting wall motion abnormalities and LV  function are not " significantly changed from the rest images on the 11/27/12  stress echocardiogram.     Mildly dilated LV with severely reduced LV function, LVEF=23%. There is  akinesis involving the uspmu-fj-psd anteroseptal, wyr-kk-infpxg anterior, mid  anterolateral, and all apical myocardial segments. The basal anterior, basal  anterolateral, and basal inferolateral segments are relatively spared.  RV size and function are normal.  No significant valvular abnormalities.  No pericardial effusion.  Normal IVC with abnormal respiratory variation, estimated RA pressure 8 mmHg.    TTE (today):  - formal report pending  - EF estimated 25-30%    Impression:  64 yo male with long-standing ischemic cardiomyopathy (EF 25-30%), NYHA class II, CAD with recent anterior STEMI on 9/14 s/p PCI to LAD, MMVT after MI (within 48 hours post MI), CKD, DM-II, HLD, HTN who we are following for consideration of ICD and / or CRT. He presents today for 3-months follow-up after revascularization.    He is NYHA symptom class II, and is >90 days post revascularization. On visualization of echo, EF appears to be 25-30% range with LAD distribution WMA. It does not appear much different than 9/2018. His EKG is consistent with LBBB ( ms). At this point, he meets class IIa indication for CRT-D (NYHA class II, -149 ms, LBBB, EF <35%). He appears volume compensated by TTE and examination.    Discussed benefits and risks of CRT-D implantation with patient and answered all questions.    Will scheduled for CRT-D. Will have him discuss urgency of hernia surgery with general surgery prior to scheduling CRT-D. Follow-up after procedure as routine.     The patient was seen and discussed with Dr. Mckeon.    Paulie Umana MD  Cardiovascular Medicine Fellow    EP STAFF NOTE  Patient seen and examined and management plan personally reviewed with the patient. I agree with the note above by the CV/EP fellow.  We discussed with the patient the potential  complications of the procedure that include hemothorax or pneumothorax, pericardial effusion and tamponade, pocket bleeding, infection.  All his questions were answered.  The patient expressed his understanding and agreement to proceed with CRT-D implant.      Mick Mckeon MD

## 2019-01-30 NOTE — NURSING NOTE
Chief Complaint   Patient presents with     New Patient     ICD consult     Vitals were taken and medications were reconciled.EKG performed.    9:04 AM Kei Silva, Student CMA

## 2019-01-30 NOTE — PROGRESS NOTES
Electrophysiology Clinic Follow-up      HPI:  64 yo male with long-standing ischemic cardiomyopathy (EF 25-30%), NYHA class II, CAD with recent anterior STEMI on 9/14 s/p PCI to LAD, MMVT after MI (within 48 hours post MI), CKD, DM-II, HLD, HTN who we are following for consideration of ICD and / or CRT. He presents today for 3-months follow-up after revascularization following anterior STEMI on 9/14/18 complicated by VT arrest and cardiogenic shock. Coronary angiography showed an acute occlusion of the proximal LAD prompting PCI of the culprit vessel and subsequently has done well. EP had consulted on him for question of the need for ICD / CRT given the MMVT and chronically low EF. EP concluded to follow-up 90 days post MI for that determination, but recommended LifeVest in interim. Of note, the patient had no NSVT or VT beyond 48 hours after his MI.     EP Visit Today (1/30/2019): He presents for clinic follow-up after hospitalization for anterior STEMI. Reports doing well. 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 Mild SOB with prolonged walking and stair climbing, but does not necessarily have to stop. No chest pain, syncope, presyncope, dizziness, orthostatic symptoms. Tolerating meds well. Has chronic hernia into scrotum that has slowly limited some function (no severe pain however). Presenting EKG: VR 88 bpm, NSR, LBBB,  ms,  ms, QTc 491 ms. Cardiac meds: asa, Lipitor, coreg, Entresto, lasix.      Current Outpatient Medications on File Prior to Visit:  aspirin (ASA) 81 MG chewable tablet Take 1 tablet (81 mg) by mouth daily   atorvastatin (LIPITOR) 80 MG tablet Take 1 tablet (80 mg) by mouth daily Needs labs done prior to the next refill.   carvedilol (COREG) 12.5 MG tablet 1 tablet (12.5 mg) by Oral or Feeding Tube route 2 times daily (with meals)   furosemide (LASIX) 40 MG tablet Take 1 tablet (40 mg) by mouth 2 times daily   metFORMIN (GLUCOPHAGE-XR) 500 MG  24 hr tablet Take 2 tablets (1,000 mg) by mouth 2 times daily (with meals)   sacubitril-valsartan (ENTRESTO) 24-26 MG per tablet Take 1 tablet by mouth 2 times daily   ticagrelor (BRILINTA) 90 MG tablet Take 1 tablet (90 mg) by mouth 2 times daily   vitamin B complex with vitamin C (VITAMIN  B COMPLEX) TABS tablet Take 1 tablet by mouth daily   MULTIPLE VITAMIN PO Take 1 tablet by mouth daily.     Current Facility-Administered Medications on File Prior to Visit:  [COMPLETED] perflutren diluted in saline (DEFINITY) injection 10 mL   sodium chloride (PF) 0.9% PF flush 10 mL     No Known Allergies    Past Medical History:   Diagnosis Date     Chronic infection     near rectum still leaking     Coronary artery disease      Diabetes mellitus, type 2 (H)      Heart attack (H) 4/17/2007     Hyperlipidemia      Hypertension      STEMI (ST elevation myocardial infarction) (H) 09/13/2018    s/p STEVO x2     Stented coronary artery 2007     Past Surgical History:   Procedure Laterality Date     HERNIA REPAIR Right     Inguinal     IRRIGATION AND DEBRIDEMENT RECTUM, COMBINED      abcess near rectum      LAPAROSCOPIC CHOLECYSTECTOMY  3/29/2012    Procedure:LAPAROSCOPIC CHOLECYSTECTOMY; LAPAROSCOPIC CHOLECYSTECTOMY; Surgeon:MARILYNN PRESSLEY; Location:[facility] OR     STENT, CORONARY, OLIVIA       Family History   Problem Relation Age of Onset     Hypertension Mother      Diabetes Father      Glaucoma No family hx of      Macular Degeneration No family hx of      Social History     Socioeconomic History     Marital status:      Spouse name: Not on file     Number of children: Not on file     Years of education: Not on file     Highest education level: Not on file   Social Needs     Financial resource strain: Not on file     Food insecurity - worry: Not on file     Food insecurity - inability: Not on file     Transportation needs - medical: Not on file     Transportation needs - non-medical: Not on file   Occupational History     Not on  "file   Tobacco Use     Smoking status: Never Smoker     Smokeless tobacco: Never Used   Substance and Sexual Activity     Alcohol use: No     Drug use: No     Sexual activity: Not on file   Other Topics Concern     Not on file   Social History Narrative     Not on file       A complete review of systems is negative except as noted above in the HPI.    Physical Examination:  Vitals: /81 (BP Location: Right arm, Patient Position: Chair, Cuff Size: Adult Regular)   Pulse 97   Ht 1.676 m (5' 6\")   Wt 86.6 kg (191 lb)   SpO2 93%   BMI 30.83 kg/m    GENERAL APPEARANCE: comfortable appearing male lying in bed with unlabored breathing.  HEENT: no scleral icterus, no xanthelasmas  CHEST: lungs clear to auscultation  CARDIOVASCULAR:  RRR with normal s1 and s2.  No murmur or rub.  Warm extremities with no peripheral edema.  ABDOMEN: soft, not tender  NEURO: alert and fully oriented.  Intact short & long term memory.  PSYCH:  Pleasant & cooperative.  SKIN: no peripheral petechiae/ecchymoses, not jaundiced.      Labs, imaging, and procedures were reviewed:  Recent Labs   Lab Test  09/18/18   1631  09/18/18   1330  09/18/18   0429  09/17/18   1559   03/07/17   1721   CR  1.01  0.86  0.94  1.30*   < >   --    BUN  20  17  19  23   < >   --    TSH   --    --    --    --    --   2.44   HGB   --    --   12.4*  12.8*   < >   --    INR   --    --   1.12   --    < >   --     < > = values in this interval not displayed.     Echocardiogram (9/2018):  Ischemic cardiomyopathy, LVEF=23% with extensive regional wall motion  abnormalities as described below. Resting wall motion abnormalities and LV  function are not significantly changed from the rest images on the 11/27/12  stress echocardiogram.     Mildly dilated LV with severely reduced LV function, LVEF=23%. There is  akinesis involving the szxtd-uw-ciz anteroseptal, ekg-gw-uvefwi anterior, mid  anterolateral, and all apical myocardial segments. The basal anterior, " basal  anterolateral, and basal inferolateral segments are relatively spared.  RV size and function are normal.  No significant valvular abnormalities.  No pericardial effusion.  Normal IVC with abnormal respiratory variation, estimated RA pressure 8 mmHg.    TTE (today):  - formal report pending  - EF estimated 25-30%    Impression:  64 yo male with long-standing ischemic cardiomyopathy (EF 25-30%), NYHA class II, CAD with recent anterior STEMI on 9/14 s/p PCI to LAD, MMVT after MI (within 48 hours post MI), CKD, DM-II, HLD, HTN who we are following for consideration of ICD and / or CRT. He presents today for 3-months follow-up after revascularization.    He is NYHA symptom class II, and is >90 days post revascularization. On visualization of echo, EF appears to be 25-30% range with LAD distribution WMA. It does not appear much different than 9/2018. His EKG is consistent with LBBB ( ms). At this point, he meets class IIa indication for CRT-D (NYHA class II, -149 ms, LBBB, EF <35%). He appears volume compensated by TTE and examination.    Discussed benefits and risks of CRT-D implantation with patient and answered all questions.    Will scheduled for CRT-D. Will have him discuss urgency of hernia surgery with general surgery prior to scheduling CRT-D. Follow-up after procedure as routine.     The patient was seen and discussed with Dr. Mckeon.    Paulie Umana MD  Cardiovascular Medicine Fellow    EP STAFF NOTE  Patient seen and examined and management plan personally reviewed with the patient. I agree with the note above by the CV/EP fellow.  We discussed with the patient the potential complications of the procedure that include hemothorax or pneumothorax, pericardial effusion and tamponade, pocket bleeding, infection.  All his questions were answered.  The patient expressed his understanding and agreement to proceed with CRT-D implant.  Mick Mckeon MD [facility]  Cardiology - Electrophysiology

## 2019-01-30 NOTE — PATIENT INSTRUCTIONS
Cardiology Provider you saw in clinic today: Dr. Mckeon    Further Instructions:  Please let us know when you'd like to pursue the ICD implant.         Please contact us via [employer]jit for questions or concerns.    Tmei Scherer RN   Electrophysiology Nurse Coordinator.  [phone]    If your question concerns the schedule/appointment times, contact:  [facility]   [phone]    Device Clinic (Pacemakers, ICD, Loop Recorders)   [phone]      You will receive all normal lab and testing results via [employer]jit or mail if not reviewed in clinic today.   If you need a medication refill please contact your pharmacy.    As always, thank you for trusting us with your health care needs!

## 2019-01-31 ENCOUNTER — APPOINTMENT (OUTPATIENT)
Dept: LAB | Facility: CLINIC | Age: 64
End: 2019-01-31
Payer: COMMERCIAL

## 2019-01-31 ENCOUNTER — OFFICE VISIT (OUTPATIENT)
Dept: CARDIOLOGY | Facility: CLINIC | Age: 64
End: 2019-01-31
Attending: INTERNAL MEDICINE
Payer: COMMERCIAL

## 2019-01-31 ENCOUNTER — HOSPITAL ENCOUNTER (OUTPATIENT)
Dept: CARDIAC REHAB | Facility: CLINIC | Age: 64
End: 2019-01-31
Attending: INTERNAL MEDICINE
Payer: COMMERCIAL

## 2019-01-31 VITALS
WEIGHT: 185 LBS | DIASTOLIC BLOOD PRESSURE: 71 MMHG | HEART RATE: 94 BPM | HEIGHT: 66 IN | SYSTOLIC BLOOD PRESSURE: 107 MMHG | OXYGEN SATURATION: 97 % | BODY MASS INDEX: 29.73 KG/M2

## 2019-01-31 DIAGNOSIS — I50.20 HEART FAILURE WITH REDUCED EJECTION FRACTION, NYHA CLASS III (H): ICD-10-CM

## 2019-01-31 DIAGNOSIS — I25.10 CORONARY ARTERY DISEASE INVOLVING NATIVE CORONARY ARTERY OF NATIVE HEART WITHOUT ANGINA PECTORIS: ICD-10-CM

## 2019-01-31 DIAGNOSIS — E78.2 MIXED HYPERLIPIDEMIA: ICD-10-CM

## 2019-01-31 DIAGNOSIS — I10 BENIGN ESSENTIAL HYPERTENSION: Primary | ICD-10-CM

## 2019-01-31 DIAGNOSIS — I21.02 ST ELEVATION MYOCARDIAL INFARCTION INVOLVING LEFT ANTERIOR DESCENDING (LAD) CORONARY ARTERY (H): ICD-10-CM

## 2019-01-31 DIAGNOSIS — N18.30 CKD (CHRONIC KIDNEY DISEASE) STAGE 3, GFR 30-59 ML/MIN (H): ICD-10-CM

## 2019-01-31 LAB
ALBUMIN SERPL-MCNC: 4.1 G/DL (ref 3.4–5)
ALP SERPL-CCNC: 58 U/L (ref 40–150)
ALT SERPL W P-5'-P-CCNC: 29 U/L (ref 0–70)
ANION GAP SERPL CALCULATED.3IONS-SCNC: 8 MMOL/L (ref 3–14)
AST SERPL W P-5'-P-CCNC: 20 U/L (ref 0–45)
BILIRUB SERPL-MCNC: 1 MG/DL (ref 0.2–1.3)
BUN SERPL-MCNC: 17 MG/DL (ref 7–30)
CALCIUM SERPL-MCNC: 8.8 MG/DL (ref 8.5–10.1)
CHLORIDE SERPL-SCNC: 102 MMOL/L (ref 94–109)
CO2 SERPL-SCNC: 28 MMOL/L (ref 20–32)
CREAT SERPL-MCNC: 0.88 MG/DL (ref 0.66–1.25)
GFR SERPL CREATININE-BSD FRML MDRD: >90 ML/MIN/{1.73_M2}
GLUCOSE SERPL-MCNC: 156 MG/DL (ref 70–99)
INTERPRETATION ECG - MUSE: NORMAL
POTASSIUM SERPL-SCNC: 3.8 MMOL/L (ref 3.4–5.3)
PROT SERPL-MCNC: 8.2 G/DL (ref 6.8–8.8)
SODIUM SERPL-SCNC: 138 MMOL/L (ref 133–144)

## 2019-01-31 PROCEDURE — 40000116 ZZH STATISTIC OP CR VISIT: Performed by: REHABILITATION PRACTITIONER

## 2019-01-31 PROCEDURE — G0463 HOSPITAL OUTPT CLINIC VISIT: HCPCS | Mod: ZF

## 2019-01-31 PROCEDURE — 93798 PHYS/QHP OP CAR RHAB W/ECG: CPT | Performed by: REHABILITATION PRACTITIONER

## 2019-01-31 PROCEDURE — 36415 COLL VENOUS BLD VENIPUNCTURE: CPT | Performed by: INTERNAL MEDICINE

## 2019-01-31 PROCEDURE — 80053 COMPREHEN METABOLIC PANEL: CPT | Performed by: INTERNAL MEDICINE

## 2019-01-31 PROCEDURE — 99215 OFFICE O/P EST HI 40 MIN: CPT | Mod: ZP | Performed by: INTERNAL MEDICINE

## 2019-01-31 RX ORDER — CARVEDILOL 25 MG/1
25 TABLET ORAL 2 TIMES DAILY WITH MEALS
Qty: 180 TABLET | Refills: 1 | Status: SHIPPED | OUTPATIENT
Start: 2019-01-31 | End: 2019-09-10

## 2019-01-31 RX ORDER — CARVEDILOL 12.5 MG/1
25 TABLET ORAL 2 TIMES DAILY WITH MEALS
Qty: 90 TABLET | Refills: 3 | Status: SHIPPED | OUTPATIENT
Start: 2019-01-31 | End: 2019-01-31

## 2019-01-31 ASSESSMENT — 6 MINUTE WALK TEST (6MWT)
PREDICTED: 1649.26
FEMALE CALC: 1539.03
GENDER SELECTION: MALE
TOTAL DISTANCE WALKED (FT): 1375
MALE CALC: 1639.26

## 2019-01-31 ASSESSMENT — PAIN SCALES - GENERAL: PAINLEVEL: NO PAIN (0)

## 2019-01-31 ASSESSMENT — MIFFLIN-ST. JEOR
SCORE: 1576.9
SCORE: 1554.22

## 2019-01-31 NOTE — NURSING NOTE
Labs: CMP today . Patient demonstrated understanding of this information and agreed to call with further questions or concerns.   Med Reconcile: Reviewed and verified all current medications with the patient. The updated medication list was printed and given to the patient.  Return Appointment: Follow up in 3 months. Patient given instructions regarding scheduling next clinic visit. Patient demonstrated understanding of this information and agreed to call with further questions or concerns.  Medication Change: Increase Coreg to 25 MG twice daily. Patient was educated regarding prescribed medication change, including discussion of the indication, administration, side effects, and when to report to MD or RN. Patient demonstrated understanding of this information and agreed to call with further questions or concerns.  Patient stated he understood all health information given and agreed to call with further questions or concerns.

## 2019-01-31 NOTE — PATIENT INSTRUCTIONS
Please increase Coreg/Carvedolol to 25 MG twice daily.     Thank you for your visit today.  Please call me with any questions or concerns.   Vladimir Servin RN  Cardiology Care Coordinator  [phone], press option 1 then option 3

## 2019-01-31 NOTE — PROGRESS NOTES
January 31, 2019    Mr. Anson Manriquez is a 63yr old male with a history of HTN, DMII, CAD (s/p STEMI in 2007 with pLAD stenting, and recent STEMI 9/2018), and ICM complicated by cardiogenic shock requiring IABP support with slow wean. Mr. Manriquez was was out with friends and developed acute chest pain and after going home his pain worsened and had syncope, then cardiac arrest. EMS was called, and on arrival provided ~30 seconds of CPR and AED delivered 1 shock.  He was then brought to [facility] in sinus rhythm with a pulse for most of the transport.  During the last 5 mins en route to the ED, he went into a wide-complex tachycardia at a rate of 220bpm, likely VT.  He maintained a pulse, acceptable BP, and mental status.  He received amiodarone 150mg in the ED, and converted to NSR as he was being prepared for cardioversion.  He was then sent to the cath lab, where he was found to have an acute thrombotic lesion of the pLAD within the prior stent.  He was treated with aspiration thrombectomy, and STEVO x2 to pLAD and mLAD, and POBA to D1.  His LVEDP was 40mmHg. Peak troponin was ~17.  Echo showed LVEF 23% with LAD territory akinesis, consistent with echo results from 2012.  Therefore, his LAD had limited viability from his prior MI, which explains his relatively low troponin and unchanged LVEF. He was ultimately started on DAPT with aspirin and Brilinta, lisinopril, carvedilol, and lasix, and discharged home on 9/20/18.     Today the patient states he feels good. He denies lightheadedness,  SOB, PND, Orthopnea , palpitations. He states his weights at home are between 174-176 lbs.   He and his wife stated they sent the the LifeVest back after wearing it for about 2 months.  He states he has had a good appetite. He has a non-productive cough for the last two days. Denies fevers or chills. The cough gets better as the day goes on. He feels it maybe allergy related. He also met with EP yesterday and had an echo.       PAST MEDICAL  HISTORY:  Past Medical History:   Diagnosis Date     Chronic infection     near rectum still leaking     Coronary artery disease      Diabetes mellitus, type 2 (H)      Heart attack (H) 4/17/2007     Hyperlipidemia      Hypertension      STEMI (ST elevation myocardial infarction) (H) 09/13/2018    s/p STEVO x2     Stented coronary artery 2007     FAMILY HISTORY:  Family History   Problem Relation Age of Onset     Hypertension Mother      Diabetes Father      Glaucoma No family hx of      Macular Degeneration No family hx of       SOCIAL HISTORY:  Social History     Social History     Marital status:      Spouse name: N/A     Number of children: N/A     Years of education: N/A     Social History Main Topics     Smoking status: Never Smoker     Smokeless tobacco: Never Used     Alcohol use No     Drug use: No     Sexual activity: Not on file     Other Topics Concern     Not on file     Social History Narrative     CURRENT MEDICATIONS:  Current Outpatient Medications   Medication     aspirin (ASA) 81 MG chewable tablet     atorvastatin (LIPITOR) 80 MG tablet     carvedilol (COREG) 12.5 MG tablet     furosemide (LASIX) 40 MG tablet     metFORMIN (GLUCOPHAGE-XR) 500 MG 24 hr tablet     MULTIPLE VITAMIN PO     sacubitril-valsartan (ENTRESTO) 24-26 MG per tablet     ticagrelor (BRILINTA) 90 MG tablet     vitamin B complex with vitamin C (VITAMIN  B COMPLEX) TABS tablet     No current facility-administered medications for this visit.      Facility-Administered Medications Ordered in Other Visits   Medication     sodium chloride (PF) 0.9% PF flush 10 mL     ROS:   Constitutional: No fever, chills, or sweats. Weight is 185 lbs 0 oz  ENT: No visual disturbance, ear ache, epistaxis, sore throat.   Allergies/Immunologic: Negative.   Respiratory: No cough, hemoptysis.   Cardiovascular: As per HPI.   GI: No nausea, vomiting, hematemesis, melena, or hematochezia.   : No urinary frequency, dysuria, or hematuria.   Integument:  "Negative.   Psychiatric: Pleasant, no major depression noted  Neuro: No focal neurological deficits noted  Endocrinology: Negative.   Musculoskeletal: As per HPI.      EXAM:  /71 (BP Location: Left arm, Patient Position: Chair, Cuff Size: Adult Regular)   Pulse 94   Ht 1.676 m (5' 6\")   Wt 83.9 kg (185 lb)   SpO2 97%   BMI 29.86 kg/m    General: appears comfortable, alert and oriented  Head: normocephalic, atraumatic  Eyes: anicteric sclera, EOMI , PERRL  Neck: no adenopathy  Orophyarynx: moist mucosa, no lesions noted  Heart: regular, S1/S2, no murmurs, rubs or gallop. JVP not appreciated  Lungs: CTAB, No wheezing.   Abdomen: soft, non-tender, bowel sounds present, no hepatosplenomegaly  Extremities: No LE edema today  Skin: no open lesions noted  Neuro: grossly non-focal     Labs:  Lab Results   Component Value Date    WBC 8.7 12/06/2018    HGB 13.3 12/06/2018    HCT 40.0 12/06/2018     12/06/2018     10/31/2018    POTASSIUM 4.4 10/31/2018    CHLORIDE 105 10/31/2018    CO2 24 10/31/2018    BUN 18 10/31/2018    CR 0.88 10/31/2018     (H) 10/31/2018    NTBNP 934 (H) 10/31/2018    TROPONIN 0.14 (HH) 09/13/2018    TROPI 1.914 (HH) 09/18/2018    AST 16 10/31/2018    ALT 26 10/31/2018    ALKPHOS 53 10/31/2018    BILITOTAL 0.7 10/31/2018    INR 1.01 12/06/2018     Procedures:  Coronary angio:  9/13/18  -Both coronary arteries arise from their respective cusps.  -Dominance: Right  -LM has no evidence of coronary artery disease.  -LAD: Type 3 [LAD supplies the entire apex]. The LAD gives rise to septal perforators, multiple diagonal branches. The proximal LAD has an acute thrombotic lesion within the prior stent. Distal to the stent, there is a 70-80% stenosis. The distal vessel has HALIMA 2 flow. The D1 has thrombus at the ostium likely from embolization. The apical LAD also has thrombus from embolization.  -LCX gives rise to a large branching OM. The prox Cx has a 20% stenosis. The rest of " the Cx system has mild disease.  -RCA (dominant) gives rise to PL branches and supplies PDA. There is minimal disease in the RCA system.     Echo:  9/14/18  Ischemic cardiomyopathy, LVEF=23% with extensive regional wall motion abnormalities as described below. Resting wall motion abnormalities and LV function are not significantly changed from the rest images on the 11/27/12 stress echocardiogram.  Mildly dilated LV with severely reduced LV function, LVEF=23%. There is akinesis involving the pmvpd-pw-apn anteroseptal, ffg-un-bdbpia anterior, mid anterolateral, and all apical myocardial segments. The basal anterior, basal anterolateral, and basal inferolateral segments are relatively spared. RV size and function are normal. No significant valvular abnormalities. No pericardial effusion. Normal IVC with abnormal respiratory variation, estimated RA pressure 8 mmHg.    Echo 1/30/19  Ischemic cardiomyopathy with moderately dilated LV with moderately reduced  global LV function, LVEF=31%. Extensive regional wall motion abnormalities as  described below, unchanged from 9/14/18.  This study was compared with the study from 9/14/18: There has been no  significant change. Specifically, LV function and wall motion abnormalities  have not changed significantly.  The right atria appears normal. Severe left atrial enlargement is present. This study was compared with the study from 9/14/18 . There has been no significant change.      ASSESSMENT AND PLAN:  Assessment and Plan:   Mr. Anson Manriquez is a 63yr old male with a history of HTN, DMII, CAD (s/p MI in 2007, prior remote pLAD stenting, and recent STEMI 9/2018), ICM complicated by cardiac arrest and ventricular tachycardia who presented to the cardiology clinic for further outpatient management and medication titration.  He is followed in core clinic. We discussed the echo results with the patient and his wife. Patient will continue his current medications we will increase the  carvedilol to 25 mg BID . We will also obtain CMP today and see the patient back in clinic in 3 months.  We recommended to the patient that he obtain the CRT-D placement as discussed with EP yesterday. He will then reassess with his physician regarding the hernia repair at a later time.  Continue current diuretic dose (Lasix 40 mg daily)  and all medications for his ischemic cardiomyopathy and recent PCI including aspirin and Brilinta.  He reports no bleeding.  For the small hernia that he seeks surgical treatment options for in the near future however we discussed that the he will need to stay on Brilinta for a year after the PCI.  We encouraged the patient to contact us if the cough worsens or he notes fevers/chills.     DMII: HgbA1c 9/14/18 was 8.4%.  He saw his PCP recently, who doubled his metformin.  PCP follow up in about 3 months     Patient seen in conjunction with Dr. Carlo DESAI CNP       I have seen and evaluated the patient and agree with the assessment and plan as above.  Jason Matos MD

## 2019-01-31 NOTE — LETTER
1/31/2019      RE: Anson Manriquez  [address] [id number]       Dear Colleague,    Thank you for the opportunity to participate in the care of your patient, Anson Manriquez, at the [facility] HEART [facility]. Please see a copy of my visit note below.    January 31, 2019    Mr. Anson Manriquez is a 63yr old male with a history of HTN, DMII, CAD (s/p STEMI in 2007 with pLAD stenting, and recent STEMI 9/2018), and ICM complicated by cardiogenic shock requiring IABP support with slow wean. Mr. Manriquez was was out with friends and developed acute chest pain and after going home his pain worsened and had syncope, then cardiac arrest. EMS was called, and on arrival provided ~30 seconds of CPR and AED delivered 1 shock.  He was then brought to [facility] in sinus rhythm with a pulse for most of the transport.  During the last 5 mins en route to the ED, he went into a wide-complex tachycardia at a rate of 220bpm, likely VT.  He maintained a pulse, acceptable BP, and mental status.  He received amiodarone 150mg in the ED, and converted to NSR as he was being prepared for cardioversion.  He was then sent to the cath lab, where he was found to have an acute thrombotic lesion of the pLAD within the prior stent.  He was treated with aspiration thrombectomy, and STEVO x2 to pLAD and mLAD, and POBA to D1.  His LVEDP was 40mmHg. Peak troponin was ~17.  Echo showed LVEF 23% with LAD territory akinesis, consistent with echo results from 2012.  Therefore, his LAD had limited viability from his prior MI, which explains his relatively low troponin and unchanged LVEF. He was ultimately started on DAPT with aspirin and Brilinta, lisinopril, carvedilol, and lasix, and discharged home on 9/20/18.     Today the patient states he feels good. He denies lightheadedness,  SOB, PND, Orthopnea , palpitations. He states his weights at home are between 174-176 lbs.   He and his wife stated they sent the  the LifeVest back after wearing it for about 2 months.  He states he has had a good appetite. He has a non-productive cough for the last two days. Denies fevers or chills. The cough gets better as the day goes on. He feels it maybe allergy related. He also met with EP yesterday and had an echo.       PAST MEDICAL HISTORY:  Past Medical History:   Diagnosis Date     Chronic infection     near rectum still leaking     Coronary artery disease      Diabetes mellitus, type 2 (H)      Heart attack (H) 4/17/2007     Hyperlipidemia      Hypertension      STEMI (ST elevation myocardial infarction) (H) 09/13/2018    s/p STEVO x2     Stented coronary artery 2007     FAMILY HISTORY:  Family History   Problem Relation Age of Onset     Hypertension Mother      Diabetes Father      Glaucoma No family hx of      Macular Degeneration No family hx of       SOCIAL HISTORY:  Social History     Social History     Marital status:      Spouse name: N/A     Number of children: N/A     Years of education: N/A     Social History Main Topics     Smoking status: Never Smoker     Smokeless tobacco: Never Used     Alcohol use No     Drug use: No     Sexual activity: Not on file     Other Topics Concern     Not on file     Social History Narrative     CURRENT MEDICATIONS:  Current Outpatient Medications   Medication     aspirin (ASA) 81 MG chewable tablet     atorvastatin (LIPITOR) 80 MG tablet     carvedilol (COREG) 12.5 MG tablet     furosemide (LASIX) 40 MG tablet     metFORMIN (GLUCOPHAGE-XR) 500 MG 24 hr tablet     MULTIPLE VITAMIN PO     sacubitril-valsartan (ENTRESTO) 24-26 MG per tablet     ticagrelor (BRILINTA) 90 MG tablet     vitamin B complex with vitamin C (VITAMIN  B COMPLEX) TABS tablet     No current facility-administered medications for this visit.      Facility-Administered Medications Ordered in Other Visits   Medication     sodium chloride (PF) 0.9% PF flush 10 mL     ROS:   Constitutional: No fever, chills, or sweats.  "Weight is 185 lbs 0 oz  ENT: No visual disturbance, ear ache, epistaxis, sore throat.   Allergies/Immunologic: Negative.   Respiratory: No cough, hemoptysis.   Cardiovascular: As per HPI.   GI: No nausea, vomiting, hematemesis, melena, or hematochezia.   : No urinary frequency, dysuria, or hematuria.   Integument: Negative.   Psychiatric: Pleasant, no major depression noted  Neuro: No focal neurological deficits noted  Endocrinology: Negative.   Musculoskeletal: As per HPI.      EXAM:  /71 (BP Location: Left arm, Patient Position: Chair, Cuff Size: Adult Regular)   Pulse 94   Ht 1.676 m (5' 6\")   Wt 83.9 kg (185 lb)   SpO2 97%   BMI 29.86 kg/m     General: appears comfortable, alert and oriented  Head: normocephalic, atraumatic  Eyes: anicteric sclera, EOMI , PERRL  Neck: no adenopathy  Orophyarynx: moist mucosa, no lesions noted  Heart: regular, S1/S2, no murmurs, rubs or gallop. JVP not appreciated  Lungs: CTAB, No wheezing.   Abdomen: soft, non-tender, bowel sounds present, no hepatosplenomegaly  Extremities: No LE edema today  Skin: no open lesions noted  Neuro: grossly non-focal     Labs:  Lab Results   Component Value Date    WBC 8.7 12/06/2018    HGB 13.3 12/06/2018    HCT 40.0 12/06/2018     12/06/2018     10/31/2018    POTASSIUM 4.4 10/31/2018    CHLORIDE 105 10/31/2018    CO2 24 10/31/2018    BUN 18 10/31/2018    CR 0.88 10/31/2018     (H) 10/31/2018    NTBNP 934 (H) 10/31/2018    TROPONIN 0.14 (HH) 09/13/2018    TROPI 1.914 (HH) 09/18/2018    AST 16 10/31/2018    ALT 26 10/31/2018    ALKPHOS 53 10/31/2018    BILITOTAL 0.7 10/31/2018    INR 1.01 12/06/2018     Procedures:  Coronary angio:  9/13/18  -Both coronary arteries arise from their respective cusps.  -Dominance: Right  -LM has no evidence of coronary artery disease.  -LAD: Type 3 [LAD supplies the entire apex]. The LAD gives rise to septal perforators, multiple diagonal branches. The proximal LAD has an acute " thrombotic lesion within the prior stent. Distal to the stent, there is a 70-80% stenosis. The distal vessel has HALIMA 2 flow. The D1 has thrombus at the ostium likely from embolization. The apical LAD also has thrombus from embolization.  -LCX gives rise to a large branching OM. The prox Cx has a 20% stenosis. The rest of the Cx system has mild disease.  -RCA (dominant) gives rise to PL branches and supplies PDA. There is minimal disease in the RCA system.     Echo:  9/14/18  Ischemic cardiomyopathy, LVEF=23% with extensive regional wall motion abnormalities as described below. Resting wall motion abnormalities and LV function are not significantly changed from the rest images on the 11/27/12 stress echocardiogram.  Mildly dilated LV with severely reduced LV function, LVEF=23%. There is akinesis involving the wtbtn-ed-pro anteroseptal, vlh-vl-jwilqv anterior, mid anterolateral, and all apical myocardial segments. The basal anterior, basal anterolateral, and basal inferolateral segments are relatively spared. RV size and function are normal. No significant valvular abnormalities. No pericardial effusion. Normal IVC with abnormal respiratory variation, estimated RA pressure 8 mmHg.    Echo 1/30/19  Ischemic cardiomyopathy with moderately dilated LV with moderately reduced  global LV function, LVEF=31%. Extensive regional wall motion abnormalities as  described below, unchanged from 9/14/18.  This study was compared with the study from 9/14/18: There has been no  significant change. Specifically, LV function and wall motion abnormalities  have not changed significantly.  The right atria appears normal. Severe left atrial enlargement is present. This study was compared with the study from 9/14/18 . There has been no significant change.      ASSESSMENT AND PLAN:  Assessment and Plan:   Mr. Anson Manriquez is a 63yr old male with a history of HTN, DMII, CAD (s/p MI in 2007, prior remote pLAD stenting, and recent STEMI  9/2018), ICM complicated by cardiac arrest and ventricular tachycardia who presented to the cardiology clinic for further outpatient management and medication titration.  He is followed in core clinic. We discussed the echo results with the patient and his wife. Patient will continue his current medications we will increase the carvedilol to 25 mg BID . We will also obtain CMP today and see the patient back in clinic in 3 months.  We recommended to the patient that he obtain the CRT-D placement as discussed with EP yesterday. He will then reassess with his physician regarding the hernia repair at a later time.  Continue current diuretic dose (Lasix 40 mg daily)  and all medications for his ischemic cardiomyopathy and recent PCI including aspirin and Brilinta.  He reports no bleeding.  For the small hernia that he seeks surgical treatment options for in the near future however we discussed that the he will need to stay on Brilinta for a year after the PCI.  We encouraged the patient to contact us if the cough worsens or he notes fevers/chills.     DMII: HgbA1c 9/14/18 was 8.4%.  He saw his PCP recently, who doubled his metformin.   PCP follow up in about 3 months     Patient seen in conjunction with Dr. Carlo DESAI, IL[facility]       I have seen and evaluated the patient and agree with the assessment and plan as above.  Jason Matos MD

## 2019-02-06 VITALS — WEIGHT: 180 LBS | BODY MASS INDEX: 28.93 KG/M2 | HEIGHT: 66 IN

## 2019-02-06 NOTE — ADDENDUM NOTE
Encounter addended by: Jeniffer Barry, OT on: 2/6/2019 4:21 PM   Actions taken: Sign clinical note, Flowsheet accepted

## 2019-02-06 NOTE — PROGRESS NOTES
"Physician cosignature/electronic signature indicates approval of this ITP document. I have established, reviewed and made necessary changes to the individualized treatment plan and exercise prescription for this patient.   01/31/19 1700   Session   Session 120 Day Individualized Treatment Plan   Certified through this date 03/06/19   Cardiac Rehab Assessment   Cardiac Rehab Assessment Pt presents today s/p STEMI, Stent placement to mLAD and D1, and ICD placement, and Intra-aortic baloon pump placed. Pt wife had called EMS after syncope episode. Pt went into cardiac arrest,  CPR performed and EMS administered AED shock.  En route to ED pt went into probable V tach. Upon arrival to ED Pt was intubated at bedside and transfered to cath where lesion of proximal LD within prior stent and Thrombus in D1.  Pt has h/o HTN, DM, iCM, previous MI (2007). 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 pt notes he is very active with work but 'knows his limits'. Pt was unsure about rehab but is motivated to increase his confidence in his return to work and his walking routine with his wife. 11/13 Pt has attended 3 sessions, now states \"I really like coming here\". Appears to have gained confidence and comfort level in participating in program.  12/11 Pt has struggled with rehab attendance due to work. Pt states some days he is unable to get away from work. Pt continues to progess with rehab. Will continue to progess as tolerated.  1/9 Unable to assess pt status. Will reassess upon return. Pt has struggled to attend rehab due to work conflicts. Pt states he feels he benefits from rehab. continues to be very active at work. ITP forwarded for MD review. 1/31 Pt has missed lots of sessions, he missed 10 days since here last and reports he has not started indep ex. 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 Skilled services necessary to assist pt with advancing " "ex toward goals, provide behavior change counseling to make lifestyle changes particularly establishing regular exercise.    General Information   Treatment Diagnosis STEMI   Date of Treatment Diagnosis 09/13/18   Secondary Treatment Diagnosis Stent  (External Defibrillator)   Significant Past CV History Previous MI;Previous PCI;History of Heart Failure   Comorbidities DM   Lead up symptoms Syncope, Cardiac Arrest   Hospital Location [facility]   Hospital Discharge Date 09/20/18   Signs and Symptoms Post Hospital Discharge None   Outpatient Cardiac Rehab Start Date 11/01/18   Primary Physician Dr. Chichi Lou   Primary Physician Follow Up Completed   Surgeon    Cardiologist Follow Up Advised to schedule appointment   Ejection Fraction 23   Risk Stratification High   Summary of Cath Report   Summary of Cath Report Available   Date Performed 09/13/18   LAD pLAD 80%, restented 0%   D1 90%, Resulted to 10%   LCX pCx 20% stenosis   RCA minimal disease   Living and Work Status    Living Arrangements and Social Status house   Support System Live with an adult   Return to Employment Yes   Occupation Contractor- Supervisor   Preventative Medications   CMS recommended medications Ace inhibitors;Antiplatelets;Beta Blocker;Lipid Lowering   Fall Risk Screen   Fall screen completed by Cardiac Rehab   Have you fallen 2 or more times in the past year? No   Have you fallen and had an injury in the past year? No   Is patient a fall risk? No   Pain   Patient Currently in Pain No   Physical Assessments   Incisions WNL   Edema None   Right Lung Sounds not assessed   Left Lung Sounds not assessed   Limitations No limitations   Individualized Treatment Plan   Monitored Sessions Scheduled 18   Monitored Sessions Attended 11   Oxygen   Supplemental Oxygen needed No   Nutrition Management - Weight Management   Assessment Re-assessment   Age 63   Weight 81.6 kg (180 lb)   Height 1.676 m (5' 6\")   BMI (Calculated) 29.11   Initial " Rate Your Plate Score. Dietary tool to assess eating patterns. Scores range from 24 to 72. The higher the score the healthier the eating pattern. 61   Weight Management Comments Pt notes he has lost a lot of weight over the past years and is satisfied with his current weight. Discussed importance of weight management.    Nutrition Management - Lipids   Lipids Labs Available   Date 09/13/18   Total Cholesterol 121   Triglycerides 158   HDL 41   LDL 48   Prescribed Lipid Medication Yes   Statin Intensity High Intensity   Nutrition Management - Diabetes   Diabetes Type II   Do you Monitor BS at Home? No   Diabetes Medication Prescribed Yes, Oral Medication   Nutrition Management Summary   Dietary Recommendations Low Fat;Low Sodium;Low Cholesterol   Interventions Planned Educate on Benefits of Exercise   Nutrition Target Outcome Weight loss .5-1 lb/week (if BMI > 25);BMI < 25   Psychosocial Management   Psychosocial Assessment Re-assessment   Is there history of clinical depression or increased risk of depression? No previous history   Current Level of Stress per Patient Report Moderate    Current Coping Skills Has Positive Support System   Initial Patient Health Questionnaire -9 Score (PHQ-9) for depression. 5-9 Minimal symptoms, 10-14 Minor depression, 15-19 Major depression, moderately severe, > 20 Major depression, severe  0   Initial [facility] Survey score.  Quality of Life:   If total score > 25 review individual areas where patient rated a 4 or 5.  Consider patients current medical condition and what role that plays on the score.   Adjust treatment protocol to improve areas of concern.  Consider the following:  PHQ9 score, DASI, and re-assessment within the next 30 days to assist with developing treatments.  10   Stages of Change Maintenance   Interventions Planned Patient to verbalize understanding of behavioral assessment results;Patient to verbalize understanding of negative impact of stress to personal  health   Psychosocial Target Outcome Maximize coping skills   Other Core Components - Hypertension   History of or Diagnosis of Hypertension Yes   Currently taking Anti-Hypertensives Yes;Beta blocker;Ace Inhibitor   Other Core Components - Tobacco   History of Tobacco Use Never   Other Core Components Summary   Interventions Planned Instruct and educate to self manage Heart Failure symptoms;Educate on signs/symptoms and when to call the doctor (i.e. increased edema, dyspnea, fatigue, dizziness/lightheadedness, change in sleep patterns, chest discomfort);Educate on the use of 'Stop Light' tool;Educate on importance of monitoring daily weight   Activity/Exercise History   Activity/Exercise Assessment Re-assessment   Activity/Exercise Status prior to event? Was Physically Active   Number of Days Currently participating in Moderate Physical Activity? 7   Number of Days Currently performing  Aerobic Exercise (including rehab)? 1-2   Number of Minutes per Session Currently of Aerobic Exercise (average)? 30   Current Stage of Change (Physical Activity) Maintenance   Current Stage of Change (Aerobic Exercise) Action   Patient Goals Goal #1;Goal #2   Goal #1 Description Pt would like to establish exercise with his wife 3x/week before completing rehab program   Goal #1 Target Date 02/09/19   Goal #1 Progress Towards Goal 11/13 no indep exercise yet.  12/11 Pt states he has not returned to any home exercise 1/9 Unable to assess. 1/31 Pt has not started indep ex. states he plays pool 2x week so it is difficult to fit it in. Discussed starting with weekends and ex. principle of avoiding 2 days in a row off.    Goal #2 Description Pt would like to tolerate 30 minutes of walking with his wife at brisk pace of 3mph  per rehab intensity by participating in rehab 2x/week.    Goal #2 Target Date 02/09/19   Goal #2 Progress Towards Goal 11/13 Pt has started regular ex in CR walking at 2mph with stable cv response.  12/11 Pt continues  to progess workloads. Currently working at 3.2 METS. 2.4mph, 1% for 15 minutes 1/9 Will assess upon return1/31 Pt is able to tolerate 2.7/1% on TD. Will do short bouts of higher intensityl   Activity/Exercise Target Outcome An Accumulation of 150  Minutes of Aerobic Activity per Week   Exercise Assessment   6 Minute Walk Predicted - Gender Selection Male   6 Minute Walk Predicted (Male) 1639.26   6 Minute Walk Predicted (Female) 1539.03   Initial 6 Minute Walk Distance (Feet) 1375 ft   Resting HR 95 bpm  (Vitals 12/27)   Exercise  bpm   Post Exercise HR 99 bpm   Resting /70   Exercise /72   Post Exercise /68   Pre  mg/dL   Effort Rating 4   Current MET Level 3.4   MET Level Goal 5   ECG Rhythm Sinus rhythm;Right bundle branch block   Ectopy PVCs   Current Symptoms Denies symptoms   Limitations/Restrictions None   Exercise Prescription   Mode Treadmill;Nustep;Recumbant bike;Weights;LE strengthening   Duration/Time 15-30 min;Intermittent bouts   Frequency 3 daysweek   THR (85% of age predicted max HR) 133.45   OMNI Effort Rating (0-10 Scale) 4-6/10   Progression Intermittent bouts;Total exercise time of 20-30 minutes;Progress peak intensity by 1/4 MET per week   Recommended Home Exercise   Type of Exercise Walking   Frequency (days per week) Daily on non rehab daysj   Duration (minutes per session) 15-30 min;Intermittent   Effort Rating Recommended 4-6/10   Current Home Exercise   Type of Exercise None   Frequency (days per week) 0   Duration (minutes per session) 0   Follow-up/On-going Support   Provider follow-up needed on the following No follow-up needed   Learning Assessment   Learner Patient   Primary Language English   Preferred Learning Style Listening;Reading;Demonstration   Barriers to Learning No barriers noted   Patient Education   Education recommended Exercise Principles;Medication Overview;Anatomy and Physiology of the Heart;Patient Attended Education in the Past

## 2019-02-19 PROBLEM — I25.5 ISCHEMIC CARDIOMYOPATHY: Status: ACTIVE | Noted: 2019-02-19

## 2019-02-20 ENCOUNTER — TELEPHONE (OUTPATIENT)
Dept: INTERNAL MEDICINE | Facility: CLINIC | Age: 64
End: 2019-02-20

## 2019-02-20 NOTE — TELEPHONE ENCOUNTER
Diarrhea in the AM for years.  Per pt has a formed bowel movements 1-2 days a week. Advised office visit, pt agreed to visit but cannot come in until Monday due to work and weather. Appt scheduled for Monday 2/26 at 2:05 with Dr Beard. Grace Astorga CMA at 3:03 PM on 2/20/2019

## 2019-02-20 NOTE — TELEPHONE ENCOUNTER
Health Call Center    Phone Message    May a detailed message be left on voicemail: yes    Reason for Call: Symptoms or Concerns     If patient has red-flag symptoms, warm transfer to triage line    Current symptom or concern: diarrhea    Symptoms have been present for:  A long time - time unknown years(s)    Has patient previously been seen for this? No    By : N/A    Date: ?    Are there any new or worsening symptoms? No    Pt UNSURE OF WHAT MEDICATION IS CAUSING THE SYMPTOMS. Pt also suspects the cholesterol medication could be the cause. Requesting a call back to discuss.    Action Taken: Message routed to:  [facility] ([facility] CAYETANO

## 2019-02-26 ENCOUNTER — HOSPITAL ENCOUNTER (OUTPATIENT)
Dept: CARDIAC REHAB | Facility: CLINIC | Age: 64
End: 2019-02-26
Attending: INTERNAL MEDICINE
Payer: COMMERCIAL

## 2019-02-26 VITALS — HEIGHT: 66 IN | BODY MASS INDEX: 29.09 KG/M2 | WEIGHT: 181 LBS

## 2019-02-26 PROCEDURE — 40000116 ZZH STATISTIC OP CR VISIT: Performed by: OCCUPATIONAL THERAPIST

## 2019-02-26 PROCEDURE — 93798 PHYS/QHP OP CAR RHAB W/ECG: CPT | Performed by: OCCUPATIONAL THERAPIST

## 2019-02-26 ASSESSMENT — 6 MINUTE WALK TEST (6MWT)
GENDER SELECTION: MALE
TOTAL DISTANCE WALKED (FT): 1375
GENDER SELECTION: MALE
TOTAL DISTANCE WALKED (FT): 1375
MALE CALC: 1636.64
FEMALE CALC: 1535.62
PREDICTED: 1646.62

## 2019-02-26 ASSESSMENT — MIFFLIN-ST. JEOR: SCORE: 1558.76

## 2019-02-26 NOTE — PROGRESS NOTES
"Anson Manriquez  63 year old  STEMI heart failure  02/26/19 1600   Session   Session Discharge Note   Certified through this date 03/06/19   Cardiac Rehab Assessment  I have established, reviewed and agree with the individualized treatment plan and exercise prescription written above for cardiac rehabilitation for this patient. Please see individualized treatment plan for details of progress and plan. Additional comments (if appropriate):      Medical Director Signature: _______________________ Date: _______ Time: _______     Cardiac Rehab Assessment Pt presents today s/p STEMI, Stent placement to mLAD and D1, and ICD placement, and Intra-aortic baloon pump placed. Pt wife had called EMS after syncope episode. Pt went into cardiac arrest,  CPR performed and EMS administered AED shock.  En route to ED pt went into probable V tach. Upon arrival to ED Pt was intubated at bedside and transfered to cath where lesion of proximal LD within prior stent and Thrombus in D1.  Pt has h/o HTN, DM, iCM, previous MI (2007). 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 pt notes he is very active with work but 'knows his limits'. Pt was unsure about rehab but is motivated to increase his confidence in his return to work and his walking routine with his wife. 11/13 Pt has attended 3 sessions, now states \"I really like coming here\". Appears to have gained confidence and comfort level in participating in program.  12/11 Pt has struggled with rehab attendance due to work. Pt states some days he is unable to get away from work. Pt continues to progess with rehab. Will continue to progess as tolerated.  1/9 Unable to assess pt status. Will reassess upon return. Pt has struggled to attend rehab due to work conflicts. Pt states he feels he benefits from rehab. continues to be very active at work. ITP forwarded for MD review. 1/31 Pt has missed lots of sessions, he missed 10 days since here last and reports he has not " started indep ex. 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 2/26 Pt discharged and feels good about his progress he attended 12 sessions and increased 14% on his 6 min walk test. Pt is currently exercising with his wife at the [facility] 3x/week and was given the handouts on how to progress his exercise but did not seem invested with the information given.  Pt made significant gains in exercise tolerance. Initially patient tolerated 6 minutes at 3.0 METs, now tolerating 20-30minutes a t 3.5METs. Patient also increased 6-minute walk test by14% The PT was given instructions on frequency, intensity, and duration for continued exercise as well as muscle conditioning and stretching exercises.  Your PT plans to continue to attend the [facility] at least 3 days per week up to 30 min duration and rpe of 4-6. PT also plans to continue with strength training.  PT was given discharge instruction and handouts and states understanding of the information he was given.       General Information   Treatment Diagnosis STEMI   Date of Treatment Diagnosis 09/13/18   Secondary Treatment Diagnosis Stent  (External Defibrillator)   Significant Past CV History Previous MI;Previous PCI;History of Heart Failure   Comorbidities DM   Lead up symptoms Syncope, Cardiac Arrest   Hospital Location [facility]   Hospital Discharge Date 09/20/18   Signs and Symptoms Post Hospital Discharge None   Outpatient Cardiac Rehab Start Date 11/01/18   Primary Physician Dr. Chichi Luo   Primary Physician Follow Up Completed   Surgeon    Cardiologist Follow Up Advised to schedule appointment   Ejection Fraction 23   Risk Stratification High   Summary of Cath Report   Summary of Cath Report Available   Date Performed 09/13/18   LAD pLAD 80%, restented 0%   D1 90%, Resulted to 10%   LCX pCx 20% stenosis   RCA minimal disease   Living and Work Status    Living Arrangements and Social Status house  "  Support System Live with an adult   Return to Employment Yes   Occupation Contractor- Supervisor   Preventative Medications   CMS recommended medications Ace inhibitors;Antiplatelets;Beta Blocker;Lipid Lowering   Fall Risk Screen   Fall screen completed by Cardiac Rehab   Have you fallen 2 or more times in the past year? No   Have you fallen and had an injury in the past year? No   Is patient a fall risk? No   Pain   Patient Currently in Pain No   Physical Assessments   Incisions WNL   Edema None   Right Lung Sounds not assessed   Left Lung Sounds not assessed   Limitations No limitations   Individualized Treatment Plan   Monitored Sessions Scheduled 18   Monitored Sessions Attended 12   Oxygen   Supplemental Oxygen needed No   Nutrition Management - Weight Management   Assessment Discharge   Age 63   Weight 82.1 kg (181 lb)   Height 1.676 m (5' 6\")   BMI (Calculated) 29.28   Initial Rate Your Plate Score. Dietary tool to assess eating patterns. Scores range from 24 to 72. The higher the score the healthier the eating pattern. 61   Discharge Rate Your Plate Score 57   Weight Management Comments Pt notes he has lost a lot of weight over the past years and is satisfied with his current weight. Discussed importance of weight management.    Nutrition Management - Lipids   Lipids Labs Available   Date 09/13/18   Total Cholesterol 121   Triglycerides 158   HDL 41   LDL 48   Prescribed Lipid Medication Yes   Statin Intensity High Intensity   Nutrition Management - Diabetes   Diabetes Type II   Do you Monitor BS at Home? No   Diabetes Medication Prescribed Yes, Oral Medication   Nutrition Management Summary   Dietary Recommendations Low Fat;Low Sodium;Low Cholesterol   Interventions Planned Educate on Benefits of Exercise   Interventions In Progress or Completed Educated on Benefits of Exercise   Nutrition Summary Comments Pt. remained roughly the same weight all throughout rehab.   Nutrition Target Outcome Weight loss " .5-1 lb/week (if BMI > 25);BMI < 25   Psychosocial Management   Psychosocial Assessment Re-assessment   Is there history of clinical depression or increased risk of depression? No previous history   Current Level of Stress per Patient Report Moderate    Current Coping Skills Has Positive Support System   Initial Patient Health Questionnaire -9 Score (PHQ-9) for depression. 5-9 Minimal symptoms, 10-14 Minor depression, 15-19 Major depression, moderately severe, > 20 Major depression, severe  0   Discharge PHQ-9 Score for Depression 0   Initial [facility] [place] Survey score.  Quality of Life:   If total score > 25 review individual areas where patient rated a 4 or 5.  Consider patients current medical condition and what role that plays on the score.   Adjust treatment protocol to improve areas of concern.  Consider the following:  PHQ9 score, DASI, and re-assessment within the next 30 days to assist with developing treatments.  10   Discharge [place] [facility] Survey Score 10   Stages of Change Maintenance   Interventions Planned Patient to verbalize understanding of behavioral assessment results;Patient to verbalize understanding of negative impact of stress to personal health   Interventions In Progress or Completed Patient verbalizes understanding of behavioral assessment scores;Patient verbalizes understanding of negative impact of stress to personal health   Psychosocial Target Outcome Maximize coping skills   Other Core Components - Hypertension   History of or Diagnosis of Hypertension Yes   Currently taking Anti-Hypertensives Yes;Beta blocker;Ace Inhibitor   Other Core Components - Tobacco   History of Tobacco Use Never   Other Core Components Summary   Interventions Planned Instruct and educate to self manage Heart Failure symptoms;Educate on signs/symptoms and when to call the doctor (i.e. increased edema, dyspnea, fatigue, dizziness/lightheadedness, change in sleep patterns, chest discomfort);Educate on the use  of 'Stop Light' tool;Educate on importance of monitoring daily weight   Interventions In Progress or Completed Educated on the use of the 'Stop Light' Tool;Educated on importance of monitoring daily weight;Educated on the importance of using resources to self manage Heart Failure symptoms   Activity/Exercise History   Activity/Exercise Assessment Re-assessment   Activity/Exercise Status prior to event? Was Physically Active   Number of Days Currently participating in Moderate Physical Activity? 7   Number of Days Currently performing  Aerobic Exercise (including rehab)? 3-5   Number of Minutes per Session Currently of Aerobic Exercise (average)? 30   Current Stage of Change (Physical Activity) Maintenance   Current Stage of Change (Aerobic Exercise) Action   Patient Goals Goal #1;Goal #2   Goal #1 Description Pt would like to establish exercise with his wife 3x/week before completing rehab program   Goal #1 Target Date 02/09/19   Goal #1 Date Met 02/26/19  (Pt. plans to continue w/ this goal)   Goal #1 Progress Towards Goal 11/13 no indep exercise yet.  12/11 Pt states he has not returned to any home exercise 1/9 Unable to assess. 1/31 Pt has not started indep ex. states he plays pool 2x week so it is difficult to fit it in. Discussed starting with weekends and ex. principle of avoiding 2 days in a row off.  2/26 Pt is currently going to the [facility]  3x/week and is completing the same routine that he does at rehab   Goal #2 Description Pt would like to tolerate 30 minutes of walkingwith his wife at brisk pace of 3 mph per rehab intensity by participating in rehab 2x/week    Goal #2 Target Date 02/09/19   Goal #2 Progress Towards Goal 11/13 Pt has started regular ex in CR walking at 2mph with stable cv response.  12/11 Pt continues to progess workloads. Currently working at 3.2 METS. 2.4mph, 1% for 15 minutes 1/9 Will assess upon return1/31 Pt is able to tolerate 2.7/1% on TD. Will do short bouts of higher intensityl.  2/26 Pt si currently progressing towards this goals as he is currently walking at 2.7 with a 1% grade and plans to work towards 3m.   Activity/Exercise Target Outcome An Accumulation of 150  Minutes of Aerobic Activity per Week   Exercise Assessment   6 Minute Walk Predicted - Gender Selection Male   6 Minute Walk Predicted (Male) 1636.64   6 Minute Walk Predicted (Female) 1535.62   Initial 6 Minute Walk Distance (Feet) 1375 ft   Resting HR 86 bpm  (Vitals 12/27)   Exercise  bpm   Post Exercise HR 93 bpm   Resting /74   Exercise /72   Post Exercise /78   Pre BG (metformin only no blood sugar needed)   Effort Rating 5   Current MET Level 3.4   MET Level Goal 5   ECG Rhythm Sinus rhythm;Right bundle branch block   Ectopy PVCs   Current Symptoms Denies symptoms   Limitations/Restrictions None   Exercise Prescription   Mode Treadmill;Nustep;Recumbant bike;Weights;LE strengthening   Duration/Time 15-30 min;Intermittent bouts   Frequency 3 daysweek   THR (85% of age predicted max HR) 133.45   OMNI Effort Rating (0-10 Scale) 4-6/10   Progression Total exercise time of 20-30 minutes;Progress peak intensity by 1/4 MET per week   Recommended Home Exercise   Type of Exercise Walking   Frequency (days per week) Daily on non rehab daysj   Duration (minutes per session) 15-30 min;Intermittent   Effort Rating Recommended 4-6/10   Current Home Exercise   Type of Exercise Walking;Health club;Treadmill;None   Frequency (days per week) 3   Duration (minutes per session) 30   Follow-up/On-going Support   Provider follow-up needed on the following Other (see comments)   Comments 02/26/2019 PT appears forgetful and said he drives himself but sometimes has trouble finding his car.    Learning Assessment   Learner Patient   Primary Language English   Preferred Learning Style Listening;Reading;Demonstration   Barriers to Learning No barriers noted   Patient Education   Education recommended Exercise  Principles;Medication Overview;Anatomy and Physiology of the Heart;Patient Attended Education in the Past   Education Comments 03/26/2019 PT did not participate in any of the education classes.

## 2019-03-22 DIAGNOSIS — I50.20 HEART FAILURE WITH REDUCED EJECTION FRACTION, NYHA CLASS III (H): ICD-10-CM

## 2019-03-22 NOTE — TELEPHONE ENCOUNTER
[facility] Call Center    Phone Message    May a detailed message be left on voicemail: yes    Reason for Call: Medication Refill Request    Has the patient contacted the pharmacy for the refill? Yes   Name of medication being requested: Entresto  Provider who prescribed the medication: Dr. Cadet  Pharmacy: [facility], [phone]  Date medication is needed: Today, pt normally uses [facility] but they are closed today and pt needs this medication, he is completely out.  Call pt's wife when this has been called in at [phone]       Action Taken: Message routed to:  [facility] ([facility]): CAYETANO

## 2019-03-22 NOTE — TELEPHONE ENCOUNTER
Patients wife Jolly called and patient is out of medication. 30 day supply sent to the pharmacy and 90 day remains pended until decision is made about who is to manage this medications.      Kathleen M Doege RN

## 2019-04-15 DIAGNOSIS — E11.9 TYPE 2 DIABETES MELLITUS WITHOUT COMPLICATION, WITHOUT LONG-TERM CURRENT USE OF INSULIN (H): ICD-10-CM

## 2019-04-15 NOTE — TELEPHONE ENCOUNTER
[facility] Call Center    Phone Message    May a detailed message be left on voicemail: yes    Reason for Call: Medication Refill Request    Has the patient contacted the pharmacy for the refill? Yes   Name of medication being requested: Need new order for metFORMIN (GLUCOPHAGE-XR) 500 MG 24 hr tablet - Need 360 for 90 days supply.  Provider who prescribed the medication: Dr. Chacon   Pharmacy: [facility]   Date medication is needed: asap          Action Taken: Message routed to:  [facility] ([facility] CAYETANO

## 2019-04-16 RX ORDER — METFORMIN HCL 500 MG
1000 TABLET, EXTENDED RELEASE 24 HR ORAL 2 TIMES DAILY WITH MEALS
Qty: 360 TABLET | Refills: 1 | Status: SHIPPED | OUTPATIENT
Start: 2019-04-16 | End: 2019-07-23 | Stop reason: ALTCHOICE

## 2019-04-16 NOTE — TELEPHONE ENCOUNTER
Last Clinic Visit: 12-27-18  Per clinic note:  Type 2 diabetes mellitus without complication, without long-term current use of insulin (H)  Pt states he has made dietary changes since MI. Continue to encourage healthy eating, exercise and medication adherence. Plan to have A1C checked at later visit.  -     metFORMIN (GLUCOPHAGE-XR) 500 MG 24 hr tablet; Take 2 tablets (1,000 mg) by mouth 2 times daily (with meals)

## 2019-04-25 ENCOUNTER — APPOINTMENT (OUTPATIENT)
Dept: MEDSURG UNIT | Facility: CLINIC | Age: 64
End: 2019-04-25
Payer: COMMERCIAL

## 2019-04-25 ENCOUNTER — HOSPITAL ENCOUNTER (OUTPATIENT)
Facility: CLINIC | Age: 64
Discharge: HOME | End: 2019-04-26
Attending: INTERNAL MEDICINE | Admitting: INTERNAL MEDICINE
Payer: COMMERCIAL

## 2019-04-25 ENCOUNTER — APPOINTMENT (OUTPATIENT)
Dept: GENERAL RADIOLOGY | Facility: CLINIC | Age: 64
End: 2019-04-25
Attending: NURSE PRACTITIONER
Payer: COMMERCIAL

## 2019-04-25 ENCOUNTER — APPOINTMENT (OUTPATIENT)
Dept: LAB | Facility: CLINIC | Age: 64
End: 2019-04-25
Attending: INTERNAL MEDICINE
Payer: COMMERCIAL

## 2019-04-25 DIAGNOSIS — I25.5 ISCHEMIC CARDIOMYOPATHY: ICD-10-CM

## 2019-04-25 DIAGNOSIS — Z95.810 S/P ICD (INTERNAL CARDIAC DEFIBRILLATOR) PROCEDURE: Primary | ICD-10-CM

## 2019-04-25 LAB
ANION GAP SERPL CALCULATED.3IONS-SCNC: 7 MMOL/L (ref 3–14)
BUN SERPL-MCNC: 19 MG/DL (ref 7–30)
CALCIUM SERPL-MCNC: 9.1 MG/DL (ref 8.5–10.1)
CHLORIDE SERPL-SCNC: 106 MMOL/L (ref 94–109)
CO2 SERPL-SCNC: 24 MMOL/L (ref 20–32)
CREAT SERPL-MCNC: 0.85 MG/DL (ref 0.66–1.25)
ERYTHROCYTE [DISTWIDTH] IN BLOOD BY AUTOMATED COUNT: 14.7 % (ref 10–15)
GFR SERPL CREATININE-BSD FRML MDRD: >90 ML/MIN/{1.73_M2}
GLUCOSE BLDC GLUCOMTR-MCNC: 133 MG/DL (ref 70–99)
GLUCOSE BLDC GLUCOMTR-MCNC: 217 MG/DL (ref 70–99)
GLUCOSE SERPL-MCNC: 152 MG/DL (ref 70–99)
HCT VFR BLD AUTO: 40.1 % (ref 40–53)
HGB BLD-MCNC: 13.3 G/DL (ref 13.3–17.7)
INR PPP: 1.16 (ref 0.86–1.14)
MCH RBC QN AUTO: 30 PG (ref 26.5–33)
MCHC RBC AUTO-ENTMCNC: 33.2 G/DL (ref 31.5–36.5)
MCV RBC AUTO: 90 FL (ref 78–100)
PLATELET # BLD AUTO: 240 10E9/L (ref 150–450)
POTASSIUM SERPL-SCNC: 3.8 MMOL/L (ref 3.4–5.3)
RBC # BLD AUTO: 4.44 10E12/L (ref 4.4–5.9)
SODIUM SERPL-SCNC: 138 MMOL/L (ref 133–144)
WBC # BLD AUTO: 7.9 10E9/L (ref 4–11)

## 2019-04-25 PROCEDURE — 25000128 H RX IP 250 OP 636: Performed by: INTERNAL MEDICINE

## 2019-04-25 PROCEDURE — 40000172 ZZH STATISTIC PROCEDURE PREP ONLY

## 2019-04-25 PROCEDURE — C1900 LEAD, CORONARY VENOUS: HCPCS | Performed by: INTERNAL MEDICINE

## 2019-04-25 PROCEDURE — C1893 INTRO/SHEATH, FIXED,NON-PEEL: HCPCS | Performed by: INTERNAL MEDICINE

## 2019-04-25 PROCEDURE — 82962 GLUCOSE BLOOD TEST: CPT

## 2019-04-25 PROCEDURE — 93005 ELECTROCARDIOGRAM TRACING: CPT

## 2019-04-25 PROCEDURE — 27210794 ZZH OR GENERAL SUPPLY STERILE: Performed by: INTERNAL MEDICINE

## 2019-04-25 PROCEDURE — 33225 L VENTRIC PACING LEAD ADD-ON: CPT | Performed by: INTERNAL MEDICINE

## 2019-04-25 PROCEDURE — 33249 INSJ/RPLCMT DEFIB W/LEAD(S): CPT | Performed by: INTERNAL MEDICINE

## 2019-04-25 PROCEDURE — C1769 GUIDE WIRE: HCPCS | Performed by: INTERNAL MEDICINE

## 2019-04-25 PROCEDURE — 40000065 ZZH STATISTIC EKG NON-CHARGEABLE

## 2019-04-25 PROCEDURE — C1779 LEAD, PMKR, TRANSVENOUS VDD: HCPCS | Performed by: INTERNAL MEDICINE

## 2019-04-25 PROCEDURE — 27210856 ZZH ACCESS HEART CATH CR2: Performed by: INTERNAL MEDICINE

## 2019-04-25 PROCEDURE — 25000128 H RX IP 250 OP 636: Performed by: NURSE PRACTITIONER

## 2019-04-25 PROCEDURE — C1777 LEAD, AICD, ENDO SINGLE COIL: HCPCS | Performed by: INTERNAL MEDICINE

## 2019-04-25 PROCEDURE — 71045 X-RAY EXAM CHEST 1 VIEW: CPT

## 2019-04-25 PROCEDURE — 85027 COMPLETE CBC AUTOMATED: CPT | Performed by: NURSE PRACTITIONER

## 2019-04-25 PROCEDURE — 33249 INSJ/RPLCMT DEFIB W/LEAD(S): CPT | Mod: GC | Performed by: INTERNAL MEDICINE

## 2019-04-25 PROCEDURE — C1894 INTRO/SHEATH, NON-LASER: HCPCS | Performed by: INTERNAL MEDICINE

## 2019-04-25 PROCEDURE — C1892 INTRO/SHEATH,FIXED,PEEL-AWAY: HCPCS | Performed by: INTERNAL MEDICINE

## 2019-04-25 PROCEDURE — 99152 MOD SED SAME PHYS/QHP 5/>YRS: CPT | Performed by: INTERNAL MEDICINE

## 2019-04-25 PROCEDURE — 99153 MOD SED SAME PHYS/QHP EA: CPT | Performed by: INTERNAL MEDICINE

## 2019-04-25 PROCEDURE — 85610 PROTHROMBIN TIME: CPT | Performed by: NURSE PRACTITIONER

## 2019-04-25 PROCEDURE — 33225 L VENTRIC PACING LEAD ADD-ON: CPT | Mod: GC | Performed by: INTERNAL MEDICINE

## 2019-04-25 PROCEDURE — 25000132 ZZH RX MED GY IP 250 OP 250 PS 637: Performed by: NURSE PRACTITIONER

## 2019-04-25 PROCEDURE — 80048 BASIC METABOLIC PNL TOTAL CA: CPT | Performed by: NURSE PRACTITIONER

## 2019-04-25 PROCEDURE — 25000125 ZZHC RX 250: Performed by: INTERNAL MEDICINE

## 2019-04-25 PROCEDURE — 93010 ELECTROCARDIOGRAM REPORT: CPT | Performed by: INTERNAL MEDICINE

## 2019-04-25 PROCEDURE — C1882 AICD, OTHER THAN SING/DUAL: HCPCS | Performed by: INTERNAL MEDICINE

## 2019-04-25 DEVICE — LEAD RELIANCE GORE DF4 0292: Type: IMPLANTABLE DEVICE | Status: FUNCTIONAL

## 2019-04-25 DEVICE — CRT-D RESONATE X4 IS4 DF4 G447: Type: IMPLANTABLE DEVICE | Status: FUNCTIONAL

## 2019-04-25 DEVICE — IMPLANTABLE DEVICE: Type: IMPLANTABLE DEVICE | Status: FUNCTIONAL

## 2019-04-25 RX ORDER — FENTANYL CITRATE 50 UG/ML
INJECTION, SOLUTION INTRAMUSCULAR; INTRAVENOUS
Status: DISCONTINUED | OUTPATIENT
Start: 2019-04-25 | End: 2019-04-25 | Stop reason: HOSPADM

## 2019-04-25 RX ORDER — FUROSEMIDE 40 MG
40 TABLET ORAL DAILY
Status: DISCONTINUED | OUTPATIENT
Start: 2019-04-25 | End: 2019-04-26 | Stop reason: HOSPADM

## 2019-04-25 RX ORDER — NALOXONE HYDROCHLORIDE 0.4 MG/ML
.1-.4 INJECTION, SOLUTION INTRAMUSCULAR; INTRAVENOUS; SUBCUTANEOUS
Status: DISCONTINUED | OUTPATIENT
Start: 2019-04-25 | End: 2019-04-26 | Stop reason: HOSPADM

## 2019-04-25 RX ORDER — CEPHALEXIN 500 MG/1
500 TABLET ORAL 3 TIMES DAILY
Qty: 15 TABLET | Refills: 0 | Status: SHIPPED | OUTPATIENT
Start: 2019-04-25 | End: 2019-06-18

## 2019-04-25 RX ORDER — OXYCODONE AND ACETAMINOPHEN 5; 325 MG/1; MG/1
1-2 TABLET ORAL EVERY 4 HOURS PRN
Status: DISCONTINUED | OUTPATIENT
Start: 2019-04-25 | End: 2019-04-26 | Stop reason: HOSPADM

## 2019-04-25 RX ORDER — CARVEDILOL 12.5 MG/1
25 TABLET ORAL 2 TIMES DAILY WITH MEALS
Status: DISCONTINUED | OUTPATIENT
Start: 2019-04-25 | End: 2019-04-26 | Stop reason: HOSPADM

## 2019-04-25 RX ORDER — ASPIRIN 81 MG/1
81 TABLET, CHEWABLE ORAL DAILY
Status: DISCONTINUED | OUTPATIENT
Start: 2019-04-25 | End: 2019-04-26 | Stop reason: HOSPADM

## 2019-04-25 RX ORDER — CEFAZOLIN SODIUM 2 G/100ML
2 INJECTION, SOLUTION INTRAVENOUS
Status: COMPLETED | OUTPATIENT
Start: 2019-04-25 | End: 2019-04-25

## 2019-04-25 RX ORDER — METFORMIN HCL 500 MG
1000 TABLET, EXTENDED RELEASE 24 HR ORAL 2 TIMES DAILY WITH MEALS
Status: DISCONTINUED | OUTPATIENT
Start: 2019-04-27 | End: 2019-04-26 | Stop reason: HOSPADM

## 2019-04-25 RX ORDER — ATORVASTATIN CALCIUM 40 MG/1
80 TABLET, FILM COATED ORAL DAILY
Status: DISCONTINUED | OUTPATIENT
Start: 2019-04-26 | End: 2019-04-26 | Stop reason: HOSPADM

## 2019-04-25 RX ORDER — LIDOCAINE 40 MG/G
CREAM TOPICAL
Status: DISCONTINUED | OUTPATIENT
Start: 2019-04-25 | End: 2019-04-25 | Stop reason: HOSPADM

## 2019-04-25 RX ORDER — LIDOCAINE HYDROCHLORIDE 20 MG/ML
INJECTION, SOLUTION INFILTRATION; PERINEURAL
Status: DISCONTINUED | OUTPATIENT
Start: 2019-04-25 | End: 2019-04-25 | Stop reason: HOSPADM

## 2019-04-25 RX ORDER — OXYCODONE AND ACETAMINOPHEN 5; 325 MG/1; MG/1
1-2 TABLET ORAL EVERY 6 HOURS PRN
Qty: 18 TABLET | Refills: 0 | Status: SHIPPED | OUTPATIENT
Start: 2019-04-25 | End: 2019-04-30

## 2019-04-25 RX ORDER — IOPAMIDOL 755 MG/ML
INJECTION, SOLUTION INTRAVASCULAR
Status: DISCONTINUED | OUTPATIENT
Start: 2019-04-25 | End: 2019-04-25 | Stop reason: HOSPADM

## 2019-04-25 RX ORDER — LIDOCAINE 40 MG/G
CREAM TOPICAL
Status: DISCONTINUED | OUTPATIENT
Start: 2019-04-25 | End: 2019-04-26 | Stop reason: HOSPADM

## 2019-04-25 RX ADMIN — TICAGRELOR 90 MG: 90 TABLET ORAL at 19:37

## 2019-04-25 RX ADMIN — SACUBITRIL AND VALSARTAN 1 TABLET: 24; 26 TABLET, FILM COATED ORAL at 19:37

## 2019-04-25 RX ADMIN — FUROSEMIDE 40 MG: 40 TABLET ORAL at 13:57

## 2019-04-25 RX ADMIN — CARVEDILOL 25 MG: 12.5 TABLET, FILM COATED ORAL at 18:24

## 2019-04-25 RX ADMIN — ASPIRIN 81 MG CHEWABLE TABLET 81 MG: 81 TABLET CHEWABLE at 13:57

## 2019-04-25 RX ADMIN — CEFAZOLIN SODIUM 2 G: 2 INJECTION, SOLUTION INTRAVENOUS at 08:00

## 2019-04-25 NOTE — DISCHARGE INSTRUCTIONS
Home Care after an ICD implant    Wound care:  Keep your incision (surgery wound) dry for 3 days.  After 3 days, you may remove the outer bandage.  Keep the strips of tape on.  They will be removed at your clinic visit.  Check for signs of infection each day.  These include increased redness, swelling, drainage or a fever over 101 F (38.3 C).  Call us immediately if you see any of these signs.  If there are no signs of infection, you may shower in 3 days.  Do not submerge the incision (in a bath tub, hot tub, or swimming pool) until fully healed.  Pain:   You may have mild to moderate pain for 3 to 5 days.  Take acetaminophen (Tylenol) or ibuprofen (Advil) for the pain.  Call us if the pain is severe or lasts more than 5 days.    Activity:  You should slowly go back to your normal activities after 24 hours.  Healing will take 4 to 6 weeks.  No driving for 3 days  Avoid climbing a ladder alone.  It is best to stay within 4 feet of the ground.  Avoid anything that may cause rough contact or a hard hit to your chest.  This includes football, hockey, and other contact sports.  Do not go swimming or boating alone.      For at least 4 weeks:  Do not raise your affected arm above your shoulder.  Do not use your affected arm to push, pull, or lift anything over 10 pounds.  Avoid repetitive upper body activities for 6 weeks (ie: golf, swimming, and weight lifting)    Follow Up Visits:  Return to the clinic in 7 to 10 days to have your device and wound checked.    Telling others about your device:  Before you have any medical tests or treatments, tell the doctors, dentists, and other care providers about your device.  There are a few tests and treatments that may interfere with your device.  (These include MRI, radiation therapy, electrocautery, and others.)  Your care team may need to take special steps to keep you safe.  Before you leave the hospital, you will receive a temporary ID card.  A permanent card will be mailed  to you about 6 to 8 weeks later.  Always carry the ID card with you.  It has important details about your device.  You should also get a MedicAlert ID.  Please ask us for a MedicAlert brochure, or go to [email or web address].    Safety near electrical equipment:  All of these are safe to use when in good repair:    Microwaves    Radios    Cordless phone    Remote controls    Small electrical tools  Cell phones: Keep cell phones at least 6 inches from your device.  Do not carry the phone in a pocket near your device.  Security mills: It is okay to walk through security mills at the airports and department stores.  Tell airport security that you have a defibrillator.  They should keep the screening wand at least 6 inches from your device.  Full-body scanners are safe.    Avoid the following:    Arc welding, chain saws and high-powered industrial or commercial tools.    Power lines, power plants and large power generators.    Electric body fat scales.    Magnetic mattress pads or pillow.    What to do after a shock from your ICD:  1. Stop what you re doing and rest.  2. If you feel fine before and after the shock, call the device clinic.  3. If you feel unwell or receive more than one shock, call 911 or go to the emergency room.  A shock could mean that your condition has changed and you may need to see a doctor.    Questions?  Please call [facility]    Device Nurse:          Business Hours:  [phone]    After Hours:  [phone]   Choose option 4, then ask for the on-call device nurse at job code [id number].    Your next device clinic appointment is scheduled on:    Thursday, May 2nd, 2019 at 9 AM.                                                 [facility]  [facility] - Clinic [address]  [address]

## 2019-04-25 NOTE — PROGRESS NOTES
Patient seen on 6D for discharge teaching. Patient is s/p ICD implant on 4/25/19. ICD discharge teaching provided in written and verbal form. Patient verbalized understanding of instructions. Plan for patient to return to clinic on 5/2/19 for an incision and device check.

## 2019-04-25 NOTE — Clinical Note
A venogram was performed on the left subclavian. Injected with multiple hand injections. One injection

## 2019-04-25 NOTE — H&P
Electrophysiology Pre-Procedure History and Physical    Anson Manriquez MRN# [id number]   Age: 63 year old YOB: 1955      Date of Procedure: 4/25/2019  Location [facility]      Date of Exam 4/25/2019 Facility (Same day)       Home clinic: [facility]  Primary care provider: Taryn Jefferson  HPI:  Mr. Manriquez is a 63 year old male with CAD with anterior STEMI s/p PCI to LAD 9/14/18, VT after MI (within 48 hours post MI), subsequent ICM LVEF 30-35%, CKD, DM-II, HLD, and HTN.  On 9/14/18, he anterior STEMI on 9/14/18 complicated by VT arrest and cardiogenic shock. Coronary angiography showed an acute occlusion of the proximal LAD prompting PCI of the culprit vessel and subsequently has done well. LVEF was round 20% post MI. He was placed on GDMT. His LVEF remained reduced 30-35% despite medications. He has LBBB 's. He reports mild KENNY with a flight of stairs or prolonged walking.  Given >90 days post revascularization, LVEF reduced despite GDMT, and LBBB, he had indication for CRTD. This was discussed in detail with him, and he elected to pursue for which he presents today.            Active problem list:   Patient Active Problem List    Diagnosis Date Noted     Ischemic cardiomyopathy 02/19/2019     Priority: Medium     Added automatically from request for surgery [id number]       STEMI (ST elevation myocardial infarction) (H) 09/14/2018     Priority: Medium     Coronary artery disease involving native coronary artery of native heart without angina pectoris 03/09/2017     Priority: Medium     MI in 2007       Type 2 diabetes mellitus without complication, without long-term current use of insulin (H) 03/09/2017     Priority: Medium     Benign essential hypertension 03/09/2017     Priority: Medium     Mixed hyperlipidemia 03/09/2017     Priority: Medium     Anal fistula 03/09/2017     Priority: Medium            Medications (include herbals and  vitamins):      Current Facility-Administered Medications   Medication     ceFAZolin (ANCEF) intermittent infusion 2 g in 100 mL dextrose PRE-MIX     lidocaine (LMX4) cream     lidocaine 1 % 0.1-1 mL     sodium chloride (PF) 0.9% PF flush 3 mL     sodium chloride (PF) 0.9% PF flush 3 mL     Facility-Administered Medications Ordered in Other Encounters   Medication     sodium chloride (PF) 0.9% PF flush 10 mL         Cedric Manriquez   Home Medication Instructions ANTONETTE:[id number]    Printed on:04/25/19 0713   Medication Information                      aspirin (ASA) 81 MG chewable tablet  Take 1 tablet (81 mg) by mouth daily             atorvastatin (LIPITOR) 80 MG tablet  Take 1 tablet (80 mg) by mouth daily Needs labs done prior to the next refill.             carvedilol (COREG) 25 MG tablet  1 tablet (25 mg) by Oral or Feeding Tube route 2 times daily (with meals)             furosemide (LASIX) 40 MG tablet  Take 1 tablet (40 mg) by mouth 2 times daily             metFORMIN (GLUCOPHAGE-XR) 500 MG 24 hr tablet  Take 2 tablets (1,000 mg) by mouth 2 times daily (with meals)             MULTIPLE VITAMIN PO  Take 1 tablet by mouth daily.             sacubitril-valsartan (ENTRESTO) 24-26 MG per tablet  Take 1 tablet by mouth 2 times daily             ticagrelor (BRILINTA) 90 MG tablet  Take 1 tablet (90 mg) by mouth 2 times daily             vitamin B complex with vitamin C (VITAMIN  B COMPLEX) TABS tablet  Take 1 tablet by mouth daily                      Allergies:    No Known Allergies  Allergy to Latex? No  Allergy to tape?   No  Intolerances: NA          Social History:     Social History     Tobacco Use     Smoking status: Never Smoker     Smokeless tobacco: Never Used   Substance Use Topics     Alcohol use: No            Physical Exam:   All vitals have been reviewed  No data found.  No intake/output data recorded.  Airway assessment:   Patient is able to open mouth wide  Patient is able to stick out tongue}       ENT:   Normocephalic, without obvious abnormality, atraumatic, sinuses nontender on palpation, external ears without lesions, oral pharynx with moist mucous membranes, tonsils without erythema or exudates, gums normal and good dentition.     Neck:   Supple, symmetrical, trachea midline, no adenopathy, thyroid symmetric, not enlarged and no tenderness, skin normal     Lungs:   No increased work of breathing, good air exchange, clear to auscultation bilaterally, no crackles or wheezing     Cardiovascular:   Normal apical impulse, regular rate and rhythm, normal S1 and S2, no S3 or S4, and no murmur noted             Lab / Radiology Results:     Lab Results   Component Value Date    WBC 8.7 12/06/2018    RBC 4.18 12/06/2018    HGB 13.3 12/06/2018    HCT 40.0 12/06/2018    MCV 96 12/06/2018    RDW 14.9 12/06/2018     12/06/2018      Lab Results   Component Value Date    WBC 8.7 12/06/2018     Lab Results   Component Value Date     12/06/2018     Lab Results   Component Value Date    HGB 13.3 12/06/2018    HCT 40.0 12/06/2018     Lab Results   Component Value Date     01/31/2019    CO2 28 01/31/2019    BUN 17 01/31/2019    CREAT 1.4 09/13/2018     Lab Results   Component Value Date     01/31/2019    CO2 28 01/31/2019    BUN 17 01/31/2019    CREAT 1.4 09/13/2018     Lab Results   Component Value Date     01/31/2019     Lab Results   Component Value Date    BUN 17 01/31/2019    CREAT 1.4 09/13/2018     Lab Results   Component Value Date    TSH 2.44 03/07/2017             Plan:   Patient's active problems diagnostically and therapeutically optimized for the planned procedure. Patient here for CRTD implant. Procedure explained in detail to patient including indications, risks, and benefits. The risk of defibrillator placement include: over sedation, reaction to local anesthetic, reaction to narcotics or benzodiazipines used for moderate secation, localized bleeding, internal bleeding,  collapsed lung, and acute or late infections. There is the possibilty of unforseen complications as well such as device or lead failure, and inappropriate shocks from the defibrillator. In regard to resynchronization therapy the additional risks included: no improvement in heart failure symptoms or inability to place the left ventricular lead. He states understanding and wishes to procced.     LLOYD Pedraza CNP  Electrophysiology Consult Service  Pager: [phone]

## 2019-04-26 ENCOUNTER — RESEARCH ENCOUNTER (OUTPATIENT)
Dept: RESEARCH | Facility: CLINIC | Age: 64
End: 2019-04-26

## 2019-04-26 ENCOUNTER — APPOINTMENT (OUTPATIENT)
Dept: GENERAL RADIOLOGY | Facility: CLINIC | Age: 64
End: 2019-04-26
Attending: NURSE PRACTITIONER
Payer: COMMERCIAL

## 2019-04-26 ENCOUNTER — ANCILLARY PROCEDURE (OUTPATIENT)
Dept: CARDIOLOGY | Facility: CLINIC | Age: 64
End: 2019-04-26
Attending: NURSE PRACTITIONER
Payer: COMMERCIAL

## 2019-04-26 VITALS
RESPIRATION RATE: 8 BRPM | SYSTOLIC BLOOD PRESSURE: 117 MMHG | TEMPERATURE: 97.6 F | HEART RATE: 83 BPM | OXYGEN SATURATION: 93 % | DIASTOLIC BLOOD PRESSURE: 82 MMHG

## 2019-04-26 LAB
GLUCOSE BLDC GLUCOMTR-MCNC: 134 MG/DL (ref 70–99)
GLUCOSE BLDC GLUCOMTR-MCNC: 188 MG/DL (ref 70–99)
INTERPRETATION ECG - MUSE: NORMAL
INTERPRETATION ECG - MUSE: NORMAL

## 2019-04-26 PROCEDURE — 25000132 ZZH RX MED GY IP 250 OP 250 PS 637: Performed by: NURSE PRACTITIONER

## 2019-04-26 PROCEDURE — 93284 PRGRMG EVAL IMPLANTABLE DFB: CPT | Mod: 26 | Performed by: INTERNAL MEDICINE

## 2019-04-26 PROCEDURE — 82962 GLUCOSE BLOOD TEST: CPT

## 2019-04-26 PROCEDURE — 71046 X-RAY EXAM CHEST 2 VIEWS: CPT

## 2019-04-26 PROCEDURE — 99024 POSTOP FOLLOW-UP VISIT: CPT | Performed by: NURSE PRACTITIONER

## 2019-04-26 PROCEDURE — 93284 PRGRMG EVAL IMPLANTABLE DFB: CPT

## 2019-04-26 RX ADMIN — CARVEDILOL 25 MG: 12.5 TABLET, FILM COATED ORAL at 08:44

## 2019-04-26 RX ADMIN — TICAGRELOR 90 MG: 90 TABLET ORAL at 08:44

## 2019-04-26 RX ADMIN — ATORVASTATIN CALCIUM 80 MG: 40 TABLET, FILM COATED ORAL at 08:44

## 2019-04-26 RX ADMIN — ASPIRIN 81 MG CHEWABLE TABLET 81 MG: 81 TABLET CHEWABLE at 08:44

## 2019-04-26 RX ADMIN — SACUBITRIL AND VALSARTAN 1 TABLET: 24; 26 TABLET, FILM COATED ORAL at 08:42

## 2019-04-26 RX ADMIN — FUROSEMIDE 40 MG: 40 TABLET ORAL at 08:44

## 2019-04-26 NOTE — PLAN OF CARE
Shift [id number]:     Pt slept most of shift. Pt not reporting any pain. Per pt it feels better. Vitals WNL. Pt voiding without any issues and is ambulating independent per baseline. Pt tolerating regular diet without any issues. Pressure dressing intact. Pt wearing sling. Pt continued on tele monitoring. Able to make needs known. Call light within reach. Will continue to monitor.

## 2019-04-26 NOTE — PLAN OF CARE
Patient is s/p ICD implant, A&O x4, pt denies pain, pt ate well, voided and walked. Site CDI, pressure dressing applied and pt tolerated well. AVSS, will continues to monitor. /80 (BP Location: Right arm)   Pulse 83   Temp 98  F (36.7  C) (Oral)   Resp 11   SpO2 95%

## 2019-04-26 NOTE — DISCHARGE SUMMARY
Electrophysiology Discharge Summary  Date of Procedure: 4/25/19  DOS: 4/26/2019   Pre-operative Diagnosis:  CHF due to ICM, EF=33%, NYHA class II, optimal medical therapy > 3 months.  Post-operative diagnosis:   Successful implantation of CRT-D device, secondary prevention of SCD.  Hospital Course:  Mr. Manriquez is a 63 year old male with CAD with anterior STEMI s/p PCI to LAD 9/14/18, VT after MI (within 48 hours post MI), subsequent ICM LVEF 30-35%, CKD, DM-II, HLD, and HTN.  On 9/14/18, he anterior STEMI on 9/14/18 complicated by VT arrest and cardiogenic shock. Coronary angiography showed an acute occlusion of the proximal LAD prompting PCI of the culprit vessel and subsequently has done well. LVEF was round 20% post MI. He was placed on GDMT. His LVEF remained reduced 30-35% despite medications. He has LBBB 's. He reports mild KENNY with a flight of stairs or prolonged walking.  Given >90 days post revascularization, LVEF reduced despite GDMT, and LBBB, he had indication for CRTD. This was discussed in detail with him, and he elected to pursue for which he presents today.   Patient underwent a successful CRTD implant as primary prevention of SCD yesterday. He had an uneventful overnight stay. Device interrogation shows normal device function and stable lead parameters. Chest x-ray demonstrates no evidence of pneumothorax. Left subclavian site is CDI, no bleeding, small soft hematoma present- pressure dressing placed overnight . Patient is tolerating oral intake, ambulating at baseline, and voiding without difficulties. Patient remains hemodynamically stable.     ROS:   10 point ROS neg other than the symptoms noted above.   Exam:  /82 (BP Location: Right arm)   Pulse 83   Temp 97.6  F (36.4  C) (Oral)   Resp 8   SpO2 93%     Constitutional: healthy, alert and no distress  Head: Normocephalic. No masses, lesions, tenderness or abnormalities  Neck: Neck supple. No adenopathy. Thyroid symmetric,  normal size,, Carotids without bruits.  ENT: ENT exam normal, no neck nodes or sinus tenderness  Cardiovascular: negative, PMI normal. No lifts, heaves, or thrills. RRR. No murmurs, clicks gallops or rub  Respiratory: negative, Percussion normal. Good diaphragmatic excursion. Lungs clear  Gastrointestinal: Abdomen soft, non-tender. BS normal. No masses, organomegaly  : Deferred  Musculoskeletal: extremities normal- no gross deformities noted, gait normal and normal muscle tone  Skin: no suspicious lesions or rashes  Neurologic: Gait normal. Reflexes normal and symmetric. Sensation grossly WNL.  Psychiatric: mentation appears normal and affect normal/bright  Hematologic/Lymphatic/Immunologic: Normal cervical lymph nodes    Discharge Medications:   Cedric Manriquez   Home Medication Instructions ANTONETTE:[id number]    Printed on:04/26/19 0702   Medication Information                      aspirin (ASA) 81 MG chewable tablet  Take 1 tablet (81 mg) by mouth daily             atorvastatin (LIPITOR) 80 MG tablet  Take 1 tablet (80 mg) by mouth daily Needs labs done prior to the next refill.             carvedilol (COREG) 25 MG tablet  1 tablet (25 mg) by Oral or Feeding Tube route 2 times daily (with meals)             cephalexin 500 MG tablet  Take 500 mg by mouth 3 times daily for 5 days             furosemide (LASIX) 40 MG tablet  Take 1 tablet (40 mg) by mouth 2 times daily             metFORMIN (GLUCOPHAGE-XR) 500 MG 24 hr tablet  Take 2 tablets (1,000 mg) by mouth 2 times daily (with meals)             MULTIPLE VITAMIN PO  Take 1 tablet by mouth daily.             oxyCODONE-acetaminophen (PERCOCET) 5-325 MG tablet  Take 1-2 tablets by mouth every 6 hours as needed for pain             sacubitril-valsartan (ENTRESTO) 24-26 MG per tablet  Take 1 tablet by mouth 2 times daily             ticagrelor (BRILINTA) 90 MG tablet  Take 1 tablet (90 mg) by mouth 2 times daily             vitamin B complex with vitamin C (VITAMIN  B  COMPLEX) TABS tablet  Take 1 tablet by mouth daily                 Plan & Follow up:    Remove pressure dressing in 24 hours.     Follow up with device clinic in 7-10 days    Oral antibiotics x 5 days    Keep incision clean and dry for 72 hours post procedure    No lifting > 10lbs or over shoulder level with left arm for 4 weeks    Notify device clinic/EP immediately for any redness, swelling, bleeding, discharge, fevers or chills.     Discharge to home.       LLOYD Pedraza CNP  Electrophysiology Consult Service  Pager: [phone]

## 2019-04-26 NOTE — PROGRESS NOTES
[facility]     Principle Investigator: Jason Matos MD   [phone]                        Malka Neves (Clinical Research Coordinator) [phone]  Edin Humphreys (Clinical Research Coordinator) [phone]     The subject was approached for possible participation in the above study. The consent form was reviewed with patient and wife including purpose, research hypothesis, nature of the research, risks & benefits.  Also reviewed were confidentiality issues, compensation for injury, and alternative procedures available.  Patient verbalized knowledge that participation is voluntary and that she may withdraw at any time.  Questions and concerns were addressed. Both the consent and HIPAA were signed at this time. Copies given to patient for his records.    We will see patient at next clinic visit on 5/2/2019 for baseline visit

## 2019-04-26 NOTE — PROGRESS NOTES
Pt s/p PPM placement. Pt is alert and oriented x4. Had denied pain. Voiding spontaneously, tolerating diet well.Pressure dressing to L chest dry and intact. Pt calls appropriately. Will continue to monitor

## 2019-04-29 LAB
MDC_IDC_LEAD_IMPLANT_DT: NORMAL
MDC_IDC_LEAD_LOCATION: NORMAL
MDC_IDC_LEAD_LOCATION_DETAIL_1: NORMAL
MDC_IDC_LEAD_MFG: NORMAL
MDC_IDC_LEAD_MODEL: NORMAL
MDC_IDC_LEAD_POLARITY_TYPE: NORMAL
MDC_IDC_LEAD_SERIAL: NORMAL
MDC_IDC_LEAD_SPECIAL_FUNCTION: NORMAL
MDC_IDC_MSMT_BATTERY_DTM: NORMAL
MDC_IDC_MSMT_BATTERY_REMAINING_LONGEVITY: 8 MO
MDC_IDC_MSMT_BATTERY_STATUS: NORMAL
MDC_IDC_MSMT_CAP_CHARGE_DTM: NORMAL
MDC_IDC_MSMT_CAP_CHARGE_ENERGY: 0 J
MDC_IDC_MSMT_CAP_CHARGE_TIME: 0 S
MDC_IDC_MSMT_CAP_CHARGE_TIME: 9.15
MDC_IDC_MSMT_CAP_CHARGE_TYPE: NORMAL
MDC_IDC_MSMT_CAP_CHARGE_TYPE: NORMAL
MDC_IDC_MSMT_LEADCHNL_LV_IMPEDANCE_VALUE: 547 OHM
MDC_IDC_MSMT_LEADCHNL_LV_PACING_THRESHOLD_AMPLITUDE: 1.6 V
MDC_IDC_MSMT_LEADCHNL_LV_PACING_THRESHOLD_PULSEWIDTH: 0.5 MS
MDC_IDC_MSMT_LEADCHNL_LV_SENSING_INTR_AMPL: 9.3 MV
MDC_IDC_MSMT_LEADCHNL_RA_IMPEDANCE_VALUE: 617 OHM
MDC_IDC_MSMT_LEADCHNL_RA_PACING_THRESHOLD_AMPLITUDE: 0.4 V
MDC_IDC_MSMT_LEADCHNL_RA_PACING_THRESHOLD_PULSEWIDTH: 0.5 MS
MDC_IDC_MSMT_LEADCHNL_RA_SENSING_INTR_AMPL: 6.4 MV
MDC_IDC_MSMT_LEADCHNL_RV_IMPEDANCE_VALUE: 580 OHM
MDC_IDC_MSMT_LEADCHNL_RV_PACING_THRESHOLD_AMPLITUDE: 0.8 V
MDC_IDC_MSMT_LEADCHNL_RV_PACING_THRESHOLD_PULSEWIDTH: 0.4 MS
MDC_IDC_MSMT_LEADCHNL_RV_SENSING_INTR_AMPL: 25 MV
MDC_IDC_PG_IMPLANT_DTM: NORMAL
MDC_IDC_PG_MFG: NORMAL
MDC_IDC_PG_MODEL: NORMAL
MDC_IDC_PG_SERIAL: NORMAL
MDC_IDC_PG_TYPE: NORMAL
MDC_IDC_SESS_CLINIC_NAME: NORMAL
MDC_IDC_SESS_DTM: NORMAL
MDC_IDC_SESS_TYPE: NORMAL
MDC_IDC_SET_BRADY_AT_MODE_SWITCH_MODE: NORMAL
MDC_IDC_SET_BRADY_AT_MODE_SWITCH_RATE: 170 {BEATS}/MIN
MDC_IDC_SET_BRADY_HYSTRATE: NORMAL
MDC_IDC_SET_BRADY_LOWRATE: 60 {BEATS}/MIN
MDC_IDC_SET_BRADY_MAX_SENSOR_RATE: 130 {BEATS}/MIN
MDC_IDC_SET_BRADY_MAX_TRACKING_RATE: 130 {BEATS}/MIN
MDC_IDC_SET_BRADY_MODE: NORMAL
MDC_IDC_SET_BRADY_PAV_DELAY_HIGH: 180 MS
MDC_IDC_SET_BRADY_PAV_DELAY_LOW: 180 MS
MDC_IDC_SET_BRADY_SAV_DELAY_HIGH: 140 MS
MDC_IDC_SET_BRADY_SAV_DELAY_LOW: 140 MS
MDC_IDC_SET_CRT_LVRV_DELAY: 30 MS
MDC_IDC_SET_CRT_PACED_CHAMBERS: NORMAL
MDC_IDC_SET_LEADCHNL_LV_PACING_AMPLITUDE: 2.4 V
MDC_IDC_SET_LEADCHNL_LV_PACING_CAPTURE_MODE: NORMAL
MDC_IDC_SET_LEADCHNL_LV_PACING_POLARITY: NORMAL
MDC_IDC_SET_LEADCHNL_LV_PACING_PULSEWIDTH: 0.5 MS
MDC_IDC_SET_LEADCHNL_LV_SENSING_ADAPTATION_MODE: NORMAL
MDC_IDC_SET_LEADCHNL_LV_SENSING_POLARITY: NORMAL
MDC_IDC_SET_LEADCHNL_LV_SENSING_SENSITIVITY: 1 MV
MDC_IDC_SET_LEADCHNL_RA_PACING_AMPLITUDE: 3.5 V
MDC_IDC_SET_LEADCHNL_RA_PACING_ANODE_ELECTRODE_1: NORMAL
MDC_IDC_SET_LEADCHNL_RA_PACING_ANODE_LOCATION_1: NORMAL
MDC_IDC_SET_LEADCHNL_RA_PACING_CAPTURE_MODE: NORMAL
MDC_IDC_SET_LEADCHNL_RA_PACING_CATHODE_ELECTRODE_1: NORMAL
MDC_IDC_SET_LEADCHNL_RA_PACING_CATHODE_LOCATION_1: NORMAL
MDC_IDC_SET_LEADCHNL_RA_PACING_POLARITY: NORMAL
MDC_IDC_SET_LEADCHNL_RA_PACING_PULSEWIDTH: 0.5 MS
MDC_IDC_SET_LEADCHNL_RA_SENSING_ADAPTATION_MODE: NORMAL
MDC_IDC_SET_LEADCHNL_RA_SENSING_ANODE_ELECTRODE_1: NORMAL
MDC_IDC_SET_LEADCHNL_RA_SENSING_ANODE_LOCATION_1: NORMAL
MDC_IDC_SET_LEADCHNL_RA_SENSING_CATHODE_ELECTRODE_1: NORMAL
MDC_IDC_SET_LEADCHNL_RA_SENSING_CATHODE_LOCATION_1: NORMAL
MDC_IDC_SET_LEADCHNL_RA_SENSING_POLARITY: NORMAL
MDC_IDC_SET_LEADCHNL_RA_SENSING_SENSITIVITY: 0.25 MV
MDC_IDC_SET_LEADCHNL_RV_PACING_AMPLITUDE: 2 V
MDC_IDC_SET_LEADCHNL_RV_PACING_ANODE_ELECTRODE_1: NORMAL
MDC_IDC_SET_LEADCHNL_RV_PACING_ANODE_LOCATION_1: NORMAL
MDC_IDC_SET_LEADCHNL_RV_PACING_CAPTURE_MODE: NORMAL
MDC_IDC_SET_LEADCHNL_RV_PACING_CATHODE_ELECTRODE_1: NORMAL
MDC_IDC_SET_LEADCHNL_RV_PACING_CATHODE_LOCATION_1: NORMAL
MDC_IDC_SET_LEADCHNL_RV_PACING_POLARITY: NORMAL
MDC_IDC_SET_LEADCHNL_RV_PACING_PULSEWIDTH: 0.4 MS
MDC_IDC_SET_LEADCHNL_RV_SENSING_ADAPTATION_MODE: NORMAL
MDC_IDC_SET_LEADCHNL_RV_SENSING_ANODE_ELECTRODE_1: NORMAL
MDC_IDC_SET_LEADCHNL_RV_SENSING_ANODE_LOCATION_1: NORMAL
MDC_IDC_SET_LEADCHNL_RV_SENSING_CATHODE_ELECTRODE_1: NORMAL
MDC_IDC_SET_LEADCHNL_RV_SENSING_CATHODE_LOCATION_1: NORMAL
MDC_IDC_SET_LEADCHNL_RV_SENSING_POLARITY: NORMAL
MDC_IDC_SET_LEADCHNL_RV_SENSING_SENSITIVITY: 0.6 MV
MDC_IDC_SET_ZONE_DETECTION_INTERVAL: 250 MS
MDC_IDC_SET_ZONE_DETECTION_INTERVAL: 300 MS
MDC_IDC_SET_ZONE_DETECTION_INTERVAL: 353 MS
MDC_IDC_SET_ZONE_TYPE: NORMAL
MDC_IDC_SET_ZONE_VENDOR_TYPE: NORMAL
MDC_IDC_STAT_BRADY_RA_PERCENT_PACED: 0 %
MDC_IDC_STAT_BRADY_RV_PERCENT_PACED: 4 %
MDC_IDC_STAT_CRT_LV_PERCENT_PACED: 96 %
MDC_IDC_STAT_CRT_PERCENT_PACED: 96 %
MDC_IDC_STAT_EPISODE_RECENT_COUNT: 0
MDC_IDC_STAT_EPISODE_RECENT_COUNT_DTM_END: NORMAL
MDC_IDC_STAT_EPISODE_RECENT_COUNT_DTM_START: NORMAL
MDC_IDC_STAT_EPISODE_TOTAL_COUNT: 0
MDC_IDC_STAT_EPISODE_TOTAL_COUNT_DTM_END: NORMAL
MDC_IDC_STAT_EPISODE_TYPE: NORMAL
MDC_IDC_STAT_EPISODE_VENDOR_TYPE: NORMAL
MDC_IDC_STAT_TACHYTHERAPY_ATP_DELIVERED_RECENT: 0
MDC_IDC_STAT_TACHYTHERAPY_ATP_DELIVERED_TOTAL: 0
MDC_IDC_STAT_TACHYTHERAPY_RECENT_DTM_END: NORMAL
MDC_IDC_STAT_TACHYTHERAPY_RECENT_DTM_START: NORMAL
MDC_IDC_STAT_TACHYTHERAPY_SHOCKS_ABORTED_RECENT: 0
MDC_IDC_STAT_TACHYTHERAPY_SHOCKS_ABORTED_TOTAL: 0
MDC_IDC_STAT_TACHYTHERAPY_SHOCKS_DELIVERED_RECENT: 0
MDC_IDC_STAT_TACHYTHERAPY_SHOCKS_DELIVERED_TOTAL: 0
MDC_IDC_STAT_TACHYTHERAPY_TOTAL_DTM_END: NORMAL

## 2019-04-30 ENCOUNTER — PATIENT OUTREACH (OUTPATIENT)
Dept: CARDIOLOGY | Facility: CLINIC | Age: 64
End: 2019-04-30

## 2019-04-30 NOTE — PROGRESS NOTES
Patient had a recent  CRTD implant. Patient was called and a voice message left requesting a call back to discuss any questions or concerns. Upcoming appointments left on voice message.

## 2019-05-02 ENCOUNTER — ANCILLARY PROCEDURE (OUTPATIENT)
Dept: CARDIOLOGY | Facility: CLINIC | Age: 64
End: 2019-05-02
Payer: COMMERCIAL

## 2019-05-02 ENCOUNTER — ANCILLARY PROCEDURE (OUTPATIENT)
Dept: GENERAL RADIOLOGY | Facility: CLINIC | Age: 64
End: 2019-05-02
Payer: COMMERCIAL

## 2019-05-02 ENCOUNTER — OFFICE VISIT (OUTPATIENT)
Dept: CARDIOLOGY | Facility: CLINIC | Age: 64
End: 2019-05-02
Attending: INTERNAL MEDICINE
Payer: COMMERCIAL

## 2019-05-02 VITALS
HEIGHT: 67 IN | HEART RATE: 85 BPM | BODY MASS INDEX: 28.56 KG/M2 | DIASTOLIC BLOOD PRESSURE: 61 MMHG | WEIGHT: 182 LBS | OXYGEN SATURATION: 95 % | SYSTOLIC BLOOD PRESSURE: 113 MMHG

## 2019-05-02 DIAGNOSIS — I25.5 ISCHEMIC CARDIOMYOPATHY: ICD-10-CM

## 2019-05-02 DIAGNOSIS — E78.2 MIXED HYPERLIPIDEMIA: Primary | ICD-10-CM

## 2019-05-02 DIAGNOSIS — I25.10 CORONARY ARTERY DISEASE INVOLVING NATIVE CORONARY ARTERY OF NATIVE HEART WITHOUT ANGINA PECTORIS: ICD-10-CM

## 2019-05-02 DIAGNOSIS — I50.20 HEART FAILURE WITH REDUCED EJECTION FRACTION, NYHA CLASS III (H): ICD-10-CM

## 2019-05-02 DIAGNOSIS — I10 BENIGN ESSENTIAL HYPERTENSION: ICD-10-CM

## 2019-05-02 PROCEDURE — G0463 HOSPITAL OUTPT CLINIC VISIT: HCPCS | Mod: ZF

## 2019-05-02 PROCEDURE — 99214 OFFICE O/P EST MOD 30 MIN: CPT | Mod: GC | Performed by: INTERNAL MEDICINE

## 2019-05-02 RX ORDER — SPIRONOLACTONE 25 MG/1
25 TABLET ORAL DAILY
Qty: 90 TABLET | Refills: 3 | Status: SHIPPED | OUTPATIENT
Start: 2019-05-02 | End: 2020-05-21

## 2019-05-02 ASSESSMENT — PAIN SCALES - GENERAL: PAINLEVEL: NO PAIN (0)

## 2019-05-02 ASSESSMENT — MIFFLIN-ST. JEOR: SCORE: 1571.24

## 2019-05-02 NOTE — PROGRESS NOTES
Heart Failure Clinic  May 2, 2019    Mr. Anson Manriquez is a 63yr old male with a history of HTN, DMII, CAD (s/p STEMI in 2007 with pLAD stenting, and recent STEMI 9/2018), and ICM complicated by cardiogenic shock requiring IABP support with slow wean. Mr. Manriquez was was out with friends and developed acute chest pain and after going home his pain worsened and had syncope, then cardiac arrest. EMS was called, and on arrival provided ~30 seconds of CPR and AED delivered 1 shock.  He was then brought to [facility] in sinus rhythm with a pulse for most of the transport.  During the last 5 mins en route to the ED, he went into a wide-complex tachycardia at a rate of 220bpm, likely VT.  He maintained a pulse, acceptable BP, and mental status.  He received amiodarone 150mg in the ED, and converted to NSR as he was being prepared for cardioversion.  He was then sent to the cath lab, where he was found to have an acute thrombotic lesion of the pLAD within the prior stent.  He was treated with aspiration thrombectomy, and STEVO x2 to pLAD and mLAD, and POBA to D1.  His LVEDP was 40mmHg. Peak troponin was ~17.  Echo showed LVEF 23% with LAD territory akinesis, consistent with echo results from 2012.  Therefore, his LAD had limited viability from his prior MI, which explains his relatively low troponin and unchanged LVEF. He was ultimately started on DAPT with aspirin and Brilinta, lisinopril, carvedilol, and lasix, and discharged home on 9/20/18.   He has been seen in clinic a few times now, with EF still 30-35%.  Despite evidence based therapy and his reduced EF, he went for CRT-D with Dr. Mckeon 4/25/19.  Procedure went well without complications    Today the patient states he feels good. He denies lightheadedness,  SOB, PND, Orthopnea , palpitations. He states his weights at home are between 176-180 lbs typically.   He states he has had a good appetite. Still has some difficulty playing pool since his heart attack. Denies fevers or  chills. Is having some diarrhea that he is wondering is medication induced.      CRT-D placement went well, he did have some bruising and swelling afterwards.      PAST MEDICAL HISTORY:  Past Medical History:   Diagnosis Date     Chronic infection     near rectum still leaking     Coronary artery disease      Diabetes mellitus, type 2 (H)      Heart attack (H) 4/17/2007     Hyperlipidemia      Hypertension      STEMI (ST elevation myocardial infarction) (H) 09/13/2018    s/p STEVO x2     Stented coronary artery 2007     FAMILY HISTORY:  Family History   Problem Relation Age of Onset     Hypertension Mother      Diabetes Father      Glaucoma No family hx of      Macular Degeneration No family hx of       SOCIAL HISTORY:  Social History     Social History     Marital status:      Spouse name: N/A     Number of children: N/A     Years of education: N/A     Social History Main Topics     Smoking status: Never Smoker     Smokeless tobacco: Never Used     Alcohol use No     Drug use: No     Sexual activity: Not on file     Other Topics Concern     Not on file     Social History Narrative     CURRENT MEDICATIONS:  Current Outpatient Medications   Medication     aspirin (ASA) 81 MG chewable tablet     atorvastatin (LIPITOR) 80 MG tablet     carvedilol (COREG) 25 MG tablet     furosemide (LASIX) 40 MG tablet     metFORMIN (GLUCOPHAGE-XR) 500 MG 24 hr tablet     MULTIPLE VITAMIN PO     sacubitril-valsartan (ENTRESTO) 24-26 MG per tablet     ticagrelor (BRILINTA) 90 MG tablet     vitamin B complex with vitamin C (VITAMIN  B COMPLEX) TABS tablet     No current facility-administered medications for this visit.      Facility-Administered Medications Ordered in Other Visits   Medication     sodium chloride (PF) 0.9% PF flush 10 mL     ROS:   Constitutional: No fever, chills, or sweats. Weight is 182 lbs 0 oz  ENT: No visual disturbance, ear ache, epistaxis, sore throat.   Allergies/Immunologic: Negative.   Respiratory: No  "cough, hemoptysis.   Cardiovascular: As per HPI.   GI: No nausea, vomiting, hematemesis, melena, or hematochezia. Some diarrhea  : No urinary frequency, dysuria, or hematuria.   Integument: Negative.   Psychiatric: Pleasant, no major depression noted  Neuro: No focal neurological deficits noted  Endocrinology: Negative.   Musculoskeletal: As per HPI.      EXAM:  /61 (BP Location: Right arm, Patient Position: Chair, Cuff Size: Adult Regular)   Pulse 85   Ht 1.689 m (5' 6.5\")   Wt 82.6 kg (182 lb)   SpO2 95%   BMI 28.94 kg/m    General: appears comfortable, alert and oriented  Head: normocephalic, atraumatic  Eyes: anicteric sclera, EOMI , PERRL  Neck: no adenopathy  Orophyarynx: moist mucosa, no lesions noted  Heart: regular, S1/S2, no murmurs, rubs or gallop. JVP not appreciated  Chest: ecchymoses along lower chest, swelling near device pocket, non tender  Lungs: CTAB, No wheezing.   Abdomen: soft, non-tender, bowel sounds present, no hepatosplenomegaly  Extremities: No LE edema today  Skin: no open lesions noted  Neuro: grossly non-focal     Labs:  Lab Results   Component Value Date    WBC 7.9 04/25/2019    HGB 13.3 04/25/2019    HCT 40.1 04/25/2019     04/25/2019     04/25/2019    POTASSIUM 3.8 04/25/2019    CHLORIDE 106 04/25/2019    CO2 24 04/25/2019    BUN 19 04/25/2019    CR 0.85 04/25/2019     (H) 04/25/2019    NTBNP 934 (H) 10/31/2018    TROPONIN 0.14 (HH) 09/13/2018    TROPI 1.914 (HH) 09/18/2018    AST 20 01/31/2019    ALT 29 01/31/2019    ALKPHOS 58 01/31/2019    BILITOTAL 1.0 01/31/2019    INR 1.16 (H) 04/25/2019     Procedures:  Coronary angio:  9/13/18  -Both coronary arteries arise from their respective cusps.  -Dominance: Right  -LM has no evidence of coronary artery disease.  -LAD: Type 3 [LAD supplies the entire apex]. The LAD gives rise to septal perforators, multiple diagonal branches. The proximal LAD has an acute thrombotic lesion within the prior stent. Distal " to the stent, there is a 70-80% stenosis. The distal vessel has HALIMA 2 flow. The D1 has thrombus at the ostium likely from embolization. The apical LAD also has thrombus from embolization.  -LCX gives rise to a large branching OM. The prox Cx has a 20% stenosis. The rest of the Cx system has mild disease.  -RCA (dominant) gives rise to PL branches and supplies PDA. There is minimal disease in the RCA system.     Echo:  9/14/18  Ischemic cardiomyopathy, LVEF=23% with extensive regional wall motion abnormalities as described below. Resting wall motion abnormalities and LV function are not significantly changed from the rest images on the 11/27/12 stress echocardiogram.  Mildly dilated LV with severely reduced LV function, LVEF=23%. There is akinesis involving the kcqpf-ej-iyr anteroseptal, rjd-zx-ssgkld anterior, mid anterolateral, and all apical myocardial segments. The basal anterior, basal anterolateral, and basal inferolateral segments are relatively spared. RV size and function are normal. No significant valvular abnormalities. No pericardial effusion. Normal IVC with abnormal respiratory variation, estimated RA pressure 8 mmHg.    Echo 1/30/19  Ischemic cardiomyopathy with moderately dilated LV with moderately reduced  global LV function, LVEF=31%. Extensive regional wall motion abnormalities as  described below, unchanged from 9/14/18.  This study was compared with the study from 9/14/18: There has been no  significant change. Specifically, LV function and wall motion abnormalities  have not changed significantly.  The right atria appears normal. Severe left atrial enlargement is present. This study was compared with the study from 9/14/18 . There has been no significant change.      ASSESSMENT AND PLAN:  Assessment and Plan:   Mr. Anson Manriquez is a 63yr old male with a history of HTN, DMII, CAD (s/p MI in 2007, prior remote pLAD stenting, STEMI 9/2018), ICM complicated by cardiac arrest and ventricular  tachycardia who presented to the cardiology clinic for further outpatient management and medication titration.      ICM - HFrEF 30-35%, ACC/AHA C, NYHA II  Has CRT-D for SCD prevention  On Carvedilol 25mg BID  On entresto 24-26mg BID, normal renal function and potassium  Lasix 40mg BID  Continue ASA/ticagrelor/lipitor for CAD/PCI  We will add aldactone 12.5mg for treatment of his ICM    Patient was considering surgery for hernia repair, however we recommend waiting until at least September 2019 when his ticagrelor could technically be stopped 1 year post PCI.    Continue follow up in [facility] clinic and we will see patient back in 6 months.    Patient seen in conjunction with Dr. Carlo Gutierrez MD PGY5  Cardiology Fellow    I have seen and evaluated the patient and agree with the assessment and plan as above.  Jasno Matos MD

## 2019-05-02 NOTE — PATIENT INSTRUCTIONS
Please start taking Spironolactone 25 MG once daily.    Please have a lab test in one week.    Echocardiogram in 5 months with a clinic visit with labs one week after.    Thank you for your visit today.  Please call me with any questions or concerns.   Vladimir Servin RN  Cardiology Care Coordinator  [phone], press option 1 then option 3

## 2019-05-02 NOTE — LETTER
5/2/2019      RE: Anson Manriquez  [address] [address]       Dear Colleague,    Thank you for the opportunity to participate in the care of your patient, Anson Manriquez, at the [facility] Please see a copy of my visit note below.    Heart Failure Clinic  May 2, 2019    Mr. Anson Manriquez is a 63yr old male with a history of HTN, DMII, CAD (s/p STEMI in 2007 with pLAD stenting, and recent STEMI 9/2018), and ICM complicated by cardiogenic shock requiring IABP support with slow wean. Mr. Manriquez was was out with friends and developed acute chest pain and after going home his pain worsened and had syncope, then cardiac arrest. EMS was called, and on arrival provided ~30 seconds of CPR and AED delivered 1 shock.  He was then brought to [facility] in sinus rhythm with a pulse for most of the transport.  During the last 5 mins en route to the ED, he went into a wide-complex tachycardia at a rate of 220bpm, likely VT.  He maintained a pulse, acceptable BP, and mental status.  He received amiodarone 150mg in the ED, and converted to NSR as he was being prepared for cardioversion.  He was then sent to the cath lab, where he was found to have an acute thrombotic lesion of the pLAD within the prior stent.  He was treated with aspiration thrombectomy, and STEVO x2 to pLAD and mLAD, and POBA to D1.  His LVEDP was 40mmHg. Peak troponin was ~17.  Echo showed LVEF 23% with LAD territory akinesis, consistent with echo results from 2012.  Therefore, his LAD had limited viability from his prior MI, which explains his relatively low troponin and unchanged LVEF. He was ultimately started on DAPT with aspirin and Brilinta, lisinopril, carvedilol, and lasix, and discharged home on 9/20/18.   He has been seen in clinic a few times now, with EF still 30-35%.  Despite evidence based therapy and his reduced EF, he went for CRT-D with Dr. Mckeon 4/25/19.  Procedure went well  without complications    Today the patient states he feels good. He denies lightheadedness,  SOB, PND, Orthopnea , palpitations. He states his weights at home are between 176-180 lbs typically.   He states he has had a good appetite. Still has some difficulty playing pool since his heart attack. Denies fevers or chills. Is having some diarrhea that he is wondering is medication induced.      CRT-D placement went well, he did have some bruising and swelling afterwards.      PAST MEDICAL HISTORY:  Past Medical History:   Diagnosis Date     Chronic infection     near rectum still leaking     Coronary artery disease      Diabetes mellitus, type 2 (H)      Heart attack (H) 4/17/2007     Hyperlipidemia      Hypertension      STEMI (ST elevation myocardial infarction) (H) 09/13/2018    s/p STEVO x2     Stented coronary artery 2007     FAMILY HISTORY:  Family History   Problem Relation Age of Onset     Hypertension Mother      Diabetes Father      Glaucoma No family hx of      Macular Degeneration No family hx of       SOCIAL HISTORY:  Social History     Social History     Marital status:      Spouse name: N/A     Number of children: N/A     Years of education: N/A     Social History Main Topics     Smoking status: Never Smoker     Smokeless tobacco: Never Used     Alcohol use No     Drug use: No     Sexual activity: Not on file     Other Topics Concern     Not on file     Social History Narrative     CURRENT MEDICATIONS:  Current Outpatient Medications   Medication     aspirin (ASA) 81 MG chewable tablet     atorvastatin (LIPITOR) 80 MG tablet     carvedilol (COREG) 25 MG tablet     furosemide (LASIX) 40 MG tablet     metFORMIN (GLUCOPHAGE-XR) 500 MG 24 hr tablet     MULTIPLE VITAMIN PO     sacubitril-valsartan (ENTRESTO) 24-26 MG per tablet     ticagrelor (BRILINTA) 90 MG tablet     vitamin B complex with vitamin C (VITAMIN  B COMPLEX) TABS tablet     No current facility-administered medications for this visit.   "    Facility-Administered Medications Ordered in Other Visits   Medication     sodium chloride (PF) 0.9% PF flush 10 mL     ROS:   Constitutional: No fever, chills, or sweats. Weight is 182 lbs 0 oz  ENT: No visual disturbance, ear ache, epistaxis, sore throat.   Allergies/Immunologic: Negative.   Respiratory: No cough, hemoptysis.   Cardiovascular: As per HPI.   GI: No nausea, vomiting, hematemesis, melena, or hematochezia. Some diarrhea  : No urinary frequency, dysuria, or hematuria.   Integument: Negative.   Psychiatric: Pleasant, no major depression noted  Neuro: No focal neurological deficits noted  Endocrinology: Negative.   Musculoskeletal: As per HPI.      EXAM:  /61 (BP Location: Right arm, Patient Position: Chair, Cuff Size: Adult Regular)   Pulse 85   Ht 1.689 m (5' 6.5\")   Wt 82.6 kg (182 lb)   SpO2 95%   BMI 28.94 kg/m     General: appears comfortable, alert and oriented  Head: normocephalic, atraumatic  Eyes: anicteric sclera, EOMI , PERRL  Neck: no adenopathy  Orophyarynx: moist mucosa, no lesions noted  Heart: regular, S1/S2, no murmurs, rubs or gallop. JVP not appreciated  Chest: ecchymoses along lower chest, swelling near device pocket, non tender  Lungs: CTAB, No wheezing.   Abdomen: soft, non-tender, bowel sounds present, no hepatosplenomegaly  Extremities: No LE edema today  Skin: no open lesions noted  Neuro: grossly non-focal     Labs:  Lab Results   Component Value Date    WBC 7.9 04/25/2019    HGB 13.3 04/25/2019    HCT 40.1 04/25/2019     04/25/2019     04/25/2019    POTASSIUM 3.8 04/25/2019    CHLORIDE 106 04/25/2019    CO2 24 04/25/2019    BUN 19 04/25/2019    CR 0.85 04/25/2019     (H) 04/25/2019    NTBNP 934 (H) 10/31/2018    TROPONIN 0.14 (HH) 09/13/2018    TROPI 1.914 (HH) 09/18/2018    AST 20 01/31/2019    ALT 29 01/31/2019    ALKPHOS 58 01/31/2019    BILITOTAL 1.0 01/31/2019    INR 1.16 (H) 04/25/2019     Procedures:  Coronary angio:  9/13/18  -Both " coronary arteries arise from their respective cusps.  -Dominance: Right  -LM has no evidence of coronary artery disease.  -LAD: Type 3 [LAD supplies the entire apex]. The LAD gives rise to septal perforators, multiple diagonal branches. The proximal LAD has an acute thrombotic lesion within the prior stent. Distal to the stent, there is a 70-80% stenosis. The distal vessel has HALIMA 2 flow. The D1 has thrombus at the ostium likely from embolization. The apical LAD also has thrombus from embolization.  -LCX gives rise to a large branching OM. The prox Cx has a 20% stenosis. The rest of the Cx system has mild disease.  -RCA (dominant) gives rise to PL branches and supplies PDA. There is minimal disease in the RCA system.     Echo:  9/14/18  Ischemic cardiomyopathy, LVEF=23% with extensive regional wall motion abnormalities as described below. Resting wall motion abnormalities and LV function are not significantly changed from the rest images on the 11/27/12 stress echocardiogram.  Mildly dilated LV with severely reduced LV function, LVEF=23%. There is akinesis involving the kawfc-qp-odp anteroseptal, xxx-ac-jjdqbz anterior, mid anterolateral, and all apical myocardial segments. The basal anterior, basal anterolateral, and basal inferolateral segments are relatively spared. RV size and function are normal. No significant valvular abnormalities. No pericardial effusion. Normal IVC with abnormal respiratory variation, estimated RA pressure 8 mmHg.    Echo 1/30/19  Ischemic cardiomyopathy with moderately dilated LV with moderately reduced  global LV function, LVEF=31%. Extensive regional wall motion abnormalities as  described below, unchanged from 9/14/18.  This study was compared with the study from 9/14/18: There has been no  significant change. Specifically, LV function and wall motion abnormalities  have not changed significantly.  The right atria appears normal. Severe left atrial enlargement is present. This study  was compared with the study from 9/14/18 . There has been no significant change.      ASSESSMENT AND PLAN:  Assessment and Plan:   Mr. Anson Manriquez is a 63yr old male with a history of HTN, DMII, CAD (s/p MI in 2007, prior remote pLAD stenting, STEMI 9/2018), ICM complicated by cardiac arrest and ventricular tachycardia who presented to the cardiology clinic for further outpatient management and medication titration.      ICM - HFrEF 30-35%, ACC/AHA C, NYHA II  Has CRT-D for SCD prevention  On Carvedilol 25mg BID  On entresto 24-26mg BID, normal renal function and potassium  Lasix 40mg BID  Continue ASA/ticagrelor/lipitor for CAD/PCI  We will add aldactone 12.5mg for treatment of his ICM    Patient was considering surgery for hernia repair, however we recommend waiting until at least September 2019 when his ticagrelor could technically be stopped 1 year post PCI.    Continue follow up in [facility] clinic and we will see patient back in 6 months.    Patient seen in conjunction with Dr. Carlo Gutierrez MD PGY5  Cardiology Fellow    I have seen and evaluated the patient and agree with the assessment and plan as above.  Jason Matos MD

## 2019-05-02 NOTE — PATIENT INSTRUCTIONS
It was a pleasure to see you in clinic today.  Please do not hesitate to call with any questions or concerns.  We look forward to seeing you in clinic at your next device check in 1 month.    Shannan Gallegos, RN, MS, CCRN  Electrophysiology Nurse Clinician  [facility]    During Business Hours Please Call:  [phone]  After Hours Please Call:  [phone] - select option #4 and ask for job code [id number]

## 2019-05-02 NOTE — NURSING NOTE
Chief Complaint   Patient presents with     Follow Up      3 mo rtn. history of HTN, DMII, CAD (s/p MI in 2007, prior remote pLAD stenting, and recent STEMI 9/2018), and ICM     Vitals were taken and medications were reconciled.    Abebe Olvera    9:19 AM

## 2019-05-06 ENCOUNTER — TELEPHONE (OUTPATIENT)
Dept: INTERNAL MEDICINE | Facility: CLINIC | Age: 64
End: 2019-05-06

## 2019-05-06 DIAGNOSIS — E11.9 TYPE 2 DIABETES MELLITUS WITHOUT COMPLICATION, WITHOUT LONG-TERM CURRENT USE OF INSULIN (H): Primary | ICD-10-CM

## 2019-05-06 NOTE — TELEPHONE ENCOUNTER
Patient called and spoke with for scheduling follow up diabetes with A1c prior per PCP. Appointment schedule for Tuesday, June 18 at 6:00 PM lab and Provider at 6:30 PM as per patient's request.

## 2019-05-09 DIAGNOSIS — E11.9 TYPE 2 DIABETES MELLITUS WITHOUT COMPLICATION, WITHOUT LONG-TERM CURRENT USE OF INSULIN (H): ICD-10-CM

## 2019-05-09 DIAGNOSIS — I50.20 HEART FAILURE WITH REDUCED EJECTION FRACTION, NYHA CLASS III (H): ICD-10-CM

## 2019-05-09 LAB
ANION GAP SERPL CALCULATED.3IONS-SCNC: 10 MMOL/L (ref 3–14)
BUN SERPL-MCNC: 14 MG/DL (ref 7–30)
CALCIUM SERPL-MCNC: 9.2 MG/DL (ref 8.5–10.1)
CHLORIDE SERPL-SCNC: 107 MMOL/L (ref 94–109)
CO2 SERPL-SCNC: 24 MMOL/L (ref 20–32)
CREAT SERPL-MCNC: 0.77 MG/DL (ref 0.66–1.25)
GFR SERPL CREATININE-BSD FRML MDRD: >90 ML/MIN/{1.73_M2}
GLUCOSE SERPL-MCNC: 141 MG/DL (ref 70–99)
HBA1C MFR BLD: 7.6 % (ref 0–5.6)
POTASSIUM SERPL-SCNC: 3.8 MMOL/L (ref 3.4–5.3)
SODIUM SERPL-SCNC: 141 MMOL/L (ref 133–144)

## 2019-05-14 LAB
MDC_IDC_LEAD_IMPLANT_DT: NORMAL
MDC_IDC_LEAD_LOCATION: NORMAL
MDC_IDC_LEAD_LOCATION_DETAIL_1: NORMAL
MDC_IDC_LEAD_MFG: NORMAL
MDC_IDC_LEAD_MODEL: NORMAL
MDC_IDC_LEAD_POLARITY_TYPE: NORMAL
MDC_IDC_LEAD_SERIAL: NORMAL
MDC_IDC_LEAD_SPECIAL_FUNCTION: NORMAL
MDC_IDC_MSMT_BATTERY_REMAINING_LONGEVITY: 96 MO
MDC_IDC_MSMT_BATTERY_STATUS: NORMAL
MDC_IDC_MSMT_CAP_CHARGE_DTM: NORMAL
MDC_IDC_MSMT_CAP_CHARGE_ENERGY: 0 J
MDC_IDC_MSMT_CAP_CHARGE_TIME: 0 S
MDC_IDC_MSMT_CAP_CHARGE_TIME: 9.15
MDC_IDC_MSMT_CAP_CHARGE_TYPE: NORMAL
MDC_IDC_MSMT_CAP_CHARGE_TYPE: NORMAL
MDC_IDC_MSMT_LEADCHNL_LV_IMPEDANCE_VALUE: 494 OHM
MDC_IDC_MSMT_LEADCHNL_LV_PACING_THRESHOLD_AMPLITUDE: 1.7 V
MDC_IDC_MSMT_LEADCHNL_LV_PACING_THRESHOLD_PULSEWIDTH: 0.5 MS
MDC_IDC_MSMT_LEADCHNL_LV_SENSING_INTR_AMPL: 11.2 MV
MDC_IDC_MSMT_LEADCHNL_RA_IMPEDANCE_VALUE: 644 OHM
MDC_IDC_MSMT_LEADCHNL_RA_PACING_THRESHOLD_AMPLITUDE: 0.8 V
MDC_IDC_MSMT_LEADCHNL_RA_PACING_THRESHOLD_PULSEWIDTH: 0.5 MS
MDC_IDC_MSMT_LEADCHNL_RA_SENSING_INTR_AMPL: 8.7 MV
MDC_IDC_MSMT_LEADCHNL_RV_IMPEDANCE_VALUE: 559 OHM
MDC_IDC_MSMT_LEADCHNL_RV_PACING_THRESHOLD_AMPLITUDE: 1.4 V
MDC_IDC_MSMT_LEADCHNL_RV_PACING_THRESHOLD_PULSEWIDTH: 0.4 MS
MDC_IDC_MSMT_LEADCHNL_RV_SENSING_INTR_AMPL: 25 MV
MDC_IDC_PG_IMPLANT_DTM: NORMAL
MDC_IDC_PG_MFG: NORMAL
MDC_IDC_PG_MODEL: NORMAL
MDC_IDC_PG_SERIAL: NORMAL
MDC_IDC_PG_TYPE: NORMAL
MDC_IDC_SESS_CLINIC_NAME: NORMAL
MDC_IDC_SESS_DTM: NORMAL
MDC_IDC_SESS_TYPE: NORMAL
MDC_IDC_SET_BRADY_AT_MODE_SWITCH_MODE: NORMAL
MDC_IDC_SET_BRADY_AT_MODE_SWITCH_RATE: 170 {BEATS}/MIN
MDC_IDC_SET_BRADY_HYSTRATE: NORMAL
MDC_IDC_SET_BRADY_LOWRATE: 60 {BEATS}/MIN
MDC_IDC_SET_BRADY_MAX_SENSOR_RATE: 130 {BEATS}/MIN
MDC_IDC_SET_BRADY_MAX_TRACKING_RATE: 130 {BEATS}/MIN
MDC_IDC_SET_BRADY_MODE: NORMAL
MDC_IDC_SET_BRADY_PAV_DELAY_HIGH: 180 MS
MDC_IDC_SET_BRADY_PAV_DELAY_LOW: 180 MS
MDC_IDC_SET_BRADY_SAV_DELAY_HIGH: 140 MS
MDC_IDC_SET_BRADY_SAV_DELAY_LOW: 140 MS
MDC_IDC_SET_CRT_LVRV_DELAY: 30 MS
MDC_IDC_SET_CRT_PACED_CHAMBERS: NORMAL
MDC_IDC_SET_LEADCHNL_LV_PACING_AMPLITUDE: 3 V
MDC_IDC_SET_LEADCHNL_LV_PACING_CAPTURE_MODE: NORMAL
MDC_IDC_SET_LEADCHNL_LV_PACING_POLARITY: NORMAL
MDC_IDC_SET_LEADCHNL_LV_PACING_PULSEWIDTH: 0.5 MS
MDC_IDC_SET_LEADCHNL_LV_SENSING_ADAPTATION_MODE: NORMAL
MDC_IDC_SET_LEADCHNL_LV_SENSING_POLARITY: NORMAL
MDC_IDC_SET_LEADCHNL_LV_SENSING_SENSITIVITY: 1 MV
MDC_IDC_SET_LEADCHNL_RA_PACING_AMPLITUDE: 3.5 V
MDC_IDC_SET_LEADCHNL_RA_PACING_ANODE_ELECTRODE_1: NORMAL
MDC_IDC_SET_LEADCHNL_RA_PACING_ANODE_LOCATION_1: NORMAL
MDC_IDC_SET_LEADCHNL_RA_PACING_CAPTURE_MODE: NORMAL
MDC_IDC_SET_LEADCHNL_RA_PACING_CATHODE_ELECTRODE_1: NORMAL
MDC_IDC_SET_LEADCHNL_RA_PACING_CATHODE_LOCATION_1: NORMAL
MDC_IDC_SET_LEADCHNL_RA_PACING_POLARITY: NORMAL
MDC_IDC_SET_LEADCHNL_RA_PACING_PULSEWIDTH: 0.5 MS
MDC_IDC_SET_LEADCHNL_RA_SENSING_ADAPTATION_MODE: NORMAL
MDC_IDC_SET_LEADCHNL_RA_SENSING_ANODE_ELECTRODE_1: NORMAL
MDC_IDC_SET_LEADCHNL_RA_SENSING_ANODE_LOCATION_1: NORMAL
MDC_IDC_SET_LEADCHNL_RA_SENSING_CATHODE_ELECTRODE_1: NORMAL
MDC_IDC_SET_LEADCHNL_RA_SENSING_CATHODE_LOCATION_1: NORMAL
MDC_IDC_SET_LEADCHNL_RA_SENSING_POLARITY: NORMAL
MDC_IDC_SET_LEADCHNL_RA_SENSING_SENSITIVITY: 0.25 MV
MDC_IDC_SET_LEADCHNL_RV_PACING_AMPLITUDE: 2.4 V
MDC_IDC_SET_LEADCHNL_RV_PACING_ANODE_ELECTRODE_1: NORMAL
MDC_IDC_SET_LEADCHNL_RV_PACING_ANODE_LOCATION_1: NORMAL
MDC_IDC_SET_LEADCHNL_RV_PACING_CAPTURE_MODE: NORMAL
MDC_IDC_SET_LEADCHNL_RV_PACING_CATHODE_ELECTRODE_1: NORMAL
MDC_IDC_SET_LEADCHNL_RV_PACING_CATHODE_LOCATION_1: NORMAL
MDC_IDC_SET_LEADCHNL_RV_PACING_POLARITY: NORMAL
MDC_IDC_SET_LEADCHNL_RV_PACING_PULSEWIDTH: 0.4 MS
MDC_IDC_SET_LEADCHNL_RV_SENSING_ADAPTATION_MODE: NORMAL
MDC_IDC_SET_LEADCHNL_RV_SENSING_ANODE_ELECTRODE_1: NORMAL
MDC_IDC_SET_LEADCHNL_RV_SENSING_ANODE_LOCATION_1: NORMAL
MDC_IDC_SET_LEADCHNL_RV_SENSING_CATHODE_ELECTRODE_1: NORMAL
MDC_IDC_SET_LEADCHNL_RV_SENSING_CATHODE_LOCATION_1: NORMAL
MDC_IDC_SET_LEADCHNL_RV_SENSING_POLARITY: NORMAL
MDC_IDC_SET_LEADCHNL_RV_SENSING_SENSITIVITY: 0.6 MV
MDC_IDC_SET_ZONE_DETECTION_INTERVAL: 250 MS
MDC_IDC_SET_ZONE_DETECTION_INTERVAL: 300 MS
MDC_IDC_SET_ZONE_DETECTION_INTERVAL: 353 MS
MDC_IDC_SET_ZONE_TYPE: NORMAL
MDC_IDC_SET_ZONE_VENDOR_TYPE: NORMAL
MDC_IDC_STAT_AT_BURDEN_PERCENT: 0 %
MDC_IDC_STAT_BRADY_RA_PERCENT_PACED: 1 %
MDC_IDC_STAT_BRADY_RV_PERCENT_PACED: 3 %
MDC_IDC_STAT_CRT_LV_PERCENT_PACED: 95 %
MDC_IDC_STAT_EPISODE_RECENT_COUNT: 0
MDC_IDC_STAT_EPISODE_RECENT_COUNT_DTM_END: NORMAL
MDC_IDC_STAT_EPISODE_RECENT_COUNT_DTM_START: NORMAL
MDC_IDC_STAT_EPISODE_TOTAL_COUNT: 0
MDC_IDC_STAT_EPISODE_TOTAL_COUNT_DTM_END: NORMAL
MDC_IDC_STAT_EPISODE_TYPE: NORMAL
MDC_IDC_STAT_EPISODE_VENDOR_TYPE: NORMAL
MDC_IDC_STAT_TACHYTHERAPY_ATP_DELIVERED_RECENT: 0
MDC_IDC_STAT_TACHYTHERAPY_ATP_DELIVERED_TOTAL: 0
MDC_IDC_STAT_TACHYTHERAPY_RECENT_DTM_END: NORMAL
MDC_IDC_STAT_TACHYTHERAPY_RECENT_DTM_START: NORMAL
MDC_IDC_STAT_TACHYTHERAPY_SHOCKS_ABORTED_RECENT: 0
MDC_IDC_STAT_TACHYTHERAPY_SHOCKS_ABORTED_TOTAL: 0
MDC_IDC_STAT_TACHYTHERAPY_SHOCKS_DELIVERED_RECENT: 0
MDC_IDC_STAT_TACHYTHERAPY_SHOCKS_DELIVERED_TOTAL: 0
MDC_IDC_STAT_TACHYTHERAPY_TOTAL_DTM_END: NORMAL

## 2019-05-21 DIAGNOSIS — I21.02 ST ELEVATION MYOCARDIAL INFARCTION INVOLVING LEFT ANTERIOR DESCENDING (LAD) CORONARY ARTERY (H): ICD-10-CM

## 2019-05-21 DIAGNOSIS — I46.9 CARDIAC ARREST (H): ICD-10-CM

## 2019-05-21 NOTE — TELEPHONE ENCOUNTER
[facility] Call Center    Phone Message    May a detailed message be left on voicemail: yes    Reason for Call: Medication Refill Request    Has the patient contacted the pharmacy for the refill? Yes   Name of medication being requested: ticagrelor (BRILINTA) 90 MG tablet  Provider who prescribed the medication: Dr. Matos   Pharmacy: [facility] [address] [place] [id number]   Date medication is needed: as soon as possible          Action Taken: Message routed to:  [facility] esdras cardio

## 2019-05-21 NOTE — TELEPHONE ENCOUNTER
Patient is out of medication so 1 month supply sent under Dr Chacon's order and then order will also be sent to get it under Dr Matos going forward.      Kathleen M Doege RN

## 2019-05-21 NOTE — TELEPHONE ENCOUNTER
[facility] Call Center    Phone Message    May a detailed message be left on voicemail: yes    Reason for Call: Other: Jolly called to follow up on this refill request. Pt had been getting this filled by Dr. Chacon in [facility], but they told him that he should start getting this filled by his cardiologist. Jolly just wanted to make sure if Dr. Matos is indeed the person who will fill this going forward. If so, pt is needing this refilled as soon as possible as he is currently out. Please give Jolly a call back to discuss.     Action Taken: Message routed to:  [facility] Cardiology

## 2019-05-22 RX ORDER — TICAGRELOR 90 MG/1
TABLET ORAL
Qty: 60 TABLET | Refills: 3 | OUTPATIENT
Start: 2019-05-22

## 2019-05-22 NOTE — TELEPHONE ENCOUNTER
Duplicate requests for ticagrelor 90 mg tablet. Sent to [id number]    ticagrelor (BRILINTA) 90 MG tablet 60 tablet 0 5/21/2019  No   Sig - Route: Take 1 tablet (90 mg) by mouth 2 times daily - Oral   Sent to pharmacy as: ticagrelor (BRILINTA) 90 MG tablet   Class: E-Prescribe   Order: [id number]   E-Prescribing Status: Receipt confirmed by pharmacy (5/21/2019  6:07 PM CDT     Printed original and faxed to requesting  [facility] pharmacy.

## 2019-06-12 DIAGNOSIS — E11.9 TYPE 2 DIABETES MELLITUS WITHOUT COMPLICATION, WITHOUT LONG-TERM CURRENT USE OF INSULIN (H): Primary | ICD-10-CM

## 2019-06-17 NOTE — PROGRESS NOTES
[facility]  Primary Care Center   Taryn Lou MD  06/18/2019      Chief Complaint:   Recheck Medication     History of Present Illness:   Anson Manriquez is a 63 year old male on Brilinta with a history of coronary artery disease s/p coronary artery stenting, diabetes mellitus type 2, and benign essential hypertension who presents for a medication recheck.    Diabetes:  His A1c today is 7.5. He has been having frequent diarrhea. He thinks the diarrhea could be caused by the metformin or Lipitor. He takes it at 7:30 am and 10 pm, but is trying to take it earlier in the evening. The diarrhea occurs primarily in the morning, but has been occurring at night lately as well. He does not wish to start a medication which needs to be injected. He does not check his blood sugar at home.    Cardiac health:  He had a STEMI last September. Per his wife, it was in the same place as his first one. Since then things have been going well. He had an ICD placed on 4/25 and it was checked today. He follows with Dr. Matos in cardiology.     Other concerns discussed:  1. He does not notice blurriness, but wonders if he needs glasses because his friend with glasses seems to have better vision than him. His last eye exam was a year and a half ago.  2. Discussed waiting to repair hernia until he has been on Brilinta for longer (potentially as early as September)     Review of Systems:   Pertinent items are noted in HPI or as in patient entered ROS below, remainder of complete ROS is negative.     Active Medications:      aspirin (ASA) 81 MG chewable tablet, Take 1 tablet (81 mg) by mouth daily, Disp: 30 tablet, Rfl: 11     atorvastatin (LIPITOR) 80 MG tablet, Take 1 tablet (80 mg) by mouth daily Needs labs done prior to the next refill., Disp: 90 tablet, Rfl: 3     carvedilol (COREG) 25 MG tablet, 1 tablet (25 mg) by Oral or Feeding Tube route 2 times daily (with meals), Disp: 180 tablet, Rfl: 1     furosemide (LASIX) 40 MG  tablet, Take 1 tablet (40 mg) by mouth 2 times daily (Patient taking differently: Take 80 mg by mouth every morning ), Disp: 60 tablet, Rfl: 3     metFORMIN (GLUCOPHAGE-XR) 500 MG 24 hr tablet, Take 2 tablets (1,000 mg) by mouth 2 times daily (with meals), Disp: 360 tablet, Rfl: 1     MULTIPLE VITAMIN PO, Take 1 tablet by mouth daily., Disp: , Rfl:      sacubitril-valsartan (ENTRESTO) 24-26 MG per tablet, Take 1 tablet by mouth 2 times daily, Disp: 180 tablet, Rfl: 3     spironolactone (ALDACTONE) 25 MG tablet, Take 1 tablet (25 mg) by mouth daily, Disp: 90 tablet, Rfl: 3     ticagrelor (BRILINTA) 90 MG tablet, Take 1 tablet (90 mg) by mouth 2 times daily, Disp: 180 tablet, Rfl: 3     Allergies:   Patient has no known allergies.      Past Medical History:  Chronic infection (near rectum, still leaking)  Coronary artery disease, s/p coronary artery stenting   Diabetes mellitus type 2  ST elevation myocardial infarction, s/p STEVO x2, s/p ICD procedure  Hyperlipidemia  Benign essential hypertension  Anal fistula  Ischemic cardiomyopathy     Past Surgical History:  EP ICD - 04/25/019  Hernia repair, inguinal, right  Irrigation and debridement rectum (abscess near rectum)  Laparoscopic cholecystectomy  Stent, coronary, OLIVIA    Family History:   Hypertension - mother  Diabetes - father      Social History:   The patient is , a nonsmoker, and does not consume alcohol.      Physical Exam:   /79 (BP Location: Right arm, Patient Position: Sitting, Cuff Size: Adult Regular)   Pulse 86   Temp 97.7  F (36.5  C) (Oral)   Resp 22   Wt 82.2 kg (181 lb 3.2 oz)   SpO2 96%   BMI 28.81 kg/m     General: Pleasant male, in no apparent distress  ENT: TMs normal bilaterally, oropharynx clear, mucous membranes are moist.   Neck:  No lymphadenopathy, no thyromegaly, no carotid bruits  Resp: lungs clear to ausculation bilaterally  CV: Heart regular rate and rhythm, no murmur, rub, or gallop  Abd: Soft, nontender,  nondistended, normal bowel sounds, no HSM  Ext: Warm and well perfused, no LE edema  Skin: warm, dry, no rash  Neuro: Alert and oriented x 3, no focal deficits      Assessment and Plan:  Type 2 diabetes mellitus without complication, without long-term current use of insulin (H)  A1c is 7.5% today, okay control. However, may be having diarrhea with metformin. Will try to decrease metformin to help improve GI Symptoms. Discussed potential heart health benefits of Ozempic, but he does not wish to try this because he does not want an injectable medication. Will add Jardiance 10 mg (also shown to have cardiovascular benefits) and try switching to once daily metformin 1000 mg. If he tolerates the Jardiance, will increase to 25 mg. Will recheck his A1c in a few months. He is also overdue for an eye exam.  - empagliflozin (JARDIANCE) 10 MG TABS tablet  Dispense: 30 tablet; Refill: 1  - OPHTHALMOLOGY ADULT REFERRAL      Follow-up: No follow-ups on file.   1 month     Scribe Disclosure:  I, Maribel Weaver, am serving as a scribe to document services personally performed by Taryn Lou MD at this visit, based upon the provider's statements to me. All documentation has been reviewed by the aforementioned provider prior to being entered into the official medical record.     Portions of this medical record were completed by a scribe. UPON MY REVIEW AND AUTHENTICATION BY ELECTRONIC SIGNATURE, this confirms (a) I performed the applicable clinical services, and (b) the record is accurate.     Taryn Lou MD

## 2019-06-18 ENCOUNTER — TELEPHONE (OUTPATIENT)
Dept: INTERNAL MEDICINE | Facility: CLINIC | Age: 64
End: 2019-06-18

## 2019-06-18 ENCOUNTER — ANCILLARY PROCEDURE (OUTPATIENT)
Dept: CARDIOLOGY | Facility: CLINIC | Age: 64
End: 2019-06-18
Attending: INTERNAL MEDICINE
Payer: COMMERCIAL

## 2019-06-18 ENCOUNTER — OFFICE VISIT (OUTPATIENT)
Dept: INTERNAL MEDICINE | Facility: CLINIC | Age: 64
End: 2019-06-18
Payer: COMMERCIAL

## 2019-06-18 VITALS
WEIGHT: 181.2 LBS | HEART RATE: 86 BPM | OXYGEN SATURATION: 96 % | TEMPERATURE: 97.7 F | SYSTOLIC BLOOD PRESSURE: 120 MMHG | DIASTOLIC BLOOD PRESSURE: 79 MMHG | RESPIRATION RATE: 22 BRPM | BODY MASS INDEX: 28.81 KG/M2

## 2019-06-18 DIAGNOSIS — E11.9 TYPE 2 DIABETES MELLITUS WITHOUT COMPLICATION, WITHOUT LONG-TERM CURRENT USE OF INSULIN (H): Primary | ICD-10-CM

## 2019-06-18 DIAGNOSIS — E11.9 TYPE 2 DIABETES MELLITUS WITHOUT COMPLICATION, WITHOUT LONG-TERM CURRENT USE OF INSULIN (H): ICD-10-CM

## 2019-06-18 DIAGNOSIS — I25.5 ISCHEMIC CARDIOMYOPATHY: ICD-10-CM

## 2019-06-18 LAB
CREAT UR-MCNC: 98 MG/DL
HBA1C MFR BLD: 7.5 % (ref 0–5.6)
MDC_IDC_LEAD_IMPLANT_DT: NORMAL
MDC_IDC_LEAD_LOCATION: NORMAL
MDC_IDC_LEAD_LOCATION_DETAIL_1: NORMAL
MDC_IDC_LEAD_MFG: NORMAL
MDC_IDC_LEAD_MODEL: NORMAL
MDC_IDC_LEAD_POLARITY_TYPE: NORMAL
MDC_IDC_LEAD_SERIAL: NORMAL
MDC_IDC_LEAD_SPECIAL_FUNCTION: NORMAL
MDC_IDC_MSMT_BATTERY_DTM: NORMAL
MDC_IDC_MSMT_BATTERY_REMAINING_LONGEVITY: 132 MO
MDC_IDC_MSMT_BATTERY_STATUS: NORMAL
MDC_IDC_MSMT_CAP_CHARGE_DTM: NORMAL
MDC_IDC_MSMT_CAP_CHARGE_ENERGY: 0 J
MDC_IDC_MSMT_CAP_CHARGE_TIME: 0 S
MDC_IDC_MSMT_CAP_CHARGE_TIME: 9.15
MDC_IDC_MSMT_CAP_CHARGE_TYPE: NORMAL
MDC_IDC_MSMT_CAP_CHARGE_TYPE: NORMAL
MDC_IDC_MSMT_LEADCHNL_LV_IMPEDANCE_VALUE: 633 OHM
MDC_IDC_MSMT_LEADCHNL_LV_PACING_THRESHOLD_AMPLITUDE: 0.9 V
MDC_IDC_MSMT_LEADCHNL_LV_PACING_THRESHOLD_PULSEWIDTH: 0.5 MS
MDC_IDC_MSMT_LEADCHNL_LV_SENSING_INTR_AMPL: 17.1 MV
MDC_IDC_MSMT_LEADCHNL_RA_IMPEDANCE_VALUE: 616 OHM
MDC_IDC_MSMT_LEADCHNL_RA_PACING_THRESHOLD_AMPLITUDE: 0.6 V
MDC_IDC_MSMT_LEADCHNL_RA_PACING_THRESHOLD_PULSEWIDTH: 0.5 MS
MDC_IDC_MSMT_LEADCHNL_RA_SENSING_INTR_AMPL: 9.5 MV
MDC_IDC_MSMT_LEADCHNL_RV_IMPEDANCE_VALUE: 695 OHM
MDC_IDC_MSMT_LEADCHNL_RV_PACING_THRESHOLD_AMPLITUDE: 0.8 V
MDC_IDC_MSMT_LEADCHNL_RV_PACING_THRESHOLD_PULSEWIDTH: 0.4 MS
MDC_IDC_MSMT_LEADCHNL_RV_SENSING_INTR_AMPL: 25 MV
MDC_IDC_PG_IMPLANT_DTM: NORMAL
MDC_IDC_PG_MFG: NORMAL
MDC_IDC_PG_MODEL: NORMAL
MDC_IDC_PG_SERIAL: NORMAL
MDC_IDC_PG_TYPE: NORMAL
MDC_IDC_SESS_CLINIC_NAME: NORMAL
MDC_IDC_SESS_DTM: NORMAL
MDC_IDC_SESS_TYPE: NORMAL
MDC_IDC_SET_BRADY_AT_MODE_SWITCH_MODE: NORMAL
MDC_IDC_SET_BRADY_AT_MODE_SWITCH_RATE: 170 {BEATS}/MIN
MDC_IDC_SET_BRADY_HYSTRATE: NORMAL
MDC_IDC_SET_BRADY_LOWRATE: 60 {BEATS}/MIN
MDC_IDC_SET_BRADY_MAX_SENSOR_RATE: 130 {BEATS}/MIN
MDC_IDC_SET_BRADY_MAX_TRACKING_RATE: 130 {BEATS}/MIN
MDC_IDC_SET_BRADY_MODE: NORMAL
MDC_IDC_SET_BRADY_PAV_DELAY_HIGH: 180 MS
MDC_IDC_SET_BRADY_PAV_DELAY_LOW: 180 MS
MDC_IDC_SET_BRADY_SAV_DELAY_HIGH: 140 MS
MDC_IDC_SET_BRADY_SAV_DELAY_LOW: 140 MS
MDC_IDC_SET_CRT_LVRV_DELAY: 30 MS
MDC_IDC_SET_CRT_PACED_CHAMBERS: NORMAL
MDC_IDC_SET_LEADCHNL_LV_PACING_AMPLITUDE: 2.1 V
MDC_IDC_SET_LEADCHNL_LV_PACING_CAPTURE_MODE: NORMAL
MDC_IDC_SET_LEADCHNL_LV_PACING_POLARITY: NORMAL
MDC_IDC_SET_LEADCHNL_LV_PACING_PULSEWIDTH: 0.5 MS
MDC_IDC_SET_LEADCHNL_LV_SENSING_ADAPTATION_MODE: NORMAL
MDC_IDC_SET_LEADCHNL_LV_SENSING_POLARITY: NORMAL
MDC_IDC_SET_LEADCHNL_LV_SENSING_SENSITIVITY: 1 MV
MDC_IDC_SET_LEADCHNL_RA_PACING_AMPLITUDE: 3.5 V
MDC_IDC_SET_LEADCHNL_RA_PACING_ANODE_ELECTRODE_1: NORMAL
MDC_IDC_SET_LEADCHNL_RA_PACING_ANODE_LOCATION_1: NORMAL
MDC_IDC_SET_LEADCHNL_RA_PACING_CAPTURE_MODE: NORMAL
MDC_IDC_SET_LEADCHNL_RA_PACING_CATHODE_ELECTRODE_1: NORMAL
MDC_IDC_SET_LEADCHNL_RA_PACING_CATHODE_LOCATION_1: NORMAL
MDC_IDC_SET_LEADCHNL_RA_PACING_POLARITY: NORMAL
MDC_IDC_SET_LEADCHNL_RA_PACING_PULSEWIDTH: 0.5 MS
MDC_IDC_SET_LEADCHNL_RA_SENSING_ADAPTATION_MODE: NORMAL
MDC_IDC_SET_LEADCHNL_RA_SENSING_ANODE_ELECTRODE_1: NORMAL
MDC_IDC_SET_LEADCHNL_RA_SENSING_ANODE_LOCATION_1: NORMAL
MDC_IDC_SET_LEADCHNL_RA_SENSING_CATHODE_ELECTRODE_1: NORMAL
MDC_IDC_SET_LEADCHNL_RA_SENSING_CATHODE_LOCATION_1: NORMAL
MDC_IDC_SET_LEADCHNL_RA_SENSING_POLARITY: NORMAL
MDC_IDC_SET_LEADCHNL_RA_SENSING_SENSITIVITY: 0.25 MV
MDC_IDC_SET_LEADCHNL_RV_PACING_AMPLITUDE: 2 V
MDC_IDC_SET_LEADCHNL_RV_PACING_ANODE_ELECTRODE_1: NORMAL
MDC_IDC_SET_LEADCHNL_RV_PACING_ANODE_LOCATION_1: NORMAL
MDC_IDC_SET_LEADCHNL_RV_PACING_CAPTURE_MODE: NORMAL
MDC_IDC_SET_LEADCHNL_RV_PACING_CATHODE_ELECTRODE_1: NORMAL
MDC_IDC_SET_LEADCHNL_RV_PACING_CATHODE_LOCATION_1: NORMAL
MDC_IDC_SET_LEADCHNL_RV_PACING_POLARITY: NORMAL
MDC_IDC_SET_LEADCHNL_RV_PACING_PULSEWIDTH: 0.4 MS
MDC_IDC_SET_LEADCHNL_RV_SENSING_ADAPTATION_MODE: NORMAL
MDC_IDC_SET_LEADCHNL_RV_SENSING_ANODE_ELECTRODE_1: NORMAL
MDC_IDC_SET_LEADCHNL_RV_SENSING_ANODE_LOCATION_1: NORMAL
MDC_IDC_SET_LEADCHNL_RV_SENSING_CATHODE_ELECTRODE_1: NORMAL
MDC_IDC_SET_LEADCHNL_RV_SENSING_CATHODE_LOCATION_1: NORMAL
MDC_IDC_SET_LEADCHNL_RV_SENSING_POLARITY: NORMAL
MDC_IDC_SET_LEADCHNL_RV_SENSING_SENSITIVITY: 0.6 MV
MDC_IDC_SET_ZONE_DETECTION_INTERVAL: 250 MS
MDC_IDC_SET_ZONE_DETECTION_INTERVAL: 300 MS
MDC_IDC_SET_ZONE_DETECTION_INTERVAL: 353 MS
MDC_IDC_SET_ZONE_TYPE: NORMAL
MDC_IDC_SET_ZONE_VENDOR_TYPE: NORMAL
MDC_IDC_STAT_BRADY_RA_PERCENT_PACED: 1 %
MDC_IDC_STAT_BRADY_RV_PERCENT_PACED: 4 %
MDC_IDC_STAT_CRT_LV_PERCENT_PACED: 95 %
MDC_IDC_STAT_EPISODE_RECENT_COUNT: 0
MDC_IDC_STAT_EPISODE_RECENT_COUNT_DTM_END: NORMAL
MDC_IDC_STAT_EPISODE_RECENT_COUNT_DTM_START: NORMAL
MDC_IDC_STAT_EPISODE_TOTAL_COUNT: 0
MDC_IDC_STAT_EPISODE_TOTAL_COUNT_DTM_END: NORMAL
MDC_IDC_STAT_EPISODE_TYPE: NORMAL
MDC_IDC_STAT_EPISODE_VENDOR_TYPE: NORMAL
MDC_IDC_STAT_TACHYTHERAPY_ATP_DELIVERED_RECENT: 0
MDC_IDC_STAT_TACHYTHERAPY_ATP_DELIVERED_TOTAL: 0
MDC_IDC_STAT_TACHYTHERAPY_RECENT_DTM_END: NORMAL
MDC_IDC_STAT_TACHYTHERAPY_RECENT_DTM_START: NORMAL
MDC_IDC_STAT_TACHYTHERAPY_SHOCKS_ABORTED_RECENT: 0
MDC_IDC_STAT_TACHYTHERAPY_SHOCKS_ABORTED_TOTAL: 0
MDC_IDC_STAT_TACHYTHERAPY_SHOCKS_DELIVERED_RECENT: 0
MDC_IDC_STAT_TACHYTHERAPY_SHOCKS_DELIVERED_TOTAL: 0
MDC_IDC_STAT_TACHYTHERAPY_TOTAL_DTM_END: NORMAL
MICROALBUMIN UR-MCNC: 9 MG/L
MICROALBUMIN/CREAT UR: 8.94 MG/G CR (ref 0–17)

## 2019-06-18 ASSESSMENT — PAIN SCALES - GENERAL: PAINLEVEL: NO PAIN (0)

## 2019-06-18 NOTE — NURSING NOTE
Chief Complaint   Patient presents with     Recheck Medication     Patient is here for follow up and medication metformin possible cause for diarrhea       Baldemar Andrews CMA ([facility]) at 6:01 PM on 6/18/2019

## 2019-06-18 NOTE — PATIENT INSTRUCTIONS
It was a pleasure to see you in clinic today. Please do not hesitate to call with any questions or concerns. We look forward to seeing you in clinic at your next device check 8/28/19/     Cee Ku RN  Electrophysiology Nurse Clinician  [facility]  During business hours call:  [phone]  After business hours please call: [phone]- select option #4 and ask for job code [phone].

## 2019-06-20 ENCOUNTER — TELEPHONE (OUTPATIENT)
Dept: OPHTHALMOLOGY | Facility: CLINIC | Age: 64
End: 2019-06-20

## 2019-06-20 DIAGNOSIS — I50.22 CHRONIC SYSTOLIC CONGESTIVE HEART FAILURE (H): ICD-10-CM

## 2019-06-24 RX ORDER — FUROSEMIDE 40 MG
TABLET ORAL
Qty: 60 TABLET | Refills: 3 | Status: SHIPPED | OUTPATIENT
Start: 2019-06-24 | End: 2019-11-15

## 2019-06-24 NOTE — TELEPHONE ENCOUNTER
Last Clinic Visit: 6/18/19  Primary Care Clinic    Future Appointment: 7/23/19    Refill to pharmacy.

## 2019-06-26 DIAGNOSIS — I46.9 CARDIAC ARREST (H): ICD-10-CM

## 2019-06-26 DIAGNOSIS — I21.02 ST ELEVATION MYOCARDIAL INFARCTION INVOLVING LEFT ANTERIOR DESCENDING (LAD) CORONARY ARTERY (H): ICD-10-CM

## 2019-06-27 NOTE — TELEPHONE ENCOUNTER
Health Call Center    Phone Message    May a detailed message be left on voicemail: yes    Reason for Call: Medication Refill Request    Has the patient contacted the pharmacy for the refill? Yes   Name of medication being requested: ticagrelor (BRILINTA) 90 MG tablet  Provider who prescribed the medication: Dr. Carlo Gomez  Pharmacy: [place], [place] - [address] [place] [address] [address]  Date medication is needed: ASAP-pt is out of med.    Action Taken: Message routed to:  [facility] ([facility]): Med Refills

## 2019-06-28 RX ORDER — TICAGRELOR 90 MG/1
TABLET ORAL
Qty: 60 TABLET | OUTPATIENT
Start: 2019-06-28

## 2019-07-02 ENCOUNTER — PATIENT OUTREACH (OUTPATIENT)
Dept: CARDIOLOGY | Facility: CLINIC | Age: 64
End: 2019-07-02

## 2019-07-02 NOTE — PROGRESS NOTES
"Patient had an ICD placed on 4/25/19. Discharge instructions state:  \"No lifting > 10lbs or over shoulder level with left arm for 4 weeks.\"  It has been over 2 months now. Called and spoke to patient who is inquiring about playing golf this weekend. Patient states he was luis antonio. Recommended that patient start slowly to make sure site feels ok and that per instructions golfing is not contraindicated at this time. Patient is agreeable to plan.  "

## 2019-07-11 ENCOUNTER — OFFICE VISIT (OUTPATIENT)
Dept: OPHTHALMOLOGY | Facility: CLINIC | Age: 64
End: 2019-07-11
Attending: INTERNAL MEDICINE
Payer: COMMERCIAL

## 2019-07-11 DIAGNOSIS — H02.135 SENILE ECTROPION OF BOTH LOWER EYELIDS: ICD-10-CM

## 2019-07-11 DIAGNOSIS — H25.13 NUCLEAR SENILE CATARACT OF BOTH EYES: ICD-10-CM

## 2019-07-11 DIAGNOSIS — H35.363 MACULAR DRUSEN, BILATERAL: ICD-10-CM

## 2019-07-11 DIAGNOSIS — H02.132 SENILE ECTROPION OF BOTH LOWER EYELIDS: ICD-10-CM

## 2019-07-11 ASSESSMENT — VISUAL ACUITY
OD_SC: 20/25
OS_SC: 20/20
METHOD: SNELLEN - LINEAR
OD_SC+: -

## 2019-07-11 ASSESSMENT — CONF VISUAL FIELD
OD_NORMAL: 1
OS_NORMAL: 1
METHOD: COUNTING FINGERS

## 2019-07-11 ASSESSMENT — TONOMETRY
OD_IOP_MMHG: 12
IOP_METHOD: ICARE
OS_IOP_MMHG: 10

## 2019-07-11 ASSESSMENT — REFRACTION_MANIFEST
OS_AXIS: 125
OD_SPHERE: -0.50
OD_AXIS: 035
OD_CYLINDER: +0.50
OD_ADD: +2.25
OS_SPHERE: PLANO
OS_ADD: +2.25
OS_CYLINDER: +0.25

## 2019-07-11 ASSESSMENT — EXTERNAL EXAM - LEFT EYE: OS_EXAM: NORMAL

## 2019-07-11 ASSESSMENT — EXTERNAL EXAM - RIGHT EYE: OD_EXAM: NORMAL

## 2019-07-11 NOTE — NURSING NOTE
Chief Complaints and History of Present Illnesses   Patient presents with     Diabetic Eye Exam     Chief Complaint(s) and History of Present Illness(es)     Diabetic Eye Exam     Vision: is stable    Diabetes Type: Type 2              Comments     Pt states he is on a lot of medications that say blurred vision is a side effect. Would like to know if it is affecting his pool game. Occasionally gets redness around the left eye. Patient denies eye pain or irritation. Does not use any eye drops.    BS does not check at home  Lab Results       Component                Value               Date                       A1C                      7.5                 06/18/2019                 A1C                      7.6                 05/09/2019                 A1C                      8.4                 09/14/2018                 A1C                      7.8                 05/17/2018                 A1C                      7.2                 09/18/2017

## 2019-07-11 NOTE — PROGRESS NOTES
HPI:  Patient presents for a diabetic eye exam. He has type 2 diabetes mellitus and last HA1C is 7.5 06/18/19. There is a history of recent cardiac arrest and tachycardia - being followed by cardiology. Vision is stable.       Pertinent Medical History:    Myocardial Infarction 2007, 09/2018. S/P stent    Hyperlipidemia    Hypertension    Type 2 diabetes mellitus    Coronary artery disease    Ocular History:    Myopia    Eye Medications:    None    Assessment and Plan:  1.   No diabetic retinopathy, both eyes.     Goal is to keep HA1C under 7.    2.   Ectropion, BLL. Mild Dermatochalasis.     Not bothersome. Monitor.     3.   Macular Drusen, both eyes.     Not ARED's criteria.     Baseline macular OCT done today.     4.   Cataracts, both eyes.     Not visually significant. Monitor.       Medical History:  Past Medical History:   Diagnosis Date     Chronic infection     near rectum still leaking     Coronary artery disease      Diabetes mellitus, type 2 (H)      Heart attack (H) 4/17/2007     Hyperlipidemia      Hypertension      STEMI (ST elevation myocardial infarction) (H) 09/13/2018    s/p STEVO x2     Stented coronary artery 2007       Medications:  Current Outpatient Medications   Medication Sig Dispense Refill     aspirin (ASA) 81 MG chewable tablet Take 1 tablet (81 mg) by mouth daily 30 tablet 11     atorvastatin (LIPITOR) 80 MG tablet Take 1 tablet (80 mg) by mouth daily Needs labs done prior to the next refill. 90 tablet 3     carvedilol (COREG) 25 MG tablet 1 tablet (25 mg) by Oral or Feeding Tube route 2 times daily (with meals) 180 tablet 1     empagliflozin (JARDIANCE) 10 MG TABS tablet Take 1 tablet (10 mg) by mouth daily 30 tablet 1     furosemide (LASIX) 40 MG tablet TAKE 1 TABLET BY MOUTH TWICE DAILY 60 tablet 3     metFORMIN (GLUCOPHAGE-XR) 500 MG 24 hr tablet Take 2 tablets (1,000 mg) by mouth 2 times daily (with meals) 360 tablet 1     MULTIPLE VITAMIN PO Take 1 tablet by mouth daily.        sacubitril-valsartan (ENTRESTO) 24-26 MG per tablet Take 1 tablet by mouth 2 times daily 180 tablet 3     spironolactone (ALDACTONE) 25 MG tablet Take 1 tablet (25 mg) by mouth daily 90 tablet 3     ticagrelor (BRILINTA) 90 MG tablet Take 1 tablet (90 mg) by mouth 2 times daily 180 tablet 3   Complete documentation of historical and exam elements from today's encounter can be found in the full encounter summary report (not reduplicated in this progress note). I personally obtained the chief complaint(s) and history of present illness.  I confirmed and edited as necessary the review of systems, past medical/surgical history, family history, social history, and examination findings as documented by others; and I examined the patient myself. I personally reviewed the relevant tests, images, and reports as documented above. I formulated and edited as necessary the assessment and plan and discussed the findings and management plan with the patient and family. - Mely Jurado OD

## 2019-07-23 ENCOUNTER — TELEPHONE (OUTPATIENT)
Dept: INTERNAL MEDICINE | Facility: CLINIC | Age: 64
End: 2019-07-23

## 2019-07-23 DIAGNOSIS — E11.9 TYPE 2 DIABETES MELLITUS WITHOUT COMPLICATION, WITHOUT LONG-TERM CURRENT USE OF INSULIN (H): Primary | ICD-10-CM

## 2019-07-23 RX ORDER — GLIPIZIDE 5 MG/1
5 TABLET, FILM COATED, EXTENDED RELEASE ORAL DAILY
Qty: 90 TABLET | Refills: 1 | Status: SHIPPED | OUTPATIENT
Start: 2019-07-23 | End: 2020-01-22

## 2019-07-23 NOTE — TELEPHONE ENCOUNTER
Health Call Center    Phone Message    May a detailed message be left on voicemail: yes    Reason for Call: Medication Question or concern regarding medication   Prescription Clarification  Name of Medication: empagliflozin (JARDIANCE) 10 MG TABS tablet  Prescribing Provider: Taryn Lou   Pharmacy:      [address]       What on the order needs clarification? Patient would like to let you know that the medication is working well and he wasn t able to make the appointment today. He would like to switch to just this medication if he can please and would like a call back.          Action Taken: Message routed to:  [facility] ([facility] PCC

## 2019-07-23 NOTE — TELEPHONE ENCOUNTER
I don't think jardiance by itself will be sufficient for diabetic control. Can increase jardiance dose to 25 mg, add glipizide xl 5 mg and stop metformin.

## 2019-07-23 NOTE — TELEPHONE ENCOUNTER
Patient was reached by phone and RN relayed Dr. Cadet's message.  Rx's were sent to preferred pharmacy and med list was updated.    Hien Beckett RN on 7/23/2019 at 5:24 PM

## 2019-07-24 ENCOUNTER — TELEPHONE (OUTPATIENT)
Dept: INTERNAL MEDICINE | Facility: CLINIC | Age: 64
End: 2019-07-24

## 2019-07-24 NOTE — TELEPHONE ENCOUNTER
[facility] Call Center    Phone Message    May a detailed message be left on voicemail: yes    Reason for Call: Medication Question or concern regarding medication   Prescription Clarification  Name of Medication: Jardiance and glipizide  Prescribing Provider: Chichi Lou   Pharmacy: [facility]   What on the order needs clarification? Pharmacy states there is an interaction. Please call to discuss.           Action Taken: Message routed to:  [facility] CAYETANO

## 2019-07-24 NOTE — TELEPHONE ENCOUNTER
Spoke with pharmacist wanted to verify with provider regarding catergory D interaction meaning it will cause a higher increase of hypoglycemia. Needing authorization from provider before they can fill Rx.

## 2019-08-28 ENCOUNTER — OFFICE VISIT (OUTPATIENT)
Dept: CARDIOLOGY | Facility: CLINIC | Age: 64
End: 2019-08-28
Attending: INTERNAL MEDICINE
Payer: COMMERCIAL

## 2019-08-28 ENCOUNTER — ANCILLARY PROCEDURE (OUTPATIENT)
Dept: CARDIOLOGY | Facility: CLINIC | Age: 64
End: 2019-08-28
Payer: COMMERCIAL

## 2019-08-28 VITALS
HEIGHT: 66 IN | BODY MASS INDEX: 28.64 KG/M2 | HEART RATE: 79 BPM | OXYGEN SATURATION: 95 % | WEIGHT: 178.2 LBS | DIASTOLIC BLOOD PRESSURE: 75 MMHG | SYSTOLIC BLOOD PRESSURE: 117 MMHG

## 2019-08-28 DIAGNOSIS — Z95.810 S/P ICD (INTERNAL CARDIAC DEFIBRILLATOR) PROCEDURE: Primary | ICD-10-CM

## 2019-08-28 DIAGNOSIS — I25.5 ISCHEMIC CARDIOMYOPATHY: ICD-10-CM

## 2019-08-28 DIAGNOSIS — I25.5 ISCHEMIC CARDIOMYOPATHY: Primary | ICD-10-CM

## 2019-08-28 PROCEDURE — G0463 HOSPITAL OUTPT CLINIC VISIT: HCPCS | Mod: 25,ZF

## 2019-08-28 PROCEDURE — 99214 OFFICE O/P EST MOD 30 MIN: CPT | Mod: ZP | Performed by: INTERNAL MEDICINE

## 2019-08-28 PROCEDURE — 93010 ELECTROCARDIOGRAM REPORT: CPT | Mod: ZP | Performed by: INTERNAL MEDICINE

## 2019-08-28 PROCEDURE — 93005 ELECTROCARDIOGRAM TRACING: CPT | Mod: ZF

## 2019-08-28 RX ADMIN — Medication 6 ML: at 08:30

## 2019-08-28 ASSESSMENT — PAIN SCALES - GENERAL: PAINLEVEL: NO PAIN (0)

## 2019-08-28 ASSESSMENT — MIFFLIN-ST. JEOR: SCORE: 1546.06

## 2019-08-28 NOTE — PATIENT INSTRUCTIONS
You were seen in the Electrophysiology today by: Dr. Mckeon    Plan:     Labs/Tests Needed:    48 hour 12 lead holter     Follow up visit: as needed        Your Care Team:  EP Cardiology   Telephone Number     Temi Scherer RN [phone]     For scheduling appts or procedures:    Suzanne Fernandez   [phone]   For the Device Clinic (Pacemakers, ICDs, Loop Recorders)    During business hours: [phone]  After business hours:   [phone]- select option 4 and ask for job code [phone].       Cardiovascular Clinic:   [address] [place] [address]. [place] [address]      As always, Thank you for trusting us with your health care needs!

## 2019-08-28 NOTE — NURSING NOTE
Chief Complaint   Patient presents with     Follow Up     4 mo follow-up CRTD implant     Vitals were taken and medications reconciled.     Kei Silva CMA  9:43 AM

## 2019-08-28 NOTE — LETTER
8/28/2019      RE: Anson Manriquez  [address]  [facility] [address]       Dear Colleague,    Thank you for the opportunity to participate in the care of your patient, Anson Manriquez, at the [facility]. Please see a copy of my visit note below.      Electrophysiology Clinic Follow-up    HPI:  64 yo male with long-standing ischemic cardiomyopathy (EF 25-30%), NYHA class II, CAD with recent anterior STEMI on 9/14 s/p PCI to LAD, MMVT after MI (within 48 hours post MI), CKD, DM-II, HLD, HTN, s/p CRT-D 4/25/19. He is here for follow-up after CRT-D implant.    EP Visit 1/30/2019: He presents today for 3-months follow-up after revascularization following anterior STEMI on 9/14/18 complicated by VT arrest and cardiogenic shock. Coronary angiography showed an acute occlusion of the proximal LAD prompting PCI of the culprit vessel and subsequently has done well. EP had consulted on him for question of the need for ICD / CRT given the MMVT and chronically low EF. EP concluded to follow-up 90 days post MI for that determination, but recommended LifeVest in interim. Of note, the patient had no NSVT or VT beyond 48 hours after his MI.He presented for clinic follow-up after hospitalization for anterior STEMI. Reports doing well. 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 Mild SOB with prolonged walking and stair climbing, but does not necessarily have to stop. No chest pain, syncope, presyncope, dizziness, orthostatic symptoms. Tolerating meds well. Has chronic hernia into scrotum that has slowly limited some function (no severe pain however). Presenting EKG: VR 88 bpm, NSR, LBBB,  ms,  ms, QTc 491 ms. Cardiac meds: asa, Lipitor, coreg, Entresto, lasix.  He was placed on GDMT. His LVEF remained reduced 30-35% despite medications. He has LBBB 's. He reports mild KENNY with a flight of stairs or prolonged walking.   Given >90 days post revascularization, LVEF reduced despite GDMT, and LBBB, he had indication for CRTD. This was discussed in detail with him, and he elected to pursue on 2019    He presents today for follow-up. His device interrogation showed  normal ICD function. No arrhythmias recorded. Intrinsic rhythm = SR w/ occ PVC's @ 75 bpm. AP = <1%. RVP = 3%. LVP = 93% (in  95%). Single PVC count = 126.5 K since 19. Estimated battery longevity to ISSA = 11 years. Lead trends appear stable. Patient reports that he is feeling well and denies complaints. His BiV [pacing was between 93 and 95% since the implant. His TTE today shows EF still 20-30%. He reports feeling better after CRT implant.    Current Outpatient Medications on File Prior to Visit:  aspirin (ASA) 81 MG chewable tablet Take 1 tablet (81 mg) by mouth daily   atorvastatin (LIPITOR) 80 MG tablet Take 1 tablet (80 mg) by mouth daily Needs labs done prior to the next refill.   carvedilol (COREG) 25 MG tablet 1 tablet (25 mg) by Oral or Feeding Tube route 2 times daily (with meals)   empagliflozin (JARDIANCE) 25 MG TABS tablet Take 1 tablet (25 mg) by mouth daily   furosemide (LASIX) 40 MG tablet TAKE 1 TABLET BY MOUTH TWICE DAILY   glipiZIDE (GLUCOTROL XL) 5 MG 24 hr tablet Take 1 tablet (5 mg) by mouth daily   MULTIPLE VITAMIN PO Take 1 tablet by mouth daily.   sacubitril-valsartan (ENTRESTO) 24-26 MG per tablet Take 1 tablet by mouth 2 times daily   spironolactone (ALDACTONE) 25 MG tablet Take 1 tablet (25 mg) by mouth daily   ticagrelor (BRILINTA) 90 MG tablet Take 1 tablet (90 mg) by mouth 2 times daily   [] oxyCODONE-acetaminophen (PERCOCET) 5-325 MG tablet Take 1-2 tablets by mouth every 6 hours as needed for pain     Current Facility-Administered Medications on File Prior to Visit:  [COMPLETED] perflutren diluted 1mL to 2mL with saline (OPTISON) diluted injection 6 mL   sodium chloride (PF) 0.9% PF flush 10 mL   sodium chloride (PF)  0.9% PF flush 10 mL     No Known Allergies    Past Medical History:   Diagnosis Date     Chronic infection     near rectum still leaking     Coronary artery disease      Diabetes mellitus, type 2 (H)      Heart attack (H) 4/17/2007     Hyperlipidemia      Hypertension      STEMI (ST elevation myocardial infarction) (H) 09/13/2018    s/p STEVO x2     Stented coronary artery 2007     Past Surgical History:   Procedure Laterality Date     EP ICD N/A 4/25/2019    Procedure: EP ICD [[id number]];  Surgeon: Mick Mckeon MD;  Location:  HEART CARDIAC CATH LAB     HERNIA REPAIR Right     Inguinal     IRRIGATION AND DEBRIDEMENT RECTUM, COMBINED      abcess near rectum      LAPAROSCOPIC CHOLECYSTECTOMY  3/29/2012    Procedure:LAPAROSCOPIC CHOLECYSTECTOMY; LAPAROSCOPIC CHOLECYSTECTOMY; Surgeon:MARILYNN PRESSLEY; Location:[facility] OR     STENT, CORONARY, OLIVIA       Family History   Problem Relation Age of Onset     Hypertension Mother      Diabetes Father      Glaucoma No family hx of      Macular Degeneration No family hx of      Social History     Socioeconomic History     Marital status:      Spouse name: Not on file     Number of children: Not on file     Years of education: Not on file     Highest education level: Not on file   Occupational History     Not on file   Social Needs     Financial resource strain: Not on file     Food insecurity:     Worry: Not on file     Inability: Not on file     Transportation needs:     Medical: Not on file     Non-medical: Not on file   Tobacco Use     Smoking status: Never Smoker     Smokeless tobacco: Never Used   Substance and Sexual Activity     Alcohol use: No     Drug use: No     Sexual activity: Not on file   Lifestyle     Physical activity:     Days per week: Not on file     Minutes per session: Not on file     Stress: Not on file   Relationships     Social connections:     Talks on phone: Not on file     Gets together: Not on file     Attends Mu-ism service: Not on file     Active  "member of club or organization: Not on file     Attends meetings of clubs or organizations: Not on file     Relationship status: Not on file     Intimate partner violence:     Fear of current or ex partner: Not on file     Emotionally abused: Not on file     Physically abused: Not on file     Forced sexual activity: Not on file   Other Topics Concern     Not on file   Social History Narrative     Not on file       A complete review of systems is negative except as noted above in the HPI.    Physical Examination:  Vitals: /75 (BP Location: Left arm, Patient Position: Chair, Cuff Size: Adult Regular)   Pulse 79   Ht 1.676 m (5' 6\")   Wt 80.8 kg (178 lb 3.2 oz)   SpO2 95%   BMI 28.76 kg/m     GENERAL APPEARANCE: comfortable appearing male lying in bed with unlabored breathing.  HEENT: no scleral icterus, no xanthelasmas  CHEST: lungs clear to auscultation  CARDIOVASCULAR:  RRR with normal s1 and s2.  No murmur or rub.  Warm extremities with no peripheral edema.  ABDOMEN: soft, not tender  NEURO: alert and fully oriented.  Intact short & long term memory.  PSYCH:  Pleasant & cooperative.  SKIN: no peripheral petechiae/ecchymoses, not jaundiced.      Labs, imaging, and procedures were reviewed:  Recent Labs   Lab Test  09/18/18   1631  09/18/18   1330  09/18/18   0429  09/17/18   1559   03/07/17   1721   CR  1.01  0.86  0.94  1.30*   < >   --    BUN  20  17  19  23   < >   --    TSH   --    --    --    --    --   2.44   HGB   --    --   12.4*  12.8*   < >   --    INR   --    --   1.12   --    < >   --     < > = values in this interval not displayed.     Echocardiogram (9/2018):  Ischemic cardiomyopathy, LVEF=23% with extensive regional wall motion  abnormalities as described below. Resting wall motion abnormalities and LV  function are not significantly changed from the rest images on the 11/27/12  stress echocardiogram.     Mildly dilated LV with severely reduced LV function, LVEF=23%. There is  akinesis " involving the ejbwx-iz-dma anteroseptal, oyt-du-awwnhs anterior, mid  anterolateral, and all apical myocardial segments. The basal anterior, basal  anterolateral, and basal inferolateral segments are relatively spared.  RV size and function are normal.  No significant valvular abnormalities.  No pericardial effusion.  Normal IVC with abnormal respiratory variation, estimated RA pressure 8 mmHg.    TTE 1/30/2019:  Interpretation Summary  Ischemic cardiomyopathy with moderately dilated LV with moderately reduced  global LV function, LVEF=31%. Extensive regional wall motion abnormalities as  described below, unchanged from 9/14/18.  This study was compared with the study from 9/14/18: There has been no  significant change. Specifically, LV function and wall motion abnormalities  have not changed significantly.    TTE 8/28/2019:  Interpretation Summary  Ischemic cardiomyopathy as previously noted. Severely (EF 20-30%) reduced left ventricular function is present. Biplane EF 26%. Wall motion abnormalities as  noted below and relatively unchanged. Mild eccentric LV hypertrophy. Right ventricular function, chamber size, wall motion, and thickness are normal.  No significant valve disease. Normal RA pressure (3 mmHg).Cannot estimate PASP.      ASSESSMENT / PLAN:   64 yo male with long-standing ischemic cardiomyopathy (EF 25-30%), NYHA class II, CAD with recent anterior STEMI on 9/14 s/p PCI to LAD, MMVT after MI (within 48 hours post MI), CKD, DM-II, HLD, HTN, s/p CRT-D 4/25/19. He is here for follow-up after CRT-D implant.    His EF did not improve but his symptoms seem to have improved subjectively. He has factors that are not favorable for response including ICM and male gender. It might be hard to recruit enough muscle to improve EF with his massive anterior MI.  His pure BiV pacing is 93 to 95%. This is likely driven by PVCs. We will pursue a 12 lead Holter to check PVC  [place] and morphology and see if PVC ablation  could be considered to increase BiV pacing above 95% or 98%. The patient feels well and is not eager to purse a procedure at this time. We will check the Holter to document morphology and consider ablation if we find it feasible and useful and he is willing to consider it.  Patient will continue to follow with HF and will follow with EP based on results.    Mick Mckeon MD [facility]  Cardiology - Electrophysiology

## 2019-08-28 NOTE — PROGRESS NOTES
Electrophysiology Clinic Follow-up    HPI:  62 yo male with long-standing ischemic cardiomyopathy (EF 25-30%), NYHA class II, CAD with recent anterior STEMI on 9/14 s/p PCI to LAD, MMVT after MI (within 48 hours post MI), CKD, DM-II, HLD, HTN, s/p CRT-D 4/25/19. He is here for follow-up after CRT-D implant.    EP Visit 1/30/2019: He presents today for 3-months follow-up after revascularization following anterior STEMI on 9/14/18 complicated by VT arrest and cardiogenic shock. Coronary angiography showed an acute occlusion of the proximal LAD prompting PCI of the culprit vessel and subsequently has done well. EP had consulted on him for question of the need for ICD / CRT given the MMVT and chronically low EF. EP concluded to follow-up 90 days post MI for that determination, but recommended LifeVest in interim. Of note, the patient had no NSVT or VT beyond 48 hours after his MI.He presented for clinic follow-up after hospitalization for anterior STEMI. Reports doing well. 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 Mild SOB with prolonged walking and stair climbing, but does not necessarily have to stop. No chest pain, syncope, presyncope, dizziness, orthostatic symptoms. Tolerating meds well. Has chronic hernia into scrotum that has slowly limited some function (no severe pain however). Presenting EKG: VR 88 bpm, NSR, LBBB,  ms,  ms, QTc 491 ms. Cardiac meds: asa, Lipitor, coreg, Entresto, lasix.  He was placed on GDMT. His LVEF remained reduced 30-35% despite medications. He has LBBB 's. He reports mild KENNY with a flight of stairs or prolonged walking.  Given >90 days post revascularization, LVEF reduced despite GDMT, and LBBB, he had indication for CRTD. This was discussed in detail with him, and he elected to pursue on 4/25/2019    He presents today for follow-up. His device interrogation showed  normal ICD function. No arrhythmias recorded. Intrinsic rhythm = SR  w/ occ PVC's @ 75 bpm. AP = <1%. RVP = 3%. LVP = 93% (in  95%). Single PVC count = 126.5 K since 19. Estimated battery longevity to ISSA = 11 years. Lead trends appear stable. Patient reports that he is feeling well and denies complaints. His BiV [pacing was between 93 and 95% since the implant. His TTE today shows EF still 20-30%. He reports feeling better after CRT implant.    Current Outpatient Medications on File Prior to Visit:  aspirin (ASA) 81 MG chewable tablet Take 1 tablet (81 mg) by mouth daily   atorvastatin (LIPITOR) 80 MG tablet Take 1 tablet (80 mg) by mouth daily Needs labs done prior to the next refill.   carvedilol (COREG) 25 MG tablet 1 tablet (25 mg) by Oral or Feeding Tube route 2 times daily (with meals)   empagliflozin (JARDIANCE) 25 MG TABS tablet Take 1 tablet (25 mg) by mouth daily   furosemide (LASIX) 40 MG tablet TAKE 1 TABLET BY MOUTH TWICE DAILY   glipiZIDE (GLUCOTROL XL) 5 MG 24 hr tablet Take 1 tablet (5 mg) by mouth daily   MULTIPLE VITAMIN PO Take 1 tablet by mouth daily.   sacubitril-valsartan (ENTRESTO) 24-26 MG per tablet Take 1 tablet by mouth 2 times daily   spironolactone (ALDACTONE) 25 MG tablet Take 1 tablet (25 mg) by mouth daily   ticagrelor (BRILINTA) 90 MG tablet Take 1 tablet (90 mg) by mouth 2 times daily   [] oxyCODONE-acetaminophen (PERCOCET) 5-325 MG tablet Take 1-2 tablets by mouth every 6 hours as needed for pain     Current Facility-Administered Medications on File Prior to Visit:  [COMPLETED] perflutren diluted 1mL to 2mL with saline (OPTISON) diluted injection 6 mL   sodium chloride (PF) 0.9% PF flush 10 mL   sodium chloride (PF) 0.9% PF flush 10 mL     No Known Allergies    Past Medical History:   Diagnosis Date     Chronic infection     near rectum still leaking     Coronary artery disease      Diabetes mellitus, type 2 (H)      Heart attack (H) 2007     Hyperlipidemia      Hypertension      STEMI (ST elevation myocardial infarction) (H)  09/13/2018    s/p STEVO x2     Stented coronary artery 2007     Past Surgical History:   Procedure Laterality Date     EP ICD N/A 4/25/2019    Procedure: EP ICD [[id number]];  Surgeon: Mick Mckeon MD;  Location:  HEART CARDIAC CATH LAB     HERNIA REPAIR Right     Inguinal     IRRIGATION AND DEBRIDEMENT RECTUM, COMBINED      abcess near rectum      LAPAROSCOPIC CHOLECYSTECTOMY  3/29/2012    Procedure:LAPAROSCOPIC CHOLECYSTECTOMY; LAPAROSCOPIC CHOLECYSTECTOMY; Surgeon:MARILYNN PRESSLEY; Location:[facility] OR     STENT, CORONARY, OLIVIA       Family History   Problem Relation Age of Onset     Hypertension Mother      Diabetes Father      Glaucoma No family hx of      Macular Degeneration No family hx of      Social History     Socioeconomic History     Marital status:      Spouse name: Not on file     Number of children: Not on file     Years of education: Not on file     Highest education level: Not on file   Occupational History     Not on file   Social Needs     Financial resource strain: Not on file     Food insecurity:     Worry: Not on file     Inability: Not on file     Transportation needs:     Medical: Not on file     Non-medical: Not on file   Tobacco Use     Smoking status: Never Smoker     Smokeless tobacco: Never Used   Substance and Sexual Activity     Alcohol use: No     Drug use: No     Sexual activity: Not on file   Lifestyle     Physical activity:     Days per week: Not on file     Minutes per session: Not on file     Stress: Not on file   Relationships     Social connections:     Talks on phone: Not on file     Gets together: Not on file     Attends Church service: Not on file     Active member of club or organization: Not on file     Attends meetings of clubs or organizations: Not on file     Relationship status: Not on file     Intimate partner violence:     Fear of current or ex partner: Not on file     Emotionally abused: Not on file     Physically abused: Not on file     Forced sexual activity:  "Not on file   Other Topics Concern     Not on file   Social History Narrative     Not on file       A complete review of systems is negative except as noted above in the HPI.    Physical Examination:  Vitals: /75 (BP Location: Left arm, Patient Position: Chair, Cuff Size: Adult Regular)   Pulse 79   Ht 1.676 m (5' 6\")   Wt 80.8 kg (178 lb 3.2 oz)   SpO2 95%   BMI 28.76 kg/m    GENERAL APPEARANCE: comfortable appearing male lying in bed with unlabored breathing.  HEENT: no scleral icterus, no xanthelasmas  CHEST: lungs clear to auscultation  CARDIOVASCULAR:  RRR with normal s1 and s2.  No murmur or rub.  Warm extremities with no peripheral edema.  ABDOMEN: soft, not tender  NEURO: alert and fully oriented.  Intact short & long term memory.  PSYCH:  Pleasant & cooperative.  SKIN: no peripheral petechiae/ecchymoses, not jaundiced.      Labs, imaging, and procedures were reviewed:  Recent Labs   Lab Test  09/18/18   1631  09/18/18   1330  09/18/18   0429  09/17/18   1559   03/07/17   1721   CR  1.01  0.86  0.94  1.30*   < >   --    BUN  20  17  19  23   < >   --    TSH   --    --    --    --    --   2.44   HGB   --    --   12.4*  12.8*   < >   --    INR   --    --   1.12   --    < >   --     < > = values in this interval not displayed.     Echocardiogram (9/2018):  Ischemic cardiomyopathy, LVEF=23% with extensive regional wall motion  abnormalities as described below. Resting wall motion abnormalities and LV  function are not significantly changed from the rest images on the 11/27/12  stress echocardiogram.     Mildly dilated LV with severely reduced LV function, LVEF=23%. There is  akinesis involving the ezley-iu-vyq anteroseptal, fkx-no-zefgtt anterior, mid  anterolateral, and all apical myocardial segments. The basal anterior, basal  anterolateral, and basal inferolateral segments are relatively spared.  RV size and function are normal.  No significant valvular abnormalities.  No pericardial " effusion.  Normal IVC with abnormal respiratory variation, estimated RA pressure 8 mmHg.    TTE 1/30/2019:  Interpretation Summary  Ischemic cardiomyopathy with moderately dilated LV with moderately reduced  global LV function, LVEF=31%. Extensive regional wall motion abnormalities as  described below, unchanged from 9/14/18.  This study was compared with the study from 9/14/18: There has been no  significant change. Specifically, LV function and wall motion abnormalities  have not changed significantly.    TTE 8/28/2019:  Interpretation Summary  Ischemic cardiomyopathy as previously noted. Severely (EF 20-30%) reduced left ventricular function is present. Biplane EF 26%. Wall motion abnormalities as  noted below and relatively unchanged. Mild eccentric LV hypertrophy. Right ventricular function, chamber size, wall motion, and thickness are normal.  No significant valve disease. Normal RA pressure (3 mmHg).Cannot estimate PASP.      ASSESSMENT / PLAN:   64 yo male with long-standing ischemic cardiomyopathy (EF 25-30%), NYHA class II, CAD with recent anterior STEMI on 9/14 s/p PCI to LAD, MMVT after MI (within 48 hours post MI), CKD, DM-II, HLD, HTN, s/p CRT-D 4/25/19. He is here for follow-up after CRT-D implant.    His EF did not improve but his symptoms seem to have improved subjectively. He has factors that are not favorable for response including ICM and male gender. It might be hard to recruit enough muscle to improve EF with his massive anterior MI.  His pure BiV pacing is 93 to 95%. This is likely driven by PVCs. We will pursue a 12 lead Holter to check PVC  [place] and morphology and see if PVC ablation could be considered to increase BiV pacing above 95% or 98%. The patient feels well and is not eager to purse a procedure at this time. We will check the Holter to document morphology and consider ablation if we find it feasible and useful and he is willing to consider it.  Patient will continue to follow with  HF and will follow with EP based on results.    Mick Mckeon MD [facility]  Cardiology - Electrophysiology

## 2019-08-29 LAB — INTERPRETATION ECG - MUSE: NORMAL

## 2019-09-01 LAB
MDC_IDC_LEAD_IMPLANT_DT: NORMAL
MDC_IDC_LEAD_LOCATION: NORMAL
MDC_IDC_LEAD_LOCATION_DETAIL_1: NORMAL
MDC_IDC_LEAD_MFG: NORMAL
MDC_IDC_LEAD_MODEL: NORMAL
MDC_IDC_LEAD_POLARITY_TYPE: NORMAL
MDC_IDC_LEAD_SERIAL: NORMAL
MDC_IDC_LEAD_SPECIAL_FUNCTION: NORMAL
MDC_IDC_MSMT_BATTERY_DTM: NORMAL
MDC_IDC_MSMT_BATTERY_REMAINING_LONGEVITY: 132 MO
MDC_IDC_MSMT_BATTERY_STATUS: NORMAL
MDC_IDC_MSMT_CAP_CHARGE_DTM: NORMAL
MDC_IDC_MSMT_CAP_CHARGE_ENERGY: 0 J
MDC_IDC_MSMT_CAP_CHARGE_TIME: 0 S
MDC_IDC_MSMT_CAP_CHARGE_TIME: 9.15
MDC_IDC_MSMT_CAP_CHARGE_TYPE: NORMAL
MDC_IDC_MSMT_CAP_CHARGE_TYPE: NORMAL
MDC_IDC_MSMT_LEADCHNL_LV_IMPEDANCE_VALUE: 566 OHM
MDC_IDC_MSMT_LEADCHNL_LV_PACING_THRESHOLD_AMPLITUDE: 0.8 V
MDC_IDC_MSMT_LEADCHNL_LV_PACING_THRESHOLD_PULSEWIDTH: 0.5 MS
MDC_IDC_MSMT_LEADCHNL_LV_SENSING_INTR_AMPL: 15.2 MV
MDC_IDC_MSMT_LEADCHNL_RA_IMPEDANCE_VALUE: 615 OHM
MDC_IDC_MSMT_LEADCHNL_RA_PACING_THRESHOLD_AMPLITUDE: 0.7 V
MDC_IDC_MSMT_LEADCHNL_RA_PACING_THRESHOLD_PULSEWIDTH: 0.5 MS
MDC_IDC_MSMT_LEADCHNL_RA_SENSING_INTR_AMPL: 9.9 MV
MDC_IDC_MSMT_LEADCHNL_RV_IMPEDANCE_VALUE: 727 OHM
MDC_IDC_MSMT_LEADCHNL_RV_PACING_THRESHOLD_AMPLITUDE: 0.7 V
MDC_IDC_MSMT_LEADCHNL_RV_PACING_THRESHOLD_PULSEWIDTH: 0.4 MS
MDC_IDC_MSMT_LEADCHNL_RV_SENSING_INTR_AMPL: 25 MV
MDC_IDC_PG_IMPLANT_DTM: NORMAL
MDC_IDC_PG_MFG: NORMAL
MDC_IDC_PG_MODEL: NORMAL
MDC_IDC_PG_SERIAL: NORMAL
MDC_IDC_PG_TYPE: NORMAL
MDC_IDC_SESS_CLINIC_NAME: NORMAL
MDC_IDC_SESS_DTM: NORMAL
MDC_IDC_SESS_TYPE: NORMAL
MDC_IDC_SET_BRADY_AT_MODE_SWITCH_MODE: NORMAL
MDC_IDC_SET_BRADY_AT_MODE_SWITCH_RATE: 170 {BEATS}/MIN
MDC_IDC_SET_BRADY_HYSTRATE: NORMAL
MDC_IDC_SET_BRADY_LOWRATE: 60 {BEATS}/MIN
MDC_IDC_SET_BRADY_MAX_SENSOR_RATE: 130 {BEATS}/MIN
MDC_IDC_SET_BRADY_MAX_TRACKING_RATE: 130 {BEATS}/MIN
MDC_IDC_SET_BRADY_MODE: NORMAL
MDC_IDC_SET_BRADY_PAV_DELAY_HIGH: 180 MS
MDC_IDC_SET_BRADY_PAV_DELAY_LOW: 180 MS
MDC_IDC_SET_BRADY_SAV_DELAY_HIGH: 140 MS
MDC_IDC_SET_BRADY_SAV_DELAY_LOW: 140 MS
MDC_IDC_SET_CRT_LVRV_DELAY: 30 MS
MDC_IDC_SET_CRT_PACED_CHAMBERS: NORMAL
MDC_IDC_SET_LEADCHNL_LV_PACING_AMPLITUDE: 1.8 V
MDC_IDC_SET_LEADCHNL_LV_PACING_CAPTURE_MODE: NORMAL
MDC_IDC_SET_LEADCHNL_LV_PACING_POLARITY: NORMAL
MDC_IDC_SET_LEADCHNL_LV_PACING_PULSEWIDTH: 0.5 MS
MDC_IDC_SET_LEADCHNL_LV_SENSING_ADAPTATION_MODE: NORMAL
MDC_IDC_SET_LEADCHNL_LV_SENSING_POLARITY: NORMAL
MDC_IDC_SET_LEADCHNL_LV_SENSING_SENSITIVITY: 1 MV
MDC_IDC_SET_LEADCHNL_RA_PACING_AMPLITUDE: 3.5 V
MDC_IDC_SET_LEADCHNL_RA_PACING_ANODE_ELECTRODE_1: NORMAL
MDC_IDC_SET_LEADCHNL_RA_PACING_ANODE_LOCATION_1: NORMAL
MDC_IDC_SET_LEADCHNL_RA_PACING_CAPTURE_MODE: NORMAL
MDC_IDC_SET_LEADCHNL_RA_PACING_CATHODE_ELECTRODE_1: NORMAL
MDC_IDC_SET_LEADCHNL_RA_PACING_CATHODE_LOCATION_1: NORMAL
MDC_IDC_SET_LEADCHNL_RA_PACING_POLARITY: NORMAL
MDC_IDC_SET_LEADCHNL_RA_PACING_PULSEWIDTH: 0.5 MS
MDC_IDC_SET_LEADCHNL_RA_SENSING_ADAPTATION_MODE: NORMAL
MDC_IDC_SET_LEADCHNL_RA_SENSING_ANODE_ELECTRODE_1: NORMAL
MDC_IDC_SET_LEADCHNL_RA_SENSING_ANODE_LOCATION_1: NORMAL
MDC_IDC_SET_LEADCHNL_RA_SENSING_CATHODE_ELECTRODE_1: NORMAL
MDC_IDC_SET_LEADCHNL_RA_SENSING_CATHODE_LOCATION_1: NORMAL
MDC_IDC_SET_LEADCHNL_RA_SENSING_POLARITY: NORMAL
MDC_IDC_SET_LEADCHNL_RA_SENSING_SENSITIVITY: 0.25 MV
MDC_IDC_SET_LEADCHNL_RV_PACING_AMPLITUDE: 2 V
MDC_IDC_SET_LEADCHNL_RV_PACING_ANODE_ELECTRODE_1: NORMAL
MDC_IDC_SET_LEADCHNL_RV_PACING_ANODE_LOCATION_1: NORMAL
MDC_IDC_SET_LEADCHNL_RV_PACING_CAPTURE_MODE: NORMAL
MDC_IDC_SET_LEADCHNL_RV_PACING_CATHODE_ELECTRODE_1: NORMAL
MDC_IDC_SET_LEADCHNL_RV_PACING_CATHODE_LOCATION_1: NORMAL
MDC_IDC_SET_LEADCHNL_RV_PACING_POLARITY: NORMAL
MDC_IDC_SET_LEADCHNL_RV_PACING_PULSEWIDTH: 0.4 MS
MDC_IDC_SET_LEADCHNL_RV_SENSING_ADAPTATION_MODE: NORMAL
MDC_IDC_SET_LEADCHNL_RV_SENSING_ANODE_ELECTRODE_1: NORMAL
MDC_IDC_SET_LEADCHNL_RV_SENSING_ANODE_LOCATION_1: NORMAL
MDC_IDC_SET_LEADCHNL_RV_SENSING_CATHODE_ELECTRODE_1: NORMAL
MDC_IDC_SET_LEADCHNL_RV_SENSING_CATHODE_LOCATION_1: NORMAL
MDC_IDC_SET_LEADCHNL_RV_SENSING_POLARITY: NORMAL
MDC_IDC_SET_LEADCHNL_RV_SENSING_SENSITIVITY: 0.6 MV
MDC_IDC_SET_ZONE_DETECTION_INTERVAL: 250 MS
MDC_IDC_SET_ZONE_DETECTION_INTERVAL: 300 MS
MDC_IDC_SET_ZONE_DETECTION_INTERVAL: 353 MS
MDC_IDC_SET_ZONE_TYPE: NORMAL
MDC_IDC_SET_ZONE_VENDOR_TYPE: NORMAL
MDC_IDC_STAT_BRADY_RA_PERCENT_PACED: 1 %
MDC_IDC_STAT_BRADY_RV_PERCENT_PACED: 3 %
MDC_IDC_STAT_CRT_LV_PERCENT_PACED: 93 %
MDC_IDC_STAT_EPISODE_RECENT_COUNT: 0
MDC_IDC_STAT_EPISODE_RECENT_COUNT_DTM_END: NORMAL
MDC_IDC_STAT_EPISODE_RECENT_COUNT_DTM_START: NORMAL
MDC_IDC_STAT_EPISODE_TOTAL_COUNT: 0
MDC_IDC_STAT_EPISODE_TOTAL_COUNT_DTM_END: NORMAL
MDC_IDC_STAT_EPISODE_TYPE: NORMAL
MDC_IDC_STAT_EPISODE_VENDOR_TYPE: NORMAL
MDC_IDC_STAT_TACHYTHERAPY_ATP_DELIVERED_RECENT: 0
MDC_IDC_STAT_TACHYTHERAPY_ATP_DELIVERED_TOTAL: 0
MDC_IDC_STAT_TACHYTHERAPY_RECENT_DTM_END: NORMAL
MDC_IDC_STAT_TACHYTHERAPY_RECENT_DTM_START: NORMAL
MDC_IDC_STAT_TACHYTHERAPY_SHOCKS_ABORTED_RECENT: 0
MDC_IDC_STAT_TACHYTHERAPY_SHOCKS_ABORTED_TOTAL: 0
MDC_IDC_STAT_TACHYTHERAPY_SHOCKS_DELIVERED_RECENT: 0
MDC_IDC_STAT_TACHYTHERAPY_SHOCKS_DELIVERED_TOTAL: 0
MDC_IDC_STAT_TACHYTHERAPY_TOTAL_DTM_END: NORMAL

## 2019-09-06 DIAGNOSIS — I21.02 ST ELEVATION MYOCARDIAL INFARCTION INVOLVING LEFT ANTERIOR DESCENDING (LAD) CORONARY ARTERY (H): ICD-10-CM

## 2019-09-06 DIAGNOSIS — I50.20 HEART FAILURE WITH REDUCED EJECTION FRACTION, NYHA CLASS III (H): ICD-10-CM

## 2019-09-10 RX ORDER — CARVEDILOL 25 MG/1
25 TABLET ORAL 2 TIMES DAILY WITH MEALS
Qty: 180 TABLET | Refills: 3 | Status: SHIPPED | OUTPATIENT
Start: 2019-09-10 | End: 2020-10-29

## 2019-11-06 ENCOUNTER — OFFICE VISIT (OUTPATIENT)
Dept: INTERNAL MEDICINE | Facility: CLINIC | Age: 64
End: 2019-11-06
Payer: COMMERCIAL

## 2019-11-06 VITALS
WEIGHT: 184.2 LBS | DIASTOLIC BLOOD PRESSURE: 83 MMHG | BODY MASS INDEX: 29.73 KG/M2 | OXYGEN SATURATION: 96 % | HEART RATE: 78 BPM | SYSTOLIC BLOOD PRESSURE: 119 MMHG

## 2019-11-06 DIAGNOSIS — Z12.5 SCREENING FOR PROSTATE CANCER: ICD-10-CM

## 2019-11-06 DIAGNOSIS — E78.2 MIXED HYPERLIPIDEMIA: ICD-10-CM

## 2019-11-06 DIAGNOSIS — I10 BENIGN ESSENTIAL HYPERTENSION: ICD-10-CM

## 2019-11-06 DIAGNOSIS — Z23 NEED FOR INFLUENZA VACCINATION: ICD-10-CM

## 2019-11-06 DIAGNOSIS — I25.5 ISCHEMIC CARDIOMYOPATHY: ICD-10-CM

## 2019-11-06 DIAGNOSIS — E78.00 HIGH BLOOD CHOLESTEROL: ICD-10-CM

## 2019-11-06 DIAGNOSIS — Z23 NEED FOR INFLUENZA VACCINATION: Primary | ICD-10-CM

## 2019-11-06 DIAGNOSIS — I25.10 CORONARY ARTERY DISEASE INVOLVING NATIVE CORONARY ARTERY OF NATIVE HEART WITHOUT ANGINA PECTORIS: ICD-10-CM

## 2019-11-06 DIAGNOSIS — I10 BENIGN ESSENTIAL HYPERTENSION: Primary | ICD-10-CM

## 2019-11-06 DIAGNOSIS — K40.11 BILATERAL RECURRENT INGUINAL HERNIA WITH GANGRENE: ICD-10-CM

## 2019-11-06 DIAGNOSIS — I50.20 HEART FAILURE WITH REDUCED EJECTION FRACTION, NYHA CLASS III (H): ICD-10-CM

## 2019-11-06 LAB
ALBUMIN SERPL-MCNC: 4.1 G/DL (ref 3.4–5)
ALP SERPL-CCNC: 57 U/L (ref 40–150)
ALT SERPL W P-5'-P-CCNC: 27 U/L (ref 0–70)
ANION GAP SERPL CALCULATED.3IONS-SCNC: 5 MMOL/L (ref 3–14)
AST SERPL W P-5'-P-CCNC: 13 U/L (ref 0–45)
BASOPHILS # BLD AUTO: 0.1 10E9/L (ref 0–0.2)
BASOPHILS NFR BLD AUTO: 0.8 %
BILIRUB SERPL-MCNC: 0.8 MG/DL (ref 0.2–1.3)
BUN SERPL-MCNC: 19 MG/DL (ref 7–30)
CALCIUM SERPL-MCNC: 9.1 MG/DL (ref 8.5–10.1)
CHLORIDE SERPL-SCNC: 105 MMOL/L (ref 94–109)
CHOLEST SERPL-MCNC: 198 MG/DL
CO2 SERPL-SCNC: 27 MMOL/L (ref 20–32)
CREAT SERPL-MCNC: 1 MG/DL (ref 0.66–1.25)
DIFFERENTIAL METHOD BLD: NORMAL
EOSINOPHIL # BLD AUTO: 0.1 10E9/L (ref 0–0.7)
EOSINOPHIL NFR BLD AUTO: 1.9 %
ERYTHROCYTE [DISTWIDTH] IN BLOOD BY AUTOMATED COUNT: 14.3 % (ref 10–15)
GFR SERPL CREATININE-BSD FRML MDRD: 79 ML/MIN/{1.73_M2}
GLUCOSE SERPL-MCNC: 170 MG/DL (ref 70–99)
HBA1C MFR BLD: 7.3 % (ref 0–5.6)
HCT VFR BLD AUTO: 48.9 % (ref 40–53)
HDLC SERPL-MCNC: 41 MG/DL
HGB BLD-MCNC: 16.5 G/DL (ref 13.3–17.7)
IMM GRANULOCYTES # BLD: 0 10E9/L (ref 0–0.4)
IMM GRANULOCYTES NFR BLD: 0.4 %
LDLC SERPL CALC-MCNC: 93 MG/DL
LYMPHOCYTES # BLD AUTO: 1.6 10E9/L (ref 0.8–5.3)
LYMPHOCYTES NFR BLD AUTO: 20.9 %
MCH RBC QN AUTO: 31.1 PG (ref 26.5–33)
MCHC RBC AUTO-ENTMCNC: 33.7 G/DL (ref 31.5–36.5)
MCV RBC AUTO: 92 FL (ref 78–100)
MONOCYTES # BLD AUTO: 0.7 10E9/L (ref 0–1.3)
MONOCYTES NFR BLD AUTO: 8.9 %
NEUTROPHILS # BLD AUTO: 5 10E9/L (ref 1.6–8.3)
NEUTROPHILS NFR BLD AUTO: 67.1 %
NONHDLC SERPL-MCNC: 157 MG/DL
NRBC # BLD AUTO: 0 10*3/UL
NRBC BLD AUTO-RTO: 0 /100
NT-PROBNP SERPL-MCNC: 306 PG/ML (ref 0–125)
PLATELET # BLD AUTO: 223 10E9/L (ref 150–450)
POTASSIUM SERPL-SCNC: 4.3 MMOL/L (ref 3.4–5.3)
PROT SERPL-MCNC: 7.9 G/DL (ref 6.8–8.8)
PSA SERPL-MCNC: 1.57 UG/L (ref 0–4)
RBC # BLD AUTO: 5.3 10E12/L (ref 4.4–5.9)
SODIUM SERPL-SCNC: 137 MMOL/L (ref 133–144)
TRIGL SERPL-MCNC: 319 MG/DL
WBC # BLD AUTO: 7.5 10E9/L (ref 4–11)

## 2019-11-06 ASSESSMENT — PAIN SCALES - GENERAL: PAINLEVEL: NO PAIN (0)

## 2019-11-06 NOTE — PROGRESS NOTES
HPI:    Pt. With h/o CAD, CM (EF 20-30%, echo 8/28/2019), DM2 comes in for follow up today. Overall stable. He continues to work as a du. He has umbilical and B inguinal hernias. He does not feel he needs to see dermatology for a skin check. He does not smoke nor abuse EtOH. No other HEENT, cardiopulmonary, abdominal, , neurological, systemic complaints. He lives in [place] with his daughter.     Past Medical History:   Diagnosis Date     Chronic infection     near rectum still leaking     Coronary artery disease      Diabetes mellitus, type 2 (H)      Heart attack (H) 4/17/2007     Hyperlipidemia      Hypertension      STEMI (ST elevation myocardial infarction) (H) 09/13/2018    s/p STEVO x2     Stented coronary artery 2007     Past Surgical History:   Procedure Laterality Date     EP ICD N/A 4/25/2019    Procedure: EP ICD [[id number]];  Surgeon: Mick Mckeon MD;  Location:  HEART CARDIAC CATH LAB     HERNIA REPAIR Right     Inguinal     IRRIGATION AND DEBRIDEMENT RECTUM, COMBINED      abcess near rectum      LAPAROSCOPIC CHOLECYSTECTOMY  3/29/2012    Procedure:LAPAROSCOPIC CHOLECYSTECTOMY; LAPAROSCOPIC CHOLECYSTECTOMY; Surgeon:MARILYNN PRESSLEY; Location:[facility] OR     STENT, CORONARY, OLIVIA       PE:    Vitals noted gen nad cooperative alert, HEENT, oropharynx clear no exudate, neck supple nl rom, lungs with good air movement, RRR, S1, S2, abdomen with umbilical hernia. B inguinal hernias present. Grossly normal neurological exam.     A/P:    1. Cardiology; he follows with Dr. Mckeon (8/28/2019) for CRT/ICD and Dr. Matos (seen 5/2/2019). He has follow up 1/22/2020 with Dr. Matos. He remains on his same medications and no sxs.   2. DM2; on Glipizide and Jardiane; A1c and urine microalbumin ordered  3. Dermatology; he declines any evaluation  4. Influenza vaccination today. He got Shingrex vaccination series  5. Colonoscopy 12/15/2012 and repeat 10 years  6. Refer back to C/R for anal fistula. He was seen  3/14/2017  7. Referred back to Dr. Massey, general surgery for umbilical and inguinal hernias. He was seen by Dr. Tobias, surgery 3/15/2017.     Total time spent 25 minutes.  More than 50% of the time spent with Mr. Manriquez on counseling / coordinating his care

## 2019-11-06 NOTE — NURSING NOTE
Chief Complaint   Patient presents with     Physical     Pt is here for annual physical      KAI Dumont at 7:36 AM sign on 11/6/2019      Anson Manriquez      1.  Has the patient received the information for the influenza vaccine? YES    2.  Does the patient have any of the following contraindications?     Allergy to eggs? No     Allergy to a component of the influenza vaccine? No     Serious reaction to previous influenza vaccines? No     Paralyzed by Guillain-[place] syndrome? No     Currently sick today? No           Recorded by Jennifer Orosco [facility]

## 2019-11-06 NOTE — PATIENT INSTRUCTIONS
[facility] Medication Refill Request Information:  * Please contact your pharmacy regarding ANY request for medication refills.  ** [facility] Prescription Fax = [phone]  * Please allow 3 business days for routine medication refills.  * Please allow 5 business days for controlled substance medication refills.     [facility] Test Result notification information:  *You will be notified with in 7-10 days of your appointment day regarding the results of your test.  If you are on MyChart you will be notified as soon as the provider has reviewed the results and signed off on them.    [facility]: [phone]

## 2019-11-11 ENCOUNTER — HOSPITAL ENCOUNTER (OUTPATIENT)
Facility: CLINIC | Age: 64
End: 2019-11-11
Attending: SURGERY | Admitting: SURGERY
Payer: COMMERCIAL

## 2019-11-11 ENCOUNTER — OFFICE VISIT (OUTPATIENT)
Dept: SURGERY | Facility: CLINIC | Age: 64
End: 2019-11-11
Attending: INTERNAL MEDICINE
Payer: COMMERCIAL

## 2019-11-11 ENCOUNTER — TELEPHONE (OUTPATIENT)
Dept: SURGERY | Facility: CLINIC | Age: 64
End: 2019-11-11

## 2019-11-11 VITALS
HEIGHT: 66 IN | WEIGHT: 187.6 LBS | OXYGEN SATURATION: 94 % | SYSTOLIC BLOOD PRESSURE: 133 MMHG | BODY MASS INDEX: 30.15 KG/M2 | TEMPERATURE: 97.6 F | DIASTOLIC BLOOD PRESSURE: 73 MMHG | HEART RATE: 82 BPM

## 2019-11-11 DIAGNOSIS — K40.90 LEFT INGUINAL HERNIA: Primary | ICD-10-CM

## 2019-11-11 DIAGNOSIS — K40.90 LEFT INGUINAL HERNIA: ICD-10-CM

## 2019-11-11 RX ORDER — CEFAZOLIN SODIUM 2 G/50ML
2 SOLUTION INTRAVENOUS
Status: CANCELLED | OUTPATIENT
Start: 2019-11-11

## 2019-11-11 RX ORDER — CEFAZOLIN SODIUM 1 G/50ML
1 INJECTION, SOLUTION INTRAVENOUS SEE ADMIN INSTRUCTIONS
Status: CANCELLED | OUTPATIENT
Start: 2019-11-11

## 2019-11-11 ASSESSMENT — MIFFLIN-ST. JEOR: SCORE: 1583.7

## 2019-11-11 ASSESSMENT — PAIN SCALES - GENERAL: PAINLEVEL: NO PAIN (0)

## 2019-11-11 NOTE — TELEPHONE ENCOUNTER
Patient has pacemaker and cardiac history. Case scheduled at [place] per Dr. Flakito Massey.    Patient is scheduled for surgery with Dr. Flakito Massey      Spoke with: Anson Manriquez in clinic about surgery dates. Patient would like surgery 12/19/2019. OR is not available and patient's next choice is 01/2020    Date of Surgery: 01/16/2020 at 1:00 PM with Dr. Flakito Massey. With request to move up to 11:30 AM.    Location:     [address] [address]      [phone]      [email or web address]    H&P: Scheduled with [facility] (P[facility]) 01/02/2020 at 5:00 PM.     Informed patient they will need an adult : Yes    Post op: 01/23/2020 at 7:00 AM with Dr. Flakito Massey    Surgery packet: Given in clinic on 11/11/2019.    Additional comments: He also knows to call general surgery if he experiences any cold or flu symptoms. Surgery teaching and soap were given to in clinic on 11/11/2019. He was asked to call [phone] if he needs to reschedule and [phone] if he experiences any symptoms.

## 2019-11-11 NOTE — NURSING NOTE
Pre and Post op Patient Education/Teaching Flowsheet  Relevant Diagnosis:  Hernia- Inguinal  Teaching Topic:  Pre and post op teaching  Person(s) Involved in teaching:  Patient     Motivation Level:  Asks Questions:  Yes  Eager to Learn:  Yes  Cooperative:  Yes  Receptive (willing/able to accept information):  Yes  Any cultural factors/Yazidism beliefs that may influence understanding or compliance?  No    Patient/caregiver/family demonstrates understanding of the following:  Reason for the appointment, diagnosis, and treatment plan:  Yes  Patient demonstrates understanding of the following:  Pre-op bowel prep:  No  Post-op pain management recommendations (medications, ice compress, binder/athletic supporter (if applicable), etc.:  Yes  Inguinal hernia patients:  Post-op urinary retention- discussed signs/symptoms and visit to ER for [place] catheter placement and to stay in place for at least 48 hours:  NA  Restrictions:  Yes  Medications to take the day of surgery:  Per PCP  Blood thinner medications discussed and when to stop (if applicable):  Yes. MAY CONTINUE ASA 81 MG.  NEEDS TO HOLD BRILINTA  Wound care:  Yes  Diabetes medication management (if applicable):  Per PCP  Which situations necessitate calling provider and whom to contact:  Discussed how to contact the hospital, nurse, and clinic scheduling staff if necessary      Date and time of surgery:  Yes  Location of surgery: Hospital due to Pacemaker  History and Physical and any other testing necessary prior to surgery:  Yes  Required time line for completion of History and Physical and any pre-op testing:  Yes  Discuss need for someone to drive patient home and stay with them for 24 hours:  Yes  Pre-op showering/scrub information with Surgical Scrub:  Yes  NPO Guidelines:  NPO per Anesthesia Guidelines    Infection Prevention: Patient demonstrates understanding of the following:  Patient instructed on hand hygiene:  Yes  Surgical procedure site care will be  taught and will be reviewed at the time of discharge  Signs and symptoms of infection taught:  Yes  Wound care reviewed and will be taught at the time of discharge  Central venous catheter care will be taught at the time of discharge (if applicable)    Post-op follow-up:  Instructional materials used/given/mailed:  [facility] Booklet, post op teaching sheet, Map, Soap, and arrival/location information    Surgical instructions mailed to patient

## 2019-11-11 NOTE — NURSING NOTE
"Chief Complaint   Patient presents with     New Patient     new hernia consult       Vitals:    11/11/19 0830   BP: 133/73   Pulse: 82   Temp: 97.6  F (36.4  C)   TempSrc: Oral   SpO2: 94%   Weight: 85.1 kg (187 lb 9.6 oz)   Height: 1.676 m (5' 6\")       Body mass index is 30.28 kg/m .    Emma Andrews CMA    "

## 2019-11-11 NOTE — PATIENT INSTRUCTIONS
You met with Dr. Flakito Massey.      Today's visit instructions:    Reminder:  Surgery Requirements  1. Your surgery will [facility] [address] [place] [address] [facility]  2. You will need to arrive 1 1/2 to 2 hours early based on the location of your surgery.  3. You will need someone to drive you home (over 18 years old) and stay with you for 24 hours after the procedure  4. You will need a preop physical with your regular doctor (or PAC if requested by your surgeon) within 30 days of surgery- closer is always better  5. Stop any blood thinners, vitamins, minerals, or herbal supplements 5 days before surgery.  If you are taking a prescribed blood thinner please let us know for specific instructions  6. Fasting- a nurse from [facility] will call you 1-2 days before surgery to confirm your procedure and tell you when to stop eating and drinking  7. Wash with the soap the night before surgery and morning of surgery. See instructions in the Surgery Packet.  8. If you would like a procedure estimate please call Clau ZUNIGA, Financial Counselor, [phone].    If you have questions please contact Ricci RN or Rosemary RN during regular clinic hours, Monday through Friday 7:30 AM - 4:00 PM, or you can contact us via [employer] at anytime.       If you have urgent needs after-hours, weekends, or holidays please call the hospital at [phone] and ask to speak with our on-call General Surgery Team.    Appointment [phone], option #1   Nurse Advice (Ricci or Rosemary): [phone]   Surgery Scheduler (Delmy): [phone]  Fax: [phone]    After Your Open Inguinal Hernia Repair         Incision care     You may take a shower the day after surgery. Carefully wash your incision with soap and water. Do not submerge yourself in water (bath, whirlpool, hot tub, pool, lake) for 14 days after surgery.     Remove the bandage the day after surgery, but leave the medical tape (Steri-Strips) or glue in  place. These will loosen and fall off on their own 5 to 7 days after surgery.      Always wash your hands before touching your incisions or removing bandages.     It is not unusual to form a collection of fluid or blood under your incision that may feel firm or squishy- it can take several weeks to months for your body to reabsorb it.  At times, it may even drain.  If that should happen keep the area clean with soap, water,  and cover with a clean gauze dressing. You can change this daily or as needed.     Other medicines     Wait to start aspirin or blood thinners until the day after surgery. You can take any other regular medicines at your normal time the day after surgery.     Your pain medicine may cause constipation (hard, dry stools). To help with this, take the stool softener your doctor gave you or an over-the-counter stool softener or laxative. You can stop taking this when you are no longer taking pain medicine and your bowel movements are back to normal.      For pain or discomfort     Take the narcotic pain medicine your doctor gave you as needed and as instructed on the bottle. If you prefer to use over-the-counter medication, use acetaminophen (Tylenol) or ibuprofen (Advil, Motrin) as instructed on the box. Do not take Tylenol if it is in your narcotic pain medication.      Use an ice pack on your groin for 20 minutes at a time as needed for the first 24 hours. Be sure to protect your skin by putting a cloth between the ice pack and your skin.     After 24 hours you can switch to heat for 20 minutes as needed. Be sure to protect your skin by putting a cloth between the heat pack and your skin.      It is normal to feel a lump in your groin after surgery. This lump may take up to 8 weeks to go away.      Activities     No driving until you feel it s safe to do so. Don t drive while taking narcotic pain medicine.     Don t lift anything heavier than 20 pounds for 3 weeks after surgery.      Special  equipment     If we gave you an athletic supporter, wear this for the first 3 days after surgery. You can wear it longer than this if you wish.      Diet   You can eat your regular meals after surgery.      When to call the doctor   Call your doctor if you have:     A fever above 101 F (38.3 C) (taken under the tongue), or a fever or chills lasting more than a day.     Redness at the incision site.     Any fluid or blood draining from the incision, especially if it smells bad.      Severe pain that doesn t improve with pain medicine.      We will call you 2 to 4 days after surgery to review this handout, answer questions and help arrange after-surgery care. If you have questions or concerns, please call [phone] during regular office hours. If you need to call after business hours, call [phone] and ask to page the surgeon on-call.

## 2019-11-11 NOTE — LETTER
11/11/2019       RE: Anson Manriquez  [address]  [facility] [address]     Dear Colleague,    Thank you for referring your patient, Anson Manriquez, to the [facility] GENERAL SURGERY at [facility]. Please see a copy of my visit note below.    Anson Manriquez is a 64 year old male with a many year history of a left inguinal mass with the following symptoms of lump.  Also with umbilical lump. Asymptomatic.  Finding was not work related.  Onset did not occur with lifting.  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 Able to climb stairs without difficulty.    Past medical and surgical history, medications, allergies, family history, and social history were reviewed with the patient. Cardiac history.  Past Medical History:   Diagnosis Date     Chronic infection     near rectum still leaking     Coronary artery disease      Diabetes mellitus, type 2 (H)      Heart attack (H) 4/17/2007     Hyperlipidemia      Hypertension      STEMI (ST elevation myocardial infarction) (H) 09/13/2018    s/p STEVO x2     Stented coronary artery 2007     Past Surgical History:   Procedure Laterality Date     EP ICD N/A 4/25/2019    Procedure: EP ICD [[id number]];  Surgeon: Mick Mckeon MD;  Location:  HEART CARDIAC CATH LAB     HERNIA REPAIR Right     Inguinal     IRRIGATION AND DEBRIDEMENT RECTUM, COMBINED      abcess near rectum      LAPAROSCOPIC CHOLECYSTECTOMY  3/29/2012    Procedure:LAPAROSCOPIC CHOLECYSTECTOMY; LAPAROSCOPIC CHOLECYSTECTOMY; Surgeon:MARILYNN PRESSLEY; Location:[facility] OR     STENT, CORONARY, OLIVIA       ROS: 10 point review of systems negative except noted in HPI  PHYSICAL EXAM  General appearance-  alert, and in no distress.  Skin- Skin color and turgor normal.  No obvious rashes.  Neck- Neck is supple without obvious adenopathy.  Lungs- Respiratory  effort unlabored.  Gait- Normal.  Abdomen - soft non distended, non tender with wide based umbilical hernia  Large left inguinal hernia reducible.    Impression: Large left inguinal hernia and wide based umbilical hernia.  PLAN: open mesh repair LIH under MAC  Will need PAC pre op.    Would repair larger inguinal hernia first and wait for recovery then proceed with umbilical repair at a separate time. Discussed rationale for this recommendation with patient who understood and had opportunity for questions.    A full discussion regarding the alternatives, risks, goals, and potential complications for this surgery was completed today.  The patient understood I would use non recalled mesh and  that the potential problems included but are not limited to:  Infection, bleeding, hematoma, seroma, recurrence, injury to nerve/muscle or involved testicle, ischemic orchitis, and chronic pain.    The patient verbally expressed understanding, was given the opportunity for questions, and gives full informed consent for the procedure.      Today the patient was instructed on the need for a preoperative H&P, NPO status prior to surgery, and the need to stop anticoagulants prior to surgery.  Additional educational material, soap, and instructions will be mailed out to the patients home.    The total time spent with this patient was 30 minutes.  Of this time, greater than 50% was spent counseling and coordinating care.      Again, thank you for allowing me to participate in the care of your patient.      Sincerely,    Flakito Massey MD

## 2019-11-11 NOTE — PROGRESS NOTES
Anson Manriquez is a 64 year old male with a many year history of a left inguinal mass with the following symptoms of lump.  Also with umbilical lump. Asymptomatic.  Finding was not work related.  Onset did not occur with lifting.  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 Able to climb stairs without difficulty.    Past medical and surgical history, medications, allergies, family history, and social history were reviewed with the patient. Cardiac history.  Past Medical History:   Diagnosis Date     Chronic infection     near rectum still leaking     Coronary artery disease      Diabetes mellitus, type 2 (H)      Heart attack (H) 4/17/2007     Hyperlipidemia      Hypertension      STEMI (ST elevation myocardial infarction) (H) 09/13/2018    s/p STEVO x2     Stented coronary artery 2007     Past Surgical History:   Procedure Laterality Date     EP ICD N/A 4/25/2019    Procedure: EP ICD [[id number]];  Surgeon: Mick Mckeon MD;  Location:  HEART CARDIAC CATH LAB     HERNIA REPAIR Right     Inguinal     IRRIGATION AND DEBRIDEMENT RECTUM, COMBINED      abcess near rectum      LAPAROSCOPIC CHOLECYSTECTOMY  3/29/2012    Procedure:LAPAROSCOPIC CHOLECYSTECTOMY; LAPAROSCOPIC CHOLECYSTECTOMY; Surgeon:MARILYNN PRESSLEY; Location:[facility] OR     STENT, CORONARY, OLIVIA       ROS: 10 point review of systems negative except noted in HPI  PHYSICAL EXAM  General appearance-  alert, and in no distress.  Skin- Skin color and turgor normal.  No obvious rashes.  Neck- Neck is supple without obvious adenopathy.  Lungs- Respiratory effort unlabored.  Gait- Normal.  Abdomen - soft non distended, non tender with wide based umbilical hernia  Large left inguinal hernia reducible.    Impression: Large left inguinal hernia and wide based umbilical hernia.  PLAN: open mesh repair LIH under MAC  Will need PAC pre  op.    Would repair larger inguinal hernia first and wait for recovery then proceed with umbilical repair at a separate time. Discussed rationale for this recommendation with patient who understood and had opportunity for questions.    A full discussion regarding the alternatives, risks, goals, and potential complications for this surgery was completed today.  The patient understood I would use non recalled mesh and  that the potential problems included but are not limited to:  Infection, bleeding, hematoma, seroma, recurrence, injury to nerve/muscle or involved testicle, ischemic orchitis, and chronic pain.    The patient verbally expressed understanding, was given the opportunity for questions, and gives full informed consent for the procedure.      Today the patient was instructed on the need for a preoperative H&P, NPO status prior to surgery, and the need to stop anticoagulants prior to surgery.  Additional educational material, soap, and instructions will be mailed out to the patients home.    The total time spent with this patient was 30 minutes.  Of this time, greater than 50% was spent counseling and coordinating care.

## 2019-11-12 ENCOUNTER — PRE VISIT (OUTPATIENT)
Dept: SURGERY | Facility: CLINIC | Age: 64
End: 2019-11-12

## 2019-11-12 NOTE — TELEPHONE ENCOUNTER
FUTURE VISIT INFORMATION      SURGERY INFORMATION:    Date: 20    Location: [facility] OR    Surgeon:  Flakito Massey    Anesthesia Type:  General    RECORDS REQUESTED FROM:       Primary Care Provider:Taryn Lou- [employer]    Pertinent Medical History: CAD, Hypertension    Most recent EKG+ Tracin19    Most recent ECHO: 19    Most recent Cardiac Stress Test: 12

## 2019-11-15 DIAGNOSIS — I50.22 CHRONIC SYSTOLIC CONGESTIVE HEART FAILURE (H): ICD-10-CM

## 2019-11-15 RX ORDER — FUROSEMIDE 40 MG
40 TABLET ORAL 2 TIMES DAILY
Qty: 180 TABLET | Refills: 0 | Status: SHIPPED | OUTPATIENT
Start: 2019-11-15 | End: 2020-03-09

## 2019-11-15 NOTE — TELEPHONE ENCOUNTER
FUROSEMIDE 40 MG TABLET   Last Written Prescription Date: 6/24/2019  Last Fill Quantity: 60,   # refills: 3  Last Office Visit : 11/6/2019  Future Office visit:  12/6/2019  180 Tabs, 0 Refills sent to pharmacy 11/15/2019.     Pt has office visit  12/6/2019.    Alaina Pandya RN  Central Triage Red Flags/Med Refills

## 2019-11-25 DIAGNOSIS — I50.20 HEART FAILURE WITH REDUCED EJECTION FRACTION, NYHA CLASS III (H): ICD-10-CM

## 2019-11-26 NOTE — TELEPHONE ENCOUNTER
ENTRESTO 24 MG-26MG TABLET     Last Written Prescription Date:  3/26/2019  Last Fill Quantity: 180,   # refills: 3  Last Office Visit : 11/6/2019  Future Office visit:  12/6/2019  180 Tabs, 3 Refills sent to pharmacy for Pt care.      11/26/2019    Alaina Pandya RN  Central Triage Red Flags/Med Refills        Warnings Override History for sacubitril-valsartan (ENTRESTO) 24-26 MG per tablet [[id number]]     Overridden by Jason Matos MD on May 2, 2019 10:19 AM   Drug-Drug   1. POTASSIUM-SPARING DIURETICS / ANGIOTENSIN II RECEPTOR ANTAGONISTS [Level: Major]   Other Orders: spironolactone (ALDACTONE) 25 MG tablet         Overridden by Iesha Valencia [facility] on Apr 25, 2019 12:53 PM   Duplicate Therapy   1. ANGIOTENSIN BLOCKERS [Level: No Abuse/Dependency Potential] [Reason: Benefit outweighs risk]   Other Orders: sacubitril-valsartan (ENTRESTO) 24-26 MG per tablet 1 tablet      2. VASOACTIVE NATRIURETIC AGONISTS [Level: No Abuse/Dependency Potential] [Reason: Benefit outweighs risk]   Other Orders: sacubitril-valsartan (ENTRESTO) 24-26 MG per tablet 1 tablet

## 2019-12-01 ENCOUNTER — ANCILLARY PROCEDURE (OUTPATIENT)
Dept: CARDIOLOGY | Facility: CLINIC | Age: 64
End: 2019-12-01
Attending: INTERNAL MEDICINE
Payer: COMMERCIAL

## 2019-12-01 DIAGNOSIS — I25.5 ISCHEMIC CARDIOMYOPATHY: ICD-10-CM

## 2019-12-01 PROCEDURE — 93296 REM INTERROG EVL PM/IDS: CPT | Mod: ZF

## 2019-12-01 PROCEDURE — 93295 DEV INTERROG REMOTE 1/2/MLT: CPT | Mod: ZP | Performed by: INTERNAL MEDICINE

## 2019-12-06 ENCOUNTER — OFFICE VISIT (OUTPATIENT)
Dept: INTERNAL MEDICINE | Facility: CLINIC | Age: 64
End: 2019-12-06
Payer: COMMERCIAL

## 2019-12-06 VITALS — DIASTOLIC BLOOD PRESSURE: 83 MMHG | SYSTOLIC BLOOD PRESSURE: 122 MMHG | RESPIRATION RATE: 18 BRPM | HEART RATE: 78 BPM

## 2019-12-06 DIAGNOSIS — E78.00 HIGH BLOOD CHOLESTEROL: Primary | ICD-10-CM

## 2019-12-06 ASSESSMENT — PAIN SCALES - GENERAL: PAINLEVEL: NO PAIN (0)

## 2019-12-06 NOTE — PROGRESS NOTES
HPI:    Pt. With h/o CAD, CM (EF 20-30%, echo 8/28/2019), DM2 comes in for follow up today. Overall stable. He continues to work as a du. He has umbilical and B inguinal hernias. He does not feel he needs to see dermatology for a skin check. He does not smoke nor abuse EtOH. No other HEENT, cardiopulmonary, abdominal, , neurological, systemic complaints. He lives in [place] with his daughter.     Past Medical History:   Diagnosis Date     Chronic infection     near rectum still leaking     Coronary artery disease      Diabetes mellitus, type 2 (H)      Heart attack (H) 4/17/2007     Hyperlipidemia      Hypertension      STEMI (ST elevation myocardial infarction) (H) 09/13/2018    s/p STEVO x2     Stented coronary artery 2007     Past Surgical History:   Procedure Laterality Date     EP ICD N/A 4/25/2019    Procedure: EP ICD [[id number]];  Surgeon: Mick Mckeon MD;  Location:  HEART CARDIAC CATH LAB     HERNIA REPAIR Right     Inguinal     IRRIGATION AND DEBRIDEMENT RECTUM, COMBINED      abcess near rectum      LAPAROSCOPIC CHOLECYSTECTOMY  3/29/2012    Procedure:LAPAROSCOPIC CHOLECYSTECTOMY; LAPAROSCOPIC CHOLECYSTECTOMY; Surgeon:MARILYNN PRESSLEY; Location:[facility] OR     STENT, CORONARY, OLIVIA       PE:    Vitals noted gen nad cooperative alert, HEENT, oropharynx clear no exudate, neck supple nl rom, lungs with good air movement, RRR, S1, S2, abdomen with umbilical hernia. B inguinal hernias present. Grossly normal neurological exam.     A/P:    1. Cardiology; he follows with Dr. Mckeon (8/28/2019) for CRT/ICD and Dr. Matos (seen 5/2/2019). He has follow up 1/22/2020 with Dr. Matos. He remains on his same medications and no sxs.   2. DM2; on Glipizide and Jardiane; A1c on 11/6/2019 was 7.3%  3. Dermatology; he declines any evaluation  4. Influenza vaccination 11/6/2019. He got Shingrex vaccination series  5. Colonoscopy 12/15/2012 and repeat 10 years  6. Refer back to C/R for anal fistula. He was seen 3/14/2017  7.  Seen by  Dr. Massey, general surgery for umbilical and inguinal hernias 11/11/2019 and he is planning surgery. He is scheduled in the PAC clinic for preop.    8. PSA done on 11/6/2019 at 1.57.  9. Lipids done 11/6/2019; LDL 93 and HDL 41    Total time spent 15 minutes.  More than 50% of the time spent with Mr. Manriquez on counseling / coordinating his care

## 2019-12-06 NOTE — NURSING NOTE
Chief Complaint   Patient presents with     Recheck Medication     Patient is here for routine follow up       Baldemar Andrews CMA ([facility]) at 8:35 AM on 12/6/2019

## 2019-12-09 LAB
MDC_IDC_EPISODE_DTM: NORMAL
MDC_IDC_EPISODE_ID: NORMAL
MDC_IDC_EPISODE_TYPE: NORMAL
MDC_IDC_LEAD_IMPLANT_DT: NORMAL
MDC_IDC_LEAD_LOCATION: NORMAL
MDC_IDC_LEAD_LOCATION_DETAIL_1: NORMAL
MDC_IDC_LEAD_MFG: NORMAL
MDC_IDC_LEAD_MODEL: NORMAL
MDC_IDC_LEAD_POLARITY_TYPE: NORMAL
MDC_IDC_LEAD_SERIAL: NORMAL
MDC_IDC_LEAD_SPECIAL_FUNCTION: NORMAL
MDC_IDC_MSMT_BATTERY_DTM: NORMAL
MDC_IDC_MSMT_BATTERY_REMAINING_LONGEVITY: 132 MO
MDC_IDC_MSMT_BATTERY_REMAINING_PERCENTAGE: 100 %
MDC_IDC_MSMT_BATTERY_STATUS: NORMAL
MDC_IDC_MSMT_CAP_CHARGE_DTM: NORMAL
MDC_IDC_MSMT_CAP_CHARGE_TIME: 9.9 S
MDC_IDC_MSMT_CAP_CHARGE_TYPE: NORMAL
MDC_IDC_MSMT_LEADCHNL_LV_IMPEDANCE_VALUE: 590 OHM
MDC_IDC_MSMT_LEADCHNL_LV_PACING_THRESHOLD_AMPLITUDE: 0.8 V
MDC_IDC_MSMT_LEADCHNL_LV_PACING_THRESHOLD_PULSEWIDTH: 0.5 MS
MDC_IDC_MSMT_LEADCHNL_RA_IMPEDANCE_VALUE: 597 OHM
MDC_IDC_MSMT_LEADCHNL_RV_IMPEDANCE_VALUE: 696 OHM
MDC_IDC_MSMT_LEADCHNL_RV_PACING_THRESHOLD_AMPLITUDE: 0.6 V
MDC_IDC_MSMT_LEADCHNL_RV_PACING_THRESHOLD_PULSEWIDTH: 0.4 MS
MDC_IDC_PG_IMPLANT_DTM: NORMAL
MDC_IDC_PG_MFG: NORMAL
MDC_IDC_PG_MODEL: NORMAL
MDC_IDC_PG_SERIAL: NORMAL
MDC_IDC_PG_TYPE: NORMAL
MDC_IDC_SESS_CLINIC_NAME: NORMAL
MDC_IDC_SESS_DTM: NORMAL
MDC_IDC_SESS_TYPE: NORMAL
MDC_IDC_SET_BRADY_AT_MODE_SWITCH_MODE: NORMAL
MDC_IDC_SET_BRADY_AT_MODE_SWITCH_RATE: 170 {BEATS}/MIN
MDC_IDC_SET_BRADY_LOWRATE: 60 {BEATS}/MIN
MDC_IDC_SET_BRADY_MAX_SENSOR_RATE: 130 {BEATS}/MIN
MDC_IDC_SET_BRADY_MAX_TRACKING_RATE: 130 {BEATS}/MIN
MDC_IDC_SET_BRADY_MODE: NORMAL
MDC_IDC_SET_BRADY_PAV_DELAY_LOW: 180 MS
MDC_IDC_SET_BRADY_SAV_DELAY_LOW: 140 MS
MDC_IDC_SET_CRT_PACED_CHAMBERS: NORMAL
MDC_IDC_SET_LEADCHNL_LV_PACING_AMPLITUDE: 2 V
MDC_IDC_SET_LEADCHNL_LV_PACING_ANODE_ELECTRODE_1: NORMAL
MDC_IDC_SET_LEADCHNL_LV_PACING_ANODE_LOCATION_1: NORMAL
MDC_IDC_SET_LEADCHNL_LV_PACING_CAPTURE_MODE: NORMAL
MDC_IDC_SET_LEADCHNL_LV_PACING_CATHODE_ELECTRODE_1: NORMAL
MDC_IDC_SET_LEADCHNL_LV_PACING_CATHODE_LOCATION_1: NORMAL
MDC_IDC_SET_LEADCHNL_LV_PACING_PULSEWIDTH: 0.5 MS
MDC_IDC_SET_LEADCHNL_LV_SENSING_ADAPTATION_MODE: NORMAL
MDC_IDC_SET_LEADCHNL_LV_SENSING_ANODE_ELECTRODE_1: NORMAL
MDC_IDC_SET_LEADCHNL_LV_SENSING_ANODE_LOCATION_1: NORMAL
MDC_IDC_SET_LEADCHNL_LV_SENSING_CATHODE_ELECTRODE_1: NORMAL
MDC_IDC_SET_LEADCHNL_LV_SENSING_CATHODE_LOCATION_1: NORMAL
MDC_IDC_SET_LEADCHNL_LV_SENSING_SENSITIVITY: 1 MV
MDC_IDC_SET_LEADCHNL_RA_PACING_AMPLITUDE: 3.5 V
MDC_IDC_SET_LEADCHNL_RA_PACING_CAPTURE_MODE: NORMAL
MDC_IDC_SET_LEADCHNL_RA_PACING_POLARITY: NORMAL
MDC_IDC_SET_LEADCHNL_RA_PACING_PULSEWIDTH: 0.5 MS
MDC_IDC_SET_LEADCHNL_RA_SENSING_ADAPTATION_MODE: NORMAL
MDC_IDC_SET_LEADCHNL_RA_SENSING_POLARITY: NORMAL
MDC_IDC_SET_LEADCHNL_RA_SENSING_SENSITIVITY: 0.25 MV
MDC_IDC_SET_LEADCHNL_RV_PACING_AMPLITUDE: 2 V
MDC_IDC_SET_LEADCHNL_RV_PACING_CAPTURE_MODE: NORMAL
MDC_IDC_SET_LEADCHNL_RV_PACING_POLARITY: NORMAL
MDC_IDC_SET_LEADCHNL_RV_PACING_PULSEWIDTH: 0.4 MS
MDC_IDC_SET_LEADCHNL_RV_SENSING_ADAPTATION_MODE: NORMAL
MDC_IDC_SET_LEADCHNL_RV_SENSING_POLARITY: NORMAL
MDC_IDC_SET_LEADCHNL_RV_SENSING_SENSITIVITY: 0.6 MV
MDC_IDC_SET_ZONE_DETECTION_INTERVAL: 250 MS
MDC_IDC_SET_ZONE_DETECTION_INTERVAL: 300 MS
MDC_IDC_SET_ZONE_DETECTION_INTERVAL: 353 MS
MDC_IDC_SET_ZONE_TYPE: NORMAL
MDC_IDC_SET_ZONE_VENDOR_TYPE: NORMAL
MDC_IDC_STAT_AT_BURDEN_PERCENT: 0 %
MDC_IDC_STAT_AT_DTM_END: NORMAL
MDC_IDC_STAT_AT_DTM_START: NORMAL
MDC_IDC_STAT_BRADY_DTM_END: NORMAL
MDC_IDC_STAT_BRADY_DTM_START: NORMAL
MDC_IDC_STAT_BRADY_RA_PERCENT_PACED: 1 %
MDC_IDC_STAT_BRADY_RV_PERCENT_PACED: 4 %
MDC_IDC_STAT_CRT_DTM_END: NORMAL
MDC_IDC_STAT_CRT_DTM_START: NORMAL
MDC_IDC_STAT_CRT_LV_PERCENT_PACED: 95 %
MDC_IDC_STAT_EPISODE_RECENT_COUNT: 0
MDC_IDC_STAT_EPISODE_RECENT_COUNT_DTM_END: NORMAL
MDC_IDC_STAT_EPISODE_RECENT_COUNT_DTM_START: NORMAL
MDC_IDC_STAT_EPISODE_TYPE: NORMAL
MDC_IDC_STAT_EPISODE_VENDOR_TYPE: NORMAL
MDC_IDC_STAT_TACHYTHERAPY_ATP_DELIVERED_RECENT: 0
MDC_IDC_STAT_TACHYTHERAPY_ATP_DELIVERED_TOTAL: 0
MDC_IDC_STAT_TACHYTHERAPY_RECENT_DTM_END: NORMAL
MDC_IDC_STAT_TACHYTHERAPY_RECENT_DTM_START: NORMAL
MDC_IDC_STAT_TACHYTHERAPY_SHOCKS_ABORTED_RECENT: 0
MDC_IDC_STAT_TACHYTHERAPY_SHOCKS_ABORTED_TOTAL: 0
MDC_IDC_STAT_TACHYTHERAPY_SHOCKS_DELIVERED_RECENT: 0
MDC_IDC_STAT_TACHYTHERAPY_SHOCKS_DELIVERED_TOTAL: 0
MDC_IDC_STAT_TACHYTHERAPY_TOTAL_DTM_END: NORMAL
MDC_IDC_STAT_TACHYTHERAPY_TOTAL_DTM_START: NORMAL

## 2019-12-24 ENCOUNTER — TELEPHONE (OUTPATIENT)
Dept: CARDIOLOGY | Facility: CLINIC | Age: 64
End: 2019-12-24

## 2019-12-24 NOTE — TELEPHONE ENCOUNTER
Request per pt call. See below.    carvedilol (COREG) 25 MG tablet  9/10/19  #180 3 rfs.    ticagrelor (BRILINTA) 90 MG tablet  5/22/19.  #180  3rfs.     aspirin (ASA) 81 MG chewable tablet  12/27/19  #30 11 rfs.    Per pharmacy  ticagrelor (BRILINTA) 90 MG tablet #10  carvedilol (COREG) 25 MG tablet  #10  aspirin (ASA) 81 MG chewable tablet  #5    Emergency supply x 5 days called to [address], [place] [address] PHONE: [phone]  Not entered on med list. called from current order.

## 2019-12-24 NOTE — TELEPHONE ENCOUNTER
[facility] Call Center    Phone Message    May a detailed message be left on voicemail: yes    Reason for Call: Medication Refill Request    Has the patient contacted the pharmacy for the refill? Yes   Name of medication being requested: carvedilol (COREG) 25 MG tablet and ticagrelor (BRILINTA) 90 MG tablet  Provider who prescribed the medication: Bela Hayden and Dr. Matos  Pharmacy: [facility] [address], [place] [address] PHONE: [phone]  Date medication is needed: 12/24/19   Jolly calling to request orders be sent to this [facility], so that [facility] can get an emergency supply. [facility] is out of the medication, but their normal pharmacy is closed until Thursday. [facility] needs the medication for today and tomorrow. Please give a Jolly a call back at your earliest convenience to confirm the order has been sent.      Action Taken: Message routed to:  [facility] ([facility] [place] [facility]

## 2019-12-30 DIAGNOSIS — I21.02 ST ELEVATION MYOCARDIAL INFARCTION INVOLVING LEFT ANTERIOR DESCENDING (LAD) CORONARY ARTERY (H): Primary | ICD-10-CM

## 2020-01-03 RX ORDER — ASPIRIN 81 MG/1
81 TABLET, CHEWABLE ORAL DAILY
Qty: 90 TABLET | Refills: 3 | Status: SHIPPED | OUTPATIENT
Start: 2020-01-03 | End: 2021-02-15

## 2020-01-13 ENCOUNTER — PATIENT OUTREACH (OUTPATIENT)
Dept: CARDIOLOGY | Facility: CLINIC | Age: 65
End: 2020-01-13

## 2020-01-13 DIAGNOSIS — I42.9 CARDIOMYOPATHY, UNSPECIFIED TYPE (H): ICD-10-CM

## 2020-01-13 DIAGNOSIS — I50.20 HEART FAILURE WITH REDUCED EJECTION FRACTION, NYHA CLASS III (H): Primary | ICD-10-CM

## 2020-01-14 ENCOUNTER — TELEPHONE (OUTPATIENT)
Dept: SURGERY | Facility: CLINIC | Age: 65
End: 2020-01-14

## 2020-01-14 NOTE — TELEPHONE ENCOUNTER
----- Message from Nancy Hernandez RN sent at 1/14/2020  1:10 PM CST -----  Hi I just called patient for PAN call  He has not obtained a H&P he said he was told it wasn't needed,  I also note in your clinic instructions he was to hold brillenta.  He has not done that.  Since I spoke to him he has made a H&P appointment for tomorrow.  His wife requests a call as she wonders about rescheduling due to his brillenta?  Her name is Jolly brar .  Please advise me as well,  Nancy LUNA

## 2020-01-14 NOTE — TELEPHONE ENCOUNTER
This writer called the patient and confirmed the need for a reschedule. The patient would like surgery on a Thursday or Friday. This writer let him know that 01/23/2020 or 01/24/2020 would be looked at and he can expect a call to confirm date. Patient will be contacted and is aware that he can expect this call.

## 2020-01-14 NOTE — TELEPHONE ENCOUNTER
Patient is scheduled for inguinal hernia repair on 1/16/19.  At the time of the patients consultation with Dr. Massey, he was asked to hold the Brilinta for 5 days prior to surgery; however, he may take ASA 81 mg po daily- no need to hold.  Patient will also need current H&P for procedure.    Returned call to patients spouse as requested.  Surgery will need to be rescheduled, patient will need to hold Brilinta for 5 days prior to surgery, and have a current H&P.    P:  Reschedule surgery

## 2020-01-17 ENCOUNTER — TELEPHONE (OUTPATIENT)
Dept: SURGERY | Facility: CLINIC | Age: 65
End: 2020-01-17

## 2020-01-17 PROBLEM — K40.90 LEFT INGUINAL HERNIA: Status: ACTIVE | Noted: 2019-11-11

## 2020-01-17 NOTE — TELEPHONE ENCOUNTER
----- Message from Palma Davidson sent at 1/16/2020  5:36 PM CST -----  Wife called because she hadn't heard anything about rescheduling surgery. I see your note about 1/23 or 1/24, but I saw Shilpa's message today about preferring to not do any cases at [facility] and so I am not sure if you will keep [facility] scheduled on that date.    I figured I could leave this for you :-)

## 2020-01-17 NOTE — TELEPHONE ENCOUNTER
Patient is not available for surgery on 01/24/2020. He would like to reschedule to after 02/14/2020. This writer let the patient know that this would be looked into and he can expect a call back to confirm a surgery date. Patient expressed understanding and agreed to this plan.

## 2020-01-17 NOTE — TELEPHONE ENCOUNTER
This writer called the patient and he was not able to talk. He would like a call around 3:00 pm as that would be a better time for him. This writer will reach out to him then.

## 2020-01-21 DIAGNOSIS — E11.9 TYPE 2 DIABETES MELLITUS WITHOUT COMPLICATION, WITHOUT LONG-TERM CURRENT USE OF INSULIN (H): ICD-10-CM

## 2020-01-22 ENCOUNTER — ANCILLARY PROCEDURE (OUTPATIENT)
Dept: CARDIOLOGY | Facility: CLINIC | Age: 65
End: 2020-01-22
Payer: COMMERCIAL

## 2020-01-22 ENCOUNTER — ANCILLARY PROCEDURE (OUTPATIENT)
Dept: CARDIOLOGY | Facility: CLINIC | Age: 65
End: 2020-01-22
Attending: INTERNAL MEDICINE
Payer: COMMERCIAL

## 2020-01-22 ENCOUNTER — TELEPHONE (OUTPATIENT)
Dept: SURGERY | Facility: CLINIC | Age: 65
End: 2020-01-22

## 2020-01-22 VITALS
OXYGEN SATURATION: 95 % | HEIGHT: 67 IN | SYSTOLIC BLOOD PRESSURE: 131 MMHG | HEART RATE: 82 BPM | WEIGHT: 184.8 LBS | BODY MASS INDEX: 29 KG/M2 | DIASTOLIC BLOOD PRESSURE: 84 MMHG

## 2020-01-22 DIAGNOSIS — I10 BENIGN ESSENTIAL HYPERTENSION: ICD-10-CM

## 2020-01-22 DIAGNOSIS — K40.11 BILATERAL RECURRENT INGUINAL HERNIA WITH GANGRENE: ICD-10-CM

## 2020-01-22 DIAGNOSIS — I25.5 ISCHEMIC CARDIOMYOPATHY: ICD-10-CM

## 2020-01-22 DIAGNOSIS — E78.00 HIGH BLOOD CHOLESTEROL: ICD-10-CM

## 2020-01-22 DIAGNOSIS — I42.9 CARDIOMYOPATHY, UNSPECIFIED TYPE (H): Primary | ICD-10-CM

## 2020-01-22 DIAGNOSIS — E78.2 MIXED HYPERLIPIDEMIA: ICD-10-CM

## 2020-01-22 DIAGNOSIS — Z95.810 S/P ICD (INTERNAL CARDIAC DEFIBRILLATOR) PROCEDURE: ICD-10-CM

## 2020-01-22 DIAGNOSIS — Z23 NEED FOR INFLUENZA VACCINATION: ICD-10-CM

## 2020-01-22 DIAGNOSIS — I50.20 HEART FAILURE WITH REDUCED EJECTION FRACTION, NYHA CLASS III (H): ICD-10-CM

## 2020-01-22 DIAGNOSIS — I25.10 CORONARY ARTERY DISEASE INVOLVING NATIVE CORONARY ARTERY OF NATIVE HEART WITHOUT ANGINA PECTORIS: ICD-10-CM

## 2020-01-22 DIAGNOSIS — I42.9 CARDIOMYOPATHY, UNSPECIFIED TYPE (H): ICD-10-CM

## 2020-01-22 DIAGNOSIS — Z12.5 SCREENING FOR PROSTATE CANCER: ICD-10-CM

## 2020-01-22 LAB
ALBUMIN SERPL-MCNC: 4.1 G/DL (ref 3.4–5)
ALP SERPL-CCNC: 61 U/L (ref 40–150)
ALT SERPL W P-5'-P-CCNC: 30 U/L (ref 0–70)
ANION GAP SERPL CALCULATED.3IONS-SCNC: 7 MMOL/L (ref 3–14)
AST SERPL W P-5'-P-CCNC: 16 U/L (ref 0–45)
BILIRUB SERPL-MCNC: 1.1 MG/DL (ref 0.2–1.3)
BUN SERPL-MCNC: 26 MG/DL (ref 7–30)
CALCIUM SERPL-MCNC: 9.4 MG/DL (ref 8.5–10.1)
CHLORIDE SERPL-SCNC: 103 MMOL/L (ref 94–109)
CO2 SERPL-SCNC: 26 MMOL/L (ref 20–32)
CREAT SERPL-MCNC: 0.97 MG/DL (ref 0.66–1.25)
CREAT UR-MCNC: 22 MG/DL
ERYTHROCYTE [DISTWIDTH] IN BLOOD BY AUTOMATED COUNT: 14.3 % (ref 10–15)
GFR SERPL CREATININE-BSD FRML MDRD: 82 ML/MIN/{1.73_M2}
GLUCOSE SERPL-MCNC: 198 MG/DL (ref 70–99)
HCT VFR BLD AUTO: 47.6 % (ref 40–53)
HGB BLD-MCNC: 16.3 G/DL (ref 13.3–17.7)
MCH RBC QN AUTO: 30.9 PG (ref 26.5–33)
MCHC RBC AUTO-ENTMCNC: 34.2 G/DL (ref 31.5–36.5)
MCV RBC AUTO: 90 FL (ref 78–100)
MICROALBUMIN UR-MCNC: <5 MG/L
MICROALBUMIN/CREAT UR: NORMAL MG/G CR (ref 0–17)
NT-PROBNP SERPL-MCNC: 386 PG/ML (ref 0–125)
PLATELET # BLD AUTO: 242 10E9/L (ref 150–450)
POTASSIUM SERPL-SCNC: 4 MMOL/L (ref 3.4–5.3)
PROT SERPL-MCNC: 8 G/DL (ref 6.8–8.8)
RBC # BLD AUTO: 5.27 10E12/L (ref 4.4–5.9)
SODIUM SERPL-SCNC: 136 MMOL/L (ref 133–144)
WBC # BLD AUTO: 8.7 10E9/L (ref 4–11)

## 2020-01-22 PROCEDURE — 82043 UR ALBUMIN QUANTITATIVE: CPT | Performed by: INTERNAL MEDICINE

## 2020-01-22 PROCEDURE — G0463 HOSPITAL OUTPT CLINIC VISIT: HCPCS | Mod: 25,ZF

## 2020-01-22 PROCEDURE — 36415 COLL VENOUS BLD VENIPUNCTURE: CPT | Performed by: INTERNAL MEDICINE

## 2020-01-22 PROCEDURE — 83880 ASSAY OF NATRIURETIC PEPTIDE: CPT | Performed by: INTERNAL MEDICINE

## 2020-01-22 PROCEDURE — 99214 OFFICE O/P EST MOD 30 MIN: CPT | Mod: 25 | Performed by: INTERNAL MEDICINE

## 2020-01-22 PROCEDURE — 85027 COMPLETE CBC AUTOMATED: CPT | Performed by: INTERNAL MEDICINE

## 2020-01-22 PROCEDURE — 80053 COMPREHEN METABOLIC PANEL: CPT | Performed by: INTERNAL MEDICINE

## 2020-01-22 RX ORDER — GLIPIZIDE 5 MG/1
5 TABLET, FILM COATED, EXTENDED RELEASE ORAL DAILY
Qty: 90 TABLET | Refills: 1 | Status: SHIPPED | OUTPATIENT
Start: 2020-01-22 | End: 2020-07-27

## 2020-01-22 ASSESSMENT — PAIN SCALES - GENERAL: PAINLEVEL: NO PAIN (0)

## 2020-01-22 ASSESSMENT — MIFFLIN-ST. JEOR: SCORE: 1578.94

## 2020-01-22 NOTE — TELEPHONE ENCOUNTER
empagliflozin (JARDIANCE) 25 MG TABS tablet      Last Written Prescription Date:  7/23/19  Last Fill Quantity: 90,   # refills: 1  Last Office Visit : 12/6/19  Future Office visit:  none    Refill to pharmacy.     glipiZIDE (GLUCOTROL XL) 5 MG 24 hr tablet    Last Written Prescription Date:  7/23/19  Last Fill Quantity: 90,   # refills: 1    Refills to pharmacy.

## 2020-01-22 NOTE — PATIENT INSTRUCTIONS
It was a pleasure to see you in clinic today.  Please do not hesitate to call with any questions or concerns.  You are scheduled for an automatic remote transmission on 4/22/20.  We look forward to seeing you in clinic at your next device check in 6 months.    Aimee Rubio RN, BSN  Electrophysiology Nurse Clinician  [facility]    During Business Hours Please Call:  [phone]  After Hours Please Call:  [phone] - select option #4 and ask for job code [phone]

## 2020-01-22 NOTE — PATIENT INSTRUCTIONS
Please increase Entresto to 49-51 MG twice daily.    Thank you for your visit today.  Please call me with any questions or concerns.   Vladimir Servin RN  Cardiology Care Coordinator  [phone], press option 1 then option 4

## 2020-01-22 NOTE — TELEPHONE ENCOUNTER
Patient too busy to schedule pre-op physical. He asked this writer to call wife. Home phone number in chart appears to be out of service. This writer called the patient's spouse, there was no answer. This writer left message with this writer's direct line [phone].

## 2020-01-22 NOTE — LETTER
1/22/2020      RE: Anson Manriquez  [address]  [facility] [address]       Dear Colleague,    Thank you for the opportunity to participate in the care of your patient, Anson Manriquez, at the [facility]. Please see a copy of my visit note below.    January 22, 2020     Mr. Anson Manriquez is a 63yr old male with a history of HTN, DMII, CAD (s/p STEMI in 2007 with pLAD stenting, and recent STEMI 9/2018), and ICM complicated by cardiogenic shock requiring IABP support with slow wean. Mr. Manriquez was was out with friends and developed acute chest pain and after going home his pain worsened and had syncope, then cardiac arrest. EMS was called, and on arrival provided ~30 seconds of CPR and AED delivered 1 shock.  He was then brought to [facility] in sinus rhythm with a pulse for most of the transport.  During the last 5 mins en route to the ED, he went into a wide-complex tachycardia at a rate of 220bpm, likely VT.  He maintained a pulse, acceptable BP, and mental status.  He received amiodarone 150mg in the ED, and converted to NSR as he was being prepared for cardioversion.  He was then sent to the cath lab, where he was found to have an acute thrombotic lesion of the pLAD within the prior stent.  He was treated with aspiration thrombectomy, and STEVO x2 to pLAD and mLAD, and POBA to D1.  His LVEDP was 40mmHg. Peak troponin was ~17.  Echo showed LVEF 23% with LAD territory akinesis, consistent with echo results from 2012.  Therefore, his LAD had limited viability from his prior MI, which explains his relatively low troponin and unchanged LVEF. He was ultimately started on DAPT with aspirin and Brilinta, lisinopril, carvedilol, and lasix, and discharged home on 9/20/18.   He has been seen in clinic a few times now, with EF still 30-35%.  Despite evidence based therapy and his reduced EF, he went for CRT-D with Dr. Mckeon 4/25/19.  Procedure went well without  complications.  Today he presents for follow-up.  Note that he has been doing very well he does remodeling the kitchen this morning he has excellent exercise tolerance and stable from the most recent visit.  He takes all medications as prescribed denies any chest pain dizziness lightheadedness no palpitations no ICD shocks.  Weight has been stable however blood pressure has been creeping up a little bit.  No other complaints.     PAST MEDICAL HISTORY:  Past Medical History:   Diagnosis Date     Chronic infection     near rectum still leaking     Coronary artery disease      Diabetes mellitus, type 2 (H)      Heart attack (H) 4/17/2007     Hyperlipidemia      Hypertension      STEMI (ST elevation myocardial infarction) (H) 09/13/2018    s/p STEVO x2     Stented coronary artery 2007     FAMILY HISTORY:  Family History   Problem Relation Age of Onset     Hypertension Mother      Diabetes Father      Glaucoma No family hx of      Macular Degeneration No family hx of       SOCIAL HISTORY:  Social History     Socioeconomic History     Marital status:      Spouse name: None     Number of children: None     Years of education: None     Highest education level: None   Occupational History     None   Social Needs     Financial resource strain: None     Food insecurity:     Worry: None     Inability: None     Transportation needs:     Medical: None     Non-medical: None   Tobacco Use     Smoking status: Never Smoker     Smokeless tobacco: Never Used   Substance and Sexual Activity     Alcohol use: No     Drug use: No     Sexual activity: None   Lifestyle     Physical activity:     Days per week: None     Minutes per session: None     Stress: None   Relationships     Social connections:     Talks on phone: None     Gets together: None     Attends Rastafarian service: None     Active member of club or organization: None     Attends meetings of clubs or organizations: None     Relationship status: None     Intimate partner  "violence:     Fear of current or ex partner: None     Emotionally abused: None     Physically abused: None     Forced sexual activity: None   Other Topics Concern     None   Social History Narrative     None     CURRENT MEDICATIONS:  Current Outpatient Medications   Medication     aspirin (ASA) 81 MG chewable tablet     atorvastatin (LIPITOR) 80 MG tablet     carvedilol (COREG) 25 MG tablet     empagliflozin (JARDIANCE) 25 MG TABS tablet     furosemide (LASIX) 40 MG tablet     glipiZIDE (GLIPIZIDE XL) 5 MG 24 hr tablet     MULTIPLE VITAMIN PO     sacubitril-valsartan (ENTRESTO) 24-26 MG per tablet     spironolactone (ALDACTONE) 25 MG tablet     ticagrelor (BRILINTA) 90 MG tablet     vitamin B complex with vitamin C (VITAMIN  B COMPLEX) tablet     No current facility-administered medications for this visit.      Facility-Administered Medications Ordered in Other Visits   Medication     perflutren diluted 1mL to 2mL with saline (OPTISON) diluted injection 5 mL     sodium chloride (PF) 0.9% PF flush 10 mL     sodium chloride (PF) 0.9% PF flush 10 mL      ROS:   Constitutional: No fever, chills, or sweats. Weight is 184 lbs 12.8 oz  ENT: No visual disturbance, ear ache, epistaxis, sore throat.   Allergies/Immunologic: Negative.   Respiratory: No cough, hemoptysis.   Cardiovascular: As per HPI.   GI: No nausea, vomiting, hematemesis, melena, or hematochezia.   : No urinary frequency, dysuria, or hematuria.   Integument: Negative.   Psychiatric: Pleasant, no major depression noted  Neuro: No focal neurological deficits noted  Endocrinology: Negative.   Musculoskeletal: As per HPI.      EXAM:  /84 (BP Location: Right arm, Patient Position: Chair, Cuff Size: Adult Regular)   Pulse 82   Ht 1.689 m (5' 6.5\")   Wt 83.8 kg (184 lb 12.8 oz)   SpO2 95%   BMI 29.38 kg/m     General: appears comfortable, alert and oriented  Head: normocephalic, atraumatic  Eyes: anicteric sclera, EOMI , PERRL  Neck: no " adenopathy  Orophyarynx: moist mucosa, no lesions noted  Heart: regular, S1/S2, no murmurs, rubs or gallop. JVP not appreciated  Chest: ecchymoses along lower chest, swelling near device pocket, non tender  Lungs: CTAB, No wheezing.   Abdomen: soft, non-tender, bowel sounds present, no hepatosplenomegaly  Extremities: No LE edema today  Skin: no open lesions noted  Neuro: grossly non-focal     Labs:  Lab Results   Component Value Date    WBC 8.7 01/22/2020    HGB 16.3 01/22/2020    HCT 47.6 01/22/2020     01/22/2020     01/22/2020    POTASSIUM 4.0 01/22/2020    CHLORIDE 103 01/22/2020    CO2 26 01/22/2020    BUN 26 01/22/2020    CR 0.97 01/22/2020     (H) 01/22/2020    NTBNP 386 (H) 01/22/2020    TROPONIN 0.14 (HH) 09/13/2018    TROPI 1.914 (HH) 09/18/2018    AST 16 01/22/2020    ALT 30 01/22/2020    ALKPHOS 61 01/22/2020    BILITOTAL 1.1 01/22/2020    INR 1.16 (H) 04/25/2019     Procedures:  Coronary angio:  9/13/18  -Both coronary arteries arise from their respective cusps.  -Dominance: Right  -LM has no evidence of coronary artery disease.  -LAD: Type 3 [LAD supplies the entire apex]. The LAD gives rise to septal perforators, multiple diagonal branches. The proximal LAD has an acute thrombotic lesion within the prior stent. Distal to the stent, there is a 70-80% stenosis. The distal vessel has HALIMA 2 flow. The D1 has thrombus at the ostium likely from embolization. The apical LAD also has thrombus from embolization.  -LCX gives rise to a large branching OM. The prox Cx has a 20% stenosis. The rest of the Cx system has mild disease.  -RCA (dominant) gives rise to PL branches and supplies PDA. There is minimal disease in the RCA system.     Echo:  9/14/18  Ischemic cardiomyopathy, LVEF=23% with extensive regional wall motion abnormalities as described below. Resting wall motion abnormalities and LV function are not significantly changed from the rest images on the 11/27/12 stress echocardiogram.   Mildly dilated LV with severely reduced LV function, LVEF=23%. There is akinesis involving the hxsjw-oh-nkt anteroseptal, ubp-fh-obmvwo anterior, mid anterolateral, and all apical myocardial segments. The basal anterior, basal anterolateral, and basal inferolateral segments are relatively spared. RV size and function are normal. No significant valvular abnormalities. No pericardial effusion. Normal IVC with abnormal respiratory variation, estimated RA pressure 8 mmHg.     Echo 1/30/19  Ischemic cardiomyopathy with moderately dilated LV with moderately reduced  global LV function, LVEF=31%. Extensive regional wall motion abnormalities as  described below, unchanged from 9/14/18.  This study was compared with the study from 9/14/18: There has been no  significant change. Specifically, LV function and wall motion abnormalities  have not changed significantly.  The right atria appears normal. Severe left atrial enlargement is present. This study was compared with the study from 9/14/18 . There has been no significant change.    TTE 1/22/2020:  Ischemic cardiomyopathy with moderately dilated LV with moderately reduced  global LV function, LVEF=29%. Extensive regional wall motion abnormalities as  described below, unchanged from the prior study.  This study was compared with the study from 8/28/19: There has been no  significant change. Specifically, LV function and wall motion abnormalities  have not changed significantly.      ASSESSMENT AND PLAN:  Mr. Anson Manriquez is a 63yr old male with a history of HTN, DMII, CAD (s/p MI in 2007, prior remote pLAD stenting, STEMI 9/2018), ICM complicated by cardiac arrest and ventricular tachycardia who presented to the cardiology clinic for further outpatient management.  He has been doing very well offers no new complaints.       ICM - HFrEF 30%, ACC/AHA C, NYHA I-II, excellent exercise tolerance.  Has CRT-D for SCD prevention  On Carvedilol 25mg BID  On entresto 24-26mg BID,  normal renal function and potassium.  We will increase Entresto today to 49 mg twice daily dosing.  Lasix 40mg BID, continue, appears euvolemic today.  Continue ASA/ticagrelor/lipitor for CAD/PCI.  He does have a hernia repair scheduled and at this point he may discontinue Brilinta 5 to 7 days prior to the planned surgery as his most recent PCI was September 2018.  I would consider restarting Brilinta once safe from the surgical standpoint.  Would also continue aspirin unless required to be stopped from the surgical standpoint.  Continue aldactone 12.5mg for treatment of his ICM  He may undergo hernia repair from the cardiac standpoint understanding that he is at moderate risk for the procedure.  I would continue all other medications as able again Brilinta may be stopped few days prior to the surgery as above.    Continue follow up in [facility] clinic and we will see patient back in 6 months.     Jaosn Matos MD

## 2020-01-22 NOTE — PROGRESS NOTES
January 22, 2020     Mr. Anson Manriquez is a 63yr old male with a history of HTN, DMII, CAD (s/p STEMI in 2007 with pLAD stenting, and recent STEMI 9/2018), and ICM complicated by cardiogenic shock requiring IABP support with slow wean. Mr. Manriquez was was out with friends and developed acute chest pain and after going home his pain worsened and had syncope, then cardiac arrest. EMS was called, and on arrival provided ~30 seconds of CPR and AED delivered 1 shock.  He was then brought to [facility] in sinus rhythm with a pulse for most of the transport.  During the last 5 mins en route to the ED, he went into a wide-complex tachycardia at a rate of 220bpm, likely VT.  He maintained a pulse, acceptable BP, and mental status.  He received amiodarone 150mg in the ED, and converted to NSR as he was being prepared for cardioversion.  He was then sent to the cath lab, where he was found to have an acute thrombotic lesion of the pLAD within the prior stent.  He was treated with aspiration thrombectomy, and STEVO x2 to pLAD and mLAD, and POBA to D1.  His LVEDP was 40mmHg. Peak troponin was ~17.  Echo showed LVEF 23% with LAD territory akinesis, consistent with echo results from 2012.  Therefore, his LAD had limited viability from his prior MI, which explains his relatively low troponin and unchanged LVEF. He was ultimately started on DAPT with aspirin and Brilinta, lisinopril, carvedilol, and lasix, and discharged home on 9/20/18.   He has been seen in clinic a few times now, with EF still 30-35%.  Despite evidence based therapy and his reduced EF, he went for CRT-D with Dr. Mckeon 4/25/19.  Procedure went well without complications.  Today he presents for follow-up.  Note that he has been doing very well he does remodeling the kitchen this morning he has excellent exercise tolerance and stable from the most recent visit.  He takes all medications as prescribed denies any chest pain dizziness lightheadedness no palpitations no ICD  shocks.  Weight has been stable however blood pressure has been creeping up a little bit.  No other complaints.     PAST MEDICAL HISTORY:  Past Medical History:   Diagnosis Date     Chronic infection     near rectum still leaking     Coronary artery disease      Diabetes mellitus, type 2 (H)      Heart attack (H) 4/17/2007     Hyperlipidemia      Hypertension      STEMI (ST elevation myocardial infarction) (H) 09/13/2018    s/p STEVO x2     Stented coronary artery 2007     FAMILY HISTORY:  Family History   Problem Relation Age of Onset     Hypertension Mother      Diabetes Father      Glaucoma No family hx of      Macular Degeneration No family hx of       SOCIAL HISTORY:  Social History     Socioeconomic History     Marital status:      Spouse name: None     Number of children: None     Years of education: None     Highest education level: None   Occupational History     None   Social Needs     Financial resource strain: None     Food insecurity:     Worry: None     Inability: None     Transportation needs:     Medical: None     Non-medical: None   Tobacco Use     Smoking status: Never Smoker     Smokeless tobacco: Never Used   Substance and Sexual Activity     Alcohol use: No     Drug use: No     Sexual activity: None   Lifestyle     Physical activity:     Days per week: None     Minutes per session: None     Stress: None   Relationships     Social connections:     Talks on phone: None     Gets together: None     Attends Pentecostal service: None     Active member of club or organization: None     Attends meetings of clubs or organizations: None     Relationship status: None     Intimate partner violence:     Fear of current or ex partner: None     Emotionally abused: None     Physically abused: None     Forced sexual activity: None   Other Topics Concern     None   Social History Narrative     None     CURRENT MEDICATIONS:  Current Outpatient Medications   Medication     aspirin (ASA) 81 MG chewable tablet      "atorvastatin (LIPITOR) 80 MG tablet     carvedilol (COREG) 25 MG tablet     empagliflozin (JARDIANCE) 25 MG TABS tablet     furosemide (LASIX) 40 MG tablet     glipiZIDE (GLIPIZIDE XL) 5 MG 24 hr tablet     MULTIPLE VITAMIN PO     sacubitril-valsartan (ENTRESTO) 24-26 MG per tablet     spironolactone (ALDACTONE) 25 MG tablet     ticagrelor (BRILINTA) 90 MG tablet     vitamin B complex with vitamin C (VITAMIN  B COMPLEX) tablet     No current facility-administered medications for this visit.      Facility-Administered Medications Ordered in Other Visits   Medication     perflutren diluted 1mL to 2mL with saline (OPTISON) diluted injection 5 mL     sodium chloride (PF) 0.9% PF flush 10 mL     sodium chloride (PF) 0.9% PF flush 10 mL      ROS:   Constitutional: No fever, chills, or sweats. Weight is 184 lbs 12.8 oz  ENT: No visual disturbance, ear ache, epistaxis, sore throat.   Allergies/Immunologic: Negative.   Respiratory: No cough, hemoptysis.   Cardiovascular: As per HPI.   GI: No nausea, vomiting, hematemesis, melena, or hematochezia.   : No urinary frequency, dysuria, or hematuria.   Integument: Negative.   Psychiatric: Pleasant, no major depression noted  Neuro: No focal neurological deficits noted  Endocrinology: Negative.   Musculoskeletal: As per HPI.      EXAM:  /84 (BP Location: Right arm, Patient Position: Chair, Cuff Size: Adult Regular)   Pulse 82   Ht 1.689 m (5' 6.5\")   Wt 83.8 kg (184 lb 12.8 oz)   SpO2 95%   BMI 29.38 kg/m    General: appears comfortable, alert and oriented  Head: normocephalic, atraumatic  Eyes: anicteric sclera, EOMI , PERRL  Neck: no adenopathy  Orophyarynx: moist mucosa, no lesions noted  Heart: regular, S1/S2, no murmurs, rubs or gallop. JVP not appreciated  Chest: ecchymoses along lower chest, swelling near device pocket, non tender  Lungs: CTAB, No wheezing.   Abdomen: soft, non-tender, bowel sounds present, no hepatosplenomegaly  Extremities: No LE edema " today  Skin: no open lesions noted  Neuro: grossly non-focal     Labs:  Lab Results   Component Value Date    WBC 8.7 01/22/2020    HGB 16.3 01/22/2020    HCT 47.6 01/22/2020     01/22/2020     01/22/2020    POTASSIUM 4.0 01/22/2020    CHLORIDE 103 01/22/2020    CO2 26 01/22/2020    BUN 26 01/22/2020    CR 0.97 01/22/2020     (H) 01/22/2020    NTBNP 386 (H) 01/22/2020    TROPONIN 0.14 (HH) 09/13/2018    TROPI 1.914 (HH) 09/18/2018    AST 16 01/22/2020    ALT 30 01/22/2020    ALKPHOS 61 01/22/2020    BILITOTAL 1.1 01/22/2020    INR 1.16 (H) 04/25/2019     Procedures:  Coronary angio:  9/13/18  -Both coronary arteries arise from their respective cusps.  -Dominance: Right  -LM has no evidence of coronary artery disease.  -LAD: Type 3 [LAD supplies the entire apex]. The LAD gives rise to septal perforators, multiple diagonal branches. The proximal LAD has an acute thrombotic lesion within the prior stent. Distal to the stent, there is a 70-80% stenosis. The distal vessel has HALIMA 2 flow. The D1 has thrombus at the ostium likely from embolization. The apical LAD also has thrombus from embolization.  -LCX gives rise to a large branching OM. The prox Cx has a 20% stenosis. The rest of the Cx system has mild disease.  -RCA (dominant) gives rise to PL branches and supplies PDA. There is minimal disease in the RCA system.     Echo:  9/14/18  Ischemic cardiomyopathy, LVEF=23% with extensive regional wall motion abnormalities as described below. Resting wall motion abnormalities and LV function are not significantly changed from the rest images on the 11/27/12 stress echocardiogram.  Mildly dilated LV with severely reduced LV function, LVEF=23%. There is akinesis involving the vomry-uv-gkg anteroseptal, ayv-xd-sfdiwc anterior, mid anterolateral, and all apical myocardial segments. The basal anterior, basal anterolateral, and basal inferolateral segments are relatively spared. RV size and function are normal.  No significant valvular abnormalities. No pericardial effusion. Normal IVC with abnormal respiratory variation, estimated RA pressure 8 mmHg.     Echo 1/30/19  Ischemic cardiomyopathy with moderately dilated LV with moderately reduced  global LV function, LVEF=31%. Extensive regional wall motion abnormalities as  described below, unchanged from 9/14/18.  This study was compared with the study from 9/14/18: There has been no  significant change. Specifically, LV function and wall motion abnormalities  have not changed significantly.  The right atria appears normal. Severe left atrial enlargement is present. This study was compared with the study from 9/14/18 . There has been no significant change.    TTE 1/22/2020:  Ischemic cardiomyopathy with moderately dilated LV with moderately reduced  global LV function, LVEF=29%. Extensive regional wall motion abnormalities as  described below, unchanged from the prior study.  This study was compared with the study from 8/28/19: There has been no  significant change. Specifically, LV function and wall motion abnormalities  have not changed significantly.      ASSESSMENT AND PLAN:  Mr. Anson Manriquez is a 63yr old male with a history of HTN, DMII, CAD (s/p MI in 2007, prior remote pLAD stenting, STEMI 9/2018), ICM complicated by cardiac arrest and ventricular tachycardia who presented to the cardiology clinic for further outpatient management.  He has been doing very well offers no new complaints.       ICM - HFrEF 30%, ACC/AHA C, NYHA I-II, excellent exercise tolerance.  Has CRT-D for SCD prevention  On Carvedilol 25mg BID  On entresto 24-26mg BID, normal renal function and potassium.  We will increase Entresto today to 49 mg twice daily dosing.  Lasix 40mg BID, continue, appears euvolemic today.  Continue ASA/ticagrelor/lipitor for CAD/PCI.  He does have a hernia repair scheduled and at this point he may discontinue Brilinta 5 to 7 days prior to the planned surgery as his most  recent PCI was September 2018.  I would consider restarting Brilinta once safe from the surgical standpoint.  Would also continue aspirin unless required to be stopped from the surgical standpoint.  Continue aldactone 12.5mg for treatment of his ICM  He may undergo hernia repair from the cardiac standpoint understanding that he is at moderate risk for the procedure.  I would continue all other medications as able again Brilinta may be stopped few days prior to the surgery as above.    Continue follow up in [facility] clinic and we will see patient back in 6 months.     Jason Matos MD

## 2020-01-22 NOTE — NURSING NOTE
Chief Complaint   Patient presents with     Follow Up     F/U HF with device check/Echo/labs prior, per pt's wife     Vitals were taken and medications were reconciled.     Yancy Eckert CMA    2:58 PM

## 2020-01-22 NOTE — TELEPHONE ENCOUNTER
Patient is scheduled for surgery with Dr. Flakito Massey      Spoke with: Anson Manriquez and his spouse, Jolly, about surgery dates.    Date of Surgery: 02/20/2020 at 2:15 PM with Dr. Flakito Massey    Location:     [address] [address]      [phone]      [email or web address]      H&P: Scheduled with [facility] ([facility]) 02/11/2020 at 7:15 AM.      Informed patient they will need an adult : Yes    Surgery packet: Mailed per patient request.    Additional comments: She also knows to call general surgery if he experiences any cold or flu symptoms. Surgery teaching and soap were given to in clinic on 11/11/2019. She was asked to call [phone] if they need to reschedule and [phone] if he experiences any symptoms.

## 2020-01-23 NOTE — TELEPHONE ENCOUNTER
FUTURE VISIT INFORMATION      SURGERY INFORMATION:    Date: 20    Location: [facility] OR    Surgeon:  Flakito Massey    Anesthesia Type:  General    Procedure: Left HERNIORRHAPHY, INGUINAL, OPEN    Consult: OV 19- Shilpa- General surgery    RECORDS REQUESTED FROM:       Primary Care Provider: Taryn Lou- [facility]    Pertinent Medical History: CAD, hypertension, Ischemic cardiomyopathy    Most recent EKG+ Tracin19    Most recent ECHO: 20    Most recent Cardiac Stress Test: 12

## 2020-01-25 LAB
MDC_IDC_EPISODE_DTM: NORMAL
MDC_IDC_EPISODE_ID: NORMAL
MDC_IDC_EPISODE_TYPE: NORMAL
MDC_IDC_LEAD_IMPLANT_DT: NORMAL
MDC_IDC_LEAD_LOCATION: NORMAL
MDC_IDC_LEAD_LOCATION_DETAIL_1: NORMAL
MDC_IDC_LEAD_MFG: NORMAL
MDC_IDC_LEAD_MODEL: NORMAL
MDC_IDC_LEAD_POLARITY_TYPE: NORMAL
MDC_IDC_LEAD_SERIAL: NORMAL
MDC_IDC_LEAD_SPECIAL_FUNCTION: NORMAL
MDC_IDC_MSMT_BATTERY_DTM: NORMAL
MDC_IDC_MSMT_BATTERY_REMAINING_LONGEVITY: 132 MO
MDC_IDC_MSMT_BATTERY_STATUS: NORMAL
MDC_IDC_MSMT_CAP_CHARGE_DTM: NORMAL
MDC_IDC_MSMT_CAP_CHARGE_ENERGY: 0 J
MDC_IDC_MSMT_CAP_CHARGE_TIME: 0 S
MDC_IDC_MSMT_CAP_CHARGE_TIME: 9.89
MDC_IDC_MSMT_CAP_CHARGE_TYPE: NORMAL
MDC_IDC_MSMT_CAP_CHARGE_TYPE: NORMAL
MDC_IDC_MSMT_LEADCHNL_LV_IMPEDANCE_VALUE: 598 OHM
MDC_IDC_MSMT_LEADCHNL_LV_PACING_THRESHOLD_AMPLITUDE: 0.8 V
MDC_IDC_MSMT_LEADCHNL_LV_PACING_THRESHOLD_PULSEWIDTH: 0.5 MS
MDC_IDC_MSMT_LEADCHNL_LV_SENSING_INTR_AMPL: 22.1 MV
MDC_IDC_MSMT_LEADCHNL_RA_IMPEDANCE_VALUE: 620 OHM
MDC_IDC_MSMT_LEADCHNL_RA_PACING_THRESHOLD_AMPLITUDE: 0.7 V
MDC_IDC_MSMT_LEADCHNL_RA_PACING_THRESHOLD_PULSEWIDTH: 0.5 MS
MDC_IDC_MSMT_LEADCHNL_RA_SENSING_INTR_AMPL: 8.7 MV
MDC_IDC_MSMT_LEADCHNL_RV_IMPEDANCE_VALUE: 718 OHM
MDC_IDC_MSMT_LEADCHNL_RV_PACING_THRESHOLD_AMPLITUDE: 0.6 V
MDC_IDC_MSMT_LEADCHNL_RV_PACING_THRESHOLD_PULSEWIDTH: 0.4 MS
MDC_IDC_MSMT_LEADCHNL_RV_SENSING_INTR_AMPL: 25 MV
MDC_IDC_PG_IMPLANT_DTM: NORMAL
MDC_IDC_PG_MFG: NORMAL
MDC_IDC_PG_MODEL: NORMAL
MDC_IDC_PG_SERIAL: NORMAL
MDC_IDC_PG_TYPE: NORMAL
MDC_IDC_SESS_CLINIC_NAME: NORMAL
MDC_IDC_SESS_DTM: NORMAL
MDC_IDC_SESS_TYPE: NORMAL
MDC_IDC_SET_BRADY_AT_MODE_SWITCH_MODE: NORMAL
MDC_IDC_SET_BRADY_AT_MODE_SWITCH_RATE: 170 {BEATS}/MIN
MDC_IDC_SET_BRADY_HYSTRATE: NORMAL
MDC_IDC_SET_BRADY_LOWRATE: 60 {BEATS}/MIN
MDC_IDC_SET_BRADY_MAX_SENSOR_RATE: 130 {BEATS}/MIN
MDC_IDC_SET_BRADY_MAX_TRACKING_RATE: 130 {BEATS}/MIN
MDC_IDC_SET_BRADY_MODE: NORMAL
MDC_IDC_SET_BRADY_PAV_DELAY_HIGH: 180 MS
MDC_IDC_SET_BRADY_PAV_DELAY_LOW: 180 MS
MDC_IDC_SET_BRADY_SAV_DELAY_HIGH: 140 MS
MDC_IDC_SET_BRADY_SAV_DELAY_LOW: 140 MS
MDC_IDC_SET_CRT_LVRV_DELAY: 30 MS
MDC_IDC_SET_CRT_PACED_CHAMBERS: NORMAL
MDC_IDC_SET_LEADCHNL_LV_PACING_AMPLITUDE: 1.9 V
MDC_IDC_SET_LEADCHNL_LV_PACING_CAPTURE_MODE: NORMAL
MDC_IDC_SET_LEADCHNL_LV_PACING_POLARITY: NORMAL
MDC_IDC_SET_LEADCHNL_LV_PACING_PULSEWIDTH: 0.5 MS
MDC_IDC_SET_LEADCHNL_LV_SENSING_ADAPTATION_MODE: NORMAL
MDC_IDC_SET_LEADCHNL_LV_SENSING_POLARITY: NORMAL
MDC_IDC_SET_LEADCHNL_LV_SENSING_SENSITIVITY: 1 MV
MDC_IDC_SET_LEADCHNL_RA_PACING_AMPLITUDE: 2.5 V
MDC_IDC_SET_LEADCHNL_RA_PACING_ANODE_ELECTRODE_1: NORMAL
MDC_IDC_SET_LEADCHNL_RA_PACING_ANODE_LOCATION_1: NORMAL
MDC_IDC_SET_LEADCHNL_RA_PACING_CAPTURE_MODE: NORMAL
MDC_IDC_SET_LEADCHNL_RA_PACING_CATHODE_ELECTRODE_1: NORMAL
MDC_IDC_SET_LEADCHNL_RA_PACING_CATHODE_LOCATION_1: NORMAL
MDC_IDC_SET_LEADCHNL_RA_PACING_POLARITY: NORMAL
MDC_IDC_SET_LEADCHNL_RA_PACING_PULSEWIDTH: 0.5 MS
MDC_IDC_SET_LEADCHNL_RA_SENSING_ADAPTATION_MODE: NORMAL
MDC_IDC_SET_LEADCHNL_RA_SENSING_ANODE_ELECTRODE_1: NORMAL
MDC_IDC_SET_LEADCHNL_RA_SENSING_ANODE_LOCATION_1: NORMAL
MDC_IDC_SET_LEADCHNL_RA_SENSING_CATHODE_ELECTRODE_1: NORMAL
MDC_IDC_SET_LEADCHNL_RA_SENSING_CATHODE_LOCATION_1: NORMAL
MDC_IDC_SET_LEADCHNL_RA_SENSING_POLARITY: NORMAL
MDC_IDC_SET_LEADCHNL_RA_SENSING_SENSITIVITY: 0.25 MV
MDC_IDC_SET_LEADCHNL_RV_PACING_AMPLITUDE: 2 V
MDC_IDC_SET_LEADCHNL_RV_PACING_ANODE_ELECTRODE_1: NORMAL
MDC_IDC_SET_LEADCHNL_RV_PACING_ANODE_LOCATION_1: NORMAL
MDC_IDC_SET_LEADCHNL_RV_PACING_CAPTURE_MODE: NORMAL
MDC_IDC_SET_LEADCHNL_RV_PACING_CATHODE_ELECTRODE_1: NORMAL
MDC_IDC_SET_LEADCHNL_RV_PACING_CATHODE_LOCATION_1: NORMAL
MDC_IDC_SET_LEADCHNL_RV_PACING_POLARITY: NORMAL
MDC_IDC_SET_LEADCHNL_RV_PACING_PULSEWIDTH: 0.4 MS
MDC_IDC_SET_LEADCHNL_RV_SENSING_ADAPTATION_MODE: NORMAL
MDC_IDC_SET_LEADCHNL_RV_SENSING_ANODE_ELECTRODE_1: NORMAL
MDC_IDC_SET_LEADCHNL_RV_SENSING_ANODE_LOCATION_1: NORMAL
MDC_IDC_SET_LEADCHNL_RV_SENSING_CATHODE_ELECTRODE_1: NORMAL
MDC_IDC_SET_LEADCHNL_RV_SENSING_CATHODE_LOCATION_1: NORMAL
MDC_IDC_SET_LEADCHNL_RV_SENSING_POLARITY: NORMAL
MDC_IDC_SET_LEADCHNL_RV_SENSING_SENSITIVITY: 0.6 MV
MDC_IDC_SET_ZONE_DETECTION_INTERVAL: 250 MS
MDC_IDC_SET_ZONE_DETECTION_INTERVAL: 300 MS
MDC_IDC_SET_ZONE_DETECTION_INTERVAL: 353 MS
MDC_IDC_SET_ZONE_TYPE: NORMAL
MDC_IDC_SET_ZONE_VENDOR_TYPE: NORMAL
MDC_IDC_STAT_BRADY_RA_PERCENT_PACED: 1 %
MDC_IDC_STAT_BRADY_RV_PERCENT_PACED: 4 %
MDC_IDC_STAT_CRT_LV_PERCENT_PACED: 95 %
MDC_IDC_STAT_CRT_PERCENT_PACED: 95 %
MDC_IDC_STAT_EPISODE_RECENT_COUNT: 0
MDC_IDC_STAT_EPISODE_RECENT_COUNT_DTM_END: NORMAL
MDC_IDC_STAT_EPISODE_RECENT_COUNT_DTM_START: NORMAL
MDC_IDC_STAT_EPISODE_TOTAL_COUNT: 0
MDC_IDC_STAT_EPISODE_TOTAL_COUNT_DTM_END: NORMAL
MDC_IDC_STAT_EPISODE_TYPE: NORMAL
MDC_IDC_STAT_EPISODE_VENDOR_TYPE: NORMAL
MDC_IDC_STAT_TACHYTHERAPY_ATP_DELIVERED_RECENT: 0
MDC_IDC_STAT_TACHYTHERAPY_ATP_DELIVERED_TOTAL: 0
MDC_IDC_STAT_TACHYTHERAPY_RECENT_DTM_END: NORMAL
MDC_IDC_STAT_TACHYTHERAPY_RECENT_DTM_START: NORMAL
MDC_IDC_STAT_TACHYTHERAPY_SHOCKS_ABORTED_RECENT: 0
MDC_IDC_STAT_TACHYTHERAPY_SHOCKS_ABORTED_TOTAL: 0
MDC_IDC_STAT_TACHYTHERAPY_SHOCKS_DELIVERED_RECENT: 0
MDC_IDC_STAT_TACHYTHERAPY_SHOCKS_DELIVERED_TOTAL: 0
MDC_IDC_STAT_TACHYTHERAPY_TOTAL_DTM_END: NORMAL

## 2020-02-07 ENCOUNTER — TELEPHONE (OUTPATIENT)
Dept: SURGERY | Facility: CLINIC | Age: 65
End: 2020-02-07

## 2020-02-07 ENCOUNTER — PATIENT OUTREACH (OUTPATIENT)
Dept: CARDIOLOGY | Facility: CLINIC | Age: 65
End: 2020-02-07

## 2020-02-07 DIAGNOSIS — I50.20 HEART FAILURE WITH REDUCED EJECTION FRACTION, NYHA CLASS III (H): ICD-10-CM

## 2020-02-07 DIAGNOSIS — I25.5 ISCHEMIC CARDIOMYOPATHY: ICD-10-CM

## 2020-02-07 NOTE — PROGRESS NOTES
Patient left message stating that he is not tolerating the increase in Entresto dose. Patient states that the new double dosage is making him feel off and he would like to hgo back to his normal dosage. Notified Dr. Matos and he states that patient can go back to previous dose of 24-26 MG twice daily. Patient called and voice message left with above information. Requested call back to confirm.

## 2020-02-07 NOTE — TELEPHONE ENCOUNTER
This writer called the patient's wife and confirmed details for surgery on 02/20/2020 with Dr. Flakito Massey. This writer asked her to call [phone] if she has questions.

## 2020-02-11 ENCOUNTER — ANESTHESIA EVENT (OUTPATIENT)
Dept: SURGERY | Facility: CLINIC | Age: 65
End: 2020-02-11
Payer: COMMERCIAL

## 2020-02-11 ENCOUNTER — PRE VISIT (OUTPATIENT)
Dept: SURGERY | Facility: CLINIC | Age: 65
End: 2020-02-11

## 2020-02-11 ENCOUNTER — OFFICE VISIT (OUTPATIENT)
Dept: SURGERY | Facility: CLINIC | Age: 65
End: 2020-02-11
Payer: COMMERCIAL

## 2020-02-11 VITALS
RESPIRATION RATE: 20 BRPM | HEART RATE: 82 BPM | TEMPERATURE: 97.6 F | DIASTOLIC BLOOD PRESSURE: 77 MMHG | WEIGHT: 190 LBS | BODY MASS INDEX: 30.53 KG/M2 | HEIGHT: 66 IN | OXYGEN SATURATION: 95 % | SYSTOLIC BLOOD PRESSURE: 111 MMHG

## 2020-02-11 DIAGNOSIS — Z01.818 PRE-OP EXAMINATION: Primary | ICD-10-CM

## 2020-02-11 DIAGNOSIS — K40.90 UNILATERAL INGUINAL HERNIA WITHOUT OBSTRUCTION OR GANGRENE, RECURRENCE NOT SPECIFIED: ICD-10-CM

## 2020-02-11 LAB
GLUCOSE SERPL-MCNC: 292 MG/DL (ref 70–99)
HBA1C MFR BLD: 8 % (ref 0–5.6)
NT-PROBNP SERPL-MCNC: 397 PG/ML (ref 0–125)

## 2020-02-11 ASSESSMENT — MIFFLIN-ST. JEOR: SCORE: 1599.96

## 2020-02-11 ASSESSMENT — ENCOUNTER SYMPTOMS: SEIZURES: 0

## 2020-02-11 ASSESSMENT — LIFESTYLE VARIABLES: TOBACCO_USE: 0

## 2020-02-11 ASSESSMENT — PAIN SCALES - GENERAL: PAINLEVEL: NO PAIN (0)

## 2020-02-11 ASSESSMENT — NEW YORK HEART ASSOCIATION (NYHA) CLASSIFICATION: NYHA FUNCTIONAL CLASS: II

## 2020-02-11 NOTE — ANESTHESIA PREPROCEDURE EVALUATION
Anesthesia Pre-Procedure Evaluation    Patient: Anson Manriquez   MRN:     [id number] Gender:   male   Age:    64 year old :      1955        Preoperative Diagnosis: Left inguinal hernia [K40.90]   Procedure(s):  Left HERNIORRHAPHY, INGUINAL, OPEN     Past Medical History:   Diagnosis Date     Anal fistula      Coronary artery disease      Diabetes mellitus, type 2 (H)      Heart attack (H) 2007     Hyperlipidemia      Hypertension      Inguinal hernia      Ischemic cardiomyopathy      STEMI (ST elevation myocardial infarction) (H) 2018    s/p STEVO x2     Stented coronary artery       Past Surgical History:   Procedure Laterality Date     EP ICD N/A 2019    Procedure: EP ICD [[id number]];  Surgeon: Mick Mckeon MD;  Location:  [facility]     HERNIA REPAIR Right     Inguinal     IRRIGATION AND DEBRIDEMENT RECTUM, COMBINED      abcess near rectum      LAPAROSCOPIC CHOLECYSTECTOMY  3/29/2012    Procedure:LAPAROSCOPIC CHOLECYSTECTOMY; LAPAROSCOPIC CHOLECYSTECTOMY; Surgeon:MARILYNN PRESSLEY; Location:[facility] OR     STENT, CORONARY, OLIVIA            Anesthesia Evaluation     . Pt has had prior anesthetic. Type: General    No history of anesthetic complications          ROS/MED HX    ENT/Pulmonary:  - neg pulmonary ROS    (-) tobacco use   Neurologic:  - neg neurologic ROS    (-) seizures, CVA and TIA   Cardiovascular: Comment: ICD check 20  Patient seen in clinic for evaluation and iterative programming of his [employer] Bi-Ventricular ICD per MD orders. Patient has an appointment to see Dr. Matos today.  Normal ICD function.  No atrial or ventricular arrhythmias recorded.  Intrinsic rhythm = SR @ 85 bpm. AP = <1%. RVP =4%. LVP =95%.   Estimated battery longevity to ISSA =11 years.  Lead trends appear stable.  RA output was reprogrammed from 3.5V to 2.5V today.  Patient reports that he is feeling very well since his device implant.   Plan for patient to send a remote transmission  in 3 months and return to clinic in 6 months.  YUKO Rubio RN.      Multi lead ICD      (+) Dyslipidemia, hypertension--CAD, --stent,2017, 9/2018,   3 Drug Eluting Stent .. : . CHF etiology: ICM Last EF: 29% date: 1/22/20 NYHA classification: II. . :ICD (placed 4/25/19) Reason placed:ICM  type;[employer]  . . Previous cardiac testing Echodate:1/22/20results:Interpretation Summary  Ischemic cardiomyopathy with moderately dilated LV with moderately reduced  global LV function, LVEF=29%. Extensive regional wall motion abnormalities as  described below, unchanged from the prior study.  This study was compared with the study from 8/28/19: There has been no  significant change. Specifically, LV function and wall motion abnormalities  have not changed significantly.  date: results:ECG reviewed date:8/28/19 results:Atrial sensed ventricular paced rhythm with occasional premature ventricular complexesCath date: 9/13/18 results:          METS/Exercise Tolerance:  4 - Raking leaves, gardening   Hematologic:        (-) history of blood clots, anemia and History of Transfusion   Musculoskeletal:  - neg musculoskeletal ROS       GI/Hepatic:     (+) Other GI/Hepatic anal fistula      (-) GERD   Renal/Genitourinary:  - ROS Renal section negative       Endo:     (+) type II DM Last HgA1c: 7.3 date: 11/6/19 Not using insulin - not using insulin pump .      Psychiatric:  - neg psychiatric ROS       Infectious Disease:  - neg infectious disease ROS       Malignancy:      - no malignancy   Other:    (+) no H/O Chronic Pain,no other significant disability                        PHYSICAL EXAM:   Mental Status/Neuro: A/A/O; Age Appropriate   Airway: Facies: Feasible  Mallampati: I  Mouth/Opening: Full  TM distance: > 6 cm  Neck ROM: Limited   Respiratory:   Resp. Rate: Normal     Resp. Effort: Normal      CV:    Comments: Ectropion of left eye     Dental: Normal Dentition                LABS:  CBC:   Lab Results   Component Value Date  "   WBC 8.7 01/22/2020    WBC 7.5 11/06/2019    HGB 16.3 01/22/2020    HGB 16.5 11/06/2019    HCT 47.6 01/22/2020    HCT 48.9 11/06/2019     01/22/2020     11/06/2019     BMP:   Lab Results   Component Value Date     01/22/2020     11/06/2019    POTASSIUM 4.0 01/22/2020    POTASSIUM 4.3 11/06/2019    CHLORIDE 103 01/22/2020    CHLORIDE 105 11/06/2019    CO2 26 01/22/2020    CO2 27 11/06/2019    BUN 26 01/22/2020    BUN 19 11/06/2019    CR 0.97 01/22/2020    CR 1.00 11/06/2019     (H) 01/22/2020     (H) 11/06/2019     COAGS:   Lab Results   Component Value Date    PTT 29 12/06/2018    INR 1.16 (H) 04/25/2019    FIBR 226 09/14/2018     POC:   Lab Results   Component Value Date     (H) 04/26/2019     OTHER:   Lab Results   Component Value Date    PH 7.50 (H) 09/17/2018    LACT 1.4 09/17/2018    A1C 7.3 (H) 11/06/2019    RENO 9.4 01/22/2020    PHOS 4.0 09/16/2018    MAG 2.5 (H) 09/20/2018    ALBUMIN 4.1 01/22/2020    PROTTOTAL 8.0 01/22/2020    ALT 30 01/22/2020    AST 16 01/22/2020    ALKPHOS 61 01/22/2020    BILITOTAL 1.1 01/22/2020    LIPASE 310 09/14/2018    AMYLASE 38 09/14/2018    TSH 2.44 03/07/2017        Preop Vitals    BP Readings from Last 3 Encounters:   01/22/20 131/84   12/06/19 122/83   11/11/19 133/73    Pulse Readings from Last 3 Encounters:   01/22/20 82   12/06/19 78   11/11/19 82      Resp Readings from Last 3 Encounters:   12/06/19 18   06/18/19 22   04/26/19 8    SpO2 Readings from Last 3 Encounters:   01/22/20 95%   11/11/19 94%   11/06/19 96%      Temp Readings from Last 1 Encounters:   11/11/19 97.6  F (36.4  C) (Oral)    Ht Readings from Last 1 Encounters:   01/22/20 1.689 m (5' 6.5\")      Wt Readings from Last 1 Encounters:   01/22/20 83.8 kg (184 lb 12.8 oz)    Estimated body mass index is 29.38 kg/m  as calculated from the following:    Height as of 1/22/20: 1.689 m (5' 6.5\").    Weight as of 1/22/20: 83.8 kg (184 lb 12.8 oz).     LDA:    "     Assessment:   ASA SCORE: 3    H&P: History and physical reviewed and following examination; no interval change.   Smoking Status:  Non-Smoker/Unknown   NPO Status: NPO Appropriate     Plan:   Anes. Type:  General   Pre-Medication: None   Induction:  IV (Standard)   Airway: ETT; Oral   Access/Monitoring: PIV   Maintenance: Balanced     Postop Plan:   Postop Pain: Opioids  Postop Sedation/Airway: Not planned     PONV Management:   Adult Risk Factors:, Non-Smoker, Postop Opioids   Prevention: Ondansetron     CONSENT: Direct conversation   Plan and risks discussed with: Patient          Comments for Plan/Consent:  Nitroglycerin and esmolol in room.  Magnet on device intra-op.              PAC Discussion and Assessment    ASA Classification: 3  Case is suitable for: [place]  Anesthetic techniques and relevant risks discussed: GA  Invasive monitoring and risk discussed:   Types:   Possibility and Risk of blood transfusion discussed:   NPO instructions given:   Additional anesthetic preparation and risks discussed:   Needs early admission to pre-op area:   Other:     PAC Resident/NP Anesthesia Assessment:  Asnon is a 64 year old man who is scheduled for Left HERNIORRHAPHY, INGUINAL, OPEN   on 2/20/20 by Dr. Massey in treatment of Left inguinal hernia.  PAC referral for risk assessment and optimization for anesthesia with comorbid conditions of ICM, CAD, HTN, HLD, ICD in place, type 2 diabetes, history of anal fistula:    Pre-operative considerations:  1.  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  Intermediate risk surgery with 11% (RCRI #) risk of major adverse cardiac event.   ~ The patient has a history of CAD with MI in 2007 s/p PCI to pLAD then another STEMI in 9/2018 with cardiac arrest/VT s/p aspiration thrombectomy for acute thrombotic lesion to pLAD within prior stent, PCI with x2 STEVO to pLAD and mLAD, and POBA to D1.  "He has had ICM secondary to this with reduced EF and underwent ICD placement on 4/25/19 with [employer] ICD. This was last interrogated on 1/22/20.   ~ He was seen by his cardiologist, Dr. Matos on 1/22/20 who cleared him for the procedure and gave the okay to hold Brillinta for 5-7 days.  \"Dr. Matos 1/22/20  Continue ASA/ticagrelor/lipitor for CAD/PCI.  He does have a hernia repair scheduled and at this point he may discontinue Brilinta 5 to 7 days prior to the planned surgery as his most recent PCI was September 2018.  I would consider restarting Brilinta once safe from the surgical standpoint.  Would also continue aspirin unless required to be stopped from the surgical standpoint.  Continue aldactone 12.5mg for treatment of his ICM  He may undergo hernia repair from the cardiac standpoint understanding that he is at moderate risk for the procedure.  I would continue all other medications as able again Brilinta may be stopped few days prior to the surgery as above.\"     I called the patient's surgical team and okay for the patient to continue ASA 81 mg. He will hold Brilinta 5 days. I discussed with Dr. Linares and will have him hold is lasix but continue coreg and entresto, aldactone and lipitor. Will check BNP today.     2.  Pulm:  Airway feasible.  JASBIR risk: Intermediate (male, age, HTN)    3. Endo: type 2 diabetes - A1c was 7.3 on 11/6/19. He will hold glipizide and jardiance DOS.     4. GI:  Risk of PONV score = 2.  If > 2, anti-emetic intervention recommended.  ~ The patient has a history of anal fistula and continues to have drainage but denies any abscess symptoms.   ~ Inguinal hernia - procedure as above.     VTE risk: 1.8%    Patient is optimized and is acceptable candidate for the proposed procedure.  No further diagnostic evaluation is needed.     Patient discussed with Dr. Linares.     For further details of assessment, testing, and physical exam please see H and P completed on same " date.    Maeve Jarrett PA-C            Mid-Level Provider/Resident: Maeve Jarrett PA-C  Date: 2/11/20  Time:     Attending Anesthesiologist Anesthesia Assessment:        Anesthesiologist:   Date:   Time:   Pass/Fail:   Disposition:     PAC Pharmacist Assessment:        Pharmacist:   Date:   Time:    Maeve Jarrett PA-C

## 2020-02-11 NOTE — PATIENT INSTRUCTIONS
Preparing for Your Surgery      Name:  Anson Manriquez   MRN:  [id number]   :  1955   Today's Date:  2020     Arriving for surgery:  Surgery date:  20  Arrival time:  5:30 am    Please come to:     [facility] [address]  [address] [place]  [address]    - ? parking is available in front of the hospital      -    Please proceed to [address] on the 3rd floor. [phone]?     - ?If you are in need of directions, wheelchair or escort please stop at the Information Desk in the lobby.  Inform the information person that you are here for surgery; a wheelchair and escort to [address] will be provided.?     -  Bring your ID and insurance card.    - If you are scheduled to go home the Same Day as surgery you must have a responsible adult as a  and to stay with you overnight the first 24 hours after surgery.     What can I eat or drink?  -  You may have solid food or milk products until 8 hours prior to your surgery. (Until 11:30 pm 20 )  -  You may have water, apple juice or 7up/Sprite until 2 hours prior to your surgery. (Until 5:30 am )    Which medicines can I take?       -  Do not bring your own medications to the hospital, except for inhalers and eye drops.        -  Follow Surgery Clinic instructions regarding Ibuprofen. If no instructions given, NO Ibuprofen the day prior to surgery.             -  Hold Naproxen (Aleve) 4 days prior to surgery.        -  Hold Multivitamin for 7 days prior to surgery.        -  Hold Ticagrelor (Brilinta) for 5 days prior to surgery. Last dose 20.    -  Do NOT take these medications in the morning, the day of surgery:  Empagliflozin (Jardiance), Furosemide (Lasix), Vitamin B Complex, Glipizide,     -  Please take these medications the morning of surgery:  Aspirin, Atorvastatin, Carvedilol, Entresto, Spironolactone,   Acetaminophen (Tylenol) if needed    How do I prepare myself?  -  Take two showers: one the  night before surgery; and one the morning of surgery.         Use Scrubcare or Hibiclens to wash from neck down.  You may use your own shampoo and conditioner. No other hair products.   -  Do NOT use lotion, powder, colognes, deodorant, or antiperspirant the day of your surgery.  -  Do NOT wear any jewelry.    Questions or Concerns:  If you have questions or concerns prior to your surgery, call [phone]. (Mon - Fri   8 am- 5:30 pm)  Questions about surgery, contact your Surgeons office.  If you have any health changes prior to surgery, please notify your Surgeon.    AFTER YOUR SURGERY  Breathing exercises   Breathing exercises help you recover faster. Take deep breaths and let the air out slowly. This will:     Help you wake up after surgery.    Help prevent complications like pneumonia.  Preventing complications will help you go home sooner.   We may give you a breathing device (incentive spirometer) to encourage you to breathe deeply.   Nausea and vomiting   You may feel sick to your stomach after surgery; if so, let your nurse know.    Pain control:  After surgery, you may have pain. Our goal is to help you manage your pain. Pain medicine will help you feel comfortable enough to do activities that will help you heal.  These activities may include breathing exercises, walking and physical therapy.   To help your health care team treat your pain we will ask: 1) If you have pain  2) where it is located 3) describe your pain in your words  Methods of pain control include medications given by mouth, vein or by nerve block for some surgeries.  Sequential Compression Device (SCD) or Pneumo Boots:  You may need to wear SCD S on your legs or feet. These are wraps connected to a machine that pumps in air and releases it. The repeated pumping helps prevent blood clots from forming.

## 2020-02-11 NOTE — H&P
Pre-Operative H & P     CC:  Preoperative exam to assess for increased cardiopulmonary risk while undergoing surgery and anesthesia.    Date of Encounter: 2/11/2020  Primary Care Physician:  Ignacio Beard     Reason for visit: pre operative examination, Left inguinal hernia    HPI  Anson Manriquez is a 64 year old male who presents for pre-operative H & P in preparation for Left HERNIORRHAPHY, INGUINAL, OPEN with Dr. Massey on 2/20/20 at [facility].     The patient is a 64 year old man who has a significant past medical history for CAD, ICM, HTN, HLD, ICD placement and type 2 diabetes. He has noticed a left inguinal mass for several years which he states is a large mass.  He has had his right inguinal hernia repaired in the past with good results. Given his ongoing symptoms, he met with Dr. Massey on 11/11/19 to discuss his surgical treatment options. He was found to have an umbilical hernia at that visit but they discussed separate treatment. The patient is having regular bowel movements and urinating without issues.  The patient is now scheduled for the procedure as above.     History is obtained from the patient and chart review    Past Medical History  Past Medical History:   Diagnosis Date     Anal fistula      Coronary artery disease      Diabetes mellitus, type 2 (H)      Heart attack (H) 4/17/2007     Hyperlipidemia      Hypertension      Inguinal hernia      Ischemic cardiomyopathy      STEMI (ST elevation myocardial infarction) (H) 09/13/2018    s/p STEVO x2     Stented coronary artery 2007       Past Surgical History  Past Surgical History:   Procedure Laterality Date     EP ICD N/A 4/25/2019    Procedure: EP ICD [[id number]];  Surgeon: Mick Mckeon MD;  Location:  [facility]     HERNIA REPAIR Right     Inguinal     IRRIGATION AND DEBRIDEMENT RECTUM, COMBINED      abcess near rectum      LAPAROSCOPIC CHOLECYSTECTOMY  3/29/2012     Procedure:LAPAROSCOPIC CHOLECYSTECTOMY; LAPAROSCOPIC CHOLECYSTECTOMY; Surgeon:MARILYNN PRESSLEY; Location:[facility] OR     STENT, CORONARY, OLIVIA         Hx of Blood transfusions/reactions: denies     Hx of abnormal bleeding or anti-platelet use: Brilinta - hold 5 days. Continue ASA 81 mg    Menstrual history: No LMP for male patient.:     Steroid use in the last year: none    Personal or FH with difficulty with Anesthesia:  denies    Prior to Admission Medications  Current Outpatient Medications   Medication Sig Dispense Refill     aspirin (ASA) 81 MG chewable tablet Take 1 tablet (81 mg) by mouth daily (Patient taking differently: Take 81 mg by mouth every morning ) 90 tablet 3     atorvastatin (LIPITOR) 80 MG tablet Take 1 tablet (80 mg) by mouth daily Needs labs done prior to the next refill. (Patient taking differently: Take 80 mg by mouth every morning Needs labs done prior to the next refill.) 90 tablet 3     carvedilol (COREG) 25 MG tablet 1 tablet (25 mg) by Oral or Feeding Tube route 2 times daily (with meals) 180 tablet 3     empagliflozin (JARDIANCE) 25 MG TABS tablet Take 1 tablet (25 mg) by mouth daily (Patient taking differently: Take 25 mg by mouth daily ) 90 tablet 1     furosemide (LASIX) 40 MG tablet Take 1 tablet (40 mg) by mouth 2 times daily (Patient taking differently: Take 80 mg by mouth every morning ) 180 tablet 0     glipiZIDE (GLIPIZIDE XL) 5 MG 24 hr tablet Take 1 tablet (5 mg) by mouth daily DO NOT CRUSH. Take before or with meals. (Patient taking differently: Take 5 mg by mouth daily DO NOT CRUSH. Take before or with meals.) 90 tablet 1     MULTIPLE VITAMIN PO Take 1 tablet by mouth every morning        sacubitril-valsartan (ENTRESTO) 24-26 MG per tablet Take 1 tablet by mouth 2 times daily 180 tablet 3     spironolactone (ALDACTONE) 25 MG tablet Take 1 tablet (25 mg) by mouth daily (Patient taking differently: Take 25 mg by mouth every morning ) 90 tablet 3     ticagrelor (BRILINTA) 90 MG tablet  Take 1 tablet (90 mg) by mouth 2 times daily 180 tablet 3     vitamin B complex with vitamin C (VITAMIN  B COMPLEX) tablet Take 1 tablet by mouth every morning          Allergies  No Known Allergies    Social History  Social History     Socioeconomic History     Marital status:      Spouse name: Not on file     Number of children: Not on file     Years of education: Not on file     Highest education level: Not on file   Occupational History     Not on file   Social Needs     Financial resource strain: Not on file     Food insecurity:     Worry: Not on file     Inability: Not on file     Transportation needs:     Medical: Not on file     Non-medical: Not on file   Tobacco Use     Smoking status: Never Smoker     Smokeless tobacco: Never Used   Substance and Sexual Activity     Alcohol use: No     Drug use: No     Sexual activity: Not on file   Lifestyle     Physical activity:     Days per week: Not on file     Minutes per session: Not on file     Stress: Not on file   Relationships     Social connections:     Talks on phone: Not on file     Gets together: Not on file     Attends [employer] service: Not on file     Active member of club or organization: Not on file     Attends meetings of clubs or organizations: Not on file     Relationship status: Not on file     Intimate partner violence:     Fear of current or ex partner: Not on file     Emotionally abused: Not on file     Physically abused: Not on file     Forced sexual activity: Not on file   Other Topics Concern     Not on file   Social History Narrative     Not on file       Family History  Family History   Problem Relation Age of Onset     Hypertension Mother      Diabetes Father      Glaucoma No family hx of      Macular Degeneration No family hx of        Review of Systems    ROS/MED HX    ENT/Pulmonary:  - neg pulmonary ROS    (-) tobacco use   Neurologic:  - neg neurologic ROS    (-) seizures, CVA and TIA   Cardiovascular: Comment: ICD check  1/22/20  Patient seen in clinic for evaluation and iterative programming of his [employer] Bi-Ventricular ICD per MD orders. Patient has an appointment to see Dr. Matos today.  Normal ICD function.  No atrial or ventricular arrhythmias recorded.  Intrinsic rhythm = SR @ 85 bpm. AP = <1%. RVP =4%. LVP =95%.   Estimated battery longevity to ISSA =11 years.  Lead trends appear stable.  RA output was reprogrammed from 3.5V to 2.5V today.  Patient reports that he is feeling very well since his device implant.   Plan for patient to send a remote transmission in 3 months and return to clinic in 6 months.  YUKO Rubio RN.      Multi lead ICD      (+) Dyslipidemia, hypertension--CAD, --stent,2017, 9/2018,   3 Drug Eluting Stent .. : . CHF etiology: ICM Last EF: 29% date: 1/22/20 NYHA classification: II. . :ICD (placed 4/25/19) Reason placed:ICM  type;[employer]  . . Previous cardiac testing Echodate:1/22/20results:Interpretation Summary  Ischemic cardiomyopathy with moderately dilated LV with moderately reduced  global LV function, LVEF=29%. Extensive regional wall motion abnormalities as  described below, unchanged from the prior study.  This study was compared with the study from 8/28/19: There has been no  significant change. Specifically, LV function and wall motion abnormalities  have not changed significantly.  date: results:ECG reviewed date:8/28/19 results:Atrial sensed ventricular paced rhythm with occasional premature ventricular complexesCath date: 9/13/18 results:          METS/Exercise Tolerance:  4 - Raking leaves, gardening   Hematologic:        (-) history of blood clots, anemia and History of Transfusion   Musculoskeletal:  - neg musculoskeletal ROS       GI/Hepatic:     (+) Other GI/Hepatic anal fistula      (-) GERD   Renal/Genitourinary:  - ROS Renal section negative       Endo:     (+) type II DM Last HgA1c: 7.3 date: 11/6/19 Not using insulin - not using insulin pump .      Psychiatric:  -  "neg psychiatric ROS       Infectious Disease:  - neg infectious disease ROS       Malignancy:      - no malignancy   Other:    (+) no H/O Chronic Pain,no other significant disability          The complete review of systems is negative other than noted in the HPI or here.   Temp: 97.6  F (36.4  C) Temp src: Oral BP: 111/77 Pulse: 82   Resp: 20 SpO2: 95 %         190 lbs 0 oz  5' 6.339\"   Body mass index is 30.35 kg/m .       Physical Exam  Constitutional: Awake, alert, cooperative, no apparent distress, and appears stated age.  Eyes: Pupils equal, round and reactive to light, extra ocular muscles intact, sclera clear, conjunctiva normal. Ectropion of left eyelid  HENT: Normocephalic, oral pharynx with moist mucus membranes, good dentition. No goiter appreciated.   Respiratory: Clear to auscultation bilaterally, no crackles or wheezing.  Cardiovascular: Regular rate and rhythm, normal S1 and S2, and no murmur noted.  Carotids +2, no bruits. No edema. Palpable pulses to radial  DP and PT arteries.   GI: Normal bowel sounds, soft, non-distended, non-tender, no masses palpated, no hepatosplenomegaly.  Umbilical hernia without signs of incarceration.   Lymph/Hematologic: No cervical lymphadenopathy and no supraclavicular lymphadenopathy.  Genitourinary:  defer  Skin: Warm and dry.  No rashes at anticipated surgical site.   Musculoskeletal: Slightly limited ROM of neck. There is no redness, warmth, or swelling of the joints. Gross motor strength is normal.    Neurologic: Awake, alert, oriented to name, place and time. Cranial nerves II-XII are grossly intact. Gait is normal.   Neuropsychiatric: Calm, cooperative. Normal affect.     Labs: (personally reviewed)  Results for JODI NOLAN (MRN [id number]) as of 2/11/2020 08:05   Ref. Range 1/22/2020 13:50   Sodium Latest Ref Range: 133 - 144 mmol/L 136   Potassium Latest Ref Range: 3.4 - 5.3 mmol/L 4.0   Chloride Latest Ref Range: 94 - 109 mmol/L 103   Carbon Dioxide Latest " Ref Range: 20 - 32 mmol/L 26   Urea Nitrogen Latest Ref Range: 7 - 30 mg/dL 26   Creatinine Latest Ref Range: 0.66 - 1.25 mg/dL 0.97   GFR Estimate Latest Ref Range: >60 mL/min/1.73_m2 82   GFR Estimate If Black Latest Ref Range: >60 mL/min/1.73_m2 >90   Calcium Latest Ref Range: 8.5 - 10.1 mg/dL 9.4   Anion Gap Latest Ref Range: 3 - 14 mmol/L 7   Albumin Latest Ref Range: 3.4 - 5.0 g/dL 4.1   Protein Total Latest Ref Range: 6.8 - 8.8 g/dL 8.0   Bilirubin Total Latest Ref Range: 0.2 - 1.3 mg/dL 1.1   Alkaline Phosphatase Latest Ref Range: 40 - 150 U/L 61   ALT Latest Ref Range: 0 - 70 U/L 30   AST Latest Ref Range: 0 - 45 U/L 16   Results for JODI NOLAN (MRN [id number]) as of 2020 08:05   Ref. Range 2020 13:50   Glucose Latest Ref Range: 70 - 99 mg/dL 198 (H)   WBC Latest Ref Range: 4.0 - 11.0 10e9/L 8.7   Hemoglobin Latest Ref Range: 13.3 - 17.7 g/dL 16.3   Hematocrit Latest Ref Range: 40.0 - 53.0 % 47.6   Platelet Count Latest Ref Range: 150 - 450 10e9/L 242   RBC Count Latest Ref Range: 4.4 - 5.9 10e12/L 5.27   MCV Latest Ref Range: 78 - 100 fl 90   MCH Latest Ref Range: 26.5 - 33.0 pg 30.9   MCHC Latest Ref Range: 31.5 - 36.5 g/dL 34.2   RDW Latest Ref Range: 10.0 - 15.0 % 14.3   Results for JODI NOLAN (MRN [id number]) as of 2020 11:52   Ref. Range 2020 08:37   Hemoglobin A1C Latest Ref Range: 0 - 5.6 % 8.0 (H)   N-Terminal Pro Bnp Latest Ref Range: 0 - 125 pg/mL 397 (H)   Glucose Latest Ref Range: 70 - 99 mg/dL 292 (H)   BNP relatively unchanged from prior. A1c slightly worse. Patient okay to proceed.     EK19  Atrial sensed ventricular paced rhythm with occasional premature ventricular complexes     Echo 20  Interpretation Summary  Ischemic cardiomyopathy with moderately dilated LV with moderately reduced  global LV function, LVEF=29%. Extensive regional wall motion abnormalities as  described below, unchanged from the prior study.  This study was compared with the  study from 8/28/19: There has been no  significant change. Specifically, LV function and wall motion abnormalities  have not changed significantly.    Coronary angiogram 9/13/18      ICD check 1/22/20  Patient seen in clinic for evaluation and iterative programming of his [employer] Bi-Ventricular ICD per MD orders. Patient has an appointment to see Dr. Matos today.  Normal ICD function.  No atrial or ventricular arrhythmias recorded.  Intrinsic rhythm = SR @ 85 bpm. AP = <1%. RVP =4%. LVP =95%.   Estimated battery longevity to ISSA =11 years.  Lead trends appear stable.  RA output was reprogrammed from 3.5V to 2.5V today.  Patient reports that he is feeling very well since his device implant.   Plan for patient to send a remote transmission in 3 months and return to clinic in 6 months.  YUKO Rubio RN.      Multi lead ICD     The patient's records and results personally reviewed by this provider.     Outside records reviewed from: care everywhere     ASSESSMENT and PLAN  Anson is a 64 year old man who is scheduled for Left HERNIORRHAPHY, INGUINAL, OPEN   on 2/20/20 by Dr. Massey in treatment of Left inguinal hernia.  PAC referral for risk assessment and optimization for anesthesia with comorbid conditions of ICM, CAD, HTN, HLD, ICD in place, type 2 diabetes, history of anal fistula:    Pre-operative considerations:  1.  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  Intermediate risk surgery with 11% (RCRI #) risk of major adverse cardiac event.   ~ The patient has a history of CAD with MI in 2007 s/p PCI to pLAD then another STEMI in 9/2018 with cardiac arrest/VT s/p aspiration thrombectomy for acute thrombotic lesion to pLAD within prior stent, PCI with x2 STEVO to pLAD and mLAD, and POBA to D1. He has had ICM secondary to this with reduced EF and underwent ICD placement on 4/25/19 with [employer] ICD. This was last  "interrogated on 1/22/20.   ~ He was seen by his cardiologist, Dr. Matos on 1/22/20 who cleared him for the procedure and gave the okay to hold Brillinta for 5-7 days.  \"Dr. Matos 1/22/20  Continue ASA/ticagrelor/lipitor for CAD/PCI.  He does have a hernia repair scheduled and at this point he may discontinue Brilinta 5 to 7 days prior to the planned surgery as his most recent PCI was September 2018.  I would consider restarting Brilinta once safe from the surgical standpoint.  Would also continue aspirin unless required to be stopped from the surgical standpoint.  Continue aldactone 12.5mg for treatment of his ICM  He may undergo hernia repair from the cardiac standpoint understanding that he is at moderate risk for the procedure.  I would continue all other medications as able again Brilinta may be stopped few days prior to the surgery as above.\"     I called the patient's surgical team and okay for the patient to continue ASA 81 mg. He will hold Brilinta 5 days. I discussed with Dr. Linares and will have him hold is lasix but continue coreg and entresto, aldactone and lipitor. Will check BNP today.     2.  Pulm:  Airway feasible.  JASBIR risk: Intermediate (male, age, HTN)    3. Endo: type 2 diabetes - A1c was 7.3 on 11/6/19. He will hold glipizide and jardiance DOS.     4. GI:  Risk of PONV score = 2.  If > 2, anti-emetic intervention recommended.  ~ The patient has a history of anal fistula and continues to have drainage but denies any abscess symptoms.   ~ Inguinal hernia - procedure as above.     VTE risk: 1.8%    Patient was discussed with Dr Linares.    The patient is optimized for their procedure. AVS with information on surgery time/arrival time, meds and NPO status given by nursing staff.        Maeve Jarrett PA-C  Preoperative Assessment Center  [facility]  [facility]  Phone: [phone]  Fax: [phone]  "

## 2020-02-17 DIAGNOSIS — E78.2 MIXED HYPERLIPIDEMIA: ICD-10-CM

## 2020-02-17 DIAGNOSIS — I10 BENIGN ESSENTIAL HYPERTENSION: ICD-10-CM

## 2020-02-17 DIAGNOSIS — E11.9 TYPE 2 DIABETES MELLITUS WITHOUT COMPLICATION, WITHOUT LONG-TERM CURRENT USE OF INSULIN (H): ICD-10-CM

## 2020-02-17 RX ORDER — ATORVASTATIN CALCIUM 80 MG/1
80 TABLET, FILM COATED ORAL DAILY
Qty: 90 TABLET | Refills: 3 | Status: SHIPPED | OUTPATIENT
Start: 2020-02-17 | End: 2021-02-17

## 2020-02-20 ENCOUNTER — ANESTHESIA (OUTPATIENT)
Dept: SURGERY | Facility: CLINIC | Age: 65
End: 2020-02-20
Payer: COMMERCIAL

## 2020-02-20 ENCOUNTER — ANCILLARY PROCEDURE (OUTPATIENT)
Dept: ULTRASOUND IMAGING | Facility: CLINIC | Age: 65
End: 2020-02-20
Attending: ANESTHESIOLOGY
Payer: COMMERCIAL

## 2020-02-20 ENCOUNTER — HOSPITAL ENCOUNTER (OUTPATIENT)
Facility: CLINIC | Age: 65
Discharge: HOME | End: 2020-02-20
Attending: SURGERY | Admitting: SURGERY
Payer: COMMERCIAL

## 2020-02-20 VITALS
HEART RATE: 79 BPM | TEMPERATURE: 98 F | HEIGHT: 67 IN | OXYGEN SATURATION: 96 % | RESPIRATION RATE: 16 BRPM | DIASTOLIC BLOOD PRESSURE: 63 MMHG | SYSTOLIC BLOOD PRESSURE: 111 MMHG | BODY MASS INDEX: 29.27 KG/M2 | WEIGHT: 186.51 LBS

## 2020-02-20 DIAGNOSIS — K40.90 LEFT INGUINAL HERNIA: ICD-10-CM

## 2020-02-20 LAB
CREAT SERPL-MCNC: 0.79 MG/DL (ref 0.66–1.25)
GFR SERPL CREATININE-BSD FRML MDRD: >90 ML/MIN/{1.73_M2}
GLUCOSE BLDC GLUCOMTR-MCNC: 158 MG/DL (ref 70–99)
GLUCOSE BLDC GLUCOMTR-MCNC: 163 MG/DL (ref 70–99)
INTERPRETATION ECG - MUSE: NORMAL
POTASSIUM SERPL-SCNC: 4.1 MMOL/L (ref 3.4–5.3)

## 2020-02-20 PROCEDURE — 82565 ASSAY OF CREATININE: CPT | Performed by: ANESTHESIOLOGY

## 2020-02-20 PROCEDURE — 25000128 H RX IP 250 OP 636: Performed by: ANESTHESIOLOGY

## 2020-02-20 PROCEDURE — 25000566 ZZH SEVOFLURANE, EA 15 MIN: Performed by: SURGERY

## 2020-02-20 PROCEDURE — 40000171 ZZH STATISTIC PRE-PROCEDURE ASSESSMENT III: Performed by: SURGERY

## 2020-02-20 PROCEDURE — 25000128 H RX IP 250 OP 636: Performed by: NURSE ANESTHETIST, CERTIFIED REGISTERED

## 2020-02-20 PROCEDURE — 25000128 H RX IP 250 OP 636: Performed by: SURGERY

## 2020-02-20 PROCEDURE — 36415 COLL VENOUS BLD VENIPUNCTURE: CPT | Performed by: ANESTHESIOLOGY

## 2020-02-20 PROCEDURE — 40000065 ZZH STATISTIC EKG NON-CHARGEABLE

## 2020-02-20 PROCEDURE — 36000053 ZZH SURGERY LEVEL 2 EA 15 ADDTL MIN - UMMC: Performed by: SURGERY

## 2020-02-20 PROCEDURE — 37000008 ZZH ANESTHESIA TECHNICAL FEE, 1ST 30 MIN: Performed by: SURGERY

## 2020-02-20 PROCEDURE — 93010 ELECTROCARDIOGRAM REPORT: CPT | Performed by: INTERNAL MEDICINE

## 2020-02-20 PROCEDURE — 37000009 ZZH ANESTHESIA TECHNICAL FEE, EACH ADDTL 15 MIN: Performed by: SURGERY

## 2020-02-20 PROCEDURE — 25000125 ZZHC RX 250: Performed by: NURSE ANESTHETIST, CERTIFIED REGISTERED

## 2020-02-20 PROCEDURE — 25800030 ZZH RX IP 258 OP 636: Performed by: ANESTHESIOLOGY

## 2020-02-20 PROCEDURE — 25000125 ZZHC RX 250: Performed by: SURGERY

## 2020-02-20 PROCEDURE — 71000014 ZZH RECOVERY PHASE 1 LEVEL 2 FIRST HR: Performed by: SURGERY

## 2020-02-20 PROCEDURE — 25000125 ZZHC RX 250: Performed by: ANESTHESIOLOGY

## 2020-02-20 PROCEDURE — 71000027 ZZH RECOVERY PHASE 2 EACH 15 MINS: Performed by: SURGERY

## 2020-02-20 PROCEDURE — 36000051 ZZH SURGERY LEVEL 2 1ST 30 MIN - UMMC: Performed by: SURGERY

## 2020-02-20 PROCEDURE — C1781 MESH (IMPLANTABLE): HCPCS | Performed by: SURGERY

## 2020-02-20 PROCEDURE — 82962 GLUCOSE BLOOD TEST: CPT

## 2020-02-20 PROCEDURE — 27210794 ZZH OR GENERAL SUPPLY STERILE: Performed by: SURGERY

## 2020-02-20 PROCEDURE — 25800030 ZZH RX IP 258 OP 636: Performed by: NURSE ANESTHETIST, CERTIFIED REGISTERED

## 2020-02-20 PROCEDURE — 84132 ASSAY OF SERUM POTASSIUM: CPT | Performed by: ANESTHESIOLOGY

## 2020-02-20 DEVICE — MESH POLYESTER PROGRIP 6X6" (15X15CM) PARIETEX TEM1515G: Type: IMPLANTABLE DEVICE | Site: INGUINAL | Status: FUNCTIONAL

## 2020-02-20 RX ORDER — DEXAMETHASONE SODIUM PHOSPHATE 4 MG/ML
INJECTION, SOLUTION INTRA-ARTICULAR; INTRALESIONAL; INTRAMUSCULAR; INTRAVENOUS; SOFT TISSUE PRN
Status: DISCONTINUED | OUTPATIENT
Start: 2020-02-20 | End: 2020-02-20

## 2020-02-20 RX ORDER — CEFAZOLIN SODIUM 1 G/3ML
1 INJECTION, POWDER, FOR SOLUTION INTRAMUSCULAR; INTRAVENOUS SEE ADMIN INSTRUCTIONS
Status: DISCONTINUED | OUTPATIENT
Start: 2020-02-20 | End: 2020-02-20 | Stop reason: HOSPADM

## 2020-02-20 RX ORDER — BUPIVACAINE HYDROCHLORIDE 5 MG/ML
INJECTION, SOLUTION EPIDURAL; INTRACAUDAL PRN
Status: DISCONTINUED | OUTPATIENT
Start: 2020-02-20 | End: 2020-02-20 | Stop reason: HOSPADM

## 2020-02-20 RX ORDER — SODIUM CHLORIDE, SODIUM LACTATE, POTASSIUM CHLORIDE, CALCIUM CHLORIDE 600; 310; 30; 20 MG/100ML; MG/100ML; MG/100ML; MG/100ML
INJECTION, SOLUTION INTRAVENOUS CONTINUOUS
Status: DISCONTINUED | OUTPATIENT
Start: 2020-02-20 | End: 2020-02-20 | Stop reason: HOSPADM

## 2020-02-20 RX ORDER — ACETAMINOPHEN 325 MG/1
975 TABLET ORAL ONCE
Status: DISCONTINUED | OUTPATIENT
Start: 2020-02-20 | End: 2020-02-20 | Stop reason: HOSPADM

## 2020-02-20 RX ORDER — HYDROCODONE BITARTRATE AND ACETAMINOPHEN 5; 325 MG/1; MG/1
1-2 TABLET ORAL EVERY 4 HOURS PRN
Qty: 15 TABLET | Refills: 0 | Status: SHIPPED | OUTPATIENT
Start: 2020-02-20 | End: 2020-10-08

## 2020-02-20 RX ORDER — NALOXONE HYDROCHLORIDE 0.4 MG/ML
.1-.4 INJECTION, SOLUTION INTRAMUSCULAR; INTRAVENOUS; SUBCUTANEOUS
Status: DISCONTINUED | OUTPATIENT
Start: 2020-02-20 | End: 2020-02-20 | Stop reason: HOSPADM

## 2020-02-20 RX ORDER — FLUMAZENIL 0.1 MG/ML
0.2 INJECTION, SOLUTION INTRAVENOUS
Status: DISCONTINUED | OUTPATIENT
Start: 2020-02-20 | End: 2020-02-20 | Stop reason: HOSPADM

## 2020-02-20 RX ORDER — AMOXICILLIN 250 MG
1-2 CAPSULE ORAL 2 TIMES DAILY
Qty: 30 TABLET | Refills: 0 | Status: SHIPPED | OUTPATIENT
Start: 2020-02-20 | End: 2020-10-14

## 2020-02-20 RX ORDER — FENTANYL CITRATE 50 UG/ML
INJECTION, SOLUTION INTRAMUSCULAR; INTRAVENOUS PRN
Status: DISCONTINUED | OUTPATIENT
Start: 2020-02-20 | End: 2020-02-20

## 2020-02-20 RX ORDER — HYDRALAZINE HYDROCHLORIDE 20 MG/ML
2.5-5 INJECTION INTRAMUSCULAR; INTRAVENOUS EVERY 10 MIN PRN
Status: DISCONTINUED | OUTPATIENT
Start: 2020-02-20 | End: 2020-02-20 | Stop reason: HOSPADM

## 2020-02-20 RX ORDER — ONDANSETRON 2 MG/ML
INJECTION INTRAMUSCULAR; INTRAVENOUS PRN
Status: DISCONTINUED | OUTPATIENT
Start: 2020-02-20 | End: 2020-02-20

## 2020-02-20 RX ORDER — HYDROMORPHONE HYDROCHLORIDE 1 MG/ML
.3-.5 INJECTION, SOLUTION INTRAMUSCULAR; INTRAVENOUS; SUBCUTANEOUS EVERY 10 MIN PRN
Status: DISCONTINUED | OUTPATIENT
Start: 2020-02-20 | End: 2020-02-20 | Stop reason: HOSPADM

## 2020-02-20 RX ORDER — FENTANYL CITRATE 50 UG/ML
25-50 INJECTION, SOLUTION INTRAMUSCULAR; INTRAVENOUS
Status: CANCELLED | OUTPATIENT
Start: 2020-02-20

## 2020-02-20 RX ORDER — CEFAZOLIN SODIUM 2 G/100ML
2 INJECTION, SOLUTION INTRAVENOUS
Status: COMPLETED | OUTPATIENT
Start: 2020-02-20 | End: 2020-02-20

## 2020-02-20 RX ORDER — AMOXICILLIN 250 MG
1-2 CAPSULE ORAL 2 TIMES DAILY
Qty: 30 TABLET | Refills: 0 | Status: SHIPPED | OUTPATIENT
Start: 2020-02-20 | End: 2020-11-10

## 2020-02-20 RX ORDER — FENTANYL CITRATE 50 UG/ML
25-50 INJECTION, SOLUTION INTRAMUSCULAR; INTRAVENOUS
Status: DISCONTINUED | OUTPATIENT
Start: 2020-02-20 | End: 2020-02-20 | Stop reason: HOSPADM

## 2020-02-20 RX ORDER — EPHEDRINE SULFATE 50 MG/ML
INJECTION, SOLUTION INTRAMUSCULAR; INTRAVENOUS; SUBCUTANEOUS PRN
Status: DISCONTINUED | OUTPATIENT
Start: 2020-02-20 | End: 2020-02-20

## 2020-02-20 RX ORDER — PROPOFOL 10 MG/ML
INJECTION, EMULSION INTRAVENOUS PRN
Status: DISCONTINUED | OUTPATIENT
Start: 2020-02-20 | End: 2020-02-20

## 2020-02-20 RX ORDER — LIDOCAINE HYDROCHLORIDE 20 MG/ML
INJECTION, SOLUTION INFILTRATION; PERINEURAL PRN
Status: DISCONTINUED | OUTPATIENT
Start: 2020-02-20 | End: 2020-02-20

## 2020-02-20 RX ORDER — ONDANSETRON 2 MG/ML
4 INJECTION INTRAMUSCULAR; INTRAVENOUS EVERY 30 MIN PRN
Status: DISCONTINUED | OUTPATIENT
Start: 2020-02-20 | End: 2020-02-20 | Stop reason: HOSPADM

## 2020-02-20 RX ORDER — LABETALOL 20 MG/4 ML (5 MG/ML) INTRAVENOUS SYRINGE
10
Status: DISCONTINUED | OUTPATIENT
Start: 2020-02-20 | End: 2020-02-20 | Stop reason: HOSPADM

## 2020-02-20 RX ORDER — BUPIVACAINE HYDROCHLORIDE 2.5 MG/ML
INJECTION, SOLUTION EPIDURAL; INFILTRATION; INTRACAUDAL PRN
Status: DISCONTINUED | OUTPATIENT
Start: 2020-02-20 | End: 2020-02-20

## 2020-02-20 RX ORDER — SODIUM CHLORIDE, SODIUM LACTATE, POTASSIUM CHLORIDE, CALCIUM CHLORIDE 600; 310; 30; 20 MG/100ML; MG/100ML; MG/100ML; MG/100ML
INJECTION, SOLUTION INTRAVENOUS CONTINUOUS PRN
Status: DISCONTINUED | OUTPATIENT
Start: 2020-02-20 | End: 2020-02-20

## 2020-02-20 RX ORDER — MEPERIDINE HYDROCHLORIDE 25 MG/ML
12.5 INJECTION INTRAMUSCULAR; INTRAVENOUS; SUBCUTANEOUS
Status: DISCONTINUED | OUTPATIENT
Start: 2020-02-20 | End: 2020-02-20 | Stop reason: HOSPADM

## 2020-02-20 RX ORDER — ONDANSETRON 4 MG/1
4 TABLET, ORALLY DISINTEGRATING ORAL EVERY 30 MIN PRN
Status: DISCONTINUED | OUTPATIENT
Start: 2020-02-20 | End: 2020-02-20 | Stop reason: HOSPADM

## 2020-02-20 RX ADMIN — SODIUM CHLORIDE, POTASSIUM CHLORIDE, SODIUM LACTATE AND CALCIUM CHLORIDE: 600; 310; 30; 20 INJECTION, SOLUTION INTRAVENOUS at 07:19

## 2020-02-20 RX ADMIN — ONDANSETRON 4 MG: 2 INJECTION INTRAMUSCULAR; INTRAVENOUS at 07:40

## 2020-02-20 RX ADMIN — DEXAMETHASONE SODIUM PHOSPHATE 2 MG: 4 INJECTION, SOLUTION INTRA-ARTICULAR; INTRALESIONAL; INTRAMUSCULAR; INTRAVENOUS; SOFT TISSUE at 06:52

## 2020-02-20 RX ADMIN — ROCURONIUM BROMIDE 10 MG: 10 INJECTION INTRAVENOUS at 08:15

## 2020-02-20 RX ADMIN — FENTANYL CITRATE 50 MCG: 50 INJECTION, SOLUTION INTRAMUSCULAR; INTRAVENOUS at 06:45

## 2020-02-20 RX ADMIN — BUPIVACAINE HYDROCHLORIDE 40 ML: 2.5 INJECTION, SOLUTION EPIDURAL; INFILTRATION; INTRACAUDAL; PERINEURAL at 06:52

## 2020-02-20 RX ADMIN — MIDAZOLAM 1 MG: 1 INJECTION INTRAMUSCULAR; INTRAVENOUS at 06:45

## 2020-02-20 RX ADMIN — DEXAMETHASONE SODIUM PHOSPHATE 4 MG: 4 INJECTION, SOLUTION INTRA-ARTICULAR; INTRALESIONAL; INTRAMUSCULAR; INTRAVENOUS; SOFT TISSUE at 07:40

## 2020-02-20 RX ADMIN — PROPOFOL 150 MG: 10 INJECTION, EMULSION INTRAVENOUS at 07:30

## 2020-02-20 RX ADMIN — LIDOCAINE HYDROCHLORIDE 80 MG: 20 INJECTION, SOLUTION INFILTRATION; PERINEURAL at 07:30

## 2020-02-20 RX ADMIN — ROCURONIUM BROMIDE 50 MG: 10 INJECTION INTRAVENOUS at 07:30

## 2020-02-20 RX ADMIN — FENTANYL CITRATE 100 MCG: 50 INJECTION, SOLUTION INTRAMUSCULAR; INTRAVENOUS at 07:30

## 2020-02-20 RX ADMIN — DEXMEDETOMIDINE HYDROCHLORIDE 40 MCG: 100 INJECTION, SOLUTION INTRAVENOUS at 06:52

## 2020-02-20 RX ADMIN — CEFAZOLIN 2 G: 10 INJECTION, POWDER, FOR SOLUTION INTRAVENOUS at 07:40

## 2020-02-20 RX ADMIN — Medication 5 MG: at 08:26

## 2020-02-20 RX ADMIN — SUGAMMADEX 200 MG: 100 INJECTION, SOLUTION INTRAVENOUS at 08:28

## 2020-02-20 ASSESSMENT — MIFFLIN-ST. JEOR: SCORE: 1586.69

## 2020-02-20 NOTE — OP NOTE
Procedure Date: 02/20/2020      PREOPERATIVE DIAGNOSIS:  Left inguinal hernia.       POSTOPERATIVE DIAGNOSIS:  Left inguinal hernia.      OPERATIVE PROCEDURE:  Open mesh repair, left inguinal hernia.      SURGEON:  Flakito Massey MD      ANESTHESIA:  General anesthetic.      INDICATIONS FOR PROCEDURE:  The patient presents with a symptomatic left inguinal hernia.  Informed consent was obtained.      OPERATIVE FINDINGS:  Large left inguinal indirect hernia.      OPERATIVE PROCEDURE:  The patient was brought to the operating room, put under general anesthetic.  His left inguinal region was widely prepped and draped in the usual sterile fashion.  I injected with Marcaine throughout for postop pain control.  A skin incision was made along the inguinal crease.  Dissection carried down to external aponeurosis, which was then again also anesthetized.  External aponeurosis was opened to the external ring.  The space was then created to allow for retraction.  The spermatic cord and hernia sac was then isolated with a Penrose.  I then opened the cremasterics longitudinally and found the indirect sac, which was fair sized.  This was dissected off the cord structures with little difficulty.  There was a small opening into the hernia sac.  This was closed with a pursestring of 3-0 Vicryl.  The hernia sac was then reduced in the preperitoneal space with no difficulty.  A 15 x 10 cm Parietex ProGrip mesh was then placed per my routine and technique using Marcello technique by taking the mesh, slitting it out laterally,  crossing tails out laterally and then taken inferior edge of each tail and securing it down to the inguinal ligament at the lateral aspect of the newly created deep ring.  The medial aspect of the newly created deep ring was also secured at the inferior edge of the mesh and down to the inguinal ligaments.  Another single stitch of Vicryl was used to take the inferior edge down to the inguinal ligament more  medially as well.  The mesh extended off to the midline over rectus fascia superiorly between internal and external aponeurosis and the tails of the mesh were then unrolled and passed out laterally.  The inguinal nerve was free of our dissection without apparent injury.  External aponeurosis was then closed with a running Vicryl stitch.  Skin was then closed subcuticular.  Steri-Strips were applied.  Estimated blood loss was minimal.  The patient tolerated the procedure well and was taken to the recovery room, where he was without difficulty or apparent complication.         ERASMO TREVINO MD             D: 2020   T: 2020   MT: STEVIE      Name:     KELSY NOLAN   MRN:      [id number]        Account:        [id number]   :      1955           Procedure Date: 2020      Document: [id number]       cc: [facility] Billing

## 2020-02-20 NOTE — ANESTHESIA POSTPROCEDURE EVALUATION
Anesthesia POST Procedure Evaluation    Patient: Anson Manriquez   MRN:     [id number] Gender:   male   Age:    64 year old :      1955        Preoperative Diagnosis: Left inguinal hernia [K40.90]   Procedure(s):  Left HERNIORRHAPHY, INGUINAL, OPEN   Postop Comments: No value filed.     Anesthesia Type: No value filed.       Disposition: Outpatient   Postop Pain Control: Uneventful            Sign Out: Well controlled pain   PONV: No   Neuro/Psych:    Airway/Respiratory: Uneventful            Sign Out: Acceptable/Baseline resp. status   CV/Hemodynamics: Uneventful            Sign Out: Acceptable CV status   Other NRE:    DID A NON-ROUTINE EVENT OCCUR?          Last Anesthesia Record Vitals:  CRNA VITALS  2020 0807 - 2020 0907      2020             NIBP:  126/88    Pulse:  73    SpO2:  95 %    Resp Rate (observed):  16          Last PACU Vitals:  Vitals Value Taken Time   /72 2020  9:30 AM   Temp 36.5  C (97.7  F) 2020  9:15 AM   Pulse 80 2020  9:30 AM   Resp 14 2020  9:15 AM   SpO2 93 % 2020  9:33 AM   Temp src     NIBP 126/88 2020  8:41 AM   Pulse 73 2020  8:41 AM   SpO2 95 % 2020  8:41 AM   Resp     Temp     Ht Rate     Temp 2     Vitals shown include unvalidated device data.      Electronically Signed By: Cindy George MD, 2020, 9:34 AM

## 2020-02-20 NOTE — OR NURSING
Discharge instructions to patient and spouse.  All questions regarding discharge information answered. Athletic support in place. Family to  discharge prescriptions.

## 2020-02-20 NOTE — DISCHARGE INSTRUCTIONS
REMEMBER: SAME DAY SURGERY IS NOT SAME DAY RECOVERY. TAKE IT EASY, GET PLENTY OF REST, AND HAVE SOMEONE WITH YOU FOR 24 HOURS. FOLLOW THESE INSTRUCTIONS AND PLEASE DO NOT HESITATE TO CALL WITH ANY QUESTIONS.   [facility], [place]  Same-Day Surgery   Adult Discharge Orders & Instructions   For 24 hours after surgery    1. Get plenty of rest.  A responsible adult must stay with you for at least 24 hours after you leave the hospital.   2. Do not drive or use heavy equipment.  If you have weakness or tingling, don't drive or use heavy equipment until this feeling goes away.  3. Do not drink alcohol.  4. Avoid strenuous or risky activities.  Ask for help when climbing stairs.   5. You may feel lightheaded.  IF so, sit for a few minutes before standing.  Have someone help you get up.   6. If you have nausea (feel sick to your stomach): Drink only clear liquids such as apple juice, ginger ale, broth or 7-Up.  Rest may also help.  Be sure to drink enough fluids.  Move to a regular diet as you feel able.  7. You may have a slight fever. Call the doctor if your fever is over 100 F (37.7 C) (taken under the tongue) or lasts longer than 24 hours.  8. You may have a dry mouth, a sore throat, muscle aches or trouble sleeping.  These should go away after 24 hours.  9. Do not make important or legal decisions.   Call your doctor for any of the followin.  Signs of infection (fever, growing tenderness at the surgery site, a large amount of drainage or bleeding, severe pain, foul-smelling drainage, redness, swelling).    2. It has been over 8 to 10 hours since surgery and you are still not able to urinate (pass water).    3.  Headache for over 24 hours.    To contact a doctor, call Dr. Massey @ [phone] and ask for the resident on call for General Surgery (answered 24 hours a day)      Emergency Department: [facility]: [phone]

## 2020-02-20 NOTE — BRIEF OP NOTE
[facility] Brief Operative Note    Pre-operative diagnosis: Left inguinal hernia [K40.90]   Post-operative diagnosis Same as Pre-op Dx   Procedure: Procedure(s):  Left HERNIORRHAPHY, INGUINAL, OPEN   Surgeon: Flakito Massey MD   Assistants(s):    Estimated blood loss: * No values recorded between 2/20/2020  7:48 AM and 2/20/2020  8:33 AM *    Specimens: no   Findings: yes

## 2020-02-20 NOTE — OR NURSING
Dr. George notified patient ready for Phase II.   Dr. George arrived to bedside in PACU.  Per Dr. George, Pacemaker nurse to see patient prior to discharge to interrogate pacemaker.  Telephone call made to Malka Bradford, pacemaker nurse who stated patient's pacemaker does not need to be interrogated after magnet is used.  Dr. George notified of telephone conversation with Malka Bradford.  Per Dr. George, patient does not need to have pacemaker interrogated if pacemaker nurse is confident.  Return call made to Malka Bradford who stated she is confident patient's pacemaker does not need to be interrogated after using magnet.  Shocking functions return when magnet removed.      Dr. George will enter anesthesia sign out.

## 2020-02-20 NOTE — OR NURSING
Device RN Malka returned page, states that anesthesia can place magnet to device to turn off AICD. States that she will call back regarding pacer settings.   PARVEEN George and Dr. Massey at bedside with patient, PARVEEN states that they will place magnet over device, and they will not need to change pacer setting as they plan to limit cautry to do biphasic.

## 2020-02-20 NOTE — ANESTHESIA PROCEDURE NOTES
Peripheral Nerve Block Procedure Note    Staff:     Anesthesiologist:  Gutierrez Velasquez MD    Resident/CRNA:  Yosvany Melton DO    Block performed by resident/CRNA in the presence of a teaching physician    Location: Pre-op  Procedure Start/Stop TImes:      2/20/2020 6:50 AM     2/20/2020 6:55 AM    patient identified, IV checked, site marked, risks and benefits discussed, informed consent, monitors and equipment checked, pre-op evaluation, at physician/surgeon's request and post-op pain management      Correct Patient: Yes      Correct Position: Yes      Correct Site: Yes      Correct Procedure: Yes      Correct Laterality:  Yes    Site Marked:  Yes  Procedure details:     Procedure:  TAP    Laterality:  Left    Position:  Supine    Sterile Prep: chloraprep, mask and sterile gloves      Needle:  Short bevel    Needle gauge:  21    Needle length (mm):  110    Ultrasound: Yes      Ultrasound used to identify targeted nerve, plexus, or vascular structure and placed a needle adjacent to it      Permanent Image entered into patiient's record      Abnormal pain on injection: No      Blood Aspirated: No      Paresthesias:  No    Bleeding at site: No      Test dose negative for signs of intravascular injection: Yes      Bolus via:  Needle    Infusion Method:  Single Shot    Blood aspirated via catheter: No      Complications:  None  Assessment/Narrative:     Injection made incrementally with aspirations every (mL):  5     Left sided TAP block

## 2020-02-20 NOTE — ANESTHESIA CARE TRANSFER NOTE
Patient: Anson Manriquez    Procedure(s):  Left HERNIORRHAPHY, INGUINAL, OPEN    Diagnosis: Left inguinal hernia [K40.90]  Diagnosis Additional Information: No value filed.    Anesthesia Type:   No value filed.     Note:  Airway :Face Mask  Patient transferred to:PACU  Comments: Pt denies pain or nausea. Report given to RN. Handoff Report: Identifed the Patient, Identified the Reponsible Provider, Reviewed the pertinent medical history, Discussed the surgical course, Reviewed Intra-OP anesthesia mangement and issues during anesthesia, Set expectations for post-procedure period and Allowed opportunity for questions and acknowledgement of understanding      Vitals: (Last set prior to Anesthesia Care Transfer)    CRNA VITALS  2/20/2020 0807 - 2/20/2020 0842      2/20/2020             NIBP:  126/88    Pulse:  73    SpO2:  95 %    Resp Rate (observed):  16                Electronically Signed By: LLOYD Byrd CRNA  February 20, 2020  8:42 AM

## 2020-02-24 ENCOUNTER — PATIENT OUTREACH (OUTPATIENT)
Dept: SURGERY | Facility: CLINIC | Age: 65
End: 2020-02-24

## 2020-02-24 NOTE — PROGRESS NOTES
RN Post-Op/Post-Discharge Care Coordination Note    Mr. Anson Manriquez is a 64 year old male who underwent Open left inguinal hernia repair on 2/20 with  Dr. Flakito Massey.  Spoke with Patient.    Support  Patient able to care for self independently     Health Status  Fevers/chills: Patient denies any fever or chills.  Nausea/Vomiting: Patient denies nausea/vomiting.  Eating/drinking: Patient is able to eat and drink without any complaints.  Bowel habits: Patient reports having a normal bowel movement. He states the Senna has helped and he will continue to use prn.  Drains (MASON): N/A  Incisions: Patient denies any signs and symptoms of infection..  Wound closure:  Steri-strips  Pain: he has been using Ibuprofen prn, which he states helps a lot with his pain.  New Medications:  Norco, Senna    Activity/Restrictions  No lifting in excess of 15-20 pounds for 3-4 weeks    Equipment  None    Pathology reviewed with patient:  N/A    Forms/Letters  No    All of his questions were answered including reviewing restrictions, and wound care.  He will call this office if he has any further questions and/or concerns.      In lieu of a post-op clinic patient that patient would like to be contacted in approximately 7-10 days via telephone.    A copy of this note was routed to the primary surgeon.      Whom and When to Call  Patient acknowledges understanding of how to manage any medication changes and   when to seek medical care.     Patient advised that if after hour medical concerns arise to please call [phone] and choose option 4 to speak to the physician on call.

## 2020-03-03 ENCOUNTER — PATIENT OUTREACH (OUTPATIENT)
Dept: SURGERY | Facility: CLINIC | Age: 65
End: 2020-03-03

## 2020-03-03 NOTE — PROGRESS NOTES
Anson Manriquez was contacted several days post procedure via telephone for a status update and elected to have a telephone follow -up call approximately 7-10 days after original contact in lieu of a clinic visit with Dr. Flakito Massey.  He continues to do well and the steri-strips have peeled off.  The patients wounds are healed and the area is C/D/I.  He is afebrile, pain free, and karma po; the patient is slowly resuming his normal activity.   Discussed restrictions including no lifting in excess of 20 pounds for 3 weeks: N/A he has not needed to take pain medication in the last 4 days.    Pathology was reviewed with the patient: N/A    All of Anson Manriquez question's were answered and  he would like to return to the clinic on a PRN basis.  The patient is aware that  he may schedule a post op appointment at any time.    Patient states he has an umbilical hernia and he wanted to know about getting this fixed. Discussed he needed to allow 6-8 weeks for healing and then he can call back to schedule a consult with Dr. Massey to discuss getting this fixed.    A copy of this note was routed to the patients surgeon.

## 2020-03-06 DIAGNOSIS — I50.22 CHRONIC SYSTOLIC CONGESTIVE HEART FAILURE (H): ICD-10-CM

## 2020-03-06 NOTE — TELEPHONE ENCOUNTER
[facility] Call Center    Phone Message    May a detailed message be left on voicemail: yes     Reason for Call: Medication Refill Request    Has the patient contacted the pharmacy for the refill? Yes   Name of medication being requested: furosemide (LASIX) 40 MG tablet  Provider who prescribed the medication: Dr. Chacon, Pt is requesting it be prescribed by Dr. Matos instead.  Please call them back if it is not appropriate for Dr. Matos to prescribe  [facility] [place] [id number]  Date medication is needed: 3/6/2020         Action Taken: Message routed to:  [facility]  Cardiology    Travel Screening: Not Applicable

## 2020-03-06 NOTE — TELEPHONE ENCOUNTER
Centralized Medication Refill Team note:   furosemide (LASIX) 40 MG tablet     Take 1 tablet (40 mg) by mouth 2 times daily   Last Written Prescription Date:  11/15/2019  Last Fill Quantity: 180,   # refills: 0  Last Office Visit : Cardiology: 1/22/20/ Dr Gomez  Future Office visit:  7/22/20 Dr Gomez    Routing refill request to provider for review/approval because:  Central refills cannot place new order with alternate provider, sent to Cardiology to determine if Dr Gomez will order/ manage this medication. Please let patient know.( request pended)

## 2020-03-09 RX ORDER — FUROSEMIDE 40 MG
40 TABLET ORAL 2 TIMES DAILY
Qty: 180 TABLET | Refills: 1 | Status: SHIPPED | OUTPATIENT
Start: 2020-03-09 | End: 2020-10-14

## 2020-03-09 RX ORDER — FUROSEMIDE 40 MG
40 TABLET ORAL 2 TIMES DAILY
Qty: 180 TABLET | Refills: 2 | Status: SHIPPED | OUTPATIENT
Start: 2020-03-09 | End: 2021-01-12

## 2020-04-23 ENCOUNTER — ANCILLARY PROCEDURE (OUTPATIENT)
Dept: CARDIOLOGY | Facility: CLINIC | Age: 65
End: 2020-04-23
Attending: INTERNAL MEDICINE
Payer: COMMERCIAL

## 2020-04-23 DIAGNOSIS — Z95.810 S/P ICD (INTERNAL CARDIAC DEFIBRILLATOR) PROCEDURE: ICD-10-CM

## 2020-04-23 PROCEDURE — 93296 REM INTERROG EVL PM/IDS: CPT | Mod: ZF

## 2020-04-23 PROCEDURE — 93295 DEV INTERROG REMOTE 1/2/MLT: CPT | Performed by: INTERNAL MEDICINE

## 2020-04-26 LAB
MDC_IDC_EPISODE_DTM: NORMAL
MDC_IDC_EPISODE_ID: NORMAL
MDC_IDC_EPISODE_TYPE: NORMAL
MDC_IDC_LEAD_IMPLANT_DT: NORMAL
MDC_IDC_LEAD_LOCATION: NORMAL
MDC_IDC_LEAD_LOCATION_DETAIL_1: NORMAL
MDC_IDC_LEAD_MFG: NORMAL
MDC_IDC_LEAD_MODEL: NORMAL
MDC_IDC_LEAD_POLARITY_TYPE: NORMAL
MDC_IDC_LEAD_SERIAL: NORMAL
MDC_IDC_LEAD_SPECIAL_FUNCTION: NORMAL
MDC_IDC_MSMT_BATTERY_DTM: NORMAL
MDC_IDC_MSMT_BATTERY_REMAINING_LONGEVITY: 132 MO
MDC_IDC_MSMT_BATTERY_REMAINING_PERCENTAGE: 100 %
MDC_IDC_MSMT_BATTERY_STATUS: NORMAL
MDC_IDC_MSMT_CAP_CHARGE_DTM: NORMAL
MDC_IDC_MSMT_CAP_CHARGE_TIME: 10.1 S
MDC_IDC_MSMT_CAP_CHARGE_TYPE: NORMAL
MDC_IDC_MSMT_LEADCHNL_LV_IMPEDANCE_VALUE: 562 OHM
MDC_IDC_MSMT_LEADCHNL_LV_PACING_THRESHOLD_AMPLITUDE: 0.9 V
MDC_IDC_MSMT_LEADCHNL_LV_PACING_THRESHOLD_PULSEWIDTH: 0.5 MS
MDC_IDC_MSMT_LEADCHNL_RA_IMPEDANCE_VALUE: 569 OHM
MDC_IDC_MSMT_LEADCHNL_RV_IMPEDANCE_VALUE: 674 OHM
MDC_IDC_MSMT_LEADCHNL_RV_PACING_THRESHOLD_AMPLITUDE: 0.7 V
MDC_IDC_MSMT_LEADCHNL_RV_PACING_THRESHOLD_PULSEWIDTH: 0.4 MS
MDC_IDC_PG_IMPLANT_DTM: NORMAL
MDC_IDC_PG_MFG: NORMAL
MDC_IDC_PG_MODEL: NORMAL
MDC_IDC_PG_SERIAL: NORMAL
MDC_IDC_PG_TYPE: NORMAL
MDC_IDC_SESS_CLINIC_NAME: NORMAL
MDC_IDC_SESS_DTM: NORMAL
MDC_IDC_SESS_TYPE: NORMAL
MDC_IDC_SET_BRADY_AT_MODE_SWITCH_MODE: NORMAL
MDC_IDC_SET_BRADY_AT_MODE_SWITCH_RATE: 170 {BEATS}/MIN
MDC_IDC_SET_BRADY_LOWRATE: 60 {BEATS}/MIN
MDC_IDC_SET_BRADY_MAX_SENSOR_RATE: 130 {BEATS}/MIN
MDC_IDC_SET_BRADY_MAX_TRACKING_RATE: 130 {BEATS}/MIN
MDC_IDC_SET_BRADY_MODE: NORMAL
MDC_IDC_SET_BRADY_PAV_DELAY_LOW: 180 MS
MDC_IDC_SET_BRADY_SAV_DELAY_LOW: 140 MS
MDC_IDC_SET_CRT_PACED_CHAMBERS: NORMAL
MDC_IDC_SET_LEADCHNL_LV_PACING_AMPLITUDE: 2 V
MDC_IDC_SET_LEADCHNL_LV_PACING_ANODE_ELECTRODE_1: NORMAL
MDC_IDC_SET_LEADCHNL_LV_PACING_ANODE_LOCATION_1: NORMAL
MDC_IDC_SET_LEADCHNL_LV_PACING_CAPTURE_MODE: NORMAL
MDC_IDC_SET_LEADCHNL_LV_PACING_CATHODE_ELECTRODE_1: NORMAL
MDC_IDC_SET_LEADCHNL_LV_PACING_CATHODE_LOCATION_1: NORMAL
MDC_IDC_SET_LEADCHNL_LV_PACING_PULSEWIDTH: 0.5 MS
MDC_IDC_SET_LEADCHNL_LV_SENSING_ADAPTATION_MODE: NORMAL
MDC_IDC_SET_LEADCHNL_LV_SENSING_ANODE_ELECTRODE_1: NORMAL
MDC_IDC_SET_LEADCHNL_LV_SENSING_ANODE_LOCATION_1: NORMAL
MDC_IDC_SET_LEADCHNL_LV_SENSING_CATHODE_ELECTRODE_1: NORMAL
MDC_IDC_SET_LEADCHNL_LV_SENSING_CATHODE_LOCATION_1: NORMAL
MDC_IDC_SET_LEADCHNL_LV_SENSING_SENSITIVITY: 1 MV
MDC_IDC_SET_LEADCHNL_RA_PACING_AMPLITUDE: 2.5 V
MDC_IDC_SET_LEADCHNL_RA_PACING_CAPTURE_MODE: NORMAL
MDC_IDC_SET_LEADCHNL_RA_PACING_POLARITY: NORMAL
MDC_IDC_SET_LEADCHNL_RA_PACING_PULSEWIDTH: 0.5 MS
MDC_IDC_SET_LEADCHNL_RA_SENSING_ADAPTATION_MODE: NORMAL
MDC_IDC_SET_LEADCHNL_RA_SENSING_POLARITY: NORMAL
MDC_IDC_SET_LEADCHNL_RA_SENSING_SENSITIVITY: 0.25 MV
MDC_IDC_SET_LEADCHNL_RV_PACING_AMPLITUDE: 2 V
MDC_IDC_SET_LEADCHNL_RV_PACING_CAPTURE_MODE: NORMAL
MDC_IDC_SET_LEADCHNL_RV_PACING_POLARITY: NORMAL
MDC_IDC_SET_LEADCHNL_RV_PACING_PULSEWIDTH: 0.4 MS
MDC_IDC_SET_LEADCHNL_RV_SENSING_ADAPTATION_MODE: NORMAL
MDC_IDC_SET_LEADCHNL_RV_SENSING_POLARITY: NORMAL
MDC_IDC_SET_LEADCHNL_RV_SENSING_SENSITIVITY: 0.6 MV
MDC_IDC_SET_ZONE_DETECTION_INTERVAL: 250 MS
MDC_IDC_SET_ZONE_DETECTION_INTERVAL: 300 MS
MDC_IDC_SET_ZONE_DETECTION_INTERVAL: 353 MS
MDC_IDC_SET_ZONE_TYPE: NORMAL
MDC_IDC_SET_ZONE_VENDOR_TYPE: NORMAL
MDC_IDC_STAT_AT_BURDEN_PERCENT: 0 %
MDC_IDC_STAT_AT_DTM_END: NORMAL
MDC_IDC_STAT_AT_DTM_START: NORMAL
MDC_IDC_STAT_BRADY_DTM_END: NORMAL
MDC_IDC_STAT_BRADY_DTM_START: NORMAL
MDC_IDC_STAT_BRADY_RA_PERCENT_PACED: 0 %
MDC_IDC_STAT_BRADY_RV_PERCENT_PACED: 4 %
MDC_IDC_STAT_CRT_DTM_END: NORMAL
MDC_IDC_STAT_CRT_DTM_START: NORMAL
MDC_IDC_STAT_CRT_LV_PERCENT_PACED: 96 %
MDC_IDC_STAT_EPISODE_RECENT_COUNT: 0
MDC_IDC_STAT_EPISODE_RECENT_COUNT: 1
MDC_IDC_STAT_EPISODE_RECENT_COUNT_DTM_END: NORMAL
MDC_IDC_STAT_EPISODE_RECENT_COUNT_DTM_START: NORMAL
MDC_IDC_STAT_EPISODE_TYPE: NORMAL
MDC_IDC_STAT_EPISODE_VENDOR_TYPE: NORMAL
MDC_IDC_STAT_TACHYTHERAPY_ATP_DELIVERED_RECENT: 0
MDC_IDC_STAT_TACHYTHERAPY_ATP_DELIVERED_TOTAL: 0
MDC_IDC_STAT_TACHYTHERAPY_RECENT_DTM_END: NORMAL
MDC_IDC_STAT_TACHYTHERAPY_RECENT_DTM_START: NORMAL
MDC_IDC_STAT_TACHYTHERAPY_SHOCKS_ABORTED_RECENT: 0
MDC_IDC_STAT_TACHYTHERAPY_SHOCKS_ABORTED_TOTAL: 0
MDC_IDC_STAT_TACHYTHERAPY_SHOCKS_DELIVERED_RECENT: 0
MDC_IDC_STAT_TACHYTHERAPY_SHOCKS_DELIVERED_TOTAL: 0
MDC_IDC_STAT_TACHYTHERAPY_TOTAL_DTM_END: NORMAL
MDC_IDC_STAT_TACHYTHERAPY_TOTAL_DTM_START: NORMAL

## 2020-05-20 DIAGNOSIS — I50.20 HEART FAILURE WITH REDUCED EJECTION FRACTION, NYHA CLASS III (H): ICD-10-CM

## 2020-05-21 RX ORDER — SPIRONOLACTONE 25 MG/1
25 TABLET ORAL DAILY
Qty: 90 TABLET | Refills: 2 | Status: SHIPPED | OUTPATIENT
Start: 2020-05-21 | End: 2021-02-24

## 2020-05-21 NOTE — TELEPHONE ENCOUNTER
Overridden by Flakito Massey MD on Feb 20, 2020 9:06 AM    Drug-Drug    1. POTASSIUM-SPARING DIURETICS / ANGIOTENSIN II RECEPTOR ANTAGONISTS [Level: Major]    Other Orders:  sacubitril-valsartan (ENTRESTO) 24-26 MG per tablet       2. POTASSIUM-SPARING DIURETICS / ANGIOTENSIN II RECEPTOR ANTAGONISTS [Level: Major]    Other Orders:  sacubitril-valsartan (ENTRESTO) 49-51 MG per tablet

## 2020-06-26 DIAGNOSIS — I21.02 ST ELEVATION MYOCARDIAL INFARCTION INVOLVING LEFT ANTERIOR DESCENDING (LAD) CORONARY ARTERY (H): ICD-10-CM

## 2020-06-26 DIAGNOSIS — I46.9 CARDIAC ARREST (H): ICD-10-CM

## 2020-06-26 NOTE — TELEPHONE ENCOUNTER
[facility] Call Center    Phone Message    May a detailed message be left on voicemail: yes     Reason for Call: Medication Refill Request    Has the patient contacted the pharmacy for the refill? Yes   Name of medication being requested: ticagrelor (BRILINTA) 90 MG tablet   Provider who prescribed the medication: Dr Matos   Pharmacy: [id number] [address]  AT [facility]   Date medication is needed: as soon as possible         Action Taken: Message routed to:  [facility] cardio     Travel Screening: Not Applicable

## 2020-06-30 NOTE — TELEPHONE ENCOUNTER
[facility] Call Center    Phone Message    May a detailed message be left on voicemail: yes     Reason for Call: Medication Refill Request    Has the patient contacted the pharmacy for the refill? Yes   Name of medication being requested: ticagrelor (BRILINTA) 90 MG tablet   Provider who prescribed the medication: Dr. Matos  Pharmacy: [facility] - [address]   Date medication is needed: 6/30/20   Cedric is completely out of his medication and has been for a few days. Cedric would like to request that this is sent to his [facility] instead of the [facility]. Please give Cedric a call to let him know when order has been sent over.      Action Taken: Message routed to:  [facility] [place] Cardio    Travel Screening: Not Applicable

## 2020-06-30 NOTE — TELEPHONE ENCOUNTER
ticagrelor (BRILINTA) 90 MG tablet   Last Written Prescription Date:  5/22/19  Last Fill Quantity: 180,   # refills: 3  Last Office Visit : 1/22/20  Future Office visit: 7/22/20

## 2020-07-13 DIAGNOSIS — E11.9 TYPE 2 DIABETES MELLITUS WITHOUT COMPLICATION, WITHOUT LONG-TERM CURRENT USE OF INSULIN (H): ICD-10-CM

## 2020-07-14 NOTE — TELEPHONE ENCOUNTER
empagliflozin (JARDIANCE) 25 MG TABS tablet    Last Written Prescription Date:  1/22/20  Last Fill Quantity: 90,   # refills: 1  Last Office Visit : 12/6/19  Kisha  Future Office visit: none    Routing refill request to provider for review/approval because:  A1C

## 2020-07-22 DIAGNOSIS — E11.9 TYPE 2 DIABETES MELLITUS WITHOUT COMPLICATION, WITHOUT LONG-TERM CURRENT USE OF INSULIN (H): ICD-10-CM

## 2020-07-27 RX ORDER — GLIPIZIDE 5 MG/1
5 TABLET, FILM COATED, EXTENDED RELEASE ORAL DAILY
Qty: 90 TABLET | Refills: 0 | Status: SHIPPED | OUTPATIENT
Start: 2020-07-27 | End: 2020-10-26

## 2020-07-31 ENCOUNTER — ANCILLARY PROCEDURE (OUTPATIENT)
Dept: CARDIOLOGY | Facility: CLINIC | Age: 65
End: 2020-07-31
Attending: INTERNAL MEDICINE
Payer: COMMERCIAL

## 2020-07-31 DIAGNOSIS — I42.9 CARDIOMYOPATHY, UNSPECIFIED TYPE (H): ICD-10-CM

## 2020-07-31 PROCEDURE — 93295 DEV INTERROG REMOTE 1/2/MLT: CPT | Performed by: INTERNAL MEDICINE

## 2020-07-31 PROCEDURE — 93296 REM INTERROG EVL PM/IDS: CPT | Mod: ZF

## 2020-08-04 ENCOUNTER — TELEPHONE (OUTPATIENT)
Dept: CARDIOLOGY | Facility: CLINIC | Age: 65
End: 2020-08-04

## 2020-08-04 DIAGNOSIS — I46.9 CARDIAC ARREST (H): ICD-10-CM

## 2020-08-04 DIAGNOSIS — I21.02 ST ELEVATION MYOCARDIAL INFARCTION INVOLVING LEFT ANTERIOR DESCENDING (LAD) CORONARY ARTERY (H): ICD-10-CM

## 2020-08-04 NOTE — TELEPHONE ENCOUNTER
[facility] Call Center    Phone Message    May a detailed message be left on voicemail: yes     Reason for Call: Medication Refill Request    Has the patient contacted the pharmacy for the refill? Yes   Name of medication being requested: ticagrelor (BRILINTA) 90 MG tablet   Provider who prescribed the medication:   Pharmacy: [place] [id number] [facility]  Date medication is needed: asap- pt is out of meds     Sending to clinic pool for change in pharmacy       Action Taken: Message routed to:  [facility] heart    Travel Screening: Not Applicable

## 2020-08-04 NOTE — PROGRESS NOTES
Cardiology clinic note    8/14/2020     Mr. Anson Manriquez is a 63yr old male with a history of HTN, DMII, CAD (s/p STEMI in 2007 with pLAD stenting, and recent STEMI 9/2018), and ICM complicated by cardiogenic shock requiring IABP support with slow wean. Mr. Manriquez was was out with friends and developed acute chest pain and after going home his pain worsened and had syncope, then cardiac arrest. EMS was called, and on arrival provided ~30 seconds of CPR and AED delivered 1 shock.  He was then brought to [facility] in sinus rhythm with a pulse for most of the transport.  During the last 5 mins en route to the ED, he went into a wide-complex tachycardia at a rate of 220bpm, likely VT.  He maintained a pulse, acceptable BP, and mental status.  He received amiodarone 150mg in the ED, and converted to NSR as he was being prepared for cardioversion.  He was then sent to the cath lab, where he was found to have an acute thrombotic lesion of the pLAD within the prior stent.  He was treated with aspiration thrombectomy, and STEVO x2 to pLAD and mLAD, and POBA to D1.  His LVEDP was 40mmHg. Peak troponin was ~17.  Echo showed LVEF 23% with LAD territory akinesis, consistent with echo results from 2012.  Therefore, his LAD had limited viability from his prior MI, which explains his relatively low troponin and unchanged LVEF. He was ultimately started on DAPT with aspirin and Brilinta, lisinopril, carvedilol, and lasix, and discharged home on 9/20/18.   He has been seen in clinic a few times now, with EF still 30-35%.  Despite evidence based therapy and his reduced EF, he went for CRT-D with Dr. Mckeon 4/25/19.  Procedure went well without complications.  Today presents for follow-up.  Note that he doing very well he has class II symptoms at this time.  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   He at times needs to sit down to take some rest but again I would consider him having class II symptoms at this point.  He denies any PND orthopnea no lower extremity edema no ICD shocks.  No other complaints today.     PAST MEDICAL HISTORY:  Past Medical History        Past Medical History:   Diagnosis Date     Chronic infection       near rectum still leaking     Coronary artery disease       Diabetes mellitus, type 2 (H)       Heart attack (H) 4/17/2007     Hyperlipidemia       Hypertension       STEMI (ST elevation myocardial infarction) (H) 09/13/2018     s/p STEVO x2     Stented coronary artery 2007        FAMILY HISTORY:  Family History         Family History   Problem Relation Age of Onset     Hypertension Mother       Diabetes Father       Glaucoma No family hx of       Macular Degeneration No family hx of           SOCIAL HISTORY:  Social History            Socioeconomic History     Marital status:        Spouse name: None     Number of children: None     Years of education: None     Highest education level: None   Occupational History     None   Social Needs     Financial resource strain: None     Food insecurity:       Worry: None       Inability: None     Transportation needs:       Medical: None       Non-medical: None   Tobacco Use     Smoking status: Never Smoker     Smokeless tobacco: Never Used   Substance and Sexual Activity     Alcohol use: No     Drug use: No     Sexual activity: None   Lifestyle     Physical activity:       Days per week: None       Minutes per session: None     Stress: None   Relationships     Social connections:       Talks on phone: None       Gets together: None       Attends Jewish service: None       Active member of club or organization: None       Attends meetings of clubs or organizations: None       Relationship status: None     Intimate partner violence:       Fear of current or ex partner: None       Emotionally abused: None       Physically abused: None      "  Forced sexual activity: None   Other Topics Concern     None   Social History Narrative     None      CURRENT MEDICATIONS:      Current Outpatient Medications   Medication     aspirin (ASA) 81 MG chewable tablet     atorvastatin (LIPITOR) 80 MG tablet     carvedilol (COREG) 25 MG tablet     empagliflozin (JARDIANCE) 25 MG TABS tablet     furosemide (LASIX) 40 MG tablet     glipiZIDE (GLIPIZIDE XL) 5 MG 24 hr tablet     MULTIPLE VITAMIN PO     sacubitril-valsartan (ENTRESTO) 24-26 MG per tablet     spironolactone (ALDACTONE) 25 MG tablet     ticagrelor (BRILINTA) 90 MG tablet     vitamin B complex with vitamin C (VITAMIN  B COMPLEX) tablet      No current facility-administered medications for this visit.           Facility-Administered Medications Ordered in Other Visits   Medication     perflutren diluted 1mL to 2mL with saline (OPTISON) diluted injection 5 mL     sodium chloride (PF) 0.9% PF flush 10 mL     sodium chloride (PF) 0.9% PF flush 10 mL      ROS:   Constitutional: No fever, chills, or sweats. Weight is 184 lbs 12.8 oz  ENT: No visual disturbance, ear ache, epistaxis, sore throat.   Allergies/Immunologic: Negative.   Respiratory: No cough, hemoptysis.   Cardiovascular: As per HPI.   GI: No nausea, vomiting, hematemesis, melena, or hematochezia.   : No urinary frequency, dysuria, or hematuria.   Integument: Negative.   Psychiatric: Pleasant, no major depression noted  Neuro: No focal neurological deficits noted  Endocrinology: Negative.   Musculoskeletal: As per HPI.      EXAM:  /84 (BP Location: Right arm, Patient Position: Chair, Cuff Size: Adult Regular)   Pulse 82   Ht 1.689 m (5' 6.5\")   Wt 83.8 kg (184 lb 12.8 oz)   SpO2 95%   BMI 29.38 kg/m    General: appears comfortable, alert and oriented  Head: normocephalic, atraumatic  Eyes: anicteric sclera, EOMI , PERRL  Neck: no adenopathy  Orophyarynx: moist mucosa, no lesions noted  Heart: regular, S1/S2, no murmurs, rubs or gallop. JVP not " appreciated  Chest: ecchymoses along lower chest, swelling near device pocket, non tender  Lungs: CTAB, No wheezing.   Abdomen: soft, non-tender, bowel sounds present, no hepatosplenomegaly  Extremities: No LE edema today  Skin: no open lesions noted  Neuro: grossly non-focal     Labs:        Lab Results   Component Value Date     WBC 8.7 01/22/2020     HGB 16.3 01/22/2020     HCT 47.6 01/22/2020      01/22/2020      01/22/2020     POTASSIUM 4.0 01/22/2020     CHLORIDE 103 01/22/2020     CO2 26 01/22/2020     BUN 26 01/22/2020     CR 0.97 01/22/2020      (H) 01/22/2020     NTBNP 386 (H) 01/22/2020     TROPONIN 0.14 (HH) 09/13/2018     TROPI 1.914 (HH) 09/18/2018     AST 16 01/22/2020     ALT 30 01/22/2020     ALKPHOS 61 01/22/2020     BILITOTAL 1.1 01/22/2020     INR 1.16 (H) 04/25/2019     Procedures:  Coronary angio:  9/13/18  -Both coronary arteries arise from their respective cusps.  -Dominance: Right  -LM has no evidence of coronary artery disease.  -LAD: Type 3 [LAD supplies the entire apex]. The LAD gives rise to septal perforators, multiple diagonal branches. The proximal LAD has an acute thrombotic lesion within the prior stent. Distal to the stent, there is a 70-80% stenosis. The distal vessel has HALIMA 2 flow. The D1 has thrombus at the ostium likely from embolization. The apical LAD also has thrombus from embolization.  -LCX gives rise to a large branching OM. The prox Cx has a 20% stenosis. The rest of the Cx system has mild disease.  -RCA (dominant) gives rise to PL branches and supplies PDA. There is minimal disease in the RCA system.     Echo:  9/14/18  Ischemic cardiomyopathy, LVEF=23% with extensive regional wall motion abnormalities as described below. Resting wall motion abnormalities and LV function are not significantly changed from the rest images on the 11/27/12 stress echocardiogram.  Mildly dilated LV with severely reduced LV function, LVEF=23%. There is akinesis  involving the vqrsr-rj-pwn anteroseptal, vxo-wf-lzxgmk anterior, mid anterolateral, and all apical myocardial segments. The basal anterior, basal anterolateral, and basal inferolateral segments are relatively spared. RV size and function are normal. No significant valvular abnormalities. No pericardial effusion. Normal IVC with abnormal respiratory variation, estimated RA pressure 8 mmHg.     Echo 1/30/19  Ischemic cardiomyopathy with moderately dilated LV with moderately reduced  global LV function, LVEF=31%. Extensive regional wall motion abnormalities as  described below, unchanged from 9/14/18.  This study was compared with the study from 9/14/18: There has been no  significant change. Specifically, LV function and wall motion abnormalities  have not changed significantly.  The right atria appears normal. Severe left atrial enlargement is present. This study was compared with the study from 9/14/18 . There has been no significant change.     TTE 1/22/2020:  Ischemic cardiomyopathy with moderately dilated LV with moderately reduced  global LV function, LVEF=29%. Extensive regional wall motion abnormalities as  described below, unchanged from the prior study.  This study was compared with the study from 8/28/19: There has been no  significant change. Specifically, LV function and wall motion abnormalities  have not changed significantly.      ASSESSMENT AND PLAN:  Mr. Anson Manriquez is a 63yr old male with a history of HTN, DMII, CAD (s/p MI in 2007, prior remote pLAD stenting, STEMI 9/2018), ICM complicated by cardiac arrest and ventricular tachycardia who presented to the cardiology clinic for further outpatient management.  He has been doing very well offers no new complaints, continues to have class II symptoms at this time.  ICM - HFrEF 30%, ACC/AHA C, NYHA I-II, excellent exercise tolerance.  Has CRT-D for SCD prevention  On Carvedilol 25mg BID  On entresto 49 mg BID, normal renal function and potassium.     Lasix 40mg BID, continue, appears euvolemic today.  Continue ASA/ticagrelor/lipitor for CAD/PCI.  He does have a hernia repair scheduled and at this point he may discontinue Brilinta 5 to 7 days prior to the planned surgery as his most recent PCI was September 2018.  I would consider restarting Brilinta once safe from the surgical standpoint.  Would also continue aspirin unless required to be stopped from the surgical standpoint.  Continue aldactone 12.5mg for treatment of his ICM     Continue follow up in [facility] and we will see patient back in 6 months.  I appreciate the opportunity to participate in the care of Mr. Manriquez.  Please do not hesitate to contact me anytime with questions.  Jason Matos MD

## 2020-08-06 LAB
MDC_IDC_EPISODE_DTM: NORMAL
MDC_IDC_EPISODE_ID: NORMAL
MDC_IDC_EPISODE_TYPE: NORMAL
MDC_IDC_LEAD_IMPLANT_DT: NORMAL
MDC_IDC_LEAD_LOCATION: NORMAL
MDC_IDC_LEAD_LOCATION_DETAIL_1: NORMAL
MDC_IDC_LEAD_MFG: NORMAL
MDC_IDC_LEAD_MODEL: NORMAL
MDC_IDC_LEAD_POLARITY_TYPE: NORMAL
MDC_IDC_LEAD_SERIAL: NORMAL
MDC_IDC_LEAD_SPECIAL_FUNCTION: NORMAL
MDC_IDC_MSMT_BATTERY_DTM: NORMAL
MDC_IDC_MSMT_BATTERY_REMAINING_LONGEVITY: 132 MO
MDC_IDC_MSMT_BATTERY_REMAINING_PERCENTAGE: 100 %
MDC_IDC_MSMT_BATTERY_STATUS: NORMAL
MDC_IDC_MSMT_CAP_CHARGE_DTM: NORMAL
MDC_IDC_MSMT_CAP_CHARGE_TIME: 10.1 S
MDC_IDC_MSMT_CAP_CHARGE_TYPE: NORMAL
MDC_IDC_MSMT_LEADCHNL_LV_IMPEDANCE_VALUE: 611 OHM
MDC_IDC_MSMT_LEADCHNL_LV_PACING_THRESHOLD_AMPLITUDE: 1 V
MDC_IDC_MSMT_LEADCHNL_LV_PACING_THRESHOLD_PULSEWIDTH: 0.5 MS
MDC_IDC_MSMT_LEADCHNL_RA_IMPEDANCE_VALUE: 570 OHM
MDC_IDC_MSMT_LEADCHNL_RV_IMPEDANCE_VALUE: 716 OHM
MDC_IDC_MSMT_LEADCHNL_RV_PACING_THRESHOLD_AMPLITUDE: 0.7 V
MDC_IDC_MSMT_LEADCHNL_RV_PACING_THRESHOLD_PULSEWIDTH: 0.4 MS
MDC_IDC_PG_IMPLANT_DTM: NORMAL
MDC_IDC_PG_MFG: NORMAL
MDC_IDC_PG_MODEL: NORMAL
MDC_IDC_PG_SERIAL: NORMAL
MDC_IDC_PG_TYPE: NORMAL
MDC_IDC_SESS_CLINIC_NAME: NORMAL
MDC_IDC_SESS_DTM: NORMAL
MDC_IDC_SESS_TYPE: NORMAL
MDC_IDC_SET_BRADY_AT_MODE_SWITCH_MODE: NORMAL
MDC_IDC_SET_BRADY_AT_MODE_SWITCH_RATE: 170 {BEATS}/MIN
MDC_IDC_SET_BRADY_LOWRATE: 60 {BEATS}/MIN
MDC_IDC_SET_BRADY_MAX_SENSOR_RATE: 130 {BEATS}/MIN
MDC_IDC_SET_BRADY_MAX_TRACKING_RATE: 130 {BEATS}/MIN
MDC_IDC_SET_BRADY_MODE: NORMAL
MDC_IDC_SET_BRADY_PAV_DELAY_LOW: 180 MS
MDC_IDC_SET_BRADY_SAV_DELAY_LOW: 140 MS
MDC_IDC_SET_CRT_PACED_CHAMBERS: NORMAL
MDC_IDC_SET_LEADCHNL_LV_PACING_AMPLITUDE: 2 V
MDC_IDC_SET_LEADCHNL_LV_PACING_CAPTURE_MODE: NORMAL
MDC_IDC_SET_LEADCHNL_LV_PACING_PULSEWIDTH: 0.5 MS
MDC_IDC_SET_LEADCHNL_LV_SENSING_ADAPTATION_MODE: NORMAL
MDC_IDC_SET_LEADCHNL_LV_SENSING_SENSITIVITY: 1 MV
MDC_IDC_SET_LEADCHNL_RA_PACING_AMPLITUDE: 2.5 V
MDC_IDC_SET_LEADCHNL_RA_PACING_CAPTURE_MODE: NORMAL
MDC_IDC_SET_LEADCHNL_RA_PACING_POLARITY: NORMAL
MDC_IDC_SET_LEADCHNL_RA_PACING_PULSEWIDTH: 0.5 MS
MDC_IDC_SET_LEADCHNL_RA_SENSING_ADAPTATION_MODE: NORMAL
MDC_IDC_SET_LEADCHNL_RA_SENSING_POLARITY: NORMAL
MDC_IDC_SET_LEADCHNL_RA_SENSING_SENSITIVITY: 0.25 MV
MDC_IDC_SET_LEADCHNL_RV_PACING_AMPLITUDE: 2 V
MDC_IDC_SET_LEADCHNL_RV_PACING_CAPTURE_MODE: NORMAL
MDC_IDC_SET_LEADCHNL_RV_PACING_POLARITY: NORMAL
MDC_IDC_SET_LEADCHNL_RV_PACING_PULSEWIDTH: 0.4 MS
MDC_IDC_SET_LEADCHNL_RV_SENSING_ADAPTATION_MODE: NORMAL
MDC_IDC_SET_LEADCHNL_RV_SENSING_POLARITY: NORMAL
MDC_IDC_SET_LEADCHNL_RV_SENSING_SENSITIVITY: 0.6 MV
MDC_IDC_SET_ZONE_DETECTION_INTERVAL: 250 MS
MDC_IDC_SET_ZONE_DETECTION_INTERVAL: 300 MS
MDC_IDC_SET_ZONE_DETECTION_INTERVAL: 353 MS
MDC_IDC_SET_ZONE_TYPE: NORMAL
MDC_IDC_SET_ZONE_VENDOR_TYPE: NORMAL
MDC_IDC_STAT_AT_BURDEN_PERCENT: 0 %
MDC_IDC_STAT_AT_DTM_END: NORMAL
MDC_IDC_STAT_AT_DTM_START: NORMAL
MDC_IDC_STAT_BRADY_DTM_END: NORMAL
MDC_IDC_STAT_BRADY_DTM_START: NORMAL
MDC_IDC_STAT_BRADY_RA_PERCENT_PACED: 0 %
MDC_IDC_STAT_BRADY_RV_PERCENT_PACED: 3 %
MDC_IDC_STAT_CRT_DTM_END: NORMAL
MDC_IDC_STAT_CRT_DTM_START: NORMAL
MDC_IDC_STAT_CRT_LV_PERCENT_PACED: 94 %
MDC_IDC_STAT_EPISODE_RECENT_COUNT: 0
MDC_IDC_STAT_EPISODE_RECENT_COUNT: 1
MDC_IDC_STAT_EPISODE_RECENT_COUNT_DTM_END: NORMAL
MDC_IDC_STAT_EPISODE_RECENT_COUNT_DTM_START: NORMAL
MDC_IDC_STAT_EPISODE_TYPE: NORMAL
MDC_IDC_STAT_EPISODE_VENDOR_TYPE: NORMAL
MDC_IDC_STAT_TACHYTHERAPY_ATP_DELIVERED_RECENT: 0
MDC_IDC_STAT_TACHYTHERAPY_ATP_DELIVERED_TOTAL: 0
MDC_IDC_STAT_TACHYTHERAPY_RECENT_DTM_END: NORMAL
MDC_IDC_STAT_TACHYTHERAPY_RECENT_DTM_START: NORMAL
MDC_IDC_STAT_TACHYTHERAPY_SHOCKS_ABORTED_RECENT: 0
MDC_IDC_STAT_TACHYTHERAPY_SHOCKS_ABORTED_TOTAL: 0
MDC_IDC_STAT_TACHYTHERAPY_SHOCKS_DELIVERED_RECENT: 0
MDC_IDC_STAT_TACHYTHERAPY_SHOCKS_DELIVERED_TOTAL: 0
MDC_IDC_STAT_TACHYTHERAPY_TOTAL_DTM_END: NORMAL
MDC_IDC_STAT_TACHYTHERAPY_TOTAL_DTM_START: NORMAL

## 2020-08-14 ENCOUNTER — OFFICE VISIT (OUTPATIENT)
Dept: CARDIOLOGY | Facility: CLINIC | Age: 65
End: 2020-08-14
Attending: INTERNAL MEDICINE
Payer: COMMERCIAL

## 2020-08-14 ENCOUNTER — RESEARCH ENCOUNTER (OUTPATIENT)
Dept: RESEARCH | Facility: CLINIC | Age: 65
End: 2020-08-14

## 2020-08-14 VITALS
BODY MASS INDEX: 29.19 KG/M2 | HEIGHT: 67 IN | SYSTOLIC BLOOD PRESSURE: 112 MMHG | HEART RATE: 77 BPM | WEIGHT: 186 LBS | DIASTOLIC BLOOD PRESSURE: 81 MMHG | OXYGEN SATURATION: 93 %

## 2020-08-14 DIAGNOSIS — I25.10 CORONARY ARTERY DISEASE INVOLVING NATIVE CORONARY ARTERY OF NATIVE HEART WITHOUT ANGINA PECTORIS: ICD-10-CM

## 2020-08-14 DIAGNOSIS — I10 BENIGN ESSENTIAL HYPERTENSION: Primary | ICD-10-CM

## 2020-08-14 DIAGNOSIS — I50.20 HEART FAILURE WITH REDUCED EJECTION FRACTION, NYHA CLASS II (H): ICD-10-CM

## 2020-08-14 DIAGNOSIS — I25.5 ISCHEMIC CARDIOMYOPATHY: ICD-10-CM

## 2020-08-14 DIAGNOSIS — I50.20 HEART FAILURE WITH REDUCED EJECTION FRACTION, NYHA CLASS III (H): ICD-10-CM

## 2020-08-14 DIAGNOSIS — E78.2 MIXED HYPERLIPIDEMIA: ICD-10-CM

## 2020-08-14 LAB
ALBUMIN SERPL-MCNC: 4 G/DL (ref 3.4–5)
ALP SERPL-CCNC: 67 U/L (ref 40–150)
ALT SERPL W P-5'-P-CCNC: 28 U/L (ref 0–70)
ANION GAP SERPL CALCULATED.3IONS-SCNC: 6 MMOL/L (ref 3–14)
AST SERPL W P-5'-P-CCNC: 12 U/L (ref 0–45)
BILIRUB SERPL-MCNC: 1.3 MG/DL (ref 0.2–1.3)
BUN SERPL-MCNC: 19 MG/DL (ref 7–30)
CALCIUM SERPL-MCNC: 9.2 MG/DL (ref 8.5–10.1)
CHLORIDE SERPL-SCNC: 104 MMOL/L (ref 94–109)
CO2 SERPL-SCNC: 25 MMOL/L (ref 20–32)
CREAT SERPL-MCNC: 0.86 MG/DL (ref 0.66–1.25)
ERYTHROCYTE [DISTWIDTH] IN BLOOD BY AUTOMATED COUNT: 14.2 % (ref 10–15)
GFR SERPL CREATININE-BSD FRML MDRD: >90 ML/MIN/{1.73_M2}
GLUCOSE SERPL-MCNC: 192 MG/DL (ref 70–99)
HCT VFR BLD AUTO: 46.5 % (ref 40–53)
HGB BLD-MCNC: 15.6 G/DL (ref 13.3–17.7)
MCH RBC QN AUTO: 31.3 PG (ref 26.5–33)
MCHC RBC AUTO-ENTMCNC: 33.5 G/DL (ref 31.5–36.5)
MCV RBC AUTO: 93 FL (ref 78–100)
NT-PROBNP SERPL-MCNC: 320 PG/ML (ref 0–125)
PLATELET # BLD AUTO: 235 10E9/L (ref 150–450)
POTASSIUM SERPL-SCNC: 4.1 MMOL/L (ref 3.4–5.3)
PROT SERPL-MCNC: 7.9 G/DL (ref 6.8–8.8)
RBC # BLD AUTO: 4.98 10E12/L (ref 4.4–5.9)
SODIUM SERPL-SCNC: 136 MMOL/L (ref 133–144)
WBC # BLD AUTO: 7.3 10E9/L (ref 4–11)

## 2020-08-14 PROCEDURE — 80053 COMPREHEN METABOLIC PANEL: CPT | Performed by: INTERNAL MEDICINE

## 2020-08-14 PROCEDURE — 36415 COLL VENOUS BLD VENIPUNCTURE: CPT | Performed by: INTERNAL MEDICINE

## 2020-08-14 PROCEDURE — 83880 ASSAY OF NATRIURETIC PEPTIDE: CPT | Performed by: INTERNAL MEDICINE

## 2020-08-14 PROCEDURE — 99214 OFFICE O/P EST MOD 30 MIN: CPT | Mod: ZP | Performed by: INTERNAL MEDICINE

## 2020-08-14 PROCEDURE — G0463 HOSPITAL OUTPT CLINIC VISIT: HCPCS | Mod: ZF

## 2020-08-14 PROCEDURE — 85027 COMPLETE CBC AUTOMATED: CPT | Performed by: INTERNAL MEDICINE

## 2020-08-14 ASSESSMENT — PAIN SCALES - GENERAL: PAINLEVEL: NO PAIN (0)

## 2020-08-14 ASSESSMENT — MIFFLIN-ST. JEOR: SCORE: 1584.38

## 2020-08-14 NOTE — NURSING NOTE
Chief Complaint   Patient presents with     Follow Up     history of HTN, DMII, CAD (s/p MI in 2007, prior remote pLAD stenting, and recent STEMI 9/2018), and ICM       Vitals were taken and medications were reconciled.     Martha Wild  10:17 AM

## 2020-08-14 NOTE — LETTER
8/14/2020      RE: Anson Manriquez  [address]  [facility] with [facility] [id number]       Dear Colleague,    Thank you for the opportunity to participate in the care of your patient, Anson Manriquez, at the [facility] HEART [facility]. Please see a copy of my visit note below.    Cardiology clinic note    8/14/2020     Mr. Anson Manriquez is a 63yr old male with a history of HTN, DMII, CAD (s/p STEMI in 2007 with pLAD stenting, and recent STEMI 9/2018), and ICM complicated by cardiogenic shock requiring IABP support with slow wean. Mr. Manriquez was was out with friends and developed acute chest pain and after going home his pain worsened and had syncope, then cardiac arrest. EMS was called, and on arrival provided ~30 seconds of CPR and AED delivered 1 shock.  He was then brought to [facility] in sinus rhythm with a pulse for most of the transport.  During the last 5 mins en route to the ED, he went into a wide-complex tachycardia at a rate of 220bpm, likely VT.  He maintained a pulse, acceptable BP, and mental status.  He received amiodarone 150mg in the ED, and converted to NSR as he was being prepared for cardioversion.  He was then sent to the cath lab, where he was found to have an acute thrombotic lesion of the pLAD within the prior stent.  He was treated with aspiration thrombectomy, and STEVO x2 to pLAD and mLAD, and POBA to D1.  His LVEDP was 40mmHg. Peak troponin was ~17.  Echo showed LVEF 23% with LAD territory akinesis, consistent with echo results from 2012.  Therefore, his LAD had limited viability from his prior MI, which explains his relatively low troponin and unchanged LVEF. He was ultimately started on DAPT with aspirin and Brilinta, lisinopril, carvedilol, and lasix, and discharged home on 9/20/18.   He has been seen in clinic a few times now, with EF still 30-35%.  Despite evidence based therapy and his reduced EF, he went for CRT-D with Dr. Mckeon 4/25/19.  Procedure went  well without complications.  Today presents for follow-up.  Note that he doing very well he has class II symptoms at this time.  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  He at times needs to sit down to take some rest but again I would consider him having class II symptoms at this point.  He denies any PND orthopnea no lower extremity edema no ICD shocks.  No other complaints today.     PAST MEDICAL HISTORY:  Past Medical History        Past Medical History:   Diagnosis Date     Chronic infection       near rectum still leaking     Coronary artery disease       Diabetes mellitus, type 2 (H)       Heart attack (H) 4/17/2007     Hyperlipidemia       Hypertension       STEMI (ST elevation myocardial infarction) (H) 09/13/2018     s/p STEVO x2     Stented coronary artery 2007        FAMILY HISTORY:  Family History         Family History   Problem Relation Age of Onset     Hypertension Mother       Diabetes Father       Glaucoma No family hx of       Macular Degeneration No family hx of           SOCIAL HISTORY:  Social History            Socioeconomic History     Marital status:        Spouse name: None     Number of children: None     Years of education: None     Highest education level: None   Occupational History     None   Social Needs     Financial resource strain: None     Food insecurity:       Worry: None       Inability: None     Transportation needs:       Medical: None       Non-medical: None   Tobacco Use     Smoking status: Never Smoker     Smokeless tobacco: Never Used   Substance and Sexual Activity     Alcohol use: No     Drug use: No     Sexual activity: None   Lifestyle     Physical activity:       Days per week: None       Minutes per session: None     Stress: None   Relationships     Social connections:       Talks on phone: None       Gets together:  None       Attends Evangelical service: None       Active member of club or organization: None       Attends meetings of clubs or organizations: None       Relationship status: None     Intimate partner violence:       Fear of current or ex partner: None       Emotionally abused: None       Physically abused: None       Forced sexual activity: None   Other Topics Concern     None   Social History Narrative     None      CURRENT MEDICATIONS:      Current Outpatient Medications   Medication     aspirin (ASA) 81 MG chewable tablet     atorvastatin (LIPITOR) 80 MG tablet     carvedilol (COREG) 25 MG tablet     empagliflozin (JARDIANCE) 25 MG TABS tablet     furosemide (LASIX) 40 MG tablet     glipiZIDE (GLIPIZIDE XL) 5 MG 24 hr tablet     MULTIPLE VITAMIN PO     sacubitril-valsartan (ENTRESTO) 24-26 MG per tablet     spironolactone (ALDACTONE) 25 MG tablet     ticagrelor (BRILINTA) 90 MG tablet     vitamin B complex with vitamin C (VITAMIN  B COMPLEX) tablet      No current facility-administered medications for this visit.           Facility-Administered Medications Ordered in Other Visits   Medication     perflutren diluted 1mL to 2mL with saline (OPTISON) diluted injection 5 mL     sodium chloride (PF) 0.9% PF flush 10 mL     sodium chloride (PF) 0.9% PF flush 10 mL      ROS:   Constitutional: No fever, chills, or sweats. Weight is 184 lbs 12.8 oz  ENT: No visual disturbance, ear ache, epistaxis, sore throat.   Allergies/Immunologic: Negative.   Respiratory: No cough, hemoptysis.   Cardiovascular: As per HPI.   GI: No nausea, vomiting, hematemesis, melena, or hematochezia.   : No urinary frequency, dysuria, or hematuria.   Integument: Negative.   Psychiatric: Pleasant, no major depression noted  Neuro: No focal neurological deficits noted  Endocrinology: Negative.   Musculoskeletal: As per HPI.      EXAM:  /84 (BP Location: Right arm, Patient Position: Chair, Cuff Size: Adult Regular)   Pulse 82   Ht 1.689 m  "(5' 6.5\")   Wt 83.8 kg (184 lb 12.8 oz)   SpO2 95%   BMI 29.38 kg/m    General: appears comfortable, alert and oriented  Head: normocephalic, atraumatic  Eyes: anicteric sclera, EOMI , PERRL  Neck: no adenopathy  Orophyarynx: moist mucosa, no lesions noted  Heart: regular, S1/S2, no murmurs, rubs or gallop. JVP not appreciated  Chest: ecchymoses along lower chest, swelling near device pocket, non tender  Lungs: CTAB, No wheezing.   Abdomen: soft, non-tender, bowel sounds present, no hepatosplenomegaly  Extremities: No LE edema today  Skin: no open lesions noted  Neuro: grossly non-focal     Labs:        Lab Results   Component Value Date     WBC 8.7 01/22/2020     HGB 16.3 01/22/2020     HCT 47.6 01/22/2020      01/22/2020      01/22/2020     POTASSIUM 4.0 01/22/2020     CHLORIDE 103 01/22/2020     CO2 26 01/22/2020     BUN 26 01/22/2020     CR 0.97 01/22/2020      (H) 01/22/2020     NTBNP 386 (H) 01/22/2020     TROPONIN 0.14 (HH) 09/13/2018     TROPI 1.914 () 09/18/2018     AST 16 01/22/2020     ALT 30 01/22/2020     ALKPHOS 61 01/22/2020     BILITOTAL 1.1 01/22/2020     INR 1.16 (H) 04/25/2019     Procedures:  Coronary angio:  9/13/18  -Both coronary arteries arise from their respective cusps.  -Dominance: Right  -LM has no evidence of coronary artery disease.  -LAD: Type 3 [LAD supplies the entire apex]. The LAD gives rise to septal perforators, multiple diagonal branches. The proximal LAD has an acute thrombotic lesion within the prior stent. Distal to the stent, there is a 70-80% stenosis. The distal vessel has HALIMA 2 flow. The D1 has thrombus at the ostium likely from embolization. The apical LAD also has thrombus from embolization.  -LCX gives rise to a large branching OM. The prox Cx has a 20% stenosis. The rest of the Cx system has mild disease.  -RCA (dominant) gives rise to PL branches and supplies PDA. There is minimal disease in the RCA system.     Echo:  9/14/18  Ischemic " cardiomyopathy, LVEF=23% with extensive regional wall motion abnormalities as described below. Resting wall motion abnormalities and LV function are not significantly changed from the rest images on the 11/27/12 stress echocardiogram.  Mildly dilated LV with severely reduced LV function, LVEF=23%. There is akinesis involving the [id number]-zv-[id number] anteroseptal, nyy-al-nkehuc anterior, mid anterolateral, and all apical myocardial segments. The basal anterior, basal anterolateral, and basal inferolateral segments are relatively spared. RV size and function are normal. No significant valvular abnormalities. No pericardial effusion. Normal IVC with abnormal respiratory variation, estimated RA pressure 8 mmHg.     Echo 1/30/19  Ischemic cardiomyopathy with moderately dilated LV with moderately reduced  global LV function, LVEF=31%. Extensive regional wall motion abnormalities as  described below, unchanged from 9/14/18.  This study was compared with the study from 9/14/18: There has been no  significant change. Specifically, LV function and wall motion abnormalities  have not changed significantly.  The right atria appears normal. Severe left atrial enlargement is present. This study was compared with the study from 9/14/18 . There has been no significant change.     TTE 1/22/2020:  Ischemic cardiomyopathy with moderately dilated LV with moderately reduced  global LV function, LVEF=29%. Extensive regional wall motion abnormalities as  described below, unchanged from the prior study.  This study was compared with the study from 8/28/19: There has been no  significant change. Specifically, LV function and wall motion abnormalities  have not changed significantly.      ASSESSMENT AND PLAN:  Mr. Anson Manriquez is a 63yr old male with a history of HTN, DMII, CAD (s/p MI in 2007, prior remote pLAD stenting, STEMI 9/2018), ICM complicated by cardiac arrest and ventricular tachycardia who presented to the cardiology clinic for further  outpatient management.  He has been doing very well offers no new complaints, continues to have class II symptoms at this time.  ICM - HFrEF 30%, ACC/AHA C, NYHA I-II, excellent exercise tolerance.  Has CRT-D for SCD prevention  On Carvedilol 25mg BID  On entresto 49 mg BID, normal renal function and potassium.    Lasix 40mg BID, continue, appears euvolemic today.  Continue ASA/ticagrelor/lipitor for CAD/PCI.  He does have a hernia repair scheduled and at this point he may discontinue Brilinta 5 to 7 days prior to the planned surgery as his most recent PCI was September 2018.  I would consider restarting Brilinta once safe from the surgical standpoint.  Would also continue aspirin unless required to be stopped from the surgical standpoint.  Continue aldactone 12.5mg for treatment of his ICM     Continue follow up in [facility] clinic and we will see patient back in 6 months.  I appreciate the opportunity to participate in the care of Mr. Manriquez.  Please do not hesitate to contact me anytime with questions.  Jason Matos MD    Please do not hesitate to contact me if you have any questions/concerns.     Sincerely,     Jason Matos MD

## 2020-08-17 NOTE — PROGRESS NOTES
PREEMPT-HF : PRECISION EVENT MONITORING FOR PATIENTS WITH HEART FAILURE USING HEARTLOGIC    IRB ID: [id number]  PI: Jason Matos M.D., Ph.D - Phone: [phone]  Primary : Malka Neves RN - Phone: [phone] Pager: [phone]  : Analy Ascencio - Phone: [phone]    Final study visit completed.  Device settings checked   Heart Failure Sensor suite is on  Sleep incline sensor is on  Respiration-related Trends is on

## 2020-08-18 DIAGNOSIS — I50.20 HEART FAILURE WITH REDUCED EJECTION FRACTION, NYHA CLASS III (H): ICD-10-CM

## 2020-08-18 DIAGNOSIS — E11.9 TYPE 2 DIABETES MELLITUS WITHOUT COMPLICATION, WITHOUT LONG-TERM CURRENT USE OF INSULIN (H): ICD-10-CM

## 2020-08-18 DIAGNOSIS — I21.02 ST ELEVATION MYOCARDIAL INFARCTION INVOLVING LEFT ANTERIOR DESCENDING (LAD) CORONARY ARTERY (H): ICD-10-CM

## 2020-08-22 RX ORDER — CARVEDILOL 25 MG/1
TABLET ORAL
Qty: 180 TABLET | Refills: 3 | OUTPATIENT
Start: 2020-08-22

## 2020-08-22 RX ORDER — EMPAGLIFLOZIN 25 MG/1
TABLET, FILM COATED ORAL
Qty: 90 TABLET | Refills: 0 | OUTPATIENT
Start: 2020-08-22

## 2020-08-22 RX ORDER — GLIPIZIDE 5 MG/1
TABLET, FILM COATED, EXTENDED RELEASE ORAL
Qty: 90 TABLET | Refills: 0 | OUTPATIENT
Start: 2020-08-22

## 2020-08-24 ENCOUNTER — PATIENT OUTREACH (OUTPATIENT)
Dept: SURGERY | Facility: CLINIC | Age: 65
End: 2020-08-24

## 2020-08-24 NOTE — PROGRESS NOTES
Established Patient Telephone Reminder Call    Date of call:  08/24/20  Phone numbers:  Home number on file [phone] (home)    Reached patient/confirmed appointment:  Yes  Appointment with:   Dr. Flakito Massey  Reason for visit:  Hernia

## 2020-08-25 ENCOUNTER — OFFICE VISIT (OUTPATIENT)
Dept: SURGERY | Facility: CLINIC | Age: 65
End: 2020-08-25
Payer: COMMERCIAL

## 2020-08-25 VITALS
HEART RATE: 87 BPM | OXYGEN SATURATION: 94 % | WEIGHT: 188.8 LBS | BODY MASS INDEX: 29.63 KG/M2 | DIASTOLIC BLOOD PRESSURE: 87 MMHG | HEIGHT: 67 IN | TEMPERATURE: 97.4 F | SYSTOLIC BLOOD PRESSURE: 135 MMHG

## 2020-08-25 DIAGNOSIS — K42.9 UMBILICAL HERNIA WITHOUT OBSTRUCTION AND WITHOUT GANGRENE: Primary | ICD-10-CM

## 2020-08-25 RX ORDER — CEFAZOLIN SODIUM 1 G/50ML
1 INJECTION, SOLUTION INTRAVENOUS SEE ADMIN INSTRUCTIONS
Status: CANCELLED | OUTPATIENT
Start: 2020-08-25

## 2020-08-25 RX ORDER — CEFAZOLIN SODIUM 2 G/50ML
2 SOLUTION INTRAVENOUS
Status: CANCELLED | OUTPATIENT
Start: 2020-08-25

## 2020-08-25 ASSESSMENT — PAIN SCALES - GENERAL: PAINLEVEL: NO PAIN (0)

## 2020-08-25 ASSESSMENT — MIFFLIN-ST. JEOR: SCORE: 1597.08

## 2020-08-25 NOTE — NURSING NOTE
"Chief Complaint   Patient presents with     Consult     Umbilical hernia.       Vitals:    08/25/20 0839   BP: 135/87   BP Location: Left arm   Patient Position: Sitting   Cuff Size: Adult Regular   Pulse: 87   Temp: 97.4  F (36.3  C)   TempSrc: Oral   SpO2: 94%   Weight: 188 lb 12.8 oz   Height: 5' 6.5\"       Body mass index is 30.02 kg/m .      Johnathan Santillan, EMT                      "

## 2020-08-25 NOTE — PATIENT INSTRUCTIONS
You met with Dr. Flakito Massey.      Today's visit instructions:    Reminder:  Surgery Requirements  1. Your surgery will be at [address] [place] [address]  2. You will need to arrive 2 hours early based on the location of your surgery.  3. You will need someone to drive you home (over 18 years old) and stay with you for 24 hours after the procedure  4. You will need a preop physical with your regular doctor within 30 days of surgery- closer is always better  5. Stop any blood thinners, vitamins, minerals, or herbal supplements 5 days before surgery.  If you are taking a prescribed blood thinner please let us know for specific instructions. Hold Brilinta 5 days prior to surgery- may resume the day after surgery. Ok to continue Aspirin 81 mg.  6. Fasting- a nurse from [facility] will call you 1-2 days before surgery to confirm your procedure and tell you when to stop eating and drinking  7. Wash with the soap the night before surgery and morning of surgery. See instructions in the Surgery Packet.  8. If you would like a procedure estimate please call Clau ZUNIGA, Financial Counselor, [phone].    At this time, any patient that does not have COVID-19 testing within 4 days of surgery and results available to the surgeon and anesthesia team before the procedure may have their procedure postponed or canceled. We do this to keep our patients, providers, and employees safe. If you decline to test, then you will need to contact your surgeon to determine when or if your procedure will still take place.    OR    We highly encourage patients to get tested for COVID-19 at one of our designated [facility] testing sites. We process the tests in our lab, which allows us to get the results quickly. If you choose to get tested at a non-[facility] location, you will need to contact your primary care provider to make those arrangements and ensure the results are available to your surgeon before you arrive for your procedure.  If we do not receive the results in time, your procedure may be postponed or canceled. Please make sure your test is collected 3-days prior to your procedure date. The results will need to get faxed to [phone].     After Your Open Umbilical Hernia Repair          Incision care     You may take a shower the day after surgery. Carefully wash your incision with soap and water. Do not submerge yourself in water (bath, whirlpool, hot tub, pool, lake) for 14 days after surgery. Always wash your hands before touching your incisions or removing bandages.    Remove the bandage the day after surgery, but leave the medical tape (Steri-Strips) or glue in place. These will loosen and fall off on their own 5 to 7 days after surgery.       It is not unusual to form a collection of fluid or blood under your incision that may feel firm or squishy- it can take several weeks to months for your body to reabsorb it.  At times, it may even drain.  If that should happen keep the area clean with soap, water,  and cover with a clean gauze dressing. You can change this daily or as needed.      Other medicines     Your pain medicine may cause constipation (hard, dry stools). To help with this, take the stool softener your doctor gave you or an over-the-counter stool softener or laxative. You can stop taking this when you are no longer taking pain medicine and your bowel movements are back to normal.      For pain or discomfort     Take the narcotic pain medicine your doctor gave you as needed and as instructed on the bottle. If you prefer to use over-the-counter medication, use acetaminophen (Tylenol) or ibuprofen (Advil, Motrin) as instructed on the box. Do not take Tylenol if it is in your narcotic pain medication.     Use an ice pack on your incision (surgical cut) for 20 minutes at a time as needed for the first 24 hours. Be sure to protect your skin by putting a cloth between the ice pack and your skin.     After 24 hours you can  switch to heat for 20 minutes as needed. Be sure to protect your skin by putting a cloth between the heat pack and your skin.         Activities     No driving until you feel it s safe to do so. Don t drive while taking narcotic pain medicine.     Don t lift anything heavier than 20 pounds for 3 to 4 weeks after surgery.        Diet     You can eat your regular meals after surgery.      When to call the doctor   Call your doctor if you have:     A fever above 101 F (38.3 C) (taken under the tongue), or a fever or chills lasting more than a day.     Redness at the incision site.     Any fluid or blood draining from the incision, especially if it smells bad.      Severe pain that doesn t improve with pain medicine.      We will call you 2 to 4 days after surgery to review this handout, answer questions and help arrange after-surgery care. If you have questions or concerns, please call [phone] during regular office hours. If you need to call after business hours, call [phone] and ask to page the surgeon on-call.         If you have questions please contact Ricci RN or Rosemary RN during regular clinic hours, Monday through Friday 7:30 AM - 4:00 PM, or you can contact us via [employer] at anytime.       If you have urgent needs after-hours, weekends, or holidays please call the hospital at [phone] and ask to speak with our on-call General Surgery Team.    Appointment [phone], option #1   Nurse Advice (Ricci or Rosemary): [phone]   Surgery Scheduler (Carissa): [phone]  Fax: [phone]

## 2020-08-25 NOTE — PROGRESS NOTES
Patient known to me from previous surgery, here now ready to proceed with umbilical hernia repair. No new symptoms.  Past Medical History:   Diagnosis Date     Anal fistula      Coronary artery disease      Diabetes mellitus, type 2 (H)      Heart attack (H) 4/17/2007     Hyperlipidemia      Hypertension      Inguinal hernia      Ischemic cardiomyopathy      STEMI (ST elevation myocardial infarction) (H) 09/13/2018    s/p STEVO x2     Stented coronary artery 2007     Past Surgical History:   Procedure Laterality Date     EP ICD N/A 4/25/2019    Procedure: EP ICD [0750184];  Surgeon: Mick Mckeon MD;  Location:  HE[facility] CARD[facility] CATH LAB     HERNIA REPAIR Right     Inguinal     HERNIORRHAPHY INGUINAL Left 2/20/2020    Procedure: Left HERNIORRHAPHY, INGUINAL, OPEN;  Surgeon: Flakito Massey MD;  Location:  OR     IRRIGATION AND DEBRIDEMENT RECTUM, COMBINED      abcess near rectum      LAPAROSCOPIC CHOLECYSTECTOMY  3/29/2012    Procedure:LAPAROSCOPIC CHOLECYSTECTOMY; LAPAROSCOPIC CHOLECYSTECTOMY; Surgeon:MARILYNN PRESSLEY; Location:[facility] OR     STENT, CORONARY, OLIVIA       PHYSICAL EXAM  General appearance- healthy, alert, and in no distress.  Skin- Skin color and turgor normal.  No obvious rashes.  Neck- Neck is supple without obvious adenopathy.  Lungs- Respiratory effort unlabored.  Gait- Normal.  Abdomen - soft non distended, non tender with umbilical hernia wide based.    Impression:  Hernia, umbilical  Recommendation:  open mesh repair umbilical hernia surgery    A full discussion regarding the alternatives, risks, goals, and potential complications for this surgery was completed today.  The patient understood that the potential problems included but are not limited to:  Infection, bleeding, hematoma, seroma, and recurrence.    The patient verbally expressed understanding, was given the opportunity for questions, and gives full informed consent for the procedure.      Will need pre op and hold anticoags. Ok to  continue asa.  Has pacemaker - may need to be done at [facility].    The total time spent with this patient was 15 minutes.  Of this time, greater than 50% was spent counseling and coordinating care.

## 2020-08-25 NOTE — NURSING NOTE
Pre and Post op Patient Education/Teaching Flowsheet  Relevant Diagnosis:  Umbilical hernia  Teaching Topic:  Pre and post op teaching  Person(s) Involved in teaching:  Patient     Motivation Level:  Asks Questions:  Yes  Eager to Learn:  Yes  Cooperative:  Yes  Receptive (willing/able to accept information):  Yes  Any cultural factors/Sabianist beliefs that may influence understanding or compliance?  No    Patient/caregiver/family demonstrates understanding of the following:  Reason for the appointment, diagnosis, and treatment plan:  Yes  Patient demonstrates understanding of the following:  Pre-op bowel prep:  No  Post-op pain management recommendations (medications, ice compress, binder/athletic supporter (if applicable), etc.:  Yes  Inguinal hernia patients:  Post-op urinary retention- discussed signs/symptoms and visit to ER for [place] catheter placement and to stay in place for at least 48 hours:  NA  Restrictions:  Yes  Medications to take the day of surgery:  Per PCP  Blood thinner medications discussed and when to stop (if applicable):  Yes  Wound care:  Yes  Diabetes medication management (if applicable):  Per PCP  Which situations necessitate calling provider and whom to contact:  Discussed how to contact the hospital, nurse, and clinic scheduling staff if necessary      Date and time of surgery:  Yes  Location of surgery: [address] [place] [address]. Has pacemaker.  History and Physical and any other testing necessary prior to surgery:  Yes  Required time line for completion of History and Physical and any pre-op testing:  Yes. H&P w/PCP.  Discuss need for someone to drive patient home and stay with them for 24 hours:  Yes  Pre-op showering/scrub information with Surgical Scrub:  Yes  NPO Guidelines:  NPO per Anesthesia Guidelines    Infection Prevention: Patient demonstrates understanding of the following:  Patient instructed on hand hygiene:  Yes  Surgical procedure site care will be taught and will be  reviewed at the time of discharge  Signs and symptoms of infection taught:  Yes  Wound care reviewed and will be taught at the time of discharge  Central venous catheter care will be taught at the time of discharge (if applicable)    Post-op follow-up:  Instructional materials used/given/mailed:  [facility] Booklet, post op teaching sheet, Map, Soap, and arrival/location information    Surgical instructions mailed to patient

## 2020-08-25 NOTE — LETTER
8/25/2020     RE: Anson Manriquez  [address]  [facility] [address]     Dear Colleague,    Thank you for referring your patient, Anson Manriquez, to the [facility] GENERAL SURGERY at [facility]. Please see a copy of my visit note below.    Patient known to me from previous surgery, here now ready to proceed with umbilical hernia repair. No new symptoms.  Past Medical History:   Diagnosis Date     Anal fistula      Coronary artery disease      Diabetes mellitus, type 2 (H)      Heart attack (H) 4/17/2007     Hyperlipidemia      Hypertension      Inguinal hernia      Ischemic cardiomyopathy      STEMI (ST elevation myocardial infarction) (H) 09/13/2018    s/p STEVO x2     Stented coronary artery 2007     Past Surgical History:   Procedure Laterality Date     EP ICD N/A 4/25/2019    Procedure: EP ICD [[id number]];  Surgeon: Mick Mckeon MD;  Location: [facility]     HERNIA REPAIR Right     Inguinal     HERNIORRHAPHY INGUINAL Left 2/20/2020    Procedure: Left HERNIORRHAPHY, INGUINAL, OPEN;  Surgeon: Flakito Massey MD;  Location: [facility] OR     IRRIGATION AND DEBRIDEMENT RECTUM, COMBINED      abcess near rectum      LAPAROSCOPIC CHOLECYSTECTOMY  3/29/2012    Procedure:LAPAROSCOPIC CHOLECYSTECTOMY; LAPAROSCOPIC CHOLECYSTECTOMY; Surgeon:MARILYNN PRESSLEY; Location:[facility] OR     STENT, CORONARY, OLIVIA       PHYSICAL EXAM  General appearance- healthy, alert, and in no distress.  Skin- Skin color and turgor normal.  No obvious rashes.  Neck- Neck is supple without obvious adenopathy.  Lungs- Respiratory effort unlabored.  Gait- Normal.  Abdomen - soft non distended, non tender with umbilical hernia wide based.    Impression:  Hernia, umbilical  Recommendation:  open mesh repair umbilical hernia surgery    A full discussion regarding the alternatives, risks, goals, and potential complications for this surgery was completed today.  The patient understood that the potential  problems included but are not limited to:  Infection, bleeding, hematoma, seroma, and recurrence.    The patient verbally expressed understanding, was given the opportunity for questions, and gives full informed consent for the procedure.      Will need pre op and hold anticoags. Ok to continue asa.  Has pacemaker - may need to be [facility].    The total time spent with this patient was 15 minutes.  Of this time, greater than 50% was spent counseling and coordinating care.      Again, thank you for allowing me to participate in the care of your patient.      Sincerely,    Flakito Massey MD

## 2020-08-26 ENCOUNTER — TELEPHONE (OUTPATIENT)
Dept: SURGERY | Facility: CLINIC | Age: 65
End: 2020-08-26

## 2020-09-02 PROBLEM — K42.9 UMBILICAL HERNIA WITHOUT OBSTRUCTION AND WITHOUT GANGRENE: Status: ACTIVE | Noted: 2020-09-02

## 2020-09-02 NOTE — TELEPHONE ENCOUNTER
Spoke with patient, completed the following scheduling:    Patient is scheduled for surgery with Dr. Flakito Massey    Spoke with or Left message for: Anson Manriquez in clinic about surgery date 10/16/2020    Date of Surgery: 10/16/2020 at 1:45 PM with Dr. Flakito Massey    Location:     [facility], [address])      [place] [address]      [phone]      [email or web address]    Pre-op with surgeon (if applicable): 8/25/2020 in clinic with Dr. Massey    H&P: Scheduled with Ignacio Beard on 10/8/2020 at 4:00 PM. Patient is aware that he can call to schedule a pre-op with the [facility] (P[facility]) if he is unable to schedule with his primary doctor.      Informed patient they will need an adult : Yes  Name of : spouse    Post op: 10/29/2020 at 7:00 AM with Dr. Flakito Massey    Special Equipment: [employer]    COVID-19 testing: 10/12/2020 at 3:30 pm at the [facility]    Surgery packet: sent via [employer] and Mail today 9/2/2020     Additional comments: He also knows to call general surgery if he experiences any cold or flu symptoms. Surgery teaching and soap were given to in clinic on 8/25/2020. He was asked to call [phone] if he needs to reschedule and [phone] if he experiences any symptoms.

## 2020-09-03 DIAGNOSIS — Z11.59 ENCOUNTER FOR SCREENING FOR OTHER VIRAL DISEASES: Primary | ICD-10-CM

## 2020-10-08 ENCOUNTER — OFFICE VISIT (OUTPATIENT)
Dept: INTERNAL MEDICINE | Facility: CLINIC | Age: 65
End: 2020-10-08
Payer: COMMERCIAL

## 2020-10-08 VITALS
HEART RATE: 80 BPM | OXYGEN SATURATION: 94 % | BODY MASS INDEX: 30.05 KG/M2 | DIASTOLIC BLOOD PRESSURE: 80 MMHG | SYSTOLIC BLOOD PRESSURE: 121 MMHG | WEIGHT: 189 LBS

## 2020-10-08 DIAGNOSIS — I10 BENIGN ESSENTIAL HYPERTENSION: ICD-10-CM

## 2020-10-08 DIAGNOSIS — Z23 NEED FOR VACCINATION: ICD-10-CM

## 2020-10-08 DIAGNOSIS — I10 BENIGN ESSENTIAL HYPERTENSION: Primary | ICD-10-CM

## 2020-10-08 DIAGNOSIS — R73.09 INCREASED GLUCOSE LEVEL: ICD-10-CM

## 2020-10-08 LAB
ALBUMIN SERPL-MCNC: 4 G/DL (ref 3.4–5)
ALP SERPL-CCNC: 62 U/L (ref 40–150)
ALT SERPL W P-5'-P-CCNC: 35 U/L (ref 0–70)
ANION GAP SERPL CALCULATED.3IONS-SCNC: 7 MMOL/L (ref 3–14)
AST SERPL W P-5'-P-CCNC: 22 U/L (ref 0–45)
BASOPHILS # BLD AUTO: 0 10E9/L (ref 0–0.2)
BASOPHILS NFR BLD AUTO: 0.5 %
BILIRUB SERPL-MCNC: 1.5 MG/DL (ref 0.2–1.3)
BUN SERPL-MCNC: 22 MG/DL (ref 7–30)
CALCIUM SERPL-MCNC: 9.1 MG/DL (ref 8.5–10.1)
CHLORIDE SERPL-SCNC: 104 MMOL/L (ref 94–109)
CO2 SERPL-SCNC: 27 MMOL/L (ref 20–32)
CREAT SERPL-MCNC: 0.93 MG/DL (ref 0.66–1.25)
DIFFERENTIAL METHOD BLD: NORMAL
EOSINOPHIL # BLD AUTO: 0.1 10E9/L (ref 0–0.7)
EOSINOPHIL NFR BLD AUTO: 1.7 %
ERYTHROCYTE [DISTWIDTH] IN BLOOD BY AUTOMATED COUNT: 14.2 % (ref 10–15)
GFR SERPL CREATININE-BSD FRML MDRD: 86 ML/MIN/{1.73_M2}
GLUCOSE SERPL-MCNC: 173 MG/DL (ref 70–99)
HCT VFR BLD AUTO: 47 % (ref 40–53)
HGB BLD-MCNC: 16 G/DL (ref 13.3–17.7)
IMM GRANULOCYTES # BLD: 0 10E9/L (ref 0–0.4)
IMM GRANULOCYTES NFR BLD: 0.4 %
LYMPHOCYTES # BLD AUTO: 1.7 10E9/L (ref 0.8–5.3)
LYMPHOCYTES NFR BLD AUTO: 21.9 %
MCH RBC QN AUTO: 31.4 PG (ref 26.5–33)
MCHC RBC AUTO-ENTMCNC: 34 G/DL (ref 31.5–36.5)
MCV RBC AUTO: 92 FL (ref 78–100)
MONOCYTES # BLD AUTO: 0.8 10E9/L (ref 0–1.3)
MONOCYTES NFR BLD AUTO: 10.8 %
NEUTROPHILS # BLD AUTO: 5 10E9/L (ref 1.6–8.3)
NEUTROPHILS NFR BLD AUTO: 64.7 %
NRBC # BLD AUTO: 0 10*3/UL
NRBC BLD AUTO-RTO: 0 /100
PLATELET # BLD AUTO: 233 10E9/L (ref 150–450)
POTASSIUM SERPL-SCNC: 3.8 MMOL/L (ref 3.4–5.3)
PROT SERPL-MCNC: 8 G/DL (ref 6.8–8.8)
RBC # BLD AUTO: 5.1 10E12/L (ref 4.4–5.9)
SODIUM SERPL-SCNC: 137 MMOL/L (ref 133–144)
WBC # BLD AUTO: 7.8 10E9/L (ref 4–11)

## 2020-10-08 PROCEDURE — 85025 COMPLETE CBC W/AUTO DIFF WBC: CPT | Performed by: PATHOLOGY

## 2020-10-08 PROCEDURE — 99214 OFFICE O/P EST MOD 30 MIN: CPT | Mod: 25 | Performed by: INTERNAL MEDICINE

## 2020-10-08 PROCEDURE — 80053 COMPREHEN METABOLIC PANEL: CPT | Performed by: PATHOLOGY

## 2020-10-08 PROCEDURE — 90662 IIV NO PRSV INCREASED AG IM: CPT | Performed by: INTERNAL MEDICINE

## 2020-10-08 PROCEDURE — 90471 IMMUNIZATION ADMIN: CPT | Performed by: INTERNAL MEDICINE

## 2020-10-08 NOTE — NURSING NOTE
Chief Complaint   Patient presents with     Pre-Op Exam     DOS: 10/16/20 HERNIORRHAPHY, UMBILICAL, OPEN with Dr. Granja Kimberly Nissen, EMT at 3:38 PM on 10/8/2020

## 2020-10-08 NOTE — PROGRESS NOTES
HPI:    Pt. Comes in for preoperative evaluation for umbilical hernia surgery scheduled on 10/16/2020. He was seen 8/25/2020 by Dr. Massey, surgery. 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 He does not smoke nor abuse EtOH. No reported anesthesia issues. He is on ASA and ticagrelor for his CAD. He h/o ischemic cardiomyopathy with EF 20-30% (last echo 1/22/2020). He follows with Dr. [facility] and last evaluation 8/4/2020. He does not routine check his blood sugars at home. He did have L inguinal hernia surgery with Dr. Massey 2/20/2020 and had no surgical complications. No other HEENT, cardiopulmonary, abdominal, , neurological, systemic, psychiatric, lymphatic, endocrine complaints.     Past Medical History:   Diagnosis Date     Anal fistula      Coronary artery disease      Diabetes mellitus, type 2 (H)      Heart attack (H) 4/17/2007     Hyperlipidemia      Hypertension      Inguinal hernia      Ischemic cardiomyopathy      STEMI (ST elevation myocardial infarction) (H) 09/13/2018    s/p STEVO x2     Stented coronary artery 2007     Past Surgical History:   Procedure Laterality Date     EP ICD N/A 4/25/2019    Procedure: EP ICD [[id number]];  Surgeon: Mick Mckeon MD;  Location:  HEART CARDIAC CATH LAB     HERNIA REPAIR Right     Inguinal     HERNIORRHAPHY INGUINAL Left 2/20/2020    Procedure: Left HERNIORRHAPHY, INGUINAL, OPEN;  Surgeon: Flakito Massey MD;  Location: [facility] OR     IRRIGATION AND DEBRIDEMENT RECTUM, COMBINED      abcess near rectum      LAPAROSCOPIC CHOLECYSTECTOMY  3/29/2012    Procedure:LAPAROSCOPIC CHOLECYSTECTOMY; LAPAROSCOPIC CHOLECYSTECTOMY; Surgeon:MARILYNN PRESSLEY; Location:[facility] OR     STENT, CORONARY, OLIVIA       PE:    Vitals noted, gen, nad, cooperative, alert, he is wearing a mask, neck supple nl rom, lungs with good air movement, RRR, S1, S2, no MRG, ICD in upper L chest area. He has a protruding umbilical hernia. Grossly  normal neurological exam.     A/P:    He has significant cardiac history but no current cardiopulmonary complaints. This is a low risk surgery    1. Labs today to check electrolytes and renal function.   2. He got an influenza vaccination today  3. He will hold ticagrelor but can remain on ASA (per Dr. Massey's surgery note from 8/25/2020).   4. Careful glucose monitoring gwen-operatively and he will hold his Glipizide and Jardiance. Ordered A1c today      Total time spent 25 minutes.  More than 50% of the time spent with Mr. Manriquez on counseling / coordinating his care

## 2020-10-08 NOTE — LETTER
10/8/2020      RE: Anson Manriquez  [address]  [facility] [address]       HPI:    Pt. Comes in for preoperative evaluation for umbilical hernia surgery scheduled on 10/16/2020. He was seen 8/25/2020 by Dr. Massey, surgery. 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 He does not smoke nor abuse EtOH. No reported anesthesia issues. He is on ASA and ticagrelor for his CAD. He h/o ischemic cardiomyopathy with EF 20-30% (last echo 1/22/2020). He follows with Dr. [facility]os [facility] and last evaluation 8/4/2020. He does not routine check his blood sugars at home. He did have L inguinal hernia surgery with Dr. Massey 2/20/2020 and had no surgical complications. No other HEENT, cardiopulmonary, abdominal, , neurological, systemic, psychiatric, lymphatic, endocrine complaints.     Past Medical History:   Diagnosis Date     Anal fistula      Coronary artery disease      Diabetes mellitus, type 2 (H)      Heart attack (H) 4/17/2007     Hyperlipidemia      Hypertension      Inguinal hernia      Ischemic cardiomyopathy      STEMI (ST elevation myocardial infarction) (H) 09/13/2018    s/p STEVO x2     Stented coronary artery 2007     Past Surgical History:   Procedure Laterality Date     EP ICD N/A 4/25/2019    Procedure: EP ICD [[id number]];  Surgeon: Mick Mckeon MD;  Location:  HEART CARDIAC CATH LAB     HERNIA REPAIR Right     Inguinal     HERNIORRHAPHY INGUINAL Left 2/20/2020    Procedure: Left HERNIORRHAPHY, INGUINAL, OPEN;  Surgeon: Flakito Massey MD;  Location:  OR     IRRIGATION AND DEBRIDEMENT RECTUM, COMBINED      abcess near rectum      LAPAROSCOPIC CHOLECYSTECTOMY  3/29/2012    Procedure:LAPAROSCOPIC CHOLECYSTECTOMY; LAPAROSCOPIC CHOLECYSTECTOMY; Surgeon:MARILYNN PRESSLEY; Location:[facility] OR     STENT, CORONARY, OLIVIA       PE:    Vitals noted, gen, nad, cooperative, alert, he is wearing a mask, neck supple nl rom, lungs with good air movement, RRR, S1, S2, no  MRG, ICD in upper L chest area. He has a protruding umbilical hernia. Grossly normal neurological exam.     A/P:    He has significant cardiac history but no current cardiopulmonary complaints. This is a low risk surgery    1. Labs today to check electrolytes and renal function.   2. He got an influenza vaccination today  3. He will hold ticagrelor but can remain on ASA (per Dr. Massey's surgery note from 8/25/2020).   4. Careful glucose monitoring gwen-operatively and he will hold his Glipizide and Jardiance. Ordered A1c today      Total time spent 25 minutes.  More than 50% of the time spent with Mr. Manriquez on counseling / coordinating his care      Ignacio Beard MD

## 2020-10-12 DIAGNOSIS — Z11.59 ENCOUNTER FOR SCREENING FOR OTHER VIRAL DISEASES: ICD-10-CM

## 2020-10-12 PROCEDURE — 99000 SPECIMEN HANDLING OFFICE-LAB: CPT | Performed by: PATHOLOGY

## 2020-10-12 PROCEDURE — U0003 INFECTIOUS AGENT DETECTION BY NUCLEIC ACID (DNA OR RNA); SEVERE ACUTE RESPIRATORY SYNDROME CORONAVIRUS 2 (SARS-COV-2) (CORONAVIRUS DISEASE [COVID-19]), AMPLIFIED PROBE TECHNIQUE, MAKING USE OF HIGH THROUGHPUT TECHNOLOGIES AS DESCRIBED BY CMS-2020-01-R: HCPCS | Mod: 90 | Performed by: PATHOLOGY

## 2020-10-14 LAB
SARS-COV-2 RNA SPEC QL NAA+PROBE: NOT DETECTED
SPECIMEN SOURCE: NORMAL

## 2020-10-15 ENCOUNTER — ANESTHESIA EVENT (OUTPATIENT)
Dept: SURGERY | Facility: CLINIC | Age: 65
End: 2020-10-15
Payer: COMMERCIAL

## 2020-10-16 ENCOUNTER — HOSPITAL ENCOUNTER (OUTPATIENT)
Facility: CLINIC | Age: 65
Discharge: HOME | End: 2020-10-16
Attending: SURGERY | Admitting: SURGERY
Payer: COMMERCIAL

## 2020-10-16 ENCOUNTER — ANESTHESIA (OUTPATIENT)
Dept: SURGERY | Facility: CLINIC | Age: 65
End: 2020-10-16
Payer: COMMERCIAL

## 2020-10-16 ENCOUNTER — ANCILLARY PROCEDURE (OUTPATIENT)
Dept: ULTRASOUND IMAGING | Facility: CLINIC | Age: 65
End: 2020-10-16
Payer: COMMERCIAL

## 2020-10-16 VITALS
TEMPERATURE: 97.6 F | BODY MASS INDEX: 30.54 KG/M2 | DIASTOLIC BLOOD PRESSURE: 81 MMHG | HEART RATE: 64 BPM | HEIGHT: 66 IN | OXYGEN SATURATION: 96 % | SYSTOLIC BLOOD PRESSURE: 132 MMHG | WEIGHT: 190.04 LBS | RESPIRATION RATE: 20 BRPM

## 2020-10-16 DIAGNOSIS — K42.9 UMBILICAL HERNIA WITHOUT OBSTRUCTION AND WITHOUT GANGRENE: ICD-10-CM

## 2020-10-16 LAB
ABO + RH BLD: NORMAL
ABO + RH BLD: NORMAL
BLD GP AB SCN SERPL QL: NORMAL
BLOOD BANK CMNT PATIENT-IMP: NORMAL
GLUCOSE BLDC GLUCOMTR-MCNC: 135 MG/DL (ref 70–99)
GLUCOSE BLDC GLUCOMTR-MCNC: 162 MG/DL (ref 70–99)
POTASSIUM SERPL-SCNC: 4.2 MMOL/L (ref 3.4–5.3)
SPECIMEN EXP DATE BLD: NORMAL

## 2020-10-16 PROCEDURE — 93005 ELECTROCARDIOGRAM TRACING: CPT

## 2020-10-16 PROCEDURE — C9290 INJ, BUPIVACAINE LIPOSOME: HCPCS | Performed by: STUDENT IN AN ORGANIZED HEALTH CARE EDUCATION/TRAINING PROGRAM

## 2020-10-16 PROCEDURE — 761N000007 HC RECOVERY PHASE 2 EACH 15 MINS: Performed by: SURGERY

## 2020-10-16 PROCEDURE — 360N000017 HC SURGERY LEVEL 2 EA 15 ADDTL MIN - UMMC: Performed by: SURGERY

## 2020-10-16 PROCEDURE — 370N000001 HC ANESTHESIA TECHNICAL FEE, 1ST 30 MIN: Performed by: SURGERY

## 2020-10-16 PROCEDURE — 250N000011 HC RX IP 250 OP 636: Performed by: STUDENT IN AN ORGANIZED HEALTH CARE EDUCATION/TRAINING PROGRAM

## 2020-10-16 PROCEDURE — 370N000002 HC ANESTHESIA TECHNICAL FEE, EACH ADDTL 15 MIN: Performed by: SURGERY

## 2020-10-16 PROCEDURE — 999N001017 HC STATISTIC GLUCOSE BY METER IP

## 2020-10-16 PROCEDURE — 250N000009 HC RX 250: Performed by: SURGERY

## 2020-10-16 PROCEDURE — 258N000003 HC RX IP 258 OP 636: Performed by: NURSE ANESTHETIST, CERTIFIED REGISTERED

## 2020-10-16 PROCEDURE — 49585 PR REPAIR UMBILICAL HERN,5+Y/O,REDUC: CPT | Performed by: SURGERY

## 2020-10-16 PROCEDURE — 999N000054 HC STATISTIC EKG NON-CHARGEABLE

## 2020-10-16 PROCEDURE — 84132 ASSAY OF SERUM POTASSIUM: CPT | Performed by: ANESTHESIOLOGY

## 2020-10-16 PROCEDURE — 360N000016 HC SURGERY LEVEL 2 1ST 30 MIN - UMMC: Performed by: SURGERY

## 2020-10-16 PROCEDURE — 272N000001 HC OR GENERAL SUPPLY STERILE: Performed by: SURGERY

## 2020-10-16 PROCEDURE — 250N000009 HC RX 250: Performed by: STUDENT IN AN ORGANIZED HEALTH CARE EDUCATION/TRAINING PROGRAM

## 2020-10-16 PROCEDURE — 250N000009 HC RX 250: Performed by: NURSE ANESTHETIST, CERTIFIED REGISTERED

## 2020-10-16 PROCEDURE — 86900 BLOOD TYPING SEROLOGIC ABO: CPT | Performed by: ANESTHESIOLOGY

## 2020-10-16 PROCEDURE — 86901 BLOOD TYPING SEROLOGIC RH(D): CPT | Performed by: ANESTHESIOLOGY

## 2020-10-16 PROCEDURE — 86850 RBC ANTIBODY SCREEN: CPT | Performed by: ANESTHESIOLOGY

## 2020-10-16 PROCEDURE — 93010 ELECTROCARDIOGRAM REPORT: CPT | Mod: 59 | Performed by: INTERNAL MEDICINE

## 2020-10-16 PROCEDURE — 250N000011 HC RX IP 250 OP 636: Performed by: SURGERY

## 2020-10-16 PROCEDURE — 999N000140 HC STATISTIC PRE-PROCEDURE ASSESSMENT III: Performed by: SURGERY

## 2020-10-16 PROCEDURE — C1781 MESH (IMPLANTABLE): HCPCS | Performed by: SURGERY

## 2020-10-16 PROCEDURE — 36415 COLL VENOUS BLD VENIPUNCTURE: CPT | Performed by: ANESTHESIOLOGY

## 2020-10-16 PROCEDURE — 250N000011 HC RX IP 250 OP 636: Performed by: NURSE ANESTHETIST, CERTIFIED REGISTERED

## 2020-10-16 DEVICE — MESH POLYESTER PROGRIP 6X6" (15X15CM) PARIETEX TEM1515G: Type: IMPLANTABLE DEVICE | Site: UMBILICAL | Status: FUNCTIONAL

## 2020-10-16 RX ORDER — SODIUM CHLORIDE, SODIUM LACTATE, POTASSIUM CHLORIDE, CALCIUM CHLORIDE 600; 310; 30; 20 MG/100ML; MG/100ML; MG/100ML; MG/100ML
INJECTION, SOLUTION INTRAVENOUS CONTINUOUS
Status: DISCONTINUED | OUTPATIENT
Start: 2020-10-16 | End: 2020-10-16 | Stop reason: HOSPADM

## 2020-10-16 RX ORDER — PROPOFOL 10 MG/ML
INJECTION, EMULSION INTRAVENOUS CONTINUOUS PRN
Status: DISCONTINUED | OUTPATIENT
Start: 2020-10-16 | End: 2020-10-16

## 2020-10-16 RX ORDER — LIDOCAINE 40 MG/G
CREAM TOPICAL
Status: DISCONTINUED | OUTPATIENT
Start: 2020-10-16 | End: 2020-10-16 | Stop reason: HOSPADM

## 2020-10-16 RX ORDER — BUPIVACAINE HYDROCHLORIDE 5 MG/ML
INJECTION, SOLUTION PERINEURAL PRN
Status: DISCONTINUED | OUTPATIENT
Start: 2020-10-16 | End: 2020-10-16 | Stop reason: HOSPADM

## 2020-10-16 RX ORDER — CEFAZOLIN SODIUM 2 G/100ML
2 INJECTION, SOLUTION INTRAVENOUS
Status: COMPLETED | OUTPATIENT
Start: 2020-10-16 | End: 2020-10-16

## 2020-10-16 RX ORDER — HYDROCODONE BITARTRATE AND ACETAMINOPHEN 5; 325 MG/1; MG/1
1-2 TABLET ORAL EVERY 4 HOURS PRN
Qty: 15 TABLET | Refills: 0 | Status: SHIPPED | OUTPATIENT
Start: 2020-10-16 | End: 2020-11-10

## 2020-10-16 RX ORDER — NALOXONE HYDROCHLORIDE 0.4 MG/ML
.1-.4 INJECTION, SOLUTION INTRAMUSCULAR; INTRAVENOUS; SUBCUTANEOUS
Status: DISCONTINUED | OUTPATIENT
Start: 2020-10-16 | End: 2020-10-16 | Stop reason: HOSPADM

## 2020-10-16 RX ORDER — FENTANYL CITRATE 50 UG/ML
25-50 INJECTION, SOLUTION INTRAMUSCULAR; INTRAVENOUS
Status: DISCONTINUED | OUTPATIENT
Start: 2020-10-16 | End: 2020-10-16 | Stop reason: HOSPADM

## 2020-10-16 RX ORDER — ONDANSETRON 2 MG/ML
4 INJECTION INTRAMUSCULAR; INTRAVENOUS EVERY 30 MIN PRN
Status: DISCONTINUED | OUTPATIENT
Start: 2020-10-16 | End: 2020-10-16 | Stop reason: HOSPADM

## 2020-10-16 RX ORDER — CEFAZOLIN SODIUM 1 G/3ML
1 INJECTION, POWDER, FOR SOLUTION INTRAMUSCULAR; INTRAVENOUS SEE ADMIN INSTRUCTIONS
Status: DISCONTINUED | OUTPATIENT
Start: 2020-10-16 | End: 2020-10-16 | Stop reason: HOSPADM

## 2020-10-16 RX ORDER — LIDOCAINE HYDROCHLORIDE 20 MG/ML
INJECTION, SOLUTION INFILTRATION; PERINEURAL PRN
Status: DISCONTINUED | OUTPATIENT
Start: 2020-10-16 | End: 2020-10-16

## 2020-10-16 RX ORDER — FLUMAZENIL 0.1 MG/ML
0.2 INJECTION, SOLUTION INTRAVENOUS
Status: DISCONTINUED | OUTPATIENT
Start: 2020-10-16 | End: 2020-10-16 | Stop reason: HOSPADM

## 2020-10-16 RX ORDER — SODIUM CHLORIDE, SODIUM LACTATE, POTASSIUM CHLORIDE, CALCIUM CHLORIDE 600; 310; 30; 20 MG/100ML; MG/100ML; MG/100ML; MG/100ML
INJECTION, SOLUTION INTRAVENOUS CONTINUOUS PRN
Status: DISCONTINUED | OUTPATIENT
Start: 2020-10-16 | End: 2020-10-16

## 2020-10-16 RX ORDER — AMOXICILLIN 250 MG
1-2 CAPSULE ORAL 2 TIMES DAILY
Qty: 30 TABLET | Refills: 0 | Status: SHIPPED | OUTPATIENT
Start: 2020-10-16 | End: 2020-11-10

## 2020-10-16 RX ORDER — ONDANSETRON 4 MG/1
4 TABLET, ORALLY DISINTEGRATING ORAL EVERY 30 MIN PRN
Status: DISCONTINUED | OUTPATIENT
Start: 2020-10-16 | End: 2020-10-16 | Stop reason: HOSPADM

## 2020-10-16 RX ORDER — BUPIVACAINE HYDROCHLORIDE 5 MG/ML
INJECTION, SOLUTION EPIDURAL; INTRACAUDAL PRN
Status: DISCONTINUED | OUTPATIENT
Start: 2020-10-16 | End: 2020-10-16

## 2020-10-16 RX ORDER — ONDANSETRON 2 MG/ML
INJECTION INTRAMUSCULAR; INTRAVENOUS PRN
Status: DISCONTINUED | OUTPATIENT
Start: 2020-10-16 | End: 2020-10-16

## 2020-10-16 RX ORDER — HYDROMORPHONE HYDROCHLORIDE 1 MG/ML
.3-.5 INJECTION, SOLUTION INTRAMUSCULAR; INTRAVENOUS; SUBCUTANEOUS EVERY 10 MIN PRN
Status: DISCONTINUED | OUTPATIENT
Start: 2020-10-16 | End: 2020-10-16 | Stop reason: HOSPADM

## 2020-10-16 RX ADMIN — PROPOFOL 125 MCG/KG/MIN: 10 INJECTION, EMULSION INTRAVENOUS at 13:46

## 2020-10-16 RX ADMIN — CEFAZOLIN 2 G: 10 INJECTION, POWDER, FOR SOLUTION INTRAVENOUS at 13:55

## 2020-10-16 RX ADMIN — LIDOCAINE HYDROCHLORIDE 30 MG: 20 INJECTION, SOLUTION INFILTRATION; PERINEURAL at 13:46

## 2020-10-16 RX ADMIN — BUPIVACAINE HYDROCHLORIDE 20 ML: 5 INJECTION, SOLUTION EPIDURAL; INTRACAUDAL at 13:30

## 2020-10-16 RX ADMIN — BUPIVACAINE 20 ML: 13.3 INJECTION, SUSPENSION, LIPOSOMAL INFILTRATION at 13:30

## 2020-10-16 RX ADMIN — SODIUM CHLORIDE, POTASSIUM CHLORIDE, SODIUM LACTATE AND CALCIUM CHLORIDE: 600; 310; 30; 20 INJECTION, SOLUTION INTRAVENOUS at 13:40

## 2020-10-16 RX ADMIN — ONDANSETRON 4 MG: 2 INJECTION INTRAMUSCULAR; INTRAVENOUS at 14:17

## 2020-10-16 RX ADMIN — FENTANYL CITRATE 50 MCG: 50 INJECTION, SOLUTION INTRAMUSCULAR; INTRAVENOUS at 13:21

## 2020-10-16 RX ADMIN — MIDAZOLAM 1 MG: 1 INJECTION INTRAMUSCULAR; INTRAVENOUS at 13:22

## 2020-10-16 ASSESSMENT — MIFFLIN-ST. JEOR: SCORE: 1589.75

## 2020-10-16 NOTE — OR NURSING
Pre-op TAP block performed without complications. VSS. Pt tolerated well. Will continue to monitor.  Pt received 1 mg Versed and 50 mcg Fentanyl

## 2020-10-16 NOTE — ANESTHESIA POSTPROCEDURE EVALUATION
Anesthesia POST Procedure Evaluation    Patient: Anson Manriquez   MRN:     [id number] Gender:   male   Age:    65 year old :      1955        Preoperative Diagnosis: Umbilical hernia without obstruction and without gangrene [K42.9]   Procedure(s):  HERNIORRHAPHY, UMBILICAL, OPEN, with mesh   Postop Comments: No value filed.     Anesthesia Type: General       Disposition: Outpatient   Postop Pain Control: Uneventful            Sign Out: Well controlled pain   PONV: No   Neuro/Psych: Uneventful            Sign Out: Acceptable/Baseline neuro status   Airway/Respiratory: Uneventful            Sign Out: Acceptable/Baseline resp. status   CV/Hemodynamics: Uneventful            Sign Out: Acceptable CV status   Other NRE: NONE   DID A NON-ROUTINE EVENT OCCUR? No         Last Anesthesia Record Vitals:  CRNA VITALS  10/16/2020 1437 - 10/16/2020 1537      10/16/2020             EKG:  V Paced          Last PACU Vitals:  No vitals data found for the desired time range.        Electronically Signed By: Nhi Hutton MD, 2020, 5:01 PM

## 2020-10-16 NOTE — BRIEF OP NOTE
"[facility]     Brief Operative Note    Pre-operative diagnosis: Umbilical hernia without obstruction and without gangrene [K42.9]  Post-operative diagnosis Same as pre-operative diagnosis    Procedure: Procedure(s):  HERNIORRHAPHY, UMBILICAL, OPEN, with mesh  Surgeon: Surgeon(s) and Role:     * Flakito Massey MD - Primary     * Nichol Jenkins MD - Resident - Assisting  Anesthesia: Monitor Anesthesia Care   Estimated blood loss: 5 mL  Drains: None  Specimens: * No specimens in log *  Findings:   Umbilical hernia, 4cm x 3cm fascial defect. 8cm x 7cm piece of progrip placed.  Complications: None.  Implants:   Implant Name Type Inv. Item Serial No.  Lot No. LRB No. Used Action   MESH POLYESTER PROGRIP 6X6\" ([id number]) [employer] [id number] Mesh MESH POLYESTER PROGRIP 6X6\" ([id number]) [employer] [id number]  [facility] [id number] N/A 1 Implanted       Nichol Jenkins MD  Surgery, PGY2    "

## 2020-10-16 NOTE — ANESTHESIA PREPROCEDURE EVALUATION
Anesthesia Pre-Procedure Evaluation    Patient: Anson Manriquez   MRN:     [id number] Gender:   male   Age:    65 year old :      1955        Preoperative Diagnosis: Umbilical hernia without obstruction and without gangrene [K42.9]   Procedure(s):  HERNIORRHAPHY, UMBILICAL, OPEN     LABS:  CBC:   Lab Results   Component Value Date    WBC 7.8 10/08/2020    WBC 7.3 2020    HGB 16.0 10/08/2020    HGB 15.6 2020    HCT 47.0 10/08/2020    HCT 46.5 2020     10/08/2020     2020     BMP:   Lab Results   Component Value Date     10/08/2020     2020    POTASSIUM 3.8 10/08/2020    POTASSIUM 4.1 2020    CHLORIDE 104 10/08/2020    CHLORIDE 104 2020    CO2 27 10/08/2020    CO2 25 2020    BUN 22 10/08/2020    BUN 19 2020    CR 0.93 10/08/2020    CR 0.86 2020     (H) 10/08/2020     (H) 2020     COAGS:   Lab Results   Component Value Date    PTT 29 2018    INR 1.16 (H) 2019    FIBR 226 2018     POC:   Lab Results   Component Value Date     (H) 2020     OTHER:   Lab Results   Component Value Date    PH 7.50 (H) 2018    LACT 1.4 2018    A1C 8.0 (H) 2020    RENO 9.1 10/08/2020    PHOS 4.0 2018    MAG 2.5 (H) 2018    ALBUMIN 4.0 10/08/2020    PROTTOTAL 8.0 10/08/2020    ALT 35 10/08/2020    AST 22 10/08/2020    ALKPHOS 62 10/08/2020    BILITOTAL 1.5 (H) 10/08/2020    LIPASE 310 2018    AMYLASE 38 2018    TSH 2.44 2017        Preop Vitals    BP Readings from Last 3 Encounters:   10/08/20 121/80   20 135/87   20 112/81    Pulse Readings from Last 3 Encounters:   10/08/20 80   20 87   20 77      Resp Readings from Last 3 Encounters:   20 16   20 20   19 18    SpO2 Readings from Last 3 Encounters:   10/08/20 94%   20 94%   20 93%      Temp Readings from Last 1 Encounters:   20 36.3  C (97.4  F)  "(Oral)    Ht Readings from Last 1 Encounters:   08/25/20 1.689 m (5' 6.5\")      Wt Readings from Last 1 Encounters:   10/08/20 85.7 kg (189 lb)    Estimated body mass index is 30.05 kg/m  as calculated from the following:    Height as of 8/25/20: 1.689 m (5' 6.5\").    Weight as of 10/8/20: 85.7 kg (189 lb).     LDA:        Past Medical History:   Diagnosis Date     Anal fistula      Antiplatelet or antithrombotic long-term use      Coronary artery disease      Diabetes mellitus, type 2 (H)      Heart attack (H) 4/17/2007     Hyperlipidemia      Hypertension      Inguinal hernia      Ischemic cardiomyopathy      STEMI (ST elevation myocardial infarction) (H) 09/13/2018    s/p STEVO x2     Stented coronary artery 2007      Past Surgical History:   Procedure Laterality Date     EP ICD N/A 4/25/2019    Procedure: EP ICD [[id number]];  Surgeon: Mick Mckeon MD;  [facility]     HERNIA REPAIR Right     Inguinal     HERNIORRHAPHY INGUINAL Left 2/20/2020    Procedure: Left HERNIORRHAPHY, INGUINAL, OPEN;  Surgeon: Flakito Massey MD;  Location: [facility] OR     IRRIGATION AND DEBRIDEMENT RECTUM, COMBINED      abcess near rectum      LAPAROSCOPIC CHOLECYSTECTOMY  3/29/2012    Procedure:LAPAROSCOPIC CHOLECYSTECTOMY; LAPAROSCOPIC CHOLECYSTECTOMY; Surgeon:MARILYNN PRESSLEY; Location:[facility] OR     STENT, CORONARY, OLIVIA        No Known Allergies     Anesthesia Evaluation     .             ROS/MED HX    ENT/Pulmonary:       Neurologic:       Cardiovascular: Comment: Dyslipidemia  Hypertension  CAD MI 2007, 2018, s/p stents 2017, 9/2018, three Jose  CHF- ICM, EF: 29% on 1/22/20 NYHA classification: II.  ICD placed 4/25/19- for ICM  [facility]    Echo:1/22/20   Moderately dilated LV with moderately reduced  global LV function, LVEF=29%. Extensive regional wall motion abnormalities, unchanged from the prior study.    ECG Atrial sensed ventricular paced rhythm with occasional premature ventricular complexes    ICD " check 1/22/20.   [employer] Bi-Ventricular ICD  Normal ICD function.  No atrial or ventricular arrhythmias recorded.  Intrinsic rhythm = SR @ 85 bpm. AP = <1%. RVP =4%. LVP =95%.   Estimated battery longevity to ISSA =11 years.        (+) hypertension--CAD, --. Taking blood thinners : . . . :. .       METS/Exercise Tolerance:     Hematologic:         Musculoskeletal:         GI/Hepatic:         Renal/Genitourinary:         Endo:     (+) type II DM .      Psychiatric:         Infectious Disease:         Malignancy:         Other:                     CRAIG FV AN PHYSICAL EXAM    Assessment:   ASA SCORE: 2    H&P: History and physical reviewed and following examination; no interval change.   Smoking Status:  Non-Smoker/Unknown   NPO Status: NPO Appropriate     Plan:   Anes. Type:  General   Pre-Medication: None   Induction:  IV (Standard)   Airway: ETT; Oral   Access/Monitoring: PIV   Maintenance: Balanced     Postop Plan:   Postop Pain: Opioids  Postop Sedation/Airway: Not planned  Disposition: Outpatient     PONV Management:   Adult Risk Factors:, Non-Smoker, Postop Opioids   Prevention: Ondansetron           Anesthesia Attending    HPI: Anson P Declan is a 65 year old male who  has a past medical history of Anal fistula, Antiplatelet or antithrombotic long-term use, Coronary artery disease, Diabetes mellitus, type 2 (H), Heart attack (H) (4/17/2007), Hyperlipidemia, Hypertension, Inguinal hernia, Ischemic cardiomyopathy, STEMI (ST elevation myocardial infarction) (H) (09/13/2018), and Stented coronary artery (2007).  has a past surgical history that includes stent, coronary, sara; Irrigation and debridement rectum, combined; Laparoscopic cholecystectomy (3/29/2012); hernia repair (Right); Implantable Cardioverter-Defibrillator (ICD) (N/A, 4/25/2019); and Herniorrhaphy inguinal (Left, 2/20/2020). with a Umbilical hernia without obstruction and without gangrene [K42.9] scheduled for Procedure(s):  HERNIORRHAPHY,  UMBILICAL, OPEN.      PMHx/PSHx/ROS:  Past Medical History:   Diagnosis Date     Anal fistula      Antiplatelet or antithrombotic long-term use      Coronary artery disease      Diabetes mellitus, type 2 (H)      Heart attack (H) 4/17/2007     Hyperlipidemia      Hypertension      Inguinal hernia      Ischemic cardiomyopathy      STEMI (ST elevation myocardial infarction) (H) 09/13/2018    s/p STEVO x2     Stented coronary artery 2007       Past Surgical History:   Procedure Laterality Date     EP ICD N/A 4/25/2019    Procedure: EP ICD [[id number]];  Surgeon: Mick Mckeon MD;  Location:  HEART CARDIAC CATH LAB     HERNIA REPAIR Right     Inguinal     HERNIORRHAPHY INGUINAL Left 2/20/2020    Procedure: Left HERNIORRHAPHY, INGUINAL, OPEN;  Surgeon: Flakito Massey MD;  Location: [facility] OR     IRRIGATION AND DEBRIDEMENT RECTUM, COMBINED      abcess near rectum      LAPAROSCOPIC CHOLECYSTECTOMY  3/29/2012    Procedure:LAPAROSCOPIC CHOLECYSTECTOMY; LAPAROSCOPIC CHOLECYSTECTOMY; Surgeon:MARILYNN PRESSLEY; Location:[facility] OR     STENT, CORONARY, OLIVIA         Soc Hx:   Social History     Tobacco Use     Smoking status: Never Smoker     Smokeless tobacco: Never Used   Substance Use Topics     Alcohol use: No         Allergies: No Known Allergies    Meds:   Medications Prior to Admission   Medication Sig Dispense Refill Last Dose     aspirin (ASA) 81 MG chewable tablet Take 1 tablet (81 mg) by mouth daily (Patient taking differently: Take 81 mg by mouth every morning ) 90 tablet 3      atorvastatin (LIPITOR) 80 MG tablet Take 1 tablet (80 mg) by mouth daily 90 tablet 3      carvedilol (COREG) 25 MG tablet 1 tablet (25 mg) by Oral or Feeding Tube route 2 times daily (with meals) (Patient taking differently: Take 25 mg by mouth 2 times daily (with meals) ) 180 tablet 3      empagliflozin (JARDIANCE) 25 MG TABS tablet Take 1 tablet (25 mg) by mouth daily 90 tablet 0      furosemide (LASIX) 40 MG tablet Take 1 tablet (40 mg) by mouth  2 times daily 180 tablet 2      glipiZIDE (GLUCOTROL XL) 5 MG 24 hr tablet Take 1 tablet (5 mg) by mouth daily Take before or with meals. 90 tablet 0      MULTIPLE VITAMIN PO Take 1 tablet by mouth every morning         sacubitril-valsartan (ENTRESTO) 24-26 MG per tablet Take 1 tablet by mouth 2 times daily 180 tablet 3      senna-docusate 8.6-50 MG PO tablet Take 1-2 tablets by mouth 2 times daily 30 tablet 0      spironolactone (ALDACTONE) 25 MG tablet Take 1 tablet (25 mg) by mouth daily 90 tablet 2      ticagrelor (BRILINTA) 90 MG tablet Take 1 tablet (90 mg) by mouth 2 times daily 180 tablet 1        No current outpatient medications on file.         Labs:  BMP:  Recent Labs   Lab Test 10/08/20  1630      POTASSIUM 3.8   CHLORIDE 104   CO2 27   BUN 22   CR 0.93   *   RENO 9.1     CBC:   Recent Labs   Lab Test 10/08/20  1630   WBC 7.8   RBC 5.10   HGB 16.0   HCT 47.0   MCV 92   MCH 31.4   MCHC 34.0   RDW 14.2        Coags:  Recent Labs   Lab Test 04/25/19  0750 12/06/18  1755 09/14/18  0102 09/14/18  0102   INR 1.16* 1.01   < > 1.34*   PTT  --  29  --   --    FIBR  --   --   --  226    < > = values in this interval not displayed.     HCG:  No results found for: HCGQUANT  Cardiac enzymes:  No results found for: CKTOTAL, CKMB, TROPN      ECG results from 02/20/20   EKG 12-lead, tracing only     Value    Interpretation ECG Click View Image link to view waveform and result     No results found for this or any previous visit (from the past 8760 hour(s)).    CXR  CT    NPO status adequate.      65 year old male who  has a past medical history of Anal fistula, Antiplatelet or antithrombotic long-term use, Coronary artery disease, Diabetes mellitus, type 2 (H), Heart attack (H) (4/17/2007), Hyperlipidemia, Hypertension, Inguinal hernia, Ischemic cardiomyopathy, STEMI (ST elevation myocardial infarction) (H) (09/13/2018), and Stented coronary artery (2007). to OR for Procedure(s):  HERNIORRHAPHY,  UMBILICAL, OPEN    Plan for GA, standard monitors, large bore IVs, PONV prophylaxis, antibiotics.  Plan discussed with the anesthesia and surgery teams.  Anesthetic risks discussed with the patient, all questions answered.  Consent in chart.          Jyothi Flores MD

## 2020-10-16 NOTE — DISCHARGE INSTRUCTIONS
[facility]  Same-Day Surgery   Adult Discharge Orders & Instructions     For 24 hours after surgery    1. Get plenty of rest.  A responsible adult must stay with you for at least 24 hours after you leave the hospital.   2. Do not drive or use heavy equipment.  If you have weakness or tingling, don't drive or use heavy equipment until this feeling goes away.  3. Do not drink alcohol.  4. Avoid strenuous or risky activities.  Ask for help when climbing stairs.   5. You may feel lightheaded.  IF so, sit for a few minutes before standing.  Have someone help you get up.   6. If you have nausea (feel sick to your stomach): Drink only clear liquids such as apple juice, ginger ale, broth or 7-Up.  Rest may also help.  Be sure to drink enough fluids.  Move to a regular diet as you feel able.  7. You may have a slight fever. Call the doctor if your fever is over 100 F (37.7 C) (taken under the tongue) or lasts longer than 24 hours.  8. You may have a dry mouth, a sore throat, muscle aches or trouble sleeping.  These should go away after 24 hours.  9. Do not make important or legal decisions.   Call your doctor for any of the followin.  Signs of infection (fever, growing tenderness at the surgery site, a large amount of drainage or bleeding, severe pain, foul-smelling drainage, redness, swelling).    2. It has been over 8 to 10 hours since surgery and you are still not able to urinate (pass water).    3.  Headache for over 24 hours.    4.  Numbness, tingling or weakness the day after surgery (if you had spinal anesthesia).  To contact a doctor, call [facility] @ [phone] or:      [phone] and ask for the resident on call for General Surgery (answered 24 hours a day)      Emergency Department:    [facility]: [phone]       (TTY for hearing impaired: [phone])    [facility]: [phone]       (TTY for hearing impaired: [phone])

## 2020-10-16 NOTE — PROGRESS NOTES
"PHASE II RN NOTE  Assigned as pt nurse while pt recovering in Phase II  Pt tolerating PO intake without difficulty   NST assisting pt with transferring from cart to chair and with dressing. All belongings returned to pt at this time. No report of any items missing.   Pt sitting up in chair alert and oriented with call light in reach pt verbalizes and demonstrates use.   @ 1615 Device RN [facility] paged the following:  \"Cedric Manriquez had hernia repair and has pacemaker. Just wanted to notify you. Jaron DUONG *[id number]\"  Informed that they do not need to see pt  @ 1619 Dr. Hutton called for sign out states she will place it  Pt   @ 1620 Discharge pharmacy called informed that scripts are ready for pt    Written instructions printed and provided to pt to be sent with upon discharge. Verbally reviewed with pt and over the phone with spouse. Education provided. Questions encouraged and answered. over the phone consent obtained (see form for details)   PIV removed   Pt transport placed and pending to take pt down to discharge pharmacy prior to JLobby via wheelchair   In pt chart after pt transferred out of pacu to complete documentation   "

## 2020-10-16 NOTE — ANESTHESIA CARE TRANSFER NOTE
Patient: Anson Manriquez    Procedure(s):  HERNIORRHAPHY, UMBILICAL, OPEN, with mesh    Diagnosis: Umbilical hernia without obstruction and without gangrene [K42.9]  Diagnosis Additional Information: No value filed.    Anesthesia Type:   General     Note:  Airway :Face Mask  Patient transferred to:Phase II  Handoff Report: Identifed the Patient, Identified the Reponsible Provider, Reviewed the pertinent medical history, Discussed the surgical course, Reviewed Intra-OP anesthesia mangement and issues during anesthesia, Set expectations for post-procedure period and Allowed opportunity for questions and acknowledgement of understanding      Vitals: (Last set prior to Anesthesia Care Transfer)    CRNA VITALS  10/16/2020 1437 - 10/16/2020 1519      10/16/2020             Pulse:  75    SpO2:  99 %    Resp Rate (observed):  (!) 1                Electronically Signed By: LLOYD Lee CRNA  October 16, 2020  3:19 PM

## 2020-10-17 LAB — INTERPRETATION ECG - MUSE: NORMAL

## 2020-10-17 NOTE — OP NOTE
Procedure Date: 10/16/2020      PREOPERATIVE DIAGNOSIS:  Umbilical hernia.      POSTOPERATIVE DIAGNOSIS:  Umbilical hernia.      OPERATIVE PROCEDURE:  Open mesh repair of umbilical hernia.      SURGEON:  Erasmo Massey MD      FIRST ASSISTANT:  Nhi Jenkins MD, Surgery Resident.      ANESTHESIA:  General IV sedation plus local infiltration of Marcaine.      INDICATIONS FOR PROCEDURE:  The patient presents with significant umbilical hernia.  Informed consent was obtained.      OPERATIVE FINDINGS:  Umbilical hernia, approximately 4 x 3 cm defect, refer to below.      DESCRIPTION OF PROCEDURE:  The patient was brought to the operating room, put under IV sedation.  The periumbilical region was widely prepped and draped in the usual sterile fashion.  I injected with Marcaine throughout for adequate anesthetic.  An infraumbilical skin incision was made.  Dissection carried down to the fascia, circumferentially cleared.  We did enter the hernia sac several times.  It was densely adherent to the overlying skin.  The hernia sac was closed with a 3-0 Vicryl, thus reestablishing the preperitoneal space.  Once preperitoneal space was identified and opened, this allowed for placement of a 7 x 8 cm Parietex ProGrip mesh as an underlay.  This was placed per my routine and secured in 4 quadrants using 2-0 PDS.  The fascial defect was then closed with a running 0 Vicryl per routine.  Umbilical skin was tacked down with Vicryl, skin with subcuticular Steri-Strips were applied.  Estimated blood loss was minimal.  The patient tolerated the procedure well and was taken to the recovery room where he was without difficulty or apparent complication.         ERASMO MASSEY MD             D: 10/16/2020   T: 10/16/2020   MT:       Name:     KELSY NOLAN   MRN:      [id number]        Account:        [id number]   :      1955           Procedure Date: 10/16/2020      Document: [id number]

## 2020-10-19 ENCOUNTER — PATIENT OUTREACH (OUTPATIENT)
Dept: SURGERY | Facility: CLINIC | Age: 65
End: 2020-10-19

## 2020-10-19 NOTE — PROGRESS NOTES
RN Post-Op/Post-Discharge Care Coordination Note    Mr. Anson Manriquez is a 65 year old male who underwent Open umbilical hernia repair on 10/16 with  Dr. Flakito Massey.  Spoke with Patient.    Support  Patient able to care for self independently     Health Status  Fevers/chills: Patient denies any fever or chills.  Nausea/Vomiting: Patient denies nausea/vomiting.  Eating/drinking: Patient is able to eat and drink without any complaints.  Bowel habits: Patient reports having a normal bowel movement. Discussed using Senna prn if he is straining to have a bm.  Drains (MASON): N/A  Incisions: Patient denies any signs and symptoms of infection..  Wound closure:  Steri-strips  Pain: he said he stopped using Norco 1.5 days ago and hasn't needed to use anything else. Discussed using Tylenol and Ibuprofen prn.  New Medications:  Norco, Senna    Activity/Restrictions  No lifting in excess of 15-20 pounds for 3-4 weeks    Equipment  None    Pathology reviewed with patient:  N/A    Forms/Letters  No    All of his questions were answered including reviewing restrictions, and wound care.  He will call this office if he has any further questions and/or concerns.      In lieu of a post-op clinic patient that patient would like to be contacted in approximately 7-10 days via telephone.    A copy of this note was routed to the primary surgeon.      Whom and When to Call  Patient acknowledges understanding of how to manage any medication changes and   when to seek medical care.     Patient advised that if after hour medical concerns arise to please call [phone] and choose option 4 to speak to the physician on call.

## 2020-10-20 DIAGNOSIS — E11.9 TYPE 2 DIABETES MELLITUS WITHOUT COMPLICATION, WITHOUT LONG-TERM CURRENT USE OF INSULIN (H): ICD-10-CM

## 2020-10-21 NOTE — TELEPHONE ENCOUNTER
[facility] Call Center    Phone Message    May a detailed message be left on voicemail: yes     Reason for Call: Medication Refill Request    Has the patient contacted the pharmacy for the refill? Yes   Name of medication being requested: empagliflozin (JARDIANCE) 25 MG TABS tablet  Provider who prescribed the medication: Dr. Beard  Pharmacy: [facility] #[id number] [address] AT [facility]    Date medication is needed: ASAP - Patient ran out yesterday.          Action Taken: Message routed to:  [facility]    Travel Screening: Not Applicable

## 2020-10-21 NOTE — TELEPHONE ENCOUNTER
empagliflozin (JARDIANCE) 25 MG TABS tablet      Last Written Prescription Date:  7/14/20  Last Fill Quantity: 90,   # refills: 0  Last Office Visit : 10/8/20  Future Office visit:  None scheduled    Routing refill request to provider for review/approval because:  Overdue for A1c  Lab Test 02/11/20  0837   A1C 8.0*     Nurse - this is a second refill request for medication with out or overdue labs. With last request pt was given 90 day naif and Tequila notified.  Nothing more recent in care everywhere nor media  Future order for A1c placed in que at last visit

## 2020-10-23 NOTE — TELEPHONE ENCOUNTER
[facility] Call Center    Phone Message    May a detailed message be left on voicemail: yes     Reason for Call: Other: Request: Patient's wife is states that the patient has a lab appointment today at 4pm. The patient's wife is requesting to see if Dr. Beard can approve a couple tablets until Monday because the patient has been out of the medications. Please call patient at [phone] to confirm or advise.     Action Taken: Message routed to:  [facility] ([facility]    Travel Screening: Not Applicable

## 2020-10-23 NOTE — TELEPHONE ENCOUNTER
[facility] Call Center    Phone Message    May a detailed message be left on voicemail: yes     Reason for Call: Medication Question or concern regarding medication   Prescription Clarification  Name of Medication: empagliflozin (JARDIANCE) 25 MG TABS tablet  Prescribing Provider: Kisha   Pharmacy: [facility] - [place], [place] - [address] [place] [address] [address]   What on the order needs clarification? Per Patient is wanting to get a call back in regards to knowing why needing to have more lab work. Patient states had blood work done last week and states needing the medication, please advise.    RX for Jardiance sent to pharmacy, Pt informed he needed a lab drawn within 60 days. Pt hung up on this writer.  Olivia Bowles RN 3:01 PM on 10/23/2020.       Patient is very frustrated and wanting to get a call back today, 10/23/2020.      Action Taken: Message routed to:  [facility]    Travel Screening: Not Applicable

## 2020-10-26 ENCOUNTER — TELEPHONE (OUTPATIENT)
Dept: SURGERY | Facility: CLINIC | Age: 65
End: 2020-10-26

## 2020-10-26 NOTE — TELEPHONE ENCOUNTER
[facility] Call Center    Phone Message    May a detailed message be left on voicemail: yes     Reason for Call: Symptoms or Concerns     If patient has red-flag symptoms, warm transfer to triage line    Current symptom or concern: Incision is still leaking and has been since 10/16/20 surgery.    Symptoms have been present for:  10 day(s)    Has patient previously been seen for this? No    Are there any new or worsening symptoms? No      Action Taken: Message routed to:  [facility]    Travel Screening: Not Applicable

## 2020-10-26 NOTE — TELEPHONE ENCOUNTER
Contacted patient to discuss his incision. He said that he has noticed a little bit of drainage from his umbilical incision over the last week. He denies fevers/chills, increased pain or tenderness, redness or warmth to the area. He said he is feeling great. Discussed cleaning the area with soap and water and to cover with gauze and tape as needed. Discussed to remove dressing prior to showering. Discussed signs/symptoms of infection to watch for. Patient was appreciative of the help and will call with any further issues.

## 2020-10-28 ENCOUNTER — PATIENT OUTREACH (OUTPATIENT)
Dept: SURGERY | Facility: CLINIC | Age: 65
End: 2020-10-28

## 2020-10-28 NOTE — PROGRESS NOTES
Pre Visit Call and Assessment    Date of call:  10/28/2020    Phone numbers:  Cell number on file:    Telephone Information:   Mobile [phone]       Reached patient/confirmed appointment:  Yes  Patient care team/Primary provider:  Ignacio Beard  Preferred outpatient pharmacy:    [facility] [employer] #[id number] [place] [id number] [place]  AT [address] [facility] [address]  Phone: [phone] Fax: [phone]    Referred to:  Dr. Flakito Massey    Reason for visit:  Post op

## 2020-10-29 ENCOUNTER — OFFICE VISIT (OUTPATIENT)
Dept: SURGERY | Facility: CLINIC | Age: 65
End: 2020-10-29
Payer: COMMERCIAL

## 2020-10-29 VITALS
SYSTOLIC BLOOD PRESSURE: 112 MMHG | WEIGHT: 186.7 LBS | HEIGHT: 66 IN | TEMPERATURE: 97.8 F | DIASTOLIC BLOOD PRESSURE: 72 MMHG | BODY MASS INDEX: 30.01 KG/M2 | OXYGEN SATURATION: 97 % | HEART RATE: 83 BPM

## 2020-10-29 DIAGNOSIS — Z98.890 POSTOPERATIVE STATE: Primary | ICD-10-CM

## 2020-10-29 DIAGNOSIS — I10 BENIGN ESSENTIAL HYPERTENSION: ICD-10-CM

## 2020-10-29 DIAGNOSIS — Z23 NEED FOR VACCINATION: ICD-10-CM

## 2020-10-29 DIAGNOSIS — I21.02 ST ELEVATION MYOCARDIAL INFARCTION INVOLVING LEFT ANTERIOR DESCENDING (LAD) CORONARY ARTERY (H): ICD-10-CM

## 2020-10-29 DIAGNOSIS — R73.09 INCREASED GLUCOSE LEVEL: ICD-10-CM

## 2020-10-29 DIAGNOSIS — I50.20 HEART FAILURE WITH REDUCED EJECTION FRACTION, NYHA CLASS III (H): ICD-10-CM

## 2020-10-29 LAB — HBA1C MFR BLD: 8 % (ref 0–5.6)

## 2020-10-29 PROCEDURE — 36415 COLL VENOUS BLD VENIPUNCTURE: CPT | Performed by: PATHOLOGY

## 2020-10-29 PROCEDURE — 99024 POSTOP FOLLOW-UP VISIT: CPT | Performed by: SURGERY

## 2020-10-29 PROCEDURE — 83036 HEMOGLOBIN GLYCOSYLATED A1C: CPT | Performed by: PATHOLOGY

## 2020-10-29 ASSESSMENT — PAIN SCALES - GENERAL: PAINLEVEL: MILD PAIN (2)

## 2020-10-29 ASSESSMENT — MIFFLIN-ST. JEOR: SCORE: 1574.62

## 2020-10-29 NOTE — PROGRESS NOTES
Anson Manriquez is status post:  10/16/2020  Herniorrhaphy umbilical(N/A)Procedure: HERNIORRHAPHY, UMBILICAL, OPEN, with mesh;  Surgeon: Flakito Massey MD;  Location: [facility] OR  Surgery without complications.  Post-op course without complications, but wound started leaking pod 2. No symptoms of infection.  Incisions examined, no signs of infection, some serous drainage consistent with draining seroma.  Instructed on local wound cares, reassured that draining should stop in next couple weeks. Will see me again if that is not the case.  All of the patients questions were answered.  Standard post-op instructions and restrictions given.

## 2020-10-29 NOTE — PATIENT INSTRUCTIONS
You met with Dr. Flakito Massey.      Today's visit instructions:    Return to the Surgery Clinic on an as needed basis.        If you have questions please contact Ricci RN or Rosemary RN during regular clinic hours, Monday through Friday 7:30 AM - 4:00 PM, or you can contact us via [employer] at anytime.       If you have urgent needs after-hours, weekends, or holidays please call the hospital at [phone] and ask to speak with our on-call General Surgery Team.    Appointment [phone], option #1   Nurse Advice (Ricci or Rosemary): [phone]   Surgery Scheduler (Carissa): [phone]  Fax: [phone]

## 2020-10-29 NOTE — LETTER
10/29/2020       RE: Anson Manriquez  [address]  Located within [facility] [address]     Dear Colleague,    Thank you for referring your patient, Anson Manriquez, to the [facility] [place] at [facility]. Please see a copy of my visit note below.    Anson Manriquez is status post:  10/16/2020  Herniorrhaphy umbilical(N/A)Procedure: HERNIORRHAPHY, UMBILICAL, OPEN, with mesh;  Surgeon: Flakito Massey MD;  Location: [facility] OR  Surgery without complications.  Post-op course without complications, but wound started leaking pod 2. No symptoms of infection.  Incisions examined, no signs of infection, some serous drainage consistent with draining seroma.  Instructed on local wound cares, reassured that draining should stop in next couple weeks. Will see me again if that is not the case.  All of the patients questions were answered.  Standard post-op instructions and restrictions given.        Again, thank you for allowing me to participate in the care of your patient.      Sincerely,    Flakito Massey MD

## 2020-10-31 ENCOUNTER — TELEPHONE (OUTPATIENT)
Dept: INTERNAL MEDICINE | Facility: CLINIC | Age: 65
End: 2020-10-31

## 2020-10-31 NOTE — TELEPHONE ENCOUNTER
Dear Onel;    Please see results note I sent to Mr. Manriquez regarding still somewhat elevated A1c    Thanks, Larry BULL      Dear Cedric;    Your A1c of 8.0% is still on the higher side. I am going to have our clinical pharmacist, Onel Dean give you a call regarding this.    ETHEL Beard MD

## 2020-11-02 RX ORDER — CARVEDILOL 25 MG/1
25 TABLET ORAL 2 TIMES DAILY WITH MEALS
Qty: 180 TABLET | Refills: 2 | Status: SHIPPED | OUTPATIENT
Start: 2020-11-02 | End: 2021-08-25

## 2020-11-02 NOTE — TELEPHONE ENCOUNTER
8/14/2020  [facility] Jason Ramos MD  Cardiovascular Disease    carvedilol (COREG) 25 MG tablet

## 2020-11-02 NOTE — ANESTHESIA PROCEDURE NOTES
Peripheral Nerve Block Procedure Note      Staff -   Anesthesiologist:  Tyrese Chris MD  Resident/Fellow: Harley Tirado MD  Performed By: anesthesiologist and resident  Procedure performed by resident/CRNA in presence of a teaching physician.    Location: Pre-op  Procedure Start/Stop TImes:     patient identified, IV checked, site marked, risks and benefits discussed, informed consent, monitors and equipment checked, pre-op evaluation, at physician/surgeon's request and post-op pain management      Correct Patient: Yes      Correct Position: Yes      Correct Site: Yes      Correct Procedure: Yes      Correct Laterality:  Yes    Site Marked:  Yes  Procedure details:     Procedure:  Rectus Sheath    Laterality:  Bilateral    Position:  Supine    Sterile Prep: chloraprep, mask and sterile gloves      Needle:  Short bevel    Needle gauge:  21    Needle length (inches):  4    Ultrasound: Yes      Ultrasound used to identify targeted nerve, plexus, or vascular structure and placed a needle adjacent to it      Permanent Image entered into patiient's record      Abnormal pain on injection: No      Blood Aspirated: No      Paresthesias:  No    Bleeding at site: No      Test dose negative for signs of intravascular injection: Yes      Bolus via:  Needle    Infusion Method:  Single Shot  Assessment/Narrative:      No complications.

## 2020-11-04 ENCOUNTER — ANCILLARY PROCEDURE (OUTPATIENT)
Dept: CARDIOLOGY | Facility: CLINIC | Age: 65
End: 2020-11-04
Attending: INTERNAL MEDICINE
Payer: COMMERCIAL

## 2020-11-04 DIAGNOSIS — I42.9 CARDIOMYOPATHY, UNSPECIFIED TYPE (H): ICD-10-CM

## 2020-11-04 PROCEDURE — 93295 DEV INTERROG REMOTE 1/2/MLT: CPT | Performed by: INTERNAL MEDICINE

## 2020-11-04 PROCEDURE — 93296 REM INTERROG EVL PM/IDS: CPT

## 2020-11-05 LAB
MDC_IDC_EPISODE_DTM: NORMAL
MDC_IDC_EPISODE_ID: NORMAL
MDC_IDC_EPISODE_TYPE: NORMAL
MDC_IDC_LEAD_IMPLANT_DT: NORMAL
MDC_IDC_LEAD_LOCATION: NORMAL
MDC_IDC_LEAD_LOCATION_DETAIL_1: NORMAL
MDC_IDC_LEAD_MFG: NORMAL
MDC_IDC_LEAD_MODEL: NORMAL
MDC_IDC_LEAD_POLARITY_TYPE: NORMAL
MDC_IDC_LEAD_SERIAL: NORMAL
MDC_IDC_LEAD_SPECIAL_FUNCTION: NORMAL
MDC_IDC_MSMT_BATTERY_DTM: NORMAL
MDC_IDC_MSMT_BATTERY_REMAINING_LONGEVITY: 132 MO
MDC_IDC_MSMT_BATTERY_REMAINING_PERCENTAGE: 100 %
MDC_IDC_MSMT_BATTERY_STATUS: NORMAL
MDC_IDC_MSMT_CAP_CHARGE_DTM: NORMAL
MDC_IDC_MSMT_CAP_CHARGE_TIME: 10.2 S
MDC_IDC_MSMT_CAP_CHARGE_TYPE: NORMAL
MDC_IDC_MSMT_LEADCHNL_LV_IMPEDANCE_VALUE: 643 OHM
MDC_IDC_MSMT_LEADCHNL_LV_PACING_THRESHOLD_AMPLITUDE: 1.1 V
MDC_IDC_MSMT_LEADCHNL_LV_PACING_THRESHOLD_PULSEWIDTH: 0.5 MS
MDC_IDC_MSMT_LEADCHNL_RA_IMPEDANCE_VALUE: 579 OHM
MDC_IDC_MSMT_LEADCHNL_RV_IMPEDANCE_VALUE: 643 OHM
MDC_IDC_MSMT_LEADCHNL_RV_PACING_THRESHOLD_AMPLITUDE: 0.7 V
MDC_IDC_MSMT_LEADCHNL_RV_PACING_THRESHOLD_PULSEWIDTH: 0.4 MS
MDC_IDC_PG_IMPLANT_DTM: NORMAL
MDC_IDC_PG_MFG: NORMAL
MDC_IDC_PG_MODEL: NORMAL
MDC_IDC_PG_SERIAL: NORMAL
MDC_IDC_PG_TYPE: NORMAL
MDC_IDC_SESS_CLINIC_NAME: NORMAL
MDC_IDC_SESS_DTM: NORMAL
MDC_IDC_SESS_TYPE: NORMAL
MDC_IDC_SET_BRADY_AT_MODE_SWITCH_MODE: NORMAL
MDC_IDC_SET_BRADY_AT_MODE_SWITCH_RATE: 170 {BEATS}/MIN
MDC_IDC_SET_BRADY_LOWRATE: 60 {BEATS}/MIN
MDC_IDC_SET_BRADY_MAX_SENSOR_RATE: 130 {BEATS}/MIN
MDC_IDC_SET_BRADY_MAX_TRACKING_RATE: 130 {BEATS}/MIN
MDC_IDC_SET_BRADY_MODE: NORMAL
MDC_IDC_SET_BRADY_PAV_DELAY_LOW: 180 MS
MDC_IDC_SET_BRADY_SAV_DELAY_LOW: 140 MS
MDC_IDC_SET_CRT_PACED_CHAMBERS: NORMAL
MDC_IDC_SET_LEADCHNL_LV_PACING_AMPLITUDE: 2.1 V
MDC_IDC_SET_LEADCHNL_LV_PACING_CAPTURE_MODE: NORMAL
MDC_IDC_SET_LEADCHNL_LV_PACING_PULSEWIDTH: 0.5 MS
MDC_IDC_SET_LEADCHNL_LV_SENSING_ADAPTATION_MODE: NORMAL
MDC_IDC_SET_LEADCHNL_LV_SENSING_SENSITIVITY: 1 MV
MDC_IDC_SET_LEADCHNL_RA_PACING_AMPLITUDE: 2.5 V
MDC_IDC_SET_LEADCHNL_RA_PACING_CAPTURE_MODE: NORMAL
MDC_IDC_SET_LEADCHNL_RA_PACING_POLARITY: NORMAL
MDC_IDC_SET_LEADCHNL_RA_PACING_PULSEWIDTH: 0.5 MS
MDC_IDC_SET_LEADCHNL_RA_SENSING_ADAPTATION_MODE: NORMAL
MDC_IDC_SET_LEADCHNL_RA_SENSING_POLARITY: NORMAL
MDC_IDC_SET_LEADCHNL_RA_SENSING_SENSITIVITY: 0.25 MV
MDC_IDC_SET_LEADCHNL_RV_PACING_AMPLITUDE: 2 V
MDC_IDC_SET_LEADCHNL_RV_PACING_CAPTURE_MODE: NORMAL
MDC_IDC_SET_LEADCHNL_RV_PACING_POLARITY: NORMAL
MDC_IDC_SET_LEADCHNL_RV_PACING_PULSEWIDTH: 0.4 MS
MDC_IDC_SET_LEADCHNL_RV_SENSING_ADAPTATION_MODE: NORMAL
MDC_IDC_SET_LEADCHNL_RV_SENSING_POLARITY: NORMAL
MDC_IDC_SET_LEADCHNL_RV_SENSING_SENSITIVITY: 0.6 MV
MDC_IDC_SET_ZONE_DETECTION_INTERVAL: 250 MS
MDC_IDC_SET_ZONE_DETECTION_INTERVAL: 300 MS
MDC_IDC_SET_ZONE_DETECTION_INTERVAL: 353 MS
MDC_IDC_SET_ZONE_TYPE: NORMAL
MDC_IDC_SET_ZONE_VENDOR_TYPE: NORMAL
MDC_IDC_STAT_AT_BURDEN_PERCENT: 0 %
MDC_IDC_STAT_AT_DTM_END: NORMAL
MDC_IDC_STAT_AT_DTM_START: NORMAL
MDC_IDC_STAT_BRADY_DTM_END: NORMAL
MDC_IDC_STAT_BRADY_DTM_START: NORMAL
MDC_IDC_STAT_BRADY_RA_PERCENT_PACED: 0 %
MDC_IDC_STAT_BRADY_RV_PERCENT_PACED: 3 %
MDC_IDC_STAT_CRT_DTM_END: NORMAL
MDC_IDC_STAT_CRT_DTM_START: NORMAL
MDC_IDC_STAT_CRT_LV_PERCENT_PACED: 94 %
MDC_IDC_STAT_EPISODE_RECENT_COUNT: 0
MDC_IDC_STAT_EPISODE_RECENT_COUNT: 1
MDC_IDC_STAT_EPISODE_RECENT_COUNT_DTM_END: NORMAL
MDC_IDC_STAT_EPISODE_RECENT_COUNT_DTM_START: NORMAL
MDC_IDC_STAT_EPISODE_TYPE: NORMAL
MDC_IDC_STAT_EPISODE_VENDOR_TYPE: NORMAL
MDC_IDC_STAT_TACHYTHERAPY_ATP_DELIVERED_RECENT: 0
MDC_IDC_STAT_TACHYTHERAPY_ATP_DELIVERED_TOTAL: 0
MDC_IDC_STAT_TACHYTHERAPY_RECENT_DTM_END: NORMAL
MDC_IDC_STAT_TACHYTHERAPY_RECENT_DTM_START: NORMAL
MDC_IDC_STAT_TACHYTHERAPY_SHOCKS_ABORTED_RECENT: 0
MDC_IDC_STAT_TACHYTHERAPY_SHOCKS_ABORTED_TOTAL: 0
MDC_IDC_STAT_TACHYTHERAPY_SHOCKS_DELIVERED_RECENT: 0
MDC_IDC_STAT_TACHYTHERAPY_SHOCKS_DELIVERED_TOTAL: 0
MDC_IDC_STAT_TACHYTHERAPY_TOTAL_DTM_END: NORMAL
MDC_IDC_STAT_TACHYTHERAPY_TOTAL_DTM_START: NORMAL

## 2020-11-10 ENCOUNTER — VIRTUAL VISIT (OUTPATIENT)
Dept: PHARMACY | Facility: CLINIC | Age: 65
End: 2020-11-10
Payer: COMMERCIAL

## 2020-11-10 DIAGNOSIS — E11.9 TYPE 2 DIABETES MELLITUS WITHOUT COMPLICATION, WITHOUT LONG-TERM CURRENT USE OF INSULIN (H): Primary | ICD-10-CM

## 2020-11-10 DIAGNOSIS — I25.10 CORONARY ARTERY DISEASE INVOLVING NATIVE CORONARY ARTERY OF NATIVE HEART WITHOUT ANGINA PECTORIS: ICD-10-CM

## 2020-11-10 DIAGNOSIS — I25.5 ISCHEMIC CARDIOMYOPATHY: ICD-10-CM

## 2020-11-10 PROCEDURE — 99605 MTMS BY PHARM NP 15 MIN: CPT | Mod: TEL | Performed by: PHARMACIST

## 2020-11-10 PROCEDURE — 99607 MTMS BY PHARM ADDL 15 MIN: CPT | Mod: TEL | Performed by: PHARMACIST

## 2020-11-10 NOTE — PROGRESS NOTES
"MTM ENCOUNTER  SUBJECTIVE/OBJECTIVE:                           Anson Manriquez is a 65 year old male called for an initial visit. He was referred to me from Larry Beard. His wife, Jolly, also joins us for the call today.       Reason for visit: uncontrolled diabetes.    Allergies/ADRs: Reviewed in chart  Tobacco: He reports that he has never smoked. He has never used smokeless tobacco.  Alcohol: not currently using  Caffeine: 4-5 sodas/day  Activity: none specified  Past Medical History: Reviewed in chart    Medication Adherence/Access: no issues reported    Type 2 Diabetes:  Currently taking glipizide XL 5 mg daily, Jardiance 25 mg daily. He has previously on metformin but it caused \"diarrhea 24/7\". Patient is not experiencing side effects.  Blood sugar monitoring: never. Ranges: NA  Symptoms of low blood sugar? none  Symptoms of high blood sugar? none  Eye exam: due  Foot exam: due   Diet/Exercise: He thinks that his numbers are up because he has gone back to drinking pop. He is willing switch to diet soda.   Aspirin: Taking 81mg daily and denies side effects  Statin: Yes: atorvastatin 80 mg daily   ACEi/ARB: Yes: valsartan.   Urine Albumin:   Lab Results   Component Value Date    UMALCR Unable to calculate due to low value 01/22/2020      Lab Results   Component Value Date    A1C 8.0 10/29/2020    A1C 8.0 02/11/2020    A1C 7.3 11/06/2019    A1C 7.5 06/18/2019    A1C 7.6 05/09/2019     HFrEF29%/Hypertension: Current medications include Entresto 24-26 mg twice daily, spironolactone 25 mg daily, furosemide 40 mg twice daily (takes 80 mg once daily), carvedilol 25 mg twice daily.  Patient does not self-monitor blood pressure.  Patient reports no current medication side effects. He reports that he used to take less furosemide with his other doctor and he has noticed significant improvement in middle water retention since starting to be seen in cardiology. He sees Dr. Matos. He endorses \"cotton mouth\" at night and " wonders what causes this. He wants to Biotene and wonders if this is okay.     BP Readings from Last 3 Encounters:   10/29/20 112/72   10/16/20 132/81   10/08/20 121/80     Last Comprehensive Metabolic Panel:  Sodium   Date Value Ref Range Status   10/08/2020 137 133 - 144 mmol/L Final     Potassium   Date Value Ref Range Status   10/16/2020 4.2 3.4 - 5.3 mmol/L Final     Chloride   Date Value Ref Range Status   10/08/2020 104 94 - 109 mmol/L Final     Carbon Dioxide   Date Value Ref Range Status   10/08/2020 27 20 - 32 mmol/L Final     Anion Gap   Date Value Ref Range Status   10/08/2020 7 3 - 14 mmol/L Final     Glucose   Date Value Ref Range Status   10/08/2020 173 (H) 70 - 99 mg/dL Final     Urea Nitrogen   Date Value Ref Range Status   10/08/2020 22 7 - 30 mg/dL Final     Creatinine   Date Value Ref Range Status   10/08/2020 0.93 0.66 - 1.25 mg/dL Final     GFR Estimate   Date Value Ref Range Status   10/08/2020 86 >60 mL/min/[1.73_m2] Final     Comment:     Non  GFR Calc  Starting 12/18/2018, serum creatinine based estimated GFR (eGFR) will be   calculated using the Chronic Kidney Disease Epidemiology Collaboration   (CKD-EPI) equation.       Calcium   Date Value Ref Range Status   10/08/2020 9.1 8.5 - 10.1 mg/dL Final     CAD/Hyperlipidemia: Current therapy includes atorvastatin 80 mg daily, ticagrelor 90 mg twice daily.  Patient reports no significant myalgias or other side effects.  The ASCVD Risk score (Parisa HOLLIE Jr., et al., 2013) failed to calculate for the following reasons:    The patient has a prior MI or stroke diagnosis  Recent Labs   Lab Test 11/06/19  0831 09/15/18  0355   CHOL 198 121   HDL 41 41   LDL 93 48   TRIG 319* 158*     Today's Vitals: There were no vitals taken for this visit. - telemed    BP Readings from Last 1 Encounters:   10/29/20 112/72     Pulse Readings from Last 1 Encounters:   10/29/20 83     Wt Readings from Last 1 Encounters:   10/29/20 186 lb 11.2 oz (84.7 kg)  "    Ht Readings from Last 1 Encounters:   10/29/20 5' 6\" (1.676 m)     Estimated body mass index is 30.13 kg/m  as calculated from the following:    Height as of 10/29/20: 5' 6\" (1.676 m).    Weight as of 10/29/20: 186 lb 11.2 oz (84.7 kg).    Temp Readings from Last 1 Encounters:   10/29/20 97.8  F (36.6  C) (Oral)     ASSESSMENT:                            Medication Adherence: No issues identified    Type 2 Diabetes: Patient is not meeting A1c goal of < 7%. Patient would benefit from monitoring fasting sugars and eliminating sugar soda from his diet.  HFrEF29%/Hypertension: Stable. Cotton mouth is likely related to diuretics. Benefits of diuretics outweigh risks of continued cotton mouth. Biotene okay to continue.   CAD/Hyperlipidemia: Stable.  PLAN:                          Post Discharge Medication Reconciliation Status: discharge medications reconciled and changed, per note/orders.    1. Monitor blood sugars once daily in the morning.     I spent 18 minutes with this patient today. All changes were made via collaborative practice agreement with Larry Beard. A copy of the visit note was provided to the patient's primary care provider.    Will follow up in 2 weeks.    The patient declined a summary of these recommendations.     Lisa Dean, Pharm.D., [facility]  Medication Therapy Management Pharmacist  Page/VM:  [phone]      Patient consented to a telehealth visit: yes  Telemedicine Visit Details  Type of service:  Telephone visit  Start Time: 4:35 PM  End Time: 4:53 PM  Originating Location (patient location): Home  Distant Location (provider location):  [facility] MEDICATION THERAPY MANAGEMENT  Mode of Communication:  Telephone      "

## 2020-11-24 ENCOUNTER — VIRTUAL VISIT (OUTPATIENT)
Dept: PHARMACY | Facility: CLINIC | Age: 65
End: 2020-11-24
Payer: COMMERCIAL

## 2020-11-24 DIAGNOSIS — E11.9 TYPE 2 DIABETES MELLITUS WITHOUT COMPLICATION, WITHOUT LONG-TERM CURRENT USE OF INSULIN (H): Primary | ICD-10-CM

## 2020-11-24 PROCEDURE — 99606 MTMS BY PHARM EST 15 MIN: CPT | Mod: TEL | Performed by: PHARMACIST

## 2020-11-24 NOTE — PROGRESS NOTES
MTM ENCOUNTER  SUBJECTIVE/OBJECTIVE:                           Anson Manriquez is a 65 year old male called for a follow-up visit. He was referred to me from Larry Beard.  Today's visit is a follow-up MTM visit from 11/10/20.     Reason for visit: uncontrolled diabetes.    Allergies/ADRs: Reviewed in chart  Tobacco: He reports that he has never smoked. He has never used smokeless tobacco.  Alcohol: not currently using  Caffeine: 4-5 diet sodas daily  Activity: none specified  Past Medical History: Reviewed in chart    Medication Adherence/Access: no issues reported    Type 2 Diabetes:  Currently taking glipizide XL 5 mg daily, Jardiance 25 mg daily. Patient is not experiencing side effects.  Blood sugar monitorin time(s) daily. Ranges (patient reported): Fasting- around 150 since giving up sugar; before he gave up sugar pop, they were in the 220-230 range fasting. Since giving it up, they have gone down pretty quickly (200, 170, now down to 150s). He is very resistant to medication changes today.   Symptoms of low blood sugar? none  Symptoms of high blood sugar? none  Eye exam: due  Foot exam: due  Diet/Exercise: He has switched to diet soda completely. He also continues to continue to reduce his sugar intake from other sources. He is really motivated to adjust his diet prior to optimizing medications.   Aspirin: Taking 81mg daily and denies side effects  Statin: Yes: atorvastatin 80 mg daily   ACEi/ARB: Yes: valsartan 26 mg twice daily (Entresto).   Urine Albumin:   Lab Results   Component Value Date    UMALCR Unable to calculate due to low value 2020      Lab Results   Component Value Date    A1C 8.0 10/29/2020    A1C 8.0 2020    A1C 7.3 2019    A1C 7.5 2019    A1C 7.6 2019     Last Comprehensive Metabolic Panel:  Sodium   Date Value Ref Range Status   10/08/2020 137 133 - 144 mmol/L Final     Potassium   Date Value Ref Range Status   10/16/2020 4.2 3.4 - 5.3 mmol/L Final  "    Chloride   Date Value Ref Range Status   10/08/2020 104 94 - 109 mmol/L Final     Carbon Dioxide   Date Value Ref Range Status   10/08/2020 27 20 - 32 mmol/L Final     Anion Gap   Date Value Ref Range Status   10/08/2020 7 3 - 14 mmol/L Final     Glucose   Date Value Ref Range Status   10/08/2020 173 (H) 70 - 99 mg/dL Final     Urea Nitrogen   Date Value Ref Range Status   10/08/2020 22 7 - 30 mg/dL Final     Creatinine   Date Value Ref Range Status   10/08/2020 0.93 0.66 - 1.25 mg/dL Final     GFR Estimate   Date Value Ref Range Status   10/08/2020 86 >60 mL/min/[1.73_m2] Final     Comment:     Non  GFR Calc  Starting 12/18/2018, serum creatinine based estimated GFR (eGFR) will be   calculated using the Chronic Kidney Disease Epidemiology Collaboration   (CKD-EPI) equation.       Calcium   Date Value Ref Range Status   10/08/2020 9.1 8.5 - 10.1 mg/dL Final     Today's Vitals: There were no vitals taken for this visit. - telemed    BP Readings from Last 1 Encounters:   10/29/20 112/72     Pulse Readings from Last 1 Encounters:   10/29/20 83     Wt Readings from Last 1 Encounters:   10/29/20 186 lb 11.2 oz (84.7 kg)     Ht Readings from Last 1 Encounters:   10/29/20 5' 6\" (1.676 m)     Estimated body mass index is 30.13 kg/m  as calculated from the following:    Height as of 10/29/20: 5' 6\" (1.676 m).    Weight as of 10/29/20: 186 lb 11.2 oz (84.7 kg).    Temp Readings from Last 1 Encounters:   10/29/20 97.8  F (36.6  C) (Oral)     ASSESSMENT:                            Medication Adherence: No issues identified    Type 2 Diabetes: Patient is not meeting A1c goal of < 8%. Self monitoring of blood glucose is not at goal of fasting  mg/dL but has made significant progress towards this goal. Patient would benefit from continued diet improvements and blood sugar monitoring. He may need optimization of DODGE therapy in the future.       PLAN:                            1. Continue to reduce sugar in " diet.   2. Continue monitoring BG daily.     I spent 6 minutes with this patient today. A copy of the visit note was provided to the patient's primary care provider.    Will follow up in 2 months.    The patient declined a summary of these recommendations.     Lisa Dean, Pharm.D., [facility]  Medication Therapy Management Pharmacist  Page/VM:  [phone]      Patient consented to a telehealth visit: yes  Telemedicine Visit Details  Type of service:  Telephone visit  Start Time: 4:39 PM  End Time: 4:45 PM  Originating Location (patient location): Home  Distant Location (provider location):  [facility] MEDICATION THERAPY MANAGEMENT  Mode of Communication:  Telephone

## 2020-12-11 DIAGNOSIS — I50.20 HEART FAILURE WITH REDUCED EJECTION FRACTION, NYHA CLASS III (H): ICD-10-CM

## 2020-12-11 DIAGNOSIS — I25.5 ISCHEMIC CARDIOMYOPATHY: ICD-10-CM

## 2020-12-14 NOTE — TELEPHONE ENCOUNTER
sacubitril-valsartan (ENTRESTO) 24-26 MG per tablet      Historical entry  Last Office Visit :Jason Matos MD  Cardiovascular Disease  8/14/2020  [facility]  Future Office visit:  2/10/21  Last CMET 10/8/20  Routing refill request to provider for review/approval because:  Medication is reported/historical

## 2020-12-18 DIAGNOSIS — E11.9 TYPE 2 DIABETES MELLITUS WITHOUT COMPLICATION, WITHOUT LONG-TERM CURRENT USE OF INSULIN (H): ICD-10-CM

## 2020-12-18 NOTE — TELEPHONE ENCOUNTER
[facility] Call Center    Phone Message    May a detailed message be left on voicemail: yes     Reason for Call: Medication Refill Request    Has the patient contacted the pharmacy for the refill? Yes   Name of medication being requested: empagliflozin (JARDIANCE) 25 MG TABS tablet, and sacubitril-valsartan (ENTRESTO) 24-26 MG per tablet  Provider who prescribed the medication: Dr. Beard  Pharmacy: [facility]  Date medication is needed: 12/18/20   Pt needing these scripts today, pt only has two left of the Jardiance      Action Taken: Message routed to:  [facility] ([facility]): maria

## 2021-01-10 DIAGNOSIS — I50.22 CHRONIC SYSTOLIC CONGESTIVE HEART FAILURE (H): ICD-10-CM

## 2021-01-12 RX ORDER — FUROSEMIDE 40 MG
40 TABLET ORAL 2 TIMES DAILY
Qty: 180 TABLET | Refills: 3 | Status: SHIPPED | OUTPATIENT
Start: 2021-01-12 | End: 2022-04-21

## 2021-01-12 NOTE — TELEPHONE ENCOUNTER
furosemide (LASIX) 40 MG tablet   Last Written Prescription Date:  3/9/2020  Last Fill Quantity: 180,   # refills: 2  Last Office Visit : 8/14/2020  Future Office visit:  2/10/2021  180 Tabs, 3 refills sent to pharm 1/12/2021      Alaina Pandya RN  Central Triage Red Flags/Med Refills

## 2021-01-15 ENCOUNTER — HEALTH MAINTENANCE LETTER (OUTPATIENT)
Age: 66
End: 2021-01-15

## 2021-01-25 DIAGNOSIS — E11.9 TYPE 2 DIABETES MELLITUS WITHOUT COMPLICATION, WITHOUT LONG-TERM CURRENT USE OF INSULIN (H): ICD-10-CM

## 2021-01-28 ENCOUNTER — OFFICE VISIT (OUTPATIENT)
Dept: CARDIOLOGY | Facility: CLINIC | Age: 66
End: 2021-01-28
Attending: INTERNAL MEDICINE
Payer: COMMERCIAL

## 2021-01-28 VITALS
HEIGHT: 67 IN | OXYGEN SATURATION: 90 % | HEART RATE: 80 BPM | WEIGHT: 183.9 LBS | BODY MASS INDEX: 28.87 KG/M2 | DIASTOLIC BLOOD PRESSURE: 84 MMHG | SYSTOLIC BLOOD PRESSURE: 131 MMHG

## 2021-01-28 DIAGNOSIS — I50.20 HEART FAILURE WITH REDUCED EJECTION FRACTION, NYHA CLASS III (H): ICD-10-CM

## 2021-01-28 DIAGNOSIS — E78.2 MIXED HYPERLIPIDEMIA: ICD-10-CM

## 2021-01-28 DIAGNOSIS — I42.9 CARDIOMYOPATHY, UNSPECIFIED TYPE (H): ICD-10-CM

## 2021-01-28 DIAGNOSIS — I25.5 ISCHEMIC CARDIOMYOPATHY: ICD-10-CM

## 2021-01-28 DIAGNOSIS — I10 BENIGN ESSENTIAL HYPERTENSION: Primary | ICD-10-CM

## 2021-01-28 LAB
ALBUMIN SERPL-MCNC: 4.2 G/DL (ref 3.4–5)
ALP SERPL-CCNC: 57 U/L (ref 40–150)
ALT SERPL W P-5'-P-CCNC: 32 U/L (ref 0–70)
ANION GAP SERPL CALCULATED.3IONS-SCNC: 4 MMOL/L (ref 3–14)
AST SERPL W P-5'-P-CCNC: 19 U/L (ref 0–45)
BILIRUB SERPL-MCNC: 1.4 MG/DL (ref 0.2–1.3)
BUN SERPL-MCNC: 17 MG/DL (ref 7–30)
CALCIUM SERPL-MCNC: 9.5 MG/DL (ref 8.5–10.1)
CHLORIDE SERPL-SCNC: 110 MMOL/L (ref 94–109)
CO2 SERPL-SCNC: 27 MMOL/L (ref 20–32)
CREAT SERPL-MCNC: 0.83 MG/DL (ref 0.66–1.25)
ERYTHROCYTE [DISTWIDTH] IN BLOOD BY AUTOMATED COUNT: 14.6 % (ref 10–15)
GFR SERPL CREATININE-BSD FRML MDRD: >90 ML/MIN/{1.73_M2}
GLUCOSE SERPL-MCNC: 119 MG/DL (ref 70–99)
HCT VFR BLD AUTO: 49.8 % (ref 40–53)
HGB BLD-MCNC: 16.5 G/DL (ref 13.3–17.7)
MCH RBC QN AUTO: 31 PG (ref 26.5–33)
MCHC RBC AUTO-ENTMCNC: 33.1 G/DL (ref 31.5–36.5)
MCV RBC AUTO: 94 FL (ref 78–100)
NT-PROBNP SERPL-MCNC: 307 PG/ML (ref 0–125)
PLATELET # BLD AUTO: 206 10E9/L (ref 150–450)
POTASSIUM SERPL-SCNC: 4.2 MMOL/L (ref 3.4–5.3)
PROT SERPL-MCNC: 8 G/DL (ref 6.8–8.8)
RBC # BLD AUTO: 5.32 10E12/L (ref 4.4–5.9)
SODIUM SERPL-SCNC: 141 MMOL/L (ref 133–144)
WBC # BLD AUTO: 7.5 10E9/L (ref 4–11)

## 2021-01-28 PROCEDURE — 93284 PRGRMG EVAL IMPLANTABLE DFB: CPT | Performed by: INTERNAL MEDICINE

## 2021-01-28 PROCEDURE — G0463 HOSPITAL OUTPT CLINIC VISIT: HCPCS | Mod: 25

## 2021-01-28 PROCEDURE — 99214 OFFICE O/P EST MOD 30 MIN: CPT | Mod: 25 | Performed by: INTERNAL MEDICINE

## 2021-01-28 PROCEDURE — 80053 COMPREHEN METABOLIC PANEL: CPT | Performed by: PATHOLOGY

## 2021-01-28 PROCEDURE — 83880 ASSAY OF NATRIURETIC PEPTIDE: CPT | Performed by: PATHOLOGY

## 2021-01-28 PROCEDURE — 36415 COLL VENOUS BLD VENIPUNCTURE: CPT | Performed by: PATHOLOGY

## 2021-01-28 PROCEDURE — 85027 COMPLETE CBC AUTOMATED: CPT | Performed by: PATHOLOGY

## 2021-01-28 RX ORDER — GLIPIZIDE 5 MG/1
5 TABLET, FILM COATED, EXTENDED RELEASE ORAL DAILY
Qty: 90 TABLET | Refills: 0 | Status: SHIPPED | OUTPATIENT
Start: 2021-01-28 | End: 2021-03-31

## 2021-01-28 ASSESSMENT — MIFFLIN-ST. JEOR: SCORE: 1569.85

## 2021-01-28 ASSESSMENT — PAIN SCALES - GENERAL: PAINLEVEL: NO PAIN (0)

## 2021-01-28 NOTE — LETTER
1/28/2021      RE: Anson Manriquez  [address]  [facility] [address]       Dear Colleague,    Thank you for the opportunity to participate in the care of your patient, Anson Manriquez, at the [facility] HE[facility]. Please see a copy of my visit note below.    January 28, 2021     Mr. Anson Manriquez is a 63yr old male with a history of HTN, DMII, CAD (s/p STEMI in 2007 with pLAD stenting, and recent STEMI 9/2018), and ICM complicated by cardiogenic shock requiring IABP support with slow wean. Mr. Manriquez was was out with friends and developed acute chest pain and after going home his pain worsened and had syncope, then cardiac arrest. EMS was called, and on arrival provided ~30 seconds of CPR and AED delivered 1 shock.  He was then brought to [facility] in sinus rhythm with a pulse for most of the transport.  During the last 5 mins en route to the ED, he went into a wide-complex tachycardia at a rate of 220bpm, likely VT.  He maintained a pulse, acceptable BP, and mental status.  He received amiodarone 150mg in the ED, and converted to NSR as he was being prepared for cardioversion.  He was then sent to the cath lab, where he was found to have an acute thrombotic lesion of the pLAD within the prior stent.  He was treated with aspiration thrombectomy, and STEVO x2 to pLAD and mLAD, and POBA to D1.  His LVEDP was 40mmHg. Peak troponin was ~17.  Echo showed LVEF 23% with LAD territory akinesis, consistent with echo results from 2012.  Therefore, his LAD had limited viability from his prior MI, which explains his relatively low troponin and unchanged LVEF. He was ultimately started on DAPT with aspirin and Brilinta, lisinopril, carvedilol, and lasix, and discharged home on 9/20/18.   He has been seen in clinic a few times now, with EF still 30-35%.  Despite evidence based therapy and his reduced EF, he went for CRT-D with Dr. Mckeon 4/25/19.  Procedure went  well without complications.  He was recently seen for abdominal hernia repair  Today he presents for follow-up.  Notes that he has been doing relatively well with no new complaints.  From the heart standpoint he has no issues.  His blood pressure has been running a little bit higher recently usually in the 120 to 130 mmHg systolic range.  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  I would say he has class II symptoms at this time.  He continues to take all his medications without any issues.  He did have device interrogation which showed no issues.  No other complaints today.      PAST MEDICAL HISTORY:  Past Medical History:   Diagnosis Date     Anal fistula      Antiplatelet or antithrombotic long-term use      Coronary artery disease      Diabetes mellitus, type 2 (H)      Heart attack (H) 4/17/2007     Hyperlipidemia      Hypertension      Inguinal hernia      Ischemic cardiomyopathy      STEMI (ST elevation myocardial infarction) (H) 09/13/2018    s/p STEVO x2     Stented coronary artery 2007     FAMILY HISTORY:  Family History   Problem Relation Age of Onset     Hypertension Mother      Diabetes Father      Glaucoma No family hx of      Macular Degeneration No family hx of      SOCIAL HISTORY:  Social History     Socioeconomic History     Marital status:      Spouse name: None     Number of children: None     Years of education: None     Highest education level: None   Occupational History     None   Social Needs     Financial resource strain: None     Food insecurity     Worry: None     Inability: None     Transportation needs     Medical: None     Non-medical: None   Tobacco Use     Smoking status: Never Smoker     Smokeless tobacco: Never Used   Substance and Sexual Activity     Alcohol use: No     Drug use: No     Sexual activity: None   Lifestyle     Physical activity     Days per week: None     Minutes per session: None      Stress: None   Relationships     Social connections     Talks on phone: None     Gets together: None     Attends Congregation service: None     Active member of club or organization: None     Attends meetings of clubs or organizations: None     Relationship status: None     Intimate partner violence     Fear of current or ex partner: None     Emotionally abused: None     Physically abused: None     Forced sexual activity: None   Other Topics Concern     None   Social History Narrative     None     CURRENT MEDICATIONS:  Current Outpatient Medications   Medication     aspirin (ASA) 81 MG chewable tablet     atorvastatin (LIPITOR) 80 MG tablet     blood glucose monitoring (NO BRAND SPECIFIED) meter device kit     carvedilol (COREG) 25 MG tablet     empagliflozin (JARDIANCE) 25 MG TABS tablet     furosemide (LASIX) 40 MG tablet     glipiZIDE (GLUCOTROL XL) 5 MG 24 hr tablet     MULTIPLE VITAMIN PO     sacubitril-valsartan (ENTRESTO) 24-26 MG per tablet     spironolactone (ALDACTONE) 25 MG tablet     ticagrelor (BRILINTA) 90 MG tablet     blood glucose (NO BRAND SPECIFIED) lancets standard     blood glucose (NO BRAND SPECIFIED) test strip     No current facility-administered medications for this visit.      Facility-Administered Medications Ordered in Other Visits   Medication     perflutren diluted 1mL to 2mL with saline (OPTISON) diluted injection 5 mL     sodium chloride (PF) 0.9% PF flush 10 mL     sodium chloride (PF) 0.9% PF flush 10 mL     ROS:   Constitutional: No fever, chills, or sweats. Weight is 183 lbs 14.4 oz  ENT: No visual disturbance, ear ache, epistaxis, sore throat.   Allergies/Immunologic: Negative.   Respiratory: No cough, hemoptysis.   Cardiovascular: As per HPI.   GI: No nausea, vomiting, hematemesis, melena, or hematochezia.   : No urinary frequency, dysuria, or hematuria.   Integument: Negative.   Psychiatric: Pleasant, no major depression noted  Neuro: No focal neurological deficits  "noted  Endocrinology: Negative.   Musculoskeletal: As per HPI.      EXAM:  /84 (BP Location: Right arm, Patient Position: Chair, Cuff Size: Adult Regular)   Pulse 80   Ht 1.689 m (5' 6.5\")   Wt 83.4 kg (183 lb 14.4 oz)   SpO2 90%   BMI 29.24 kg/m    General: appears comfortable, alert and oriented  Head: normocephalic, atraumatic  Eyes: anicteric sclera, EOMI , PERRL  Neck: no adenopathy  Orophyarynx: moist mucosa, no lesions noted  Heart: regular, S1/S2, no murmurs, rubs or gallop. JVP not appreciated  Chest: ecchymoses along lower chest, swelling near device pocket, non tender  Lungs: CTAB, No wheezing.   Abdomen: soft, non-tender, bowel sounds present, no hepatosplenomegaly  Extremities: No LE edema today  Skin: no open lesions noted  Neuro: grossly non-focal     Labs:  Lab Results   Component Value Date    WBC 7.5 01/28/2021    HGB 16.5 01/28/2021    HCT 49.8 01/28/2021     01/28/2021     01/28/2021    POTASSIUM 4.2 01/28/2021    CHLORIDE 110 (H) 01/28/2021    CO2 27 01/28/2021    BUN 17 01/28/2021    CR 0.83 01/28/2021     (H) 01/28/2021    NTBNP 307 (H) 01/28/2021    TROPONIN 0.14 (HH) 09/13/2018    TROPI 1.914 (HH) 09/18/2018    AST 19 01/28/2021    ALT 32 01/28/2021    ALKPHOS 57 01/28/2021    BILITOTAL 1.4 (H) 01/28/2021    INR 1.16 (H) 04/25/2019   Procedures:  Coronary angio:  9/13/18  -Both coronary arteries arise from their respective cusps.  -Dominance: Right  -LM has no evidence of coronary artery disease.  -LAD: Type 3 [LAD supplies the entire apex]. The LAD gives rise to septal perforators, multiple diagonal branches. The proximal LAD has an acute thrombotic lesion within the prior stent. Distal to the stent, there is a 70-80% stenosis. The distal vessel has HALIMA 2 flow. The D1 has thrombus at the ostium likely from embolization. The apical LAD also has thrombus from embolization.  -LCX gives rise to a large branching OM. The prox Cx has a 20% stenosis. The rest of the " Cx system has mild disease.  -RCA (dominant) gives rise to PL branches and supplies PDA. There is minimal disease in the RCA system.     Echo:  9/14/18  Ischemic cardiomyopathy, LVEF=23% with extensive regional wall motion abnormalities as described below. Resting wall motion abnormalities and LV function are not significantly changed from the rest images on the 11/27/12 stress echocardiogram.  Mildly dilated LV with severely reduced LV function, LVEF=23%. There is akinesis involving the nfofr-vd-nik anteroseptal, jsn-eu-odzorf anterior, mid anterolateral, and all apical myocardial segments. The basal anterior, basal anterolateral, and basal inferolateral segments are relatively spared. RV size and function are normal. No significant valvular abnormalities. No pericardial effusion. Normal IVC with abnormal respiratory variation, estimated RA pressure 8 mmHg.     Echo 1/30/19  Ischemic cardiomyopathy with moderately dilated LV with moderately reduced  global LV function, LVEF=31%. Extensive regional wall motion abnormalities as  described below, unchanged from 9/14/18.  This study was compared with the study from 9/14/18: There has been no  significant change. Specifically, LV function and wall motion abnormalities  have not changed significantly.  The right atria appears normal. Severe left atrial enlargement is present. This study was compared with the study from 9/14/18 . There has been no significant change.     TTE 1/22/2020:  Ischemic cardiomyopathy with moderately dilated LV with moderately reduced  global LV function, LVEF=29%. Extensive regional wall motion abnormalities as  described below, unchanged from the prior study.  This study was compared with the study from 8/28/19: There has been no  significant change. Specifically, LV function and wall motion abnormalities  have not changed significantly.      ASSESSMENT AND PLAN:  Mr. Anson Manriquez is a 63yr old male with a history of HTN, DMII, CAD (s/p MI in  2007, prior remote pLAD stenting, STEMI 9/2018), ICM complicated by cardiac arrest and ventricular tachycardia who presented to the cardiology clinic for further outpatient management.  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  No ICD shocks.  ICM - HFrEF 30%, ACC/AHA C, NYHA I-II, excellent exercise tolerance.  Has CRT-D for SCD prevention, device interrogation was reviewed today and there were no issues no evidence of tachycardia or therapies.  On Carvedilol 25mg BID we will continue at this time for heart failure with reduced ejection fraction.  On entresto  24 mg twice daily, renal function is normal.  At this time we will increase to 49 mg in the afternoon to further improve and I directed medical therapy.  Lasix 40mg BID, continue, appears euvolemic today.  Continue ASA/ticagrelor/lipitor for CAD/PCI.    Continue aldactone 12.5mg for treatment of his ICM, no changes today consider increasing the dose as able.     Continue follow up in [facility] clinic and we will see patient back in 6 months.  I appreciate the opportunity to participate in the care of Mr. Manriquez.  Please do not hesitate to contact me anytime with questions.  Jason Matos MD

## 2021-01-28 NOTE — PATIENT INSTRUCTIONS
It was a pleasure to see you in clinic today. Please do not hesitate to call with any questions or concerns. You are scheduled for a remote ICD transmission on 5/11/2021. This will happen automatically in the night. We will call in 1-2 business days to discuss the results with you. We look forward to seeing you in clinic at your next device check in 6 months    Cee Ku, RN  Electrophysiology Nurse Clinician  [facility]  During business hours call:  [phone]  After business hours please call: [phone]- select option #[phone] and ask for job code [id number].

## 2021-01-28 NOTE — PROGRESS NOTES
January 28, 2021     Mr. Anson Manriquez is a 63yr old male with a history of HTN, DMII, CAD (s/p STEMI in 2007 with pLAD stenting, and recent STEMI 9/2018), and ICM complicated by cardiogenic shock requiring IABP support with slow wean. Mr. Manriquez was was out with friends and developed acute chest pain and after going home his pain worsened and had syncope, then cardiac arrest. EMS was called, and on arrival provided ~30 seconds of CPR and AED delivered 1 shock.  He was then brought to [facility] in sinus rhythm with a pulse for most of the transport.  During the last 5 mins en route to the ED, he went into a wide-complex tachycardia at a rate of 220bpm, likely VT.  He maintained a pulse, acceptable BP, and mental status.  He received amiodarone 150mg in the ED, and converted to NSR as he was being prepared for cardioversion.  He was then sent to the cath lab, where he was found to have an acute thrombotic lesion of the pLAD within the prior stent.  He was treated with aspiration thrombectomy, and STEVO x2 to pLAD and mLAD, and POBA to D1.  His LVEDP was 40mmHg. Peak troponin was ~17.  Echo showed LVEF 23% with LAD territory akinesis, consistent with echo results from 2012.  Therefore, his LAD had limited viability from his prior MI, which explains his relatively low troponin and unchanged LVEF. He was ultimately started on DAPT with aspirin and Brilinta, lisinopril, carvedilol, and lasix, and discharged home on 9/20/18.   He has been seen in clinic a few times now, with EF still 30-35%.  Despite evidence based therapy and his reduced EF, he went for CRT-D with Dr. Mckeon 4/25/19.  Procedure went well without complications.  He was recently seen for abdominal hernia repair  Today he presents for follow-up.  Notes that he has been doing relatively well with no new complaints.  From the heart standpoint he has no issues.  His blood pressure has been running a little bit higher recently usually in the 120 to 130 mmHg systolic  range.  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  I would say he has class II symptoms at this time.  He continues to take all his medications without any issues.  He did have device interrogation which showed no issues.  No other complaints today.      PAST MEDICAL HISTORY:  Past Medical History:   Diagnosis Date     Anal fistula      Antiplatelet or antithrombotic long-term use      Coronary artery disease      Diabetes mellitus, type 2 (H)      Heart attack (H) 4/17/2007     Hyperlipidemia      Hypertension      Inguinal hernia      Ischemic cardiomyopathy      STEMI (ST elevation myocardial infarction) (H) 09/13/2018    s/p STEVO x2     Stented coronary artery 2007     FAMILY HISTORY:  Family History   Problem Relation Age of Onset     Hypertension Mother      Diabetes Father      Glaucoma No family hx of      Macular Degeneration No family hx of      SOCIAL HISTORY:  Social History     Socioeconomic History     Marital status:      Spouse name: None     Number of children: None     Years of education: None     Highest education level: None   Occupational History     None   Social Needs     Financial resource strain: None     Food insecurity     Worry: None     Inability: None     Transportation needs     Medical: None     Non-medical: None   Tobacco Use     Smoking status: Never Smoker     Smokeless tobacco: Never Used   Substance and Sexual Activity     Alcohol use: No     Drug use: No     Sexual activity: None   Lifestyle     Physical activity     Days per week: None     Minutes per session: None     Stress: None   Relationships     Social connections     Talks on phone: None     Gets together: None     Attends Confucianism service: None     Active member of club or organization: None     Attends meetings of clubs or organizations: None     Relationship status: None     Intimate partner violence     Fear of current or ex partner: None  "    Emotionally abused: None     Physically abused: None     Forced sexual activity: None   Other Topics Concern     None   Social History Narrative     None     CURRENT MEDICATIONS:  Current Outpatient Medications   Medication     aspirin (ASA) 81 MG chewable tablet     atorvastatin (LIPITOR) 80 MG tablet     blood glucose monitoring (NO BRAND SPECIFIED) meter device kit     carvedilol (COREG) 25 MG tablet     empagliflozin (JARDIANCE) 25 MG TABS tablet     furosemide (LASIX) 40 MG tablet     glipiZIDE (GLUCOTROL XL) 5 MG 24 hr tablet     MULTIPLE VITAMIN PO     sacubitril-valsartan (ENTRESTO) 24-26 MG per tablet     spironolactone (ALDACTONE) 25 MG tablet     ticagrelor (BRILINTA) 90 MG tablet     blood glucose (NO BRAND SPECIFIED) lancets standard     blood glucose (NO BRAND SPECIFIED) test strip     No current facility-administered medications for this visit.      Facility-Administered Medications Ordered in Other Visits   Medication     perflutren diluted 1mL to 2mL with saline (OPTISON) diluted injection 5 mL     sodium chloride (PF) 0.9% PF flush 10 mL     sodium chloride (PF) 0.9% PF flush 10 mL     ROS:   Constitutional: No fever, chills, or sweats. Weight is 183 lbs 14.4 oz  ENT: No visual disturbance, ear ache, epistaxis, sore throat.   Allergies/Immunologic: Negative.   Respiratory: No cough, hemoptysis.   Cardiovascular: As per HPI.   GI: No nausea, vomiting, hematemesis, melena, or hematochezia.   : No urinary frequency, dysuria, or hematuria.   Integument: Negative.   Psychiatric: Pleasant, no major depression noted  Neuro: No focal neurological deficits noted  Endocrinology: Negative.   Musculoskeletal: As per HPI.      EXAM:  /84 (BP Location: Right arm, Patient Position: Chair, Cuff Size: Adult Regular)   Pulse 80   Ht 1.689 m (5' 6.5\")   Wt 83.4 kg (183 lb 14.4 oz)   SpO2 90%   BMI 29.24 kg/m    General: appears comfortable, alert and oriented  Head: normocephalic, atraumatic  Eyes: " anicteric sclera, EOMI , PERRL  Neck: no adenopathy  Orophyarynx: moist mucosa, no lesions noted  Heart: regular, S1/S2, no murmurs, rubs or gallop. JVP not appreciated  Chest: ecchymoses along lower chest, swelling near device pocket, non tender  Lungs: CTAB, No wheezing.   Abdomen: soft, non-tender, bowel sounds present, no hepatosplenomegaly  Extremities: No LE edema today  Skin: no open lesions noted  Neuro: grossly non-focal     Labs:  Lab Results   Component Value Date    WBC 7.5 01/28/2021    HGB 16.5 01/28/2021    HCT 49.8 01/28/2021     01/28/2021     01/28/2021    POTASSIUM 4.2 01/28/2021    CHLORIDE 110 (H) 01/28/2021    CO2 27 01/28/2021    BUN 17 01/28/2021    CR 0.83 01/28/2021     (H) 01/28/2021    NTBNP 307 (H) 01/28/2021    TROPONIN 0.14 (HH) 09/13/2018    TROPI 1.914 (HH) 09/18/2018    AST 19 01/28/2021    ALT 32 01/28/2021    ALKPHOS 57 01/28/2021    BILITOTAL 1.4 (H) 01/28/2021    INR 1.16 (H) 04/25/2019   Procedures:  Coronary angio:  9/13/18  -Both coronary arteries arise from their respective cusps.  -Dominance: Right  -LM has no evidence of coronary artery disease.  -LAD: Type 3 [LAD supplies the entire apex]. The LAD gives rise to septal perforators, multiple diagonal branches. The proximal LAD has an acute thrombotic lesion within the prior stent. Distal to the stent, there is a 70-80% stenosis. The distal vessel has HALIMA 2 flow. The D1 has thrombus at the ostium likely from embolization. The apical LAD also has thrombus from embolization.  -LCX gives rise to a large branching OM. The prox Cx has a 20% stenosis. The rest of the Cx system has mild disease.  -RCA (dominant) gives rise to PL branches and supplies PDA. There is minimal disease in the RCA system.     Echo:  9/14/18  Ischemic cardiomyopathy, LVEF=23% with extensive regional wall motion abnormalities as described below. Resting wall motion abnormalities and LV function are not significantly changed from the  rest images on the 11/27/12 stress echocardiogram.  Mildly dilated LV with severely reduced LV function, LVEF=23%. There is akinesis involving the fhvgq-yh-noq anteroseptal, hiv-wo-hyoblg anterior, mid anterolateral, and all apical myocardial segments. The basal anterior, basal anterolateral, and basal inferolateral segments are relatively spared. RV size and function are normal. No significant valvular abnormalities. No pericardial effusion. Normal IVC with abnormal respiratory variation, estimated RA pressure 8 mmHg.     Echo 1/30/19  Ischemic cardiomyopathy with moderately dilated LV with moderately reduced  global LV function, LVEF=31%. Extensive regional wall motion abnormalities as  described below, unchanged from 9/14/18.  This study was compared with the study from 9/14/18: There has been no  significant change. Specifically, LV function and wall motion abnormalities  have not changed significantly.  The right atria appears normal. Severe left atrial enlargement is present. This study was compared with the study from 9/14/18 . There has been no significant change.     TTE 1/22/2020:  Ischemic cardiomyopathy with moderately dilated LV with moderately reduced  global LV function, LVEF=29%. Extensive regional wall motion abnormalities as  described below, unchanged from the prior study.  This study was compared with the study from 8/28/19: There has been no  significant change. Specifically, LV function and wall motion abnormalities  have not changed significantly.      ASSESSMENT AND PLAN:  Mr. Anson Manriquez is a 63yr old male with a history of HTN, DMII, CAD (s/p MI in 2007, prior remote pLAD stenting, STEMI 9/2018), ICM complicated by cardiac arrest and ventricular tachycardia who presented to the cardiology clinic for further outpatient management.  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  No ICD shocks.  ICM - HFrEF 30%, ACC/AHA C, NYHA I-II, excellent exercise tolerance.  Has CRT-D for SCD prevention, device interrogation was reviewed today and there were no issues no evidence of tachycardia or therapies.  On Carvedilol 25mg BID we will continue at this time for heart failure with reduced ejection fraction.  On entresto  24 mg twice daily, renal function is normal.  At this time we will increase to 49 mg in the afternoon to further improve and I directed medical therapy.  Lasix 40mg BID, continue, appears euvolemic today.  Continue ASA/ticagrelor/lipitor for CAD/PCI.    Continue aldactone 12.5mg for treatment of his ICM, no changes today consider increasing the dose as able.     Continue follow up in [facility] clinic and we will see patient back in 6 months.  I appreciate the opportunity to participate in the care of Mr. Manriquez.  Please do not hesitate to contact me anytime with questions.  Jason Matos MD

## 2021-01-28 NOTE — NURSING NOTE
Chief Complaint   Patient presents with     Follow Up     6 mo F/U history of HTN, DMII, CAD (s/p MI in 2007, prior remote pLAD stenting, and recent STEMI 9/2018), and ICM      Vitals were taken and medication reconciled.    CORIN Glaser  3:23 PM

## 2021-01-28 NOTE — PATIENT INSTRUCTIONS
Follow up with Dr. Matos in clinic with labs prior in 6 months.    Please increase Entresto to 1 tablet every morning and 2 tablets every evening.     Thank you for your visit today.  Please call me with any questions or concerns.   Vladimir Servin RN  Cardiology Care Coordinator  [phone], press option 1 then option 4

## 2021-01-29 LAB
MDC_IDC_EPISODE_DTM: NORMAL
MDC_IDC_EPISODE_ID: NORMAL
MDC_IDC_EPISODE_TYPE: NORMAL
MDC_IDC_LEAD_IMPLANT_DT: NORMAL
MDC_IDC_LEAD_LOCATION: NORMAL
MDC_IDC_LEAD_LOCATION_DETAIL_1: NORMAL
MDC_IDC_LEAD_MFG: NORMAL
MDC_IDC_LEAD_MODEL: NORMAL
MDC_IDC_LEAD_POLARITY_TYPE: NORMAL
MDC_IDC_LEAD_SERIAL: NORMAL
MDC_IDC_LEAD_SPECIAL_FUNCTION: NORMAL
MDC_IDC_MSMT_BATTERY_DTM: NORMAL
MDC_IDC_MSMT_BATTERY_REMAINING_LONGEVITY: 132 MO
MDC_IDC_MSMT_BATTERY_STATUS: NORMAL
MDC_IDC_MSMT_CAP_CHARGE_DTM: NORMAL
MDC_IDC_MSMT_CAP_CHARGE_ENERGY: 0 J
MDC_IDC_MSMT_CAP_CHARGE_TIME: 0 S
MDC_IDC_MSMT_CAP_CHARGE_TIME: 10.19 S
MDC_IDC_MSMT_CAP_CHARGE_TYPE: NORMAL
MDC_IDC_MSMT_CAP_CHARGE_TYPE: NORMAL
MDC_IDC_MSMT_LEADCHNL_LV_IMPEDANCE_VALUE: 616 OHM
MDC_IDC_MSMT_LEADCHNL_LV_PACING_THRESHOLD_AMPLITUDE: 0.9 V
MDC_IDC_MSMT_LEADCHNL_LV_PACING_THRESHOLD_PULSEWIDTH: 0.5 MS
MDC_IDC_MSMT_LEADCHNL_LV_SENSING_INTR_AMPL: 16.7 MV
MDC_IDC_MSMT_LEADCHNL_RA_IMPEDANCE_VALUE: 551 OHM
MDC_IDC_MSMT_LEADCHNL_RA_PACING_THRESHOLD_AMPLITUDE: 0.6 V
MDC_IDC_MSMT_LEADCHNL_RA_PACING_THRESHOLD_PULSEWIDTH: 0.5 MS
MDC_IDC_MSMT_LEADCHNL_RA_SENSING_INTR_AMPL: 8.2 MV
MDC_IDC_MSMT_LEADCHNL_RV_IMPEDANCE_VALUE: 673 OHM
MDC_IDC_MSMT_LEADCHNL_RV_PACING_THRESHOLD_AMPLITUDE: 0.7 V
MDC_IDC_MSMT_LEADCHNL_RV_PACING_THRESHOLD_PULSEWIDTH: 0.4 MS
MDC_IDC_MSMT_LEADCHNL_RV_SENSING_INTR_AMPL: 25 MV
MDC_IDC_PG_IMPLANT_DTM: NORMAL
MDC_IDC_PG_MFG: NORMAL
MDC_IDC_PG_MODEL: NORMAL
MDC_IDC_PG_SERIAL: NORMAL
MDC_IDC_PG_TYPE: NORMAL
MDC_IDC_SESS_CLINIC_NAME: NORMAL
MDC_IDC_SESS_DTM: NORMAL
MDC_IDC_SESS_TYPE: NORMAL
MDC_IDC_SET_BRADY_AT_MODE_SWITCH_MODE: NORMAL
MDC_IDC_SET_BRADY_AT_MODE_SWITCH_RATE: 170 {BEATS}/MIN
MDC_IDC_SET_BRADY_HYSTRATE: NORMAL
MDC_IDC_SET_BRADY_LOWRATE: 60 {BEATS}/MIN
MDC_IDC_SET_BRADY_MAX_SENSOR_RATE: 130 {BEATS}/MIN
MDC_IDC_SET_BRADY_MAX_TRACKING_RATE: 130 {BEATS}/MIN
MDC_IDC_SET_BRADY_MODE: NORMAL
MDC_IDC_SET_BRADY_PAV_DELAY_HIGH: 180 MS
MDC_IDC_SET_BRADY_PAV_DELAY_LOW: 180 MS
MDC_IDC_SET_BRADY_SAV_DELAY_HIGH: 140 MS
MDC_IDC_SET_BRADY_SAV_DELAY_LOW: 140 MS
MDC_IDC_SET_CRT_LVRV_DELAY: 30 MS
MDC_IDC_SET_CRT_PACED_CHAMBERS: NORMAL
MDC_IDC_SET_LEADCHNL_LV_PACING_AMPLITUDE: 2.1 V
MDC_IDC_SET_LEADCHNL_LV_PACING_CAPTURE_MODE: NORMAL
MDC_IDC_SET_LEADCHNL_LV_PACING_POLARITY: NORMAL
MDC_IDC_SET_LEADCHNL_LV_PACING_PULSEWIDTH: 0.5 MS
MDC_IDC_SET_LEADCHNL_LV_SENSING_ADAPTATION_MODE: NORMAL
MDC_IDC_SET_LEADCHNL_LV_SENSING_POLARITY: NORMAL
MDC_IDC_SET_LEADCHNL_LV_SENSING_SENSITIVITY: 1 MV
MDC_IDC_SET_LEADCHNL_RA_PACING_AMPLITUDE: 2.5 V
MDC_IDC_SET_LEADCHNL_RA_PACING_CAPTURE_MODE: NORMAL
MDC_IDC_SET_LEADCHNL_RA_PACING_POLARITY: NORMAL
MDC_IDC_SET_LEADCHNL_RA_PACING_PULSEWIDTH: 0.5 MS
MDC_IDC_SET_LEADCHNL_RA_SENSING_ADAPTATION_MODE: NORMAL
MDC_IDC_SET_LEADCHNL_RA_SENSING_POLARITY: NORMAL
MDC_IDC_SET_LEADCHNL_RA_SENSING_SENSITIVITY: 0.25 MV
MDC_IDC_SET_LEADCHNL_RV_PACING_AMPLITUDE: 2 V
MDC_IDC_SET_LEADCHNL_RV_PACING_CAPTURE_MODE: NORMAL
MDC_IDC_SET_LEADCHNL_RV_PACING_POLARITY: NORMAL
MDC_IDC_SET_LEADCHNL_RV_PACING_PULSEWIDTH: 0.4 MS
MDC_IDC_SET_LEADCHNL_RV_SENSING_ADAPTATION_MODE: NORMAL
MDC_IDC_SET_LEADCHNL_RV_SENSING_POLARITY: NORMAL
MDC_IDC_SET_LEADCHNL_RV_SENSING_SENSITIVITY: 0.6 MV
MDC_IDC_SET_ZONE_DETECTION_INTERVAL: 250 MS
MDC_IDC_SET_ZONE_DETECTION_INTERVAL: 300 MS
MDC_IDC_SET_ZONE_DETECTION_INTERVAL: 353 MS
MDC_IDC_SET_ZONE_TYPE: NORMAL
MDC_IDC_SET_ZONE_VENDOR_TYPE: NORMAL
MDC_IDC_STAT_EPISODE_RECENT_COUNT: 0
MDC_IDC_STAT_EPISODE_RECENT_COUNT: 0
MDC_IDC_STAT_EPISODE_RECENT_COUNT: 1
MDC_IDC_STAT_EPISODE_RECENT_COUNT_DTM_END: NORMAL
MDC_IDC_STAT_EPISODE_RECENT_COUNT_DTM_START: NORMAL
MDC_IDC_STAT_EPISODE_TOTAL_COUNT: 0
MDC_IDC_STAT_EPISODE_TOTAL_COUNT: 0
MDC_IDC_STAT_EPISODE_TOTAL_COUNT: 1
MDC_IDC_STAT_EPISODE_TOTAL_COUNT_DTM_END: NORMAL
MDC_IDC_STAT_EPISODE_TYPE: NORMAL
MDC_IDC_STAT_EPISODE_VENDOR_TYPE: NORMAL
MDC_IDC_STAT_TACHYTHERAPY_ATP_DELIVERED_RECENT: 0
MDC_IDC_STAT_TACHYTHERAPY_ATP_DELIVERED_TOTAL: 0
MDC_IDC_STAT_TACHYTHERAPY_RECENT_DTM_END: NORMAL
MDC_IDC_STAT_TACHYTHERAPY_RECENT_DTM_START: NORMAL
MDC_IDC_STAT_TACHYTHERAPY_SHOCKS_ABORTED_RECENT: 0
MDC_IDC_STAT_TACHYTHERAPY_SHOCKS_ABORTED_TOTAL: 0
MDC_IDC_STAT_TACHYTHERAPY_SHOCKS_DELIVERED_RECENT: 0
MDC_IDC_STAT_TACHYTHERAPY_SHOCKS_DELIVERED_TOTAL: 0
MDC_IDC_STAT_TACHYTHERAPY_TOTAL_DTM_END: NORMAL

## 2021-02-09 DIAGNOSIS — I21.02 ST ELEVATION MYOCARDIAL INFARCTION INVOLVING LEFT ANTERIOR DESCENDING (LAD) CORONARY ARTERY (H): ICD-10-CM

## 2021-02-09 DIAGNOSIS — I46.9 CARDIAC ARREST (H): ICD-10-CM

## 2021-02-11 ENCOUNTER — TELEPHONE (OUTPATIENT)
Dept: PHARMACY | Facility: CLINIC | Age: 66
End: 2021-02-11

## 2021-02-11 NOTE — TELEPHONE ENCOUNTER
ticagrelor (BRILINTA) 90 MG tablet    Take 1 tablet (90 mg) by mouth 2 times daily     Last Written Prescription Date:  8/4/20  Last Fill Quantity: 180,   # refills: 1  Last Office Visit : 1/28/21  Continue ASA/ticagrelor/lipitor for CAD/PCI.   Continue follow up in [facility] clinic and we will see patient back in 6 months.   Future Office visit:  none    Refill to pharmacy.

## 2021-02-11 NOTE — TELEPHONE ENCOUNTER
[facility] Diabetes IMPACT Project Post-Visit Call    Subjective:  Pt currently taking Sulfonylureas: Glipizide XL 5 mg daily and SGLT2 Inhibitors: Empagliflozin (Jardiance) 25 mg daily.  Side Effect: No, patient is not experiencing side effects.  A1C/BG Goal: for A1c <8%, FPG  mg/dL, 2 HRPPG <180 mg/dL   SMBG: One time daily     BG Readings (patient reported): 160-190 fastings (improvement from last year)  Is pt meeting this goal: No   Patient is not experiencing hypoglycemia  Recent symptoms of high blood sugar? none   Diet/Exercise: none specified       Objective:  A1C  Hemoglobin A1C   Date Value Ref Range Status   10/29/2020 8.0 (H) 0 - 5.6 % Final     Comment:     Normal <5.7% Prediabetes 5.7-6.4%  Diabetes 6.5% or higher - adopted from ADA   consensus guidelines.     02/11/2020 8.0 (H) 0 - 5.6 % Final     Comment:     Normal <5.7% Prediabetes 5.7-6.4%  Diabetes 6.5% or higher - adopted from ADA   consensus guidelines.     11/06/2019 7.3 (H) 0 - 5.6 % Final     Comment:     Normal <5.7% Prediabetes 5.7-6.4%  Diabetes 6.5% or higher - adopted from ADA   consensus guidelines.       BMP  Sodium   Date Value Ref Range Status   01/28/2021 141 133 - 144 mmol/L Final     Potassium   Date Value Ref Range Status   01/28/2021 4.2 3.4 - 5.3 mmol/L Final     Chloride   Date Value Ref Range Status   01/28/2021 110 (H) 94 - 109 mmol/L Final     Carbon Dioxide   Date Value Ref Range Status   01/28/2021 27 20 - 32 mmol/L Final     Anion Gap   Date Value Ref Range Status   01/28/2021 4 3 - 14 mmol/L Final     Glucose   Date Value Ref Range Status   01/28/2021 119 (H) 70 - 99 mg/dL Final     Urea Nitrogen   Date Value Ref Range Status   01/28/2021 17 7 - 30 mg/dL Final     Creatinine   Date Value Ref Range Status   01/28/2021 0.83 0.66 - 1.25 mg/dL Final     GFR Estimate   Date Value Ref Range Status   01/28/2021 >90 >60 mL/min/[1.73_m2] Final     Comment:     Non  GFR Calc  Starting 12/18/2018, serum creatinine  based estimated GFR (eGFR) will be   calculated using the Chronic Kidney Disease Epidemiology Collaboration   (CKD-EPI) equation.       Calcium   Date Value Ref Range Status   01/28/2021 9.5 8.5 - 10.1 mg/dL Final     Estimated Creatinine Clearance  CrCl cannot be calculated (Patient's most recent lab result is older than the maximum 10 days allowed.).   The ASCVD Risk score (Parisa BROWNE Jr., et al., 2013) failed to calculate for the following reasons:    The patient has a prior MI or stroke diagnosis   Eye exam: due  Foot exam: due  ACEi/ARB: Yes   Statin: Yes  Urine Albumin  Lab Results   Component Value Date    UMALCR Unable to calculate due to low value 01/22/2020     ASA: Taking 81 mg daily and denies side effects     Assessment/Plan:  Diabetes: Patient would benefit from dose increase in glipizide to achieve BG goals.   Drug Therapy Problem Identified: Dose too low    1. Increase glipizide to 10 mg daily.     Follow up: 02/25/21     Time Spent with patient (mins): 5      Lisa Dean RPH

## 2021-02-15 DIAGNOSIS — E78.2 MIXED HYPERLIPIDEMIA: ICD-10-CM

## 2021-02-15 DIAGNOSIS — E11.9 TYPE 2 DIABETES MELLITUS WITHOUT COMPLICATION, WITHOUT LONG-TERM CURRENT USE OF INSULIN (H): ICD-10-CM

## 2021-02-15 DIAGNOSIS — I10 BENIGN ESSENTIAL HYPERTENSION: ICD-10-CM

## 2021-02-15 DIAGNOSIS — I21.02 ST ELEVATION MYOCARDIAL INFARCTION INVOLVING LEFT ANTERIOR DESCENDING (LAD) CORONARY ARTERY (H): ICD-10-CM

## 2021-02-17 RX ORDER — ASPIRIN 81 MG/1
81 TABLET, CHEWABLE ORAL DAILY
Qty: 90 TABLET | Refills: 2 | Status: SHIPPED | OUTPATIENT
Start: 2021-02-17 | End: 2021-12-07

## 2021-02-17 RX ORDER — ATORVASTATIN CALCIUM 80 MG/1
80 TABLET, FILM COATED ORAL DAILY
Qty: 90 TABLET | Refills: 0 | Status: SHIPPED | OUTPATIENT
Start: 2021-02-17 | End: 2021-05-26

## 2021-02-17 NOTE — TELEPHONE ENCOUNTER
[facility] Call Center    Phone Message    May a detailed message be left on voicemail: yes     Reason for Call: Medication Refill Request    Has the patient contacted the pharmacy for the refill? Yes   Name of medication being requested: aspirin (ASA) 81 MG chewable tablet & atorvastatin (LIPITOR) 80 MG tablet  Provider who prescribed the medication: Dr. Beard  Pharmacy: [id number] [address]  AT [facility]  Date medication is needed: 2/17/21   Pt needing these scripts asap, pt will be leaving to go out of town in a couple days and does not want to be out of his medication. Pt stated [facility] contacted the clinic a few days ago and they have not heard anything back yet.       Action Taken: Message routed to:  [facility] ([facility]): maria

## 2021-02-17 NOTE — TELEPHONE ENCOUNTER
Last Clinic Visit: 10/8/2020  [facility] [place]  aspirin (ASA) 81 MG chewable tablet  10-8-20 Lab: ALT,AST, CR,PLT, HGB,: WNL  RF 90 DAY:2  atorvastatin (LIPITOR) 80 MG tablet,   overdue lab: LDL- FYI to clinic  RF 90 day

## 2021-02-23 DIAGNOSIS — I50.20 HEART FAILURE WITH REDUCED EJECTION FRACTION, NYHA CLASS III (H): ICD-10-CM

## 2021-02-24 RX ORDER — SPIRONOLACTONE 25 MG/1
25 TABLET ORAL DAILY
Qty: 90 TABLET | Refills: 3 | Status: SHIPPED | OUTPATIENT
Start: 2021-02-24 | End: 2022-03-17

## 2021-02-24 NOTE — TELEPHONE ENCOUNTER
spironolactone (ALDACTONE) 25 MG tablet  Last Written Prescription Date:  5/21/2020  Last Fill Quantity: 90,   # refills: 2  Last Office Visit : 1/28/2021  Future Office visit:  None  90 Tabs, 3 Refill sent to pharm 2/24/2021      Alaina Pandya RN  Central Triage Red Flags/Med Refills  Warnings Override History for spironolactone (ALDACTONE) 25 MG tablet [[id number]]    Overridden by Vladimir Servin RN on Jan 28, 2021 3:33 PM   Drug-Drug   1. POTASSIUM-SPARING DIURETICS / ANGIOTENSIN II RECEPTOR ANTAGONISTS [Level: Major]   Other Orders: sacubitril-valsartan (ENTRESTO) 24-26 MG per tablet         Overridden by Vladimir Servin RN on Dec 17, 2020 10:48 AM   Drug-Drug   1. POTASSIUM-SPARING DIURETICS / ANGIOTENSIN II RECEPTOR ANTAGONISTS [Level: Major] [Reason: Benefit outweighs risk]   Other Orders: sacubitril-valsartan (ENTRESTO) 24-26 MG per tablet         Overridden by Doege, Kathleen M, RN on May 21, 2020 6:57 PM   Drug-Drug   1. POTASSIUM-SPARING DIURETICS / ANGIOTENSIN II RECEPTOR ANTAGONISTS [Level: Major]   Other Orders: sacubitril-valsartan (ENTRESTO) 24-26 MG per tablet

## 2021-02-25 ENCOUNTER — VIRTUAL VISIT (OUTPATIENT)
Dept: PHARMACY | Facility: CLINIC | Age: 66
End: 2021-02-25
Payer: COMMERCIAL

## 2021-02-25 DIAGNOSIS — E11.9 TYPE 2 DIABETES MELLITUS WITHOUT COMPLICATION, WITHOUT LONG-TERM CURRENT USE OF INSULIN (H): Primary | ICD-10-CM

## 2021-02-25 PROCEDURE — 99606 MTMS BY PHARM EST 15 MIN: CPT | Mod: TEL | Performed by: PHARMACIST

## 2021-02-25 NOTE — PROGRESS NOTES
Medication Therapy Management (MTM) Encounter    ASSESSMENT:                            Medication Adherence/Access: No issues identified    Type 2 Diabetes: Patient is not meeting A1c goal of < 8%. Patient would benefit from minimum SMBG: Check blood sugars fasting, and occasionally 2 hours after starting a meal.    PLAN:                            1. Monitor sugars fasting and 2 hours after eating.     Follow-up: 4 weeks    SUBJECTIVE/OBJECTIVE:                          Anson Manriquez is a 65 year old male called for a follow-up visit. He was referred to me from Larry Beard.  Today's visit is a follow-up MTM visit from 11/24/20. The first time I call, there is no answer. Our visit starts late as result.     Reason for visit: uncontrolled diabetes.    Allergies/ADRs: Reviewed in chart  Tobacco: He reports that he has never smoked. He has never used smokeless tobacco.  Alcohol: not currently using  Caffeine: 4-5 diet sodas/day  Activity: none  Past Medical History: Reviewed in chart      Medication Adherence/Access: no issues reported    Type 2 Diabetes:  Currently taking glipizide XL 10 mg daily, Jardiance 25 mg daily. Patient is not experiencing side effects.  Blood sugar monitoring: never. Ranges: NA. He hasn't been monitoring because he has been caring for family members with COVID.   Symptoms of low blood sugar? none  Symptoms of high blood sugar? none  Eye exam: due  Foot exam: due  Diet/Exercise: none  Aspirin: Taking 81mg daily and denies side effects  Statin: Yes: atorvastatin 80 mg daily   ACEi/ARB: Yes: valsartan 26 mg two in the morning and one at night.   Urine Albumin:   Lab Results   Component Value Date    UMALCR Unable to calculate due to low value 01/22/2020      Lab Results   Component Value Date    A1C 8.0 10/29/2020    A1C 8.0 02/11/2020    A1C 7.3 11/06/2019    A1C 7.5 06/18/2019    A1C 7.6 05/09/2019     Today's Vitals: There were no vitals taken for this visit. - telemed    BP Readings from  "Last 1 Encounters:   01/28/21 131/84     Pulse Readings from Last 1 Encounters:   01/28/21 80     Wt Readings from Last 1 Encounters:   01/28/21 183 lb 14.4 oz (83.4 kg)     Ht Readings from Last 1 Encounters:   01/28/21 5' 6.5\" (1.689 m)     Estimated body mass index is 29.24 kg/m  as calculated from the following:    Height as of 1/28/21: 5' 6.5\" (1.689 m).    Weight as of 1/28/21: 183 lb 14.4 oz (83.4 kg).    Temp Readings from Last 1 Encounters:   10/29/20 97.8  F (36.6  C) (Oral)     ----------------    I spent 5 minutes with this patient today. A copy of the visit note was provided to the patient's primary care provider.    The patient was sent via [employer] a summary of these recommendations.     Lisa Dean, Pharm.D., [facility]  Medication Therapy Management Pharmacist  Page/VM:  [phone]      Telemedicine Visit Details  Type of service:  Telephone visit  Start Time: 4:15 PM  End Time: 4:20 PM  Originating Location (patient location): Home  Distant Location (provider location):  [facility] MEDICATION THERAPY MANAGEMENT        Medication Therapy Recommendations  Type 2 diabetes mellitus without complication, without long-term current use of insulin (H)    Current Medication: glipiZIDE (GLUCOTROL XL) 5 MG 24 hr tablet   Rationale: Medication requires monitoring - Needs additional monitoring - Effectiveness   Recommendation: Self-Monitoring - Monitor sugars fasting and 2 hours after eating   Status: Patient Agreed - Adherence/Education               "

## 2021-03-04 NOTE — PATIENT INSTRUCTIONS
Recommendations from today's MTM visit:                                                       1. Monitor sugars the best you can remember either fasting or 2 hours after eating    It was great to speak with you today.  I value your experience and would be very thankful for your time with providing feedback on our clinic survey. You may receive a survey via email or text message in the next few days.     Next MTM visit: I'll call in 4 weeks to check in on sugars again    To schedule another MTM appointment, please call the clinic directly or you may call the MTM scheduling line at [phone] or toll-free at [phone].     My Clinical Pharmacist's contact information:                                                      It was a pleasure talking with you today!  Please feel free to contact me with any questions or concerns you have.      Lisa Dean, Pharm.D., [facility]  Medication Therapy Management Pharmacist  Page/VM:  [phone]

## 2021-03-22 ENCOUNTER — IMMUNIZATION (OUTPATIENT)
Dept: NURSING | Facility: CLINIC | Age: 66
End: 2021-03-22
Payer: COMMERCIAL

## 2021-03-22 PROCEDURE — 0001A PR COVID VAC PFIZER DIL RECON 30 MCG/0.3 ML IM: CPT

## 2021-03-22 PROCEDURE — 91300 PR COVID VAC PFIZER DIL RECON 30 MCG/0.3 ML IM: CPT

## 2021-03-30 DIAGNOSIS — E11.9 TYPE 2 DIABETES MELLITUS WITHOUT COMPLICATION, WITHOUT LONG-TERM CURRENT USE OF INSULIN (H): ICD-10-CM

## 2021-03-31 NOTE — TELEPHONE ENCOUNTER
glipiZIDE (GLUCOTROL XL) 5 MG 24 hr tablet   Take 1 tablet (5 mg) by mouth daily Take before or with meals.     Last Written Prescription Date:  1/28/21  Last Fill Quantity: 90,   # refills: 0  Last Office Visit : 10/8/20  Future Office visit:  none    Routing because:    Patient has documented A1c result of 8.0 outside the specified period of time.    If HgbA1C is 8 or greater, it needs to be on file within the past 3 months.  If less than 8, must be on file within the past 6 months.         Lab Test 10/29/20  0740   A1C 8.0*     90 day pended.     Scheduling has been notified to contact the pt for 6 month follow up appointment.

## 2021-04-01 ENCOUNTER — VIRTUAL VISIT (OUTPATIENT)
Dept: PHARMACY | Facility: CLINIC | Age: 66
End: 2021-04-01
Payer: COMMERCIAL

## 2021-04-01 DIAGNOSIS — E11.9 TYPE 2 DIABETES MELLITUS WITHOUT COMPLICATION, WITHOUT LONG-TERM CURRENT USE OF INSULIN (H): Primary | ICD-10-CM

## 2021-04-01 PROCEDURE — 99606 MTMS BY PHARM EST 15 MIN: CPT | Mod: TEL | Performed by: PHARMACIST

## 2021-04-01 RX ORDER — GLIPIZIDE 5 MG/1
5 TABLET, FILM COATED, EXTENDED RELEASE ORAL 2 TIMES DAILY
Qty: 180 TABLET | Refills: 3 | Status: SHIPPED | OUTPATIENT
Start: 2021-04-01 | End: 2022-05-11

## 2021-04-01 RX ORDER — GLIPIZIDE 5 MG/1
5 TABLET, FILM COATED, EXTENDED RELEASE ORAL DAILY
Qty: 90 TABLET | Refills: 1 | Status: SHIPPED | OUTPATIENT
Start: 2021-04-01 | End: 2021-04-01 | Stop reason: DRUGHIGH

## 2021-04-01 NOTE — PROGRESS NOTES
Medication Therapy Management (MTM) Encounter    ASSESSMENT:                            Medication Adherence/Access: No issues identified    Type 2 Diabetes: Patient would benefit from continued glucose monitoring. Unlikely that his vision changes specifically when playing pool are related to hypoglycemia.     PLAN:                            1. Continue to monitor BG.     Follow-up: in 4 weeks to remind to do A1c    SUBJECTIVE/OBJECTIVE:                          Anson Manriquez is a 65 year old male called for a follow-up visit. He was referred to me from Larry Beard.  Today's visit is a follow-up MTM visit from 21.     Reason for visit: diabetes follow-up.    Allergies/ADRs: Reviewed in chart  Tobacco: He reports that he has never smoked. He has never used smokeless tobacco.  Alcohol: not currently using  Caffeine: 45- cups/day of coffee  Activity: none  Past Medical History: Reviewed in chart    Medication Adherence/Access: no issues reported    Type 2 Diabetes:  Currently taking glipizide 5 mg twice daily, Jardiance 25 mg daily. Patient is not experiencing side effects.   Blood sugar monitorin time(s) daily. Ranges (patient reported): Fasting- 150-160  Symptoms of low blood sugar? None - has not monitored but has been having some blurry vision only while playing pool, denies other symptoms of hypoglycemia  Symptoms of high blood sugar? none  Eye exam: due  Foot exam: due  Diet/Exercise: no special diet or exercise  Aspirin: Taking 81mg daily and denies side effects  Statin: Yes: atorvastatin   ACEi/ARB: Yes: valsartan.   Urine Albumin:   Lab Results   Component Value Date    UMALCR Unable to calculate due to low value 2020      Lab Results   Component Value Date    A1C 8.0 10/29/2020    A1C 8.0 2020    A1C 7.3 2019    A1C 7.5 2019    A1C 7.6 2019     Last Comprehensive Metabolic Panel:  Sodium   Date Value Ref Range Status   2021 141 133 - 144 mmol/L Final  "    Potassium   Date Value Ref Range Status   01/28/2021 4.2 3.4 - 5.3 mmol/L Final     Chloride   Date Value Ref Range Status   01/28/2021 110 (H) 94 - 109 mmol/L Final     Carbon Dioxide   Date Value Ref Range Status   01/28/2021 27 20 - 32 mmol/L Final     Anion Gap   Date Value Ref Range Status   01/28/2021 4 3 - 14 mmol/L Final     Glucose   Date Value Ref Range Status   01/28/2021 119 (H) 70 - 99 mg/dL Final     Urea Nitrogen   Date Value Ref Range Status   01/28/2021 17 7 - 30 mg/dL Final     Creatinine   Date Value Ref Range Status   01/28/2021 0.83 0.66 - 1.25 mg/dL Final     GFR Estimate   Date Value Ref Range Status   01/28/2021 >90 >60 mL/min/[1.73_m2] Final     Comment:     Non  GFR Calc  Starting 12/18/2018, serum creatinine based estimated GFR (eGFR) will be   calculated using the Chronic Kidney Disease Epidemiology Collaboration   (CKD-EPI) equation.       Calcium   Date Value Ref Range Status   01/28/2021 9.5 8.5 - 10.1 mg/dL Final       Today's Vitals: There were no vitals taken for this visit. - telemed    BP Readings from Last 1 Encounters:   01/28/21 131/84     Pulse Readings from Last 1 Encounters:   01/28/21 80     Wt Readings from Last 1 Encounters:   01/28/21 183 lb 14.4 oz (83.4 kg)     Ht Readings from Last 1 Encounters:   01/28/21 5' 6.5\" (1.689 m)     Estimated body mass index is 29.24 kg/m  as calculated from the following:    Height as of 1/28/21: 5' 6.5\" (1.689 m).    Weight as of 1/28/21: 183 lb 14.4 oz (83.4 kg).    Temp Readings from Last 1 Encounters:   10/29/20 97.8  F (36.6  C) (Oral)     ----------------    I spent 8 minutes with this patient today. All changes were made via collaborative practice agreement with Larry Beard. A copy of the visit note was provided to the patient's primary care provider.    The patient was sent via Jini a summary of these recommendations.     Lisa Dean, Pharm.D., [facility]  Medication Therapy Management " Pharmacist  Page/VM:  [phone]    Telemedicine Visit Details  Type of service:  Telephone visit  Start Time: 4:05 PM  End Time: 4:13 PM  Originating Location (patient location): Home  Distant Location (provider location):  [facility] MEDICATION THERAPY MANAGEMENT      Medication Therapy Recommendations  No medication therapy recommendations to display

## 2021-04-01 NOTE — PATIENT INSTRUCTIONS
Recommendations from today's MTM visit:                                                       Keep monitoring sugars at home! Be on the look out for symptoms of low blood sugar - dizzy, sweat, hungry, weak    Next MTM visit: I will call you at the end of April to remind you to do your A1c    It was great to speak with you today.  I value your experience and would be very thankful for your time with providing feedback on our clinic survey. You may receive a survey via email or text message in the next few days.     To schedule another MTM appointment, please call the clinic directly or you may call the MTM scheduling line at [phone] or toll-free at [phone].     My Clinical Pharmacist's contact information:                                                      Please feel free to contact me with any questions or concerns you have.      Lisa Dean, Pharm.D., [facility]  Medication Therapy Management Pharmacist  Page/VM:  [phone]

## 2021-04-05 ENCOUNTER — HOSPITAL ENCOUNTER (OUTPATIENT)
Facility: CLINIC | Age: 66
Setting detail: OBSERVATION
Discharge: HOME | End: 2021-04-06
Attending: EMERGENCY MEDICINE | Admitting: SURGERY
Payer: COMMERCIAL

## 2021-04-05 ENCOUNTER — APPOINTMENT (OUTPATIENT)
Dept: CT IMAGING | Facility: CLINIC | Age: 66
End: 2021-04-05
Attending: EMERGENCY MEDICINE
Payer: COMMERCIAL

## 2021-04-05 DIAGNOSIS — L02.211 ABDOMINAL WALL ABSCESS: Primary | ICD-10-CM

## 2021-04-05 DIAGNOSIS — U07.1 INFECTION DUE TO 2019-NCOV: ICD-10-CM

## 2021-04-05 LAB
ALBUMIN SERPL-MCNC: 4.1 G/DL (ref 3.4–5)
ALP SERPL-CCNC: 74 U/L (ref 40–150)
ALT SERPL W P-5'-P-CCNC: 27 U/L (ref 0–70)
ANION GAP SERPL CALCULATED.3IONS-SCNC: 8 MMOL/L (ref 3–14)
AST SERPL W P-5'-P-CCNC: 18 U/L (ref 0–45)
BASOPHILS # BLD AUTO: 0.1 10E9/L (ref 0–0.2)
BASOPHILS NFR BLD AUTO: 0.6 %
BILIRUB SERPL-MCNC: 1.5 MG/DL (ref 0.2–1.3)
BUN SERPL-MCNC: 21 MG/DL (ref 7–30)
CALCIUM SERPL-MCNC: 9.5 MG/DL (ref 8.5–10.1)
CHLORIDE SERPL-SCNC: 103 MMOL/L (ref 94–109)
CO2 SERPL-SCNC: 25 MMOL/L (ref 20–32)
CREAT SERPL-MCNC: 0.94 MG/DL (ref 0.66–1.25)
DIFFERENTIAL METHOD BLD: ABNORMAL
EOSINOPHIL # BLD AUTO: 0.1 10E9/L (ref 0–0.7)
EOSINOPHIL NFR BLD AUTO: 1.3 %
ERYTHROCYTE [DISTWIDTH] IN BLOOD BY AUTOMATED COUNT: 15.3 % (ref 10–15)
GFR SERPL CREATININE-BSD FRML MDRD: 84 ML/MIN/{1.73_M2}
GLUCOSE SERPL-MCNC: 115 MG/DL (ref 70–99)
HCT VFR BLD AUTO: 44.8 % (ref 40–53)
HGB BLD-MCNC: 15 G/DL (ref 13.3–17.7)
IMM GRANULOCYTES # BLD: 0.1 10E9/L (ref 0–0.4)
IMM GRANULOCYTES NFR BLD: 0.5 %
INR PPP: 1.1 (ref 0.86–1.14)
LACTATE BLD-SCNC: 0.9 MMOL/L (ref 0.7–2)
LYMPHOCYTES # BLD AUTO: 2.3 10E9/L (ref 0.8–5.3)
LYMPHOCYTES NFR BLD AUTO: 22.6 %
MCH RBC QN AUTO: 30.4 PG (ref 26.5–33)
MCHC RBC AUTO-ENTMCNC: 33.5 G/DL (ref 31.5–36.5)
MCV RBC AUTO: 91 FL (ref 78–100)
MONOCYTES # BLD AUTO: 1.2 10E9/L (ref 0–1.3)
MONOCYTES NFR BLD AUTO: 11.2 %
NEUTROPHILS # BLD AUTO: 6.6 10E9/L (ref 1.6–8.3)
NEUTROPHILS NFR BLD AUTO: 63.8 %
NRBC # BLD AUTO: 0 10*3/UL
NRBC BLD AUTO-RTO: 0 /100
PLATELET # BLD AUTO: 229 10E9/L (ref 150–450)
POTASSIUM SERPL-SCNC: 3.5 MMOL/L (ref 3.4–5.3)
PROT SERPL-MCNC: 8.3 G/DL (ref 6.8–8.8)
RBC # BLD AUTO: 4.93 10E12/L (ref 4.4–5.9)
SODIUM SERPL-SCNC: 136 MMOL/L (ref 133–144)
WBC # BLD AUTO: 10.3 10E9/L (ref 4–11)

## 2021-04-05 PROCEDURE — 74177 CT ABD & PELVIS W/CONTRAST: CPT | Mod: 26 | Performed by: RADIOLOGY

## 2021-04-05 PROCEDURE — 87040 BLOOD CULTURE FOR BACTERIA: CPT | Performed by: EMERGENCY MEDICINE

## 2021-04-05 PROCEDURE — C9803 HOPD COVID-19 SPEC COLLECT: HCPCS | Performed by: EMERGENCY MEDICINE

## 2021-04-05 PROCEDURE — 99285 EMERGENCY DEPT VISIT HI MDM: CPT | Performed by: EMERGENCY MEDICINE

## 2021-04-05 PROCEDURE — 80053 COMPREHEN METABOLIC PANEL: CPT | Performed by: EMERGENCY MEDICINE

## 2021-04-05 PROCEDURE — 96365 THER/PROPH/DIAG IV INF INIT: CPT | Mod: 59 | Performed by: EMERGENCY MEDICINE

## 2021-04-05 PROCEDURE — 250N000011 HC RX IP 250 OP 636: Performed by: EMERGENCY MEDICINE

## 2021-04-05 PROCEDURE — 96367 TX/PROPH/DG ADDL SEQ IV INF: CPT | Performed by: EMERGENCY MEDICINE

## 2021-04-05 PROCEDURE — 258N000003 HC RX IP 258 OP 636: Performed by: EMERGENCY MEDICINE

## 2021-04-05 PROCEDURE — 85025 COMPLETE CBC W/AUTO DIFF WBC: CPT | Performed by: EMERGENCY MEDICINE

## 2021-04-05 PROCEDURE — 83605 ASSAY OF LACTIC ACID: CPT | Performed by: EMERGENCY MEDICINE

## 2021-04-05 PROCEDURE — 74177 CT ABD & PELVIS W/CONTRAST: CPT

## 2021-04-05 PROCEDURE — 250N000011 HC RX IP 250 OP 636: Performed by: STUDENT IN AN ORGANIZED HEALTH CARE EDUCATION/TRAINING PROGRAM

## 2021-04-05 PROCEDURE — 99285 EMERGENCY DEPT VISIT HI MDM: CPT | Mod: 25 | Performed by: EMERGENCY MEDICINE

## 2021-04-05 PROCEDURE — 85610 PROTHROMBIN TIME: CPT | Performed by: EMERGENCY MEDICINE

## 2021-04-05 RX ORDER — PIPERACILLIN SODIUM, TAZOBACTAM SODIUM 3; .375 G/15ML; G/15ML
3.38 INJECTION, POWDER, LYOPHILIZED, FOR SOLUTION INTRAVENOUS ONCE
Status: COMPLETED | OUTPATIENT
Start: 2021-04-05 | End: 2021-04-05

## 2021-04-05 RX ORDER — IOPAMIDOL 755 MG/ML
111 INJECTION, SOLUTION INTRAVASCULAR ONCE
Status: COMPLETED | OUTPATIENT
Start: 2021-04-05 | End: 2021-04-05

## 2021-04-05 RX ADMIN — PIPERACILLIN SODIUM AND TAZOBACTAM SODIUM 3.38 G: 3; .375 INJECTION, POWDER, LYOPHILIZED, FOR SOLUTION INTRAVENOUS at 22:08

## 2021-04-05 RX ADMIN — IOPAMIDOL 111 ML: 755 INJECTION, SOLUTION INTRAVENOUS at 22:22

## 2021-04-05 RX ADMIN — VANCOMYCIN HYDROCHLORIDE 1500 MG: 10 INJECTION, POWDER, LYOPHILIZED, FOR SOLUTION INTRAVENOUS at 22:55

## 2021-04-05 ASSESSMENT — MIFFLIN-ST. JEOR: SCORE: 1544.22

## 2021-04-05 NOTE — ED TRIAGE NOTES
"Last fall had umbilical hernia repair. Since Thursday has been collecting fluid in that area. Today it \"popped\" and \"a lot of of blood and pus\" that came out.  "

## 2021-04-06 ENCOUNTER — PATIENT OUTREACH (OUTPATIENT)
Dept: CARE COORDINATION | Facility: CLINIC | Age: 66
End: 2021-04-06

## 2021-04-06 VITALS
BODY MASS INDEX: 28.93 KG/M2 | HEIGHT: 66 IN | TEMPERATURE: 98.2 F | SYSTOLIC BLOOD PRESSURE: 121 MMHG | OXYGEN SATURATION: 97 % | RESPIRATION RATE: 14 BRPM | WEIGHT: 180 LBS | HEART RATE: 79 BPM | DIASTOLIC BLOOD PRESSURE: 70 MMHG

## 2021-04-06 LAB
GLUCOSE BLDC GLUCOMTR-MCNC: 158 MG/DL (ref 70–99)
LABORATORY COMMENT REPORT: ABNORMAL
SARS-COV-2 RNA RESP QL NAA+PROBE: POSITIVE
SPECIMEN SOURCE: ABNORMAL

## 2021-04-06 PROCEDURE — G0378 HOSPITAL OBSERVATION PER HR: HCPCS

## 2021-04-06 PROCEDURE — 96361 HYDRATE IV INFUSION ADD-ON: CPT | Performed by: EMERGENCY MEDICINE

## 2021-04-06 PROCEDURE — 258N000003 HC RX IP 258 OP 636: Performed by: SURGERY

## 2021-04-06 PROCEDURE — 99218 PR INITIAL OBSERVATION CARE,LEVEL I: CPT | Mod: GC | Performed by: SURGERY

## 2021-04-06 PROCEDURE — 96366 THER/PROPH/DIAG IV INF ADDON: CPT | Performed by: EMERGENCY MEDICINE

## 2021-04-06 PROCEDURE — 250N000011 HC RX IP 250 OP 636: Performed by: SURGERY

## 2021-04-06 PROCEDURE — U0005 INFEC AGEN DETEC AMPLI PROBE: HCPCS | Performed by: EMERGENCY MEDICINE

## 2021-04-06 PROCEDURE — 96367 TX/PROPH/DG ADDL SEQ IV INF: CPT | Performed by: EMERGENCY MEDICINE

## 2021-04-06 PROCEDURE — 999N001017 HC STATISTIC GLUCOSE BY METER IP

## 2021-04-06 PROCEDURE — U0003 INFECTIOUS AGENT DETECTION BY NUCLEIC ACID (DNA OR RNA); SEVERE ACUTE RESPIRATORY SYNDROME CORONAVIRUS 2 (SARS-COV-2) (CORONAVIRUS DISEASE [COVID-19]), AMPLIFIED PROBE TECHNIQUE, MAKING USE OF HIGH THROUGHPUT TECHNOLOGIES AS DESCRIBED BY CMS-2020-01-R: HCPCS | Performed by: EMERGENCY MEDICINE

## 2021-04-06 PROCEDURE — 250N000013 HC RX MED GY IP 250 OP 250 PS 637: Performed by: STUDENT IN AN ORGANIZED HEALTH CARE EDUCATION/TRAINING PROGRAM

## 2021-04-06 RX ORDER — ACETAMINOPHEN 325 MG/1
650 TABLET ORAL EVERY 4 HOURS PRN
Status: DISCONTINUED | OUTPATIENT
Start: 2021-04-06 | End: 2021-04-06 | Stop reason: HOSPADM

## 2021-04-06 RX ORDER — ONDANSETRON 4 MG/1
4 TABLET, ORALLY DISINTEGRATING ORAL EVERY 6 HOURS PRN
Status: DISCONTINUED | OUTPATIENT
Start: 2021-04-06 | End: 2021-04-06 | Stop reason: HOSPADM

## 2021-04-06 RX ORDER — ONDANSETRON 2 MG/ML
4 INJECTION INTRAMUSCULAR; INTRAVENOUS EVERY 6 HOURS PRN
Status: DISCONTINUED | OUTPATIENT
Start: 2021-04-06 | End: 2021-04-06 | Stop reason: HOSPADM

## 2021-04-06 RX ORDER — SODIUM CHLORIDE, SODIUM LACTATE, POTASSIUM CHLORIDE, CALCIUM CHLORIDE 600; 310; 30; 20 MG/100ML; MG/100ML; MG/100ML; MG/100ML
INJECTION, SOLUTION INTRAVENOUS CONTINUOUS
Status: DISCONTINUED | OUTPATIENT
Start: 2021-04-06 | End: 2021-04-06 | Stop reason: HOSPADM

## 2021-04-06 RX ORDER — DEXTROSE MONOHYDRATE 25 G/50ML
25-50 INJECTION, SOLUTION INTRAVENOUS
Status: DISCONTINUED | OUTPATIENT
Start: 2021-04-06 | End: 2021-04-06 | Stop reason: HOSPADM

## 2021-04-06 RX ORDER — ACETAMINOPHEN 650 MG/1
650 SUPPOSITORY RECTAL EVERY 4 HOURS PRN
Status: DISCONTINUED | OUTPATIENT
Start: 2021-04-06 | End: 2021-04-06 | Stop reason: HOSPADM

## 2021-04-06 RX ORDER — SULFAMETHOXAZOLE/TRIMETHOPRIM 800-160 MG
1 TABLET ORAL 2 TIMES DAILY
Qty: 20 TABLET | Refills: 0 | Status: SHIPPED | OUTPATIENT
Start: 2021-04-06 | End: 2021-04-16

## 2021-04-06 RX ORDER — NICOTINE POLACRILEX 4 MG
15-30 LOZENGE BUCCAL
Status: DISCONTINUED | OUTPATIENT
Start: 2021-04-06 | End: 2021-04-06 | Stop reason: HOSPADM

## 2021-04-06 RX ORDER — PIPERACILLIN SODIUM, TAZOBACTAM SODIUM 3; .375 G/15ML; G/15ML
3.38 INJECTION, POWDER, LYOPHILIZED, FOR SOLUTION INTRAVENOUS EVERY 6 HOURS
Status: DISCONTINUED | OUTPATIENT
Start: 2021-04-06 | End: 2021-04-06

## 2021-04-06 RX ORDER — SULFAMETHOXAZOLE/TRIMETHOPRIM 800-160 MG
1 TABLET ORAL 2 TIMES DAILY
Qty: 20 TABLET | Refills: 0 | Status: SHIPPED | OUTPATIENT
Start: 2021-04-06 | End: 2021-04-06

## 2021-04-06 RX ORDER — SULFAMETHOXAZOLE/TRIMETHOPRIM 800-160 MG
1 TABLET ORAL 2 TIMES DAILY
Status: DISCONTINUED | OUTPATIENT
Start: 2021-04-06 | End: 2021-04-06 | Stop reason: HOSPADM

## 2021-04-06 RX ADMIN — SODIUM CHLORIDE, POTASSIUM CHLORIDE, SODIUM LACTATE AND CALCIUM CHLORIDE: 600; 310; 30; 20 INJECTION, SOLUTION INTRAVENOUS at 02:28

## 2021-04-06 RX ADMIN — PIPERACILLIN SODIUM AND TAZOBACTAM SODIUM 3.38 G: 3; .375 INJECTION, POWDER, LYOPHILIZED, FOR SOLUTION INTRAVENOUS at 04:54

## 2021-04-06 RX ADMIN — SULFAMETHOXAZOLE AND TRIMETHOPRIM 1 TABLET: 800; 160 TABLET ORAL at 10:33

## 2021-04-06 NOTE — PHARMACY-VANCOMYCIN DOSING SERVICE
Pharmacy Vancomycin Initial Note  Date of Service 2021  Patient's  1955  65 year old, male    Indication: Skin and Soft Tissue Infection    Current estimated CrCl = Estimated Creatinine Clearance: 78.6 mL/min (based on SCr of 0.94 mg/dL).    Creatinine for last 3 days  2021:  9:22 PM Creatinine 0.94 mg/dL    Recent Vancomycin Level(s) for last 3 days  No results found for requested labs within last 72 hours.      Vancomycin IV Administrations (past 72 hours)      No vancomycin orders with administrations in past 72 hours.                Nephrotoxins and other renal medications (From now, onward)    Start     Dose/Rate Route Frequency Ordered Stop    21 1100  vancomycin 1500 mg in 0.9% NaCl 250 ml intermittent infusion 1,500 mg      1,500 mg  over 90 Minutes Intravenous EVERY 12 HOURS 21 2100  vancomycin 1500 mg in 0.9% NaCl 250 ml intermittent infusion 1,500 mg      1,500 mg  over 90 Minutes Intravenous ONCE 21  piperacillin-tazobactam (ZOSYN) 3.375 g vial to attach to  mL bag      3.375 g  over 30 Minutes Intravenous ONCE 21            Contrast Orders - past 72 hours (72h ago, onward)    None                Plan:  1.  Start vancomycin  1500 mg IV q12h.   2.  Goal Trough Level: 10-15 mg/L   3.  Pharmacy will check trough levels as appropriate in 1-3 Days.    4. Serum creatinine levels will be ordered daily for the first week of therapy and at least twice weekly for subsequent weeks.    5. [place] method utilized to dose vancomycin therapy: Method 2    Ricci Michaels, PharmD, BCPS

## 2021-04-06 NOTE — ED PROVIDER NOTES
"    [facility]ERGENC[facility] DEPARTMENT ([facility])  4/05/21      History     Chief Complaint   Patient presents with     Post-op Problem     The history is provided by the patient and medical records.     Anson Manriquez is a 65 year old male with a medical history significant for HLD, HTN, DMT2, STEMI, and CAD s/p hernia repair and ICD placement (2019) who presents to the emergency department for evaluation of a ruptured abdominal abscess.  Patient notes pain and redness which began on Thursday (4/1/2021). He notes that he had an umbilical hernia repair last fall in this area. He states he was sitting in a hot tub earlier today when and saw the abscess \"pop\". Patient denies fever or urinary symptoms.     Past Medical History:   Diagnosis Date     Anal fistula      Antiplatelet or antithrombotic long-term use      Coronary artery disease      Diabetes mellitus, type 2 (H)      Heart attack (H) 4/17/2007     Hyperlipidemia      Hypertension      Inguinal hernia      Ischemic cardiomyopathy      STEMI (ST elevation myocardial infarction) (H) 09/13/2018    s/p STEVO x2     Stented coronary artery 2007       Past Surgical History:   Procedure Laterality Date     EP ICD N/A 4/25/2019    Procedure: EP ICD [[id number]];  Surgeon: Mick Mckeon MD;  Location:  [facility]     HERNIA REPAIR Right     Inguinal     HERNIORRHAPHY INGUINAL Left 2/20/2020    Procedure: Left HERNIORRHAPHY, INGUINAL, OPEN;  Surgeon: Flakito Massey MD;  Location: [facility] OR     HERNIORRHAPHY UMBILICAL N/A 10/16/2020    Procedure: HERNIORRHAPHY, UMBILICAL, OPEN, with mesh;  Surgeon: Flakito Massey MD;  Location: [facility] OR     IRRIGATION AND DEBRIDEMENT RECTUM, COMBINED      abcess near rectum      LAPAROSCOPIC CHOLECYSTECTOMY  3/29/2012    Procedure:LAPAROSCOPIC CHOLECYSTECTOMY; LAPAROSCOPIC CHOLECYSTECTOMY; Surgeon:MARILYNN PRESSLEY; Location:[facility] OR     STENT, CORONARY, OLIVIA         Family History   Problem Relation Age of Onset     " Hypertension Mother      Diabetes Father      Glaucoma No family hx of      Macular Degeneration No family hx of        Social History     Tobacco Use     Smoking status: Never Smoker     Smokeless tobacco: Never Used   Substance Use Topics     Alcohol use: No       Current Facility-Administered Medications   Medication     vancomycin 1500 mg in 0.9% NaCl 250 ml intermittent infusion 1,500 mg     vancomycin 1500 mg in 0.9% NaCl 250 ml intermittent infusion 1,500 mg     Current Outpatient Medications   Medication     aspirin (ASA) 81 MG chewable tablet     atorvastatin (LIPITOR) 80 MG tablet     carvedilol (COREG) 25 MG tablet     empagliflozin (JARDIANCE) 25 MG TABS tablet     furosemide (LASIX) 40 MG tablet     glipiZIDE (GLUCOTROL XL) 5 MG 24 hr tablet     MULTIPLE VITAMIN PO     sacubitril-valsartan (ENTRESTO) 24-26 MG per tablet     spironolactone (ALDACTONE) 25 MG tablet     ticagrelor (BRILINTA) 90 MG tablet     blood glucose (NO BRAND SPECIFIED) lancets standard     blood glucose (NO BRAND SPECIFIED) test strip     blood glucose monitoring (NO BRAND SPECIFIED) meter device kit     Facility-Administered Medications Ordered in Other Encounters   Medication     perflutren diluted 1mL to 2mL with saline (OPTISON) diluted injection 5 mL     sodium chloride (PF) 0.9% PF flush 10 mL     sodium chloride (PF) 0.9% PF flush 10 mL      No Known Allergies    Past Medical History:   Diagnosis Date     Anal fistula      Antiplatelet or antithrombotic long-term use      Coronary artery disease      Diabetes mellitus, type 2 (H)      Heart attack (H) 4/17/2007     Hyperlipidemia      Hypertension      Inguinal hernia      Ischemic cardiomyopathy      STEMI (ST elevation myocardial infarction) (H) 09/13/2018    s/p STEVO x2     Stented coronary artery 2007       Past Surgical History:   Procedure Laterality Date     EP ICD N/A 4/25/2019    Procedure: EP ICD [[id number]];  Surgeon: Mick Mckeon MD;  Location: [facility] CARDIAC  CATH LAB     HERNIA REPAIR Right     Inguinal     HERNIORRHAPHY INGUINAL Left 2/20/2020    Procedure: Left HERNIORRHAPHY, INGUINAL, OPEN;  Surgeon: Flakito Massey MD;  Location: [facility] OR     HERNIORRHAPHY UMBILICAL N/A 10/16/2020    Procedure: HERNIORRHAPHY, UMBILICAL, OPEN, with mesh;  Surgeon: Flakito Massey MD;  Location: [facility] OR     IRRIGATION AND DEBRIDEMENT RECTUM, COMBINED      abcess near rectum      LAPAROSCOPIC CHOLECYSTECTOMY  3/29/2012    Procedure:LAPAROSCOPIC CHOLECYSTECTOMY; LAPAROSCOPIC CHOLECYSTECTOMY; Surgeon:MARILYNN PRESSLEY; Location:[facility] OR     STENT, CORONARY, OLIVIA         Family History   Problem Relation Age of Onset     Hypertension Mother      Diabetes Father      Glaucoma No family hx of      Macular Degeneration No family hx of        Social History     Tobacco Use     Smoking status: Never Smoker     Smokeless tobacco: Never Used   Substance Use Topics     Alcohol use: No       Current Facility-Administered Medications   Medication     vancomycin 1500 mg in 0.9% NaCl 250 ml intermittent infusion 1,500 mg     vancomycin 1500 mg in 0.9% NaCl 250 ml intermittent infusion 1,500 mg     Current Outpatient Medications   Medication     aspirin (ASA) 81 MG chewable tablet     atorvastatin (LIPITOR) 80 MG tablet     carvedilol (COREG) 25 MG tablet     empagliflozin (JARDIANCE) 25 MG TABS tablet     furosemide (LASIX) 40 MG tablet     glipiZIDE (GLUCOTROL XL) 5 MG 24 hr tablet     MULTIPLE VITAMIN PO     sacubitril-valsartan (ENTRESTO) 24-26 MG per tablet     spironolactone (ALDACTONE) 25 MG tablet     ticagrelor (BRILINTA) 90 MG tablet     blood glucose (NO BRAND SPECIFIED) lancets standard     blood glucose (NO BRAND SPECIFIED) test strip     blood glucose monitoring (NO BRAND SPECIFIED) meter device kit     Facility-Administered Medications Ordered in Other Encounters   Medication     perflutren diluted 1mL to 2mL with saline (OPTISON) diluted injection 5 mL     sodium chloride (PF) 0.9%  "PF flush 10 mL     sodium chloride (PF) 0.9% PF flush 10 mL      No Known Allergies       Review of Systems  A complete review of systems was performed with pertinent positives and negatives noted in the HPI, and all other systems negative.    Physical Exam   BP: 129/85  Pulse: 103  Temp: 98.2  F (36.8  C)  Resp: 14  Height: 167.6 cm (5' 6\")  Weight: 81.6 kg (180 lb)  SpO2: 93 %  Physical Exam  Vitals signs and nursing note reviewed.   Constitutional:       General: He is not in acute distress.     Appearance: He is well-developed. He is not ill-appearing, toxic-appearing or diaphoretic.      Comments: Comfortably resting, lying in bed, NAD, nondiaphoretic, lucid, fully conversant, no  respiratory distress, alert and oriented.     HENT:      Head: Normocephalic and atraumatic.   Eyes:      General: No scleral icterus.     Pupils: Pupils are equal, round, and reactive to light.   Neck:      Musculoskeletal: Normal range of motion and neck supple.   Cardiovascular:      Rate and Rhythm: Normal rate.   Pulmonary:      Effort: Pulmonary effort is normal. No respiratory distress.   Abdominal:      Palpations: Abdomen is soft.      Tenderness: There is no abdominal tenderness.   Skin:     General: Skin is warm and dry.      Findings: No rash.      Comments: Cellulitis surrounding the umbilicus  Large draining abscess with induration coming from the umbilicus   Neurological:      Mental Status: He is alert and oriented to person, place, and time.         ED Course     8:50 PM  The patient was seen and examined by Cody Vivas MD in Room ED19.     Procedures               Results for orders placed or performed during the hospital encounter of 04/05/21   CT Abdomen Pelvis w Contrast     Status: None (Preliminary result)    Narrative    EXAM: CT ABDOMEN PELVIS W CONTRAST  LOCATION: [facility]  DATE/TIME: 4/5/2021 10:20 PM    INDICATION: Abdominal abscess/infection suspected.    COMPARISON: None.    TECHNIQUE: " CT scan of the abdomen and pelvis was performed following injection of IV contrast. Multiplanar reformats were obtained. Dose reduction techniques were used.    CONTRAST: 111 mL iopamidol (Isovue-370) solution.    FINDINGS:   LOWER CHEST: Slight subpleural linear scarring or atelectasis in the lung bases.    HEPATOBILIARY: Diffuse fatty infiltration of the liver. Liver is otherwise negative. Gallbladder is absent. No biliary dilatation.    PANCREAS: Normal.    SPLEEN: Normal.    ADRENAL GLANDS: Normal.    KIDNEYS/BLADDER: There are a few subcentimeter cysts in both kidneys with no specific follow-up needed. Small focal cortical scar laterally in the left kidney. Kidneys are otherwise negative. No hydronephrosis. Bladder is grossly negative.    BOWEL: Bowel is normal in caliber with no evidence for obstruction. Normal appendix. Scattered colonic diverticula without evidence for diverticulitis. There is a vertically oriented tubular-shaped structure seen in the left ischiorectal fossa which   extends from the left lateral aspect of the anal canal inferiorly towards the skin surface at the gluteal fold on the left with some mild surrounding stranding. Findings would be concerning for potential underlying fistula formation. No definite abscess   is seen, although could be better evaluated with MRI if indicated.    LYMPH NODES: Normal.    VASCULATURE: Aortic calcification without aneurysm.    PELVIC ORGANS: Prostatic enlargement.    ADDITIONAL FINDINGS: Postoperative changes in the anterior abdominal wall related to umbilical hernia surgery. Nothing definite for recurrent hernia in this area. However, there is some ill-defined prominent thickening and induration seen in the   subcutaneous tissues overlying this region measuring approximately 4.1 x 2.8 cm on series 3 image 285. There is a small nonspecific collection of air within the upper aspect of this area of thickening and induration. Some mild overlying skin  thickening   is present. Findings are nonspecific and could be seen with phlegmonous reaction, developing abscess, or hematoma. No definite active bleeding is seen. No evidence for extension into the intraperitoneal compartment.    MUSCULOSKELETAL: Mild degenerative changes in the spine.      Impression    IMPRESSION:   1.  Postoperative changes in the anterior abdominal wall from prior hernia surgery at the level of the umbilicus. No evidence for recurrent hernia. However, there is some localized prominent ill-defined thickening/induration in the subcutaneous tissues   overlying the umbilicus measuring about 4.1 x 2.8 cm. There is a small air collection within this process which is nonspecific and could be seen with phlegmonous reaction, developing abscess, or hematoma. No evidence for active bleeding. This process   appears confined to the superficial soft tissues about the periumbilical region with no evidence for extension into the peritoneal cavity. Follow-up as clinically warranted.    2.  Tubular-shaped vertically oriented structure seen in the left ischiorectal fossa has an appearance suspicious for perianal fistula. A definite abscess is not seen, however this process could be better evaluated with MRI if indicated.    3.  Diffuse fatty infiltration of the liver.    4.  Cholecystectomy. No biliary dilatation.    5.  Prostatic enlargement.   CBC with platelets differential     Status: Abnormal   Result Value Ref Range    WBC 10.3 4.0 - 11.0 10e9/L    RBC Count 4.93 4.4 - 5.9 10e12/L    Hemoglobin 15.0 13.3 - 17.7 g/dL    Hematocrit 44.8 40.0 - 53.0 %    MCV 91 78 - 100 fl    MCH 30.4 26.5 - 33.0 pg    MCHC 33.5 31.5 - 36.5 g/dL    RDW 15.3 (H) 10.0 - 15.0 %    Platelet Count 229 150 - 450 10e9/L    Diff Method Automated Method     % Neutrophils 63.8 %    % Lymphocytes 22.6 %    % Monocytes 11.2 %    % Eosinophils 1.3 %    % Basophils 0.6 %    % Immature Granulocytes 0.5 %    Nucleated RBCs 0 0 /100     Absolute Neutrophil 6.6 1.6 - 8.3 10e9/L    Absolute Lymphocytes 2.3 0.8 - 5.3 10e9/L    Absolute Monocytes 1.2 0.0 - 1.3 10e9/L    Absolute Eosinophils 0.1 0.0 - 0.7 10e9/L    Absolute Basophils 0.1 0.0 - 0.2 10e9/L    Abs Immature Granulocytes 0.1 0 - 0.4 10e9/L    Absolute Nucleated RBC 0.0    Comprehensive metabolic panel     Status: Abnormal   Result Value Ref Range    Sodium 136 133 - 144 mmol/L    Potassium 3.5 3.4 - 5.3 mmol/L    Chloride 103 94 - 109 mmol/L    Carbon Dioxide 25 20 - 32 mmol/L    Anion Gap 8 3 - 14 mmol/L    Glucose 115 (H) 70 - 99 mg/dL    Urea Nitrogen 21 7 - 30 mg/dL    Creatinine 0.94 0.66 - 1.25 mg/dL    GFR Estimate 84 >60 mL/min/[1.73_m2]    GFR Estimate If Black >90 >60 mL/min/[1.73_m2]    Calcium 9.5 8.5 - 10.1 mg/dL    Bilirubin Total 1.5 (H) 0.2 - 1.3 mg/dL    Albumin 4.1 3.4 - 5.0 g/dL    Protein Total 8.3 6.8 - 8.8 g/dL    Alkaline Phosphatase 74 40 - 150 U/L    ALT 27 0 - 70 U/L    AST 18 0 - 45 U/L   INR     Status: None   Result Value Ref Range    INR 1.10 0.86 - 1.14   Lactic acid whole blood     Status: None   Result Value Ref Range    Lactic Acid 0.9 0.7 - 2.0 mmol/L   Blood culture     Status: None (Preliminary result)    Specimen: Arm, Left; Blood    Left Arm   Result Value Ref Range    Specimen Description Blood Left Arm     Culture Micro PENDING      Medications   vancomycin 1500 mg in 0.9% NaCl 250 ml intermittent infusion 1,500 mg (1,500 mg Intravenous New Bag 4/5/21 2252)   vancomycin 1500 mg in 0.9% NaCl 250 ml intermittent infusion 1,500 mg (has no administration in time range)   piperacillin-tazobactam (ZOSYN) 3.375 g vial to attach to  mL bag (0 g Intravenous Stopped 4/5/21 9446)   iopamidol (ISOVUE-370) solution 111 mL (111 mLs Intravenous Given 4/5/21 2222)   sodium chloride (PF) 0.9% PF flush 78 mL (78 mLs Intravenous Given 4/5/21 2222)        Assessments & Plan (with Medical Decision Making)   This is a 65-year-old male coming into the emergency room  with concerns for ruptured umbilical abscess.  Patient states that his umbilical hernia last year and today while he was sitting in a hot tub he noticed that he had a swelling on his abdomen its been going for the past few days that suddenly ruptured with pus.  He denies all other complaints at this time.  Labs are otherwise unremarkable.  CT does show that there is some phlegmon around the umbilicus.  Patient was started on antibiotics here in the emergency room and general surgery consulted.  Their plan at this time is to admit to their service for continued care and management.    I have reviewed the nursing notes. I have reviewed the findings, diagnosis, plan and need for follow up with the patient.    New Prescriptions    No medications on file       Final diagnoses:   Abdominal wall abscess     I, Lynn Sher, am serving as a trained medical scribe to document services personally performed by Cody Vivas MD based on the provider's statements to me on April 5, 2021.  This document has been checked and approved by the attending provider.    I, Cody Vivas MD, was physically present and have reviewed and verified the accuracy of this note documented by Lynn Sher, medical scribe.      --  Cody Vivas MD  [facility] EMERGENCY DEPARTMENT  4/5/2021     Cody Vivas MD  04/06/21 0006

## 2021-04-06 NOTE — DISCHARGE SUMMARY
[facility]    Discharge Summary  General Surgery    Date of Admission:  4/5/2021  Date of Discharge:  4/6/2021  Date of Service (when I saw the patient): 04/06/21          Discharged Diagnoses   Discharge Diagnoses   Abdominal wall abscess  COVID-19 infections          Procedures/ Surgery Info     Procedure/Surgery Information   Procedure:    Surgeon(s): * Surgery not found *                  History of Present Illness    Mr. Manriquez is a 65 yr old male with PMHx of HLD, HTN, T2D, STEMI and CAD, s/p ICD 2019 and umbilical hernia repair 10/2020 who presents to the ED for evaluation of draining from umbilical area. He reports that he noticed swelling and some discomfort at the site on Thursday last week (4/1) and has increased over the last few days. His wife notes there was some redness on Friday as well. Had spontaneous drainage of blood and 'thick yellow' fluid from the umbilicus this evening. He denies fevers, chills, chest pain or SOB. Has been eating and drinking normally, no nausea or vomiting. Has been voiding easily and having regular BMs.      He notes he recovered well from surgery, post op course significant for 2-3 wks drainage of thin blood tinged fluid, however, this resolved and he denies any problems at the surgical site until last week.      In the ED, he is afebrile and hemodynamically stable. HR in the 80s. Labs significant for tbili of 1.5 (has been persistently eleveted since 8/2020). WBC 10.3, hgb 15. CT scan demonstrates induration/thickening in the subcutaneous tissue, no obvious intraperitoneal extension. He was started on vanc and zosyn in the ED.             Hospital Course    Anson Manriquez was admitted on 4/5/2021 and had his umbilical wound irrigated and probed at bedside. Cultures were collected and sent for aerobic and anaerobic culture. His wound was packed and dressed and patient instructed on at home dressing changes. He was converted to  oral antibiotics and was tolerating medications prior to discharge. He will follow up with General Surgery clinic within the week.           Discharge Disposition    Discharge Disposition   Discharged to home   Condition at discharge: Stable     Primary Care Physician   Ignacio Beard     Time Spent on this Encounter   I have spent greater than 30 minutes on this discharge.         Consultations This Hospital Stay    PHARMACY TO DOSE VANCO          Discharge Orders         WOUND CARE SUPPLIES    4x4 gauze  Medipore tape  Ribbon packing gauze     Reason for your hospital stay    Abscess     Adult [facility] Follow-up and recommended labs and tests    Follow up with primary care provider, Ignacio Beard, within 7 days for follow up of new COVID-19 diagnosis     Discharge Instructions    GENERAL SURGERY DISCHARGE INSTRUCTIONS    DIET  -You may return to your pre-surgery diet as tolerated   -Stay well hydrated.    - Take over the counter fiber (metamucil or benefiber) and stool softeners (Miralax, docusate or senna) if becoming constipated.     ACTIVITY  -No lifting, pushing, pulling greater than 10 lbs and no strenuous exercise for 6 weeks   -No driving until you are able to fully twist to both sides or slam on brakes quickly and without any pain  -We encourage walking at least 4-5 times per day    WOUND CARE  -Inspect your wounds daily for signs of infection (increased redness, drainage, pain)  -Keep your wound clean and dry  -You may shower, but do not soak in tub or pool    DRESSING CHANGE  - Once or twice a day change the gauze packing in your abscess.   - You can remove the gauze (helpful to remove while in the shower, but can do any time) that is packed in the wound  - Use a clean gauze to gently wipe any dried material  - Use a cotton-tipped applicator to gently pack a small amount of ribbon gauze into the wound  - Cover with gauze and tape or a bandaid if small enough    NOTIFY  Please contact General  Surgery Clinic at [phone] for problems after discharge such as:  -Temperature > 101F, chills, rigors, dizziness  -Redness around or purulent drainage from wound  -Inability to tolerate diet, nausea or vomiting  -You stop passing gas (or your stoma stops functioning), develop significant bloating, abdominal pain  -Have blood in stools/vomit  -Have severe diarrhea/constipation  -Any other questions or concerns.  - At nights (after 4:30pm), on weekends, or if urgent, call [phone] and ask the  to speak with the on-call General Surgery resident    FOLLOW-UP  1.  You will need to follow-up with Dr. Massey with the General Surgery clinic via video visit by the end of this week.   Please contact our clinic scheduler (phone # [phone].) if you have not heard from our clinic in 3 business days afer discharge to schedule a follow-up appointment.   2.  Follow up with your primary care provider in 1-2 weeks after discharge from the hospital to review this hospitalization.          What to Expect at Home  You tested positive for COVID-19 and are well enough to recover at home. As you recover, you must isolate at home. Home isolation keeps people who are infected with COVID-19 away from other people who are not infected with the virus. You should stay in home isolation until it is safe to be around others.     HELP PROTECT OTHERS    While in home isolation, you should separate yourself and stay away from other people to help prevent spreading COVID-19.    As much as possible, stay in a specific room and away from others in your home. Use a separate bathroom if you can. Do not leave your home except to get medical care.  Have food brought to you. Try not to leave the room except to use the bathroom.  Use a face mask when you see your health care provider and anytime other people are in the same room with you.  Wash your hands many times a day with soap and running water for at least 20 seconds. If soap and  water are not easily available, you should use an alcohol-based hand  that contains at least 60% alcohol.  Do not share personal items such as cups, eating utensils, towels, or bedding. Wash anything you have used in soap and water.    WHEN TO END HOME ISOLATION    Talk with your health care provider about when it is safe to end home isolation. When it is safe depends upon your specific situation. These are the general recommendations from the CDC for when to be around other people. The CDC guidelines are updated frequently: www.cdc.gov/coronavirus/2019-ncov/if-you-are-sick/end-home-isolation.html.    If you are tested for COVID-19 after your diagnosis or after having symptoms of the illness, it is safe to be around others if ALL of the following are true:    It has been at least 10 days since your symptoms first appeared.  You have gone at least 24 hours with no fever without the use of fever-reducing medicine.  Your symptoms are improving, including cough, fever, and shortness of breath. (You may end home isolation even if you continue to have symptoms such as loss of taste and smell, which may linger for weeks or months.)    TAKE CARE OF YOURSELF    It's important to get proper nutrition, stay active as much as you can, and take steps to relieve stress and anxiety as you recover at home.    Managing COVID-19 symptoms    While recovering at home, it's important to keep track of your symptoms and stay in touch with your doctor. You may receive instructions on how to check and report your symptoms. Follow your provider's instructions and take medicines as prescribed. If you have severe symptoms, call 911 or the local emergency number.    To help manage symptoms of COVID-19, try the following tips.    Rest and drink plenty of fluids.  Acetaminophen (Tylenol) and ibuprofen (Advil, Motrin) help reduce fever. Sometimes, health care providers advise you to use both types of medicine. Take the recommended amount  to reduce fever. DO NOT use ibuprofen in children 6 months or younger.  Aspirin works well to treat fever in adults. DO NOT give aspirin to a child (under age 18 years) unless your child's provider tells you to.  A lukewarm bath or sponge bath may help cool a fever. Keep taking medicine -- otherwise your temperature might go back up.  For a sore throat, gargle several times a day with warm salt water (1/2 tsp or 3 grams of salt in 1 cup or 240 milliliters of water). Drink warm liquids such as tea, or lemon tea with honey. Suck on hard candies or throat lozenges.  Use a vaporizer or take a steamy shower to increase moisture in the air, reduce nasal congestion, and help soothe a dry throat and cough.  Saline spray can also help reduce nasal congestion.  To help relieve diarrhea, drink 8 to 10 glasses of clear liquids, such as water, diluted fruit juices, and clear soups to make up for fluid loss. Avoid dairy products, fried foods, caffeine, alcohol, and carbonated drinks.  If you have nausea, eat small meals with bland foods. Avoid foods with strong smells. Try to drink 8 to 10 glasses of water or clear fluids every day to stay hydrated.  Do not smoke, and stay away from secondhand smoke.    Nutrition    COVID-19 symptoms such as loss of taste and smell, nausea, or tiredness can make it hard to want to eat. But eating a healthy diet is important for your recovery. These suggestions may help:    Try to eat healthy foods you enjoy most of the time. Eat anytime you feel like eating, not just at mealtime.  Include a variety of fruits, vegetables, whole grains, dairy, and protein foods. Include a protein food with every meal (tofu, beans, legumes, cheese, fish, poultry, or lean meat)  Try adding herbs, spices, onion, garlic, martita, hot sauce or spice, mustard, vinegar, pickles, and other strong flavors to help increase enjoyment.  Try foods with different textures (soft or crunchy) and temperatures (cool or warm) to see  what is more appealing.  Eat smaller meals more often throughout the day.  Don't fill up on liquids before or during your meals.    Physical Activity    Even though you don't have a lot of energy, it's important to move your body every day. This will help you regain your strength.    Deep breathing exercises may increase the amount of oxygen in your lungs and help open up airways. Ask your provider to show you.  Simple stretching exercises keep your body from getting stiff. Try to sit upright as much as you can during the day.  Try walking around your home for short periods every day. Try to do 5 minutes, 5 times a day. Slowly build up every week.    When to Call the Doctor  You should call your health care provider if your symptoms are getting worse.    Call 911 or your local emergency number if you have:    Trouble breathing  Chest pain or pressure  Confusion or inability to wake up  Blue lips or face  Confusion  Seizures  Slurred speech  Weakness or numbness in a limb or one side of the face  Swelling of the legs or arms  Any other symptoms that are severe or concern you            Discharge Medications:     Current Discharge Medication List      START taking these medications    Details   sulfamethoxazole-trimethoprim (BACTRIM DS) 800-160 MG tablet Take 1 tablet by mouth 2 times daily for 10 days  Qty: 20 tablet, Refills: 0    Associated Diagnoses: Abdominal wall abscess         CONTINUE these medications which have NOT CHANGED    Details   aspirin (ASA) 81 MG chewable tablet Take 1 tablet (81 mg) by mouth daily  Qty: 90 tablet, Refills: 2    Associated Diagnoses: ST elevation myocardial infarction involving left anterior descending (LAD) coronary artery (H)      atorvastatin (LIPITOR) 80 MG tablet Take 1 tablet (80 mg) by mouth daily  Qty: 90 tablet, Refills: 0    Comments: LAB DUE FOR RF  Associated Diagnoses: Type 2 diabetes mellitus without complication, without long-term current use of insulin (H); Benign  essential hypertension; Mixed hyperlipidemia      carvedilol (COREG) 25 MG tablet 1 tablet (25 mg) by Oral or Feeding Tube route 2 times daily (with meals)  Qty: 180 tablet, Refills: 2    Associated Diagnoses: ST elevation myocardial infarction involving left anterior descending (LAD) coronary artery (H); Heart failure with reduced ejection fraction, NYHA class III (H)      empagliflozin (JARDIANCE) 25 MG TABS tablet Take 1 tablet (25 mg) by mouth daily Needs lab  Qty: 90 tablet, Refills: 3    Associated Diagnoses: Type 2 diabetes mellitus without complication, without long-term current use of insulin (H)      furosemide (LASIX) 40 MG tablet Take 1 tablet (40 mg) by mouth 2 times daily  Qty: 180 tablet, Refills: 3    Associated Diagnoses: Chronic systolic congestive heart failure (H)      glipiZIDE (GLUCOTROL XL) 5 MG 24 hr tablet Take 1 tablet (5 mg) by mouth 2 times daily  Qty: 180 tablet, Refills: 3    Comments: Intentional dose increase, please disregard previous refill today  Associated Diagnoses: Type 2 diabetes mellitus without complication, without long-term current use of insulin (H)      MULTIPLE VITAMIN PO Take 1 tablet by mouth every morning       sacubitril-valsartan (ENTRESTO) 24-26 MG per tablet Take one tablet every morning and two tablets every evening.  Qty: 270 tablet, Refills: 3    Associated Diagnoses: Heart failure with reduced ejection fraction, NYHA class III (H); Ischemic cardiomyopathy      spironolactone (ALDACTONE) 25 MG tablet Take 1 tablet (25 mg) by mouth daily  Qty: 90 tablet, Refills: 3    Associated Diagnoses: Heart failure with reduced ejection fraction, NYHA class III (H)      ticagrelor (BRILINTA) 90 MG tablet Take 1 tablet (90 mg) by mouth 2 times daily  Qty: 180 tablet, Refills: 1    Associated Diagnoses: Cardiac arrest (H); ST elevation myocardial infarction involving left anterior descending (LAD) coronary artery (H)      blood glucose (NO BRAND SPECIFIED) lancets standard  Use to test blood sugar 1 times daily or as directed.  Qty: 100 each, Refills: 3    Associated Diagnoses: Type 2 diabetes mellitus without complication, without long-term current use of insulin (H)      blood glucose (NO BRAND SPECIFIED) test strip Use to test blood sugar 1 times daily or as directed.  Qty: 100 strip, Refills: 3    Associated Diagnoses: Type 2 diabetes mellitus without complication, without long-term current use of insulin (H)      blood glucose monitoring (NO BRAND SPECIFIED) meter device kit Use to test blood sugar 1 times daily or as directed.  Qty: 1 kit, Refills: 0    Associated Diagnoses: Type 2 diabetes mellitus without complication, without long-term current use of insulin (H)                   Allergies:   No Known Allergies            Data:     Most Recent 3 CBC's:  Recent Labs   Lab Test 04/05/21 2122 01/28/21  1345 10/08/20  1630   WBC 10.3 7.5 7.8   HGB 15.0 16.5 16.0   MCV 91 94 92    206 233      Most Recent 3 BMP's:  Recent Labs   Lab Test 04/05/21 2122 01/28/21  1345 10/16/20  1238 10/08/20  1630    141  --  137   POTASSIUM 3.5 4.2 4.2 3.8   CHLORIDE 103 110*  --  104   CO2 25 27  --  27   BUN 21 17  --  22   CR 0.94 0.83  --  0.93   ANIONGAP 8 4  --  7   RENO 9.5 9.5  --  9.1   * 119*  --  173*     Most Recent 2 LFT's:  Recent Labs   Lab Test 04/05/21 2122 01/28/21  1345   AST 18 19   ALT 27 32   ALKPHOS 74 57   BILITOTAL 1.5* 1.4*     Most Recent INR's and Anticoagulation Dosing History:  Anticoagulation Dose History     Recent Dosing and Labs Latest Ref Rng & Units 9/17/2018 9/18/2018 9/19/2018 9/20/2018 12/6/2018 4/25/2019 4/5/2021    INR 0.86 - 1.14 1.13 1.12 1.09 1.04 1.01 1.16(H) 1.10    INR-ISTAT 0.86 - 1.14 - - - - - - -        Most Recent 3 Troponin's:  Recent Labs   Lab Test 09/18/18  0429 09/17/18  1559 09/17/18  0350 09/13/18  2232 09/13/18 2232   TROPI 1.914* 2.542* 3.324*   < >  --    TROPONIN  --   --   --   --  0.14*    < > = values in this  interval not displayed.     Most Recent Cholesterol Panel:  Recent Labs   Lab Test 11/06/19  0831   CHOL 198   LDL 93   HDL 41   TRIG 319*     Most Recent 6 Bacteria Isolates From Any Culture (See EPIC Reports for Culture Details):  Recent Labs   Lab Test 04/05/21  2141 04/05/21  2122 09/15/18  1049 09/15/18  1001 09/15/18  0954 09/15/18  0908   CULT No growth after 8 hours No growth after 8 hours No growth No growth No growth No growth     Most Recent TSH, T4 and A1c Labs:  Recent Labs   Lab Test 10/29/20  0740 03/07/17  1721 03/07/17  1721   TSH  --   --  2.44   A1C 8.0*   < > 7.1*    < > = values in this interval not displayed.     Yosvany Pérez MD  PGY-1

## 2021-04-06 NOTE — CONSULTS
"[facility] Surgery Consultation    Anson Manriquez MRN# [id number]   Age: 65 year old YOB: 1955     Date of Admission:  4/5/2021    Date of Consult:   4/05/21    Reason for consult: \"ruptured abdominal abscess\"        Requesting service: ED; requesting provider: Dr. Vivas                    Assessment and Plan:   Assessment:   Mr. Manriquez is a 65 yr old male with PMHx of HLD, HTN, T2D, STEMI and CAD, s/p ICD 2019 and umbilical hernia repair 10/2020 who presents to the ED for evaluation of drainage from umbilical area, now with circumferential erythema. No obvious fluid collection or intraperitoneal involvement on imaging. Afebrile, hemodynamically stable without leukocytosis.       Plan:   -Admit to general surgery, observation.   -NPO, IVF  -IV abx  -ISS in setting of NPO status  -Reassess in AM, possible further drainage procedure.      Discussed with chief resident, Dr. Myles and staff, Dr. Addison.     Tata Rubio MD  General Surgery, PGY2  [phone]            Chief Complaint:   Drainage from umbilical area          History of Present Illness:   Mr. Manriquez is a 65 yr old male with PMHx of HLD, HTN, T2D, STEMI and CAD, s/p ICD 2019 and umbilical hernia repair 10/2020 who presents to the ED for evaluation of draining from umbilical area. He reports that he noticed swelling and some discomfort at the site on Thursday last week (4/1) and has increased over the last few days. His wife notes there was some redness on Friday as well. Had spontaneous drainage of blood and 'thick yellow' fluid from the umbilicus this evening. He denies fevers, chills, chest pain or SOB. Has been eating and drinking normally, no nausea or vomiting. Has been voiding easily and having regular BMs.     He notes he recovered well from surgery, post op course significant for 2-3 wks drainage of thin blood tinged fluid, however, this resolved and he denies any problems at the surgical site until last week.     In the ED, " he is afebrile and hemodynamically stable. HR in the 80s. Labs significant for tbili of 1.5 (has been persistently eleveted since 8/2020). WBC 10.3, hgb 15. CT scan demonstrates induration/thickening in the subcutaneous tissue, no obvious intraperitoneal extension. He was started on vanc and zosyn in the ED.          Past Medical History:     Past Medical History:   Diagnosis Date     Anal fistula      Antiplatelet or antithrombotic long-term use      Coronary artery disease      Diabetes mellitus, type 2 (H)      Heart attack (H) 4/17/2007     Hyperlipidemia      Hypertension      Inguinal hernia      Ischemic cardiomyopathy      STEMI (ST elevation myocardial infarction) (H) 09/13/2018    s/p STEVO x2     Stented coronary artery 2007             Past Surgical History:     Past Surgical History:   Procedure Laterality Date     EP ICD N/A 4/25/2019    Procedure: EP ICD [[id number]];  Surgeon: Mick Mckeon MD;  Location:  [facility]     HERNIA REPAIR Right     Inguinal     HERNIORRHAPHY INGUINAL Left 2/20/2020    Procedure: Left HERNIORRHAPHY, INGUINAL, OPEN;  Surgeon: Flakito Massey MD;  Location: [facility] OR     HERNIORRHAPHY UMBILICAL N/A 10/16/2020    Procedure: HERNIORRHAPHY, UMBILICAL, OPEN, with mesh;  Surgeon: Flakito Massey MD;  Location: [facility] OR     IRRIGATION AND DEBRIDEMENT RECTUM, COMBINED      abcess near rectum      LAPAROSCOPIC CHOLECYSTECTOMY  3/29/2012    Procedure:LAPAROSCOPIC CHOLECYSTECTOMY; LAPAROSCOPIC CHOLECYSTECTOMY; Surgeon:MARILYNN PRESSLEY; Location:[facility] OR     STENT, CORONARY, OLIVIA               Social History:     Social History     Tobacco Use     Smoking status: Never Smoker     Smokeless tobacco: Never Used   Substance Use Topics     Alcohol use: No             Family History:     Family History   Problem Relation Age of Onset     Hypertension Mother      Diabetes Father      Glaucoma No family hx of      Macular Degeneration No family hx of                 Allergies:    No Known Allergies          Medications:     Current Facility-Administered Medications   Medication     vancomycin 1500 mg in 0.9% NaCl 250 ml intermittent infusion 1,500 mg     [START ON 4/6/2021] vancomycin 1500 mg in 0.9% NaCl 250 ml intermittent infusion 1,500 mg     Current Outpatient Medications   Medication Sig     aspirin (ASA) 81 MG chewable tablet Take 1 tablet (81 mg) by mouth daily     atorvastatin (LIPITOR) 80 MG tablet Take 1 tablet (80 mg) by mouth daily     carvedilol (COREG) 25 MG tablet 1 tablet (25 mg) by Oral or Feeding Tube route 2 times daily (with meals)     empagliflozin (JARDIANCE) 25 MG TABS tablet Take 1 tablet (25 mg) by mouth daily Needs lab     furosemide (LASIX) 40 MG tablet Take 1 tablet (40 mg) by mouth 2 times daily     glipiZIDE (GLUCOTROL XL) 5 MG 24 hr tablet Take 1 tablet (5 mg) by mouth 2 times daily     MULTIPLE VITAMIN PO Take 1 tablet by mouth every morning      sacubitril-valsartan (ENTRESTO) 24-26 MG per tablet Take one tablet every morning and two tablets every evening.     spironolactone (ALDACTONE) 25 MG tablet Take 1 tablet (25 mg) by mouth daily     ticagrelor (BRILINTA) 90 MG tablet Take 1 tablet (90 mg) by mouth 2 times daily     blood glucose (NO BRAND SPECIFIED) lancets standard Use to test blood sugar 1 times daily or as directed. (Patient not taking: Reported on 1/28/2021)     blood glucose (NO BRAND SPECIFIED) test strip Use to test blood sugar 1 times daily or as directed. (Patient not taking: Reported on 1/28/2021)     blood glucose monitoring (NO BRAND SPECIFIED) meter device kit Use to test blood sugar 1 times daily or as directed.     Facility-Administered Medications Ordered in Other Encounters   Medication     perflutren diluted 1mL to 2mL with saline (OPTISON) diluted injection 5 mL     sodium chloride (PF) 0.9% PF flush 10 mL     sodium chloride (PF) 0.9% PF flush 10 mL               Review of Systems:   Negative except as noted in HPI.           Physical Exam:   All vitals have been reviewed  Temp:  [98.2  F (36.8  C)] 98.2  F (36.8  C)  Pulse:  [] 89  Resp:  [14] 14  BP: (129-146)/(82-85) 146/82  SpO2:  [93 %-95 %] 94 %    Physical Exam:  Gen: awake, NAD  HEENT: NCAT, EOMI  CV: RRR to palpation   Resp: unlabored breathing on RA   Abd: obese, soft, non distended. Redness centered around umbilicus and spreads out 4in circumferentially. Small opening in the skin with minimal dark bloody drainage. Cavity probed, appears to extend about 1.5cm in each direction. Indurated, no palpable fluctuance, and unable to express any fluid. Minimally tender.  Ext: warm, well perfused, no significant edema   Neuro: alert, moving all extremities spontaneously, no focal deficits.           Data:   All laboratory data reviewed    Results:  BMP  Recent Labs   Lab 04/05/21 2122      POTASSIUM 3.5   CHLORIDE 103   CO2 25   BUN 21   CR 0.94   *     CBC  Recent Labs   Lab 04/05/21 2122   WBC 10.3   HGB 15.0        LFT  Recent Labs   Lab 04/05/21 2122   AST 18   ALT 27   ALKPHOS 74   BILITOTAL 1.5*   ALBUMIN 4.1   INR 1.10     Recent Labs   Lab 04/05/21 2122   *       Imaging:  CT 4/5/2021   IMPRESSION:   1.  Postoperative changes in the anterior abdominal wall from prior hernia surgery at the level of the umbilicus. No evidence for recurrent hernia. However, there is some localized prominent ill-defined thickening/induration in the subcutaneous tissues. overlying the umbilicus measuring about 4.1 x 2.8 cm. There is a small air collection within this process which is nonspecific and could be seen with phlegmonous reaction, developing abscess, or hematoma. No evidence for active bleeding. This process appears confined to the superficial soft tissues about the periumbilical region with no evidence for extension into the peritoneal cavity. Follow-up as clinically warranted.     2.  Tubular-shaped vertically oriented structure seen in the left  ischiorectal fossa has an appearance suspicious for perianal fistula. A definite abscess is not seen, however this process could be better evaluated with MRI if indicated.     3.  Diffuse fatty infiltration of the liver.     4.  Cholecystectomy. No biliary dilatation.     5.  Prostatic enlargement.

## 2021-04-07 ENCOUNTER — PATIENT OUTREACH (OUTPATIENT)
Dept: SURGERY | Facility: CLINIC | Age: 66
End: 2021-04-07

## 2021-04-07 NOTE — PROGRESS NOTES
Pre Visit Call and Assessment    Date of call:  04/07/2021    Phone numbers:  Cell number on file:    Telephone Information:   Mobile [phone]       Reached patient/confirmed appointment:  No - left message:   on cell phone  Patient care team/Primary provider:  Ignacio Beard  Preferred outpatient pharmacy:    [id number]  [place] [address] AT [address] [place] [address]  Phone: [phone] Fax: [phone]    Referred to:  Dr. Flakito Massey    Reason for visit:  Wound check

## 2021-04-07 NOTE — PROGRESS NOTES
The patient is scheduled for clinic follow up within 24-48 hours of hospital discharge. No post-hospital call is needed at this time.    Name: Cedric Manriquez MRN: [id number]   Date: 4/8/2021 Status: [facility]   Time: 2:15 PM Length: 30   Visit Type: VIDEO VISIT RETURN [1116] ANTONETTE:     Provider: Flakito Massey MD Department: [facility] GENERAL SURGERY         Lisbet Jorge CMA, [facility] Discharge Team

## 2021-04-08 ENCOUNTER — VIRTUAL VISIT (OUTPATIENT)
Dept: SURGERY | Facility: CLINIC | Age: 66
End: 2021-04-08
Payer: COMMERCIAL

## 2021-04-08 VITALS — HEIGHT: 66 IN | WEIGHT: 180 LBS | BODY MASS INDEX: 28.93 KG/M2

## 2021-04-08 DIAGNOSIS — Z98.890 POSTOPERATIVE STATE: Primary | ICD-10-CM

## 2021-04-08 PROCEDURE — 99024 POSTOP FOLLOW-UP VISIT: CPT | Performed by: SURGERY

## 2021-04-08 ASSESSMENT — PAIN SCALES - GENERAL: PAINLEVEL: NO PAIN (0)

## 2021-04-08 ASSESSMENT — MIFFLIN-ST. JEOR: SCORE: 1544.22

## 2021-04-08 NOTE — PATIENT INSTRUCTIONS
You met with Dr. Flakito Massey.      Today's visit instructions:    Return to the Surgery Clinic on Friday,  at 12:30 for a wound check with Dr. Ortega.        If you have questions please contact Rosemary DUONG during regular clinic hours, Monday through Friday 7:30 AM - 4:00 PM, or you can contact us via OLX at anytime.       If you have urgent needs after-hours, weekends, or holidays please call the hospital at [phone] and ask to speak with our on-call General Surgery Team.    Appointment [phone], option #1   Nurse Advice (Rosemary): [phone]   Surgery Scheduler (Carissa): [phone]  Fax: [phone]

## 2021-04-08 NOTE — LETTER
4/8/2021       RE: Anson Manriquez  [address]  [place] [address]     Dear Colleague,    Thank you for referring your patient, Anson Manriquez, to the [facility] GENERAL SURGERY CLINIC [place] at [facility]. Please see a copy of my visit note below.    Cedric is a 65 year old who is being evaluated via a billable video visit.      How would you like to obtain your AVS? MyChart  If the video visit is dropped, the invitation should be resent by: Text to cell phone: [phone]  Will anyone else be joining your video visit? No    Video-Visit Details    S/p open repair umbilical hernia October of last year.  This past week developed infection periumbilical  See notes from surgery consult.  At home now with open wound cares.  Used video to see that wound is healing nicely.  Advised on local wound care.   Follow up next week for wound check    Type of service:  Video Visit    Video End Time:.    Originating Location (pt. Location): Home    Distant Location (provider location):  [facility] GENERAL SURGERY [facility]     Platform used for Video Visit: [employer]    Again, thank you for allowing me to participate in the care of your patient.      Sincerely,    Flakito Massey MD

## 2021-04-08 NOTE — NURSING NOTE
"Chief Complaint   Patient presents with     RECHECK     Virtual follow up       Vitals:    04/08/21 1346   Weight: 81.6 kg (180 lb)   Height: 1.676 m (5' 6\")       Body mass index is 29.05 kg/m .      MACARIO Ross NREMT                      "

## 2021-04-08 NOTE — PROGRESS NOTES
Cedric is a 65 year old who is being evaluated via a billable video visit.      How would you like to obtain your AVS? MyChart  If the video visit is dropped, the invitation should be resent by: Text to cell phone: [phone]  Will anyone else be joining your video visit? No    Video-Visit Details      S/p open repair umbilical hernia October of last year.  This past week developed infection periumbilical  See notes from surgery consult.  At home now with open wound cares.  Used video to see that wound is healing nicely.  Advised on local wound care.   Follow up next week for wound check    Type of service:  Video Visit    Video End Time:.    Originating Location (pt. Location): Home    Distant Location (provider location):  [facility] GENERAL SURGERY CLINIC [place]     Platform used for Video Visit: [employer]

## 2021-04-11 LAB
BACTERIA SPEC CULT: NO GROWTH
BACTERIA SPEC CULT: NO GROWTH
SPECIMEN SOURCE: NORMAL
SPECIMEN SOURCE: NORMAL

## 2021-04-15 ENCOUNTER — PATIENT OUTREACH (OUTPATIENT)
Dept: SURGERY | Facility: CLINIC | Age: 66
End: 2021-04-15

## 2021-04-15 NOTE — PROGRESS NOTES
Patient Telephone Reminder Call    Date of call:  04/15/21  Phone numbers:  Home number on file [phone] (home)    Reached patient/confirmed appointment:  No - left message:   on voicemail  Appointment with:   Dr. Ignacio Ortega  Reason for visit:  Wound check

## 2021-04-16 ENCOUNTER — OFFICE VISIT (OUTPATIENT)
Dept: SURGERY | Facility: CLINIC | Age: 66
End: 2021-04-16
Payer: COMMERCIAL

## 2021-04-16 VITALS
SYSTOLIC BLOOD PRESSURE: 147 MMHG | DIASTOLIC BLOOD PRESSURE: 87 MMHG | HEART RATE: 88 BPM | WEIGHT: 188.8 LBS | HEIGHT: 66 IN | BODY MASS INDEX: 30.34 KG/M2 | OXYGEN SATURATION: 94 %

## 2021-04-16 DIAGNOSIS — L02.211 ABDOMINAL WALL ABSCESS: Primary | ICD-10-CM

## 2021-04-16 PROCEDURE — 99213 OFFICE O/P EST LOW 20 MIN: CPT | Performed by: SURGERY

## 2021-04-16 ASSESSMENT — MIFFLIN-ST. JEOR: SCORE: 1584.14

## 2021-04-16 ASSESSMENT — PAIN SCALES - GENERAL: PAINLEVEL: NO PAIN (0)

## 2021-04-16 NOTE — PATIENT INSTRUCTIONS
You met with Dr. Ignacio Ortega.      Today's visit instructions:    Return to the Surgery Clinic on an as needed basis.        If you have questions please contact Rosemary DUONG during regular clinic hours, Monday through Friday 7:30 AM - 4:00 PM, or you can contact us via [employer] at anytime.       If you have urgent needs after-hours, weekends, or holidays please call the hospital at [phone] and ask to speak with our on-call General Surgery Team.    Appointment [phone], option #1   Nurse Advice (Rosemary): [phone]   Surgery Scheduler (Leslie): [phone]  Fax: [phone]

## 2021-04-16 NOTE — LETTER
"4/16/2021       RE: Anson Manriquez  [address]  [facility] [address]     Dear Colleague,    Thank you for referring your patient, Anson Manriquez, to the [facility] GENERAL SURGERY CLINIC [place] at [facility]. Please see a copy of my visit note below.    I saw Anson Manriquez in clinic today for ED follow-up of a draining umbilical wound.  Last fall he had an open umbilical hernia repair with mesh placed (sublay, fascia closed).  Did have drainage from the incision for a few weeks afterwards.  He was seen in the ED on 4/5 with puruelent drainage from his umbilicus- a CT scan was performed and he was started on ABX.  He had been having pain for a few days and then self expressed some purulent fluid from the site with improvement in pain, did have erythema at that time.    Since then he feels he has improved immensely.  No longer packing the wound as the opening has disappeared. No fevers or pain currently, tolerating a diet.  He is almost at the end of his oral ABX course.    PE:  BP (!) 147/87 (BP Location: Left arm, Patient Position: Sitting, Cuff Size: Adult Regular)   Pulse 88   Ht 1.676 m (5' 6\")   Wt 85.6 kg (188 lb 12.8 oz)   SpO2 94%   BMI 30.47 kg/m      A+Ox3, NAD, pleasant  RRR   No resp distress or wheezing  Abd is obese, soft, nondistended.  Nontender to palpation.  His infraumbilical incision is intact, at his umbilicus there is a pinprick opening, no drainage, unable to express any fluid.  No tenderness, no fluctuance or erythema.    A/P:  Apparently resolved infection at umbilicus, 6 months out from open umbilical hernia repair with mesh.  We discussed concern for mesh infection (which may be currently suppressed by ABX but not cured).  He is optimistic that the infection is gone.  I did discuss possibility of recurrent/smoldering infection and if that were to occur he may require mesh removal.    He would like to call us if needed. " Return precautions discussed.  He will follow-up PRN      Again, thank you for allowing me to participate in the care of your patient.      Sincerely,    Ignacio Ortega MD

## 2021-04-16 NOTE — PROGRESS NOTES
"I saw Anson Manriquez in clinic today for ED follow-up of a draining umbilical wound.  Last fall he had an open umbilical hernia repair with mesh placed (sublay, fascia closed).  Did have drainage from the incision for a few weeks afterwards.  He was seen in the ED on 4/5 with puruelent drainage from his umbilicus- a CT scan was performed and he was started on ABX.  He had been having pain for a few days and then self expressed some purulent fluid from the site with improvement in pain, did have erythema at that time.    Since then he feels he has improved immensely.  No longer packing the wound as the opening has disappeared. No fevers or pain currently, tolerating a diet.  He is almost at the end of his oral ABX course.    PE:  BP (!) 147/87 (BP Location: Left arm, Patient Position: Sitting, Cuff Size: Adult Regular)   Pulse 88   Ht 1.676 m (5' 6\")   Wt 85.6 kg (188 lb 12.8 oz)   SpO2 94%   BMI 30.47 kg/m      A+Ox3, NAD, pleasant  RRR   No resp distress or wheezing  Abd is obese, soft, nondistended.  Nontender to palpation.  His infraumbilical incision is intact, at his umbilicus there is a pinprick opening, no drainage, unable to express any fluid.  No tenderness, no fluctuance or erythema.    A/P:  Apparently resolved infection at umbilicus, 6 months out from open umbilical hernia repair with mesh.  We discussed concern for mesh infection (which may be currently suppressed by ABX but not cured).  He is optimistic that the infection is gone.  I did discuss possibility of recurrent/smoldering infection and if that were to occur he may require mesh removal.    He would like to call us if needed. Return precautions discussed.  He will follow-up PRN  "

## 2021-04-16 NOTE — NURSING NOTE
"Chief Complaint   Patient presents with     Wound Check     umbilical abscess        Vitals:    04/16/21 1219   BP: (!) 147/87   BP Location: Left arm   Patient Position: Sitting   Cuff Size: Adult Regular   Pulse: 88   SpO2: 94%   Weight: 188 lb 12.8 oz   Height: 5' 6\"       Body mass index is 30.47 kg/m .    Mariangel Hansen MA    "

## 2021-04-19 ENCOUNTER — TELEPHONE (OUTPATIENT)
Dept: INTERNAL MEDICINE | Facility: CLINIC | Age: 66
End: 2021-04-19

## 2021-04-19 ENCOUNTER — VIRTUAL VISIT (OUTPATIENT)
Dept: INTERNAL MEDICINE | Facility: CLINIC | Age: 66
End: 2021-04-19
Payer: COMMERCIAL

## 2021-04-19 DIAGNOSIS — H53.2 DOUBLE VISION: ICD-10-CM

## 2021-04-19 DIAGNOSIS — K60.30 ANAL FISTULA: ICD-10-CM

## 2021-04-19 DIAGNOSIS — R73.09 INCREASED GLUCOSE LEVEL: Primary | ICD-10-CM

## 2021-04-19 DIAGNOSIS — E11.9 TYPE 2 DIABETES MELLITUS WITHOUT COMPLICATION, WITHOUT LONG-TERM CURRENT USE OF INSULIN (H): Primary | ICD-10-CM

## 2021-04-19 PROCEDURE — 99213 OFFICE O/P EST LOW 20 MIN: CPT | Mod: TEL | Performed by: INTERNAL MEDICINE

## 2021-04-19 NOTE — NURSING NOTE
Chief Complaint   Patient presents with     Recheck Medication     follow up     Kimberly Nissen, EMT at 8:27 AM on 4/19/2021

## 2021-04-19 NOTE — TELEPHONE ENCOUNTER
Dear Onel;    I spoke with Cedric today regarding several issues.    He is having trouble with his glucose meter. Time for new one?    Thanks, Larry BULL

## 2021-04-19 NOTE — PROGRESS NOTES
Telephone visit    Mr. Manriquez agrees to a telephone visit    See 4/16/2021 General Surgery note regarding ED follow up draining umbilical wound. He had umbilical hernia repair with mesh 10/16/2020. He also saw Dr. Massey, Gen.Surgery 4/8/2021 regarding this. He was in the ED 4/6/2021. He had umbilical hernia from previous laparoscopic cholecystectomy. Now all healed and doing well.     Second Pfizer, COVID vaccination 4/20/2021    He follows in Cardiology with Dr. Hall (nest visit 6/25/2021) for h/o CAD, s/p ICD. Last Cardiology note 1/28/2021.     See MTM note from Ms. Dean 4/1/2021 regarding DM2; he is on Glipizide and Jardiace. A1c on 10/29/2020 was 8.0%. Ordered A1c today 4/19/2021. He may need a new meter and will send Onel message.     Referred to ophthalmology for double vision.     Tested positive for COVID 4/6/2021 no sxs. And doing well.     He states long standing anal fistula (more than 5 years?) and placed colorectal referral today    ETHEL Beard MD    Total time today 6 minutes and 29 seconds

## 2021-04-19 NOTE — Clinical Note
(1) ophthalmology referral  (2) Colorectal referral for anal fistula  (3) See me back in person in about 6 weeks

## 2021-04-19 NOTE — PROGRESS NOTES
Discharge instruction reviewed.  Patient verbalized understanding. PIV removed, new meds reviewed,  patient ambulated to the main lobby with family. Patient discharged   (4) walks frequently

## 2021-04-20 ENCOUNTER — MYC MEDICAL ADVICE (OUTPATIENT)
Dept: INTERNAL MEDICINE | Facility: CLINIC | Age: 66
End: 2021-04-20

## 2021-04-20 NOTE — TELEPHONE ENCOUNTER
Called Cedric to let him know new Rx's for strips, lancets and meter is sent to his pharmacy. Spoke with patient on the phone and he verbalized understanding.

## 2021-04-20 NOTE — TELEPHONE ENCOUNTER
We can send him a new one and see if that helps. Rx's are sent to his pharmacy. I'll call and leave him a message letting him know this is done.     Onel

## 2021-04-23 NOTE — TELEPHONE ENCOUNTER
FUTURE VISIT INFORMATION      FUTURE VISIT INFORMATION:    Date: 5/4/21    Time: 2:20pm    Location: [facility]  REFERRAL INFORMATION:    Referring provider:  Dr. Beard    Referring providers clinic:  [facility]    Reason for visit/diagnosis  Double vision    RECORDS REQUESTED FROM:       Clinic name Comments Records Status Imaging Status   [facility] OV/referral 4/19/21 [facility] OV/notes 10/4/17-4/19/21 [facility]

## 2021-04-23 NOTE — TELEPHONE ENCOUNTER
RECORDS RECEIVED FROM: Internal   DATE RECEIVED: 6.18.21   NOTES STATUS DETAILS   OFFICE NOTE from referring provider  Internal 4.19.21 ML [facility] Internal Medicine   OFFICE NOTE from other specialist   N/A    DISCHARGE SUMMARY from hospital  N/A    DISCHARGE REPORT from the ER N/A    OPERATIVE REPORT  N/A    MEDICATION LIST Internal    LABS     PFC TESTING N/A    ANAL PAP N/A    BIOPSIES/PATHOLOGY RELATED TO DIAGNOSIS N/A    DIAGNOSTIC PROCEDURES     COLONOSCOPY Internal 12.5.12   UPPER ENDOSCOPY (EGD) N/A    FLEX SIGMOIDOSCOPY  N/A    ERCP N/A    IMAGING (DISC & REPORT)      CT  Internal 4.5.21 ab pelvis  9.13.18 chest ab    MRI N/A    XRAY N/A    ULTRASOUND (ENDOANAL/ENDORECTAL) N/A

## 2021-04-26 ENCOUNTER — IMMUNIZATION (OUTPATIENT)
Dept: NURSING | Facility: CLINIC | Age: 66
End: 2021-04-26
Attending: INTERNAL MEDICINE
Payer: COMMERCIAL

## 2021-04-26 PROCEDURE — 0002A PR COVID VAC PFIZER DIL RECON 30 MCG/0.3 ML IM: CPT

## 2021-04-26 PROCEDURE — 91300 PR COVID VAC PFIZER DIL RECON 30 MCG/0.3 ML IM: CPT

## 2021-05-04 ENCOUNTER — PRE VISIT (OUTPATIENT)
Dept: OPHTHALMOLOGY | Facility: CLINIC | Age: 66
End: 2021-05-04

## 2021-05-04 ENCOUNTER — OFFICE VISIT (OUTPATIENT)
Dept: OPHTHALMOLOGY | Facility: CLINIC | Age: 66
End: 2021-05-04
Attending: INTERNAL MEDICINE
Payer: COMMERCIAL

## 2021-05-04 DIAGNOSIS — H52.203 MYOPIC ASTIGMATISM OF BOTH EYES: ICD-10-CM

## 2021-05-04 DIAGNOSIS — E11.9 TYPE 2 DIABETES MELLITUS WITHOUT OPHTHALMIC MANIFESTATIONS (H): Primary | ICD-10-CM

## 2021-05-04 DIAGNOSIS — H53.2 DOUBLE VISION: ICD-10-CM

## 2021-05-04 DIAGNOSIS — R73.09 INCREASED GLUCOSE LEVEL: ICD-10-CM

## 2021-05-04 DIAGNOSIS — H52.4 PRESBYOPIA OF BOTH EYES: ICD-10-CM

## 2021-05-04 DIAGNOSIS — H52.13 MYOPIC ASTIGMATISM OF BOTH EYES: ICD-10-CM

## 2021-05-04 DIAGNOSIS — H25.13 NUCLEAR SCLEROTIC CATARACT OF BOTH EYES: ICD-10-CM

## 2021-05-04 PROCEDURE — 92014 COMPRE OPH EXAM EST PT 1/>: CPT | Performed by: OPHTHALMOLOGY

## 2021-05-04 PROCEDURE — 92015 DETERMINE REFRACTIVE STATE: CPT | Performed by: OPHTHALMOLOGY

## 2021-05-04 ASSESSMENT — VISUAL ACUITY
OD_SC: 20/25
METHOD: SNELLEN - LINEAR
OD_SC+: -2
OS_SC+: -1
OS_SC: 20/20

## 2021-05-04 ASSESSMENT — TONOMETRY
IOP_METHOD: TONOPEN
OS_IOP_MMHG: 12
OD_IOP_MMHG: 15

## 2021-05-04 ASSESSMENT — REFRACTION_MANIFEST
OD_CYLINDER: +0.50
OS_ADD: +2.25
OS_AXIS: 125
OD_SPHERE: -0.50
OD_AXIS: 035
OS_CYLINDER: +0.25
OD_ADD: +2.25
OS_SPHERE: PLANO

## 2021-05-04 ASSESSMENT — EXTERNAL EXAM - LEFT EYE: OS_EXAM: NORMAL

## 2021-05-04 ASSESSMENT — EXTERNAL EXAM - RIGHT EYE: OD_EXAM: NORMAL

## 2021-05-04 ASSESSMENT — CONF VISUAL FIELD
OD_NORMAL: 1
OS_NORMAL: 1
METHOD: COUNTING FINGERS

## 2021-05-04 NOTE — NURSING NOTE
"Chief Complaints and History of Present Illnesses   Patient presents with     Consult For     New Pt.  Double vision.     Chief Complaint(s) and History of Present Illness(es)     Consult For     Laterality: both eyes    Onset: gradual    Onset: years ago    Course: stable    Associated symptoms: redness (LE.), itching and burning.  Negative for eye pain, dryness, tearing, flashes, floaters, glare, haloes and discharge    Treatments tried: no treatments    Pain scale: 0/10    Comments: New Pt.  Double vision.              Comments     Pt states vision is blurry and not double.  Mainly notices vision change mainly when playing pool and golf.  Pt states vision is \"frank the same\" since last visit.  LLL seems to droop and LE gets red from time to time.  He did not fill out last glasses Rx.      DM 1  Lab Results       Component                Value               Date                       A1C                      8.0                 10/29/2020                 A1C                      8.0                 02/11/2020                 A1C                      7.3                 11/06/2019                 A1C                      7.5                 06/18/2019                 A1C                      7.6                 05/09/2019            BS was 150 this am.    TEX Gonzalez May 4, 2021 2:23 PM                  "

## 2021-05-04 NOTE — PROGRESS NOTES
"HPI:  Anson Manriquez is a 65 year old male here for comprehensive eye exam for diabetes mellitus.  When he plays pool in the evenings he notes vision is not as clear in the distance, there is a \"ghost image\" with both eyes, not delia diplopia.  Has not worn prescription glasses.  No flashes/floaters or eye pain.   Lab Results   Component Value Date    A1C 8.0 10/29/2020    A1C 8.0 02/11/2020    A1C 7.3 11/06/2019    A1C 7.5 06/18/2019    A1C 7.6 05/09/2019       PMH:    Past Medical History:   Diagnosis Date     Anal fistula      Antiplatelet or antithrombotic long-term use      Coronary artery disease      Diabetes mellitus, type 2 (H)      Heart attack (H) 4/17/2007     Hyperlipidemia      Hypertension      Inguinal hernia      Ischemic cardiomyopathy      STEMI (ST elevation myocardial infarction) (H) 09/13/2018    s/p STEVO x2     Stented coronary artery 2007      POH:  Glasses for reading, mild cataract, mild drusen, no surgery, no trauma  Oc Meds:  none  FH:  Denies any glaucoma, age related macular degeneration, or other known eye diseases       Assessment & Plan      (E11.9) Type 2 diabetes mellitus without ophthalmic manifestations (H) - Both Eyes  (primary encounter diagnosis)  (R73.09) Increased glucose level - Both Eyes  Comment; Discussed the importance of tighter blood glucose levels; blood pressure and cholesterol control in the prevention of diabetic retinopathy.    Plan:  Recommend yearly dilated eye exam.      (H53.2) Double vision - Both Eyes  Comment: ortho today, likely that refractive error is more of his complaint  Plan: return to clinic for evaluation if still having symptoms after filling glasses prescription     (H52.203,  H52.13) Myopic astigmatism of both eyes - Right Eye  (H52.4) Presbyopia of both eyes - Both EyesComment: small correction improves visual acuity   Plan: manifest refraction done and prescription for glasses given     (H25.13) Nuclear sclerotic cataract of both eyes - Both " Eyes  Comment: mild not visually significant   Plan: follow     Return in about 1 year (around 5/4/2022) for Comprehensive Exam.     Complete documentation of historical and exam elements from today's encounter can be found in the full encounter summary report (not reduplicated in this progress note). I personally obtained the chief complaint(s) and history of present illness.  I have confirmed and edited as necessary the CC, HPI, PMH/PSH, social history, FMH, ROS, and exam/neuro findings as obtained by the technician or others. I have examined this patient myself and I personally viewed the image(s) and studies listed above and the documentation reflects my findings and interpretation.  I formulated and edited as necessary the assessment and plan and discussed the findings and management plan with the patient and family.     Marielena Munroe MD

## 2021-05-11 ENCOUNTER — ANCILLARY PROCEDURE (OUTPATIENT)
Dept: CARDIOLOGY | Facility: CLINIC | Age: 66
End: 2021-05-11
Attending: INTERNAL MEDICINE
Payer: COMMERCIAL

## 2021-05-11 DIAGNOSIS — I42.9 CARDIOMYOPATHY, UNSPECIFIED TYPE (H): ICD-10-CM

## 2021-05-11 PROCEDURE — 93295 DEV INTERROG REMOTE 1/2/MLT: CPT | Performed by: INTERNAL MEDICINE

## 2021-05-11 PROCEDURE — 93296 REM INTERROG EVL PM/IDS: CPT

## 2021-05-13 LAB
MDC_IDC_EPISODE_DTM: NORMAL
MDC_IDC_EPISODE_ID: NORMAL
MDC_IDC_EPISODE_TYPE: NORMAL
MDC_IDC_LEAD_IMPLANT_DT: NORMAL
MDC_IDC_LEAD_LOCATION: NORMAL
MDC_IDC_LEAD_LOCATION_DETAIL_1: NORMAL
MDC_IDC_LEAD_MFG: NORMAL
MDC_IDC_LEAD_MODEL: NORMAL
MDC_IDC_LEAD_POLARITY_TYPE: NORMAL
MDC_IDC_LEAD_SERIAL: NORMAL
MDC_IDC_LEAD_SPECIAL_FUNCTION: NORMAL
MDC_IDC_MSMT_BATTERY_DTM: NORMAL
MDC_IDC_MSMT_BATTERY_REMAINING_LONGEVITY: 120 MO
MDC_IDC_MSMT_BATTERY_REMAINING_PERCENTAGE: 100 %
MDC_IDC_MSMT_BATTERY_STATUS: NORMAL
MDC_IDC_MSMT_CAP_CHARGE_DTM: NORMAL
MDC_IDC_MSMT_CAP_CHARGE_TIME: 10.3 S
MDC_IDC_MSMT_CAP_CHARGE_TYPE: NORMAL
MDC_IDC_MSMT_LEADCHNL_LV_IMPEDANCE_VALUE: 696 OHM
MDC_IDC_MSMT_LEADCHNL_LV_PACING_THRESHOLD_AMPLITUDE: 1.3 V
MDC_IDC_MSMT_LEADCHNL_LV_PACING_THRESHOLD_PULSEWIDTH: 0.5 MS
MDC_IDC_MSMT_LEADCHNL_RA_IMPEDANCE_VALUE: 616 OHM
MDC_IDC_MSMT_LEADCHNL_RV_IMPEDANCE_VALUE: 718 OHM
MDC_IDC_MSMT_LEADCHNL_RV_PACING_THRESHOLD_AMPLITUDE: 0.7 V
MDC_IDC_MSMT_LEADCHNL_RV_PACING_THRESHOLD_PULSEWIDTH: 0.4 MS
MDC_IDC_PG_IMPLANT_DTM: NORMAL
MDC_IDC_PG_MFG: NORMAL
MDC_IDC_PG_MODEL: NORMAL
MDC_IDC_PG_SERIAL: NORMAL
MDC_IDC_PG_TYPE: NORMAL
MDC_IDC_SESS_CLINIC_NAME: NORMAL
MDC_IDC_SESS_DTM: NORMAL
MDC_IDC_SESS_TYPE: NORMAL
MDC_IDC_SET_BRADY_AT_MODE_SWITCH_MODE: NORMAL
MDC_IDC_SET_BRADY_AT_MODE_SWITCH_RATE: 170 {BEATS}/MIN
MDC_IDC_SET_BRADY_LOWRATE: 60 {BEATS}/MIN
MDC_IDC_SET_BRADY_MAX_SENSOR_RATE: 130 {BEATS}/MIN
MDC_IDC_SET_BRADY_MAX_TRACKING_RATE: 130 {BEATS}/MIN
MDC_IDC_SET_BRADY_MODE: NORMAL
MDC_IDC_SET_BRADY_PAV_DELAY_LOW: 180 MS
MDC_IDC_SET_BRADY_SAV_DELAY_LOW: 140 MS
MDC_IDC_SET_CRT_PACED_CHAMBERS: NORMAL
MDC_IDC_SET_LEADCHNL_LV_PACING_AMPLITUDE: 2.5 V
MDC_IDC_SET_LEADCHNL_LV_PACING_ANODE_ELECTRODE_1: NORMAL
MDC_IDC_SET_LEADCHNL_LV_PACING_ANODE_LOCATION_1: NORMAL
MDC_IDC_SET_LEADCHNL_LV_PACING_CAPTURE_MODE: NORMAL
MDC_IDC_SET_LEADCHNL_LV_PACING_CATHODE_ELECTRODE_1: NORMAL
MDC_IDC_SET_LEADCHNL_LV_PACING_CATHODE_LOCATION_1: NORMAL
MDC_IDC_SET_LEADCHNL_LV_PACING_PULSEWIDTH: 0.5 MS
MDC_IDC_SET_LEADCHNL_LV_SENSING_ADAPTATION_MODE: NORMAL
MDC_IDC_SET_LEADCHNL_LV_SENSING_ANODE_ELECTRODE_1: NORMAL
MDC_IDC_SET_LEADCHNL_LV_SENSING_ANODE_LOCATION_1: NORMAL
MDC_IDC_SET_LEADCHNL_LV_SENSING_CATHODE_ELECTRODE_1: NORMAL
MDC_IDC_SET_LEADCHNL_LV_SENSING_CATHODE_LOCATION_1: NORMAL
MDC_IDC_SET_LEADCHNL_LV_SENSING_SENSITIVITY: 1 MV
MDC_IDC_SET_LEADCHNL_RA_PACING_AMPLITUDE: 2.5 V
MDC_IDC_SET_LEADCHNL_RA_PACING_CAPTURE_MODE: NORMAL
MDC_IDC_SET_LEADCHNL_RA_PACING_POLARITY: NORMAL
MDC_IDC_SET_LEADCHNL_RA_PACING_PULSEWIDTH: 0.5 MS
MDC_IDC_SET_LEADCHNL_RA_SENSING_ADAPTATION_MODE: NORMAL
MDC_IDC_SET_LEADCHNL_RA_SENSING_POLARITY: NORMAL
MDC_IDC_SET_LEADCHNL_RA_SENSING_SENSITIVITY: 0.25 MV
MDC_IDC_SET_LEADCHNL_RV_PACING_AMPLITUDE: 2 V
MDC_IDC_SET_LEADCHNL_RV_PACING_CAPTURE_MODE: NORMAL
MDC_IDC_SET_LEADCHNL_RV_PACING_POLARITY: NORMAL
MDC_IDC_SET_LEADCHNL_RV_PACING_PULSEWIDTH: 0.4 MS
MDC_IDC_SET_LEADCHNL_RV_SENSING_ADAPTATION_MODE: NORMAL
MDC_IDC_SET_LEADCHNL_RV_SENSING_POLARITY: NORMAL
MDC_IDC_SET_LEADCHNL_RV_SENSING_SENSITIVITY: 0.6 MV
MDC_IDC_SET_ZONE_DETECTION_INTERVAL: 250 MS
MDC_IDC_SET_ZONE_DETECTION_INTERVAL: 300 MS
MDC_IDC_SET_ZONE_DETECTION_INTERVAL: 353 MS
MDC_IDC_SET_ZONE_TYPE: NORMAL
MDC_IDC_SET_ZONE_VENDOR_TYPE: NORMAL
MDC_IDC_STAT_AT_BURDEN_PERCENT: 1 %
MDC_IDC_STAT_AT_DTM_END: NORMAL
MDC_IDC_STAT_AT_DTM_START: NORMAL
MDC_IDC_STAT_BRADY_DTM_END: NORMAL
MDC_IDC_STAT_BRADY_DTM_START: NORMAL
MDC_IDC_STAT_BRADY_RA_PERCENT_PACED: 0 %
MDC_IDC_STAT_BRADY_RV_PERCENT_PACED: 26 %
MDC_IDC_STAT_CRT_DTM_END: NORMAL
MDC_IDC_STAT_CRT_DTM_START: NORMAL
MDC_IDC_STAT_CRT_LV_PERCENT_PACED: 99 %
MDC_IDC_STAT_EPISODE_RECENT_COUNT: 0
MDC_IDC_STAT_EPISODE_RECENT_COUNT: 1
MDC_IDC_STAT_EPISODE_RECENT_COUNT_DTM_END: NORMAL
MDC_IDC_STAT_EPISODE_RECENT_COUNT_DTM_START: NORMAL
MDC_IDC_STAT_EPISODE_TYPE: NORMAL
MDC_IDC_STAT_EPISODE_VENDOR_TYPE: NORMAL
MDC_IDC_STAT_TACHYTHERAPY_ATP_DELIVERED_RECENT: 0
MDC_IDC_STAT_TACHYTHERAPY_ATP_DELIVERED_TOTAL: 0
MDC_IDC_STAT_TACHYTHERAPY_RECENT_DTM_END: NORMAL
MDC_IDC_STAT_TACHYTHERAPY_RECENT_DTM_START: NORMAL
MDC_IDC_STAT_TACHYTHERAPY_SHOCKS_ABORTED_RECENT: 0
MDC_IDC_STAT_TACHYTHERAPY_SHOCKS_ABORTED_TOTAL: 0
MDC_IDC_STAT_TACHYTHERAPY_SHOCKS_DELIVERED_RECENT: 0
MDC_IDC_STAT_TACHYTHERAPY_SHOCKS_DELIVERED_TOTAL: 0
MDC_IDC_STAT_TACHYTHERAPY_TOTAL_DTM_END: NORMAL
MDC_IDC_STAT_TACHYTHERAPY_TOTAL_DTM_START: NORMAL

## 2021-05-15 ENCOUNTER — HEALTH MAINTENANCE LETTER (OUTPATIENT)
Age: 66
End: 2021-05-15

## 2021-05-24 DIAGNOSIS — I10 BENIGN ESSENTIAL HYPERTENSION: ICD-10-CM

## 2021-05-24 DIAGNOSIS — E11.9 TYPE 2 DIABETES MELLITUS WITHOUT COMPLICATION, WITHOUT LONG-TERM CURRENT USE OF INSULIN (H): ICD-10-CM

## 2021-05-24 DIAGNOSIS — E78.2 MIXED HYPERLIPIDEMIA: ICD-10-CM

## 2021-05-26 RX ORDER — ATORVASTATIN CALCIUM 80 MG/1
80 TABLET, FILM COATED ORAL DAILY
Qty: 30 TABLET | Refills: 0 | Status: SHIPPED | OUTPATIENT
Start: 2021-05-26 | End: 2021-07-08

## 2021-05-26 NOTE — TELEPHONE ENCOUNTER
ATORVASTATIN 80MG TABLETS   Last Written Prescription Date:  2/17/2021  Last Fill Quantity: 90,   # refills: 0  Last Office Visit : 4/19/2021  Future Office visit:  6/1/2021  30 Tabs sent to pharm 5/26/2021  Pt needs Lipid Profile       Alaina Pandya RN  Central Triage Red Flags/Med Refills

## 2021-06-14 DIAGNOSIS — I21.02 ST ELEVATION MYOCARDIAL INFARCTION INVOLVING LEFT ANTERIOR DESCENDING (LAD) CORONARY ARTERY (H): ICD-10-CM

## 2021-06-14 DIAGNOSIS — I46.9 CARDIAC ARREST (H): ICD-10-CM

## 2021-06-18 ENCOUNTER — PRE VISIT (OUTPATIENT)
Dept: SURGERY | Facility: CLINIC | Age: 66
End: 2021-06-18

## 2021-06-18 ENCOUNTER — OFFICE VISIT (OUTPATIENT)
Dept: SURGERY | Facility: CLINIC | Age: 66
End: 2021-06-18
Attending: INTERNAL MEDICINE
Payer: COMMERCIAL

## 2021-06-18 VITALS
DIASTOLIC BLOOD PRESSURE: 76 MMHG | WEIGHT: 186.1 LBS | HEART RATE: 71 BPM | OXYGEN SATURATION: 97 % | HEIGHT: 66 IN | BODY MASS INDEX: 29.91 KG/M2 | SYSTOLIC BLOOD PRESSURE: 130 MMHG

## 2021-06-18 DIAGNOSIS — K60.30 ANAL FISTULA: Primary | ICD-10-CM

## 2021-06-18 PROCEDURE — 99213 OFFICE O/P EST LOW 20 MIN: CPT | Performed by: NURSE PRACTITIONER

## 2021-06-18 ASSESSMENT — MIFFLIN-ST. JEOR: SCORE: 1571.89

## 2021-06-18 ASSESSMENT — PAIN SCALES - GENERAL: PAINLEVEL: NO PAIN (0)

## 2021-06-18 NOTE — PROGRESS NOTES
Colon and Rectal Surgery Consult Clinic Note    Referring provider:  Taryn Lou MD  [address] [place] [id number]     RE: Anson Manriquez  : 1955  CHOLO: 2021    Anson Manriquez is a very pleasant 65 year old male with long-standing anorectal fistula who presents today for second opinion.     Cedric initially presented with a large horseshoe abscess in . He underwent an examination under anesthesia with incision and drainage with Dr. Weber. He underwent a normal colonoscopy in . In  he continued to have drainage and was noted to have a transsphincteric fistula and underwent examination under anesthesia with seton drain placement with Dr. Dey. In  he then underwent a fistula plug procedure. He initially did well after this procedure but has had continued drainage since that time. I met with him in  and he was not interested in a seton at that time but wanted to discuss surgical options. I set him up to meet with a surgeon but he did not follow up.  He does occasionally have some leakage of a small amount of stool and minimal drainage. No pain. No recurrent abscess.  Last colonoscopy was  and was normal.    Assessment/Plan: 61 year old male with a past medical history of DMII, CAD, MI in  and , and HTN with long-standing transsphincteric anorectal fistula. He has previously undergone seton drain placement and a fistula plug with Dr. Lo in  in . He has a persistent, well established fistula. Some continued drainage and leakage of stool but this is not very bothersome to him. We discussed seton replacement or meeting with a surgeon to discuss if there are other surgical options. He states that he is fine as he is and has not gotten recurrent abscesses so would prefer to leave it alone. Will have him follow up if he becomes more symptomatic or wants to discuss surgical options with a surgeon. Due for colonoscopy  next year. Patient's questions were answered to his stated satisfaction and he is in agreement with this plan.    Medical history:  Past Medical History:   Diagnosis Date     Anal fistula      Antiplatelet or antithrombotic long-term use      Coronary artery disease      Diabetes mellitus, type 2 (H)      Heart attack (H) 4/17/2007     Hyperlipidemia      Hypertension      Inguinal hernia      Ischemic cardiomyopathy      STEMI (ST elevation myocardial infarction) (H) 09/13/2018    s/p STEVO x2     Stented coronary artery 2007       Surgical history:  Past Surgical History:   Procedure Laterality Date     EP ICD N/A 4/25/2019    Procedure: EP ICD [[id number]];  Surgeon: Mick Mckeon MD;  [facility]     HERNIA REPAIR Right     Inguinal     HERNIORRHAPHY INGUINAL Left 2/20/2020    Procedure: Left HERNIORRHAPHY, INGUINAL, OPEN;  Surgeon: Flakito Massey MD;  Location: [facility]     HERNIORRHAPHY UMBILICAL N/A 10/16/2020    Procedure: HERNIORRHAPHY, UMBILICAL, OPEN, with mesh;  Surgeon: Flakito Massey MD;  Location: [facility] OR     IRRIGATION AND DEBRIDEMENT RECTUM, COMBINED      abcess near rectum      LAPAROSCOPIC CHOLECYSTECTOMY  3/29/2012    Procedure:LAPAROSCOPIC CHOLECYSTECTOMY; LAPAROSCOPIC CHOLECYSTECTOMY; Surgeon:MARILYNN PRESSLEY; Location:[facility] OR     STENT, CORONARY, OLIVIA         Problem list:    Patient Active Problem List    Diagnosis Date Noted     Abdominal wall abscess 04/05/2021     Priority: Medium     Umbilical hernia without obstruction and without gangrene 09/02/2020     Priority: Medium     Added automatically from request for surgery [id number]       Left inguinal hernia 11/11/2019     Priority: Medium     Added automatically from request for surgery [id number]       S/P ICD (internal cardiac defibrillator) procedure 04/25/2019     Priority: Medium     Chronic infection      Priority: Medium     near rectum still leaking       Ischemic cardiomyopathy 02/19/2019     Priority: Medium      Added automatically from request for surgery [id number]       STEMI (ST elevation myocardial infarction) (H) 09/14/2018     Priority: Medium     Coronary artery disease involving native coronary artery of native heart without angina pectoris 03/09/2017     Priority: Medium     MI in 2007       Type 2 diabetes mellitus without complication, without long-term current use of insulin (H) 03/09/2017     Priority: Medium     Benign essential hypertension 03/09/2017     Priority: Medium     Mixed hyperlipidemia 03/09/2017     Priority: Medium     Anal fistula 03/09/2017     Priority: Medium       Medications:  Current Outpatient Medications   Medication Sig Dispense Refill     aspirin (ASA) 81 MG chewable tablet Take 1 tablet (81 mg) by mouth daily 90 tablet 2     atorvastatin (LIPITOR) 80 MG tablet Take 1 tablet (80 mg) by mouth daily (Please have Lipid Profile lab drawn at 6/1/2021 visit for Pt care)   Thank you 30 tablet 0     blood glucose (NO BRAND SPECIFIED) lancets standard Use to test blood sugar 1 times daily or as directed. 100 lancet 3     blood glucose (NO BRAND SPECIFIED) lancets standard Use to test blood sugar 1 times daily or as directed. 100 each 3     blood glucose (NO BRAND SPECIFIED) test strip Use to test blood sugar 1 times daily or as directed. 100 strip 3     blood glucose (NO BRAND SPECIFIED) test strip Use to test blood sugar 1 times daily or as directed. 100 strip 3     blood glucose monitoring (NO BRAND SPECIFIED) meter device kit Use to test blood sugar 1 times daily or as directed. 1 kit 0     blood glucose monitoring (NO BRAND SPECIFIED) meter device kit Use to test blood sugar 1 times daily or as directed. 1 kit 0     carvedilol (COREG) 25 MG tablet 1 tablet (25 mg) by Oral or Feeding Tube route 2 times daily (with meals) 180 tablet 2     empagliflozin (JARDIANCE) 25 MG TABS tablet Take 1 tablet (25 mg) by mouth daily Needs lab 90 tablet 3     furosemide (LASIX) 40 MG tablet Take 1 tablet  "(40 mg) by mouth 2 times daily 180 tablet 3     glipiZIDE (GLUCOTROL XL) 5 MG 24 hr tablet Take 1 tablet (5 mg) by mouth 2 times daily 180 tablet 3     MULTIPLE VITAMIN PO Take 1 tablet by mouth every morning        sacubitril-valsartan (ENTRESTO) 24-26 MG per tablet Take one tablet every morning and two tablets every evening. 270 tablet 3     spironolactone (ALDACTONE) 25 MG tablet Take 1 tablet (25 mg) by mouth daily 90 tablet 3     ticagrelor (BRILINTA) 90 MG tablet Take 1 tablet (90 mg) by mouth 2 times daily 180 tablet 1       Allergies:  No Known Allergies    Family history:  Family History   Problem Relation Age of Onset     Hypertension Mother      Diabetes Father      Glaucoma No family hx of      Macular Degeneration No family hx of        Social history:  Social History     Tobacco Use     Smoking status: Never Smoker     Smokeless tobacco: Never Used   Substance Use Topics     Alcohol use: No    Marital status: .      Nursing Notes:   Mariangel Hansen  6/18/2021  8:06 AM  Signed  Chief Complaint   Patient presents with     Consult     long standing anal fistula        Vitals:    06/18/21 0803   BP: 130/76   BP Location: Left arm   Patient Position: Sitting   Cuff Size: Adult Regular   Pulse: 71   SpO2: 97%   Weight: 186 lb 1.6 oz   Height: 5' 6\"       Body mass index is 30.04 kg/m .    Mariangel Hansen CMA         Physical Examination:  /76 (BP Location: Left arm, Patient Position: Sitting, Cuff Size: Adult Regular)   Pulse 71   Ht 5' 6\"   Wt 186 lb 1.6 oz   SpO2 97%   BMI 30.04 kg/m    General: alert, oriented, in no acute distress, sitting comfortably  HEENT: mucous membranes moist  Perianal external examination:  Well established, epithelialized, external fistula opening in the left posterior position which can be probed approximately 2 cm towards the anal opening. A small amount of purulent drainage is present. Surrounding scarring but no induration or fluctuance. Skin tag present in " the left posterior position    20 minutes spent on the date of the encounter doing chart review, history and exam, documentation and further activities as noted above.     LLOYD Mortensen, NP-C  Colon and Rectal Surgery   [facility]    This note was created using speech recognition software and may contain unintended word substitutions.

## 2021-06-18 NOTE — LETTER
2021       RE: Anson Manriquez  [address] [facility] [address]     Dear Colleague,    Thank you for referring your patient, Anson Manriquez, to the [facility] [place] at [facility]. Please see a copy of my visit note below.    Colon and Rectal Surgery Consult Clinic Note    Referring provider:  Taryn Lou MD  [facility]  [address] [facility] [address]  [place] [address]     RE: Anson Manriquez  : 1955  CHOLO: 2021    Anson Manriquez is a very pleasant 65 year old male with long-standing anorectal fistula who presents today for second opinion.     Cedric initially presented with a large horseshoe abscess in . He underwent an examination under anesthesia with incision and drainage with Dr. Weber. He underwent a normal colonoscopy in . In  he continued to have drainage and was noted to have a transsphincteric fistula and underwent examination under anesthesia with seton drain placement with Dr. Dey. In  he then underwent a fistula plug procedure. He initially did well after this procedure but has had continued drainage since that time. I met with him in  and he was not interested in a seton at that time but wanted to discuss surgical options. I set him up to meet with a surgeon but he did not follow up.  He does occasionally have some leakage of a small amount of stool and minimal drainage. No pain. No recurrent abscess.  Last colonoscopy was  and was normal.    Assessment/Plan: 61 year old male with a past medical history of DMII, CAD, MI in  and , and HTN with long-standing transsphincteric anorectal fistula. He has previously undergone seton drain placement and a fistula plug with Dr. Lo in  in . He has a persistent, well established fistula. Some continued drainage and leakage of stool but this is not very bothersome to him. We discussed  seton replacement or meeting with a surgeon to discuss if there are other surgical options. He states that he is fine as he is and has not gotten recurrent abscesses so would prefer to leave it alone. Will have him follow up if he becomes more symptomatic or wants to discuss surgical options with a surgeon. Due for colonoscopy next year. Patient's questions were answered to his stated satisfaction and he is in agreement with this plan.    Medical history:  Past Medical History:   Diagnosis Date     Anal fistula      Antiplatelet or antithrombotic long-term use      Coronary artery disease      Diabetes mellitus, type 2 (H)      Heart attack (H) 4/17/2007     Hyperlipidemia      Hypertension      Inguinal hernia      Ischemic cardiomyopathy      STEMI (ST elevation myocardial infarction) (H) 09/13/2018    s/p STEVO x2     Stented coronary artery 2007       Surgical history:  Past Surgical History:   Procedure Laterality Date     EP ICD N/A 4/25/2019    Procedure: EP ICD [[id number]];  Surgeon: Mick Mckeon MD;  [facility]  [facility]     HERNIA REPAIR Right     Inguinal     HERNIORRHAPHY INGUINAL Left 2/20/2020    Procedure: Left HERNIORRHAPHY, INGUINAL, OPEN;  Surgeon: Flakito Massey MD;  Location: [facility] OR     HERNIORRHAPHY UMBILICAL N/A 10/16/2020    Procedure: HERNIORRHAPHY, UMBILICAL, OPEN, with mesh;  Surgeon: Flakito Massey MD;  Location: [facility] OR     IRRIGATION AND DEBRIDEMENT RECTUM, COMBINED      abcess near rectum      LAPAROSCOPIC CHOLECYSTECTOMY  3/29/2012    Procedure:LAPAROSCOPIC CHOLECYSTECTOMY; LAPAROSCOPIC CHOLECYSTECTOMY; Surgeon:MARILYNN PRESSLEY; Location:[facility] OR     STENT, CORONARY, OLIVIA         Problem list:    Patient Active Problem List    Diagnosis Date Noted     Abdominal wall abscess 04/05/2021     Priority: Medium     Umbilical hernia without obstruction and without gangrene 09/02/2020     Priority: Medium     Added automatically from request for surgery [id number]       Left  inguinal hernia 11/11/2019     Priority: Medium     Added automatically from request for surgery [id number]       S/P ICD (internal cardiac defibrillator) procedure 04/25/2019     Priority: Medium     Chronic infection      Priority: Medium     near rectum still leaking       Ischemic cardiomyopathy 02/19/2019     Priority: Medium     Added automatically from request for surgery [id number]       STEMI (ST elevation myocardial infarction) (H) 09/14/2018     Priority: Medium     Coronary artery disease involving native coronary artery of native heart without angina pectoris 03/09/2017     Priority: Medium     MI in 2007       Type 2 diabetes mellitus without complication, without long-term current use of insulin (H) 03/09/2017     Priority: Medium     Benign essential hypertension 03/09/2017     Priority: Medium     Mixed hyperlipidemia 03/09/2017     Priority: Medium     Anal fistula 03/09/2017     Priority: Medium       Medications:  Current Outpatient Medications   Medication Sig Dispense Refill     aspirin (ASA) 81 MG chewable tablet Take 1 tablet (81 mg) by mouth daily 90 tablet 2     atorvastatin (LIPITOR) 80 MG tablet Take 1 tablet (80 mg) by mouth daily (Please have Lipid Profile lab drawn at 6/1/2021 visit for Pt care)   Thank you 30 tablet 0     blood glucose (NO BRAND SPECIFIED) lancets standard Use to test blood sugar 1 times daily or as directed. 100 lancet 3     blood glucose (NO BRAND SPECIFIED) lancets standard Use to test blood sugar 1 times daily or as directed. 100 each 3     blood glucose (NO BRAND SPECIFIED) test strip Use to test blood sugar 1 times daily or as directed. 100 strip 3     blood glucose (NO BRAND SPECIFIED) test strip Use to test blood sugar 1 times daily or as directed. 100 strip 3     blood glucose monitoring (NO BRAND SPECIFIED) meter device kit Use to test blood sugar 1 times daily or as directed. 1 kit 0     blood glucose monitoring (NO BRAND SPECIFIED) meter device kit Use to test  "blood sugar 1 times daily or as directed. 1 kit 0     carvedilol (COREG) 25 MG tablet 1 tablet (25 mg) by Oral or Feeding Tube route 2 times daily (with meals) 180 tablet 2     empagliflozin (JARDIANCE) 25 MG TABS tablet Take 1 tablet (25 mg) by mouth daily Needs lab 90 tablet 3     furosemide (LASIX) 40 MG tablet Take 1 tablet (40 mg) by mouth 2 times daily 180 tablet 3     glipiZIDE (GLUCOTROL XL) 5 MG 24 hr tablet Take 1 tablet (5 mg) by mouth 2 times daily 180 tablet 3     MULTIPLE VITAMIN PO Take 1 tablet by mouth every morning        sacubitril-valsartan (ENTRESTO) 24-26 MG per tablet Take one tablet every morning and two tablets every evening. 270 tablet 3     spironolactone (ALDACTONE) 25 MG tablet Take 1 tablet (25 mg) by mouth daily 90 tablet 3     ticagrelor (BRILINTA) 90 MG tablet Take 1 tablet (90 mg) by mouth 2 times daily 180 tablet 1       Allergies:  No Known Allergies    Family history:  Family History   Problem Relation Age of Onset     Hypertension Mother      Diabetes Father      Glaucoma No family hx of      Macular Degeneration No family hx of        Social history:  Social History     Tobacco Use     Smoking status: Never Smoker     Smokeless tobacco: Never Used   Substance Use Topics     Alcohol use: No    Marital status: .      Nursing Notes:   Mariangel Hansen  6/18/2021  8:06 AM  Signed  Chief Complaint   Patient presents with     Consult     long standing anal fistula        Vitals:    06/18/21 0803   BP: 130/76   BP Location: Left arm   Patient Position: Sitting   Cuff Size: Adult Regular   Pulse: 71   SpO2: 97%   Weight: 186 lb 1.6 oz   Height: 5' 6\"       Body mass index is 30.04 kg/m .    Mariangel Hansen CMA         Physical Examination:  /76 (BP Location: Left arm, Patient Position: Sitting, Cuff Size: Adult Regular)   Pulse 71   Ht 5' 6\"   Wt 186 lb 1.6 oz   SpO2 97%   BMI 30.04 kg/m    General: alert, oriented, in no acute distress, sitting comfortably  HEENT: " mucous membranes moist  Perianal external examination:  Well established, epithelialized, external fistula opening in the left posterior position which can be probed approximately 2 cm towards the anal opening. A small amount of purulent drainage is present. Surrounding scarring but no induration or fluctuance. Skin tag present in the left posterior position    20 minutes spent on the date of the encounter doing chart review, history and exam, documentation and further activities as noted above.     LLOYD Mortensen, NP-C  Colon and Rectal Surgery   [facility]    This note was created using speech recognition software and may contain unintended word substitutions.      Again, thank you for allowing me to participate in the care of your patient.      Sincerely,    LLOYD Mortensen CNP

## 2021-06-18 NOTE — NURSING NOTE
"Chief Complaint   Patient presents with     Consult     long standing anal fistula        Vitals:    06/18/21 0803   BP: 130/76   BP Location: Left arm   Patient Position: Sitting   Cuff Size: Adult Regular   Pulse: 71   SpO2: 97%   Weight: 186 lb 1.6 oz   Height: 5' 6\"       Body mass index is 30.04 kg/m .    Mariangel Hansen CMA    "

## 2021-07-06 DIAGNOSIS — E78.2 MIXED HYPERLIPIDEMIA: ICD-10-CM

## 2021-07-06 DIAGNOSIS — I10 BENIGN ESSENTIAL HYPERTENSION: ICD-10-CM

## 2021-07-06 DIAGNOSIS — E11.9 TYPE 2 DIABETES MELLITUS WITHOUT COMPLICATION, WITHOUT LONG-TERM CURRENT USE OF INSULIN (H): ICD-10-CM

## 2021-07-08 NOTE — TELEPHONE ENCOUNTER
ATORVASTATIN 80MG TABLETS  Last Written Prescription Date:  5/26/2021  Last Fill Quantity: 30,   # refills: 0  Last Office Visit : 4/19/2021  Future Office visit:  None    Routing refill request to provider for review/approval because:  Third Request        Labs due LIPID PROFILE  15 days spend       Clinic notified     Recent Labs   Lab Test 11/06/19  0831   LDL 93           No abnormal creatine kinase in past 12 months        Recent Labs   Lab Test 09/14/18  0102   *              Alaina Pandya RN  Central Triage Red Flags/Med Refills

## 2021-07-09 RX ORDER — ATORVASTATIN CALCIUM 80 MG/1
80 TABLET, FILM COATED ORAL DAILY
Qty: 60 TABLET | Refills: 1 | Status: SHIPPED | OUTPATIENT
Start: 2021-07-09 | End: 2021-11-10

## 2021-08-24 DIAGNOSIS — I21.02 ST ELEVATION MYOCARDIAL INFARCTION INVOLVING LEFT ANTERIOR DESCENDING (LAD) CORONARY ARTERY (H): ICD-10-CM

## 2021-08-24 DIAGNOSIS — I50.20 HEART FAILURE WITH REDUCED EJECTION FRACTION, NYHA CLASS III (H): ICD-10-CM

## 2021-08-25 RX ORDER — CARVEDILOL 25 MG/1
25 TABLET ORAL 2 TIMES DAILY WITH MEALS
Qty: 180 TABLET | Refills: 1 | Status: SHIPPED | OUTPATIENT
Start: 2021-08-25 | End: 2022-02-23

## 2021-08-25 NOTE — TELEPHONE ENCOUNTER
Last Clinic Visit: 1/28/2021 [facility]

## 2021-08-31 ENCOUNTER — ANCILLARY PROCEDURE (OUTPATIENT)
Dept: CARDIOLOGY | Facility: CLINIC | Age: 66
End: 2021-08-31
Attending: INTERNAL MEDICINE
Payer: COMMERCIAL

## 2021-08-31 DIAGNOSIS — I42.9 CARDIOMYOPATHY, UNSPECIFIED TYPE (H): ICD-10-CM

## 2021-08-31 DIAGNOSIS — I25.5 ISCHEMIC CARDIOMYOPATHY: ICD-10-CM

## 2021-08-31 PROCEDURE — 93296 REM INTERROG EVL PM/IDS: CPT

## 2021-08-31 PROCEDURE — 93295 DEV INTERROG REMOTE 1/2/MLT: CPT | Performed by: INTERNAL MEDICINE

## 2021-09-27 LAB
MDC_IDC_EPISODE_DTM: NORMAL
MDC_IDC_EPISODE_ID: NORMAL
MDC_IDC_EPISODE_TYPE: NORMAL
MDC_IDC_LEAD_IMPLANT_DT: NORMAL
MDC_IDC_LEAD_LOCATION: NORMAL
MDC_IDC_LEAD_LOCATION_DETAIL_1: NORMAL
MDC_IDC_LEAD_MFG: NORMAL
MDC_IDC_LEAD_MODEL: NORMAL
MDC_IDC_LEAD_POLARITY_TYPE: NORMAL
MDC_IDC_LEAD_SERIAL: NORMAL
MDC_IDC_LEAD_SPECIAL_FUNCTION: NORMAL
MDC_IDC_MSMT_BATTERY_DTM: NORMAL
MDC_IDC_MSMT_BATTERY_REMAINING_LONGEVITY: 126 MO
MDC_IDC_MSMT_BATTERY_REMAINING_PERCENTAGE: 100 %
MDC_IDC_MSMT_BATTERY_STATUS: NORMAL
MDC_IDC_MSMT_CAP_CHARGE_DTM: NORMAL
MDC_IDC_MSMT_CAP_CHARGE_TIME: 10.3 S
MDC_IDC_MSMT_CAP_CHARGE_TYPE: NORMAL
MDC_IDC_MSMT_LEADCHNL_LV_IMPEDANCE_VALUE: 541 OHM
MDC_IDC_MSMT_LEADCHNL_LV_PACING_THRESHOLD_AMPLITUDE: 0.9 V
MDC_IDC_MSMT_LEADCHNL_LV_PACING_THRESHOLD_PULSEWIDTH: 0.5 MS
MDC_IDC_MSMT_LEADCHNL_RA_IMPEDANCE_VALUE: 541 OHM
MDC_IDC_MSMT_LEADCHNL_RV_IMPEDANCE_VALUE: 652 OHM
MDC_IDC_MSMT_LEADCHNL_RV_PACING_THRESHOLD_AMPLITUDE: 0.7 V
MDC_IDC_MSMT_LEADCHNL_RV_PACING_THRESHOLD_PULSEWIDTH: 0.4 MS
MDC_IDC_PG_IMPLANT_DTM: NORMAL
MDC_IDC_PG_MFG: NORMAL
MDC_IDC_PG_MODEL: NORMAL
MDC_IDC_PG_SERIAL: NORMAL
MDC_IDC_PG_TYPE: NORMAL
MDC_IDC_SESS_CLINIC_NAME: NORMAL
MDC_IDC_SESS_DTM: NORMAL
MDC_IDC_SESS_TYPE: NORMAL
MDC_IDC_SET_BRADY_AT_MODE_SWITCH_MODE: NORMAL
MDC_IDC_SET_BRADY_AT_MODE_SWITCH_RATE: 170 {BEATS}/MIN
MDC_IDC_SET_BRADY_LOWRATE: 60 {BEATS}/MIN
MDC_IDC_SET_BRADY_MAX_SENSOR_RATE: 130 {BEATS}/MIN
MDC_IDC_SET_BRADY_MAX_TRACKING_RATE: 130 {BEATS}/MIN
MDC_IDC_SET_BRADY_MODE: NORMAL
MDC_IDC_SET_BRADY_PAV_DELAY_LOW: 180 MS
MDC_IDC_SET_BRADY_SAV_DELAY_LOW: 140 MS
MDC_IDC_SET_CRT_PACED_CHAMBERS: NORMAL
MDC_IDC_SET_LEADCHNL_LV_PACING_AMPLITUDE: 2 V
MDC_IDC_SET_LEADCHNL_LV_PACING_ANODE_ELECTRODE_1: NORMAL
MDC_IDC_SET_LEADCHNL_LV_PACING_ANODE_LOCATION_1: NORMAL
MDC_IDC_SET_LEADCHNL_LV_PACING_CAPTURE_MODE: NORMAL
MDC_IDC_SET_LEADCHNL_LV_PACING_CATHODE_ELECTRODE_1: NORMAL
MDC_IDC_SET_LEADCHNL_LV_PACING_CATHODE_LOCATION_1: NORMAL
MDC_IDC_SET_LEADCHNL_LV_PACING_PULSEWIDTH: 0.5 MS
MDC_IDC_SET_LEADCHNL_LV_SENSING_ADAPTATION_MODE: NORMAL
MDC_IDC_SET_LEADCHNL_LV_SENSING_ANODE_ELECTRODE_1: NORMAL
MDC_IDC_SET_LEADCHNL_LV_SENSING_ANODE_LOCATION_1: NORMAL
MDC_IDC_SET_LEADCHNL_LV_SENSING_CATHODE_ELECTRODE_1: NORMAL
MDC_IDC_SET_LEADCHNL_LV_SENSING_CATHODE_LOCATION_1: NORMAL
MDC_IDC_SET_LEADCHNL_LV_SENSING_SENSITIVITY: 1 MV
MDC_IDC_SET_LEADCHNL_RA_PACING_AMPLITUDE: 2.5 V
MDC_IDC_SET_LEADCHNL_RA_PACING_CAPTURE_MODE: NORMAL
MDC_IDC_SET_LEADCHNL_RA_PACING_POLARITY: NORMAL
MDC_IDC_SET_LEADCHNL_RA_PACING_PULSEWIDTH: 0.5 MS
MDC_IDC_SET_LEADCHNL_RA_SENSING_ADAPTATION_MODE: NORMAL
MDC_IDC_SET_LEADCHNL_RA_SENSING_POLARITY: NORMAL
MDC_IDC_SET_LEADCHNL_RA_SENSING_SENSITIVITY: 0.25 MV
MDC_IDC_SET_LEADCHNL_RV_PACING_AMPLITUDE: 2 V
MDC_IDC_SET_LEADCHNL_RV_PACING_CAPTURE_MODE: NORMAL
MDC_IDC_SET_LEADCHNL_RV_PACING_POLARITY: NORMAL
MDC_IDC_SET_LEADCHNL_RV_PACING_PULSEWIDTH: 0.4 MS
MDC_IDC_SET_LEADCHNL_RV_SENSING_ADAPTATION_MODE: NORMAL
MDC_IDC_SET_LEADCHNL_RV_SENSING_POLARITY: NORMAL
MDC_IDC_SET_LEADCHNL_RV_SENSING_SENSITIVITY: 0.6 MV
MDC_IDC_SET_ZONE_DETECTION_INTERVAL: 250 MS
MDC_IDC_SET_ZONE_DETECTION_INTERVAL: 300 MS
MDC_IDC_SET_ZONE_DETECTION_INTERVAL: 353 MS
MDC_IDC_SET_ZONE_TYPE: NORMAL
MDC_IDC_SET_ZONE_VENDOR_TYPE: NORMAL
MDC_IDC_STAT_AT_BURDEN_PERCENT: 0 %
MDC_IDC_STAT_AT_DTM_END: NORMAL
MDC_IDC_STAT_AT_DTM_START: NORMAL
MDC_IDC_STAT_BRADY_DTM_END: NORMAL
MDC_IDC_STAT_BRADY_DTM_START: NORMAL
MDC_IDC_STAT_BRADY_RA_PERCENT_PACED: 0 %
MDC_IDC_STAT_BRADY_RV_PERCENT_PACED: 20 %
MDC_IDC_STAT_CRT_DTM_END: NORMAL
MDC_IDC_STAT_CRT_DTM_START: NORMAL
MDC_IDC_STAT_CRT_LV_PERCENT_PACED: 98 %
MDC_IDC_STAT_EPISODE_RECENT_COUNT: 0
MDC_IDC_STAT_EPISODE_RECENT_COUNT_DTM_END: NORMAL
MDC_IDC_STAT_EPISODE_RECENT_COUNT_DTM_START: NORMAL
MDC_IDC_STAT_EPISODE_TYPE: NORMAL
MDC_IDC_STAT_EPISODE_VENDOR_TYPE: NORMAL
MDC_IDC_STAT_TACHYTHERAPY_ATP_DELIVERED_RECENT: 0
MDC_IDC_STAT_TACHYTHERAPY_ATP_DELIVERED_TOTAL: 0
MDC_IDC_STAT_TACHYTHERAPY_RECENT_DTM_END: NORMAL
MDC_IDC_STAT_TACHYTHERAPY_RECENT_DTM_START: NORMAL
MDC_IDC_STAT_TACHYTHERAPY_SHOCKS_ABORTED_RECENT: 0
MDC_IDC_STAT_TACHYTHERAPY_SHOCKS_ABORTED_TOTAL: 0
MDC_IDC_STAT_TACHYTHERAPY_SHOCKS_DELIVERED_RECENT: 0
MDC_IDC_STAT_TACHYTHERAPY_SHOCKS_DELIVERED_TOTAL: 0
MDC_IDC_STAT_TACHYTHERAPY_TOTAL_DTM_END: NORMAL
MDC_IDC_STAT_TACHYTHERAPY_TOTAL_DTM_START: NORMAL

## 2021-09-27 NOTE — PROGRESS NOTES
September 28, 2021     Mr. Anson Manriquez is a 63yr old male with a history of HTN, DMII, CAD (s/p STEMI in 2007 with pLAD stenting, and recent STEMI 9/2018), and ICM complicated by cardiogenic shock requiring IABP support with slow wean.     Mr. Manriquez was was out with friends and developed acute chest pain and after going home his pain worsened and had syncope, then cardiac arrest. EMS was called, and on arrival provided ~30 seconds of CPR and AED delivered 1 shock.  He was then brought to [facility] in sinus rhythm with a pulse for most of the transport.  During the last 5 mins en route to the ED, he went into a wide-complex tachycardia at a rate of 220bpm, likely VT.  He maintained a pulse, acceptable BP, and mental status.  He received amiodarone 150mg in the ED, and converted to NSR as he was being prepared for cardioversion.  He was then sent to the cath lab, where he was found to have an acute thrombotic lesion of the pLAD within the prior stent.  He was treated with aspiration thrombectomy, and STEVO x2 to pLAD and mLAD, and POBA to D1.  His LVEDP was 40mmHg. Peak troponin was ~17.  Echo showed LVEF 23% with LAD territory akinesis, consistent with echo results from 2012.  Therefore, his LAD had limited viability from his prior MI, which explains his relatively low troponin and unchanged LVEF. He was ultimately started on DAPT with aspirin and Brilinta, lisinopril, carvedilol, and lasix, and discharged home on 9/20/18.   He has been seen in clinic a few times now, with EF still 30-35%.  Despite evidence based therapy and his reduced EF, he went for CRT-D with Dr. Mckeon 4/25/19.    He was last seen on 1/2021. At the time of his visit, he was endorsing NYHA Class II symptoms.  He remained on OGDT during that visit.  During today's visit, he endorses NYHA Class II symptoms. Denies chest pain, SOB, lighteadedness and dizziness. He denies PND, orthopnea. 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  No bleeding no other complaints at this time.  He did have hernia repair in the past and though he also did have a abscess recently in the umbilical area.  This has popped and was treated with antibiotics appropriately with resolution.  He was very nervous about this because if this gets infected and continues to drain then he will need to have the mass.  Luckily so far everything looks good and healed.     PAST MEDICAL HISTORY:  Past Medical History:   Diagnosis Date     Anal fistula      Antiplatelet or antithrombotic long-term use      Coronary artery disease      Diabetes mellitus, type 2 (H)      Heart attack (H) 4/17/2007     Hyperlipidemia      Hypertension      Inguinal hernia      Ischemic cardiomyopathy      STEMI (ST elevation myocardial infarction) (H) 09/13/2018    s/p STEVO x2     Stented coronary artery 2007     FAMILY HISTORY:  Family History   Problem Relation Age of Onset     Hypertension Mother      Diabetes Father      Glaucoma No family hx of      Macular Degeneration No family hx of      SOCIAL HISTORY:  Social History     Socioeconomic History     Marital status:      Spouse name: Not on file     Number of children: Not on file     Years of education: Not on file     Highest education level: Not on file   Occupational History     Not on file   Tobacco Use     Smoking status: Never Smoker     Smokeless tobacco: Never Used   Substance and Sexual Activity     Alcohol use: No     Drug use: No     Sexual activity: Not on file   Other Topics Concern     Not on file   Social History Narrative     Not on file     Social Determinants of Health     Financial Resource Strain:      Difficulty of Paying Living Expenses:    Food Insecurity:      Worried About Running Out of Food in the Last Year:      Ran Out of Food in the Last Year:    Transportation Needs:      Lack of Transportation (Medical):      Lack of Transportation (Non-Medical):    Physical Activity:      Days of Exercise  per Week:      Minutes of Exercise per Session:    Stress:      Feeling of Stress :    Social Connections:      Frequency of Communication with Friends and Family:      Frequency of Social Gatherings with Friends and Family:      Attends Voodoo Services:      Active Member of Clubs or Organizations:      Attends Club or Organization Meetings:      Marital Status:    Intimate Partner Violence:      Fear of Current or Ex-Partner:      Emotionally Abused:      Physically Abused:      Sexually Abused:      CURRENT MEDICATIONS:  Current Outpatient Medications   Medication     aspirin (ASA) 81 MG chewable tablet     atorvastatin (LIPITOR) 80 MG tablet     blood glucose (NO BRAND SPECIFIED) lancets standard     blood glucose (NO BRAND SPECIFIED) lancets standard     blood glucose (NO BRAND SPECIFIED) test strip     blood glucose (NO BRAND SPECIFIED) test strip     blood glucose monitoring (NO BRAND SPECIFIED) meter device kit     blood glucose monitoring (NO BRAND SPECIFIED) meter device kit     carvedilol (COREG) 25 MG tablet     empagliflozin (JARDIANCE) 25 MG TABS tablet     furosemide (LASIX) 40 MG tablet     glipiZIDE (GLUCOTROL XL) 5 MG 24 hr tablet     MULTIPLE VITAMIN PO     sacubitril-valsartan (ENTRESTO) 24-26 MG per tablet     spironolactone (ALDACTONE) 25 MG tablet     ticagrelor (BRILINTA) 90 MG tablet     No current facility-administered medications for this visit.     Facility-Administered Medications Ordered in Other Visits   Medication     perflutren diluted 1mL to 2mL with saline (OPTISON) diluted injection 5 mL     sodium chloride (PF) 0.9% PF flush 10 mL     sodium chloride (PF) 0.9% PF flush 10 mL     ROS:   Constitutional: No fever, chills, or sweats. Weight is 180 lbs 12.8 oz  ENT: No visual disturbance, ear ache, epistaxis, sore throat.   Allergies/Immunologic: Negative.   Respiratory: No cough, hemoptysis.   Cardiovascular: As per HPI.   GI: No nausea, vomiting, hematemesis, melena, or  "hematochezia.   : No urinary frequency, dysuria, or hematuria.   Integument: Negative.   Psychiatric: Pleasant, no major depression noted  Neuro: No focal neurological deficits noted  Endocrinology: Negative.   Musculoskeletal: As per HPI.      EXAM:  /83 (BP Location: Right arm, Patient Position: Chair, Cuff Size: Adult Regular)   Pulse 89   Ht 1.67 m (5' 5.75\")   Wt 82 kg (180 lb 12.8 oz)   SpO2 97%   BMI 29.41 kg/m    General: appears comfortable, alert and oriented  Head: normocephalic, atraumatic  Eyes: anicteric sclera, EOMI , PERRL  Neck: no adenopathy  Orophyarynx: moist mucosa, no lesions noted  Heart: regular, S1/S2, no murmurs, rubs or gallop. Estimated JVP at 5 cmH2O  Lungs: CTAB, No wheezing.   Abdomen: soft, non-tender, bowel sounds present, no hepatosplenomegaly  Extremities: No LE edema today  Skin: no open lesions noted  Neuro: grossly non-focal     Labs:  Lab Results   Component Value Date    WBC 7.6 09/28/2021    HGB 16.1 09/28/2021    HCT 47.0 09/28/2021     09/28/2021     09/28/2021    POTASSIUM 4.2 09/28/2021    CHLORIDE 102 09/28/2021    CO2 28 09/28/2021    BUN 22 09/28/2021    CR 1.06 09/28/2021     (H) 09/28/2021    NTBNP 326 (H) 09/28/2021    TROPONIN 0.14 () 09/13/2018    TROPI 1.914 () 09/18/2018    AST 16 09/28/2021    ALT 32 09/28/2021    ALKPHOS 64 09/28/2021    BILITOTAL 1.1 09/28/2021    INR 1.10 04/05/2021     Coronary angio:  9/13/18  -Both coronary arteries arise from their respective cusps.  -Dominance: Right  -LM has no evidence of coronary artery disease.  -LAD: Type 3 [LAD supplies the entire apex]. The LAD gives rise to septal perforators, multiple diagonal branches. The proximal LAD has an acute thrombotic lesion within the prior stent. Distal to the stent, there is a 70-80% stenosis. The distal vessel has HALIMA 2 flow. The D1 has thrombus at the ostium likely from embolization. The apical LAD also has thrombus from embolization.  -LCX " gives rise to a large branching OM. The prox Cx has a 20% stenosis. The rest of the Cx system has mild disease.  -RCA (dominant) gives rise to PL branches and supplies PDA. There is minimal disease in the RCA system.     Echo:  9/14/18  Ischemic cardiomyopathy, LVEF=23% with extensive regional wall motion abnormalities as described below. Resting wall motion abnormalities and LV function are not significantly changed from the rest images on the 11/27/12 stress echocardiogram.  Mildly dilated LV with severely reduced LV function, LVEF=23%. There is akinesis involving the ynxsh-ls-egy anteroseptal, fdi-ym-cubzbn anterior, mid anterolateral, and all apical myocardial segments. The basal anterior, basal anterolateral, and basal inferolateral segments are relatively spared. RV size and function are normal. No significant valvular abnormalities. No pericardial effusion. Normal IVC with abnormal respiratory variation, estimated RA pressure 8 mmHg.     Echo 1/30/19   Ischemic cardiomyopathy with moderately dilated LV with moderately reduced  global LV function, LVEF=31%. Extensive regional wall motion abnormalities as  described below, unchanged from 9/14/18.  This study was compared with the study from 9/14/18: There has been no  significant change. Specifically, LV function and wall motion abnormalities  have not changed significantly.  The right atria appears normal. Severe left atrial enlargement is present. This study was compared with the study from 9/14/18 . There has been no significant change.     TTE 1/22/2020:  Ischemic cardiomyopathy with moderately dilated LV with moderately reduced  global LV function, LVEF=29%. Extensive regional wall motion abnormalities as  described below, unchanged from the prior study.  This study was compared with the study from 8/28/19: There has been no  significant change. Specifically, LV function and wall motion abnormalities  have not changed significantly.         ASSESSMENT AND  PLAN:  Mr. Anson Manriquez is a 63yr old male with a history of HTN, DMII, CAD (s/p STEMI in 2007 with pLAD stenting, and recent STEMI 9/2018), and ICM who presents for follow up visit.    #ICM  #HFrEF with an EF of 30-35%  Patient with NYHA Class II symptoms and overall doing well on OGDT. 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 From a symptoms perspective, he is at a good baseline. His BP is 130's today, as a result we have BP room to increase entresto further. He is left ventricular pacing at 98% based on his last device check on 9/15/2021.   - Coreg 25mg BID  - Aldactone 25mg qday  - Increase Entresto 24-26mg BID---> 49-51mg BID  - On Empagiflozin 25mg qday.  If he does have recurrent episodes of infection especially in the umbilical area where he does have the hernia and hernia repair with mesh then we may need to consider stopping empagliflozin.  - SCD: s/p CRT-D on 9/2019    CAD s/p pLAD stent  S/p STEVO to prox-mid LADx2 with POBA of D1 in 2018. Has been on brillinta since 9/2018. Will consider stopping today.   -ASA 81mg qday  - Stop Brillanta 90mg BID   - Lipitor 40mg qday     Staffed with Dr. Carlo Vanegas MD  Cardiology Fellow  [id number]    I have seen and evaluated the patient and agree with the assessment and plan as above.  Jason Matos MD

## 2021-09-28 ENCOUNTER — OFFICE VISIT (OUTPATIENT)
Dept: CARDIOLOGY | Facility: CLINIC | Age: 66
End: 2021-09-28
Attending: INTERNAL MEDICINE
Payer: COMMERCIAL

## 2021-09-28 ENCOUNTER — LAB (OUTPATIENT)
Dept: LAB | Facility: CLINIC | Age: 66
End: 2021-09-28
Payer: COMMERCIAL

## 2021-09-28 VITALS
DIASTOLIC BLOOD PRESSURE: 83 MMHG | HEART RATE: 89 BPM | SYSTOLIC BLOOD PRESSURE: 132 MMHG | HEIGHT: 66 IN | WEIGHT: 180.8 LBS | OXYGEN SATURATION: 97 % | BODY MASS INDEX: 29.06 KG/M2

## 2021-09-28 DIAGNOSIS — E78.2 MIXED HYPERLIPIDEMIA: ICD-10-CM

## 2021-09-28 DIAGNOSIS — I25.5 ISCHEMIC CARDIOMYOPATHY: ICD-10-CM

## 2021-09-28 DIAGNOSIS — I50.20 HEART FAILURE WITH REDUCED EJECTION FRACTION, NYHA CLASS III (H): ICD-10-CM

## 2021-09-28 DIAGNOSIS — I10 BENIGN ESSENTIAL HYPERTENSION: Primary | ICD-10-CM

## 2021-09-28 LAB
ALBUMIN SERPL-MCNC: 4 G/DL (ref 3.4–5)
ALP SERPL-CCNC: 64 U/L (ref 40–150)
ALT SERPL W P-5'-P-CCNC: 32 U/L (ref 0–70)
ANION GAP SERPL CALCULATED.3IONS-SCNC: 7 MMOL/L (ref 3–14)
AST SERPL W P-5'-P-CCNC: 16 U/L (ref 0–45)
BILIRUB SERPL-MCNC: 1.1 MG/DL (ref 0.2–1.3)
BUN SERPL-MCNC: 22 MG/DL (ref 7–30)
CALCIUM SERPL-MCNC: 9.3 MG/DL (ref 8.5–10.1)
CHLORIDE BLD-SCNC: 102 MMOL/L (ref 94–109)
CO2 SERPL-SCNC: 28 MMOL/L (ref 20–32)
CREAT SERPL-MCNC: 1.06 MG/DL (ref 0.66–1.25)
ERYTHROCYTE [DISTWIDTH] IN BLOOD BY AUTOMATED COUNT: 14.3 % (ref 10–15)
GFR SERPL CREATININE-BSD FRML MDRD: 73 ML/MIN/1.73M2
GLUCOSE BLD-MCNC: 246 MG/DL (ref 70–99)
HCT VFR BLD AUTO: 47 % (ref 40–53)
HGB BLD-MCNC: 16.1 G/DL (ref 13.3–17.7)
MCH RBC QN AUTO: 31.4 PG (ref 26.5–33)
MCHC RBC AUTO-ENTMCNC: 34.3 G/DL (ref 31.5–36.5)
MCV RBC AUTO: 92 FL (ref 78–100)
NT-PROBNP SERPL-MCNC: 326 PG/ML (ref 0–125)
PLATELET # BLD AUTO: 222 10E3/UL (ref 150–450)
POTASSIUM BLD-SCNC: 4.2 MMOL/L (ref 3.4–5.3)
PROT SERPL-MCNC: 8 G/DL (ref 6.8–8.8)
RBC # BLD AUTO: 5.12 10E6/UL (ref 4.4–5.9)
SODIUM SERPL-SCNC: 137 MMOL/L (ref 133–144)
WBC # BLD AUTO: 7.6 10E3/UL (ref 4–11)

## 2021-09-28 PROCEDURE — G0463 HOSPITAL OUTPT CLINIC VISIT: HCPCS

## 2021-09-28 PROCEDURE — 85027 COMPLETE CBC AUTOMATED: CPT | Performed by: PATHOLOGY

## 2021-09-28 PROCEDURE — 83880 ASSAY OF NATRIURETIC PEPTIDE: CPT | Performed by: PATHOLOGY

## 2021-09-28 PROCEDURE — 99214 OFFICE O/P EST MOD 30 MIN: CPT | Mod: GC | Performed by: INTERNAL MEDICINE

## 2021-09-28 PROCEDURE — 36415 COLL VENOUS BLD VENIPUNCTURE: CPT | Performed by: PATHOLOGY

## 2021-09-28 PROCEDURE — 80053 COMPREHEN METABOLIC PANEL: CPT | Performed by: PATHOLOGY

## 2021-09-28 RX ORDER — SACUBITRIL AND VALSARTAN 49; 51 MG/1; MG/1
1 TABLET, FILM COATED ORAL 2 TIMES DAILY
Qty: 180 TABLET | Refills: 3 | Status: SHIPPED | OUTPATIENT
Start: 2021-09-28 | End: 2022-04-05 | Stop reason: DRUGHIGH

## 2021-09-28 ASSESSMENT — PAIN SCALES - GENERAL: PAINLEVEL: NO PAIN (0)

## 2021-09-28 ASSESSMENT — MIFFLIN-ST. JEOR: SCORE: 1538.85

## 2021-09-28 NOTE — NURSING NOTE
Chief Complaint   Patient presents with     Follow Up     HFrEF     Vitals were taken and medications reconciled.    Qasim Yuen, EMT  8:51 AM

## 2021-09-28 NOTE — PATIENT INSTRUCTIONS
Dr. Matos recommends:    Stop taking Brilinta.    Increase Entresto to 49-51 MG twice daily.    Follow up clinic visit with Dr. Matos in 6 months with labs same day.    Thank you for your visit today.  Please call me with any questions or concerns.   Vladimir Servin RN  Cardiology Care Coordinator  [phone], press option 1 then option 4

## 2021-09-28 NOTE — LETTER
9/28/2021      RE: Anson Manriquez  [address]  [place] [address]       Dear Colleague,    Thank you for the opportunity to participate in the care of your patient, Anson Manriquez, at the [facility]. Please see a copy of my visit note below.    September 28, 2021     Mr. Anson Manriquez is a 63yr old male with a history of HTN, DMII, CAD (s/p STEMI in 2007 with pLAD stenting, and recent STEMI 9/2018), and ICM complicated by cardiogenic shock requiring IABP support with slow wean.     Mr. Manriquez was was out with friends and developed acute chest pain and after going home his pain worsened and had syncope, then cardiac arrest. EMS was called, and on arrival provided ~30 seconds of CPR and AED delivered 1 shock.  He was then brought to [facility] in sinus rhythm with a pulse for most of the transport.  During the last 5 mins en route to the ED, he went into a wide-complex tachycardia at a rate of 220bpm, likely VT.  He maintained a pulse, acceptable BP, and mental status.  He received amiodarone 150mg in the ED, and converted to NSR as he was being prepared for cardioversion.  He was then sent to the cath lab, where he was found to have an acute thrombotic lesion of the pLAD within the prior stent.  He was treated with aspiration thrombectomy, and STEVO x2 to pLAD and mLAD, and POBA to D1.  His LVEDP was 40mmHg. Peak troponin was ~17.  Echo showed LVEF 23% with LAD territory akinesis, consistent with echo results from 2012.  Therefore, his LAD had limited viability from his prior MI, which explains his relatively low troponin and unchanged LVEF. He was ultimately started on DAPT with aspirin and Brilinta, lisinopril, carvedilol, and lasix, and discharged home on 9/20/18.   He has been seen in clinic a few times now, with EF still 30-35%.  Despite evidence based therapy and his reduced EF, he went for CRT-D with Dr. Mckeon 4/25/19.     He was last seen on 1/2021. At the time of his visit, he was endorsing NYHA Class II symptoms.  He remained on OGDT during that visit.  During today's visit, he endorses NYHA Class II symptoms. Denies chest pain, SOB, lighteadedness and dizziness. He denies PND, orthopnea. 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  No bleeding no other complaints at this time.  He did have hernia repair in the past and though he also did have a abscess recently in the umbilical area.  This has popped and was treated with antibiotics appropriately with resolution.  He was very nervous about this because if this gets infected and continues to drain then he will need to have the mass.  Luckily so far everything looks good and healed.     PAST MEDICAL HISTORY:  Past Medical History:   Diagnosis Date     Anal fistula      Antiplatelet or antithrombotic long-term use      Coronary artery disease      Diabetes mellitus, type 2 (H)      Heart attack (H) 4/17/2007     Hyperlipidemia      Hypertension      Inguinal hernia      Ischemic cardiomyopathy      STEMI (ST elevation myocardial infarction) (H) 09/13/2018    s/p STEVO x2     Stented coronary artery 2007     FAMILY HISTORY:  Family History   Problem Relation Age of Onset     Hypertension Mother      Diabetes Father      Glaucoma No family hx of      Macular Degeneration No family hx of      SOCIAL HISTORY:  Social History     Socioeconomic History     Marital status:      Spouse name: Not on file     Number of children: Not on file     Years of education: Not on file     Highest education level: Not on file   Occupational History     Not on file   Tobacco Use     Smoking status: Never Smoker     Smokeless tobacco: Never Used   Substance and Sexual Activity     Alcohol use: No     Drug use: No     Sexual activity: Not on file   Other Topics Concern     Not on file   Social History Narrative     Not on file     Social Determinants of  Health     Financial Resource Strain:      Difficulty of Paying Living Expenses:    Food Insecurity:      Worried About Running Out of Food in the Last Year:      Ran Out of Food in the Last Year:    Transportation Needs:      Lack of Transportation (Medical):      Lack of Transportation (Non-Medical):    Physical Activity:      Days of Exercise per Week:      Minutes of Exercise per Session:    Stress:      Feeling of Stress :    Social Connections:      Frequency of Communication with Friends and Family:      Frequency of Social Gatherings with Friends and Family:      Attends Nondenominational Services:      Active Member of Clubs or Organizations:      Attends Club or Organization Meetings:      Marital Status:    Intimate Partner Violence:      Fear of Current or Ex-Partner:      Emotionally Abused:      Physically Abused:      Sexually Abused:      CURRENT MEDICATIONS:  Current Outpatient Medications   Medication     aspirin (ASA) 81 MG chewable tablet     atorvastatin (LIPITOR) 80 MG tablet     blood glucose (NO BRAND SPECIFIED) lancets standard     blood glucose (NO BRAND SPECIFIED) lancets standard     blood glucose (NO BRAND SPECIFIED) test strip     blood glucose (NO BRAND SPECIFIED) test strip     blood glucose monitoring (NO BRAND SPECIFIED) meter device kit     blood glucose monitoring (NO BRAND SPECIFIED) meter device kit     carvedilol (COREG) 25 MG tablet     empagliflozin (JARDIANCE) 25 MG TABS tablet     furosemide (LASIX) 40 MG tablet     glipiZIDE (GLUCOTROL XL) 5 MG 24 hr tablet     MULTIPLE VITAMIN PO     sacubitril-valsartan (ENTRESTO) 24-26 MG per tablet     spironolactone (ALDACTONE) 25 MG tablet     ticagrelor (BRILINTA) 90 MG tablet     No current facility-administered medications for this visit.     Facility-Administered Medications Ordered in Other Visits   Medication     perflutren diluted 1mL to 2mL with saline (OPTISON) diluted injection 5 mL     sodium chloride (PF) 0.9% PF flush 10 mL      "sodium chloride (PF) 0.9% PF flush 10 mL     ROS:   Constitutional: No fever, chills, or sweats. Weight is 180 lbs 12.8 oz  ENT: No visual disturbance, ear ache, epistaxis, sore throat.   Allergies/Immunologic: Negative.   Respiratory: No cough, hemoptysis.   Cardiovascular: As per HPI.   GI: No nausea, vomiting, hematemesis, melena, or hematochezia.   : No urinary frequency, dysuria, or hematuria.   Integument: Negative.   Psychiatric: Pleasant, no major depression noted  Neuro: No focal neurological deficits noted  Endocrinology: Negative.   Musculoskeletal: As per HPI.      EXAM:  /83 (BP Location: Right arm, Patient Position: Chair, Cuff Size: Adult Regular)   Pulse 89   Ht 1.67 m (5' 5.75\")   Wt 82 kg (180 lb 12.8 oz)   SpO2 97%   BMI 29.41 kg/m    General: appears comfortable, alert and oriented  Head: normocephalic, atraumatic  Eyes: anicteric sclera, EOMI , PERRL  Neck: no adenopathy  Orophyarynx: moist mucosa, no lesions noted  Heart: regular, S1/S2, no murmurs, rubs or gallop. Estimated JVP at 5 cmH2O  Lungs: CTAB, No wheezing.   Abdomen: soft, non-tender, bowel sounds present, no hepatosplenomegaly  Extremities: No LE edema today  Skin: no open lesions noted  Neuro: grossly non-focal     Labs:  Lab Results   Component Value Date    WBC 7.6 09/28/2021    HGB 16.1 09/28/2021    HCT 47.0 09/28/2021     09/28/2021     09/28/2021    POTASSIUM 4.2 09/28/2021    CHLORIDE 102 09/28/2021    CO2 28 09/28/2021    BUN 22 09/28/2021    CR 1.06 09/28/2021     (H) 09/28/2021    NTBNP 326 (H) 09/28/2021    TROPONIN 0.14 (HH) 09/13/2018    TROPI 1.914 () 09/18/2018    AST 16 09/28/2021    ALT 32 09/28/2021    ALKPHOS 64 09/28/2021    BILITOTAL 1.1 09/28/2021    INR 1.10 04/05/2021     Coronary angio:  9/13/18  -Both coronary arteries arise from their respective cusps.  -Dominance: Right  -LM has no evidence of coronary artery disease.  -LAD: Type 3 [LAD supplies the entire apex]. The LAD " gives rise to septal perforators, multiple diagonal branches. The proximal LAD has an acute thrombotic lesion within the prior stent. Distal to the stent, there is a 70-80% stenosis. The distal vessel has HALIMA 2 flow. The D1 has thrombus at the ostium likely from embolization. The apical LAD also has thrombus from embolization.  -LCX gives rise to a large branching OM. The prox Cx has a 20% stenosis. The rest of the Cx system has mild disease.  -RCA (dominant) gives rise to PL branches and supplies PDA. There is minimal disease in the RCA system.     Echo:  9/14/18  Ischemic cardiomyopathy, LVEF=23% with extensive regional wall motion abnormalities as described below. Resting wall motion abnormalities and LV function are not significantly changed from the rest images on the 11/27/12 stress echocardiogram.  Mildly dilated LV with severely reduced LV function, LVEF=23%. There is akinesis involving the dalqv-sm-lku anteroseptal, pmf-xw-uztquj anterior, mid anterolateral, and all apical myocardial segments. The basal anterior, basal anterolateral, and basal inferolateral segments are relatively spared. RV size and function are normal. No significant valvular abnormalities. No pericardial effusion. Normal IVC with abnormal respiratory variation, estimated RA pressure 8 mmHg.     Echo 1/30/19   Ischemic cardiomyopathy with moderately dilated LV with moderately reduced  global LV function, LVEF=31%. Extensive regional wall motion abnormalities as  described below, unchanged from 9/14/18.  This study was compared with the study from 9/14/18: There has been no  significant change. Specifically, LV function and wall motion abnormalities  have not changed significantly.  The right atria appears normal. Severe left atrial enlargement is present. This study was compared with the study from 9/14/18 . There has been no significant change.     TTE 1/22/2020:  Ischemic cardiomyopathy with moderately dilated LV with moderately  reduced  global LV function, LVEF=29%. Extensive regional wall motion abnormalities as  described below, unchanged from the prior study.  This study was compared with the study from 8/28/19: There has been no  significant change. Specifically, LV function and wall motion abnormalities  have not changed significantly.         ASSESSMENT AND PLAN:  Mr. Anson Manriquez is a 63yr old male with a history of HTN, DMII, CAD (s/p STEMI in 2007 with pLAD stenting, and recent STEMI 9/2018), and ICM who presents for follow up visit.    #ICM  #HFrEF with an EF of 30-35%  Patient with NYHA Class II symptoms and overall doing well on OGDT. 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 From a symptoms perspective, he is at a good baseline. His BP is 130's today, as a result we have BP room to increase entresto further. He is left ventricular pacing at 98% based on his last device check on 9/15/2021.   - Coreg 25mg BID  - Aldactone 25mg qday  - Increase Entresto 24-26mg BID---> 49-51mg BID  - On Empagiflozin 25mg qday.  If he does have recurrent episodes of infection especially in the umbilical area where he does have the hernia and hernia repair with mesh then we may need to consider stopping empagliflozin.  - SCD: s/p CRT-D on 9/2019    CAD s/p pLAD stent  S/p STEVO to prox-mid LADx2 with POBA of D1 in 2018. Has been on brillinta since 9/2018. Will consider stopping today.   -ASA 81mg qday  - Stop Brillanta 90mg BID   - Lipitor 40mg qday     Staffed with Dr. Carlo Vanegas MD  Cardiology Fellow  [id number]    I have seen and evaluated the patient and agree with the assessment and plan as above.  Jason Matos MD

## 2021-10-21 ENCOUNTER — NURSE TRIAGE (OUTPATIENT)
Dept: NURSING | Facility: CLINIC | Age: 66
End: 2021-10-21

## 2021-10-21 NOTE — TELEPHONE ENCOUNTER
Pt was seen by his cardiologist in September and is reporting that he believes he has been taking his medications wrong since then.     After further review of patient checking his medications:   Pt has been taking 2 carvedilol BID rather than 1 tablet BID.     Pt was having diarrhea and plans on taking regular dose starting today    Pt declined having writer transfer him to his PCP or Cardiologist and will do it on his own.  Pt given the following information per his visit summary:   Cardiology Care Coordinator  [phone], press option 1 then option 4    Routed message to PCP per protocol. No routing for Cardiologist.     Grace Ramirez RN  [facility] Nurse Advisor 9:02 AM 10/21/2021    Additional Information    Negative: Drug overdose and triager unable to answer question    Negative: Caller requesting information unrelated to medicine    Negative: Caller requesting a prescription for Strep throat and has a positive culture result    Negative: Rash while taking a medication or within 3 days of stopping it    Negative: Immunization reaction suspected    Negative: Asthma and having symptoms of asthma (cough, wheezing, etc.)    Negative: Breastfeeding questions about mother's medicines and diet    Negative: MORE THAN A DOUBLE DOSE of a prescription or over-the-counter (OTC) drug    Negative: DOUBLE DOSE (an extra dose or lesser amount) of over-the-counter (OTC) drug and any symptoms (e.g., dizziness, nausea, pain, sleepiness)    Negative: DOUBLE DOSE (an extra dose or lesser amount) of prescription drug and any symptoms (e.g., dizziness, nausea, pain, sleepiness)    Negative: Took another person's prescription drug    DOUBLE DOSE (an extra dose or lesser amount) of prescription drug and NO symptoms (Exception: a double dose of antibiotics)    Protocols used: MEDICATION QUESTION CALL-A-OH

## 2021-10-24 ENCOUNTER — HEALTH MAINTENANCE LETTER (OUTPATIENT)
Age: 66
End: 2021-10-24

## 2021-10-25 NOTE — PROGRESS NOTES
HPI:    Mr. Manriquez comes in for a physical today. Last telephone visit with me 4/19/2021. Overall doing well. He continues to work as a builder/contractor. He has 8 grandchildren and spent 5 hours swimming with some of them this weekend. He plays pool for fun. He does not smoke nor abuse EtOH. No new HEENT, cardiopulmonary, abdominal, , neurological, systemic, psychiatric, lymphatic, endocrine, vascular complaints.     Past Medical History:   Diagnosis Date     Anal fistula      Antiplatelet or antithrombotic long-term use      Coronary artery disease      Diabetes mellitus, type 2 (H)      Heart attack (H) 4/17/2007     Hyperlipidemia      Hypertension      Inguinal hernia      Ischemic cardiomyopathy      STEMI (ST elevation myocardial infarction) (H) 09/13/2018    s/p STEVO x2     Stented coronary artery 2007     Past Surgical History:   Procedure Laterality Date     EP ICD N/A 4/25/2019    Procedure: EP ICD [2578898];  Surgeon: Mick Mckeon MD;  Location:  [facility]     HERNIA REPAIR Right     Inguinal     HERNIORRHAPHY INGUINAL Left 2/20/2020    Procedure: Left HERNIORRHAPHY, INGUINAL, OPEN;  Surgeon: Flakito Massey MD;  Location: [facility] OR     HERNIORRHAPHY UMBILICAL N/A 10/16/2020    Procedure: HERNIORRHAPHY, UMBILICAL, OPEN, with mesh;  Surgeon: Flakito Massey MD;  Location: [facility] OR     IRRIGATION AND DEBRIDEMENT RECTUM, COMBINED      abcess near rectum      LAPAROSCOPIC CHOLECYSTECTOMY  3/29/2012    Procedure:LAPAROSCOPIC CHOLECYSTECTOMY; LAPAROSCOPIC CHOLECYSTECTOMY; Surgeon:MARILYNN PRESSLEY; Location:[facility] OR     STENT, CORONARY, OLIVIA       PE:    Vitals noted, gen, nad, cooperative, alert, neck supple nl rom, lungs with good air movement, RRR, S1, S2, no MRG, abdomen, no acute findings. Grossly normal neurological exam.     A/P:    1. Cardiology follow up with Dr. Matos 4/5/2022. On Coreg, Lasix, ASA, Entresto, and Spironolactone. Seen 9/28/2021 h/o CAD with EF now 30-35% range and  he has an ICD.  Electrolytes and Creatinine checked  9/28/2021  2. Immunizations; he has gotten 2 doses of the Pfizer COVID vaccination, Shingrix one dose? Influenza vaccine today 10/26/2021  3. Dermatology; he does not feel he needs to see dermatology for a skin check.   4. PSA; ordered 10/26/2021  5. Colonoscopy; 12/5/2012 and repeat in 10  Years   6. DM2; on Jardiance,  Glipizide. Ordered A1c and  Urine microalbumin   7. Increased lipids on Atorvastatin ordered lipids   8. Seen Colorectal 6/18/2021 for anal fissure    I will see him in six months.

## 2021-10-26 ENCOUNTER — LAB (OUTPATIENT)
Dept: LAB | Facility: CLINIC | Age: 66
End: 2021-10-26
Payer: COMMERCIAL

## 2021-10-26 ENCOUNTER — OFFICE VISIT (OUTPATIENT)
Dept: INTERNAL MEDICINE | Facility: CLINIC | Age: 66
End: 2021-10-26
Payer: COMMERCIAL

## 2021-10-26 VITALS
OXYGEN SATURATION: 97 % | DIASTOLIC BLOOD PRESSURE: 88 MMHG | RESPIRATION RATE: 16 BRPM | HEART RATE: 81 BPM | SYSTOLIC BLOOD PRESSURE: 142 MMHG | WEIGHT: 186 LBS | BODY MASS INDEX: 29.89 KG/M2 | HEIGHT: 66 IN

## 2021-10-26 DIAGNOSIS — I10 BENIGN ESSENTIAL HYPERTENSION: ICD-10-CM

## 2021-10-26 DIAGNOSIS — R73.09 INCREASED GLUCOSE LEVEL: ICD-10-CM

## 2021-10-26 DIAGNOSIS — I10 BENIGN ESSENTIAL HYPERTENSION: Primary | ICD-10-CM

## 2021-10-26 DIAGNOSIS — Z23 NEED FOR INFLUENZA VACCINATION: ICD-10-CM

## 2021-10-26 LAB
BASOPHILS # BLD AUTO: 0.1 10E3/UL (ref 0–0.2)
BASOPHILS NFR BLD AUTO: 1 %
CREAT UR-MCNC: 14 MG/DL
EOSINOPHIL # BLD AUTO: 0.1 10E3/UL (ref 0–0.7)
EOSINOPHIL NFR BLD AUTO: 1 %
ERYTHROCYTE [DISTWIDTH] IN BLOOD BY AUTOMATED COUNT: 14 % (ref 10–15)
HBA1C MFR BLD: 9 % (ref 0–5.6)
HCT VFR BLD AUTO: 46.2 % (ref 40–53)
HGB BLD-MCNC: 15.6 G/DL (ref 13.3–17.7)
IMM GRANULOCYTES # BLD: 0.1 10E3/UL
IMM GRANULOCYTES NFR BLD: 1 %
LYMPHOCYTES # BLD AUTO: 1.8 10E3/UL (ref 0.8–5.3)
LYMPHOCYTES NFR BLD AUTO: 20 %
MCH RBC QN AUTO: 31.3 PG (ref 26.5–33)
MCHC RBC AUTO-ENTMCNC: 33.8 G/DL (ref 31.5–36.5)
MCV RBC AUTO: 93 FL (ref 78–100)
MICROALBUMIN UR-MCNC: <5 MG/L
MICROALBUMIN/CREAT UR: NORMAL MG/G{CREAT}
MONOCYTES # BLD AUTO: 0.7 10E3/UL (ref 0–1.3)
MONOCYTES NFR BLD AUTO: 8 %
NEUTROPHILS # BLD AUTO: 6.4 10E3/UL (ref 1.6–8.3)
NEUTROPHILS NFR BLD AUTO: 69 %
NRBC # BLD AUTO: 0 10E3/UL
NRBC BLD AUTO-RTO: 0 /100
PLATELET # BLD AUTO: 209 10E3/UL (ref 150–450)
PSA SERPL-MCNC: 2 UG/L (ref 0–4)
RBC # BLD AUTO: 4.99 10E6/UL (ref 4.4–5.9)
WBC # BLD AUTO: 9.1 10E3/UL (ref 4–11)

## 2021-10-26 PROCEDURE — 36415 COLL VENOUS BLD VENIPUNCTURE: CPT | Performed by: PATHOLOGY

## 2021-10-26 PROCEDURE — 83036 HEMOGLOBIN GLYCOSYLATED A1C: CPT | Performed by: PATHOLOGY

## 2021-10-26 PROCEDURE — 90662 IIV NO PRSV INCREASED AG IM: CPT | Performed by: INTERNAL MEDICINE

## 2021-10-26 PROCEDURE — 90471 IMMUNIZATION ADMIN: CPT | Performed by: INTERNAL MEDICINE

## 2021-10-26 PROCEDURE — G0103 PSA SCREENING: HCPCS | Performed by: PATHOLOGY

## 2021-10-26 PROCEDURE — 82043 UR ALBUMIN QUANTITATIVE: CPT | Performed by: PATHOLOGY

## 2021-10-26 PROCEDURE — 99397 PER PM REEVAL EST PAT 65+ YR: CPT | Mod: 25 | Performed by: INTERNAL MEDICINE

## 2021-10-26 PROCEDURE — 85025 COMPLETE CBC W/AUTO DIFF WBC: CPT | Performed by: PATHOLOGY

## 2021-10-26 ASSESSMENT — PAIN SCALES - GENERAL: PAINLEVEL: NO PAIN (0)

## 2021-10-26 ASSESSMENT — MIFFLIN-ST. JEOR: SCORE: 1566.44

## 2021-10-26 NOTE — NURSING NOTE
Anson Manriquez is a 66 year old male patient that presents today in clinic for the following:    Chief Complaint   Patient presents with     Physical     Patient comes for an annual physical.      The patient's allergies and medications were reviewed as noted. A set of vitals were recorded as noted without incident. The patient does not have any other questions for the provider.    Librado Mixon, EMT at 7:28 AM on 10/26/2021

## 2021-10-28 ENCOUNTER — TELEPHONE (OUTPATIENT)
Dept: INTERNAL MEDICINE | Facility: CLINIC | Age: 66
End: 2021-10-28

## 2021-10-28 DIAGNOSIS — R73.09 INCREASED GLUCOSE LEVEL: Primary | ICD-10-CM

## 2021-11-08 DIAGNOSIS — E78.2 MIXED HYPERLIPIDEMIA: ICD-10-CM

## 2021-11-08 DIAGNOSIS — E11.9 TYPE 2 DIABETES MELLITUS WITHOUT COMPLICATION, WITHOUT LONG-TERM CURRENT USE OF INSULIN (H): ICD-10-CM

## 2021-11-08 DIAGNOSIS — I10 BENIGN ESSENTIAL HYPERTENSION: ICD-10-CM

## 2021-11-08 NOTE — TELEPHONE ENCOUNTER
[facility] Call Center    Phone Message    May a detailed message be left on voicemail: yes     Reason for Call: Medication Refill Request    Has the patient contacted the pharmacy for the refill? Yes   Name of medication being requested: atorvastatin (LIPITOR) 80 MG tablet  Provider who prescribed the medication: Kisha  Pharmacy: [id number] [place] [id number]  AT [facility]    Date medication is needed: As soon as possible patient is out.         Action Taken: Message routed to:  [facility]    Travel Screening: Not Applicable

## 2021-11-10 RX ORDER — ATORVASTATIN CALCIUM 80 MG/1
80 TABLET, FILM COATED ORAL DAILY
Qty: 90 TABLET | Refills: 0 | Status: SHIPPED | OUTPATIENT
Start: 2021-11-10 | End: 2022-02-07

## 2021-11-10 NOTE — TELEPHONE ENCOUNTER
Last Clinic Visit: 10/26/2021  [facility] [place]  90 DAY BE - LDL OVERDUE

## 2021-11-29 NOTE — TELEPHONE ENCOUNTER
RECORDS RECEIVED FROM: Internal   DATE RECEIVED: 12.3.21   NOTES (FOR ALL VISITS) STATUS DETAILS   OFFICE NOTES from referring provider Internal 10.28.21, 4.19.21 MARLENA Beard    OFFICE NOTES from other specialist Internal 5.4.21 MARLENA Munroe Ophthalomogy   ED NOTES N/A    OPERATIVE REPORT  (thyroid, pituitary, adrenal, parathyroid) N/A    MEDICATION LIST Internal    IMAGING      DEXASCAN N/A    MRI (BRAIN) N/A    XR (Chest) N/A    CT (HEAD/NECK/CHEST/ABDOMEN) Internal 4.5.21 ab pelvis   NUCLEAR  N/A    ULTRASOUND (HEAD/NECK) N/A    LABS     DIABETES: HBGA1C, CREATININE, FASTING LIPIDS, MICROALBUMIN URINE, POTASSIUM, TSH, T4    THYROID: TSH, T4, CBC, THYRODLONULIN, TOTAL T3, FREE T4, CALCITONIN, CEA Internal

## 2021-12-03 ENCOUNTER — PRE VISIT (OUTPATIENT)
Dept: ENDOCRINOLOGY | Facility: CLINIC | Age: 66
End: 2021-12-03

## 2021-12-06 DIAGNOSIS — I21.02 ST ELEVATION MYOCARDIAL INFARCTION INVOLVING LEFT ANTERIOR DESCENDING (LAD) CORONARY ARTERY (H): ICD-10-CM

## 2021-12-07 RX ORDER — ASPIRIN 81 MG/1
81 TABLET, CHEWABLE ORAL DAILY
Qty: 90 TABLET | Refills: 3 | Status: SHIPPED | OUTPATIENT
Start: 2021-12-07 | End: 2022-12-27

## 2021-12-09 ENCOUNTER — ANCILLARY PROCEDURE (OUTPATIENT)
Dept: CARDIOLOGY | Facility: CLINIC | Age: 66
End: 2021-12-09
Attending: INTERNAL MEDICINE
Payer: COMMERCIAL

## 2021-12-09 DIAGNOSIS — I25.5 ISCHEMIC CARDIOMYOPATHY: ICD-10-CM

## 2021-12-09 DIAGNOSIS — I42.9 CARDIOMYOPATHY, UNSPECIFIED TYPE (H): ICD-10-CM

## 2021-12-09 PROCEDURE — 93296 REM INTERROG EVL PM/IDS: CPT

## 2021-12-09 PROCEDURE — 93295 DEV INTERROG REMOTE 1/2/MLT: CPT | Performed by: INTERNAL MEDICINE

## 2021-12-15 DIAGNOSIS — E11.9 TYPE 2 DIABETES MELLITUS WITHOUT COMPLICATION, WITHOUT LONG-TERM CURRENT USE OF INSULIN (H): Primary | ICD-10-CM

## 2021-12-27 DIAGNOSIS — E11.9 TYPE 2 DIABETES MELLITUS WITHOUT COMPLICATION, WITHOUT LONG-TERM CURRENT USE OF INSULIN (H): ICD-10-CM

## 2021-12-30 RX ORDER — EMPAGLIFLOZIN 25 MG/1
TABLET, FILM COATED ORAL
Qty: 90 TABLET | Refills: 3 | OUTPATIENT
Start: 2021-12-30

## 2022-01-26 LAB
MDC_IDC_EPISODE_DTM: NORMAL
MDC_IDC_EPISODE_ID: NORMAL
MDC_IDC_EPISODE_TYPE: NORMAL
MDC_IDC_LEAD_IMPLANT_DT: NORMAL
MDC_IDC_LEAD_LOCATION: NORMAL
MDC_IDC_LEAD_LOCATION_DETAIL_1: NORMAL
MDC_IDC_LEAD_MFG: NORMAL
MDC_IDC_LEAD_MODEL: NORMAL
MDC_IDC_LEAD_POLARITY_TYPE: NORMAL
MDC_IDC_LEAD_SERIAL: NORMAL
MDC_IDC_LEAD_SPECIAL_FUNCTION: NORMAL
MDC_IDC_MSMT_BATTERY_DTM: NORMAL
MDC_IDC_MSMT_BATTERY_REMAINING_LONGEVITY: 114 MO
MDC_IDC_MSMT_BATTERY_REMAINING_PERCENTAGE: 100 %
MDC_IDC_MSMT_BATTERY_STATUS: NORMAL
MDC_IDC_MSMT_CAP_CHARGE_DTM: NORMAL
MDC_IDC_MSMT_CAP_CHARGE_TIME: 10.4 S
MDC_IDC_MSMT_CAP_CHARGE_TYPE: NORMAL
MDC_IDC_MSMT_LEADCHNL_LV_IMPEDANCE_VALUE: 756 OHM
MDC_IDC_MSMT_LEADCHNL_LV_PACING_THRESHOLD_AMPLITUDE: 1.4 V
MDC_IDC_MSMT_LEADCHNL_LV_PACING_THRESHOLD_PULSEWIDTH: 0.5 MS
MDC_IDC_MSMT_LEADCHNL_RA_IMPEDANCE_VALUE: 576 OHM
MDC_IDC_MSMT_LEADCHNL_RV_IMPEDANCE_VALUE: 696 OHM
MDC_IDC_MSMT_LEADCHNL_RV_PACING_THRESHOLD_AMPLITUDE: 0.7 V
MDC_IDC_MSMT_LEADCHNL_RV_PACING_THRESHOLD_PULSEWIDTH: 0.4 MS
MDC_IDC_PG_IMPLANT_DTM: NORMAL
MDC_IDC_PG_MFG: NORMAL
MDC_IDC_PG_MODEL: NORMAL
MDC_IDC_PG_SERIAL: NORMAL
MDC_IDC_PG_TYPE: NORMAL
MDC_IDC_SESS_CLINIC_NAME: NORMAL
MDC_IDC_SESS_DTM: NORMAL
MDC_IDC_SESS_TYPE: NORMAL
MDC_IDC_SET_BRADY_AT_MODE_SWITCH_MODE: NORMAL
MDC_IDC_SET_BRADY_AT_MODE_SWITCH_RATE: 170 {BEATS}/MIN
MDC_IDC_SET_BRADY_LOWRATE: 60 {BEATS}/MIN
MDC_IDC_SET_BRADY_MAX_SENSOR_RATE: 130 {BEATS}/MIN
MDC_IDC_SET_BRADY_MAX_TRACKING_RATE: 130 {BEATS}/MIN
MDC_IDC_SET_BRADY_MODE: NORMAL
MDC_IDC_SET_BRADY_PAV_DELAY_LOW: 180 MS
MDC_IDC_SET_BRADY_SAV_DELAY_LOW: 140 MS
MDC_IDC_SET_CRT_PACED_CHAMBERS: NORMAL
MDC_IDC_SET_LEADCHNL_LV_PACING_AMPLITUDE: 2.6 V
MDC_IDC_SET_LEADCHNL_LV_PACING_ANODE_ELECTRODE_1: NORMAL
MDC_IDC_SET_LEADCHNL_LV_PACING_ANODE_LOCATION_1: NORMAL
MDC_IDC_SET_LEADCHNL_LV_PACING_CAPTURE_MODE: NORMAL
MDC_IDC_SET_LEADCHNL_LV_PACING_CATHODE_ELECTRODE_1: NORMAL
MDC_IDC_SET_LEADCHNL_LV_PACING_CATHODE_LOCATION_1: NORMAL
MDC_IDC_SET_LEADCHNL_LV_PACING_PULSEWIDTH: 0.5 MS
MDC_IDC_SET_LEADCHNL_LV_SENSING_ADAPTATION_MODE: NORMAL
MDC_IDC_SET_LEADCHNL_LV_SENSING_ANODE_ELECTRODE_1: NORMAL
MDC_IDC_SET_LEADCHNL_LV_SENSING_ANODE_LOCATION_1: NORMAL
MDC_IDC_SET_LEADCHNL_LV_SENSING_CATHODE_ELECTRODE_1: NORMAL
MDC_IDC_SET_LEADCHNL_LV_SENSING_CATHODE_LOCATION_1: NORMAL
MDC_IDC_SET_LEADCHNL_LV_SENSING_SENSITIVITY: 1 MV
MDC_IDC_SET_LEADCHNL_RA_PACING_AMPLITUDE: 2.5 V
MDC_IDC_SET_LEADCHNL_RA_PACING_CAPTURE_MODE: NORMAL
MDC_IDC_SET_LEADCHNL_RA_PACING_POLARITY: NORMAL
MDC_IDC_SET_LEADCHNL_RA_PACING_PULSEWIDTH: 0.5 MS
MDC_IDC_SET_LEADCHNL_RA_SENSING_ADAPTATION_MODE: NORMAL
MDC_IDC_SET_LEADCHNL_RA_SENSING_POLARITY: NORMAL
MDC_IDC_SET_LEADCHNL_RA_SENSING_SENSITIVITY: 0.25 MV
MDC_IDC_SET_LEADCHNL_RV_PACING_AMPLITUDE: 2 V
MDC_IDC_SET_LEADCHNL_RV_PACING_CAPTURE_MODE: NORMAL
MDC_IDC_SET_LEADCHNL_RV_PACING_POLARITY: NORMAL
MDC_IDC_SET_LEADCHNL_RV_PACING_PULSEWIDTH: 0.4 MS
MDC_IDC_SET_LEADCHNL_RV_SENSING_ADAPTATION_MODE: NORMAL
MDC_IDC_SET_LEADCHNL_RV_SENSING_POLARITY: NORMAL
MDC_IDC_SET_LEADCHNL_RV_SENSING_SENSITIVITY: 0.6 MV
MDC_IDC_SET_ZONE_DETECTION_INTERVAL: 250 MS
MDC_IDC_SET_ZONE_DETECTION_INTERVAL: 300 MS
MDC_IDC_SET_ZONE_DETECTION_INTERVAL: 353 MS
MDC_IDC_SET_ZONE_TYPE: NORMAL
MDC_IDC_SET_ZONE_VENDOR_TYPE: NORMAL
MDC_IDC_STAT_AT_BURDEN_PERCENT: 1 %
MDC_IDC_STAT_AT_DTM_END: NORMAL
MDC_IDC_STAT_AT_DTM_START: NORMAL
MDC_IDC_STAT_BRADY_DTM_END: NORMAL
MDC_IDC_STAT_BRADY_DTM_START: NORMAL
MDC_IDC_STAT_BRADY_RA_PERCENT_PACED: 0 %
MDC_IDC_STAT_BRADY_RV_PERCENT_PACED: 17 %
MDC_IDC_STAT_CRT_DTM_END: NORMAL
MDC_IDC_STAT_CRT_DTM_START: NORMAL
MDC_IDC_STAT_CRT_LV_PERCENT_PACED: 98 %
MDC_IDC_STAT_EPISODE_RECENT_COUNT: 0
MDC_IDC_STAT_EPISODE_RECENT_COUNT: 1
MDC_IDC_STAT_EPISODE_RECENT_COUNT_DTM_END: NORMAL
MDC_IDC_STAT_EPISODE_RECENT_COUNT_DTM_START: NORMAL
MDC_IDC_STAT_EPISODE_TYPE: NORMAL
MDC_IDC_STAT_EPISODE_VENDOR_TYPE: NORMAL
MDC_IDC_STAT_TACHYTHERAPY_ATP_DELIVERED_RECENT: 0
MDC_IDC_STAT_TACHYTHERAPY_ATP_DELIVERED_TOTAL: 0
MDC_IDC_STAT_TACHYTHERAPY_RECENT_DTM_END: NORMAL
MDC_IDC_STAT_TACHYTHERAPY_RECENT_DTM_START: NORMAL
MDC_IDC_STAT_TACHYTHERAPY_SHOCKS_ABORTED_RECENT: 0
MDC_IDC_STAT_TACHYTHERAPY_SHOCKS_ABORTED_TOTAL: 0
MDC_IDC_STAT_TACHYTHERAPY_SHOCKS_DELIVERED_RECENT: 0
MDC_IDC_STAT_TACHYTHERAPY_SHOCKS_DELIVERED_TOTAL: 0
MDC_IDC_STAT_TACHYTHERAPY_TOTAL_DTM_END: NORMAL
MDC_IDC_STAT_TACHYTHERAPY_TOTAL_DTM_START: NORMAL

## 2022-02-07 DIAGNOSIS — E11.9 TYPE 2 DIABETES MELLITUS WITHOUT COMPLICATION, WITHOUT LONG-TERM CURRENT USE OF INSULIN (H): ICD-10-CM

## 2022-02-07 DIAGNOSIS — I10 BENIGN ESSENTIAL HYPERTENSION: ICD-10-CM

## 2022-02-07 DIAGNOSIS — E78.2 MIXED HYPERLIPIDEMIA: ICD-10-CM

## 2022-02-07 NOTE — TELEPHONE ENCOUNTER
ATORVASTATIN 80MG TABLETS      Last Written Prescription Date:  11-10-21  Last Fill Quantity: 90,   # refills: 0  Last Office Visit : 10-26-21  Future Office visit:  4-26-22    Routing refill request to provider for review/approval because:  Overdue lab:LDL, FYI to clinic  Previous 90 day naif given

## 2022-02-07 NOTE — TELEPHONE ENCOUNTER
[facility]    Phone Message    May a detailed message be left on voicemail: yes     Reason for Call: Medication Refill Request    Has the patient contacted the pharmacy for the refill? Yes   Name of medication being requested: atorvastatin (LIPITOR) 80 MG tablet     Provider who prescribed the medication: Dr. Beard  Pharmacy: [facility]  Date medication is needed: 2/7/22   Pt needing this script today, pt has one tablet left      Action Taken: Message routed to:  [facility] maria

## 2022-02-08 RX ORDER — ATORVASTATIN CALCIUM 80 MG/1
80 TABLET, FILM COATED ORAL DAILY
Qty: 60 TABLET | Refills: 0 | Status: SHIPPED | OUTPATIENT
Start: 2022-02-08 | End: 2022-04-12

## 2022-02-21 DIAGNOSIS — I21.02 ST ELEVATION MYOCARDIAL INFARCTION INVOLVING LEFT ANTERIOR DESCENDING (LAD) CORONARY ARTERY (H): ICD-10-CM

## 2022-02-21 DIAGNOSIS — I50.20 HEART FAILURE WITH REDUCED EJECTION FRACTION, NYHA CLASS III (H): ICD-10-CM

## 2022-02-23 RX ORDER — CARVEDILOL 25 MG/1
25 TABLET ORAL 2 TIMES DAILY WITH MEALS
Qty: 180 TABLET | Refills: 0 | Status: SHIPPED | OUTPATIENT
Start: 2022-02-23 | End: 2022-06-07

## 2022-02-23 NOTE — TELEPHONE ENCOUNTER
LCV: 9/28/2021  [facility]  BP above protocol- FYI to clinic  RF 90 day  NCV: 4-5-22    BP Readings from Last 3 Encounters:   10/26/21 (!) 142/88   09/28/21 132/83   06/18/21 130/76

## 2022-03-12 DIAGNOSIS — I50.20 HEART FAILURE WITH REDUCED EJECTION FRACTION, NYHA CLASS III (H): Primary | ICD-10-CM

## 2022-03-17 RX ORDER — SPIRONOLACTONE 25 MG/1
25 TABLET ORAL DAILY
Qty: 90 TABLET | Refills: 0 | Status: SHIPPED | OUTPATIENT
Start: 2022-03-17 | End: 2022-06-15

## 2022-04-04 NOTE — PROGRESS NOTES
April 4, 2022     Mr. Anson Manriquez is a 66yr old male with a history of HTN, DMII, CAD (s/p STEMI in 2007 with pLAD stenting, and recent STEMI 9/2018), and ICM complicated by cardiogenic shock requiring IABP support with slow wean.      Mr. Manriquez was was out with friends and developed acute chest pain and after going home his pain worsened and had syncope, then cardiac arrest. EMS was called, and on arrival provided ~30 seconds of CPR and AED delivered 1 shock.  He was then brought to [facility] in sinus rhythm with a pulse for most of the transport.  During the last 5 mins en route to the ED, he went into a wide-complex tachycardia at a rate of 220bpm, likely VT.  He maintained a pulse, acceptable BP, and mental status.  He received amiodarone 150mg in the ED, and converted to NSR as he was being prepared for cardioversion.  He was then sent to the cath lab, where he was found to have an acute thrombotic lesion of the pLAD within the prior stent.  He was treated with aspiration thrombectomy, and STEVO x2 to pLAD and mLAD, and POBA to D1.  His LVEDP was 40mmHg. Peak troponin was ~17.  Echo showed LVEF 23% with LAD territory akinesis, consistent with echo results from 2012.  Therefore, his LAD had limited viability from his prior MI, which explains his relatively low troponin and unchanged LVEF. He was ultimately started on DAPT with aspirin and Brilinta, lisinopril, carvedilol, and lasix, and discharged home on 9/20/18.   He has been seen in clinic a few times now, with EF still 30-35%.  Despite evidence based therapy and his reduced EF, he went for CRT-D with Dr. Mckeon 4/25/19.    He was last seen on 1/2021. At the time of his visit, he was endorsing NYHA Class II symptoms.  He remained on OGDT during that visit.  Mr. Manriquez has been doing very well overall.  He remains active and has many building projects going on.  He denies any dizziness lightheadedness no episodes of chest pain no lower extremity edema shortness of  breath.  He has been able to do all his activities he has been able to do in the past.  He takes no medications religiously.  No other complaints whatsoever overall.  Continues to have class II symptoms     PAST MEDICAL HISTORY:  Past Medical History:   Diagnosis Date     Anal fistula      Antiplatelet or antithrombotic long-term use      Coronary artery disease      Diabetes mellitus, type 2 (H)      Heart attack (H) 4/17/2007     Hyperlipidemia      Hypertension      Inguinal hernia      Ischemic cardiomyopathy      STEMI (ST elevation myocardial infarction) (H) 09/13/2018    s/p STEVO x2     Stented coronary artery 2007     FAMILY HISTORY:  Family History   Problem Relation Age of Onset     Hypertension Mother      Diabetes Father      Glaucoma No family hx of      Macular Degeneration No family hx of      SOCIAL HISTORY:  Social History     Socioeconomic History     Marital status:      Spouse name: Not on file     Number of children: Not on file     Years of education: Not on file     Highest education level: Not on file   Occupational History     Not on file   Tobacco Use     Smoking status: Never Smoker     Smokeless tobacco: Never Used   Substance and Sexual Activity     Alcohol use: No     Drug use: No     Sexual activity: Not on file   Other Topics Concern     Not on file   Social History Narrative     Not on file     Social Determinants of Health     Financial Resource Strain: Not on file   Food Insecurity: Not on file   Transportation Needs: Not on file   Physical Activity: Not on file   Stress: Not on file   Social Connections: Not on file   Intimate Partner Violence: Not on file   Housing Stability: Not on file     CURRENT MEDICATIONS:  Current Outpatient Medications   Medication     spironolactone (ALDACTONE) 25 MG tablet     aspirin (ASA) 81 MG chewable tablet     atorvastatin (LIPITOR) 80 MG tablet     blood glucose (NO BRAND SPECIFIED) lancets standard     blood glucose (NO BRAND SPECIFIED)  "lancets standard     blood glucose (NO BRAND SPECIFIED) test strip     blood glucose (NO BRAND SPECIFIED) test strip     blood glucose monitoring (NO BRAND SPECIFIED) meter device kit     blood glucose monitoring (NO BRAND SPECIFIED) meter device kit     carvedilol (COREG) 25 MG tablet     empagliflozin (JARDIANCE) 25 MG TABS tablet     furosemide (LASIX) 40 MG tablet     glipiZIDE (GLUCOTROL XL) 5 MG 24 hr tablet     MULTIPLE VITAMIN PO     sacubitril-valsartan (ENTRESTO) 49-51 MG per tablet     No current facility-administered medications for this visit.     Facility-Administered Medications Ordered in Other Visits   Medication     perflutren diluted 1mL to 2mL with saline (OPTISON) diluted injection 5 mL     sodium chloride (PF) 0.9% PF flush 10 mL     sodium chloride (PF) 0.9% PF flush 10 mL     ROS:   Constitutional: No fever, chills, or sweats. Weight is 0 lbs 0 oz  ENT: No visual disturbance, ear ache, epistaxis, sore throat.   Allergies/Immunologic: Negative.   Respiratory: No cough, hemoptysis.   Cardiovascular: As per HPI.   GI: No nausea, vomiting, hematemesis, melena, or hematochezia.   : No urinary frequency, dysuria, or hematuria.   Integument: Negative.   Psychiatric: Pleasant, no major depression noted  Neuro: No focal neurological deficits noted  Endocrinology: Negative.   Musculoskeletal: As per HPI.      EXAM:  BP (!) 148/83 (BP Location: Right arm, Patient Position: Chair, Cuff Size: Adult Regular)   Pulse 88   Ht 1.691 m (5' 6.58\")   Wt 83.9 kg (185 lb)   SpO2 96%   BMI 29.35 kg/m    General: appears comfortable, alert and oriented  Head: normocephalic, atraumatic  Eyes: anicteric sclera, EOMI , PERRL  Neck: no adenopathy  Orophyarynx: moist mucosa, no lesions noted  Heart: regular, S1/S2, no murmurs, rubs or gallop. Estimated JVP at 5 cmH2O  Lungs: CTAB, No wheezing.   Abdomen: soft, non-tender, bowel sounds present, no hepatosplenomegaly  Extremities: No LE edema today  Skin: no open " lesions noted  Neuro: grossly non-focal     Labs:  Lab Results   Component Value Date    WBC 9.1 10/26/2021    HGB 15.6 10/26/2021    HCT 46.2 10/26/2021     10/26/2021     09/28/2021    POTASSIUM 4.2 09/28/2021    CHLORIDE 102 09/28/2021    CO2 28 09/28/2021    BUN 22 09/28/2021    CR 1.06 09/28/2021     (H) 09/28/2021    NTBNP 326 (H) 09/28/2021    TROPONIN 0.14 (HH) 09/13/2018    TROPI 1.914 (HH) 09/18/2018    AST 16 09/28/2021    ALT 32 09/28/2021    ALKPHOS 64 09/28/2021    BILITOTAL 1.1 09/28/2021    INR 1.10 04/05/2021     Coronary angio:  9/13/18  -Both coronary arteries arise from their respective cusps.  -Dominance: Right  -LM has no evidence of coronary artery disease.  -LAD: Type 3 [LAD supplies the entire apex]. The LAD gives rise to septal perforators, multiple diagonal branches. The proximal LAD has an acute thrombotic lesion within the prior stent. Distal to the stent, there is a 70-80% stenosis. The distal vessel has HALIMA 2 flow. The D1 has thrombus at the ostium likely from embolization. The apical LAD also has thrombus from embolization.  -LCX gives rise to a large branching OM. The prox Cx has a 20% stenosis. The rest of the Cx system has mild disease.  -RCA (dominant) gives rise to PL branches and supplies PDA. There is minimal disease in the RCA system.     Echo:  9/14/18  Ischemic cardiomyopathy, LVEF=23% with extensive regional wall motion abnormalities as described below. Resting wall motion abnormalities and LV function are not significantly changed from the rest images on the 11/27/12 stress echocardiogram.  Mildly dilated LV with severely reduced LV function, LVEF=23%. There is akinesis involving the [id number]-dl-xyd anteroseptal, ipd-[id number]-[id number] anterior, mid anterolateral, and all apical myocardial segments. The basal anterior, basal anterolateral, and basal inferolateral segments are relatively spared. RV size and function are normal. No significant valvular  abnormalities. No pericardial effusion. Normal IVC with abnormal respiratory variation, estimated RA pressure 8 mmHg.     Echo 1/30/19   Ischemic cardiomyopathy with moderately dilated LV with moderately reduced  global LV function, LVEF=31%. Extensive regional wall motion abnormalities as  described below, unchanged from 9/14/18.  This study was compared with the study from 9/14/18: There has been no  significant change. Specifically, LV function and wall motion abnormalities  have not changed significantly.  The right atria appears normal. Severe left atrial enlargement is present. This study was compared with the study from 9/14/18 . There has been no significant change.     TTE 1/22/2020:  Ischemic cardiomyopathy with moderately dilated LV with moderately reduced  global LV function, LVEF=29%. Extensive regional wall motion abnormalities as  described below, unchanged from the prior study.  This study was compared with the study from 8/28/19: There has been no  significant change. Specifically, LV function and wall motion abnormalities  have not changed significantly.         ASSESSMENT AND PLAN:  Mr. Anson Manriquez is a 66yr old male with a history of HTN, DMII, CAD (s/p STEMI in 2007 with pLAD stenting, and recent STEMI 9/2018), and ICM who presents for follow up visit.  Overall he has been doing very well without any new complaints feeling well no chest pain remains active no shortness of breath.     #ICM  #HFrEF with an EF of 30-35%  Patient with NYHA Class II symptoms and overall doing well on OGDT. 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 From a symptoms perspective, he is at a good baseline. His BP is 130's today, as a result we have BP room to increase entresto further. He is left ventricular pacing at 98% based on his last device check on 9/15/2021.   - Coreg 25mg BID  - Aldactone 25mg qday  - Increase Entresto from 49 mg twice daily to 97 mg twice daily  - On  Empagiflozin 25mg qday.   He has been tolerating this higher dose well without any complaints.  There is no evidence of infection or infections in the past.  If he does have these then we will consider consider either stopping or reducing the dose to 10 mg daily.  - SCD: s/p CRT-D on 9/2019     CAD s/p pLAD stent  -ASA 81mg qday continue  - Stop Brilinta Brillanta in the past  - Lipitor 40mg qday continue    We will see him back in 6 months with echo and labs.  I appreciate the opportunity to participate in the care of Anson Manriquez . Please do not hesitate to contact me with any further questions.    Sincerely,   Jason Matos MD  [facility] Division of Cardiology

## 2022-04-05 ENCOUNTER — OFFICE VISIT (OUTPATIENT)
Dept: CARDIOLOGY | Facility: CLINIC | Age: 67
End: 2022-04-05
Attending: INTERNAL MEDICINE
Payer: COMMERCIAL

## 2022-04-05 ENCOUNTER — LAB (OUTPATIENT)
Dept: LAB | Facility: CLINIC | Age: 67
End: 2022-04-05
Payer: COMMERCIAL

## 2022-04-05 VITALS
SYSTOLIC BLOOD PRESSURE: 148 MMHG | HEIGHT: 67 IN | HEART RATE: 88 BPM | OXYGEN SATURATION: 96 % | BODY MASS INDEX: 29.03 KG/M2 | WEIGHT: 185 LBS | DIASTOLIC BLOOD PRESSURE: 83 MMHG

## 2022-04-05 DIAGNOSIS — I50.20 HEART FAILURE WITH REDUCED EJECTION FRACTION, NYHA CLASS III (H): ICD-10-CM

## 2022-04-05 DIAGNOSIS — I25.5 ISCHEMIC CARDIOMYOPATHY: ICD-10-CM

## 2022-04-05 DIAGNOSIS — I10 BENIGN ESSENTIAL HYPERTENSION: Primary | ICD-10-CM

## 2022-04-05 DIAGNOSIS — E78.2 MIXED HYPERLIPIDEMIA: ICD-10-CM

## 2022-04-05 DIAGNOSIS — I42.9 CARDIOMYOPATHY, UNSPECIFIED TYPE (H): ICD-10-CM

## 2022-04-05 LAB
ALBUMIN SERPL-MCNC: 4 G/DL (ref 3.4–5)
ALP SERPL-CCNC: 54 U/L (ref 40–150)
ALT SERPL W P-5'-P-CCNC: 29 U/L (ref 0–70)
ANION GAP SERPL CALCULATED.3IONS-SCNC: 7 MMOL/L (ref 3–14)
AST SERPL W P-5'-P-CCNC: 17 U/L (ref 0–45)
BILIRUB SERPL-MCNC: 1.2 MG/DL (ref 0.2–1.3)
BUN SERPL-MCNC: 20 MG/DL (ref 7–30)
CALCIUM SERPL-MCNC: 9 MG/DL (ref 8.5–10.1)
CHLORIDE BLD-SCNC: 107 MMOL/L (ref 94–109)
CO2 SERPL-SCNC: 28 MMOL/L (ref 20–32)
CREAT SERPL-MCNC: 1.02 MG/DL (ref 0.66–1.25)
ERYTHROCYTE [DISTWIDTH] IN BLOOD BY AUTOMATED COUNT: 14.5 % (ref 10–15)
GFR SERPL CREATININE-BSD FRML MDRD: 81 ML/MIN/1.73M2
GLUCOSE BLD-MCNC: 241 MG/DL (ref 70–99)
HCT VFR BLD AUTO: 49 % (ref 40–53)
HGB BLD-MCNC: 16.5 G/DL (ref 13.3–17.7)
MCH RBC QN AUTO: 31.2 PG (ref 26.5–33)
MCHC RBC AUTO-ENTMCNC: 33.7 G/DL (ref 31.5–36.5)
MCV RBC AUTO: 93 FL (ref 78–100)
NT-PROBNP SERPL-MCNC: 267 PG/ML (ref 0–125)
PLATELET # BLD AUTO: 180 10E3/UL (ref 150–450)
POTASSIUM BLD-SCNC: 4.2 MMOL/L (ref 3.4–5.3)
PROT SERPL-MCNC: 7.6 G/DL (ref 6.8–8.8)
RBC # BLD AUTO: 5.29 10E6/UL (ref 4.4–5.9)
SODIUM SERPL-SCNC: 142 MMOL/L (ref 133–144)
WBC # BLD AUTO: 7.7 10E3/UL (ref 4–11)

## 2022-04-05 PROCEDURE — 99214 OFFICE O/P EST MOD 30 MIN: CPT | Mod: 25 | Performed by: INTERNAL MEDICINE

## 2022-04-05 PROCEDURE — G0463 HOSPITAL OUTPT CLINIC VISIT: HCPCS

## 2022-04-05 PROCEDURE — 80053 COMPREHEN METABOLIC PANEL: CPT | Performed by: PATHOLOGY

## 2022-04-05 PROCEDURE — 93284 PRGRMG EVAL IMPLANTABLE DFB: CPT | Performed by: INTERNAL MEDICINE

## 2022-04-05 PROCEDURE — 36415 COLL VENOUS BLD VENIPUNCTURE: CPT | Performed by: PATHOLOGY

## 2022-04-05 PROCEDURE — 85027 COMPLETE CBC AUTOMATED: CPT | Performed by: PATHOLOGY

## 2022-04-05 PROCEDURE — 83880 ASSAY OF NATRIURETIC PEPTIDE: CPT | Performed by: PATHOLOGY

## 2022-04-05 RX ORDER — SACUBITRIL AND VALSARTAN 97; 103 MG/1; MG/1
1 TABLET, FILM COATED ORAL 2 TIMES DAILY
Qty: 180 TABLET | Refills: 3 | Status: SHIPPED | OUTPATIENT
Start: 2022-04-05 | End: 2023-06-02

## 2022-04-05 ASSESSMENT — PAIN SCALES - GENERAL: PAINLEVEL: NO PAIN (0)

## 2022-04-05 NOTE — PATIENT INSTRUCTIONS
It was a pleasure to see you in clinic today.  Please do not hesitate to call with any questions or concerns.  You are scheduled for a remote transmission on 7/12/22.  We look forward to seeing you in clinic at your next device check in 6 months.    Vladimir Nelson, RN  Electrophysiology Nurse Clinician  [facility]    During Business Hours Please Call:  [phone]  After Hours Please Call:  [phone] - select option #4 and ask for job code [phone]

## 2022-04-05 NOTE — NURSING NOTE
Chief Complaint   Patient presents with     Follow Up     --Dr. Matos: HFrEF     Vitals were taken and medications reconciled.    John Meyers, EMT  3:16 PM

## 2022-04-05 NOTE — LETTER
4/5/2022      RE: Anson Manriquez  [address]  [facility] [address]       Dear Colleague,    Thank you for the opportunity to participate in the care of your patient, Anson Manriquez, at the [facility]ART [facility]. Please see a copy of my visit note below.    April 4, 2022     Mr. Anson Manriquez is a 66yr old male with a history of HTN, DMII, CAD (s/p STEMI in 2007 with pLAD stenting, and recent STEMI 9/2018), and ICM complicated by cardiogenic shock requiring IABP support with slow wean.      Mr. Manriquez was was out with friends and developed acute chest pain and after going home his pain worsened and had syncope, then cardiac arrest. EMS was called, and on arrival provided ~30 seconds of CPR and AED delivered 1 shock.  He was then brought to [facility] in sinus rhythm with a pulse for most of the transport.  During the last 5 mins en route to the ED, he went into a wide-complex tachycardia at a rate of 220bpm, likely VT.  He maintained a pulse, acceptable BP, and mental status.  He received amiodarone 150mg in the ED, and converted to NSR as he was being prepared for cardioversion.  He was then sent to the cath lab, where he was found to have an acute thrombotic lesion of the pLAD within the prior stent.  He was treated with aspiration thrombectomy, and STEVO x2 to pLAD and mLAD, and POBA to D1.  His LVEDP was 40mmHg. Peak troponin was ~17.  Echo showed LVEF 23% with LAD territory akinesis, consistent with echo results from 2012.  Therefore, his LAD had limited viability from his prior MI, which explains his relatively low troponin and unchanged LVEF. He was ultimately started on DAPT with aspirin and Brilinta, lisinopril, carvedilol, and lasix, and discharged home on 9/20/18.   He has been seen in clinic a few times now, with EF still 30-35%.  Despite evidence based therapy and his reduced EF, he went for CRT-D with Dr. Mckeon 4/25/19.    He was  last seen on 1/2021. At the time of his visit, he was endorsing NYHA Class II symptoms.  He remained on OGDT during that visit.  Mr. Manriquez has been doing very well overall.  He remains active and has many building projects going on.  He denies any dizziness lightheadedness no episodes of chest pain no lower extremity edema shortness of breath.  He has been able to do all his activities he has been able to do in the past.  He takes no medications religiously.  No other complaints whatsoever overall.  Continues to have class II symptoms     PAST MEDICAL HISTORY:  Past Medical History:   Diagnosis Date     Anal fistula      Antiplatelet or antithrombotic long-term use      Coronary artery disease      Diabetes mellitus, type 2 (H)      Heart attack (H) 4/17/2007     Hyperlipidemia      Hypertension      Inguinal hernia      Ischemic cardiomyopathy      STEMI (ST elevation myocardial infarction) (H) 09/13/2018    s/p STEVO x2     Stented coronary artery 2007     FAMILY HISTORY:  Family History   Problem Relation Age of Onset     Hypertension Mother      Diabetes Father      Glaucoma No family hx of      Macular Degeneration No family hx of      SOCIAL HISTORY:  Social History     Socioeconomic History     Marital status:      Spouse name: Not on file     Number of children: Not on file     Years of education: Not on file     Highest education level: Not on file   Occupational History     Not on file   Tobacco Use     Smoking status: Never Smoker     Smokeless tobacco: Never Used   Substance and Sexual Activity     Alcohol use: No     Drug use: No     Sexual activity: Not on file   Other Topics Concern     Not on file   Social History Narrative     Not on file     Social Determinants of Health     Financial Resource Strain: Not on file   Food Insecurity: Not on file   Transportation Needs: Not on file   Physical Activity: Not on file   Stress: Not on file   Social Connections: Not on file   Intimate Partner  "Violence: Not on file   Housing Stability: Not on file     CURRENT MEDICATIONS:  Current Outpatient Medications   Medication     spironolactone (ALDACTONE) 25 MG tablet     aspirin (ASA) 81 MG chewable tablet     atorvastatin (LIPITOR) 80 MG tablet     blood glucose (NO BRAND SPECIFIED) lancets standard     blood glucose (NO BRAND SPECIFIED) lancets standard     blood glucose (NO BRAND SPECIFIED) test strip     blood glucose (NO BRAND SPECIFIED) test strip     blood glucose monitoring (NO BRAND SPECIFIED) meter device kit     blood glucose monitoring (NO BRAND SPECIFIED) meter device kit     carvedilol (COREG) 25 MG tablet     empagliflozin (JARDIANCE) 25 MG TABS tablet     furosemide (LASIX) 40 MG tablet     glipiZIDE (GLUCOTROL XL) 5 MG 24 hr tablet     MULTIPLE VITAMIN PO     sacubitril-valsartan (ENTRESTO) 49-51 MG per tablet     No current facility-administered medications for this visit.     Facility-Administered Medications Ordered in Other Visits   Medication     perflutren diluted 1mL to 2mL with saline (OPTISON) diluted injection 5 mL     sodium chloride (PF) 0.9% PF flush 10 mL     sodium chloride (PF) 0.9% PF flush 10 mL     ROS:   Constitutional: No fever, chills, or sweats. Weight is 0 lbs 0 oz  ENT: No visual disturbance, ear ache, epistaxis, sore throat.   Allergies/Immunologic: Negative.   Respiratory: No cough, hemoptysis.   Cardiovascular: As per HPI.   GI: No nausea, vomiting, hematemesis, melena, or hematochezia.   : No urinary frequency, dysuria, or hematuria.   Integument: Negative.   Psychiatric: Pleasant, no major depression noted  Neuro: No focal neurological deficits noted  Endocrinology: Negative.   Musculoskeletal: As per HPI.      EXAM:  BP (!) 148/83 (BP Location: Right arm, Patient Position: Chair, Cuff Size: Adult Regular)   Pulse 88   Ht 1.691 m (5' 6.58\")   Wt 83.9 kg (185 lb)   SpO2 96%   BMI 29.35 kg/m    General: appears comfortable, alert and oriented  Head: " normocephalic, atraumatic  Eyes: anicteric sclera, EOMI , PERRL  Neck: no adenopathy  Orophyarynx: moist mucosa, no lesions noted  Heart: regular, S1/S2, no murmurs, rubs or gallop. Estimated JVP at 5 cmH2O  Lungs: CTAB, No wheezing.   Abdomen: soft, non-tender, bowel sounds present, no hepatosplenomegaly  Extremities: No LE edema today  Skin: no open lesions noted  Neuro: grossly non-focal     Labs:  Lab Results   Component Value Date    WBC 9.1 10/26/2021    HGB 15.6 10/26/2021    HCT 46.2 10/26/2021     10/26/2021     09/28/2021    POTASSIUM 4.2 09/28/2021    CHLORIDE 102 09/28/2021    CO2 28 09/28/2021    BUN 22 09/28/2021    CR 1.06 09/28/2021     (H) 09/28/2021    NTBNP 326 (H) 09/28/2021    TROPONIN 0.14 (HH) 09/13/2018    TROPI 1.914 (HH) 09/18/2018    AST 16 09/28/2021    ALT 32 09/28/2021    ALKPHOS 64 09/28/2021    BILITOTAL 1.1 09/28/2021    INR 1.10 04/05/2021     Coronary angio:  9/13/18  -Both coronary arteries arise from their respective cusps.  -Dominance: Right  -LM has no evidence of coronary artery disease.  -LAD: Type 3 [LAD supplies the entire apex]. The LAD gives rise to septal perforators, multiple diagonal branches. The proximal LAD has an acute thrombotic lesion within the prior stent. Distal to the stent, there is a 70-80% stenosis. The distal vessel has HALIMA 2 flow. The D1 has thrombus at the ostium likely from embolization. The apical LAD also has thrombus from embolization.  -LCX gives rise to a large branching OM. The prox Cx has a 20% stenosis. The rest of the Cx system has mild disease.  -RCA (dominant) gives rise to PL branches and supplies PDA. There is minimal disease in the RCA system.     Echo:  9/14/18  Ischemic cardiomyopathy, LVEF=23% with extensive regional wall motion abnormalities as described below. Resting wall motion abnormalities and LV function are not significantly changed from the rest images on the 11/27/12 stress echocardiogram.  Mildly dilated  LV with severely reduced LV function, LVEF=23%. There is akinesis involving the jihpf-cb-wci anteroseptal, xme-yi-npcuqi anterior, mid anterolateral, and all apical myocardial segments. The basal anterior, basal anterolateral, and basal inferolateral segments are relatively spared. RV size and function are normal. No significant valvular abnormalities. No pericardial effusion. Normal IVC with abnormal respiratory variation, estimated RA pressure 8 mmHg.     Echo 1/30/19   Ischemic cardiomyopathy with moderately dilated LV with moderately reduced  global LV function, LVEF=31%. Extensive regional wall motion abnormalities as  described below, unchanged from 9/14/18.  This study was compared with the study from 9/14/18: There has been no  significant change. Specifically, LV function and wall motion abnormalities  have not changed significantly.  The right atria appears normal. Severe left atrial enlargement is present. This study was compared with the study from 9/14/18 . There has been no significant change.     TTE 1/22/2020:  Ischemic cardiomyopathy with moderately dilated LV with moderately reduced  global LV function, LVEF=29%. Extensive regional wall motion abnormalities as  described below, unchanged from the prior study.  This study was compared with the study from 8/28/19: There has been no  significant change. Specifically, LV function and wall motion abnormalities  have not changed significantly.         ASSESSMENT AND PLAN:  Mr. Anson Manriquez is a 66yr old male with a history of HTN, DMII, CAD (s/p STEMI in 2007 with pLAD stenting, and recent STEMI 9/2018), and ICM who presents for follow up visit.  Overall he has been doing very well without any new complaints feeling well no chest pain remains active no shortness of breath.     #ICM  #HFrEF with an EF of 30-35%  Patient with NYHA Class II symptoms and overall doing well on OGDT. 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 From a symptoms perspective, he is at a good baseline. His BP is 130's today, as a result we have BP room to increase entresto further. He is left ventricular pacing at 98% based on his last device check on 9/15/2021.   - Coreg 25mg BID  - Aldactone 25mg qday  - Increase Entresto from 49 mg twice daily to 97 mg twice daily  - On Empagiflozin 25mg qday.   He has been tolerating this higher dose well without any complaints.  There is no evidence of infection or infections in the past.  If he does have these then we will consider consider either stopping or reducing the dose to 10 mg daily.  - SCD: s/p CRT-D on 9/2019     CAD s/p pLAD stent  -ASA 81mg qday continue  - Stop Brilinta Brillanta in the past  - Lipitor 40mg qday continue    We will see him back in 6 months with echo and labs.  I appreciate the opportunity to participate in the care of Anson Manriquez . Please do not hesitate to contact me with any further questions.          Please do not hesitate to contact me if you have any questions/concerns.     Sincerely,     Jason Matos MD

## 2022-04-05 NOTE — PATIENT INSTRUCTIONS
Dr. Matos recommends:    Increase Entresto to  MG twice daily.    Follow up ijnic visit with Dr. Matos in 6 months with an echocardiogram and labs same day.    Thank you for your visit today.  Please call me with any questions or concerns.   Vladimir Servin RN  Cardiology Care Coordinator  [phone], press option 1 then option 4

## 2022-04-08 DIAGNOSIS — E11.9 TYPE 2 DIABETES MELLITUS WITHOUT COMPLICATION, WITHOUT LONG-TERM CURRENT USE OF INSULIN (H): ICD-10-CM

## 2022-04-08 DIAGNOSIS — I10 BENIGN ESSENTIAL HYPERTENSION: ICD-10-CM

## 2022-04-08 DIAGNOSIS — E78.2 MIXED HYPERLIPIDEMIA: ICD-10-CM

## 2022-04-11 ENCOUNTER — TELEPHONE (OUTPATIENT)
Dept: INTERNAL MEDICINE | Facility: CLINIC | Age: 67
End: 2022-04-11
Payer: COMMERCIAL

## 2022-04-11 DIAGNOSIS — I25.10 CORONARY ARTERY DISEASE INVOLVING NATIVE CORONARY ARTERY OF NATIVE HEART WITHOUT ANGINA PECTORIS: ICD-10-CM

## 2022-04-11 DIAGNOSIS — E78.2 MIXED HYPERLIPIDEMIA: ICD-10-CM

## 2022-04-11 DIAGNOSIS — I21.3 STEMI (ST ELEVATION MYOCARDIAL INFARCTION) (H): ICD-10-CM

## 2022-04-11 DIAGNOSIS — E11.9 TYPE 2 DIABETES MELLITUS WITHOUT COMPLICATION, WITHOUT LONG-TERM CURRENT USE OF INSULIN (H): Primary | ICD-10-CM

## 2022-04-11 DIAGNOSIS — I25.5 ISCHEMIC CARDIOMYOPATHY: ICD-10-CM

## 2022-04-11 NOTE — TELEPHONE ENCOUNTER
Health Call Center    Phone Message    May a detailed message be left on voicemail: yes     Reason for Call: Medication Question or concern regarding medication   Prescription Clarification   Name of Medication:   Atorvastatin Calcium 80 MG     Prescribing Provider: Dr. Beard   Pharmacy: [facility] #[id number] [address] AT [facility]   What on the order needs clarification? Patients wife calling stating is out of his above medication and requesting that be filled today 04/11/2022. Please call with questions thank you.           Action Taken: Message routed to:  [facility] pcc    Travel Screening: Not Applicable

## 2022-04-12 RX ORDER — ATORVASTATIN CALCIUM 80 MG/1
80 TABLET, FILM COATED ORAL DAILY
Qty: 60 TABLET | Refills: 4 | OUTPATIENT
Start: 2022-04-12

## 2022-04-12 RX ORDER — ATORVASTATIN CALCIUM 80 MG/1
TABLET, FILM COATED ORAL
Qty: 60 TABLET | Refills: 0 | Status: SHIPPED | OUTPATIENT
Start: 2022-04-12 | End: 2022-04-12

## 2022-04-12 NOTE — TELEPHONE ENCOUNTER
atorvastatin (LIPITOR) 80 MG tablet  Request is pending with provider  Thank-you, Nancy POWELL RN Medication Refill Team

## 2022-04-13 RX ORDER — ATORVASTATIN CALCIUM 80 MG/1
80 TABLET, FILM COATED ORAL DAILY
Qty: 90 TABLET | Refills: 3 | Status: SHIPPED | OUTPATIENT
Start: 2022-04-13 | End: 2023-04-19

## 2022-04-13 NOTE — TELEPHONE ENCOUNTER
Health Call Center    Phone Message    May a detailed message be left on voicemail: yes     Reason for Call: Medication Question or concern regarding medication   Prescription Clarification  Name of Medication: atorvastatin (LIPITOR) 80 MG tablet  Prescribing Provider: Dr. Beard   Pharmacy: [facility][id number] [address] [facility]   What on the order needs clarification? Patient calling about status of his meds. Patient has been out for 2 days. Patient said he was denied these meds. Patient said his second heart attack was due to him being off these meds since his stint got plugged up.     Please call back patient asap to discuss.          Action Taken: Message routed to:  [facility]    Travel Screening: Not Applicable

## 2022-04-18 LAB
MDC_IDC_LEAD_IMPLANT_DT: NORMAL
MDC_IDC_LEAD_LOCATION: NORMAL
MDC_IDC_LEAD_LOCATION_DETAIL_1: NORMAL
MDC_IDC_LEAD_MFG: NORMAL
MDC_IDC_LEAD_MODEL: NORMAL
MDC_IDC_LEAD_POLARITY_TYPE: NORMAL
MDC_IDC_LEAD_SERIAL: NORMAL
MDC_IDC_LEAD_SPECIAL_FUNCTION: NORMAL
MDC_IDC_MSMT_BATTERY_DTM: NORMAL
MDC_IDC_MSMT_BATTERY_REMAINING_LONGEVITY: 114 MO
MDC_IDC_MSMT_BATTERY_REMAINING_PERCENTAGE: 100 %
MDC_IDC_MSMT_BATTERY_STATUS: NORMAL
MDC_IDC_MSMT_CAP_CHARGE_DTM: NORMAL
MDC_IDC_MSMT_CAP_CHARGE_TIME: 10.4 S
MDC_IDC_MSMT_CAP_CHARGE_TYPE: NORMAL
MDC_IDC_MSMT_LEADCHNL_LV_IMPEDANCE_VALUE: 698 OHM
MDC_IDC_MSMT_LEADCHNL_LV_LEAD_CHANNEL_STATUS: NORMAL
MDC_IDC_MSMT_LEADCHNL_LV_PACING_THRESHOLD_AMPLITUDE: 1.5 V
MDC_IDC_MSMT_LEADCHNL_LV_PACING_THRESHOLD_PULSEWIDTH: 0.5 MS
MDC_IDC_MSMT_LEADCHNL_RA_IMPEDANCE_VALUE: 546 OHM
MDC_IDC_MSMT_LEADCHNL_RA_PACING_THRESHOLD_AMPLITUDE: 0.6 V
MDC_IDC_MSMT_LEADCHNL_RA_PACING_THRESHOLD_PULSEWIDTH: 0.5 MS
MDC_IDC_MSMT_LEADCHNL_RV_IMPEDANCE_VALUE: 656 OHM
MDC_IDC_MSMT_LEADCHNL_RV_PACING_THRESHOLD_AMPLITUDE: 0.7 V
MDC_IDC_MSMT_LEADCHNL_RV_PACING_THRESHOLD_PULSEWIDTH: 0.4 MS
MDC_IDC_PG_IMPLANT_DTM: NORMAL
MDC_IDC_PG_MFG: NORMAL
MDC_IDC_PG_MODEL: NORMAL
MDC_IDC_PG_SERIAL: NORMAL
MDC_IDC_PG_TYPE: NORMAL
MDC_IDC_SESS_CLINIC_NAME: NORMAL
MDC_IDC_SESS_DTM: NORMAL
MDC_IDC_SESS_TYPE: NORMAL
MDC_IDC_SET_BRADY_AT_MODE_SWITCH_MODE: NORMAL
MDC_IDC_SET_BRADY_AT_MODE_SWITCH_RATE: 170 {BEATS}/MIN
MDC_IDC_SET_BRADY_LOWRATE: 60 {BEATS}/MIN
MDC_IDC_SET_BRADY_MAX_SENSOR_RATE: 130 {BEATS}/MIN
MDC_IDC_SET_BRADY_MAX_TRACKING_RATE: 130 {BEATS}/MIN
MDC_IDC_SET_BRADY_MODE: NORMAL
MDC_IDC_SET_BRADY_PAV_DELAY_LOW: 180 MS
MDC_IDC_SET_BRADY_SAV_DELAY_LOW: 140 MS
MDC_IDC_SET_CRT_PACED_CHAMBERS: NORMAL
MDC_IDC_SET_LEADCHNL_LV_PACING_AMPLITUDE: 2.7 V
MDC_IDC_SET_LEADCHNL_LV_PACING_ANODE_ELECTRODE_1: NORMAL
MDC_IDC_SET_LEADCHNL_LV_PACING_ANODE_LOCATION_1: NORMAL
MDC_IDC_SET_LEADCHNL_LV_PACING_CAPTURE_MODE: NORMAL
MDC_IDC_SET_LEADCHNL_LV_PACING_CATHODE_ELECTRODE_1: NORMAL
MDC_IDC_SET_LEADCHNL_LV_PACING_CATHODE_LOCATION_1: NORMAL
MDC_IDC_SET_LEADCHNL_LV_PACING_PULSEWIDTH: 0.5 MS
MDC_IDC_SET_LEADCHNL_LV_SENSING_ADAPTATION_MODE: NORMAL
MDC_IDC_SET_LEADCHNL_LV_SENSING_ANODE_ELECTRODE_1: NORMAL
MDC_IDC_SET_LEADCHNL_LV_SENSING_ANODE_LOCATION_1: NORMAL
MDC_IDC_SET_LEADCHNL_LV_SENSING_CATHODE_ELECTRODE_1: NORMAL
MDC_IDC_SET_LEADCHNL_LV_SENSING_CATHODE_LOCATION_1: NORMAL
MDC_IDC_SET_LEADCHNL_LV_SENSING_SENSITIVITY: 1 MV
MDC_IDC_SET_LEADCHNL_RA_PACING_AMPLITUDE: 2.5 V
MDC_IDC_SET_LEADCHNL_RA_PACING_CAPTURE_MODE: NORMAL
MDC_IDC_SET_LEADCHNL_RA_PACING_POLARITY: NORMAL
MDC_IDC_SET_LEADCHNL_RA_PACING_PULSEWIDTH: 0.5 MS
MDC_IDC_SET_LEADCHNL_RA_SENSING_ADAPTATION_MODE: NORMAL
MDC_IDC_SET_LEADCHNL_RA_SENSING_POLARITY: NORMAL
MDC_IDC_SET_LEADCHNL_RA_SENSING_SENSITIVITY: 0.25 MV
MDC_IDC_SET_LEADCHNL_RV_PACING_AMPLITUDE: 2 V
MDC_IDC_SET_LEADCHNL_RV_PACING_CAPTURE_MODE: NORMAL
MDC_IDC_SET_LEADCHNL_RV_PACING_POLARITY: NORMAL
MDC_IDC_SET_LEADCHNL_RV_PACING_PULSEWIDTH: 0.4 MS
MDC_IDC_SET_LEADCHNL_RV_SENSING_ADAPTATION_MODE: NORMAL
MDC_IDC_SET_LEADCHNL_RV_SENSING_POLARITY: NORMAL
MDC_IDC_SET_LEADCHNL_RV_SENSING_SENSITIVITY: 0.6 MV
MDC_IDC_SET_ZONE_DETECTION_INTERVAL: 250 MS
MDC_IDC_SET_ZONE_DETECTION_INTERVAL: 300 MS
MDC_IDC_SET_ZONE_DETECTION_INTERVAL: 353 MS
MDC_IDC_SET_ZONE_TYPE: NORMAL
MDC_IDC_SET_ZONE_VENDOR_TYPE: NORMAL
MDC_IDC_STAT_AT_BURDEN_PERCENT: 0 %
MDC_IDC_STAT_AT_DTM_END: NORMAL
MDC_IDC_STAT_AT_DTM_START: NORMAL
MDC_IDC_STAT_BRADY_DTM_END: NORMAL
MDC_IDC_STAT_BRADY_DTM_START: NORMAL
MDC_IDC_STAT_BRADY_RA_PERCENT_PACED: 0 %
MDC_IDC_STAT_BRADY_RV_PERCENT_PACED: 20 %
MDC_IDC_STAT_CRT_DTM_END: NORMAL
MDC_IDC_STAT_CRT_DTM_START: NORMAL
MDC_IDC_STAT_CRT_LV_PERCENT_PACED: 98 %
MDC_IDC_STAT_EPISODE_RECENT_COUNT: 0
MDC_IDC_STAT_EPISODE_RECENT_COUNT: 1
MDC_IDC_STAT_EPISODE_RECENT_COUNT_DTM_END: NORMAL
MDC_IDC_STAT_EPISODE_RECENT_COUNT_DTM_START: NORMAL
MDC_IDC_STAT_EPISODE_TYPE: NORMAL
MDC_IDC_STAT_EPISODE_VENDOR_TYPE: NORMAL
MDC_IDC_STAT_TACHYTHERAPY_ATP_DELIVERED_RECENT: 0
MDC_IDC_STAT_TACHYTHERAPY_ATP_DELIVERED_TOTAL: 0
MDC_IDC_STAT_TACHYTHERAPY_RECENT_DTM_END: NORMAL
MDC_IDC_STAT_TACHYTHERAPY_RECENT_DTM_START: NORMAL
MDC_IDC_STAT_TACHYTHERAPY_SHOCKS_ABORTED_RECENT: 0
MDC_IDC_STAT_TACHYTHERAPY_SHOCKS_ABORTED_TOTAL: 0
MDC_IDC_STAT_TACHYTHERAPY_SHOCKS_DELIVERED_RECENT: 0
MDC_IDC_STAT_TACHYTHERAPY_SHOCKS_DELIVERED_TOTAL: 0
MDC_IDC_STAT_TACHYTHERAPY_TOTAL_DTM_END: NORMAL
MDC_IDC_STAT_TACHYTHERAPY_TOTAL_DTM_START: NORMAL

## 2022-04-19 DIAGNOSIS — I50.22 CHRONIC SYSTOLIC CONGESTIVE HEART FAILURE (H): ICD-10-CM

## 2022-04-21 RX ORDER — FUROSEMIDE 40 MG
TABLET ORAL
Qty: 180 TABLET | Refills: 0 | Status: SHIPPED | OUTPATIENT
Start: 2022-04-21 | End: 2022-07-26

## 2022-04-24 NOTE — PROGRESS NOTES
HPI:    Last visit with us 10/26/2021. Overall stable. He has L eye lid issues. He continues to work w/o problems. He likes to play pool at night. He has not followed up in Endocrinology. No recent A1c. No additional HEENT, cardiopulmonary, abdominal, , neurological, systemic, psychiatric, lymphatic, endocrine, vascular complaints.     Past Medical History:   Diagnosis Date     Anal fistula      Antiplatelet or antithrombotic long-term use      Coronary artery disease      Diabetes mellitus, type 2 (H)      Heart attack (H) 4/17/2007     Hyperlipidemia      Hypertension      Inguinal hernia      Ischemic cardiomyopathy      STEMI (ST elevation myocardial infarction) (H) 09/13/2018    s/p STEVO x2     Stented coronary artery 2007     Past Surgical History:   Procedure Laterality Date     EP ICD N/A 4/25/2019    Procedure: EP ICD [4510815];  Surgeon: Mick Mckeon MD;  Location:  HEART CARDI[facility] CATH LAB     HERNIA REPAIR Right     Inguinal     HERNIORRHAPHY INGUINAL Left 2/20/2020    Procedure: Left HERNIORRHAPHY, INGUINAL, OPEN;  Surgeon: Flakito Massey MD;  Location: [facility] OR     HERNIORRHAPHY UMBILICAL N/A 10/16/2020    Procedure: HERNIORRHAPHY, UMBILICAL, OPEN, with mesh;  Surgeon: Flakito Massey MD;  Location: [facility] OR     IRRIGATION AND DEBRIDEMENT RECTUM, COMBINED      abcess near rectum      LAPAROSCOPIC CHOLECYSTECTOMY  3/29/2012    Procedure:LAPAROSCOPIC CHOLECYSTECTOMY; LAPAROSCOPIC CHOLECYSTECTOMY; Surgeon:MARILYNN PRESSLEY; Location:[facility] OR     STENT, CORONARY, OLIVIA       PE:    Vitals noted, gen, nad, cooperative, alert, neck supple, L lower lid with some drooping and minor erythema, no drainage, lungs with good air movement, RRR, S1, S2, no MRG, abdomen, no acute findings. Grossly normal neurological exam.     A/P:    1. Cardiology follow up Dr. Matos for heart failure 11/1/2022 ON ASA, Lasix, Carvedilol, Atorvastatin, Entresto and Spironolactone. Last visit 4/5/2022. Labs were ordered on  4/5/2022 and recommend he do these today  2. Immunizations; Pfizer COVID vaccine x 2. Tdap 10/28/2013. Prevnar 13 done 3/14/2016. Pneumococcal 23 done 1/26/2010. COVID booster today   3. DM2 on Jardiance was 9.0% on 10/26/2021. Placed endocrinology referral 10/28/2021 and not scheduled. Reordered A1c today 4/26/2022 and placed endocrinology referral again   4. PSA 2.00 on 10/26/2021  5. Dermatology; no acute issues  6. Colonoscopy; 12/5/2012 and repeat in 10 years    7. Placed ophthalmology referral for L eye lid concerns.     I will see him back in about 3 months in person     30 minutes spent on the date of the encounter doing chart review, history and exam, documentation and further activities as noted above

## 2022-04-26 ENCOUNTER — OFFICE VISIT (OUTPATIENT)
Dept: INTERNAL MEDICINE | Facility: CLINIC | Age: 67
End: 2022-04-26

## 2022-04-26 ENCOUNTER — LAB (OUTPATIENT)
Dept: LAB | Facility: CLINIC | Age: 67
End: 2022-04-26
Payer: COMMERCIAL

## 2022-04-26 VITALS
WEIGHT: 183 LBS | SYSTOLIC BLOOD PRESSURE: 124 MMHG | HEIGHT: 66 IN | HEART RATE: 83 BPM | RESPIRATION RATE: 16 BRPM | DIASTOLIC BLOOD PRESSURE: 80 MMHG | OXYGEN SATURATION: 95 % | BODY MASS INDEX: 29.41 KG/M2

## 2022-04-26 DIAGNOSIS — Z23 NEED FOR COVID-19 VACCINE: ICD-10-CM

## 2022-04-26 DIAGNOSIS — E78.2 MIXED HYPERLIPIDEMIA: ICD-10-CM

## 2022-04-26 DIAGNOSIS — I50.20 HEART FAILURE WITH REDUCED EJECTION FRACTION, NYHA CLASS III (H): ICD-10-CM

## 2022-04-26 DIAGNOSIS — E11.9 TYPE 2 DIABETES MELLITUS WITHOUT COMPLICATION, WITHOUT LONG-TERM CURRENT USE OF INSULIN (H): ICD-10-CM

## 2022-04-26 DIAGNOSIS — I10 BENIGN ESSENTIAL HYPERTENSION: ICD-10-CM

## 2022-04-26 DIAGNOSIS — H02.536 LID RETRACTION OF LEFT EYE: ICD-10-CM

## 2022-04-26 DIAGNOSIS — Z12.5 SCREENING FOR PROSTATE CANCER: ICD-10-CM

## 2022-04-26 DIAGNOSIS — I25.5 ISCHEMIC CARDIOMYOPATHY: ICD-10-CM

## 2022-04-26 DIAGNOSIS — R73.09 INCREASED GLUCOSE LEVEL: Primary | ICD-10-CM

## 2022-04-26 DIAGNOSIS — R73.09 INCREASED GLUCOSE LEVEL: ICD-10-CM

## 2022-04-26 LAB
ALBUMIN SERPL-MCNC: 4.2 G/DL (ref 3.4–5)
ALP SERPL-CCNC: 60 U/L (ref 40–150)
ALT SERPL W P-5'-P-CCNC: 31 U/L (ref 0–70)
ANION GAP SERPL CALCULATED.3IONS-SCNC: 9 MMOL/L (ref 3–14)
AST SERPL W P-5'-P-CCNC: 18 U/L (ref 0–45)
BILIRUB SERPL-MCNC: 1.7 MG/DL (ref 0.2–1.3)
BUN SERPL-MCNC: 24 MG/DL (ref 7–30)
CALCIUM SERPL-MCNC: 9.7 MG/DL (ref 8.5–10.1)
CHLORIDE BLD-SCNC: 103 MMOL/L (ref 94–109)
CHOLEST SERPL-MCNC: 228 MG/DL
CO2 SERPL-SCNC: 26 MMOL/L (ref 20–32)
CREAT SERPL-MCNC: 0.98 MG/DL (ref 0.66–1.25)
ERYTHROCYTE [DISTWIDTH] IN BLOOD BY AUTOMATED COUNT: 14.4 % (ref 10–15)
FASTING STATUS PATIENT QL REPORTED: YES
GFR SERPL CREATININE-BSD FRML MDRD: 85 ML/MIN/1.73M2
GLUCOSE BLD-MCNC: 235 MG/DL (ref 70–99)
HBA1C MFR BLD: 9.3 % (ref 0–5.6)
HCT VFR BLD AUTO: 51.4 % (ref 40–53)
HDLC SERPL-MCNC: 40 MG/DL
HGB BLD-MCNC: 17.1 G/DL (ref 13.3–17.7)
LDLC SERPL CALC-MCNC: 134 MG/DL
LDLC SERPL CALC-MCNC: ABNORMAL MG/DL
MCH RBC QN AUTO: 30.6 PG (ref 26.5–33)
MCHC RBC AUTO-ENTMCNC: 33.3 G/DL (ref 31.5–36.5)
MCV RBC AUTO: 92 FL (ref 78–100)
NONHDLC SERPL-MCNC: 188 MG/DL
NT-PROBNP SERPL-MCNC: 194 PG/ML (ref 0–125)
PLATELET # BLD AUTO: 209 10E3/UL (ref 150–450)
POTASSIUM BLD-SCNC: 4.2 MMOL/L (ref 3.4–5.3)
PROT SERPL-MCNC: 8.1 G/DL (ref 6.8–8.8)
PSA SERPL-MCNC: 1.94 UG/L (ref 0–4)
RBC # BLD AUTO: 5.59 10E6/UL (ref 4.4–5.9)
SODIUM SERPL-SCNC: 138 MMOL/L (ref 133–144)
TRIGL SERPL-MCNC: 437 MG/DL
WBC # BLD AUTO: 8.4 10E3/UL (ref 4–11)

## 2022-04-26 PROCEDURE — 85027 COMPLETE CBC AUTOMATED: CPT | Performed by: PATHOLOGY

## 2022-04-26 PROCEDURE — 99214 OFFICE O/P EST MOD 30 MIN: CPT | Mod: 25 | Performed by: INTERNAL MEDICINE

## 2022-04-26 PROCEDURE — 0054A COVID-19,PF,PFIZER (12+ YRS): CPT | Performed by: INTERNAL MEDICINE

## 2022-04-26 PROCEDURE — G0103 PSA SCREENING: HCPCS | Performed by: PATHOLOGY

## 2022-04-26 PROCEDURE — 91305 COVID-19,PF,PFIZER (12+ YRS): CPT | Performed by: INTERNAL MEDICINE

## 2022-04-26 PROCEDURE — 80061 LIPID PANEL: CPT | Performed by: PATHOLOGY

## 2022-04-26 PROCEDURE — 83036 HEMOGLOBIN GLYCOSYLATED A1C: CPT | Performed by: PATHOLOGY

## 2022-04-26 PROCEDURE — 36415 COLL VENOUS BLD VENIPUNCTURE: CPT | Performed by: PATHOLOGY

## 2022-04-26 PROCEDURE — 99000 SPECIMEN HANDLING OFFICE-LAB: CPT | Performed by: PATHOLOGY

## 2022-04-26 PROCEDURE — 83880 ASSAY OF NATRIURETIC PEPTIDE: CPT | Performed by: PATHOLOGY

## 2022-04-26 PROCEDURE — 80053 COMPREHEN METABOLIC PANEL: CPT | Performed by: PATHOLOGY

## 2022-04-26 ASSESSMENT — PAIN SCALES - GENERAL: PAINLEVEL: NO PAIN (0)

## 2022-04-26 NOTE — NURSING NOTE
Anson Manriquez is a 66 year old male patient that presents today in clinic for the following:    Chief Complaint   Patient presents with     RECHECK     Pt comes into clinic for 6 month follow up     The patient's allergies and medications were reviewed as noted. A set of vitals were recorded as noted without incident. The patient does not have any other questions for the provider.    Zoya Khan, KAI at 7:23 AM on 4/26/2022

## 2022-05-09 DIAGNOSIS — E11.9 TYPE 2 DIABETES MELLITUS WITHOUT COMPLICATION, WITHOUT LONG-TERM CURRENT USE OF INSULIN (H): ICD-10-CM

## 2022-05-11 RX ORDER — GLIPIZIDE 5 MG/1
TABLET, FILM COATED, EXTENDED RELEASE ORAL
Qty: 180 TABLET | Refills: 0 | Status: SHIPPED | OUTPATIENT
Start: 2022-05-11 | End: 2022-08-17

## 2022-05-11 NOTE — TELEPHONE ENCOUNTER
glipiZIDE (GLUCOTROL XL) 5 MG 24 hr tablet   Take 1 tablet (5 mg) by mouth 2 times daily -      Last Written Prescription Date:  4/1/21  Last Fill Quantity: 180,   # refills: 3  Last Office Visit : 4/26/22  Future Office visit:  7/26/22    Routing refill request to provider for review/approval because:  Overdue visit. Frequency of 2 doses e xceeds recommended maximum of 1 dose/ day    Lab Test 04/26/22  0840   A1C 9.3*       Lab Test 04/26/22  0840   CR 0.98   CREAT  --

## 2022-06-03 DIAGNOSIS — I50.20 HEART FAILURE WITH REDUCED EJECTION FRACTION, NYHA CLASS III (H): ICD-10-CM

## 2022-06-03 DIAGNOSIS — I21.02 ST ELEVATION MYOCARDIAL INFARCTION INVOLVING LEFT ANTERIOR DESCENDING (LAD) CORONARY ARTERY (H): ICD-10-CM

## 2022-06-07 RX ORDER — CARVEDILOL 25 MG/1
TABLET ORAL
Qty: 180 TABLET | Refills: 3 | Status: SHIPPED | OUTPATIENT
Start: 2022-06-07 | End: 2023-03-24

## 2022-06-13 DIAGNOSIS — I50.20 HEART FAILURE WITH REDUCED EJECTION FRACTION, NYHA CLASS III (H): ICD-10-CM

## 2022-06-15 RX ORDER — SPIRONOLACTONE 25 MG/1
TABLET ORAL
Qty: 90 TABLET | Refills: 3 | Status: SHIPPED | OUTPATIENT
Start: 2022-06-15 | End: 2023-08-01

## 2022-06-15 NOTE — TELEPHONE ENCOUNTER
4/5/2022 [facility] Cardiology, labs 4/26/22  [facility]

## 2022-06-16 ENCOUNTER — OFFICE VISIT (OUTPATIENT)
Dept: OPHTHALMOLOGY | Facility: CLINIC | Age: 67
End: 2022-06-16
Attending: INTERNAL MEDICINE
Payer: COMMERCIAL

## 2022-06-16 ENCOUNTER — TELEPHONE (OUTPATIENT)
Dept: OPHTHALMOLOGY | Facility: CLINIC | Age: 67
End: 2022-06-16

## 2022-06-16 ENCOUNTER — PRE VISIT (OUTPATIENT)
Dept: OPHTHALMOLOGY | Facility: CLINIC | Age: 67
End: 2022-06-16
Payer: COMMERCIAL

## 2022-06-16 DIAGNOSIS — H02.135 SENILE ECTROPION OF BOTH LOWER EYELIDS: Primary | ICD-10-CM

## 2022-06-16 DIAGNOSIS — H02.403 PTOSIS OF BOTH EYELIDS: ICD-10-CM

## 2022-06-16 DIAGNOSIS — H02.132 SENILE ECTROPION OF BOTH LOWER EYELIDS: Primary | ICD-10-CM

## 2022-06-16 PROCEDURE — 99214 OFFICE O/P EST MOD 30 MIN: CPT | Mod: GC | Performed by: OPHTHALMOLOGY

## 2022-06-16 PROCEDURE — 92285 EXTERNAL OCULAR PHOTOGRAPHY: CPT | Mod: GC | Performed by: OPHTHALMOLOGY

## 2022-06-16 ASSESSMENT — TONOMETRY
OS_IOP_MMHG: 17
IOP_METHOD: ICARE
OD_IOP_MMHG: 14

## 2022-06-16 ASSESSMENT — EXTERNAL EXAM - RIGHT EYE: OD_EXAM: NORMAL

## 2022-06-16 ASSESSMENT — CONF VISUAL FIELD
METHOD: COUNTING FINGERS
OS_NORMAL: 1
OD_NORMAL: 1

## 2022-06-16 ASSESSMENT — MARGIN REFLEX DISTANCE
OD_MRD1: 2
OS_MRD1: 1

## 2022-06-16 ASSESSMENT — VISUAL ACUITY
OS_SC: 20/20
OS_SC+: -2
METHOD: SNELLEN - LINEAR
OD_SC: 20/25

## 2022-06-16 ASSESSMENT — EXTERNAL EXAM - LEFT EYE: OS_EXAM: NORMAL

## 2022-06-16 NOTE — NURSING NOTE
Chief Complaints and History of Present Illnesses   Patient presents with     Consult For     New Pt here for lid retraction of LE consult.     Chief Complaint(s) and History of Present Illness(es)     Consult For     Laterality: left eye    Onset: gradual    Course: stable    Associated symptoms: redness.  Negative for dryness, eye pain, tearing, photophobia, flashes and floaters    Pain scale: 0/10    Comments: New Pt here for lid retraction of LE consult.              Comments     Pt states vision is stable since last visit.  No ocular medications.  Notices redness and irritation, LE>RE.    DM 2  Lab Results       Component                Value               Date                       A1C                      9.3                 04/26/2022                 A1C                      9.0                 10/26/2021                 A1C                      8.0                 10/29/2020                 A1C                      8.0                 02/11/2020                 A1C                      7.3                 11/06/2019                 A1C                      7.5                 06/18/2019                 A1C                      7.6                 05/09/2019            Pt did not check BS today.    TEX Gonzalez June 16, 2022 8:00 AM

## 2022-06-16 NOTE — PATIENT INSTRUCTIONS
Use artificial tears 3-4 x daily in both eyes. Good brands include Refresh, Systane, and Blink.    Apply Aquaphor to both lower eyelids at bedtime.

## 2022-06-16 NOTE — PROGRESS NOTES
[facility] Progress Note    Patient: Anson Manriquez MRN# [id number]   YOB: 1955 Age: 66 year old   Date of Visit: Jun 16, 2022    CC: Patient presents with:  Consult For: New Pt here for lid retraction of LE consult.              HPI:     Anson Manriquez is a 66 year old male with HFrEF due ischemic cardiomyopathy with 2x MIs one requiring IABP s/p ICD, DM2, likely JASBIR here for eyelid eval.     Interval Jun 16, 2022:  Chief Complaint(s) and History of Present Illness(es)     Consult For     Laterality: left eye    Onset: gradual    Course: stable    Associated symptoms: redness.  Negative for dryness, eye pain, tearing,   photophobia, flashes and floaters    Pain scale: 0/10    Comments: New Pt here for lid retraction of LE consult.         Comments     Pt states vision is stable since last visit.  No ocular medications.  Notices redness and irritation, LE>RE.    DM 2  Lab Results       Component                Value               Date                       A1C                      9.3                 04/26/2022                 A1C                      9.0                 10/26/2021                 A1C                      8.0                 10/29/2020                 A1C                      8.0                 02/11/2020                 A1C                      7.3                 11/06/2019                 A1C                      7.5                 06/18/2019                 A1C                      7.6                 05/09/2019            Pt did not check BS today.    TEX Gonzalez June 16, 2022 8:00 AM           Assessment & Plan:   Anson Manriquez is a 66 year old male with the following diagnoses:   Encounter Diagnoses   Name Primary?     Senile ectropion of both lower eyelids Yes     Floppy eyelid syndrome of both eyes      Ptosis of both eyelids      - Has not tried lubrication  - While lateral tarsal strip will help patient, he would need to  stop asa and given he had 2x myocardial infarctions in the past, we would need Dr. Matos (Cardiology) to recommend cessation of asa perioperatively.   Plan  - PFATs and AT ointment at bedtime  - Will seek Dr. Jason Matos MD (cardiology clearance) to hold asa 81 gwen-op for bilateral tarsal strip, ectropion repair.   Patient disposition: Return for Schedule surgery, Follow Up.     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  Artificial tears  Aquphor to lower eyelids at bedtime  Start with lower eyelid ectropion repair - LTS. He will ask if cards ok holding aspirin.     We did discuss down the road he may require FTSG due to actinic changes of his lower eyelids.    [place] - Pacemaker and A1c          Attending Physician Attestation: Complete documentation of historical and exam elements from today's encounter can be found in the full encounter summary report (not reduplicated in this progress note). I personally obtained the chief complaint(s) and history of present illness. I confirmed and edited as necessary the review of systems, past medical/surgical history, family history, social history, and examination findings as documented by others; and I examined the patient myself. I personally reviewed the relevant tests, images, and reports as documented above. I formulated and edited as necessary the assessment and plan and discussed the findings and management plan with the patient.  -Cheyanne Francisco MD    Today with Anson Manriquez I reviewed the indications, risks, benefits, and alternatives of the proposed surgical procedure including, but not limited to, failure obtain the desired result  and need for additional surgery, bleeding, infection, loss of vision, loss of the eye, and the remote possibility of permanent damage to any organ system or death with  the use of anesthesia.  I provided multiple opportunities for the questions, answered all questions to the best of my ability, and confirmed that my answers and my discussion were understood.   -Cheyanne Francisco MD

## 2022-06-16 NOTE — TELEPHONE ENCOUNTER
.Met with patient to schedule surgery with Dr. Francisco    Surgery was scheduled on 9/2 at [facility]  Patient will have H&P at [facility]     Patient is aware a COVID-19 test is needed before their procedure. The test should be with-in 4 days of their procedure.   Test Details: Date 7/26 Location [facility]    Post-Op visit was scheduled on 8/29  Patient is aware a / is needed day of surgery.   Surgery packet was given 6/16, patient has my direct contact information for any further questions.

## 2022-06-16 NOTE — LETTER
2022         RE:  :  MRN: Anson Manriquez  1955  [id number]     Dear Dr. Beard and Dr. Matos,    Thank you for asking me to see your patient, Anson Manriquez, for an oculoplastic   consultation.  My assessment and plan are below.  For further details, please see my attached clinic note.           HPI:     Anson Manriquez is a 66 year old male with HFrEF due ischemic cardiomyopathy with 2x MIs one requiring IABP s/p ICD, DM2, likely JASBIR here for eyelid eval.     Interval 2022:  Chief Complaint(s) and History of Present Illness(es)     Consult For     Laterality: left eye    Onset: gradual    Course: stable    Associated symptoms: redness.  Negative for dryness, eye pain, tearing,   photophobia, flashes and floaters    Pain scale: 0/10    Comments: New Pt here for lid retraction of LE consult.         Comments     Pt states vision is stable since last visit.  No ocular medications.  Notices redness and irritation, LE>RE.    DM 2  Lab Results       Component                Value               Date                       A1C                      9.3                 2022                 A1C                      9.0                 10/26/2021                 A1C                      8.0                 10/29/2020                 A1C                      8.0                 2020                 A1C                      7.3                 2019                 A1C                      7.5                 2019                 A1C                      7.6                 2019            Pt did not check BS today.    TEX Gonzalez 2022 8:00 AM           Assessment & Plan:   Anson Manriquez is a 66 year old male with the following diagnoses:   Encounter Diagnoses   Name Primary?     Senile ectropion of both lower eyelids Yes     Floppy eyelid syndrome of both eyes      Ptosis of both eyelids      - Has not tried lubrication  - While lateral tarsal strip will help  patient, he would need to stop asa and given he had 2x myocardial infarctions in the past, we would need Dr. Matos (Cardiology) to recommend cessation of asa perioperatively.   Plan  - PFATs and AT ointment at bedtime  - Will seek Dr. Jason Matos MD (cardiology clearance) to hold asa 81 gwen-op for bilateral tarsal strip, ectropion repair.   Patient disposition: Return for Schedule surgery, Follow Up.     My privilege to be part of your care,  Diony Moore MD, MSc  Ophthalmology PGY-2 resident physician  Pager: [phone]    Cicatricial lower eyelid ectropion from actinic changes to midface skin + laxity.  Artificial tears  Aquphor to lower eyelids at bedtime  Start with lower eyelid ectropion repair - LTS. He will ask if cards ok holding aspirin.     We did discuss down the road he may require FTSG due to actinic changes of his lower eyelids.    [place] - Pacemaker and A1c       Again, thank you for allowing me to participate in the care of your patient.      Sincerely,    Cheyanne Francisco MD  Department of Ophthalmology and Visual Neurosciences  [facility]    CC: Ignacio Beard MD  [address]  [facility] [id number]  Via In Basket     Jason Matos MD  [address]  [facility] [id number]  Via In Basket

## 2022-07-01 NOTE — TELEPHONE ENCOUNTER
RECORDS RECEIVED FROM: Type 2 DM, increased glucose level, referred by Dr. Beard   DATE RECEIVED: 9/7/2022   NOTES (FOR ALL VISITS) STATUS DETAILS   OFFICE NOTES from referring provider [facility]:  ([facility]) 8/29/22, 4/26/22 - PCC OV with Dr. Beard   OFFICE NOTES from other specialist [facility]:  5/4/21 - EYE OV with Dr. Munroe    [facility]:  6/18/19 - PCC OV with Dr. Chichi Lou   ED NOTES N/A    OPERATIVE REPORT  (thyroid, pituitary, adrenal, parathyroid) N/A    MEDICATION LIST Internal    IMAGING      XR (Chest) [facility]:  5/2/19 - XR Chest   CT (HEAD/NECK/CHEST/ABDOMEN) [facility]:  4/5/21 - CT Abd/Pelvis   LABS     DIABETES: HBGA1C, CREATININE, FASTING LIPIDS, MICROALBUMIN URINE, POTASSIUM, TSH, T4    THYROID: TSH, T4, CBC, THYRODLONULIN, TOTAL T3, FREE T4, CALCITONIN, CEA Internal   [facility]:  4/26/22 - CBC  4/26/22 - CMP  4/26/22 - HBGA1C  4/26/22 - Lipid  2/20/22 - Creatinine  4/6/21 - Glucose  10/16/20 - Potassium  5/9/19 - BMP  9/15/18 - Calcium  3/7/17 - TSH, T4

## 2022-07-12 ENCOUNTER — ANCILLARY PROCEDURE (OUTPATIENT)
Dept: CARDIOLOGY | Facility: CLINIC | Age: 67
End: 2022-07-12
Attending: INTERNAL MEDICINE
Payer: COMMERCIAL

## 2022-07-12 DIAGNOSIS — I42.9 CARDIOMYOPATHY, UNSPECIFIED TYPE (H): ICD-10-CM

## 2022-07-12 DIAGNOSIS — I25.5 ISCHEMIC CARDIOMYOPATHY: ICD-10-CM

## 2022-07-12 PROCEDURE — 93295 DEV INTERROG REMOTE 1/2/MLT: CPT | Performed by: INTERNAL MEDICINE

## 2022-07-12 PROCEDURE — 93296 REM INTERROG EVL PM/IDS: CPT

## 2022-07-25 DIAGNOSIS — I50.22 CHRONIC SYSTOLIC CONGESTIVE HEART FAILURE (H): ICD-10-CM

## 2022-07-26 RX ORDER — FUROSEMIDE 40 MG
40 TABLET ORAL 2 TIMES DAILY
Qty: 180 TABLET | Refills: 2 | Status: SHIPPED | OUTPATIENT
Start: 2022-07-26 | End: 2023-06-30

## 2022-07-26 NOTE — TELEPHONE ENCOUNTER
Last Clinic Visit: 4/5/2022 [facility]

## 2022-08-08 LAB
MDC_IDC_EPISODE_DTM: NORMAL
MDC_IDC_EPISODE_ID: NORMAL
MDC_IDC_EPISODE_TYPE: NORMAL
MDC_IDC_LEAD_IMPLANT_DT: NORMAL
MDC_IDC_LEAD_LOCATION: NORMAL
MDC_IDC_LEAD_LOCATION_DETAIL_1: NORMAL
MDC_IDC_LEAD_MFG: NORMAL
MDC_IDC_LEAD_MODEL: NORMAL
MDC_IDC_LEAD_POLARITY_TYPE: NORMAL
MDC_IDC_LEAD_SERIAL: NORMAL
MDC_IDC_LEAD_SPECIAL_FUNCTION: NORMAL
MDC_IDC_MSMT_BATTERY_DTM: NORMAL
MDC_IDC_MSMT_BATTERY_REMAINING_LONGEVITY: 102 MO
MDC_IDC_MSMT_BATTERY_REMAINING_PERCENTAGE: 100 %
MDC_IDC_MSMT_BATTERY_STATUS: NORMAL
MDC_IDC_MSMT_CAP_CHARGE_DTM: NORMAL
MDC_IDC_MSMT_CAP_CHARGE_TIME: 10.6 S
MDC_IDC_MSMT_CAP_CHARGE_TYPE: NORMAL
MDC_IDC_MSMT_LEADCHNL_LV_IMPEDANCE_VALUE: 695 OHM
MDC_IDC_MSMT_LEADCHNL_LV_PACING_THRESHOLD_AMPLITUDE: 1.8 V
MDC_IDC_MSMT_LEADCHNL_LV_PACING_THRESHOLD_PULSEWIDTH: 0.5 MS
MDC_IDC_MSMT_LEADCHNL_RA_IMPEDANCE_VALUE: 781 OHM
MDC_IDC_MSMT_LEADCHNL_RV_IMPEDANCE_VALUE: 677 OHM
MDC_IDC_MSMT_LEADCHNL_RV_PACING_THRESHOLD_AMPLITUDE: 0.7 V
MDC_IDC_MSMT_LEADCHNL_RV_PACING_THRESHOLD_PULSEWIDTH: 0.4 MS
MDC_IDC_PG_IMPLANT_DTM: NORMAL
MDC_IDC_PG_MFG: NORMAL
MDC_IDC_PG_MODEL: NORMAL
MDC_IDC_PG_SERIAL: NORMAL
MDC_IDC_PG_TYPE: NORMAL
MDC_IDC_SESS_CLINIC_NAME: NORMAL
MDC_IDC_SESS_DTM: NORMAL
MDC_IDC_SESS_TYPE: NORMAL
MDC_IDC_SET_BRADY_AT_MODE_SWITCH_MODE: NORMAL
MDC_IDC_SET_BRADY_AT_MODE_SWITCH_RATE: 170 {BEATS}/MIN
MDC_IDC_SET_BRADY_LOWRATE: 60 {BEATS}/MIN
MDC_IDC_SET_BRADY_MAX_SENSOR_RATE: 130 {BEATS}/MIN
MDC_IDC_SET_BRADY_MAX_TRACKING_RATE: 130 {BEATS}/MIN
MDC_IDC_SET_BRADY_MODE: NORMAL
MDC_IDC_SET_BRADY_PAV_DELAY_LOW: 180 MS
MDC_IDC_SET_BRADY_SAV_DELAY_LOW: 140 MS
MDC_IDC_SET_CRT_PACED_CHAMBERS: NORMAL
MDC_IDC_SET_LEADCHNL_LV_PACING_AMPLITUDE: 2.9 V
MDC_IDC_SET_LEADCHNL_LV_PACING_ANODE_ELECTRODE_1: NORMAL
MDC_IDC_SET_LEADCHNL_LV_PACING_ANODE_LOCATION_1: NORMAL
MDC_IDC_SET_LEADCHNL_LV_PACING_CAPTURE_MODE: NORMAL
MDC_IDC_SET_LEADCHNL_LV_PACING_CATHODE_ELECTRODE_1: NORMAL
MDC_IDC_SET_LEADCHNL_LV_PACING_CATHODE_LOCATION_1: NORMAL
MDC_IDC_SET_LEADCHNL_LV_PACING_PULSEWIDTH: 0.5 MS
MDC_IDC_SET_LEADCHNL_LV_SENSING_ADAPTATION_MODE: NORMAL
MDC_IDC_SET_LEADCHNL_LV_SENSING_ANODE_ELECTRODE_1: NORMAL
MDC_IDC_SET_LEADCHNL_LV_SENSING_ANODE_LOCATION_1: NORMAL
MDC_IDC_SET_LEADCHNL_LV_SENSING_CATHODE_ELECTRODE_1: NORMAL
MDC_IDC_SET_LEADCHNL_LV_SENSING_CATHODE_LOCATION_1: NORMAL
MDC_IDC_SET_LEADCHNL_LV_SENSING_SENSITIVITY: 1 MV
MDC_IDC_SET_LEADCHNL_RA_PACING_AMPLITUDE: 2.5 V
MDC_IDC_SET_LEADCHNL_RA_PACING_CAPTURE_MODE: NORMAL
MDC_IDC_SET_LEADCHNL_RA_PACING_POLARITY: NORMAL
MDC_IDC_SET_LEADCHNL_RA_PACING_PULSEWIDTH: 0.5 MS
MDC_IDC_SET_LEADCHNL_RA_SENSING_ADAPTATION_MODE: NORMAL
MDC_IDC_SET_LEADCHNL_RA_SENSING_POLARITY: NORMAL
MDC_IDC_SET_LEADCHNL_RA_SENSING_SENSITIVITY: 0.25 MV
MDC_IDC_SET_LEADCHNL_RV_PACING_AMPLITUDE: 2 V
MDC_IDC_SET_LEADCHNL_RV_PACING_CAPTURE_MODE: NORMAL
MDC_IDC_SET_LEADCHNL_RV_PACING_POLARITY: NORMAL
MDC_IDC_SET_LEADCHNL_RV_PACING_PULSEWIDTH: 0.4 MS
MDC_IDC_SET_LEADCHNL_RV_SENSING_ADAPTATION_MODE: NORMAL
MDC_IDC_SET_LEADCHNL_RV_SENSING_POLARITY: NORMAL
MDC_IDC_SET_LEADCHNL_RV_SENSING_SENSITIVITY: 0.6 MV
MDC_IDC_SET_ZONE_DETECTION_INTERVAL: 250 MS
MDC_IDC_SET_ZONE_DETECTION_INTERVAL: 300 MS
MDC_IDC_SET_ZONE_DETECTION_INTERVAL: 353 MS
MDC_IDC_SET_ZONE_TYPE: NORMAL
MDC_IDC_SET_ZONE_VENDOR_TYPE: NORMAL
MDC_IDC_STAT_AT_BURDEN_PERCENT: 0 %
MDC_IDC_STAT_AT_DTM_END: NORMAL
MDC_IDC_STAT_AT_DTM_START: NORMAL
MDC_IDC_STAT_BRADY_DTM_END: NORMAL
MDC_IDC_STAT_BRADY_DTM_START: NORMAL
MDC_IDC_STAT_BRADY_RA_PERCENT_PACED: 0 %
MDC_IDC_STAT_BRADY_RV_PERCENT_PACED: 21 %
MDC_IDC_STAT_CRT_DTM_END: NORMAL
MDC_IDC_STAT_CRT_DTM_START: NORMAL
MDC_IDC_STAT_CRT_LV_PERCENT_PACED: 99 %
MDC_IDC_STAT_CRT_PERCENT_PACED: 99 %
MDC_IDC_STAT_EPISODE_RECENT_COUNT: 0
MDC_IDC_STAT_EPISODE_RECENT_COUNT_DTM_END: NORMAL
MDC_IDC_STAT_EPISODE_RECENT_COUNT_DTM_START: NORMAL
MDC_IDC_STAT_EPISODE_TYPE: NORMAL
MDC_IDC_STAT_EPISODE_VENDOR_TYPE: NORMAL
MDC_IDC_STAT_TACHYTHERAPY_ATP_DELIVERED_RECENT: 0
MDC_IDC_STAT_TACHYTHERAPY_ATP_DELIVERED_TOTAL: 0
MDC_IDC_STAT_TACHYTHERAPY_RECENT_DTM_END: NORMAL
MDC_IDC_STAT_TACHYTHERAPY_RECENT_DTM_START: NORMAL
MDC_IDC_STAT_TACHYTHERAPY_SHOCKS_ABORTED_RECENT: 0
MDC_IDC_STAT_TACHYTHERAPY_SHOCKS_ABORTED_TOTAL: 0
MDC_IDC_STAT_TACHYTHERAPY_SHOCKS_DELIVERED_RECENT: 0
MDC_IDC_STAT_TACHYTHERAPY_SHOCKS_DELIVERED_TOTAL: 0
MDC_IDC_STAT_TACHYTHERAPY_TOTAL_DTM_END: NORMAL
MDC_IDC_STAT_TACHYTHERAPY_TOTAL_DTM_START: NORMAL

## 2022-08-13 DIAGNOSIS — E11.9 TYPE 2 DIABETES MELLITUS WITHOUT COMPLICATION, WITHOUT LONG-TERM CURRENT USE OF INSULIN (H): ICD-10-CM

## 2022-08-17 RX ORDER — GLIPIZIDE 5 MG/1
5 TABLET, FILM COATED, EXTENDED RELEASE ORAL 2 TIMES DAILY
Qty: 180 TABLET | Refills: 0 | Status: SHIPPED | OUTPATIENT
Start: 2022-08-17 | End: 2022-11-17

## 2022-08-17 NOTE — TELEPHONE ENCOUNTER
Last Clinic Visit: 4/26/2022 [facility] [place]    Test(s)- A1C due.  Alissa refill of Glipizide was sent for a 90 day supply and FYI to [facility] clinic.

## 2022-08-27 NOTE — PROGRESS NOTES
Mr. Manriquez comes in for a preoperative evaluation for B lower eyelid ectropion repair with Dr. Randolph on 9/2/2022. See ophthalmology note from Dr. Francisco 6/16/2022 for additional details.     HPI:  He has being doing well and feeling healthy. No c/o shortness of breath or chest pain. No h/o of infection or fever in last 4 weeks. He denies any LE edema, exertional dyspnea/orthopnea/PND, dizziness, lightheadedness, nausea, vomiting or diarrhea. There is no h/o adverse reaction to anesthesia or h/o provoked DVT after asurgical event. No new concerns expect the ectropion for which he is getting the surgery,.     Surgical Information (as known today):  Surgery/Procedure: B lower eyelid ectropion repair  Surgeon: Dr. Randolph  Surgery Date: 9/2/2022  Where patient plans to recover: Home        Past Medical History:   Diagnosis Date     Anal fistula      Antiplatelet or antithrombotic long-term use      Coronary artery disease      Diabetes mellitus, type 2 (H)      Heart attack (H) 4/17/2007     Hyperlipidemia      Hypertension      Inguinal hernia      Ischemic cardiomyopathy      STEMI (ST elevation myocardial infarction) (H) 09/13/2018    s/p STEVO x2     Stented coronary artery 2007     Past Surgical History:   Procedure Laterality Date     EP ICD N/A 4/25/2019    Procedure: EP ICD [[id number]];  Surgeon: Mick Mckeon MD;  Location:  [facility]ARD[facility] CATH LAB     HERNIA REPAIR Right     Inguinal     HERNIORRHAPHY INGUINAL Left 2/20/2020    Procedure: Left HERNIORRHAPHY, INGUINAL, OPEN;  Surgeon: Flakito Massey MD;  Location: [facility] OR     HERNIORRHAPHY UMBILICAL N/A 10/16/2020    Procedure: HERNIORRHAPHY, UMBILICAL, OPEN, with mesh;  Surgeon: Flakito Massey MD;  Location: [facility] OR     IRRIGATION AND DEBRIDEMENT RECTUM, COMBINED      abcess near rectum      LAPAROSCOPIC CHOLECYSTECTOMY  3/29/2012    Procedure:LAPAROSCOPIC CHOLECYSTECTOMY; LAPAROSCOPIC CHOLECYSTECTOMY; Surgeon:MARILYNN PRESSLEY;  Location:[facility] OR     STENT, CORONARY, OLIVIA       Physical Exam  General:  Conversant, generally healthy appearing, no acute distress  Head: atraumatic  Eyes:  Pupils 2-3 mm, sclera white, EOM's full, conjunctiva moist, no periorbital swelling    Throat/Mouth:  No pharyngeal erythema, exudate, ulcers, oral mucosa and tongue moist, normal hard and soft palate, No loose teeth  Neck:  Thyroid smooth, symmetric, not enlarged, no nodules, no neck lymphadenopathy  Lungs:  Clear to auscultation throughout, no wheezes, rhonchi or rales.  C/V:  Regular rate and rhythm, no murmurs, rubs or gallops.  No JVD, no carotid bruits.   Abdomen:  Not distended. No tenderness, no hepatosplenomegaly or masses.   Lymph:  No cervical lymph nodes.  Neuro: Alert and oriented, face symmetric. Able to get on/off exam table without assistance.  Strength grossly intact. No tremor.  Gait steady.   M/S:   No joint deformities noted.  No joint swelling.  Skin:   Normal temperature., turgor and texture. No rashes, suspicious lesions, or jaundice on exposed skin surfaces.   Extremities:  No peripheral edema, no digital cyanosis  Psych:  Alert and oriented. Appropriate affect.  Not psychomotor slowed.  No signs of anxiety or agitation.      EKG: atrial-sensed and ventricular paced rhythm at 75 bpm.  No change from 10/16/2020      Resting echo 1/22/2020:    Echocardiogram with two-dimensional, color and spectral Doppler performed.  Contrast Optison. Patient was given 5 ml mixture of 3 ml Optison and 6 ml  saline. 4 ml wasted.          Interpretation Summary  Ischemic cardiomyopathy with moderately dilated LV with moderately reduced  global LV function, LVEF=29%. Extensive regional wall motion abnormalities as  described below, unchanged from the prior study.  This study was compared with the study from 8/28/19: There has been no  significant change. Specifically, LV function and wall motion abnormalities  have not changed significantly.          Left  Ventricle  Left ventricular wall thickness is normal. Moderate left ventricular dilation  is present. Severely (EF 20-30%) reduced left ventricular function is present.  Grade I or early diastolic dysfunction. There is akinesis involving the basal-  to-mid anteroseptal, lyb-lr-givzxb anterior, mid anterolateral, and all apical  myocardial segments. The basal anterior, basal anterolateral, and basal  inferolateral segments are relatively spared.     Right Ventricle  Right ventricular function, chamber size, wall motion, and thickness are  normal. A pacemaker lead is noted in the right ventricle.     Atria  Both atria appear normal.     Mitral Valve  Mild mitral annular calcification is present.        Aortic Valve  Mild aortic valve calcification is present.     Tricuspid Valve  The tricuspid valve is normal. The peak velocity of the tricuspid regurgitant  jet is not obtainable. Pulmonary artery systolic pressure cannot be assessed.  Trace tricuspid insufficiency is present.     Pulmonic Valve  The pulmonic valve cannot be assessed.     Vessels  The aorta root is normal. The thoracic aorta is normal. The pulmonary artery  cannot be assessed. The inferior vena cava was normal in size with preserved  respiratory variability. IVC diameter <2.1 cm collapsing >50% with sniff  suggests a normal RA pressure of 3 mmHg.     Pericardium  No pericardial effusion is present.        Compared to Previous Study  This study was compared with the study from 8/28/19: There has been no  significant change. Specifically, LV function and wall motion abnormalities  have not changed significantly.      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       Respiratory concerns:  No current concerns       Clotting concerns:  - No new concerns   - No h/o DVT   - Currently taking Asprin      Other chronic medical concerns  - CAD  - T2DM  - HTN  -  HLD      Preoperative Review of :   reviewed - no record of controlled substances prescribed.      No known Allergies      Assessment and Plan:    1. Preoperative COVID testing on 8/29/2022  2. Avoid ASA/NSAIDS before surgery  3. DM2 on Jardiance and Glipizide and he hold these the morning of surgery and his blood glucose values should be followed carefully perioperatively  4. He is on Carvedilol, Entresto (sacubitril-valsartan), Lasix, and Spironolactone for h/o CAD. Ordered electrolytes. He can hold his lasix and spironolactone and remain on Entresto. He was seen 4/5/2022 Dr. Matos, Cardiology; last resing echo 1/2020 with low EF 30% and future resting echo ordered. He also has an ICD and ventricular pacing.     The patient appears to be medically optimized for upcoming procedure, assuming appropriate medical supervision onsite for anesthesia or other developments.      Staff note. I personally reviewed Mr. Manriquez's past medical history. I interviewed him and conducted a physical examination. I agree with the above assessment and plan    ETHEL Beard MD    30 minutes spent on the date of the encounter doing chart review, history and exam, documentation and further activities as noted above exclusive of procedures and other billable interpretations

## 2022-08-27 NOTE — H&P (VIEW-ONLY)
Mr. Manriquez comes in for a preoperative evaluation for B lower eyelid ectropion repair with Dr. Randolph on 9/2/2022. See ophthalmology note from Dr. Francisco 6/16/2022 for additional details.     HPI:  He has being doing well and feeling healthy. No c/o shortness of breath or chest pain. No h/o of infection or fever in last 4 weeks. He denies any LE edema, exertional dyspnea/orthopnea/PND, dizziness, lightheadedness, nausea, vomiting or diarrhea. There is no h/o adverse reaction to anesthesia or h/o provoked DVT after asurgical event. No new concerns expect the ectropion for which he is getting the surgery,.     Surgical Information (as known today):  Surgery/Procedure: B lower eyelid ectropion repair  Surgeon: Dr. Randolph  Surgery Date: 9/2/2022  Where patient plans to recover: Home        Past Medical History:   Diagnosis Date     Anal fistula      Antiplatelet or antithrombotic long-term use      Coronary artery disease      Diabetes mellitus, type 2 (H)      Heart attack (H) 4/17/2007     Hyperlipidemia      Hypertension      Inguinal hernia      Ischemic cardiomyopathy      STEMI (ST elevation myocardial infarction) (H) 09/13/2018    s/p STEVO x2     Stented coronary artery 2007     Past Surgical History:   Procedure Laterality Date     EP ICD N/A 4/25/2019    Procedure: EP ICD [[id number]];  Surgeon: Mick Mckeon MD;  Location:  [facility] CATH LAB     HERNIA REPAIR Right     Inguinal     HERNIORRHAPHY INGUINAL Left 2/20/2020    Procedure: Left HERNIORRHAPHY, INGUINAL, OPEN;  Surgeon: Flakito Massey MD;  Location: [facility] OR     HERNIORRHAPHY UMBILICAL N/A 10/16/2020    Procedure: HERNIORRHAPHY, UMBILICAL, OPEN, with mesh;  Surgeon: Flakito Massey MD;  Location: [facility] OR     IRRIGATION AND DEBRIDEMENT RECTUM, COMBINED      abcess near rectum      LAPAROSCOPIC CHOLECYSTECTOMY  3/29/2012    Procedure:LAPAROSCOPIC CHOLECYSTECTOMY; LAPAROSCOPIC CHOLECYSTECTOMY; Surgeon:MARILYNN PRESSLEY;  Location:[facility] OR     STENT, CORONARY, OLIVIA       Physical Exam  General:  Conversant, generally healthy appearing, no acute distress  Head: atraumatic  Eyes:  Pupils 2-3 mm, sclera white, EOM's full, conjunctiva moist, no periorbital swelling    Throat/Mouth:  No pharyngeal erythema, exudate, ulcers, oral mucosa and tongue moist, normal hard and soft palate, No loose teeth  Neck:  Thyroid smooth, symmetric, not enlarged, no nodules, no neck lymphadenopathy  Lungs:  Clear to auscultation throughout, no wheezes, rhonchi or rales.  C/V:  Regular rate and rhythm, no murmurs, rubs or gallops.  No JVD, no carotid bruits.   Abdomen:  Not distended. No tenderness, no hepatosplenomegaly or masses.   Lymph:  No cervical lymph nodes.  Neuro: Alert and oriented, face symmetric. Able to get on/off exam table without assistance.  Strength grossly intact. No tremor.  Gait steady.   M/S:   No joint deformities noted.  No joint swelling.  Skin:   Normal temperature., turgor and texture. No rashes, suspicious lesions, or jaundice on exposed skin surfaces.   Extremities:  No peripheral edema, no digital cyanosis  Psych:  Alert and oriented. Appropriate affect.  Not psychomotor slowed.  No signs of anxiety or agitation.      EKG: atrial-sensed and ventricular paced rhythm at 75 bpm.  No change from 10/16/2020      Resting echo 1/22/2020:    Echocardiogram with two-dimensional, color and spectral Doppler performed.  Contrast Optison. Patient was given 5 ml mixture of 3 ml Optison and 6 ml  saline. 4 ml wasted.          Interpretation Summary  Ischemic cardiomyopathy with moderately dilated LV with moderately reduced  global LV function, LVEF=29%. Extensive regional wall motion abnormalities as  described below, unchanged from the prior study.  This study was compared with the study from 8/28/19: There has been no  significant change. Specifically, LV function and wall motion abnormalities  have not changed significantly.          Left  Ventricle  Left ventricular wall thickness is normal. Moderate left ventricular dilation  is present. Severely (EF 20-30%) reduced left ventricular function is present.  Grade I or early diastolic dysfunction. There is akinesis involving the basal-  to-mid anteroseptal, kaw-ak-bwvwyq anterior, mid anterolateral, and all apical  myocardial segments. The basal anterior, basal anterolateral, and basal  inferolateral segments are relatively spared.     Right Ventricle  Right ventricular function, chamber size, wall motion, and thickness are  normal. A pacemaker lead is noted in the right ventricle.     Atria  Both atria appear normal.     Mitral Valve  Mild mitral annular calcification is present.        Aortic Valve  Mild aortic valve calcification is present.     Tricuspid Valve  The tricuspid valve is normal. The peak velocity of the tricuspid regurgitant  jet is not obtainable. Pulmonary artery systolic pressure cannot be assessed.  Trace tricuspid insufficiency is present.     Pulmonic Valve  The pulmonic valve cannot be assessed.     Vessels  The aorta root is normal. The thoracic aorta is normal. The pulmonary artery  cannot be assessed. The inferior vena cava was normal in size with preserved  respiratory variability. IVC diameter <2.1 cm collapsing >50% with sniff  suggests a normal RA pressure of 3 mmHg.     Pericardium  No pericardial effusion is present.        Compared to Previous Study  This study was compared with the study from 8/28/19: There has been no  significant change. Specifically, LV function and wall motion abnormalities  have not changed significantly.      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       Respiratory concerns:  No current concerns       Clotting concerns:  - No new concerns   - No h/o DVT   - Currently taking Asprin      Other chronic medical concerns  - CAD  - T2DM  - HTN  -  HLD      Preoperative Review of :   reviewed - no record of controlled substances prescribed.      No known Allergies      Assessment and Plan:    1. Preoperative COVID testing on 8/29/2022  2. Avoid ASA/NSAIDS before surgery  3. DM2 on Jardiance and Glipizide and he hold these the morning of surgery and his blood glucose values should be followed carefully perioperatively  4. He is on Carvedilol, Entresto (sacubitril-valsartan), Lasix, and Spironolactone for h/o CAD. Ordered electrolytes. He can hold his lasix and spironolactone and remain on Entresto. He was seen 4/5/2022 Dr. Matos, Cardiology; last resing echo 1/2020 with low EF 30% and future resting echo ordered. He also has an ICD and ventricular pacing.     The patient appears to be medically optimized for upcoming procedure, assuming appropriate medical supervision onsite for anesthesia or other developments.      Staff note. I personally reviewed Mr. Manriquez's past medical history. I interviewed him and conducted a physical examination. I agree with the above assessment and plan    ETHEL Beard MD    30 minutes spent on the date of the encounter doing chart review, history and exam, documentation and further activities as noted above exclusive of procedures and other billable interpretations

## 2022-08-29 ENCOUNTER — LAB (OUTPATIENT)
Dept: LAB | Facility: CLINIC | Age: 67
End: 2022-08-29
Payer: COMMERCIAL

## 2022-08-29 ENCOUNTER — OFFICE VISIT (OUTPATIENT)
Dept: INTERNAL MEDICINE | Facility: CLINIC | Age: 67
End: 2022-08-29
Payer: COMMERCIAL

## 2022-08-29 VITALS
OXYGEN SATURATION: 93 % | SYSTOLIC BLOOD PRESSURE: 134 MMHG | WEIGHT: 186.1 LBS | BODY MASS INDEX: 29.91 KG/M2 | DIASTOLIC BLOOD PRESSURE: 89 MMHG | HEIGHT: 66 IN | HEART RATE: 87 BPM

## 2022-08-29 DIAGNOSIS — I10 BENIGN ESSENTIAL HYPERTENSION: ICD-10-CM

## 2022-08-29 DIAGNOSIS — Z20.822 ENCOUNTER FOR LABORATORY TESTING FOR COVID-19 VIRUS: ICD-10-CM

## 2022-08-29 DIAGNOSIS — I10 BENIGN ESSENTIAL HYPERTENSION: Primary | ICD-10-CM

## 2022-08-29 DIAGNOSIS — B35.1 ONYCHOMYCOSIS: ICD-10-CM

## 2022-08-29 LAB
ALBUMIN SERPL-MCNC: 4.1 G/DL (ref 3.4–5)
ALP SERPL-CCNC: 58 U/L (ref 40–150)
ALT SERPL W P-5'-P-CCNC: 30 U/L (ref 0–70)
ANION GAP SERPL CALCULATED.3IONS-SCNC: 7 MMOL/L (ref 3–14)
AST SERPL W P-5'-P-CCNC: 17 U/L (ref 0–45)
ATRIAL RATE - MUSE: 75 BPM
BASOPHILS # BLD AUTO: 0 10E3/UL (ref 0–0.2)
BASOPHILS NFR BLD AUTO: 1 %
BILIRUB SERPL-MCNC: 0.9 MG/DL (ref 0.2–1.3)
BUN SERPL-MCNC: 21 MG/DL (ref 7–30)
CALCIUM SERPL-MCNC: 9 MG/DL (ref 8.5–10.1)
CHLORIDE BLD-SCNC: 106 MMOL/L (ref 94–109)
CO2 SERPL-SCNC: 26 MMOL/L (ref 20–32)
CREAT SERPL-MCNC: 0.8 MG/DL (ref 0.66–1.25)
DIASTOLIC BLOOD PRESSURE - MUSE: NORMAL MMHG
EOSINOPHIL # BLD AUTO: 0.1 10E3/UL (ref 0–0.7)
EOSINOPHIL NFR BLD AUTO: 2 %
ERYTHROCYTE [DISTWIDTH] IN BLOOD BY AUTOMATED COUNT: 13.7 % (ref 10–15)
GFR SERPL CREATININE-BSD FRML MDRD: >90 ML/MIN/1.73M2
GLUCOSE BLD-MCNC: 215 MG/DL (ref 70–99)
HCT VFR BLD AUTO: 47.3 % (ref 40–53)
HGB BLD-MCNC: 16.3 G/DL (ref 13.3–17.7)
IMM GRANULOCYTES # BLD: 0 10E3/UL
IMM GRANULOCYTES NFR BLD: 0 %
INTERPRETATION ECG - MUSE: NORMAL
LYMPHOCYTES # BLD AUTO: 1.6 10E3/UL (ref 0.8–5.3)
LYMPHOCYTES NFR BLD AUTO: 24 %
MCH RBC QN AUTO: 31.4 PG (ref 26.5–33)
MCHC RBC AUTO-ENTMCNC: 34.5 G/DL (ref 31.5–36.5)
MCV RBC AUTO: 91 FL (ref 78–100)
MONOCYTES # BLD AUTO: 0.6 10E3/UL (ref 0–1.3)
MONOCYTES NFR BLD AUTO: 8 %
NEUTROPHILS # BLD AUTO: 4.4 10E3/UL (ref 1.6–8.3)
NEUTROPHILS NFR BLD AUTO: 65 %
NRBC # BLD AUTO: 0 10E3/UL
NRBC BLD AUTO-RTO: 0 /100
P AXIS - MUSE: 58 DEGREES
PLATELET # BLD AUTO: 176 10E3/UL (ref 150–450)
POTASSIUM BLD-SCNC: 4.3 MMOL/L (ref 3.4–5.3)
PR INTERVAL - MUSE: 162 MS
PROT SERPL-MCNC: 8 G/DL (ref 6.8–8.8)
QRS DURATION - MUSE: 154 MS
QT - MUSE: 438 MS
QTC - MUSE: 489 MS
R AXIS - MUSE: -55 DEGREES
RBC # BLD AUTO: 5.19 10E6/UL (ref 4.4–5.9)
SARS-COV-2 RNA RESP QL NAA+PROBE: NEGATIVE
SODIUM SERPL-SCNC: 139 MMOL/L (ref 133–144)
SYSTOLIC BLOOD PRESSURE - MUSE: NORMAL MMHG
T AXIS - MUSE: 76 DEGREES
VENTRICULAR RATE- MUSE: 75 BPM
WBC # BLD AUTO: 6.8 10E3/UL (ref 4–11)

## 2022-08-29 PROCEDURE — 99000 SPECIMEN HANDLING OFFICE-LAB: CPT | Performed by: PATHOLOGY

## 2022-08-29 PROCEDURE — U0005 INFEC AGEN DETEC AMPLI PROBE: HCPCS | Mod: 90 | Performed by: PATHOLOGY

## 2022-08-29 PROCEDURE — 85025 COMPLETE CBC W/AUTO DIFF WBC: CPT | Performed by: PATHOLOGY

## 2022-08-29 PROCEDURE — 36415 COLL VENOUS BLD VENIPUNCTURE: CPT | Performed by: PATHOLOGY

## 2022-08-29 PROCEDURE — U0003 INFECTIOUS AGENT DETECTION BY NUCLEIC ACID (DNA OR RNA); SEVERE ACUTE RESPIRATORY SYNDROME CORONAVIRUS 2 (SARS-COV-2) (CORONAVIRUS DISEASE [COVID-19]), AMPLIFIED PROBE TECHNIQUE, MAKING USE OF HIGH THROUGHPUT TECHNOLOGIES AS DESCRIBED BY CMS-2020-01-R: HCPCS | Mod: 90 | Performed by: PATHOLOGY

## 2022-08-29 PROCEDURE — 93000 ELECTROCARDIOGRAM COMPLETE: CPT | Performed by: INTERNAL MEDICINE

## 2022-08-29 PROCEDURE — 80053 COMPREHEN METABOLIC PANEL: CPT | Performed by: PATHOLOGY

## 2022-08-29 PROCEDURE — 99214 OFFICE O/P EST MOD 30 MIN: CPT | Performed by: INTERNAL MEDICINE

## 2022-08-29 NOTE — PROGRESS NOTES
Surgeon (please enter first and last name): Cheyanne Francisco MD  Location of Surgery: [facility] OR  Date of Surgery: 9/2/22  Procedure: Both lower eyelid ectropion repair  History of reaction to anesthesia? No    Castro Rodrigues, EMT at 7:24 AM on 8/29/2022.

## 2022-08-29 NOTE — NURSING NOTE
Anson Manriquez is a 66 year old male that presents in clinic today for the following:     Chief Complaint   Patient presents with     Pre-Op Exam     Nail Problem     Toe Fungus per patient       The patient's allergies and medications were reviewed. The patient's vitals were obtained without incident. The patient does not have any other questions for the provider.     Castro Rodrigues, EMT at 7:30 AM on 8/29/2022.  Primary Care Clinic: [phone]

## 2022-09-01 ENCOUNTER — ANESTHESIA EVENT (OUTPATIENT)
Dept: SURGERY | Facility: CLINIC | Age: 67
End: 2022-09-01
Payer: COMMERCIAL

## 2022-09-01 NOTE — PROGRESS NOTES
Outcome for 09/01/22 2:46 PM :Sent patient [employer] message asking them to upload their BG data   Clarice Murillo

## 2022-09-02 ENCOUNTER — HOSPITAL ENCOUNTER (OUTPATIENT)
Facility: CLINIC | Age: 67
Discharge: HOME | End: 2022-09-02
Attending: OPHTHALMOLOGY | Admitting: OPHTHALMOLOGY
Payer: COMMERCIAL

## 2022-09-02 ENCOUNTER — ANESTHESIA (OUTPATIENT)
Dept: SURGERY | Facility: CLINIC | Age: 67
End: 2022-09-02
Payer: COMMERCIAL

## 2022-09-02 VITALS
SYSTOLIC BLOOD PRESSURE: 116 MMHG | HEART RATE: 69 BPM | OXYGEN SATURATION: 95 % | BODY MASS INDEX: 29.97 KG/M2 | HEIGHT: 66 IN | DIASTOLIC BLOOD PRESSURE: 74 MMHG | RESPIRATION RATE: 16 BRPM | TEMPERATURE: 96.8 F | WEIGHT: 186.5 LBS

## 2022-09-02 DIAGNOSIS — H02.112 CICATRICIAL ECTROPION OF LOWER EYELIDS OF BOTH EYES: Primary | ICD-10-CM

## 2022-09-02 DIAGNOSIS — H02.115 CICATRICIAL ECTROPION OF LOWER EYELIDS OF BOTH EYES: Primary | ICD-10-CM

## 2022-09-02 PROCEDURE — 999N000141 HC STATISTIC PRE-PROCEDURE NURSING ASSESSMENT: Performed by: OPHTHALMOLOGY

## 2022-09-02 PROCEDURE — 258N000003 HC RX IP 258 OP 636

## 2022-09-02 PROCEDURE — 250N000009 HC RX 250: Performed by: OPHTHALMOLOGY

## 2022-09-02 PROCEDURE — 67917 REPAIR EYELID DEFECT: CPT | Mod: 59 | Performed by: OPHTHALMOLOGY

## 2022-09-02 PROCEDURE — 360N000076 HC SURGERY LEVEL 3, PER MIN: Performed by: OPHTHALMOLOGY

## 2022-09-02 PROCEDURE — 710N000012 HC RECOVERY PHASE 2, PER MINUTE: Performed by: OPHTHALMOLOGY

## 2022-09-02 PROCEDURE — 272N000001 HC OR GENERAL SUPPLY STERILE: Performed by: OPHTHALMOLOGY

## 2022-09-02 PROCEDURE — 710N000009 HC RECOVERY PHASE 1, LEVEL 1, PER MIN: Performed by: OPHTHALMOLOGY

## 2022-09-02 PROCEDURE — 370N000017 HC ANESTHESIA TECHNICAL FEE, PER MIN: Performed by: OPHTHALMOLOGY

## 2022-09-02 PROCEDURE — 250N000011 HC RX IP 250 OP 636

## 2022-09-02 RX ORDER — HYDROMORPHONE HCL IN WATER/PF 6 MG/30 ML
0.2 PATIENT CONTROLLED ANALGESIA SYRINGE INTRAVENOUS EVERY 5 MIN PRN
Status: DISCONTINUED | OUTPATIENT
Start: 2022-09-02 | End: 2022-09-02 | Stop reason: HOSPADM

## 2022-09-02 RX ORDER — ERYTHROMYCIN 5 MG/G
OINTMENT OPHTHALMIC
Status: DISCONTINUED
Start: 2022-09-02 | End: 2022-09-02 | Stop reason: HOSPADM

## 2022-09-02 RX ORDER — SODIUM CHLORIDE, SODIUM LACTATE, POTASSIUM CHLORIDE, CALCIUM CHLORIDE 600; 310; 30; 20 MG/100ML; MG/100ML; MG/100ML; MG/100ML
INJECTION, SOLUTION INTRAVENOUS CONTINUOUS
Status: DISCONTINUED | OUTPATIENT
Start: 2022-09-02 | End: 2022-09-02 | Stop reason: HOSPADM

## 2022-09-02 RX ORDER — FENTANYL CITRATE 0.05 MG/ML
25 INJECTION, SOLUTION INTRAMUSCULAR; INTRAVENOUS EVERY 5 MIN PRN
Status: DISCONTINUED | OUTPATIENT
Start: 2022-09-02 | End: 2022-09-02 | Stop reason: HOSPADM

## 2022-09-02 RX ORDER — ERYTHROMYCIN 5 MG/G
OINTMENT OPHTHALMIC PRN
Status: DISCONTINUED | OUTPATIENT
Start: 2022-09-02 | End: 2022-09-02 | Stop reason: HOSPADM

## 2022-09-02 RX ORDER — LIDOCAINE HCL/EPINEPHRINE/PF 2%-1:200K
VIAL (ML) INJECTION
Status: DISCONTINUED
Start: 2022-09-02 | End: 2022-09-02 | Stop reason: HOSPADM

## 2022-09-02 RX ORDER — ONDANSETRON 2 MG/ML
4 INJECTION INTRAMUSCULAR; INTRAVENOUS EVERY 30 MIN PRN
Status: DISCONTINUED | OUTPATIENT
Start: 2022-09-02 | End: 2022-09-02 | Stop reason: HOSPADM

## 2022-09-02 RX ORDER — ERYTHROMYCIN 5 MG/G
OINTMENT OPHTHALMIC
Qty: 3.5 G | Refills: 0 | Status: SHIPPED | OUTPATIENT
Start: 2022-09-02 | End: 2022-09-13

## 2022-09-02 RX ORDER — PROPOFOL 10 MG/ML
INJECTION, EMULSION INTRAVENOUS CONTINUOUS PRN
Status: DISCONTINUED | OUTPATIENT
Start: 2022-09-02 | End: 2022-09-02

## 2022-09-02 RX ORDER — MEPERIDINE HYDROCHLORIDE 25 MG/ML
12.5 INJECTION INTRAMUSCULAR; INTRAVENOUS; SUBCUTANEOUS
Status: DISCONTINUED | OUTPATIENT
Start: 2022-09-02 | End: 2022-09-02 | Stop reason: HOSPADM

## 2022-09-02 RX ORDER — PROPOFOL 10 MG/ML
INJECTION, EMULSION INTRAVENOUS PRN
Status: DISCONTINUED | OUTPATIENT
Start: 2022-09-02 | End: 2022-09-02

## 2022-09-02 RX ORDER — BUPIVACAINE HYDROCHLORIDE 5 MG/ML
INJECTION, SOLUTION EPIDURAL; INTRACAUDAL
Status: DISCONTINUED
Start: 2022-09-02 | End: 2022-09-02 | Stop reason: HOSPADM

## 2022-09-02 RX ORDER — OXYCODONE HYDROCHLORIDE 5 MG/1
5 TABLET ORAL EVERY 4 HOURS PRN
Status: DISCONTINUED | OUTPATIENT
Start: 2022-09-02 | End: 2022-09-02 | Stop reason: HOSPADM

## 2022-09-02 RX ORDER — TETRACAINE HYDROCHLORIDE 5 MG/ML
SOLUTION OPHTHALMIC PRN
Status: DISCONTINUED | OUTPATIENT
Start: 2022-09-02 | End: 2022-09-02 | Stop reason: HOSPADM

## 2022-09-02 RX ORDER — LABETALOL HYDROCHLORIDE 5 MG/ML
10 INJECTION, SOLUTION INTRAVENOUS
Status: DISCONTINUED | OUTPATIENT
Start: 2022-09-02 | End: 2022-09-02 | Stop reason: HOSPADM

## 2022-09-02 RX ORDER — ONDANSETRON 4 MG/1
4 TABLET, ORALLY DISINTEGRATING ORAL EVERY 30 MIN PRN
Status: DISCONTINUED | OUTPATIENT
Start: 2022-09-02 | End: 2022-09-02 | Stop reason: HOSPADM

## 2022-09-02 RX ORDER — TETRACAINE HYDROCHLORIDE 5 MG/ML
SOLUTION OPHTHALMIC
Status: DISCONTINUED
Start: 2022-09-02 | End: 2022-09-02 | Stop reason: HOSPADM

## 2022-09-02 RX ORDER — SODIUM CHLORIDE, SODIUM LACTATE, POTASSIUM CHLORIDE, CALCIUM CHLORIDE 600; 310; 30; 20 MG/100ML; MG/100ML; MG/100ML; MG/100ML
INJECTION, SOLUTION INTRAVENOUS CONTINUOUS PRN
Status: DISCONTINUED | OUTPATIENT
Start: 2022-09-02 | End: 2022-09-02

## 2022-09-02 RX ORDER — FENTANYL CITRATE 0.05 MG/ML
25 INJECTION, SOLUTION INTRAMUSCULAR; INTRAVENOUS
Status: DISCONTINUED | OUTPATIENT
Start: 2022-09-02 | End: 2022-09-02 | Stop reason: HOSPADM

## 2022-09-02 RX ADMIN — PROPOFOL 30 MG: 10 INJECTION, EMULSION INTRAVENOUS at 07:38

## 2022-09-02 RX ADMIN — PROPOFOL 50 MG: 10 INJECTION, EMULSION INTRAVENOUS at 07:36

## 2022-09-02 RX ADMIN — PROPOFOL 50 MCG/KG/MIN: 10 INJECTION, EMULSION INTRAVENOUS at 07:36

## 2022-09-02 RX ADMIN — SODIUM CHLORIDE, POTASSIUM CHLORIDE, SODIUM LACTATE AND CALCIUM CHLORIDE: 600; 310; 30; 20 INJECTION, SOLUTION INTRAVENOUS at 07:29

## 2022-09-02 ASSESSMENT — ACTIVITIES OF DAILY LIVING (ADL)
ADLS_ACUITY_SCORE: 35
ADLS_ACUITY_SCORE: 35

## 2022-09-02 NOTE — INTERVAL H&P NOTE
"I have reviewed the surgical (or preoperative) H&P that is linked to this encounter, and examined the patient. There are no significant changes    Clinical Conditions Present on Arrival:  Clinically Significant Risk Factors Present on Admission                   # Obesity: Estimated body mass index is 30.1 kg/m  as calculated from the following:    Height as of this encounter: 1.676 m (5' 6\").    Weight as of this encounter: 84.6 kg (186 lb 8 oz).       "

## 2022-09-02 NOTE — ANESTHESIA POSTPROCEDURE EVALUATION
Patient: Anson Manriquez    Procedure: Procedure(s):  Both lower eyelid ectropion repair       Anesthesia Type:  MAC    Note:  Disposition: Outpatient   Postop Pain Control: Uneventful            Sign Out: Well controlled pain   PONV: No   Neuro/Psych: Uneventful            Sign Out: Acceptable/Baseline neuro status   Airway/Respiratory: Uneventful            Sign Out: Acceptable/Baseline resp. status   CV/Hemodynamics: Uneventful            Sign Out: Acceptable CV status; No obvious hypovolemia; No obvious fluid overload   Other NRE: NONE   DID A NON-ROUTINE EVENT OCCUR? No           Last vitals:  Vitals Value Taken Time   /72 09/02/22 0824   Temp     Pulse 65 09/02/22 0828   Resp 21 09/02/22 0828   SpO2 93 % 09/02/22 0828   Vitals shown include unvalidated device data.    Electronically Signed By: DEX PEACE MD  September 2, 2022  2:10 PM

## 2022-09-02 NOTE — ANESTHESIA PREPROCEDURE EVALUATION
Anesthesia Pre-Procedure Evaluation    Patient: Anson Manriquez   MRN: [id number] : 1955        Procedure : Procedure(s):  Both lower eyelid ectropion repair          Past Medical History:   Diagnosis Date     Anal fistula      Antiplatelet or antithrombotic long-term use      Coronary artery disease      Diabetes mellitus, type 2 (H)      Heart attack (H) 2007     Hyperlipidemia      Hypertension      Inguinal hernia      Ischemic cardiomyopathy      STEMI (ST elevation myocardial infarction) (H) 2018    s/p STEVO x2     Stented coronary artery       Past Surgical History:   Procedure Laterality Date     EP ICD N/A 2019    Procedure: EP ICD [[id number]];  Surgeon: Mick Mckeon MD;  Location: [facility]     HERNIA REPAIR Right     Inguinal     HERNIORRHAPHY INGUINAL Left 2020    Procedure: Left HERNIORRHAPHY, INGUINAL, OPEN;  Surgeon: Flakito Massey MD;  Location: [facility]     HERNIORRHAPHY UMBILICAL N/A 10/16/2020    Procedure: HERNIORRHAPHY, UMBILICAL, OPEN, with mesh;  Surgeon: Flakito Massey MD;  Location: [facility]     IRRIGATION AND DEBRIDEMENT RECTUM, COMBINED      abcess near rectum      LAPAROSCOPIC CHOLECYSTECTOMY  3/29/2012    Procedure:LAPAROSCOPIC CHOLECYSTECTOMY; LAPAROSCOPIC CHOLECYSTECTOMY; Surgeon:MARILYNN PRESSLEY; Location:[facility]     STENT, CORONARY, OLIVIA        No Known Allergies   Social History     Tobacco Use     Smoking status: Never Smoker     Smokeless tobacco: Never Used   Substance Use Topics     Alcohol use: No      Wt Readings from Last 1 Encounters:   22 84.6 kg (186 lb 8 oz)        Anesthesia Evaluation            ROS/MED HX  ENT/Pulmonary:     (+) sleep apnea, doesn't use CPAP,     Neurologic:       Cardiovascular:     (+) Dyslipidemia hypertension--CAD -past MI -stent-CHF etiology: Ischemic cardiomyopathy; Last EF: 29% pacemaker, Previous cardiac testing   Echo: Date: 2020 Results:  Resting echo 2020:     Echocardiogram  with two-dimensional, color and spectral Doppler performed.  Contrast Optison. Patient was given 5 ml mixture of 3 ml Optison and 6 ml  saline. 4 ml wasted.           Interpretation Summary  Ischemic cardiomyopathy with moderately dilated LV with moderately reduced  global LV function, LVEF=29%. Extensive regional wall motion abnormalities as  described below, unchanged from the prior study.  This study was compared with the study from 8/28/19: There has been no  significant change. Specifically, LV function and wall motion abnormalities  have not changed significantly.           Left Ventricle  Left ventricular wall thickness is normal. Moderate left ventricular dilation  is present. Severely (EF 20-30%) reduced left ventricular function is present.  Grade I or early diastolic dysfunction. There is akinesis involving the basal-  to-mid anteroseptal, cem-ft-vxetor anterior, mid anterolateral, and all apical  myocardial segments. The basal anterior, basal anterolateral, and basal  inferolateral segments are relatively spared.     Right Ventricle  Right ventricular function, chamber size, wall motion, and thickness are  normal. A pacemaker lead is noted in the right ventricle.     Atria  Both atria appear normal.     Mitral Valve  Mild mitral annular calcification is present.        Aortic Valve  Mild aortic valve calcification is present.     Tricuspid Valve  The tricuspid valve is normal. The peak velocity of the tricuspid regurgitant  jet is not obtainable. Pulmonary artery systolic pressure cannot be assessed.  Trace tricuspid insufficiency is present.     Pulmonic Valve  The pulmonic valve cannot be assessed.     Vessels  The aorta root is normal. The thoracic aorta is normal. The pulmonary artery  cannot be assessed. The inferior vena cava was normal in size with preserved  respiratory variability. IVC diameter <2.1 cm collapsing >50% with sniff  suggests a normal RA pressure of 3 mmHg.     Pericardium  No  pericardial effusion is present.        Compared to Previous Study  This study was compared with the study from 8/28/19: There has been no  significant change. Specifically, LV function and wall motion abnormalities  have not changed significantly.        Stress Test: Date: Results:    ECG Reviewed: Date: Results:    Cath: Date: Results:      METS/Exercise Tolerance:     Hematologic:       Musculoskeletal:       GI/Hepatic:    (-) GERD   Renal/Genitourinary:       Endo:     (+) type II DM,     Psychiatric/Substance Use:       Infectious Disease:       Malignancy:       Other:            Physical Exam    Airway        Mallampati: II   TM distance: > 3 FB   Neck ROM: full   Mouth opening: > 3 cm    Respiratory Devices and Support         Dental       (+) missing      Cardiovascular   cardiovascular exam normal          Pulmonary   pulmonary exam normal                OUTSIDE LABS:  CBC:   Lab Results   Component Value Date    WBC 6.8 08/29/2022    WBC 8.4 04/26/2022    HGB 16.3 08/29/2022    HGB 17.1 04/26/2022    HCT 47.3 08/29/2022    HCT 51.4 04/26/2022     08/29/2022     04/26/2022     BMP:   Lab Results   Component Value Date     08/29/2022     04/26/2022    POTASSIUM 4.3 08/29/2022    POTASSIUM 4.2 04/26/2022    CHLORIDE 106 08/29/2022    CHLORIDE 103 04/26/2022    CO2 26 08/29/2022    CO2 26 04/26/2022    BUN 21 08/29/2022    BUN 24 04/26/2022    CR 0.80 08/29/2022    CR 0.98 04/26/2022     (H) 08/29/2022     (H) 04/26/2022     COAGS:   Lab Results   Component Value Date    PTT 29 12/06/2018    INR 1.10 04/05/2021    FIBR 226 09/14/2018     POC:   Lab Results   Component Value Date     (H) 04/06/2021     HEPATIC:   Lab Results   Component Value Date    ALBUMIN 4.1 08/29/2022    PROTTOTAL 8.0 08/29/2022    ALT 30 08/29/2022    AST 17 08/29/2022    ALKPHOS 58 08/29/2022    BILITOTAL 0.9 08/29/2022     OTHER:   Lab Results   Component Value Date    PH 7.50 (H)  09/17/2018    LACT 0.9 04/05/2021    A1C 9.3 (H) 04/26/2022    RENO 9.0 08/29/2022    PHOS 4.0 09/16/2018    MAG 2.5 (H) 09/20/2018    LIPASE 310 09/14/2018    AMYLASE 38 09/14/2018    TSH 2.44 03/07/2017       Anesthesia Plan    ASA Status:  3   NPO Status:  NPO Appropriate    Anesthesia Type: MAC.              Consents    Anesthesia Plan(s) and associated risks, benefits, and realistic alternatives discussed. Questions answered and patient/representative(s) expressed understanding.    - Discussed:     - Discussed with:  Patient         Postoperative Care       PONV prophylaxis: Ondansetron (or other 5HT-3)     Comments:                DEX PEACE MD

## 2022-09-02 NOTE — BRIEF OP NOTE
[facility]    Brief Operative Note    Pre-operative diagnosis: Senile ectropion of both lower eyelids [H02.132, H02.135]  Post-operative diagnosis Same as pre-operative diagnosis    Procedure: Procedure(s):  Both lower eyelid ectropion repair  Surgeon: Surgeon(s) and Role:     * Cheyanne Francisco MD - Primary   Niki Washington MD - Resident    Anesthesia: MAC with Local   Estimated Blood Loss: None    Drains: None  Specimens: * No specimens in log *  Findings:   None.  Complications: None.  Implants: * No implants in log *

## 2022-09-02 NOTE — DISCHARGE INSTRUCTIONS
Post-operative Instructions  Ophthalmic Plastic and Reconstructive Surgery    Cheyanne Francisco M.D.     All instructions apply to the operated eye(s) or eyelid(s).    Wound care and personal care  ? Apply ice compresses 15 minutes of every hour while awake for 2 days. If you are sleeping, you don't need to wake up to ice. As long as there is no further bleeding, after two days, switch to warm water compresses for five minutes, four times a day until seen by your physician.   ? You may shower or wash your hair the day after surgery. Do not go swimming for at least 2 weeks to prevent contamination of your wounds.  ? You may go for walks and light activity is ok, but no heavy (over 15 pounds) lifting, bending or excessive straining for one week.   ? Do not apply make-up to the eyes or eyelids for 2 weeks after surgery.  ? Expect some swelling, bruising, black eye (even into the lower eyelids and cheeks). Also expect serum caking, crusting and discharge from the eye and/or incisions. A small amount of surface bleeding, and depending on the type of surgery, bleeding from the inside of the eyelid, is normal for the first 48 hours.  ? Avoid straining, bending at the waist, or lifting more than 15 pounds for 1 week. Sleeping with your head elevated, such as in a recliner, for the first several days can help swelling resolve more quickly.   ? Do continue to ambulate (walk) as you normally would - being sedentary after surgery can cause blood clots.   ? Your eye(s) and eyelid(s) may be painful and tender. This is normal after surgery.      Contact information and follow-up  ? Return to the Eye Clinic for a follow-up appointment with your physician as scheduled. If no appointment has been scheduled:   - [facility] eye clinic: [phone] for an appointment with Dr. Francisco within 2 to 3 weeks from your date of surgery.      -  [facility] eye clinic: [phone] for an appointment with   Nolan within 2 to 3 weeks from your date of surgery.     ? For severe pain, bleeding, or loss of vision, call the [facility] at [phone] or [facility] at [phone].     After hours or on weekends and holidays, call [phone] and ask to speak with the ophthalmologist on call.    An on call person can be reached after hours for concerns. The on call doctor should not call in medication refill requests after hours or on weekends, so please plan accordingly. An effort has been made to provide adequate pain medications following every surgery, and refills will not be provided in most instances.     Medications  ? Restart all regular home medications and eye drops. If you take Plavix or Aspirin on a regular basis, wait for 72 hours after your surgery before restarting these in order to decrease the risk of bleeding complications.  ? Avoid aspirin and aspirin-like medications (Motrin, Aleve, Ibuprofen, [place] etc) for 72 hours to reduce the risk of bleeding. You may take Tylenol (acetaminophen) for pain.  ? In addition to your home medications, take the following post-operative medications as prescribed by your physician.    ? Apply antibiotic ointment to all sutures three times a day, and into the operated eye(s) at night.    ? In many cases, postoperative discomfort can be managed with Tylenol alone.     [facility]   Eyelid/Orbital Surgery Discharge Instructions   Cheyanne Rosenberg M.D.     ICE COMPRESSES  Immediately following surgery, you should begin to apply ice compresses.  Apply a cold gel pack, which can be purchased at your drug store, or wrap a clean washcloth around a cup of crushed ice in a plastic bag (a bag of frozen peas also works well) and hold the cold compresses directly against the closed eyelid (s). Do this at least six times per day for 15 minutes, but not while sleeping.  Continue cold compresses for the first two days  after surgery, or longer if swelling persists.    HOT COMPRESSES  Starting on the third day following surgery or after your swelling has gone down, hot compresses should be applied.  Take a clean washcloth and wring it out in hot water (as warm as you can tolerate comfortably).  Hold this warm compress against the closed eyelid(s) six times per day for 15 minutes.  This should be continued for about two weeks.    OINTMENT  You may be given some ointment when you leave the hospital.  Apply this ointment to the suture line(s) three times a day.  Expect some blurring of vision from the ointment.     ACTIVITY  Avoid heavy lifting or vigorous exercise for one week after surgery.  You may resume regular activities as tolerated.  You may shower and wash your hair on the day after surgery; be careful to avoid getting shampoo in your eyes.     MEDICATION  If the doctor has given you some medications to take after surgery, please take these according to the instructions on the bottle.  Pain medications may make you drowsy so do not drive, operate heavy machinery, or use alcohol while taking it.  When you feel that you do not need the prescription pain medication, you may substitute Extra Strength Tylenol for mild pain.    WHAT TO EXPECT  You should expect some slight oozing of blood from the incision site over the first two to three days after surgery.  Swelling and bruising will occur for one to two weeks or longer.  You may also experience itching and tearing during the first several weeks after surgery.  This is part of the normal healing process    QUESTIONS  Please feel free to contact the office, should you have any questions that are not answered above.  The phone number is [phone].  Please call immediately if you are unable to establish vision in the operative eye, or if you are experiencing heavy bleeding that will not stop with gentle pressure.     Same Day Surgery Discharge Instructions for  Sedation and  General Anesthesia     It's not unusual to feel dizzy, light-headed or faint for up to 24 hours after surgery or while taking pain medication.  If you have these symptoms: sit for a few minutes before standing and have someone assist you when you get up to walk or use the bathroom.    You should rest and relax for the next 24 hours. We recommend you make arrangements to have an adult stay with you for at least 24 hours after your discharge.  Avoid hazardous and strenuous activity.    DO NOT DRIVE any vehicle or operate mechanical equipment for 24 hours following the end of your surgery.  Even though you may feel normal, your reactions may be affected by the medication you have received.    Do not drink alcoholic beverages for 24 hours following surgery.     Slowly progress to your regular diet as you feel able. It's not unusual to feel nauseated and/or vomit after receiving anesthesia.  If you develop these symptoms, drink clear liquids (apple juice, ginger ale, broth, 7-up, etc. ) until you feel better.  If your nausea and vomiting persists for 24 hours, please notify your surgeon.      All narcotic pain medications, along with inactivity and anesthesia, can cause constipation. Drinking plenty of liquids and increasing fiber intake will help.    For any questions of a medical nature, call your surgeon.    Do not make important decisions for 24 hours.    If you had general anesthesia, you may have a sore throat for a couple of days related to the breathing tube used during surgery.  You may use Cepacol lozenges to help with this discomfort.  If it worsens or if you develop a fever, contact your surgeon.     If you feel your pain is not well managed with the pain medications prescribed by your surgeon, please contact your surgeon's office to let them know so they can address your concerns.            **If you have questions or concerns about your procedure,   call Dr. Francisco at [phone] [facility] and  [phone] [place]**

## 2022-09-02 NOTE — OP NOTE
PREOPERATIVE DIAGNOSIS: Bilateral lower eyelid ectropion  POSTOPERATIVE DIAGNOSIS: Bilateral lower eyelid ectropion  PROCEDURE:  Bilateral lower eyelid ectropion repair by tarsal strip procedure   ANESTHESIA: Monitored with local infiltration of a 50/50 mixture of 2% lidocaine with epinephrine and 0.5% Marcaine.   SURGEON: Cheyanne Francisco MD.  ASSISTANT: Sarah Beth Washington MD  COMPLICATIONS: None.   ESTIMATED BLOOD LOSS: Less than 5 mL.   HISTORY: Anson Manriquez  presented with tearing  due to lower lid ectropion. After the risks, benefits and alternatives to the proposed procedure were explained, informed consent was obtained.   DESCRIPTION OF PROCEDURE: Anson Manriquez was brought to the operating room and placed supine on the operating table. IV sedation was given. The lower lids and lateral canthal areas were infiltrated with local anesthetic. The area was prepped and draped in the typical fashion. Attention was directed to the right side. Lateral canthotomy and inferior cantholysis was performed with Barbara scissors. A lateral tarsal strip was fashioned with the high temperature cautery and the barbara scissors. The tarsal strip was secured to the lateral orbital rim periosteum with a double-armed 5-0 vicryl suture in a horizontal mattress fashion. Lateral canthal angle was closed with a gray line to gray line suture of 5-0 Vicryl tied internally. Attention was directed to the left side where the same procedure was performed. Antibiotic ointment was applied to the incisions. Anson Manriquez left the operating room in stable condition.   Cheyanne Francisco MD

## 2022-09-07 ENCOUNTER — TELEPHONE (OUTPATIENT)
Dept: ENDOCRINOLOGY | Facility: CLINIC | Age: 67
End: 2022-09-07

## 2022-09-07 ENCOUNTER — PRE VISIT (OUTPATIENT)
Dept: ENDOCRINOLOGY | Facility: CLINIC | Age: 67
End: 2022-09-07

## 2022-09-07 ENCOUNTER — OFFICE VISIT (OUTPATIENT)
Dept: ENDOCRINOLOGY | Facility: CLINIC | Age: 67
End: 2022-09-07
Attending: INTERNAL MEDICINE
Payer: COMMERCIAL

## 2022-09-07 VITALS
HEART RATE: 88 BPM | SYSTOLIC BLOOD PRESSURE: 134 MMHG | DIASTOLIC BLOOD PRESSURE: 82 MMHG | WEIGHT: 185.8 LBS | BODY MASS INDEX: 29.99 KG/M2

## 2022-09-07 DIAGNOSIS — E11.9 TYPE 2 DIABETES MELLITUS WITHOUT COMPLICATION, WITHOUT LONG-TERM CURRENT USE OF INSULIN (H): Primary | ICD-10-CM

## 2022-09-07 LAB — HBA1C MFR BLD: 9.6 % (ref 4.3–?)

## 2022-09-07 PROCEDURE — 83036 HEMOGLOBIN GLYCOSYLATED A1C: CPT | Performed by: STUDENT IN AN ORGANIZED HEALTH CARE EDUCATION/TRAINING PROGRAM

## 2022-09-07 PROCEDURE — 99215 OFFICE O/P EST HI 40 MIN: CPT | Performed by: STUDENT IN AN ORGANIZED HEALTH CARE EDUCATION/TRAINING PROGRAM

## 2022-09-07 RX ORDER — SEMAGLUTIDE 1.34 MG/ML
INJECTION, SOLUTION SUBCUTANEOUS
Qty: 16 ML | Refills: 3 | OUTPATIENT
Start: 2022-09-07 | End: 2022-09-12

## 2022-09-07 ASSESSMENT — PAIN SCALES - GENERAL: PAINLEVEL: NO PAIN (0)

## 2022-09-07 NOTE — LETTER
9/7/2022       RE: Anson Manriquez  [address]  [facility] [address]     Dear Colleague,    Thank you for referring your patient, Anson Manriquez, to the [facility] [place] [facility]. Please see a copy of my visit note below.    Outcome for 09/01/22 2:46 PM :Sent patient [employer] message asking them to upload their BG data   Clarice Murillo        Reached out via phone to the pt on 9/6 to remind them of their visit with Dr. SHAKEEL Murillo      Endocrinology Clinic Visit 9/7/2022    NAME:  Anson Manriquez  PCP:  Ignacio Beard  MRN:  [id number]  Reason for Consult:  DM2  Requesting Provider:  Ignacio Beard    Chief Complaint     Chief Complaint   Patient presents with     Diabetes       History of Present Illness     Anson Manriquez is a 66 year old male who is seen in clinic for DM2    The patient was diagnosed with type 2 diabetes since 4-5 years ago on lab routine screening. He was started on metformin, he had diarrhea. He was switched to glipizide then jardiance was added.    Previous A1C in records reviewed. Today A1C is 9.6 similar compared to previous.  Lab Results   Component Value Date    A1C 9.3 (H) 04/26/2022    A1C 9.0 (H) 10/26/2021    A1C 8.0 (H) 10/29/2020    A1C 8.0 (H) 02/11/2020    A1C 7.3 (H) 11/06/2019    A1C 7.5 (H) 06/18/2019    A1C 7.6 (H) 05/09/2019    HEMOGLOBINA1 9.6 09/07/2022     Weight is stable  Wt Readings from Last 4 Encounters:   09/07/22 84.3 kg (185 lb 12.8 oz)   09/02/22 84.6 kg (186 lb 8 oz)   08/29/22 84.4 kg (186 lb 1.6 oz)   04/26/22 83 kg (183 lb)         Diabetes Care/Complications:   Eyes: no diabetic retinopathy, last eye exam 5/2021  Kidneys: no DN, negative urine microalb on 10/2021. Bmp on 8/2022  Nerves: no neuropathy  Smoking: no  Blood Pressure: ok on careg, entresto  Lipids: on statin  Macrovascular: CAD had a heart attack 15 years ago and then 3 years ago  Hypoglycemia:  no    Previous DM related Hospitalization:    Current treatment strategy:   jardiance 25 mg daily  Glipizide 5 mg bid    Blood Glucose Monitoring:   He does not check his bg.    Diet: 3 meals, not much snacks   Sometimes snack at bedtime  Drinks:    Exercise: he is active all day at work. No routine exercise.    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 He is  and has 3 children.     Family hx: DM2 in his father, brother and sister    Problem List     Patient Active Problem List   Diagnosis     Coronary artery disease involving native coronary artery of native heart without angina pectoris     Type 2 diabetes mellitus without complication, without long-term current use of insulin (H)     Benign essential hypertension     Mixed hyperlipidemia     Anal fistula     STEMI (ST elevation myocardial infarction) (H)     Ischemic cardiomyopathy     S/P ICD (internal cardiac defibrillator) procedure     Chronic infection     Left inguinal hernia     Umbilical hernia without obstruction and without gangrene     Abdominal wall abscess        Medications     Current Outpatient Medications   Medication     aspirin (ASA) 81 MG chewable tablet     atorvastatin (LIPITOR) 80 MG tablet     carvedilol (COREG) 25 MG tablet     empagliflozin (JARDIANCE) 25 MG TABS tablet     erythromycin (ROMYCIN) 5 MG/GM ophthalmic ointment     furosemide (LASIX) 40 MG tablet     glipiZIDE (GLUCOTROL XL) 5 MG 24 hr tablet     MULTIPLE VITAMIN PO     sacubitril-valsartan (ENTRESTO)  MG per tablet     spironolactone (ALDACTONE) 25 MG tablet     blood glucose (NO BRAND SPECIFIED) lancets standard     blood glucose (NO BRAND SPECIFIED) lancets standard     blood glucose (NO BRAND SPECIFIED) test strip     blood glucose (NO BRAND SPECIFIED) test strip     blood glucose monitoring (NO BRAND SPECIFIED) meter device kit     blood glucose monitoring (NO BRAND SPECIFIED) meter device kit     No current facility-administered medications for  this visit.     Facility-Administered Medications Ordered in Other Visits   Medication     perflutren diluted 1mL to 2mL with saline (OPTISON) diluted injection 5 mL     sodium chloride (PF) 0.9% PF flush 10 mL     sodium chloride (PF) 0.9% PF flush 10 mL        Allergies     No Known Allergies    Medical / Surgical History     Past Medical History:   Diagnosis Date     Anal fistula      Antiplatelet or antithrombotic long-term use      Coronary artery disease      Diabetes mellitus, type 2 (H)      Heart attack (H) 4/17/2007     Hyperlipidemia      Hypertension      Inguinal hernia      Ischemic cardiomyopathy      STEMI (ST elevation myocardial infarction) (H) 09/13/2018    s/p STEVO x2     Stented coronary artery 2007     Past Surgical History:   Procedure Laterality Date     EP ICD N/A 4/25/2019    Procedure: EP ICD [[id number]];  Surgeon: Mick Mckeon MD;  Location: [facility]     HERNIA REPAIR Right     Inguinal     HERNIORRHAPHY INGUINAL Left 2/20/2020    Procedure: Left HERNIORRHAPHY, INGUINAL, OPEN;  Surgeon: Flakito Massey MD;  Location: [facility] OR     HERNIORRHAPHY UMBILICAL N/A 10/16/2020    Procedure: HERNIORRHAPHY, UMBILICAL, OPEN, with mesh;  Surgeon: Flakito Massey MD;  Location: [facility] OR     IRRIGATION AND DEBRIDEMENT RECTUM, COMBINED      abcess near rectum      LAPAROSCOPIC CHOLECYSTECTOMY  3/29/2012    Procedure:LAPAROSCOPIC CHOLECYSTECTOMY; LAPAROSCOPIC CHOLECYSTECTOMY; Surgeon:MARILYNN PRESSLEY; Location:[facility] OR     REPAIR ECTROPION Bilateral 9/2/2022    Procedure: Both lower eyelid ectropion repair;  Surgeon: Cheyanne Francisco MD;  Location: [facility] OR     STENT, CORONARY, OLIVIA         Social History     Social History     Socioeconomic History     Marital status:      Spouse name: Not on file     Number of children: Not on file     Years of education: Not on file     Highest education level: Not on file   Occupational History     Not on file   Tobacco Use     Smoking status:  Never Smoker     Smokeless tobacco: Never Used   Substance and Sexual Activity     Alcohol use: No     Drug use: No     Sexual activity: Not on file   Other Topics Concern     Not on file   Social History Narrative     Not on file     Social Determinants of Health     Financial Resource Strain: Not on file   Food Insecurity: Not on file   Transportation Needs: Not on file   Physical Activity: Not on file   Stress: Not on file   Social Connections: Not on file   Intimate Partner Violence: Not on file   Housing Stability: Not on file       Family History     Family History   Problem Relation Age of Onset     Hypertension Mother      Diabetes Father      Glaucoma No family hx of      Macular Degeneration No family hx of        ROS     12 ROS completed, pertinent positive and negative in HPI    Physical Exam   /82 (BP Location: Left arm, Patient Position: Sitting, Cuff Size: Adult Regular)   Pulse 88   Wt 84.3 kg (185 lb 12.8 oz)   BMI 29.99 kg/m       General: Comfortable, no obvious distress, normal body habitus  Eyes: Sclera anicteric, moist conjunctiva  HENT: Atraumatic, oropharynx clear, moist mucous membranes with no mucosal ulcerations  Neck: Trachea midline, supple. Thyroid: Thyroid is normal in size and texture  CV: normal rate.   Resp:  good effort, no evidence of loud wheezing  Abdomen:  obese, non distended.   Skin: No rashes, lesions, or subcutaneous nodules on exposed skin.   Psych: Alert and oriented x 3. Appropriate affect, good insight  Extremities: No peripheral edema  Musculoskeletal: Appropriate muscle bulk and strength  Neuro: Moves all four extremities. No focal deficits on limited exam. Gait normal.   Diabetic foot exam: normal DP and PT pulses, no trophic changes or ulcerative lesions, normal sensory exam and normal monofilament exam  Labs/Imaging     Pertinent Labs were reviewed and updated in EPIC and discussed briefly.  Radiology Results were  reviewed and updated in EPIC and  discussed briefly.    Summary of recent findings:   Lab Results   Component Value Date    A1C 9.3 04/26/2022    A1C 9.0 10/26/2021    A1C 8.0 10/29/2020    A1C 8.0 02/11/2020    A1C 7.3 11/06/2019    A1C 7.5 06/18/2019    A1C 7.6 05/09/2019       TSH   Date Value Ref Range Status   03/07/2017 2.44 0.40 - 4.00 mU/L Final   07/27/2016 2.273 0.358 - 3.740 mcU/mL Final   07/27/2016 2.273 mcU/mL Final       Creatinine   Date Value Ref Range Status   08/29/2022 0.80 0.66 - 1.25 mg/dL Final   04/05/2021 0.94 0.66 - 1.25 mg/dL Final       Recent Labs   Lab Test 04/26/22  0840 11/06/19  0831   CHOL 228* 198   HDL 40 41   * 93   TRIG 437* 319*       No results found for: [id number], [id number], [id number]    I personally reviewed the patient's outside records from [employer] EMR. Summary of pertinent findings in HPI.    Impression / Plan     1. Diabetes Mellitus: Type 2  2-overweight, BMI 29.99    He has poorly controlled DM2 on jardiance and glipizide. a1c 9.6. target a1c is <8. He has central obesity and is interested in weight loss. We discussed at a1c>9 we usually recommend adding a basal insulin for better glycemic control and faster effect on a1c. Insulin and glipizide are both wt promoting medications. He would like to try adding glp1a and reassess, if a1c still above 9 then we will plan on adding basal insulin and getting him slowly off glipizide.  We reviewed glp1a side effects including gi side effects, association with pancreatitis and MTC in rat  We reviewed hypoglycemia symptoms and what to do. He was instructed to call clinic if having frequent hypoglycemia as we might decrease or stop his glipizide.      Plan:   Continue jardiance andglipizide  Start Ozempic 0.25 mg once weekly for 4 weeks, then increase to 0.5 mg once weekly then  increase to 1 mg once weekly after 4 weeks      2. Diabetes Complications: none. Due for eye exam     3. Blood Pressure Management: Blood pressure is controlled . Currently is  on  pharmacotherapy for this.     4.Lipid Management: Per the new ACC/TERI/NHLBI guidelines, statins are recommended for individuals with diabetes aged 40-75 with LDL  without ASCVD, and for any individual with ASCVD. Currently the patient is on a statin.      5. Smoking Status: Patient Pt is smoke free..            Test and/or medications prescribed today:  Orders Placed This Encounter   Procedures     Hemoglobin A1c POCT         Follow up: 3 MONTH      Li Muller MD  Endocrinology, Diabetes and Metabolism  [facility]    Review of the result(s) of each unique test - A1c and other DM related labs  60 minutes spent on the date of the encounter doing chart review, history and exam, documentation and further activities per the note

## 2022-09-07 NOTE — PROGRESS NOTES
Endocrinology Clinic Visit 9/7/2022    NAME:  Anson Manriquez  PCP:  Ignacio Beard  MRN:  [id number]  Reason for Consult:  DM2  Requesting Provider:  Ignacio Beard    Chief Complaint     Chief Complaint   Patient presents with     Diabetes       History of Present Illness     Anson Manriquez is a 66 year old male who is seen in clinic for DM2    The patient was diagnosed with type 2 diabetes since 4-5 years ago on lab routine screening. He was started on metformin, he had diarrhea. He was switched to glipizide then jardiance was added.    Previous A1C in records reviewed. Today A1C is 9.6 similar compared to previous.  Lab Results   Component Value Date    A1C 9.3 (H) 04/26/2022    A1C 9.0 (H) 10/26/2021    A1C 8.0 (H) 10/29/2020    A1C 8.0 (H) 02/11/2020    A1C 7.3 (H) 11/06/2019    A1C 7.5 (H) 06/18/2019    A1C 7.6 (H) 05/09/2019    HEMOGLOBINA1 9.6 09/07/2022     Weight is stable  Wt Readings from Last 4 Encounters:   09/07/22 84.3 kg (185 lb 12.8 oz)   09/02/22 84.6 kg (186 lb 8 oz)   08/29/22 84.4 kg (186 lb 1.6 oz)   04/26/22 83 kg (183 lb)         Diabetes Care/Complications:   Eyes: no diabetic retinopathy, last eye exam 5/2021  Kidneys: no DN, negative urine microalb on 10/2021. Bmp on 8/2022  Nerves: no neuropathy  Smoking: no  Blood Pressure: ok on careg, entresto  Lipids: on statin  Macrovascular: CAD had a heart attack 15 years ago and then 3 years ago  Hypoglycemia: no    Previous DM related Hospitalization:    Current treatment strategy:   jardiance 25 mg daily  Glipizide 5 mg bid    Blood Glucose Monitoring:   He does not check his bg.    Diet: 3 meals, not much snacks   Sometimes snack at bedtime  Drinks:    Exercise: he is active all day at work. No routine exercise.    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 He is  and has 3 children.     Family hx: DM2 in his father, brother and sister    Problem List     Patient Active Problem List   Diagnosis     Coronary artery disease  involving native coronary artery of native heart without angina pectoris     Type 2 diabetes mellitus without complication, without long-term current use of insulin (H)     Benign essential hypertension     Mixed hyperlipidemia     Anal fistula     STEMI (ST elevation myocardial infarction) (H)     Ischemic cardiomyopathy     S/P ICD (internal cardiac defibrillator) procedure     Chronic infection     Left inguinal hernia     Umbilical hernia without obstruction and without gangrene     Abdominal wall abscess        Medications     Current Outpatient Medications   Medication     aspirin (ASA) 81 MG chewable tablet     atorvastatin (LIPITOR) 80 MG tablet     carvedilol (COREG) 25 MG tablet     empagliflozin (JARDIANCE) 25 MG TABS tablet     erythromycin (ROMYCIN) 5 MG/GM ophthalmic ointment     furosemide (LASIX) 40 MG tablet     glipiZIDE (GLUCOTROL XL) 5 MG 24 hr tablet     MULTIPLE VITAMIN PO     sacubitril-valsartan (ENTRESTO)  MG per tablet     spironolactone (ALDACTONE) 25 MG tablet     blood glucose (NO BRAND SPECIFIED) lancets standard     blood glucose (NO BRAND SPECIFIED) lancets standard     blood glucose (NO BRAND SPECIFIED) test strip     blood glucose (NO BRAND SPECIFIED) test strip     blood glucose monitoring (NO BRAND SPECIFIED) meter device kit     blood glucose monitoring (NO BRAND SPECIFIED) meter device kit     No current facility-administered medications for this visit.     Facility-Administered Medications Ordered in Other Visits   Medication     perflutren diluted 1mL to 2mL with saline (OPTISON) diluted injection 5 mL     sodium chloride (PF) 0.9% PF flush 10 mL     sodium chloride (PF) 0.9% PF flush 10 mL        Allergies     No Known Allergies    Medical / Surgical History     Past Medical History:   Diagnosis Date     Anal fistula      Antiplatelet or antithrombotic long-term use      Coronary artery disease      Diabetes mellitus, type 2 (H)      Heart attack (H) 4/17/2007      Hyperlipidemia      Hypertension      Inguinal hernia      Ischemic cardiomyopathy      STEMI (ST elevation myocardial infarction) (H) 09/13/2018    s/p STEVO x2     Stented coronary artery 2007     Past Surgical History:   Procedure Laterality Date     EP ICD N/A 4/25/2019    Procedure: EP ICD [[id number]];  Surgeon: Mick Mckeon MD;  Location: [facility]     HERNIA REPAIR Right     Inguinal     HERNIORRHAPHY INGUINAL Left 2/20/2020    Procedure: Left HERNIORRHAPHY, INGUINAL, OPEN;  Surgeon: Flakito Massey MD;  Location: [facility] OR     HERNIORRHAPHY UMBILICAL N/A 10/16/2020    Procedure: HERNIORRHAPHY, UMBILICAL, OPEN, with mesh;  Surgeon: Flakito Massey MD;  Location: [facility] OR     IRRIGATION AND DEBRIDEMENT RECTUM, COMBINED      abcess near rectum      LAPAROSCOPIC CHOLECYSTECTOMY  3/29/2012    Procedure:LAPAROSCOPIC CHOLECYSTECTOMY; LAPAROSCOPIC CHOLECYSTECTOMY; Surgeon:MARILYNN PRESSLEY; Location:[facility] OR     REPAIR ECTROPION Bilateral 9/2/2022    Procedure: Both lower eyelid ectropion repair;  Surgeon: Cheyanne Francisco MD;  Location: [facility] OR     STENT, CORONARY, OLIVIA         Social History     Social History     Socioeconomic History     Marital status:      Spouse name: Not on file     Number of children: Not on file     Years of education: Not on file     Highest education level: Not on file   Occupational History     Not on file   Tobacco Use     Smoking status: Never Smoker     Smokeless tobacco: Never Used   Substance and Sexual Activity     Alcohol use: No     Drug use: No     Sexual activity: Not on file   Other Topics Concern     Not on file   Social History Narrative     Not on file     Social Determinants of Health     Financial Resource Strain: Not on file   Food Insecurity: Not on file   Transportation Needs: Not on file   Physical Activity: Not on file   Stress: Not on file   Social Connections: Not on file   Intimate Partner Violence: Not on file   Housing Stability: Not on  file       Family History     Family History   Problem Relation Age of Onset     Hypertension Mother      Diabetes Father      Glaucoma No family hx of      Macular Degeneration No family hx of        ROS     12 ROS completed, pertinent positive and negative in HPI    Physical Exam   /82 (BP Location: Left arm, Patient Position: Sitting, Cuff Size: Adult Regular)   Pulse 88   Wt 84.3 kg (185 lb 12.8 oz)   BMI 29.99 kg/m       General: Comfortable, no obvious distress, normal body habitus  Eyes: Sclera anicteric, moist conjunctiva  HENT: Atraumatic, oropharynx clear, moist mucous membranes with no mucosal ulcerations  Neck: Trachea midline, supple. Thyroid: Thyroid is normal in size and texture  CV: normal rate.   Resp:  good effort, no evidence of loud wheezing  Abdomen:  obese, non distended.   Skin: No rashes, lesions, or subcutaneous nodules on exposed skin.   Psych: Alert and oriented x 3. Appropriate affect, good insight  Extremities: No peripheral edema  Musculoskeletal: Appropriate muscle bulk and strength  Neuro: Moves all four extremities. No focal deficits on limited exam. Gait normal.   Diabetic foot exam: normal DP and PT pulses, no trophic changes or ulcerative lesions, normal sensory exam and normal monofilament exam  Labs/Imaging     Pertinent Labs were reviewed and updated in EPIC and discussed briefly.  Radiology Results were  reviewed and updated in EPIC and discussed briefly.    Summary of recent findings:   Lab Results   Component Value Date    A1C 9.3 04/26/2022    A1C 9.0 10/26/2021    A1C 8.0 10/29/2020    A1C 8.0 02/11/2020    A1C 7.3 11/06/2019    A1C 7.5 06/18/2019    A1C 7.6 05/09/2019       TSH   Date Value Ref Range Status   03/07/2017 2.44 0.40 - 4.00 mU/L Final   07/27/2016 2.273 0.358 - 3.740 mcU/mL Final   07/27/2016 2.273 mcU/mL Final       Creatinine   Date Value Ref Range Status   08/29/2022 0.80 0.66 - 1.25 mg/dL Final   04/05/2021 0.94 0.66 - 1.25 mg/dL Final        Recent Labs   Lab Test 04/26/22  0840 11/06/19  0831   CHOL 228* 198   HDL 40 41   * 93   TRIG 437* 319*       No results found for: DIANA, [id number], [id number]    I personally reviewed the patient's outside records from [facility] EMR. Summary of pertinent findings in HPI.    Impression / Plan     1. Diabetes Mellitus: Type 2  2-overweight, BMI 29.99    He has poorly controlled DM2 on jardiance and glipizide. a1c 9.6. target a1c is <8. He has central obesity and is interested in weight loss. We discussed at a1c>9 we usually recommend adding a basal insulin for better glycemic control and faster effect on a1c. Insulin and glipizide are both wt promoting medications. He would like to try adding glp1a and reassess, if a1c still above 9 then we will plan on adding basal insulin and getting him slowly off glipizide.  We reviewed glp1a side effects including gi side effects, association with pancreatitis and MTC in rat  We reviewed hypoglycemia symptoms and what to do. He was instructed to call clinic if having frequent hypoglycemia as we might decrease or stop his glipizide.      Plan:   Continue jardiance andglipizide  Start Ozempic 0.25 mg once weekly for 4 weeks, then increase to 0.5 mg once weekly then  increase to 1 mg once weekly after 4 weeks      2. Diabetes Complications: none. Due for eye exam     3. Blood Pressure Management: Blood pressure is controlled . Currently is  on pharmacotherapy for this.     4.Lipid Management: Per the new ACC/TERI/NHLBI guidelines, statins are recommended for individuals with diabetes aged 40-75 with LDL  without ASCVD, and for any individual with ASCVD. Currently the patient is on a statin.      5. Smoking Status: Patient Pt is smoke free..            Test and/or medications prescribed today:  Orders Placed This Encounter   Procedures     Hemoglobin A1c POCT         Follow up: 3 MONTH      Li Muller MD  Endocrinology, Diabetes and Metabolism  [place]  [facility]    Review of the result(s) of each unique test - A1c and other DM related labs  60 minutes spent on the date of the encounter doing chart review, history and exam, documentation and further activities per the note

## 2022-09-07 NOTE — PATIENT INSTRUCTIONS
Continue jardiance andglipizide  Start Ozempic 0.25 mg once weekly for 4 weeks, then increase to 0.5 mg once weekly then  increase to 1 mg once weekly after 4 weeks     Check BG 2-3 times a day      Hypoglycemia is a blood sugar less than 70 mg/dl. Symptoms of hypoglycemia include: shakes, tremors, sweating, confusion, odd behavior, unresponsiveness, and seizures. If your sugar is less than 70 mg/dl, eat a 15 gram carb snack (i.e. Apple juice, orange juice, quoc crackers) or take a glucose tablet. If your sugar is still less than 70 mg/dl after 15 minutes, eat another 15 gram snack and consider eating a more substantial meal. If you have 3 or more blood sugar readings less than 70 mg/dl or over 300 mg/dl, please call my office

## 2022-09-07 NOTE — NURSING NOTE
"Chief Complaint   Patient presents with     Diabetes     Vital signs:      BP: 134/82 Pulse: 88             Weight: 84.3 kg (185 lb 12.8 oz)  Estimated body mass index is 29.99 kg/m  as calculated from the following:    Height as of 9/2/22: 1.676 m (5' 6\").    Weight as of this encounter: 84.3 kg (185 lb 12.8 oz).          "

## 2022-09-07 NOTE — TELEPHONE ENCOUNTER
PA Initiation    Medication: PA Pending [facility]  Insurance Company: [employer] - Phone [phone] Fax [phone]  Pharmacy Filling the Rx:    Filling Pharmacy Phone:    Filling Pharmacy Fax:    Start Date: 9/7/2022    Key: [id number]

## 2022-09-12 DIAGNOSIS — E11.9 TYPE 2 DIABETES MELLITUS WITHOUT COMPLICATION, WITHOUT LONG-TERM CURRENT USE OF INSULIN (H): ICD-10-CM

## 2022-09-12 RX ORDER — SEMAGLUTIDE 1.34 MG/ML
INJECTION, SOLUTION SUBCUTANEOUS
Qty: 16 ML | Refills: 3 | Status: SHIPPED | OUTPATIENT
Start: 2022-09-12 | End: 2022-12-14

## 2022-09-13 ENCOUNTER — OFFICE VISIT (OUTPATIENT)
Dept: OPHTHALMOLOGY | Facility: CLINIC | Age: 67
End: 2022-09-13
Payer: COMMERCIAL

## 2022-09-13 DIAGNOSIS — Z98.890 POSTOPERATIVE EYE STATE: Primary | ICD-10-CM

## 2022-09-13 PROCEDURE — 99024 POSTOP FOLLOW-UP VISIT: CPT | Mod: GC | Performed by: OPHTHALMOLOGY

## 2022-09-13 ASSESSMENT — EXTERNAL EXAM - LEFT EYE: OS_EXAM: NORMAL

## 2022-09-13 ASSESSMENT — VISUAL ACUITY
OS_SC+: -1
OD_SC: 20/20
METHOD: SNELLEN - LINEAR
OS_SC: 20/20

## 2022-09-13 ASSESSMENT — TONOMETRY
OS_IOP_MMHG: 14
OD_IOP_MMHG: 13
IOP_METHOD: ICARE

## 2022-09-13 ASSESSMENT — EXTERNAL EXAM - RIGHT EYE: OD_EXAM: NORMAL

## 2022-09-13 NOTE — NURSING NOTE
Chief Complaints and History of Present Illnesses   Patient presents with     Post Op (Ophthalmology) Both Eyes     Patient is 2-3 week post op Bilateral lower eyelid ectropion repair by tarsal strip procedure on 9/2/22     Chief Complaint(s) and History of Present Illness(es)     Post Op (Ophthalmology) Both Eyes     Comments: Patient is 2-3 week post op Bilateral lower eyelid ectropion repair by tarsal strip procedure on 9/2/22              Comments     Patient states that things are going well. He states that at first he did not see any healing,but in the last 5 days It has improved slightly everyday. He states that he does have some tearing in both eyes. No pain.     Ocular Meds:erythromycin ointment TID     Onel NICE, September 13, 2022 8:26 AM

## 2022-09-13 NOTE — PROGRESS NOTES
Chief Complaint(s) and History of Present Illness(es)     Post Op (Ophthalmology) Both Eyes     Comments: Patient is 2-3 week post op Bilateral lower eyelid ectropion   repair by tarsal strip procedure on 9/2/22              Comments     Patient states that things are going well. He states that at first he did   not see any healing,but in the last 5 days It has improved slightly   everyday. He states that he does have some tearing in both eyes. No pain.     Ocular Meds:erythromycin ointment TID     Onel Pegueromartínez NICE, September 13, 2022 8:26 AM                      Assessment & Plan     Anson Manriquez is a 66 year old male with the following diagnoses:   Encounter Diagnosis   Name Primary?     Postoperative eye state Yes       ~POW2 s/p BLL ectropion repair (LTS)  - doing well, states he has no epiphora or FBS. He is happy with the appearance  - stop maxitrol    Patient Instructions     Use Aquaphor on your lower eyelids and outer corner of the eyes at bedtime.     Stop the maxitrol eye drops    Can use artificial tears as needed    Use warm soaks over both eyes twice a day.         Patient disposition:   No follow-ups on file.       Alicia King MD  Oculoplastic Surgery Fellow    Attending Physician Attestation: Complete documentation of historical and exam elements from today's encounter can be found in the full encounter summary report (not reduplicated in this progress note). I personally obtained the chief complaint(s) and history of present illness. I confirmed and edited as necessary the review of systems, past medical/surgical history, family history, social history, and examination findings as documented by others; and I examined the patient myself. I personally reviewed the relevant tests, images, and reports as documented above. I formulated and edited as necessary the assessment and plan and discussed the findings and management plan with the patient.  -Cheyanne Francisco MD

## 2022-09-13 NOTE — TELEPHONE ENCOUNTER
Prior Authorization Approval    Authorization Effective Date: 9/7/2022  Authorization Expiration Date: 9/7/2023  Medication: Ozempic pa approved  Approved Dose/Quantity: 1.5  Reference #: Key: [id number]   Insurance Company: [employer] [phone] Fax [phone]  Expected CoPay:       CoPay Card Available:      Foundation Assistance Needed:    Which Pharmacy is filling the prescription (Not needed for infusion/clinic administered):    Pharmacy Notified:    Patient Notified:

## 2022-09-13 NOTE — PATIENT INSTRUCTIONS
Use Aquaphor on your lower eyelids and outer corner of the eyes at bedtime.   Stop the maxitrol eye drops  Can use artificial tears as needed  Use warm soaks over both eyes twice a day.

## 2022-10-10 DIAGNOSIS — I50.20 HEART FAILURE WITH REDUCED EJECTION FRACTION, NYHA CLASS III (H): ICD-10-CM

## 2022-10-10 DIAGNOSIS — I25.5 ISCHEMIC CARDIOMYOPATHY: ICD-10-CM

## 2022-10-13 RX ORDER — SACUBITRIL AND VALSARTAN 24; 26 MG/1; MG/1
TABLET, FILM COATED ORAL
Qty: 180 TABLET | Refills: 3 | OUTPATIENT
Start: 2022-10-13

## 2022-10-16 ENCOUNTER — HEALTH MAINTENANCE LETTER (OUTPATIENT)
Age: 67
End: 2022-10-16

## 2022-11-09 ENCOUNTER — ANCILLARY PROCEDURE (OUTPATIENT)
Dept: CARDIOLOGY | Facility: CLINIC | Age: 67
End: 2022-11-09
Attending: INTERNAL MEDICINE
Payer: COMMERCIAL

## 2022-11-09 DIAGNOSIS — I25.5 ISCHEMIC CARDIOMYOPATHY: ICD-10-CM

## 2022-11-09 DIAGNOSIS — I42.9 CARDIOMYOPATHY, UNSPECIFIED TYPE (H): ICD-10-CM

## 2022-11-09 LAB
MDC_IDC_EPISODE_DTM: NORMAL
MDC_IDC_EPISODE_DTM: NORMAL
MDC_IDC_EPISODE_ID: NORMAL
MDC_IDC_EPISODE_ID: NORMAL
MDC_IDC_EPISODE_TYPE: NORMAL
MDC_IDC_EPISODE_TYPE: NORMAL
MDC_IDC_LEAD_IMPLANT_DT: NORMAL
MDC_IDC_LEAD_LOCATION: NORMAL
MDC_IDC_LEAD_LOCATION_DETAIL_1: NORMAL
MDC_IDC_LEAD_MFG: NORMAL
MDC_IDC_LEAD_MODEL: NORMAL
MDC_IDC_LEAD_POLARITY_TYPE: NORMAL
MDC_IDC_LEAD_SERIAL: NORMAL
MDC_IDC_LEAD_SPECIAL_FUNCTION: NORMAL
MDC_IDC_MSMT_BATTERY_DTM: NORMAL
MDC_IDC_MSMT_BATTERY_REMAINING_LONGEVITY: 90 MO
MDC_IDC_MSMT_BATTERY_REMAINING_PERCENTAGE: 100 %
MDC_IDC_MSMT_BATTERY_STATUS: NORMAL
MDC_IDC_MSMT_CAP_CHARGE_DTM: NORMAL
MDC_IDC_MSMT_CAP_CHARGE_TIME: 10.6 S
MDC_IDC_MSMT_CAP_CHARGE_TYPE: NORMAL
MDC_IDC_MSMT_LEADCHNL_LV_IMPEDANCE_VALUE: 755 OHM
MDC_IDC_MSMT_LEADCHNL_LV_PACING_THRESHOLD_AMPLITUDE: 1.6 V
MDC_IDC_MSMT_LEADCHNL_LV_PACING_THRESHOLD_PULSEWIDTH: 0.5 MS
MDC_IDC_MSMT_LEADCHNL_RA_IMPEDANCE_VALUE: 827 OHM
MDC_IDC_MSMT_LEADCHNL_RV_IMPEDANCE_VALUE: 707 OHM
MDC_IDC_MSMT_LEADCHNL_RV_PACING_THRESHOLD_AMPLITUDE: 0.7 V
MDC_IDC_MSMT_LEADCHNL_RV_PACING_THRESHOLD_PULSEWIDTH: 0.4 MS
MDC_IDC_PG_IMPLANT_DTM: NORMAL
MDC_IDC_PG_MFG: NORMAL
MDC_IDC_PG_MODEL: NORMAL
MDC_IDC_PG_SERIAL: NORMAL
MDC_IDC_PG_TYPE: NORMAL
MDC_IDC_SESS_CLINIC_NAME: NORMAL
MDC_IDC_SESS_DTM: NORMAL
MDC_IDC_SESS_TYPE: NORMAL
MDC_IDC_SET_BRADY_AT_MODE_SWITCH_MODE: NORMAL
MDC_IDC_SET_BRADY_AT_MODE_SWITCH_RATE: 170 {BEATS}/MIN
MDC_IDC_SET_BRADY_LOWRATE: 60 {BEATS}/MIN
MDC_IDC_SET_BRADY_MAX_SENSOR_RATE: 130 {BEATS}/MIN
MDC_IDC_SET_BRADY_MAX_TRACKING_RATE: 130 {BEATS}/MIN
MDC_IDC_SET_BRADY_MODE: NORMAL
MDC_IDC_SET_BRADY_PAV_DELAY_LOW: 180 MS
MDC_IDC_SET_BRADY_SAV_DELAY_LOW: 140 MS
MDC_IDC_SET_CRT_PACED_CHAMBERS: NORMAL
MDC_IDC_SET_LEADCHNL_LV_PACING_AMPLITUDE: 2.7 V
MDC_IDC_SET_LEADCHNL_LV_PACING_ANODE_ELECTRODE_1: NORMAL
MDC_IDC_SET_LEADCHNL_LV_PACING_ANODE_LOCATION_1: NORMAL
MDC_IDC_SET_LEADCHNL_LV_PACING_CAPTURE_MODE: NORMAL
MDC_IDC_SET_LEADCHNL_LV_PACING_CATHODE_ELECTRODE_1: NORMAL
MDC_IDC_SET_LEADCHNL_LV_PACING_CATHODE_LOCATION_1: NORMAL
MDC_IDC_SET_LEADCHNL_LV_PACING_PULSEWIDTH: 0.5 MS
MDC_IDC_SET_LEADCHNL_LV_SENSING_ADAPTATION_MODE: NORMAL
MDC_IDC_SET_LEADCHNL_LV_SENSING_ANODE_ELECTRODE_1: NORMAL
MDC_IDC_SET_LEADCHNL_LV_SENSING_ANODE_LOCATION_1: NORMAL
MDC_IDC_SET_LEADCHNL_LV_SENSING_CATHODE_ELECTRODE_1: NORMAL
MDC_IDC_SET_LEADCHNL_LV_SENSING_CATHODE_LOCATION_1: NORMAL
MDC_IDC_SET_LEADCHNL_LV_SENSING_SENSITIVITY: 1 MV
MDC_IDC_SET_LEADCHNL_RA_PACING_AMPLITUDE: 2.5 V
MDC_IDC_SET_LEADCHNL_RA_PACING_CAPTURE_MODE: NORMAL
MDC_IDC_SET_LEADCHNL_RA_PACING_POLARITY: NORMAL
MDC_IDC_SET_LEADCHNL_RA_PACING_PULSEWIDTH: 0.5 MS
MDC_IDC_SET_LEADCHNL_RA_SENSING_ADAPTATION_MODE: NORMAL
MDC_IDC_SET_LEADCHNL_RA_SENSING_POLARITY: NORMAL
MDC_IDC_SET_LEADCHNL_RA_SENSING_SENSITIVITY: 0.25 MV
MDC_IDC_SET_LEADCHNL_RV_PACING_AMPLITUDE: 2 V
MDC_IDC_SET_LEADCHNL_RV_PACING_CAPTURE_MODE: NORMAL
MDC_IDC_SET_LEADCHNL_RV_PACING_POLARITY: NORMAL
MDC_IDC_SET_LEADCHNL_RV_PACING_PULSEWIDTH: 0.4 MS
MDC_IDC_SET_LEADCHNL_RV_SENSING_ADAPTATION_MODE: NORMAL
MDC_IDC_SET_LEADCHNL_RV_SENSING_POLARITY: NORMAL
MDC_IDC_SET_LEADCHNL_RV_SENSING_SENSITIVITY: 0.6 MV
MDC_IDC_SET_ZONE_DETECTION_INTERVAL: 250 MS
MDC_IDC_SET_ZONE_DETECTION_INTERVAL: 300 MS
MDC_IDC_SET_ZONE_DETECTION_INTERVAL: 353 MS
MDC_IDC_SET_ZONE_TYPE: NORMAL
MDC_IDC_SET_ZONE_VENDOR_TYPE: NORMAL
MDC_IDC_STAT_AT_BURDEN_PERCENT: 0 %
MDC_IDC_STAT_AT_DTM_END: NORMAL
MDC_IDC_STAT_AT_DTM_START: NORMAL
MDC_IDC_STAT_BRADY_DTM_END: NORMAL
MDC_IDC_STAT_BRADY_DTM_START: NORMAL
MDC_IDC_STAT_BRADY_RA_PERCENT_PACED: 0 %
MDC_IDC_STAT_BRADY_RV_PERCENT_PACED: 21 %
MDC_IDC_STAT_CRT_DTM_END: NORMAL
MDC_IDC_STAT_CRT_DTM_START: NORMAL
MDC_IDC_STAT_CRT_LV_PERCENT_PACED: 99 %
MDC_IDC_STAT_CRT_PERCENT_PACED: 99 %
MDC_IDC_STAT_EPISODE_RECENT_COUNT: 0
MDC_IDC_STAT_EPISODE_RECENT_COUNT_DTM_END: NORMAL
MDC_IDC_STAT_EPISODE_RECENT_COUNT_DTM_START: NORMAL
MDC_IDC_STAT_EPISODE_TYPE: NORMAL
MDC_IDC_STAT_EPISODE_VENDOR_TYPE: NORMAL
MDC_IDC_STAT_TACHYTHERAPY_ATP_DELIVERED_RECENT: 0
MDC_IDC_STAT_TACHYTHERAPY_ATP_DELIVERED_TOTAL: 0
MDC_IDC_STAT_TACHYTHERAPY_RECENT_DTM_END: NORMAL
MDC_IDC_STAT_TACHYTHERAPY_RECENT_DTM_START: NORMAL
MDC_IDC_STAT_TACHYTHERAPY_SHOCKS_ABORTED_RECENT: 0
MDC_IDC_STAT_TACHYTHERAPY_SHOCKS_ABORTED_TOTAL: 0
MDC_IDC_STAT_TACHYTHERAPY_SHOCKS_DELIVERED_RECENT: 0
MDC_IDC_STAT_TACHYTHERAPY_SHOCKS_DELIVERED_TOTAL: 0
MDC_IDC_STAT_TACHYTHERAPY_TOTAL_DTM_END: NORMAL
MDC_IDC_STAT_TACHYTHERAPY_TOTAL_DTM_START: NORMAL

## 2022-11-09 PROCEDURE — 93296 REM INTERROG EVL PM/IDS: CPT

## 2022-11-09 PROCEDURE — 93294 REM INTERROG EVL PM/LDLS PM: CPT | Performed by: INTERNAL MEDICINE

## 2022-11-13 DIAGNOSIS — E11.9 TYPE 2 DIABETES MELLITUS WITHOUT COMPLICATION, WITHOUT LONG-TERM CURRENT USE OF INSULIN (H): ICD-10-CM

## 2022-11-17 RX ORDER — GLIPIZIDE 5 MG/1
TABLET, FILM COATED, EXTENDED RELEASE ORAL
Qty: 180 TABLET | Refills: 0 | Status: SHIPPED | OUTPATIENT
Start: 2022-11-17 | End: 2023-03-08

## 2022-11-18 ENCOUNTER — TELEPHONE (OUTPATIENT)
Dept: OPHTHALMOLOGY | Facility: CLINIC | Age: 67
End: 2022-11-18

## 2022-11-19 NOTE — TELEPHONE ENCOUNTER
LVM for patient regarding rescheduling missed POST-OP appointment. Provided Eye Clinic and direct number for scheduling needs.

## 2022-12-03 ENCOUNTER — HEALTH MAINTENANCE LETTER (OUTPATIENT)
Age: 67
End: 2022-12-03

## 2022-12-13 DIAGNOSIS — E11.9 TYPE 2 DIABETES MELLITUS WITHOUT COMPLICATION, WITHOUT LONG-TERM CURRENT USE OF INSULIN (H): ICD-10-CM

## 2022-12-13 NOTE — TELEPHONE ENCOUNTER
[facility] Call Center    Phone Message    May a detailed message be left on voicemail: yes     Reason for Call: Medication Refill Request    Has the patient contacted the pharmacy for the refill? Yes   Name of medication being requested: semaglutide (OZEMPIC, 0.25 OR 0.5 MG/DOSE,) 2 MG/1.5ML SOPN pen  Provider who prescribed the medication: Edward Talavera MD  Pharmacy: [facility] #[id number] [address]  AT [facility]  Date medication is needed: Will need a refill for Sunday          Action Taken: Other: Endo    Travel Screening: Not Applicable

## 2022-12-13 NOTE — TELEPHONE ENCOUNTER
semaglutide (OZEMPIC, 0.25 OR 0.5 MG/DOSE,) 2 MG/1.5ML SOPN pen  Last Written Prescription Date:  9/2/22  Last Fill Quantity: 16 ml ,   # refills: 3  Last Office Visit : 9/7/22 [place]  Future Office visit:  1/4/23    Routing refill request to provider for review/approval because: A1c elevated/ due, next appt 1/4/23 in person   Start Ozempic 0.25 mg once weekly for 4 weeks, then increase to 0.5 mg once weekly then  increase to 1 mg once weekly after 4 weeks  9/7/22 HEMOGLOBINA1 9.6        [facility] #[id number] - [facility] [id number] [facility] [address] [address]  AT [facility]

## 2022-12-14 RX ORDER — SEMAGLUTIDE 1.34 MG/ML
INJECTION, SOLUTION SUBCUTANEOUS
Qty: 16 ML | Refills: 3 | Status: SHIPPED | OUTPATIENT
Start: 2022-12-14 | End: 2023-08-08

## 2022-12-15 ENCOUNTER — TELEPHONE (OUTPATIENT)
Dept: ENDOCRINOLOGY | Facility: CLINIC | Age: 67
End: 2022-12-15

## 2022-12-15 DIAGNOSIS — E11.9 TYPE 2 DIABETES MELLITUS WITHOUT COMPLICATION, WITHOUT LONG-TERM CURRENT USE OF INSULIN (H): Primary | ICD-10-CM

## 2022-12-15 NOTE — TELEPHONE ENCOUNTER
[facility] Call Center    Phone Message    May a detailed message be left on voicemail: yes     Reason for Call: Other: Patient is asking for a call back for assitance   he is having trouble picking up his Ozempic from [employer] has questions about dosage .     Action Taken: Other: ENDO    Travel Screening: Not Applicable

## 2022-12-15 NOTE — TELEPHONE ENCOUNTER
New orders sent for the Ozempioc 4 mg pen for 1 mg dose  Per Dr Yohana Gooden, RN on 12/15/2022 at 1:00 PM

## 2022-12-25 DIAGNOSIS — I21.02 ST ELEVATION MYOCARDIAL INFARCTION INVOLVING LEFT ANTERIOR DESCENDING (LAD) CORONARY ARTERY (H): ICD-10-CM

## 2022-12-27 RX ORDER — ASPIRIN 81 MG/1
TABLET, CHEWABLE ORAL
Qty: 90 TABLET | Refills: 3 | Status: SHIPPED | OUTPATIENT
Start: 2022-12-27 | End: 2023-10-10

## 2022-12-27 NOTE — TELEPHONE ENCOUNTER
ASPIRIN 81MG CHEWABLE TABLETS  Passed protocol    Office Visit  8/29/2022  [facility]   Ignacio Beard MD  Internal Medicine          Last Written Prescription Date:  12/27/21  Last Fill Quantity: 90,   # refills: 3

## 2023-01-02 ENCOUNTER — TELEPHONE (OUTPATIENT)
Dept: ENDOCRINOLOGY | Facility: CLINIC | Age: 68
End: 2023-01-02

## 2023-01-02 DIAGNOSIS — E11.9 TYPE 2 DIABETES MELLITUS WITHOUT COMPLICATION, WITHOUT LONG-TERM CURRENT USE OF INSULIN (H): ICD-10-CM

## 2023-01-02 NOTE — TELEPHONE ENCOUNTER
Pt was last seen in clinic on 8/29/2022. Pt last had Jardiance 25mg tablet refilled on 12/17/2021 for a 270 day supply by Dr. Beard.   As noted in endocrinology note, continue to take as well.   Sent Dr. Beard an FYI to see if ok with managing. Pt last had A1C checked on 9/7/2022, next due for check on 3/7/2022.     CURT RAZA RN on 1/2/2023 at 1:19 PM

## 2023-01-02 NOTE — TELEPHONE ENCOUNTER
empagliflozin (JARDIANCE) 25 MG TABS tablet 90 tablet 3 12/17/2021         Last Written Prescription Date:  12-  Last Fill Quantity: 90,   # refills: 3  Last Office Visit : 8-  Future Office visit:  none    Routing refill request to provider for review/approval because:  A1c overdue  Who should be managing this medication PCP or ENDO provider?      Kathleen M Doege RN

## 2023-01-04 ENCOUNTER — VIRTUAL VISIT (OUTPATIENT)
Dept: ENDOCRINOLOGY | Facility: CLINIC | Age: 68
End: 2023-01-04
Payer: COMMERCIAL

## 2023-01-04 DIAGNOSIS — E11.9 TYPE 2 DIABETES MELLITUS WITHOUT COMPLICATION, WITHOUT LONG-TERM CURRENT USE OF INSULIN (H): ICD-10-CM

## 2023-01-04 PROCEDURE — 99213 OFFICE O/P EST LOW 20 MIN: CPT | Mod: 95 | Performed by: STUDENT IN AN ORGANIZED HEALTH CARE EDUCATION/TRAINING PROGRAM

## 2023-01-04 NOTE — PROGRESS NOTES
Endocrinology Clinic Visit     Virtual-Visit Details     Type of service:  telephoneVisit     Start Time: 2 pm   End Time: 2:18 pm     I spent a total of 20 minutes on the date of encounter reviewing medical records, evaluating the patient, coordinating care and documenting in the EHR, as detailed above.        Provider location: off site      NAME:  Anson Manriquez  PCP:  Kisha Ignacio SOTO  MRN:  [id number]  Reason for Consult:  DM2  Requesting Provider:  Ignacio Beard    Chief Complaint     No chief complaint on file.      History of Present Illness     Anson Manriquez is a 66 year old male who is seen in clinic for DM2    The patient was diagnosed with type 2 diabetes since 4-5 years ago on lab routine screening. He was started on metformin, he had diarrhea. He was switched to glipizide then jardiance was added.  Last visit 9/2022 we started ozempic. He is up to 1 mg weekly for the past 2 weeks. He feels very good overall. He lost 6 lbs since starting ozempic. He denied any symptoms of low bg.    Previous A1C in records reviewed.   Lab Results   Component Value Date    A1C 9.3 (H) 04/26/2022    A1C 9.0 (H) 10/26/2021    A1C 8.0 (H) 10/29/2020    A1C 8.0 (H) 02/11/2020    A1C 7.3 (H) 11/06/2019    A1C 7.5 (H) 06/18/2019    A1C 7.6 (H) 05/09/2019    HEMOGLOBINA1 9.6 09/07/2022     Weight is stable  Wt Readings from Last 4 Encounters:   09/07/22 84.3 kg (185 lb 12.8 oz)   09/02/22 84.6 kg (186 lb 8 oz)   08/29/22 84.4 kg (186 lb 1.6 oz)   04/26/22 83 kg (183 lb)     He is down by 6 lbs despite the holidays. He checks his wt at home and it is down to 179 lbs today.    Diabetes Care/Complications:   Eyes: no diabetic retinopathy, last eye exam 5/2021  Kidneys: no DN, negative urine microalb on 10/2021. Bmp on 8/2022  Nerves: no neuropathy. DM foot exam was done by me on 9/2022  Smoking: no  Blood Pressure: ok on careg, entresto  Lipids: on statin  Macrovascular: CAD had a heart attack 15 years ago and then 3 years  ago  Hypoglycemia: no      Current treatment strategy:   jardiance 25 mg daily  Glipizide 10 mg daily  Ozmepic 1 mg weekly    Blood Glucose Monitoring:   He does not check his bg.    Diet: 3 meals, not much snacks     Exercise: he is active all day at work. No routine exercise.    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 He is  and has 3 children.     Family hx: DM2 in his father, brother and sister    Problem List     Patient Active Problem List   Diagnosis     Coronary artery disease involving native coronary artery of native heart without angina pectoris     Type 2 diabetes mellitus without complication, without long-term current use of insulin (H)     Benign essential hypertension     Mixed hyperlipidemia     Anal fistula     STEMI (ST elevation myocardial infarction) (H)     Ischemic cardiomyopathy     S/P ICD (internal cardiac defibrillator) procedure     Chronic infection     Left inguinal hernia     Umbilical hernia without obstruction and without gangrene     Abdominal wall abscess        Medications     Current Outpatient Medications   Medication     aspirin (ASA) 81 MG chewable tablet     atorvastatin (LIPITOR) 80 MG tablet     carvedilol (COREG) 25 MG tablet     empagliflozin (JARDIANCE) 25 MG TABS tablet     furosemide (LASIX) 40 MG tablet     glipiZIDE (GLUCOTROL XL) 5 MG 24 hr tablet     MULTIPLE VITAMIN PO     sacubitril-valsartan (ENTRESTO)  MG per tablet     semaglutide (OZEMPIC, 0.25 OR 0.5 MG/DOSE,) 2 MG/1.5ML SOPN pen     Semaglutide, 1 MG/DOSE, 4 MG/3ML SOPN     blood glucose (NO BRAND SPECIFIED) lancets standard     blood glucose (NO BRAND SPECIFIED) lancets standard     blood glucose (NO BRAND SPECIFIED) test strip     blood glucose (NO BRAND SPECIFIED) test strip     blood glucose monitoring (NO BRAND SPECIFIED) meter device kit     blood glucose monitoring (NO BRAND SPECIFIED) meter device kit     spironolactone (ALDACTONE) 25 MG tablet     No current  facility-administered medications for this visit.     Facility-Administered Medications Ordered in Other Visits   Medication     perflutren diluted 1mL to 2mL with saline (OPTISON) diluted injection 5 mL     sodium chloride (PF) 0.9% PF flush 10 mL     sodium chloride (PF) 0.9% PF flush 10 mL        Allergies     No Known Allergies    Medical / Surgical History     Past Medical History:   Diagnosis Date     Anal fistula      Antiplatelet or antithrombotic long-term use      Coronary artery disease      Diabetes mellitus, type 2 (H)      Heart attack (H) 4/17/2007     Hyperlipidemia      Hypertension      Inguinal hernia      Ischemic cardiomyopathy      STEMI (ST elevation myocardial infarction) (H) 09/13/2018    s/p STEVO x2     Stented coronary artery 2007     Past Surgical History:   Procedure Laterality Date     EP ICD N/A 4/25/2019    Procedure: EP ICD [[id number]];  Surgeon: Mick Mckeon MD;  [facility]     HERNIA REPAIR Right     Inguinal     HERNIORRHAPHY INGUINAL Left 2/20/2020    Procedure: Left HERNIORRHAPHY, INGUINAL, OPEN;  Surgeon: Flakito Massey MD;  Location: [facility] OR     HERNIORRHAPHY UMBILICAL N/A 10/16/2020    Procedure: HERNIORRHAPHY, UMBILICAL, OPEN, with mesh;  Surgeon: Flakito Massey MD;  Location: [facility] OR     IRRIGATION AND DEBRIDEMENT RECTUM, COMBINED      abcess near rectum      LAPAROSCOPIC CHOLECYSTECTOMY  3/29/2012    Procedure:LAPAROSCOPIC CHOLECYSTECTOMY; LAPAROSCOPIC CHOLECYSTECTOMY; Surgeon:MARILYNN PRESSLEY; Location:[facility] OR     REPAIR ECTROPION Bilateral 9/2/2022    Procedure: Both lower eyelid ectropion repair;  Surgeon: Cheyanne Francisco MD;  Location: [facility] OR     STENT, CORONARY, OLIVIA         Social History     Social History     Socioeconomic History     Marital status:      Spouse name: Not on file     Number of children: Not on file     Years of education: Not on file     Highest education level: Not on file   Occupational History     Not on  file   Tobacco Use     Smoking status: Never     Smokeless tobacco: Never   Substance and Sexual Activity     Alcohol use: No     Drug use: No     Sexual activity: Not on file   Other Topics Concern     Not on file   Social History Narrative     Not on file     Social Determinants of Health     Financial Resource Strain: Not on file   Food Insecurity: Not on file   Transportation Needs: Not on file   Physical Activity: Not on file   Stress: Not on file   Social Connections: Not on file   Intimate Partner Violence: Not on file   Housing Stability: Not on file       Family History     Family History   Problem Relation Age of Onset     Hypertension Mother      Diabetes Father      Glaucoma No family hx of      Macular Degeneration No family hx of        ROS     limited ROS completed, pertinent positive and negative in HPI    Physical Exam   There were no vitals taken for this visit.   n/a  Labs/Imaging     Pertinent Labs were reviewed and updated in EPIC and discussed briefly.  Radiology Results were  reviewed and updated in EPIC and discussed briefly.    Summary of recent findings:   Lab Results   Component Value Date    A1C 9.3 04/26/2022    A1C 9.0 10/26/2021    A1C 8.0 10/29/2020    A1C 8.0 02/11/2020    A1C 7.3 11/06/2019    A1C 7.5 06/18/2019    A1C 7.6 05/09/2019       TSH   Date Value Ref Range Status   03/07/2017 2.44 0.40 - 4.00 mU/L Final   07/27/2016 2.273 0.358 - 3.740 mcU/mL Final   07/27/2016 2.273 mcU/mL Final       Creatinine   Date Value Ref Range Status   08/29/2022 0.80 0.66 - 1.25 mg/dL Final   04/05/2021 0.94 0.66 - 1.25 mg/dL Final       Recent Labs   Lab Test 04/26/22  0840 11/06/19  0831   CHOL 228* 198   HDL 40 41   * 93   TRIG 437* 319*       No results found for: [id number], [id number], [id number]    I personally reviewed the patient's outside records from Dr. Scribbles EMR. Summary of pertinent findings in [facility].    Impression / Plan     1. Diabetes Mellitus: Type 2  2. overweight, BMI  29.99    He had poorly controlled DM2 on jardiance and glipizide. a1c 9.6. target a1c is <8. He has central obesity and is interested in weight loss. Ozmepic was added last visit, he is currently at 1mg weekly for the past 2 weeks, no side effects. I have no SMBG and no recent A1C to assess his current glycemic control.      Plan:   Continue same regimen  Check a1c     2. Diabetes Complications: none. Due for eye exam and urine microalb     3. Blood Pressure Management: Blood pressure is controlled . Currently is  on pharmacotherapy for this.     4.Lipid Management: Per the new ACC/TERI/NHLBI guidelines, statins are recommended for individuals with diabetes aged 40-75 with LDL  without ASCVD, and for any individual with ASCVD. Currently the patient is on a statin.      5. Smoking Status: Patient Pt is smoke free..            Test and/or medications prescribed today:  No orders of the defined types were placed in this encounter.        Follow up: 3 MONTH      Li Muller MD  Endocrinology, Diabetes and Metabolism  [facility]

## 2023-01-04 NOTE — LETTER
1/4/2023       RE: Anson Manriquez  [address]  [facility] [address]     Dear Colleague,    Thank you for referring your patient, Anson Manriquez, to the [facility] [place] at [facility]. Please see a copy of my visit note below.    Cedric is a 67 year old who is being evaluated via a billable telephone visit.      What phone number would you like to be contacted at? [phone]   How would you like to obtain your AVS? MyChart    Distant Location (provider location):  Off-site  Phone call duration: 18 minutes  Outcome for 01/03/23 3:41 PM: Patient is not checking blood sugars  Tata Malik,         Endocrinology Clinic Visit     Virtual-Visit Details     Type of service:  telephoneVisit     Start Time: 2 pm   End Time: 2:18 pm     I spent a total of 20 minutes on the date of encounter reviewing medical records, evaluating the patient, coordinating care and documenting in the EHR, as detailed above.        Provider location: off site      NAME:  Anson Manriquez  PCP:  Ignacio Beard  MRN:  [id number]  Reason for Consult:  DM2  Requesting Provider:  Ignacio Beard    Chief Complaint     No chief complaint on file.      History of Present Illness     Anson Manriquez is a 66 year old male who is seen in clinic for DM2    The patient was diagnosed with type 2 diabetes since 4-5 years ago on lab routine screening. He was started on metformin, he had diarrhea. He was switched to glipizide then jardiance was added.  Last visit 9/2022 we started ozempic. He is up to 1 mg weekly for the past 2 weeks. He feels very good overall. He lost 6 lbs since starting ozempic. He denied any symptoms of low bg.    Previous A1C in records reviewed.   Lab Results   Component Value Date    A1C 9.3 (H) 04/26/2022    A1C 9.0 (H) 10/26/2021    A1C 8.0 (H) 10/29/2020    A1C 8.0 (H) 02/11/2020    A1C 7.3 (H) 11/06/2019    A1C 7.5 (H) 06/18/2019    A1C 7.6  (H) 05/09/2019    HEMOGLOBINA1 9.6 09/07/2022     Weight is stable  Wt Readings from Last 4 Encounters:   09/07/22 84.3 kg (185 lb 12.8 oz)   09/02/22 84.6 kg (186 lb 8 oz)   08/29/22 84.4 kg (186 lb 1.6 oz)   04/26/22 83 kg (183 lb)     He is down by 6 lbs despite the holidays. He checks his wt at home and it is down to 179 lbs today.    Diabetes Care/Complications:   Eyes: no diabetic retinopathy, last eye exam 5/2021  Kidneys: no DN, negative urine microalb on 10/2021. Bmp on 8/2022  Nerves: no neuropathy. DM foot exam was done by me on 9/2022  Smoking: no  Blood Pressure: ok on careg, entresto  Lipids: on statin  Macrovascular: CAD had a heart attack 15 years ago and then 3 years ago  Hypoglycemia: no      Current treatment strategy:   jardiance 25 mg daily  Glipizide 10 mg daily  Ozmepic 1 mg weekly    Blood Glucose Monitoring:   He does not check his bg.    Diet: 3 meals, not much snacks     Exercise: he is active all day at work. No routine exercise.    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 He is  and has 3 children.     Family hx: DM2 in his father, brother and sister    Problem List     Patient Active Problem List   Diagnosis     Coronary artery disease involving native coronary artery of native heart without angina pectoris     Type 2 diabetes mellitus without complication, without long-term current use of insulin (H)     Benign essential hypertension     Mixed hyperlipidemia     Anal fistula     STEMI (ST elevation myocardial infarction) (H)     Ischemic cardiomyopathy     S/P ICD (internal cardiac defibrillator) procedure     Chronic infection     Left inguinal hernia     Umbilical hernia without obstruction and without gangrene     Abdominal wall abscess        Medications     Current Outpatient Medications   Medication     aspirin (ASA) 81 MG chewable tablet     atorvastatin (LIPITOR) 80 MG tablet     carvedilol (COREG) 25 MG tablet     empagliflozin (JARDIANCE) 25 MG TABS tablet      furosemide (LASIX) 40 MG tablet     glipiZIDE (GLUCOTROL XL) 5 MG 24 hr tablet     MULTIPLE VITAMIN PO     sacubitril-valsartan (ENTRESTO)  MG per tablet     semaglutide (OZEMPIC, 0.25 OR 0.5 MG/DOSE,) 2 MG/1.5ML SOPN pen     Semaglutide, 1 MG/DOSE, 4 MG/3ML SOPN     blood glucose (NO BRAND SPECIFIED) lancets standard     blood glucose (NO BRAND SPECIFIED) lancets standard     blood glucose (NO BRAND SPECIFIED) test strip     blood glucose (NO BRAND SPECIFIED) test strip     blood glucose monitoring (NO BRAND SPECIFIED) meter device kit     blood glucose monitoring (NO BRAND SPECIFIED) meter device kit     spironolactone (ALDACTONE) 25 MG tablet     No current facility-administered medications for this visit.     Facility-Administered Medications Ordered in Other Visits   Medication     perflutren diluted 1mL to 2mL with saline (OPTISON) diluted injection 5 mL     sodium chloride (PF) 0.9% PF flush 10 mL     sodium chloride (PF) 0.9% PF flush 10 mL        Allergies     No Known Allergies    Medical / Surgical History     Past Medical History:   Diagnosis Date     Anal fistula      Antiplatelet or antithrombotic long-term use      Coronary artery disease      Diabetes mellitus, type 2 (H)      Heart attack (H) 4/17/2007     Hyperlipidemia      Hypertension      Inguinal hernia      Ischemic cardiomyopathy      STEMI (ST elevation myocardial infarction) (H) 09/13/2018    s/p STEVO x2     Stented coronary artery 2007     Past Surgical History:   Procedure Laterality Date     EP ICD N/A 4/25/2019    Procedure: EP ICD [[id number]];  Surgeon: Mick Mckeon MD;  Location:  [facility]     HERNIA REPAIR Right     Inguinal     HERNIORRHAPHY INGUINAL Left 2/20/2020    Procedure: Left HERNIORRHAPHY, INGUINAL, OPEN;  Surgeon: Flakito Massey MD;  Location: [facility] OR     HERNIORRHAPHY UMBILICAL N/A 10/16/2020    Procedure: HERNIORRHAPHY, UMBILICAL, OPEN, with mesh;  Surgeon: Flakito Massey MD;   Location: [facility] OR     IRRIGATION AND DEBRIDEMENT RECTUM, COMBINED      abcess near rectum      LAPAROSCOPIC CHOLECYSTECTOMY  3/29/2012    Procedure:LAPAROSCOPIC CHOLECYSTECTOMY; LAPAROSCOPIC CHOLECYSTECTOMY; Surgeon:MARILYNN PRESSLEY; Location:[facility] OR     REPAIR ECTROPION Bilateral 9/2/2022    Procedure: Both lower eyelid ectropion repair;  Surgeon: Cheyanne Francisco MD;  Location: [facility] OR     STENT, CORONARY, OLIVIA         Social History     Social History     Socioeconomic History     Marital status:      Spouse name: Not on file     Number of children: Not on file     Years of education: Not on file     Highest education level: Not on file   Occupational History     Not on file   Tobacco Use     Smoking status: Never     Smokeless tobacco: Never   Substance and Sexual Activity     Alcohol use: No     Drug use: No     Sexual activity: Not on file   Other Topics Concern     Not on file   Social History Narrative     Not on file     Social Determinants of Health     Financial Resource Strain: Not on file   Food Insecurity: Not on file   Transportation Needs: Not on file   Physical Activity: Not on file   Stress: Not on file   Social Connections: Not on file   Intimate Partner Violence: Not on file   Housing Stability: Not on file       Family History     Family History   Problem Relation Age of Onset     Hypertension Mother      Diabetes Father      Glaucoma No family hx of      Macular Degeneration No family hx of        ROS     limited ROS completed, pertinent positive and negative in HPI    Physical Exam   There were no vitals taken for this visit.   n/a  Labs/Imaging     Pertinent Labs were reviewed and updated in EPIC and discussed briefly.  Radiology Results were  reviewed and updated in EPIC and discussed briefly.    Summary of recent findings:   Lab Results   Component Value Date    A1C 9.3 04/26/2022    A1C 9.0 10/26/2021    A1C 8.0 10/29/2020    A1C 8.0 02/11/2020    A1C 7.3 11/06/2019    A1C 7.5 06/18/2019     A1C 7.6 05/09/2019       TSH   Date Value Ref Range Status   03/07/2017 2.44 0.40 - 4.00 mU/L Final   07/27/2016 2.273 0.358 - 3.740 mcU/mL Final   07/27/2016 2.273 mcU/mL Final       Creatinine   Date Value Ref Range Status   08/29/2022 0.80 0.66 - 1.25 mg/dL Final   04/05/2021 0.94 0.66 - 1.25 mg/dL Final       Recent Labs   Lab Test 04/26/22  0840 11/06/19  0831   CHOL 228* 198   HDL 40 41   * 93   TRIG 437* 319*       No results found for: [id number], [id number], [id number]    I personally reviewed the patient's outside records from [facility] EMR. Summary of pertinent findings in HPI.    Impression / Plan     1. Diabetes Mellitus: Type 2  2. overweight, BMI 29.99    He had poorly controlled DM2 on jardiance and glipizide. a1c 9.6. target a1c is <8. He has central obesity and is interested in weight loss. Ozmepic was added last visit, he is currently at 1mg weekly for the past 2 weeks, no side effects. I have no SMBG and no recent A1C to assess his current glycemic control.      Plan:   Continue same regimen  Check a1c     2. Diabetes Complications: none. Due for eye exam and urine microalb     3. Blood Pressure Management: Blood pressure is controlled . Currently is  on pharmacotherapy for this.     4.Lipid Management: Per the new ACC/TERI/NHLBI guidelines, statins are recommended for individuals with diabetes aged 40-75 with LDL  without ASCVD, and for any individual with ASCVD. Currently the patient is on a statin.      5. Smoking Status: Patient Pt is smoke free..            Test and/or medications prescribed today:  No orders of the defined types were placed in this encounter.        Follow up: 3 MONTH      Li Muller MD  Endocrinology, Diabetes and Metabolism  [facility]

## 2023-01-11 DIAGNOSIS — E11.9 TYPE 2 DIABETES MELLITUS WITHOUT COMPLICATION, WITHOUT LONG-TERM CURRENT USE OF INSULIN (H): Primary | ICD-10-CM

## 2023-02-02 NOTE — TELEPHONE ENCOUNTER
I called the patient to see if he wanted to proceed with scheduling. I left my direct number for him to call me back to discuss.   Patient called and rescheduled for 2/6.

## 2023-03-05 DIAGNOSIS — E11.9 TYPE 2 DIABETES MELLITUS WITHOUT COMPLICATION, WITHOUT LONG-TERM CURRENT USE OF INSULIN (H): ICD-10-CM

## 2023-03-08 RX ORDER — GLIPIZIDE 5 MG/1
TABLET, FILM COATED, EXTENDED RELEASE ORAL
Qty: 180 TABLET | Refills: 0 | Status: SHIPPED | OUTPATIENT
Start: 2023-03-08 | End: 2023-06-14

## 2023-03-08 NOTE — TELEPHONE ENCOUNTER
LCV:8/29/2022  [facility] [place]  Overdue appt and  lab: A1c, FYI to clinic  RF 90 day

## 2023-03-22 DIAGNOSIS — I21.02 ST ELEVATION MYOCARDIAL INFARCTION INVOLVING LEFT ANTERIOR DESCENDING (LAD) CORONARY ARTERY (H): ICD-10-CM

## 2023-03-22 DIAGNOSIS — I50.20 HEART FAILURE WITH REDUCED EJECTION FRACTION, NYHA CLASS III (H): Primary | ICD-10-CM

## 2023-03-24 RX ORDER — CARVEDILOL 25 MG/1
25 TABLET ORAL 2 TIMES DAILY WITH MEALS
Qty: 180 TABLET | Refills: 0 | Status: SHIPPED | OUTPATIENT
Start: 2023-03-24 | End: 2023-10-10

## 2023-03-26 ENCOUNTER — HEALTH MAINTENANCE LETTER (OUTPATIENT)
Age: 68
End: 2023-03-26

## 2023-03-29 ENCOUNTER — CARE COORDINATION (OUTPATIENT)
Dept: CARDIOLOGY | Facility: CLINIC | Age: 68
End: 2023-03-29
Payer: COMMERCIAL

## 2023-03-29 DIAGNOSIS — I50.20 HEART FAILURE WITH REDUCED EJECTION FRACTION, NYHA CLASS III (H): Primary | ICD-10-CM

## 2023-04-06 ENCOUNTER — ANCILLARY PROCEDURE (OUTPATIENT)
Dept: CARDIOLOGY | Facility: CLINIC | Age: 68
End: 2023-04-06
Attending: INTERNAL MEDICINE
Payer: COMMERCIAL

## 2023-04-06 DIAGNOSIS — I25.5 ISCHEMIC CARDIOMYOPATHY: ICD-10-CM

## 2023-04-06 DIAGNOSIS — I42.9 CARDIOMYOPATHY, UNSPECIFIED TYPE (H): ICD-10-CM

## 2023-04-06 PROCEDURE — 93296 REM INTERROG EVL PM/IDS: CPT

## 2023-04-06 PROCEDURE — 93295 DEV INTERROG REMOTE 1/2/MLT: CPT | Performed by: INTERNAL MEDICINE

## 2023-04-13 LAB
MDC_IDC_EPISODE_DTM: NORMAL
MDC_IDC_EPISODE_ID: NORMAL
MDC_IDC_EPISODE_TYPE: NORMAL
MDC_IDC_LEAD_IMPLANT_DT: NORMAL
MDC_IDC_LEAD_LOCATION: NORMAL
MDC_IDC_LEAD_LOCATION_DETAIL_1: NORMAL
MDC_IDC_LEAD_MFG: NORMAL
MDC_IDC_LEAD_MODEL: NORMAL
MDC_IDC_LEAD_POLARITY_TYPE: NORMAL
MDC_IDC_LEAD_SERIAL: NORMAL
MDC_IDC_LEAD_SPECIAL_FUNCTION: NORMAL
MDC_IDC_MSMT_BATTERY_DTM: NORMAL
MDC_IDC_MSMT_BATTERY_REMAINING_LONGEVITY: 90 MO
MDC_IDC_MSMT_BATTERY_REMAINING_PERCENTAGE: 100 %
MDC_IDC_MSMT_BATTERY_STATUS: NORMAL
MDC_IDC_MSMT_CAP_CHARGE_DTM: NORMAL
MDC_IDC_MSMT_CAP_CHARGE_TIME: 10.6 S
MDC_IDC_MSMT_CAP_CHARGE_TYPE: NORMAL
MDC_IDC_MSMT_LEADCHNL_LV_IMPEDANCE_VALUE: 617 OHM
MDC_IDC_MSMT_LEADCHNL_LV_PACING_THRESHOLD_AMPLITUDE: 1.5 V
MDC_IDC_MSMT_LEADCHNL_LV_PACING_THRESHOLD_PULSEWIDTH: 0.5 MS
MDC_IDC_MSMT_LEADCHNL_RA_IMPEDANCE_VALUE: 749 OHM
MDC_IDC_MSMT_LEADCHNL_RV_IMPEDANCE_VALUE: 680 OHM
MDC_IDC_MSMT_LEADCHNL_RV_PACING_THRESHOLD_AMPLITUDE: 0.7 V
MDC_IDC_MSMT_LEADCHNL_RV_PACING_THRESHOLD_PULSEWIDTH: 0.4 MS
MDC_IDC_PG_IMPLANT_DTM: NORMAL
MDC_IDC_PG_MFG: NORMAL
MDC_IDC_PG_MODEL: NORMAL
MDC_IDC_PG_SERIAL: NORMAL
MDC_IDC_PG_TYPE: NORMAL
MDC_IDC_SESS_CLINIC_NAME: NORMAL
MDC_IDC_SESS_DTM: NORMAL
MDC_IDC_SESS_TYPE: NORMAL
MDC_IDC_SET_BRADY_AT_MODE_SWITCH_MODE: NORMAL
MDC_IDC_SET_BRADY_AT_MODE_SWITCH_RATE: 170 {BEATS}/MIN
MDC_IDC_SET_BRADY_LOWRATE: 60 {BEATS}/MIN
MDC_IDC_SET_BRADY_MAX_SENSOR_RATE: 130 {BEATS}/MIN
MDC_IDC_SET_BRADY_MAX_TRACKING_RATE: 130 {BEATS}/MIN
MDC_IDC_SET_BRADY_MODE: NORMAL
MDC_IDC_SET_BRADY_PAV_DELAY_LOW: 180 MS
MDC_IDC_SET_BRADY_SAV_DELAY_LOW: 140 MS
MDC_IDC_SET_CRT_PACED_CHAMBERS: NORMAL
MDC_IDC_SET_LEADCHNL_LV_PACING_AMPLITUDE: 2.6 V
MDC_IDC_SET_LEADCHNL_LV_PACING_ANODE_ELECTRODE_1: NORMAL
MDC_IDC_SET_LEADCHNL_LV_PACING_ANODE_LOCATION_1: NORMAL
MDC_IDC_SET_LEADCHNL_LV_PACING_CAPTURE_MODE: NORMAL
MDC_IDC_SET_LEADCHNL_LV_PACING_CATHODE_ELECTRODE_1: NORMAL
MDC_IDC_SET_LEADCHNL_LV_PACING_CATHODE_LOCATION_1: NORMAL
MDC_IDC_SET_LEADCHNL_LV_PACING_PULSEWIDTH: 0.5 MS
MDC_IDC_SET_LEADCHNL_LV_SENSING_ADAPTATION_MODE: NORMAL
MDC_IDC_SET_LEADCHNL_LV_SENSING_ANODE_ELECTRODE_1: NORMAL
MDC_IDC_SET_LEADCHNL_LV_SENSING_ANODE_LOCATION_1: NORMAL
MDC_IDC_SET_LEADCHNL_LV_SENSING_CATHODE_ELECTRODE_1: NORMAL
MDC_IDC_SET_LEADCHNL_LV_SENSING_CATHODE_LOCATION_1: NORMAL
MDC_IDC_SET_LEADCHNL_LV_SENSING_SENSITIVITY: 1 MV
MDC_IDC_SET_LEADCHNL_RA_PACING_AMPLITUDE: 2.5 V
MDC_IDC_SET_LEADCHNL_RA_PACING_CAPTURE_MODE: NORMAL
MDC_IDC_SET_LEADCHNL_RA_PACING_POLARITY: NORMAL
MDC_IDC_SET_LEADCHNL_RA_PACING_PULSEWIDTH: 0.5 MS
MDC_IDC_SET_LEADCHNL_RA_SENSING_ADAPTATION_MODE: NORMAL
MDC_IDC_SET_LEADCHNL_RA_SENSING_POLARITY: NORMAL
MDC_IDC_SET_LEADCHNL_RA_SENSING_SENSITIVITY: 0.25 MV
MDC_IDC_SET_LEADCHNL_RV_PACING_AMPLITUDE: 2 V
MDC_IDC_SET_LEADCHNL_RV_PACING_CAPTURE_MODE: NORMAL
MDC_IDC_SET_LEADCHNL_RV_PACING_POLARITY: NORMAL
MDC_IDC_SET_LEADCHNL_RV_PACING_PULSEWIDTH: 0.4 MS
MDC_IDC_SET_LEADCHNL_RV_SENSING_ADAPTATION_MODE: NORMAL
MDC_IDC_SET_LEADCHNL_RV_SENSING_POLARITY: NORMAL
MDC_IDC_SET_LEADCHNL_RV_SENSING_SENSITIVITY: 0.6 MV
MDC_IDC_SET_ZONE_DETECTION_INTERVAL: 250 MS
MDC_IDC_SET_ZONE_DETECTION_INTERVAL: 300 MS
MDC_IDC_SET_ZONE_DETECTION_INTERVAL: 353 MS
MDC_IDC_SET_ZONE_TYPE: NORMAL
MDC_IDC_SET_ZONE_VENDOR_TYPE: NORMAL
MDC_IDC_STAT_AT_BURDEN_PERCENT: 0 %
MDC_IDC_STAT_AT_DTM_END: NORMAL
MDC_IDC_STAT_AT_DTM_START: NORMAL
MDC_IDC_STAT_BRADY_DTM_END: NORMAL
MDC_IDC_STAT_BRADY_DTM_START: NORMAL
MDC_IDC_STAT_BRADY_RA_PERCENT_PACED: 0 %
MDC_IDC_STAT_BRADY_RV_PERCENT_PACED: 25 %
MDC_IDC_STAT_CRT_DTM_END: NORMAL
MDC_IDC_STAT_CRT_DTM_START: NORMAL
MDC_IDC_STAT_CRT_LV_PERCENT_PACED: 99 %
MDC_IDC_STAT_EPISODE_RECENT_COUNT: 0
MDC_IDC_STAT_EPISODE_RECENT_COUNT_DTM_END: NORMAL
MDC_IDC_STAT_EPISODE_RECENT_COUNT_DTM_START: NORMAL
MDC_IDC_STAT_EPISODE_TYPE: NORMAL
MDC_IDC_STAT_EPISODE_VENDOR_TYPE: NORMAL
MDC_IDC_STAT_TACHYTHERAPY_ATP_DELIVERED_RECENT: 0
MDC_IDC_STAT_TACHYTHERAPY_ATP_DELIVERED_TOTAL: 0
MDC_IDC_STAT_TACHYTHERAPY_RECENT_DTM_END: NORMAL
MDC_IDC_STAT_TACHYTHERAPY_RECENT_DTM_START: NORMAL
MDC_IDC_STAT_TACHYTHERAPY_SHOCKS_ABORTED_RECENT: 0
MDC_IDC_STAT_TACHYTHERAPY_SHOCKS_ABORTED_TOTAL: 0
MDC_IDC_STAT_TACHYTHERAPY_SHOCKS_DELIVERED_RECENT: 0
MDC_IDC_STAT_TACHYTHERAPY_SHOCKS_DELIVERED_TOTAL: 0
MDC_IDC_STAT_TACHYTHERAPY_TOTAL_DTM_END: NORMAL
MDC_IDC_STAT_TACHYTHERAPY_TOTAL_DTM_START: NORMAL

## 2023-04-17 DIAGNOSIS — I21.3 STEMI (ST ELEVATION MYOCARDIAL INFARCTION) (H): ICD-10-CM

## 2023-04-17 DIAGNOSIS — E78.2 MIXED HYPERLIPIDEMIA: ICD-10-CM

## 2023-04-17 DIAGNOSIS — I25.10 CORONARY ARTERY DISEASE INVOLVING NATIVE CORONARY ARTERY OF NATIVE HEART WITHOUT ANGINA PECTORIS: ICD-10-CM

## 2023-04-17 DIAGNOSIS — E11.9 TYPE 2 DIABETES MELLITUS WITHOUT COMPLICATION, WITHOUT LONG-TERM CURRENT USE OF INSULIN (H): ICD-10-CM

## 2023-04-17 DIAGNOSIS — I25.5 ISCHEMIC CARDIOMYOPATHY: ICD-10-CM

## 2023-04-19 RX ORDER — ATORVASTATIN CALCIUM 80 MG/1
80 TABLET, FILM COATED ORAL DAILY
Qty: 90 TABLET | Refills: 3 | Status: SHIPPED | OUTPATIENT
Start: 2023-04-19 | End: 2023-10-10

## 2023-04-19 NOTE — TELEPHONE ENCOUNTER
atorvastatin (LIPITOR) 80 MG tablet    Statins Protocol Passed     8/29/2022  [facility] [place]   Ignacio Beard MD  Internal Medicine

## 2023-06-02 DIAGNOSIS — I50.20 HEART FAILURE WITH REDUCED EJECTION FRACTION, NYHA CLASS III (H): ICD-10-CM

## 2023-06-02 RX ORDER — SACUBITRIL AND VALSARTAN 97; 103 MG/1; MG/1
1 TABLET, FILM COATED ORAL 2 TIMES DAILY
Qty: 180 TABLET | Refills: 0 | Status: SHIPPED | OUTPATIENT
Start: 2023-06-02 | End: 2023-09-14

## 2023-06-02 NOTE — TELEPHONE ENCOUNTER
sacubitril-valsartan (ENTRESTO)  MG per tablet  Last Written Prescription Date:   4/5/2022  Last Fill Quantity: 180,   # refills: 3  Last Office Visit :  4/5/2022  Future Office visit:  None  90 Tabs sent to pharm   Pt needs updated office visit  Clinic notified to set up visit      Alaina Pandya RN  Central Triage Red Flags/Med Refills      Warnings Override History for sacubitril-valsartan (ENTRESTO)  MG per tablet [[id number]]    Overridden by Sarah Beth Washington MD on Sep 2, 2022 7:19 AM   Drug-Drug   1. ANGIOTENSIN II RECEPTOR ANTAGONISTS / POTASSIUM-SPARING DIURETICS [Level: Major] [Reason: Tolerated medication/side effects in past]   Other Orders: spironolactone (ALDACTONE) 25 MG tablet         Overridden by Vera Gonzales RN on Michael 15, 2022 1:34 PM   Drug-Drug   1. ANGIOTENSIN II RECEPTOR ANTAGONISTS / POTASSIUM-SPARING DIURETICS [Level: Major] [Reason: Tolerated medication/side effects in past]   Other Orders: spironolactone (ALDACTONE) 25 MG tablet         Overridden by Vladimir Servin RN on Apr 5, 2022 3:53 PM   Drug-Drug   1. POTASSIUM-SPARING DIURETICS / ANGIOTENSIN II RECEPTOR ANTAGONISTS [Level: Major] [Reason: Tolerated medication/side effects in past]   Other Orders: spironolactone (ALDACTONE) 25 MG tablet

## 2023-06-02 NOTE — TELEPHONE ENCOUNTER
[facility] Call Center    Phone Message    May a detailed message be left on voicemail: yes     Reason for Call: Medication Refill Request    Has the patient contacted the pharmacy for the refill? Yes   Name of medication being requested: sacubitril-valsartan (ENTRESTO)  MG per tablet  Provider who prescribed the medication: Dr. Matso  Pharmacy:    [facility] #[id number] [address] [facility]     Date medication is needed: 06/2/23   Patient got an emergency supply as the pharmacy has not heard back. Patient is taking his last dose today.       Action Taken: Other: cardiology    Travel Screening: Not Applicable   Thank you!  [facility]

## 2023-06-11 DIAGNOSIS — E11.9 TYPE 2 DIABETES MELLITUS WITHOUT COMPLICATION, WITHOUT LONG-TERM CURRENT USE OF INSULIN (H): ICD-10-CM

## 2023-06-14 RX ORDER — GLIPIZIDE 5 MG/1
TABLET, FILM COATED, EXTENDED RELEASE ORAL
Qty: 180 TABLET | Refills: 0 | Status: SHIPPED | OUTPATIENT
Start: 2023-06-14 | End: 2023-09-26

## 2023-06-14 NOTE — TELEPHONE ENCOUNTER
glipiZIDE (GLUCOTROL XL) 5 MG 24 hr tablet 180 tablet 0 3/8/2023         Last Written Prescription Date:  3-8-2023  Last Fill Quantity: 180,   # refills: 0  Last Office Visit : 8-  Future Office visit:  8-    Routing refill request to provider for review/approval because:  A1C IS STILL OVERDUE - HAS HAD 90 DAY BE REFILL  WHO SHOULD BE MANAGING - ENDO VS PCP?

## 2023-06-30 ENCOUNTER — TELEPHONE (OUTPATIENT)
Dept: CARDIOLOGY | Facility: CLINIC | Age: 68
End: 2023-06-30

## 2023-06-30 DIAGNOSIS — I50.22 CHRONIC SYSTOLIC CONGESTIVE HEART FAILURE (H): ICD-10-CM

## 2023-06-30 RX ORDER — FUROSEMIDE 40 MG/1
40 TABLET ORAL 2 TIMES DAILY
Qty: 60 TABLET | Refills: 0 | Status: SHIPPED | OUTPATIENT
Start: 2023-06-30 | End: 2023-08-04

## 2023-06-30 NOTE — TELEPHONE ENCOUNTER
furosemide (LASIX) 40 MG tablet  Last Written Prescription Date:  7/26/22  Last Fill Quantity: 180,   # refills: 2   Last Office Visit :  4/5/2022  Future Office visit:  None, no show 4/4/23  BMP 8/29/22  Routing refill request to provider for review  because:   Last seen 4/5/22  30 day naif refill sent to the pharmacy - including instructions for patient to call the clinic and schedule an appointment.

## 2023-06-30 NOTE — TELEPHONE ENCOUNTER
[facility] Call Center    Phone Message    May a detailed message be left on voicemail: yes     Reason for Call: Medication Refill Request    Has the patient contacted the pharmacy for the refill? Yes   Name of medication being requested: furosemide (LASIX)   Provider who prescribed the medication: radha   Pharmacy:    [facility] #[id number] [place] [id number] [facility]  AT [facility]      Date medication is needed: asap          Action Taken: Other: cardiology    Travel Screening: Not Applicable   Thank you!  [facility]

## 2023-06-30 NOTE — TELEPHONE ENCOUNTER
I called Cedric and he told me he has been out of lasix for a day. I told him there was a refill sent to his pharmacy this morning, but that he needs to get back into the clinic for further refills. I will ask our schedulers to reach out to him.    Milady Guzman RN

## 2023-07-07 ENCOUNTER — ANCILLARY PROCEDURE (OUTPATIENT)
Dept: CARDIOLOGY | Facility: CLINIC | Age: 68
End: 2023-07-07
Attending: INTERNAL MEDICINE
Payer: COMMERCIAL

## 2023-07-07 DIAGNOSIS — I25.5 ISCHEMIC CARDIOMYOPATHY: ICD-10-CM

## 2023-07-07 DIAGNOSIS — I42.9 CARDIOMYOPATHY, UNSPECIFIED TYPE (H): ICD-10-CM

## 2023-07-07 PROCEDURE — 93296 REM INTERROG EVL PM/IDS: CPT

## 2023-07-07 PROCEDURE — 93295 DEV INTERROG REMOTE 1/2/MLT: CPT | Performed by: INTERNAL MEDICINE

## 2023-07-21 ENCOUNTER — TELEPHONE (OUTPATIENT)
Dept: INTERNAL MEDICINE | Facility: CLINIC | Age: 68
End: 2023-07-21
Payer: COMMERCIAL

## 2023-07-21 NOTE — TELEPHONE ENCOUNTER
[facility] Call Center    Phone Message    May a detailed message be left on voicemail: yes     Reason for Call: Medication Refill Request    Has the patient contacted the pharmacy for the refill? Yes   Name of medication being requested: atorvastatin (LIPITOR) 80 MG tablet     Provider who prescribed the medication: Dr. Beadr  Pharmacy: [facility] #[id number] [address]  Date medication is needed: 7/21/23

## 2023-07-21 NOTE — TELEPHONE ENCOUNTER
[facility] was called and notified to fill available refills   Cedric was claled and notified medication has refills avail merissa at [facility]

## 2023-08-01 ENCOUNTER — TELEPHONE (OUTPATIENT)
Dept: CARDIOLOGY | Facility: CLINIC | Age: 68
End: 2023-08-01
Payer: COMMERCIAL

## 2023-08-01 DIAGNOSIS — I50.20 HEART FAILURE WITH REDUCED EJECTION FRACTION, NYHA CLASS III (H): ICD-10-CM

## 2023-08-01 RX ORDER — SPIRONOLACTONE 25 MG/1
25 TABLET ORAL DAILY
Qty: 90 TABLET | Refills: 3 | Status: SHIPPED | OUTPATIENT
Start: 2023-08-01 | End: 2023-10-10

## 2023-08-01 NOTE — PROGRESS NOTES
Outcome for 08/01/23 1:34 PM: Patient is not checking blood sugars  Carissa Brown MA      Patient is showing 4/5 MNCM met. A1c not in range   Carissa Brown MA

## 2023-08-01 NOTE — TELEPHONE ENCOUNTER
[facility] Call Center    Phone Message    May a detailed message be left on voicemail: yes     Reason for Call: Medication Refill Request    Has the patient contacted the pharmacy for the refill? Yes   Name of medication being requested: spironolactone (ALDACTONE)   furosemide (LASIX   Provider who prescribed the medication: radha    Pharmacy:    [facility] #[id number] [address]  AT [facility]      Date medication is needed: asap    Needs approval leaves [facility] tomorrow     Action Taken: Other: cardiology    Travel Screening: Not Applicable  Thank you!  [facility]

## 2023-08-02 DIAGNOSIS — I50.22 CHRONIC SYSTOLIC CONGESTIVE HEART FAILURE (H): ICD-10-CM

## 2023-08-04 NOTE — TELEPHONE ENCOUNTER
furosemide (LASIX) 40 MG tablet      Last Written Prescription Date:  6-30-23  Last Fill Quantity: 60,   # refills: 0  Last Office Visit : 4-5-22  Future Office visit:  8-22-23    Routing refill request to provider for review/approval because:  Past due appt,  Previous no show and cancel appt  Previous naif RF  Authorize ESTELITA til RTC      
never

## 2023-08-07 RX ORDER — FUROSEMIDE 40 MG
40 TABLET ORAL 2 TIMES DAILY
Qty: 180 TABLET | Refills: 0 | Status: SHIPPED | OUTPATIENT
Start: 2023-08-07 | End: 2023-10-10

## 2023-08-08 ENCOUNTER — VIRTUAL VISIT (OUTPATIENT)
Dept: ENDOCRINOLOGY | Facility: CLINIC | Age: 68
End: 2023-08-08
Payer: COMMERCIAL

## 2023-08-08 DIAGNOSIS — E11.9 TYPE 2 DIABETES MELLITUS WITHOUT COMPLICATION, WITHOUT LONG-TERM CURRENT USE OF INSULIN (H): Primary | ICD-10-CM

## 2023-08-08 PROCEDURE — 99213 OFFICE O/P EST LOW 20 MIN: CPT | Mod: 93 | Performed by: STUDENT IN AN ORGANIZED HEALTH CARE EDUCATION/TRAINING PROGRAM

## 2023-08-08 NOTE — LETTER
8/8/2023       RE: Anson Manriquez  [address]  formerly [facility] [address]     Dear Colleague,    Thank you for referring your patient, Anson Manriquez, to the [facility] [place] at [facility]. Please see a copy of my visit note below.    Outcome for 08/01/23 1:34 PM: Patient is not checking blood sugars  Carissa Brown MA      Patient is showing 4/5 MNCM met. A1c not in range   Carissa Brown MA      Endocrinology Clinic Visit     Telephone Visit Details     Type of service:  telephoneVisit     Total time 11 minutes     I spent a total of 20 minutes on the date of encounter reviewing medical records, evaluating the patient, coordinating care and documenting in the EHR, as detailed above.        Provider location: off site      NAME:  Anson Manriquez  PCP:  Ignacio Beard  MRN:  [id number]  Reason for Consult:  DM2  Requesting Provider:  Ignacio Beard    Chief Complaint     Chief Complaint   Patient presents with    RECHECK    Video Visit       History of Present Illness     Anson Manriquez is a 66 year old male who is seen in clinic for DM2. Last seen 1/2023.    The patient was diagnosed with type 2 diabetes since 4-5 years ago on lab routine screening. He was started on metformin, he had diarrhea. He was switched to glipizide then jardiance was added.  Last visit 9/2022 we started ozempic. He is up to 1 mg weekly  since January 2023. He feels very good overall.     Previous A1C in records reviewed.   Lab Results   Component Value Date    A1C 9.3 (H) 04/26/2022    A1C 9.0 (H) 10/26/2021    A1C 8.0 (H) 10/29/2020    A1C 8.0 (H) 02/11/2020    A1C 7.3 (H) 11/06/2019    A1C 7.5 (H) 06/18/2019    A1C 7.6 (H) 05/09/2019    HEMOGLOBINA1 9.6 09/07/2022     Weight is stable  Wt Readings from Last 4 Encounters:   09/07/22 84.3 kg (185 lb 12.8 oz)   09/02/22 84.6 kg (186 lb 8 oz)   08/29/22 84.4 kg (186 lb 1.6 oz)   04/26/22 83 kg (183 lb)      He is down by 12 lbs since starting ozempic.     Diabetes Care/Complications:   Eyes: no diabetic retinopathy, last eye exam 5/2021  Kidneys: no DN, negative urine microalb on 10/2021. Bmp on 8/2022  Nerves: no neuropathy. DM foot exam was done by me on 9/2022  Smoking: no  Blood Pressure: ok on careg, entresto  Lipids: on statin  Macrovascular: CAD had a heart attack 15 years ago and then 3 years ago  Hypoglycemia: no      Current treatment strategy:   jardiance 25 mg daily  Glipizide 5 mg bid  Ozmepic 1 mg weekly    Blood Glucose Monitoring:   He does not check his bg.  He denied any low bg symptoms    Diet: 3 meals, not much snacks     Exercise: he is active all day at work. No routine exercise.    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 He is  and has 3 children.     Family hx: DM2 in his father, brother and sister    Problem List     Patient Active Problem List   Diagnosis    Coronary artery disease involving native coronary artery of native heart without angina pectoris    Type 2 diabetes mellitus without complication, without long-term current use of insulin (H)    Benign essential hypertension    Mixed hyperlipidemia    Anal fistula    STEMI (ST elevation myocardial infarction) (H)    Ischemic cardiomyopathy    S/P ICD (internal cardiac defibrillator) procedure    Chronic infection    Left inguinal hernia    Umbilical hernia without obstruction and without gangrene    Abdominal wall abscess        Medications     Current Outpatient Medications   Medication    aspirin (ASA) 81 MG chewable tablet    atorvastatin (LIPITOR) 80 MG tablet    carvedilol (COREG) 25 MG tablet    empagliflozin (JARDIANCE) 25 MG TABS tablet    furosemide (LASIX) 40 MG tablet    glipiZIDE (GLUCOTROL XL) 5 MG 24 hr tablet    MULTIPLE VITAMIN PO    sacubitril-valsartan (ENTRESTO)  MG per tablet    semaglutide (OZEMPIC, 0.25 OR 0.5 MG/DOSE,) 2 MG/1.5ML SOPN pen    Semaglutide, 1 MG/DOSE, 4 MG/3ML SOPN     Semaglutide, 1 MG/DOSE, 4 MG/3ML SOPN    blood glucose (NO BRAND SPECIFIED) lancets standard    blood glucose (NO BRAND SPECIFIED) lancets standard    blood glucose (NO BRAND SPECIFIED) test strip    blood glucose (NO BRAND SPECIFIED) test strip    blood glucose monitoring (NO BRAND SPECIFIED) meter device kit    blood glucose monitoring (NO BRAND SPECIFIED) meter device kit    spironolactone (ALDACTONE) 25 MG tablet     No current facility-administered medications for this visit.     Facility-Administered Medications Ordered in Other Visits   Medication    perflutren diluted 1mL to 2mL with saline (OPTISON) diluted injection 5 mL    sodium chloride (PF) 0.9% PF flush 10 mL    sodium chloride (PF) 0.9% PF flush 10 mL        Allergies     No Known Allergies    Medical / Surgical History     Past Medical History:   Diagnosis Date    Anal fistula     Antiplatelet or antithrombotic long-term use     Coronary artery disease     Diabetes mellitus, type 2 (H)     Heart attack (H) 4/17/2007    Hyperlipidemia     Hypertension     Inguinal hernia     Ischemic cardiomyopathy     STEMI (ST elevation myocardial infarction) (H) 09/13/2018    s/p STEVO x2    Stented coronary artery 2007     Past Surgical History:   Procedure Laterality Date    EP ICD N/A 4/25/2019    Procedure: EP ICD [[id number]];  Surgeon: Mick Mckeon MD;  Location:  [facility]ATH LAB    HERNIA REPAIR Right     Inguinal    HERNIORRHAPHY INGUINAL Left 2/20/2020    Procedure: Left HERNIORRHAPHY, INGUINAL, OPEN;  Surgeon: Flakito Massey MD;  Location: [facility]    HERNIORRHAPHY UMBILICAL N/A 10/16/2020    Procedure: HERNIORRHAPHY, UMBILICAL, OPEN, with mesh;  Surgeon: Flakito Massey MD;  Location: [facility] OR    IRRIGATION AND DEBRIDEMENT RECTUM, COMBINED      abcess near rectum     LAPAROSCOPIC CHOLECYSTECTOMY  3/29/2012    Procedure:LAPAROSCOPIC CHOLECYSTECTOMY; LAPAROSCOPIC CHOLECYSTECTOMY; Surgeon:MARILYNN PRESSLEY; Location:[facility] OR    REPAIR ECTROPION  Bilateral 9/2/2022    Procedure: Both lower eyelid ectropion repair;  Surgeon: Cheyanne Francisco MD;  Location: [facility] OR    STENT, CORONARY, OLIVIA         Social History     Social History     Socioeconomic History    Marital status:      Spouse name: Not on file    Number of children: Not on file    Years of education: Not on file    Highest education level: Not on file   Occupational History    Not on file   Tobacco Use    Smoking status: Never    Smokeless tobacco: Never   Substance and Sexual Activity    Alcohol use: No    Drug use: No    Sexual activity: Not on file   Other Topics Concern    Not on file   Social History Narrative    Not on file     Social Determinants of Health     Financial Resource Strain: Not on file   Food Insecurity: Not on file   Transportation Needs: Not on file   Physical Activity: Not on file   Stress: Not on file   Social Connections: Not on file   Intimate Partner Violence: Not on file   Housing Stability: Not on file       Family History     Family History   Problem Relation Age of Onset    Hypertension Mother     Diabetes Father     Glaucoma No family hx of     Macular Degeneration No family hx of        ROS     limited ROS completed, pertinent positive and negative in HPI    Physical Exam   There were no vitals taken for this visit.   n/a  Labs/Imaging     Pertinent Labs were reviewed and updated in EPIC and discussed briefly.  Radiology Results were  reviewed and updated in EPIC and discussed briefly.    Summary of recent findings:   Lab Results   Component Value Date    A1C 9.3 04/26/2022    A1C 9.0 10/26/2021    A1C 8.0 10/29/2020    A1C 8.0 02/11/2020    A1C 7.3 11/06/2019    A1C 7.5 06/18/2019    A1C 7.6 05/09/2019       TSH   Date Value Ref Range Status   03/07/2017 2.44 0.40 - 4.00 mU/L Final   07/27/2016 2.273 0.358 - 3.740 mcU/mL Final   07/27/2016 2.273 mcU/mL Final       Creatinine   Date Value Ref Range Status   08/29/2022 0.80 0.66 - 1.25 mg/dL Final   04/05/2021  0.94 0.66 - 1.25 mg/dL Final       Recent Labs   Lab Test 04/26/22  0840 11/06/19  0831   CHOL 228* 198   HDL 40 41   * 93   TRIG 437* 319*       No results found for: [id number], [id number], [id number]    I personally reviewed the patient's outside records from [facility] EMR. Summary of pertinent findings in HPI.    Impression / Plan     1. Diabetes Mellitus: Type 2  2. overweight, BMI 29.99    He had poorly controlled DM2 on jardiance and glipizide. a1c 9.6 on 9/2022.  target a1c is <7. I have no SMBG and no recent A1C to assess his current glycemic control.      Plan:   Continue same regimen  Check a1c     2. Diabetes Complications: none. Due for eye exam and urine microalb     3. Blood Pressure Management: Blood pressure is controlled . Currently is  on pharmacotherapy for this.     4.Lipid Management: Per the new ACC/TERI/NHLBI guidelines, statins are recommended for individuals with diabetes aged 40-75 with LDL  without ASCVD, and for any individual with ASCVD. Currently the patient is on a statin.      5. Smoking Status: Patient Pt is smoke free..            Test and/or medications prescribed today:  No orders of the defined types were placed in this encounter.        Follow up: 3 MONTH      Li Muller MD  Endocrinology, Diabetes and Metabolism  [facility]

## 2023-08-08 NOTE — NURSING NOTE
Is the patient currently in the state of [place]? YES    Visit mode:TELEPHONE    If the visit is dropped, the patient can be reconnected by: TELEPHONE VISIT: Phone number: [phone]    Will anyone else be joining the visit? NO      How would you like to obtain your AVS? MyChart    Are changes needed to the allergy or medication list? NO    Reason for visit: RECHECK and Video Visit

## 2023-08-08 NOTE — PROGRESS NOTES
Endocrinology Clinic Visit     Telephone Visit Details     Type of service:  telephoneVisit     Total time 11 minutes     I spent a total of 20 minutes on the date of encounter reviewing medical records, evaluating the patient, coordinating care and documenting in the EHR, as detailed above.        Provider location: off site      NAME:  Anson Manriquez  PCP:  Ignacio Beard  MRN:  [id number]  Reason for Consult:  DM2  Requesting Provider:  Ignacio Beard    Chief Complaint     Chief Complaint   Patient presents with    RECHECK    Video Visit       History of Present Illness     Anson Manriquez is a 66 year old male who is seen in clinic for DM2. Last seen 1/2023.    The patient was diagnosed with type 2 diabetes since 4-5 years ago on lab routine screening. He was started on metformin, he had diarrhea. He was switched to glipizide then jardiance was added.  Last visit 9/2022 we started ozempic. He is up to 1 mg weekly  since January 2023. He feels very good overall.     Previous A1C in records reviewed.   Lab Results   Component Value Date    A1C 9.3 (H) 04/26/2022    A1C 9.0 (H) 10/26/2021    A1C 8.0 (H) 10/29/2020    A1C 8.0 (H) 02/11/2020    A1C 7.3 (H) 11/06/2019    A1C 7.5 (H) 06/18/2019    A1C 7.6 (H) 05/09/2019    HEMOGLOBINA1 9.6 09/07/2022     Weight is stable  Wt Readings from Last 4 Encounters:   09/07/22 84.3 kg (185 lb 12.8 oz)   09/02/22 84.6 kg (186 lb 8 oz)   08/29/22 84.4 kg (186 lb 1.6 oz)   04/26/22 83 kg (183 lb)     He is down by 12 lbs since starting ozempic.     Diabetes Care/Complications:   Eyes: no diabetic retinopathy, last eye exam 5/2021  Kidneys: no DN, negative urine microalb on 10/2021. Bmp on 8/2022  Nerves: no neuropathy. DM foot exam was done by me on 9/2022  Smoking: no  Blood Pressure: ok on careg, entresto  Lipids: on statin  Macrovascular: CAD had a heart attack 15 years ago and then 3 years ago  Hypoglycemia: no      Current treatment strategy:   jardiance 25 mg  daily  Glipizide 5 mg bid  Ozmepic 1 mg weekly    Blood Glucose Monitoring:   He does not check his bg.  He denied any low bg symptoms    Diet: 3 meals, not much snacks     Exercise: he is active all day at work. No routine exercise.    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 He is  and has 3 children.     Family hx: DM2 in his father, brother and sister    Problem List     Patient Active Problem List   Diagnosis    Coronary artery disease involving native coronary artery of native heart without angina pectoris    Type 2 diabetes mellitus without complication, without long-term current use of insulin (H)    Benign essential hypertension    Mixed hyperlipidemia    Anal fistula    STEMI (ST elevation myocardial infarction) (H)    Ischemic cardiomyopathy    S/P ICD (internal cardiac defibrillator) procedure    Chronic infection    Left inguinal hernia    Umbilical hernia without obstruction and without gangrene    Abdominal wall abscess        Medications     Current Outpatient Medications   Medication    aspirin (ASA) 81 MG chewable tablet    atorvastatin (LIPITOR) 80 MG tablet    carvedilol (COREG) 25 MG tablet    empagliflozin (JARDIANCE) 25 MG TABS tablet    furosemide (LASIX) 40 MG tablet    glipiZIDE (GLUCOTROL XL) 5 MG 24 hr tablet    MULTIPLE VITAMIN PO    sacubitril-valsartan (ENTRESTO)  MG per tablet    semaglutide (OZEMPIC, 0.25 OR 0.5 MG/DOSE,) 2 MG/1.5ML SOPN pen    Semaglutide, 1 MG/DOSE, 4 MG/3ML SOPN    Semaglutide, 1 MG/DOSE, 4 MG/3ML SOPN    blood glucose (NO BRAND SPECIFIED) lancets standard    blood glucose (NO BRAND SPECIFIED) lancets standard    blood glucose (NO BRAND SPECIFIED) test strip    blood glucose (NO BRAND SPECIFIED) test strip    blood glucose monitoring (NO BRAND SPECIFIED) meter device kit    blood glucose monitoring (NO BRAND SPECIFIED) meter device kit    spironolactone (ALDACTONE) 25 MG tablet     No current facility-administered medications for this visit.      Facility-Administered Medications Ordered in Other Visits   Medication    perflutren diluted 1mL to 2mL with saline (OPTISON) diluted injection 5 mL    sodium chloride (PF) 0.9% PF flush 10 mL    sodium chloride (PF) 0.9% PF flush 10 mL        Allergies     No Known Allergies    Medical / Surgical History     Past Medical History:   Diagnosis Date    Anal fistula     Antiplatelet or antithrombotic long-term use     Coronary artery disease     Diabetes mellitus, type 2 (H)     Heart attack (H) 4/17/2007    Hyperlipidemia     Hypertension     Inguinal hernia     Ischemic cardiomyopathy     STEMI (ST elevation myocardial infarction) (H) 09/13/2018    s/p STEVO x2    Stented coronary artery 2007     Past Surgical History:   Procedure Laterality Date    EP ICD N/A 4/25/2019    Procedure: EP ICD [[id number]];  Surgeon: Mick Mckeon MD;  Location: [facility]    HERNIA REPAIR Right     Inguinal    HERNIORRHAPHY INGUINAL Left 2/20/2020    Procedure: Left HERNIORRHAPHY, INGUINAL, OPEN;  Surgeon: Flakito Massey MD;  Location: [facility] OR    HERNIORRHAPHY UMBILICAL N/A 10/16/2020    Procedure: HERNIORRHAPHY, UMBILICAL, OPEN, with mesh;  Surgeon: Flakito Massey MD;  Location: [facility] OR    IRRIGATION AND DEBRIDEMENT RECTUM, COMBINED      abcess near rectum     LAPAROSCOPIC CHOLECYSTECTOMY  3/29/2012    Procedure:LAPAROSCOPIC CHOLECYSTECTOMY; LAPAROSCOPIC CHOLECYSTECTOMY; Surgeon:MARILYNN PRESSLEY; Location:[facility] OR    REPAIR ECTROPION Bilateral 9/2/2022    Procedure: Both lower eyelid ectropion repair;  Surgeon: Cheyanne Francisco MD;  Location: [facility] OR    STENT, CORONARY, OLIVIA         Social History     Social History     Socioeconomic History    Marital status:      Spouse name: Not on file    Number of children: Not on file    Years of education: Not on file    Highest education level: Not on file   Occupational History    Not on file   Tobacco Use    Smoking status: Never    Smokeless tobacco: Never    Substance and Sexual Activity    Alcohol use: No    Drug use: No    Sexual activity: Not on file   Other Topics Concern    Not on file   Social History Narrative    Not on file     Social Determinants of Health     Financial Resource Strain: Not on file   Food Insecurity: Not on file   Transportation Needs: Not on file   Physical Activity: Not on file   Stress: Not on file   Social Connections: Not on file   Intimate Partner Violence: Not on file   Housing Stability: Not on file       Family History     Family History   Problem Relation Age of Onset    Hypertension Mother     Diabetes Father     Glaucoma No family hx of     Macular Degeneration No family hx of        ROS     limited ROS completed, pertinent positive and negative in HPI    Physical Exam   There were no vitals taken for this visit.   n/a  Labs/Imaging     Pertinent Labs were reviewed and updated in EPIC and discussed briefly.  Radiology Results were  reviewed and updated in EPIC and discussed briefly.    Summary of recent findings:   Lab Results   Component Value Date    A1C 9.3 04/26/2022    A1C 9.0 10/26/2021    A1C 8.0 10/29/2020    A1C 8.0 02/11/2020    A1C 7.3 11/06/2019    A1C 7.5 06/18/2019    A1C 7.6 05/09/2019       TSH   Date Value Ref Range Status   03/07/2017 2.44 0.40 - 4.00 mU/L Final   07/27/2016 2.273 0.358 - 3.740 mcU/mL Final   07/27/2016 2.273 mcU/mL Final       Creatinine   Date Value Ref Range Status   08/29/2022 0.80 0.66 - 1.25 mg/dL Final   04/05/2021 0.94 0.66 - 1.25 mg/dL Final       Recent Labs   Lab Test 04/26/22  0840 11/06/19  0831   CHOL 228* 198   HDL 40 41   * 93   TRIG 437* 319*       No results found for: [id number], [id number], [id number]    I personally reviewed the patient's outside records from [facility] EMR. Summary of pertinent findings in [facility].    Impression / Plan     1. Diabetes Mellitus: Type 2  2. overweight, BMI 29.99    He had poorly controlled DM2 on jardiance and glipizide. a1c 9.6 on 9/2022.   target a1c is <7. I have no SMBG and no recent A1C to assess his current glycemic control.      Plan:   Continue same regimen  Check a1c     2. Diabetes Complications: none. Due for eye exam and urine microalb     3. Blood Pressure Management: Blood pressure is controlled . Currently is  on pharmacotherapy for this.     4.Lipid Management: Per the new ACC/TERI/NHLBI guidelines, statins are recommended for individuals with diabetes aged 40-75 with LDL  without ASCVD, and for any individual with ASCVD. Currently the patient is on a statin.      5. Smoking Status: Patient Pt is smoke free..            Test and/or medications prescribed today:  No orders of the defined types were placed in this encounter.        Follow up: 3 MONTH      Li Muller MD  Endocrinology, Diabetes and Metabolism  [facility]

## 2023-08-14 LAB
MDC_IDC_EPISODE_DTM: NORMAL
MDC_IDC_EPISODE_ID: NORMAL
MDC_IDC_EPISODE_TYPE: NORMAL
MDC_IDC_LEAD_IMPLANT_DT: NORMAL
MDC_IDC_LEAD_LOCATION: NORMAL
MDC_IDC_LEAD_LOCATION_DETAIL_1: NORMAL
MDC_IDC_LEAD_MFG: NORMAL
MDC_IDC_LEAD_MODEL: NORMAL
MDC_IDC_LEAD_POLARITY_TYPE: NORMAL
MDC_IDC_LEAD_SERIAL: NORMAL
MDC_IDC_LEAD_SPECIAL_FUNCTION: NORMAL
MDC_IDC_MSMT_BATTERY_DTM: NORMAL
MDC_IDC_MSMT_BATTERY_REMAINING_LONGEVITY: 84 MO
MDC_IDC_MSMT_BATTERY_REMAINING_PERCENTAGE: 97 %
MDC_IDC_MSMT_BATTERY_STATUS: NORMAL
MDC_IDC_MSMT_CAP_CHARGE_DTM: NORMAL
MDC_IDC_MSMT_CAP_CHARGE_TIME: 10.8 S
MDC_IDC_MSMT_CAP_CHARGE_TYPE: NORMAL
MDC_IDC_MSMT_LEADCHNL_LV_IMPEDANCE_VALUE: 617 OHM
MDC_IDC_MSMT_LEADCHNL_LV_PACING_THRESHOLD_AMPLITUDE: 1.7 V
MDC_IDC_MSMT_LEADCHNL_LV_PACING_THRESHOLD_PULSEWIDTH: 0.5 MS
MDC_IDC_MSMT_LEADCHNL_RA_IMPEDANCE_VALUE: 717 OHM
MDC_IDC_MSMT_LEADCHNL_RV_IMPEDANCE_VALUE: 689 OHM
MDC_IDC_MSMT_LEADCHNL_RV_PACING_THRESHOLD_AMPLITUDE: 0.7 V
MDC_IDC_MSMT_LEADCHNL_RV_PACING_THRESHOLD_PULSEWIDTH: 0.4 MS
MDC_IDC_PG_IMPLANT_DTM: NORMAL
MDC_IDC_PG_MFG: NORMAL
MDC_IDC_PG_MODEL: NORMAL
MDC_IDC_PG_SERIAL: NORMAL
MDC_IDC_PG_TYPE: NORMAL
MDC_IDC_SESS_CLINIC_NAME: NORMAL
MDC_IDC_SESS_DTM: NORMAL
MDC_IDC_SESS_TYPE: NORMAL
MDC_IDC_SET_BRADY_AT_MODE_SWITCH_MODE: NORMAL
MDC_IDC_SET_BRADY_AT_MODE_SWITCH_RATE: 170 {BEATS}/MIN
MDC_IDC_SET_BRADY_LOWRATE: 60 {BEATS}/MIN
MDC_IDC_SET_BRADY_MAX_SENSOR_RATE: 130 {BEATS}/MIN
MDC_IDC_SET_BRADY_MAX_TRACKING_RATE: 130 {BEATS}/MIN
MDC_IDC_SET_BRADY_MODE: NORMAL
MDC_IDC_SET_BRADY_PAV_DELAY_LOW: 180 MS
MDC_IDC_SET_BRADY_SAV_DELAY_LOW: 140 MS
MDC_IDC_SET_CRT_PACED_CHAMBERS: NORMAL
MDC_IDC_SET_LEADCHNL_LV_PACING_AMPLITUDE: 2.7 V
MDC_IDC_SET_LEADCHNL_LV_PACING_ANODE_ELECTRODE_1: NORMAL
MDC_IDC_SET_LEADCHNL_LV_PACING_ANODE_LOCATION_1: NORMAL
MDC_IDC_SET_LEADCHNL_LV_PACING_CAPTURE_MODE: NORMAL
MDC_IDC_SET_LEADCHNL_LV_PACING_CATHODE_ELECTRODE_1: NORMAL
MDC_IDC_SET_LEADCHNL_LV_PACING_CATHODE_LOCATION_1: NORMAL
MDC_IDC_SET_LEADCHNL_LV_PACING_PULSEWIDTH: 0.5 MS
MDC_IDC_SET_LEADCHNL_LV_SENSING_ADAPTATION_MODE: NORMAL
MDC_IDC_SET_LEADCHNL_LV_SENSING_ANODE_ELECTRODE_1: NORMAL
MDC_IDC_SET_LEADCHNL_LV_SENSING_ANODE_LOCATION_1: NORMAL
MDC_IDC_SET_LEADCHNL_LV_SENSING_CATHODE_ELECTRODE_1: NORMAL
MDC_IDC_SET_LEADCHNL_LV_SENSING_CATHODE_LOCATION_1: NORMAL
MDC_IDC_SET_LEADCHNL_LV_SENSING_SENSITIVITY: 1 MV
MDC_IDC_SET_LEADCHNL_RA_PACING_AMPLITUDE: 2.5 V
MDC_IDC_SET_LEADCHNL_RA_PACING_CAPTURE_MODE: NORMAL
MDC_IDC_SET_LEADCHNL_RA_PACING_POLARITY: NORMAL
MDC_IDC_SET_LEADCHNL_RA_PACING_PULSEWIDTH: 0.5 MS
MDC_IDC_SET_LEADCHNL_RA_SENSING_ADAPTATION_MODE: NORMAL
MDC_IDC_SET_LEADCHNL_RA_SENSING_POLARITY: NORMAL
MDC_IDC_SET_LEADCHNL_RA_SENSING_SENSITIVITY: 0.25 MV
MDC_IDC_SET_LEADCHNL_RV_PACING_AMPLITUDE: 2 V
MDC_IDC_SET_LEADCHNL_RV_PACING_CAPTURE_MODE: NORMAL
MDC_IDC_SET_LEADCHNL_RV_PACING_POLARITY: NORMAL
MDC_IDC_SET_LEADCHNL_RV_PACING_PULSEWIDTH: 0.4 MS
MDC_IDC_SET_LEADCHNL_RV_SENSING_ADAPTATION_MODE: NORMAL
MDC_IDC_SET_LEADCHNL_RV_SENSING_POLARITY: NORMAL
MDC_IDC_SET_LEADCHNL_RV_SENSING_SENSITIVITY: 0.6 MV
MDC_IDC_SET_ZONE_DETECTION_INTERVAL: 250 MS
MDC_IDC_SET_ZONE_DETECTION_INTERVAL: 300 MS
MDC_IDC_SET_ZONE_DETECTION_INTERVAL: 353 MS
MDC_IDC_SET_ZONE_TYPE: NORMAL
MDC_IDC_SET_ZONE_VENDOR_TYPE: NORMAL
MDC_IDC_STAT_AT_BURDEN_PERCENT: 0 %
MDC_IDC_STAT_AT_DTM_END: NORMAL
MDC_IDC_STAT_AT_DTM_START: NORMAL
MDC_IDC_STAT_BRADY_DTM_END: NORMAL
MDC_IDC_STAT_BRADY_DTM_START: NORMAL
MDC_IDC_STAT_BRADY_RA_PERCENT_PACED: 0 %
MDC_IDC_STAT_BRADY_RV_PERCENT_PACED: 25 %
MDC_IDC_STAT_CRT_DTM_END: NORMAL
MDC_IDC_STAT_CRT_DTM_START: NORMAL
MDC_IDC_STAT_CRT_LV_PERCENT_PACED: 99 %
MDC_IDC_STAT_EPISODE_RECENT_COUNT: 0
MDC_IDC_STAT_EPISODE_RECENT_COUNT_DTM_END: NORMAL
MDC_IDC_STAT_EPISODE_RECENT_COUNT_DTM_START: NORMAL
MDC_IDC_STAT_EPISODE_TYPE: NORMAL
MDC_IDC_STAT_EPISODE_VENDOR_TYPE: NORMAL
MDC_IDC_STAT_TACHYTHERAPY_ATP_DELIVERED_RECENT: 0
MDC_IDC_STAT_TACHYTHERAPY_ATP_DELIVERED_TOTAL: 0
MDC_IDC_STAT_TACHYTHERAPY_RECENT_DTM_END: NORMAL
MDC_IDC_STAT_TACHYTHERAPY_RECENT_DTM_START: NORMAL
MDC_IDC_STAT_TACHYTHERAPY_SHOCKS_ABORTED_RECENT: 0
MDC_IDC_STAT_TACHYTHERAPY_SHOCKS_ABORTED_TOTAL: 0
MDC_IDC_STAT_TACHYTHERAPY_SHOCKS_DELIVERED_RECENT: 0
MDC_IDC_STAT_TACHYTHERAPY_SHOCKS_DELIVERED_TOTAL: 0
MDC_IDC_STAT_TACHYTHERAPY_TOTAL_DTM_END: NORMAL
MDC_IDC_STAT_TACHYTHERAPY_TOTAL_DTM_START: NORMAL

## 2023-08-25 ENCOUNTER — TELEPHONE (OUTPATIENT)
Dept: ENDOCRINOLOGY | Facility: CLINIC | Age: 68
End: 2023-08-25

## 2023-08-25 NOTE — TELEPHONE ENCOUNTER
PA Initiation    Medication: OZEMPIC (1 MG/DOSE) 4 MG/3ML SC SOPN  Insurance Company: [employer] - Phone [phone] Fax [phone]  Pharmacy Filling the Rx:    Filling Pharmacy Phone:    Filling Pharmacy Fax:    Start Date: 8/25/2023

## 2023-08-28 NOTE — TELEPHONE ENCOUNTER
Prior Authorization Approval    Medication: OZEMPIC (1 MG/DOSE) 4 MG/3ML SC SOPN  Authorization Effective Date: 8/25/2023  Authorization Expiration Date: 8/25/2024  Approved Dose/Quantity: 3  Reference #: [id number]   Insurance Company: [employer] - Phone [phone] Fax [phone]  Expected CoPay:       CoPay Card Available:      Financial Assistance Needed:    Which Pharmacy is filling the prescription:    Pharmacy Notified:    Patient Notified:

## 2023-09-14 DIAGNOSIS — I50.20 HEART FAILURE WITH REDUCED EJECTION FRACTION, NYHA CLASS III (H): Primary | ICD-10-CM

## 2023-09-15 RX ORDER — SACUBITRIL AND VALSARTAN 97; 103 MG/1; MG/1
1 TABLET, FILM COATED ORAL 2 TIMES DAILY
Qty: 60 TABLET | Refills: 0 | Status: SHIPPED | OUTPATIENT
Start: 2023-09-15 | End: 2023-10-10

## 2023-09-26 DIAGNOSIS — E11.9 TYPE 2 DIABETES MELLITUS WITHOUT COMPLICATION, WITHOUT LONG-TERM CURRENT USE OF INSULIN (H): Primary | ICD-10-CM

## 2023-09-29 DIAGNOSIS — E11.9 TYPE 2 DIABETES MELLITUS WITHOUT COMPLICATION, WITHOUT LONG-TERM CURRENT USE OF INSULIN (H): ICD-10-CM

## 2023-09-29 RX ORDER — GLIPIZIDE 5 MG/1
5 TABLET, FILM COATED, EXTENDED RELEASE ORAL 2 TIMES DAILY
Qty: 180 TABLET | Refills: 0 | Status: SHIPPED | OUTPATIENT
Start: 2023-09-29 | End: 2023-11-14

## 2023-09-29 RX ORDER — GLIPIZIDE 5 MG/1
5 TABLET, FILM COATED, EXTENDED RELEASE ORAL 2 TIMES DAILY
Qty: 180 TABLET | Refills: 0 | Status: CANCELLED | OUTPATIENT
Start: 2023-09-29

## 2023-09-29 NOTE — TELEPHONE ENCOUNTER
glipiZIDE (GLUCOTROL XL) 5 MG 24 hr tablet      Disp Refills Start End MABEL   glipiZIDE (GLUCOTROL XL) 5 MG 24 hr tablet 180 tablet 0 9/29/2023  No   Sig - Route: Take 1 tablet (5 mg) by mouth 2 times daily - Oral   Sent to pharmacy as: glipiZIDE ER 5 MG Oral Tablet Extended Release 24 Hour (GLUCOTROL XL)   Class: E-Prescribe   Notes to Pharmacy: Please remind patient to keep appointment on 11/14/23 for future refills   Order: [id number]   E-Prescribing Status: Receipt confirmed by pharmacy (9/29/2023  1:14 PM CDT)     Printout Tracking    External Result Report     Pharmacy    [facility][id number] - [facility] [address] AT [facility]

## 2023-09-29 NOTE — TELEPHONE ENCOUNTER
[facility] Call Center    Phone Message    May a detailed message be left on voicemail: yes     Reason for Call: Medication Refill Request    Has the patient contacted the pharmacy for the refill? Yes   Name of medication being requested: glipizide Carlo  Provider who prescribed the medication: Carlo  Pharmacy:    [facility] #[id number] [place] [phone] [facility]  AT [facility]  Date medication is needed: [id number]       Action Taken: Other: cardio    Travel Screening: Not Applicable

## 2023-10-09 ENCOUNTER — ANCILLARY PROCEDURE (OUTPATIENT)
Dept: CARDIOLOGY | Facility: CLINIC | Age: 68
End: 2023-10-09
Attending: INTERNAL MEDICINE
Payer: COMMERCIAL

## 2023-10-09 DIAGNOSIS — I25.5 ISCHEMIC CARDIOMYOPATHY: ICD-10-CM

## 2023-10-09 DIAGNOSIS — I42.9 CARDIOMYOPATHY, UNSPECIFIED TYPE (H): ICD-10-CM

## 2023-10-09 PROCEDURE — 93295 DEV INTERROG REMOTE 1/2/MLT: CPT | Performed by: INTERNAL MEDICINE

## 2023-10-09 PROCEDURE — 93296 REM INTERROG EVL PM/IDS: CPT

## 2023-10-09 NOTE — PROGRESS NOTES
October 10, 2023     Mr. Anson Manriquez is a 66yr old male with a history of HTN, DMII, CAD (s/p STEMI in 2007 with pLAD stenting, and recent STEMI 9/2018), and ICM complicated by cardiogenic shock requiring IABP support with slow wean.      Mr. Manriquez was was out with friends and developed acute chest pain and after going home his pain worsened and had syncope, then cardiac arrest. EMS was called, and on arrival provided ~30 seconds of CPR and AED delivered 1 shock.  He was then brought to [facility] in sinus rhythm with a pulse for most of the transport.  During the last 5 mins en route to the ED, he went into a wide-complex tachycardia at a rate of 220bpm, likely VT.  He maintained a pulse, acceptable BP, and mental status.  He received amiodarone 150mg in the ED, and converted to NSR as he was being prepared for cardioversion.  He was then sent to the cath lab, where he was found to have an acute thrombotic lesion of the pLAD within the prior stent.  He was treated with aspiration thrombectomy, and STEVO x2 to pLAD and mLAD, and POBA to D1.  His LVEDP was 40mmHg. Peak troponin was ~17.  Echo showed LVEF 23% with LAD territory akinesis, consistent with echo results from 2012.  Therefore, his LAD had limited viability from his prior MI, which explains his relatively low troponin and unchanged LVEF. He was ultimately started on DAPT with aspirin and Brilinta, lisinopril, carvedilol, and lasix, and discharged home on 9/20/18.   He has been seen in clinic a few times now, with EF still 30-35%.  Despite evidence based therapy and his reduced EF, he went for CRT-D with Dr. Mckeon 4/25/19.    Mr. Manriquez has been doing very well denies any new complaints.  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  No chest pain dizziness lightheadedness palpitations.  Takes all medications as prescribed.  Feels well that he in fact knows that  this is the best how he has been feeling for the past couple of years.  No other complaints      PAST MEDICAL HISTORY:  Past Medical History:   Diagnosis Date    Anal fistula     Antiplatelet or antithrombotic long-term use     Coronary artery disease     Diabetes mellitus, type 2 (H)     Heart attack (H) 4/17/2007    Hyperlipidemia     Hypertension     Inguinal hernia     Ischemic cardiomyopathy     STEMI (ST elevation myocardial infarction) (H) 09/13/2018    s/p STEVO x2    Stented coronary artery 2007     FAMILY HISTORY:  Family History   Problem Relation Age of Onset    Hypertension Mother     Diabetes Father     Glaucoma No family hx of     Macular Degeneration No family hx of      SOCIAL HISTORY:  Social History     Socioeconomic History    Marital status:    Tobacco Use    Smoking status: Never    Smokeless tobacco: Never   Substance and Sexual Activity    Alcohol use: No    Drug use: No     CURRENT MEDICATIONS:  Current Outpatient Medications   Medication    aspirin (ASA) 81 MG chewable tablet    atorvastatin (LIPITOR) 80 MG tablet    blood glucose (NO BRAND SPECIFIED) lancets standard    blood glucose (NO BRAND SPECIFIED) lancets standard    blood glucose (NO BRAND SPECIFIED) test strip    blood glucose (NO BRAND SPECIFIED) test strip    blood glucose monitoring (NO BRAND SPECIFIED) meter device kit    blood glucose monitoring (NO BRAND SPECIFIED) meter device kit    carvedilol (COREG) 25 MG tablet    empagliflozin (JARDIANCE) 25 MG TABS tablet    furosemide (LASIX) 40 MG tablet    glipiZIDE (GLUCOTROL XL) 5 MG 24 hr tablet    MULTIPLE VITAMIN PO    sacubitril-valsartan (ENTRESTO)  MG per tablet    Semaglutide, 1 MG/DOSE, (OZEMPIC) 4 MG/3ML pen    Semaglutide, 1 MG/DOSE, 4 MG/3ML SOPN    spironolactone (ALDACTONE) 25 MG tablet     No current facility-administered medications for this visit.     Facility-Administered Medications Ordered in Other Visits   Medication    perflutren diluted 1mL to 2mL  "with saline (OPTISON) diluted injection 5 mL    sodium chloride (PF) 0.9% PF flush 10 mL    sodium chloride (PF) 0.9% PF flush 10 mL     ROS:   Constitutional: No fever, chills, or sweats. Weight is 0 lbs 0 oz  ENT: No visual disturbance, ear ache, epistaxis, sore throat.   Allergies/Immunologic: Negative.   Respiratory: No cough, hemoptysis.   Cardiovascular: As per HPI.   GI: No nausea, vomiting, hematemesis, melena, or hematochezia.   : No urinary frequency, dysuria, or hematuria.   Integument: Negative.   Psychiatric: Pleasant, no major depression noted  Neuro: No focal neurological deficits noted  Endocrinology: Negative.   Musculoskeletal: As per HPI.      EXAM:  BP (!) 138/90 (BP Location: Right arm, Patient Position: Sitting, Cuff Size: Adult Regular)   Pulse 92   Ht 1.704 m (5' 7.09\")   Wt 83.6 kg (184 lb 3.2 oz)   SpO2 94%   BMI 28.78 kg/m    General: appears comfortable, alert and oriented  Head: normocephalic, atraumatic  Eyes: anicteric sclera, EOMI , PERRL  Neck: no adenopathy  Orophyarynx: moist mucosa, no lesions noted  Heart: regular, S1/S2, no murmurs, rubs or gallop. Estimated JVP at 5 cmH2O  Lungs: CTAB, No wheezing.   Abdomen: soft, non-tender, bowel sounds present, no hepatosplenomegaly  Extremities: No LE edema today  Skin: no open lesions noted  Neuro: grossly non-focal     Labs:  Lab Results   Component Value Date    WBC 6.8 08/29/2022    HGB 16.3 08/29/2022    HCT 47.3 08/29/2022     08/29/2022     08/29/2022    POTASSIUM 4.3 08/29/2022    CHLORIDE 106 08/29/2022    CO2 26 08/29/2022    BUN 21 08/29/2022    CR 0.80 08/29/2022     (H) 08/29/2022    NTBNP 194 (H) 04/26/2022    TROPONIN 0.14 () 09/13/2018    TROPI 1.914 () 09/18/2018    AST 17 08/29/2022    ALT 30 08/29/2022    ALKPHOS 58 08/29/2022    BILITOTAL 0.9 08/29/2022    INR 1.10 04/05/2021     Coronary angio:  9/13/18  -Both coronary arteries arise from their respective cusps.  -Dominance: Right  -LM " has no evidence of coronary artery disease.  -LAD: Type 3 [LAD supplies the entire apex]. The LAD gives rise to septal perforators, multiple diagonal branches. The proximal LAD has an acute thrombotic lesion within the prior stent. Distal to the stent, there is a 70-80% stenosis. The distal vessel has HALIMA 2 flow. The D1 has thrombus at the ostium likely from embolization. The apical LAD also has thrombus from embolization.  -LCX gives rise to a large branching OM. The prox Cx has a 20% stenosis. The rest of the Cx system has mild disease.  -RCA (dominant) gives rise to PL branches and supplies PDA. There is minimal disease in the RCA system.     Echo:  9/14/18  Ischemic cardiomyopathy, LVEF=23% with extensive regional wall motion abnormalities as described below. Resting wall motion abnormalities and LV function are not significantly changed from the rest images on the 11/27/12 stress echocardiogram.  Mildly dilated LV with severely reduced LV function, LVEF=23%. There is akinesis involving the ebjiu-fe-jzs anteroseptal, mgp-ho-xkgspf anterior, mid anterolateral, and all apical myocardial segments. The basal anterior, basal anterolateral, and basal inferolateral segments are relatively spared. RV size and function are normal. No significant valvular abnormalities. No pericardial effusion. Normal IVC with abnormal respiratory variation, estimated RA pressure 8 mmHg.     Echo 1/30/19   Ischemic cardiomyopathy with moderately dilated LV with moderately reduced  global LV function, LVEF=31%. Extensive regional wall motion abnormalities as  described below, unchanged from 9/14/18.  This study was compared with the study from 9/14/18: There has been no  significant change. Specifically, LV function and wall motion abnormalities  have not changed significantly.  The right atria appears normal. Severe left atrial enlargement is present. This study was compared with the study from 9/14/18 . There has been no significant  change.     TTE 1/22/2020:  Ischemic cardiomyopathy with moderately dilated LV with moderately reduced  global LV function, LVEF=29%. Extensive regional wall motion abnormalities as  described below, unchanged from the prior study.  This study was compared with the study from 8/28/19: There has been no  significant change. Specifically, LV function and wall motion abnormalities  have not changed significantly.      ASSESSMENT AND PLAN:  Mr. Anson Manriquez is a 66yr old male with a history of HTN, DMII, CAD (s/p STEMI in 2007 with pLAD stenting, and recent STEMI 9/2018), and ICM who presents for follow up visit.  Overall he has been doing very well without any new complaints feeling well no chest pain remains active no shortness of breath.  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  No medication problems.  Again he says that he sees the best he has felt for a long time.  Please note that the blood pressure is a little bit on the higher end today however this is something unusual for him usually it is running way lower than this.  As such we will not make any medication adjustments today.  However we will repeat an echocardiogram in 6 months when he comes back and sees me in clinic as he had not had any recheck for a while.  We are also giving him refills as he had difficulties obtaining medications at times.    #ICM  #HFrEF with an EF of 30-35%  Patient with NYHA Class II symptoms and overall doing well on OGDT. 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 From a symptoms perspective, he is at a good baseline. His BP is 130's today, as a result we have BP room to increase entresto further. He is left ventricular pacing at 98% based on his last device check on 9/15/2021.   - Coreg 25mg BID  - Aldactone 25mg qday  - Entresto 97 mg twice daily  - On Empagiflozin 25mg qday.   He has been tolerating this higher dose well without any  complaints.  There is no evidence of infection or infections in the past.  If he does have these then we will consider consider either stopping or reducing the dose to 10 mg daily.  - SCD: s/p CRT-D on 9/2019     CAD s/p pLAD stent  -ASA 81mg qday continue  - Stop Brilinta Brillanta in the past  - Lipitor 40mg qday continue     We will see him back in 6 months with echo and labs.  I appreciate the opportunity to participate in the care of Anson Manriquez . Please do not hesitate to contact me with any further questions.     Sincerely,   Jason Matos MD  [facility] Division of Cardiology

## 2023-10-10 ENCOUNTER — LAB (OUTPATIENT)
Dept: LAB | Facility: CLINIC | Age: 68
End: 2023-10-10
Attending: INTERNAL MEDICINE
Payer: COMMERCIAL

## 2023-10-10 ENCOUNTER — OFFICE VISIT (OUTPATIENT)
Dept: CARDIOLOGY | Facility: CLINIC | Age: 68
End: 2023-10-10
Attending: INTERNAL MEDICINE
Payer: COMMERCIAL

## 2023-10-10 VITALS
DIASTOLIC BLOOD PRESSURE: 90 MMHG | BODY MASS INDEX: 28.91 KG/M2 | SYSTOLIC BLOOD PRESSURE: 138 MMHG | HEART RATE: 92 BPM | WEIGHT: 184.2 LBS | OXYGEN SATURATION: 94 % | HEIGHT: 67 IN

## 2023-10-10 DIAGNOSIS — E11.9 TYPE 2 DIABETES MELLITUS WITHOUT COMPLICATION, WITHOUT LONG-TERM CURRENT USE OF INSULIN (H): ICD-10-CM

## 2023-10-10 DIAGNOSIS — E78.2 MIXED HYPERLIPIDEMIA: ICD-10-CM

## 2023-10-10 DIAGNOSIS — I50.20 HEART FAILURE WITH REDUCED EJECTION FRACTION, NYHA CLASS III (H): ICD-10-CM

## 2023-10-10 DIAGNOSIS — I50.22 CHRONIC SYSTOLIC CONGESTIVE HEART FAILURE (H): ICD-10-CM

## 2023-10-10 DIAGNOSIS — I25.5 ISCHEMIC CARDIOMYOPATHY: ICD-10-CM

## 2023-10-10 DIAGNOSIS — I21.09 ST ELEVATION MYOCARDIAL INFARCTION (STEMI) INVOLVING OTHER CORONARY ARTERY OF ANTERIOR WALL (H): ICD-10-CM

## 2023-10-10 DIAGNOSIS — I21.02 ST ELEVATION MYOCARDIAL INFARCTION INVOLVING LEFT ANTERIOR DESCENDING (LAD) CORONARY ARTERY (H): ICD-10-CM

## 2023-10-10 DIAGNOSIS — I25.10 CORONARY ARTERY DISEASE INVOLVING NATIVE CORONARY ARTERY OF NATIVE HEART WITHOUT ANGINA PECTORIS: ICD-10-CM

## 2023-10-10 LAB
ALBUMIN SERPL BCG-MCNC: 4.8 G/DL (ref 3.5–5.2)
ALP SERPL-CCNC: 60 U/L (ref 40–129)
ALT SERPL W P-5'-P-CCNC: 25 U/L (ref 0–70)
ANION GAP SERPL CALCULATED.3IONS-SCNC: 13 MMOL/L (ref 7–15)
AST SERPL W P-5'-P-CCNC: 17 U/L (ref 0–45)
BILIRUB SERPL-MCNC: 1 MG/DL
BUN SERPL-MCNC: 18.1 MG/DL (ref 8–23)
CALCIUM SERPL-MCNC: 9.9 MG/DL (ref 8.8–10.2)
CHLORIDE SERPL-SCNC: 101 MMOL/L (ref 98–107)
CREAT SERPL-MCNC: 0.94 MG/DL (ref 0.67–1.17)
CREAT UR-MCNC: 55.4 MG/DL
DEPRECATED HCO3 PLAS-SCNC: 26 MMOL/L (ref 22–29)
EGFRCR SERPLBLD CKD-EPI 2021: 88 ML/MIN/1.73M2
ERYTHROCYTE [DISTWIDTH] IN BLOOD BY AUTOMATED COUNT: 13.8 % (ref 10–15)
GLUCOSE SERPL-MCNC: 187 MG/DL (ref 70–99)
HBA1C MFR BLD: 8.7 %
HCT VFR BLD AUTO: 49.7 % (ref 40–53)
HGB BLD-MCNC: 17 G/DL (ref 13.3–17.7)
MCH RBC QN AUTO: 31.5 PG (ref 26.5–33)
MCHC RBC AUTO-ENTMCNC: 34.2 G/DL (ref 31.5–36.5)
MCV RBC AUTO: 92 FL (ref 78–100)
MICROALBUMIN UR-MCNC: <12 MG/L
MICROALBUMIN/CREAT UR: NORMAL MG/G{CREAT}
NT-PROBNP SERPL-MCNC: 336 PG/ML (ref 0–900)
PLATELET # BLD AUTO: 202 10E3/UL (ref 150–450)
POTASSIUM SERPL-SCNC: 4.7 MMOL/L (ref 3.4–5.3)
PROT SERPL-MCNC: 8 G/DL (ref 6.4–8.3)
RBC # BLD AUTO: 5.4 10E6/UL (ref 4.4–5.9)
SODIUM SERPL-SCNC: 140 MMOL/L (ref 135–145)
WBC # BLD AUTO: 7.8 10E3/UL (ref 4–11)

## 2023-10-10 PROCEDURE — 82570 ASSAY OF URINE CREATININE: CPT | Performed by: STUDENT IN AN ORGANIZED HEALTH CARE EDUCATION/TRAINING PROGRAM

## 2023-10-10 PROCEDURE — 99000 SPECIMEN HANDLING OFFICE-LAB: CPT | Performed by: PATHOLOGY

## 2023-10-10 PROCEDURE — 85027 COMPLETE CBC AUTOMATED: CPT | Performed by: PATHOLOGY

## 2023-10-10 PROCEDURE — 99213 OFFICE O/P EST LOW 20 MIN: CPT | Performed by: INTERNAL MEDICINE

## 2023-10-10 PROCEDURE — 80053 COMPREHEN METABOLIC PANEL: CPT | Performed by: PATHOLOGY

## 2023-10-10 PROCEDURE — 36415 COLL VENOUS BLD VENIPUNCTURE: CPT | Performed by: PATHOLOGY

## 2023-10-10 PROCEDURE — 99215 OFFICE O/P EST HI 40 MIN: CPT | Performed by: INTERNAL MEDICINE

## 2023-10-10 PROCEDURE — 83880 ASSAY OF NATRIURETIC PEPTIDE: CPT | Performed by: PATHOLOGY

## 2023-10-10 PROCEDURE — 83036 HEMOGLOBIN GLYCOSYLATED A1C: CPT | Performed by: STUDENT IN AN ORGANIZED HEALTH CARE EDUCATION/TRAINING PROGRAM

## 2023-10-10 RX ORDER — ASPIRIN 81 MG/1
81 TABLET, CHEWABLE ORAL DAILY
Qty: 90 TABLET | Refills: 4 | Status: SHIPPED | OUTPATIENT
Start: 2023-10-10

## 2023-10-10 RX ORDER — CARVEDILOL 25 MG/1
25 TABLET ORAL 2 TIMES DAILY WITH MEALS
Qty: 180 TABLET | Refills: 4 | Status: SHIPPED | OUTPATIENT
Start: 2023-10-10

## 2023-10-10 RX ORDER — ATORVASTATIN CALCIUM 80 MG/1
80 TABLET, FILM COATED ORAL DAILY
Qty: 90 TABLET | Refills: 4 | Status: SHIPPED | OUTPATIENT
Start: 2023-10-10

## 2023-10-10 RX ORDER — FUROSEMIDE 40 MG
40 TABLET ORAL 2 TIMES DAILY
Qty: 180 TABLET | Refills: 4 | Status: SHIPPED | OUTPATIENT
Start: 2023-10-10

## 2023-10-10 RX ORDER — SACUBITRIL AND VALSARTAN 97; 103 MG/1; MG/1
1 TABLET, FILM COATED ORAL 2 TIMES DAILY
Qty: 180 TABLET | Refills: 4 | Status: SHIPPED | OUTPATIENT
Start: 2023-10-10 | End: 2024-05-21

## 2023-10-10 RX ORDER — SPIRONOLACTONE 25 MG/1
25 TABLET ORAL DAILY
Qty: 90 TABLET | Refills: 4 | Status: SHIPPED | OUTPATIENT
Start: 2023-10-10

## 2023-10-10 ASSESSMENT — PAIN SCALES - GENERAL: PAINLEVEL: NO PAIN (0)

## 2023-10-10 NOTE — NURSING NOTE
Chief Complaint   Patient presents with    Follow Up     Return heart failure        Vitals were taken, medications reconciled.    Martha Crump CMA  7:58 AM

## 2023-10-10 NOTE — LETTER
10/10/2023      RE: Anson Manriquez  [address]  [facility] [address]       Dear Colleague,    Thank you for the opportunity to participate in the care of your patient, Anson Manriquez, at the [facility] HEART [facility] at [facility]. Please see a copy of my visit note below.    October 10, 2023     Mr. Anson Manriquez is a 66yr old male with a history of HTN, DMII, CAD (s/p STEMI in 2007 with pLAD stenting, and recent STEMI 9/2018), and ICM complicated by cardiogenic shock requiring IABP support with slow wean.      Mr. Manriquez was was out with friends and developed acute chest pain and after going home his pain worsened and had syncope, then cardiac arrest. EMS was called, and on arrival provided ~30 seconds of CPR and AED delivered 1 shock.  He was then brought to [facility] in sinus rhythm with a pulse for most of the transport.  During the last 5 mins en route to the ED, he went into a wide-complex tachycardia at a rate of 220bpm, likely VT.  He maintained a pulse, acceptable BP, and mental status.  He received amiodarone 150mg in the ED, and converted to NSR as he was being prepared for cardioversion.  He was then sent to the cath lab, where he was found to have an acute thrombotic lesion of the pLAD within the prior stent.  He was treated with aspiration thrombectomy, and STEVO x2 to pLAD and mLAD, and POBA to D1.  His LVEDP was 40mmHg. Peak troponin was ~17.  Echo showed LVEF 23% with LAD territory akinesis, consistent with echo results from 2012.  Therefore, his LAD had limited viability from his prior MI, which explains his relatively low troponin and unchanged LVEF. He was ultimately started on DAPT with aspirin and Brilinta, lisinopril, carvedilol, and lasix, and discharged home on 9/20/18.   He has been seen in clinic a few times now, with EF still 30-35%.  Despite evidence based therapy and his reduced EF, he went for CRT-D with Dr. Mckeon 4/25/19.     Mr. Manriquez has been doing very well denies any new complaints.  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  No chest pain dizziness lightheadedness palpitations.  Takes all medications as prescribed.  Feels well that he in fact knows that this is the best how he has been feeling for the past couple of years.  No other complaints      PAST MEDICAL HISTORY:  Past Medical History:   Diagnosis Date     Anal fistula      Antiplatelet or antithrombotic long-term use      Coronary artery disease      Diabetes mellitus, type 2 (H)      Heart attack (H) 4/17/2007     Hyperlipidemia      Hypertension      Inguinal hernia      Ischemic cardiomyopathy      STEMI (ST elevation myocardial infarction) (H) 09/13/2018    s/p STEVO x2     Stented coronary artery 2007     FAMILY HISTORY:  Family History   Problem Relation Age of Onset     Hypertension Mother      Diabetes Father      Glaucoma No family hx of      Macular Degeneration No family hx of      SOCIAL HISTORY:  Social History     Socioeconomic History     Marital status:    Tobacco Use     Smoking status: Never     Smokeless tobacco: Never   Substance and Sexual Activity     Alcohol use: No     Drug use: No     CURRENT MEDICATIONS:  Current Outpatient Medications   Medication     aspirin (ASA) 81 MG chewable tablet     atorvastatin (LIPITOR) 80 MG tablet     blood glucose (NO BRAND SPECIFIED) lancets standard     blood glucose (NO BRAND SPECIFIED) lancets standard     blood glucose (NO BRAND SPECIFIED) test strip     blood glucose (NO BRAND SPECIFIED) test strip     blood glucose monitoring (NO BRAND SPECIFIED) meter device kit     blood glucose monitoring (NO BRAND SPECIFIED) meter device kit     carvedilol (COREG) 25 MG tablet     empagliflozin (JARDIANCE) 25 MG TABS tablet     furosemide (LASIX) 40 MG tablet     glipiZIDE (GLUCOTROL XL) 5 MG 24 hr tablet      "MULTIPLE VITAMIN PO     sacubitril-valsartan (ENTRESTO)  MG per tablet     Semaglutide, 1 MG/DOSE, (OZEMPIC) 4 MG/3ML pen     Semaglutide, 1 MG/DOSE, 4 MG/3ML SOPN     spironolactone (ALDACTONE) 25 MG tablet     No current facility-administered medications for this visit.     Facility-Administered Medications Ordered in Other Visits   Medication     perflutren diluted 1mL to 2mL with saline (OPTISON) diluted injection 5 mL     sodium chloride (PF) 0.9% PF flush 10 mL     sodium chloride (PF) 0.9% PF flush 10 mL     ROS:   Constitutional: No fever, chills, or sweats. Weight is 0 lbs 0 oz  ENT: No visual disturbance, ear ache, epistaxis, sore throat.   Allergies/Immunologic: Negative.   Respiratory: No cough, hemoptysis.   Cardiovascular: As per HPI.   GI: No nausea, vomiting, hematemesis, melena, or hematochezia.   : No urinary frequency, dysuria, or hematuria.   Integument: Negative.   Psychiatric: Pleasant, no major depression noted  Neuro: No focal neurological deficits noted  Endocrinology: Negative.   Musculoskeletal: As per HPI.      EXAM:  BP (!) 138/90 (BP Location: Right arm, Patient Position: Sitting, Cuff Size: Adult Regular)   Pulse 92   Ht 1.704 m (5' 7.09\")   Wt 83.6 kg (184 lb 3.2 oz)   SpO2 94%   BMI 28.78 kg/m    General: appears comfortable, alert and oriented  Head: normocephalic, atraumatic  Eyes: anicteric sclera, EOMI , PERRL  Neck: no adenopathy  Orophyarynx: moist mucosa, no lesions noted  Heart: regular, S1/S2, no murmurs, rubs or gallop. Estimated JVP at 5 cmH2O  Lungs: CTAB, No wheezing.   Abdomen: soft, non-tender, bowel sounds present, no hepatosplenomegaly  Extremities: No LE edema today  Skin: no open lesions noted  Neuro: grossly non-focal     Labs:  Lab Results   Component Value Date    WBC 6.8 08/29/2022    HGB 16.3 08/29/2022    HCT 47.3 08/29/2022     08/29/2022     08/29/2022    POTASSIUM 4.3 08/29/2022    CHLORIDE 106 08/29/2022    CO2 26 08/29/2022    " BUN 21 08/29/2022    CR 0.80 08/29/2022     (H) 08/29/2022    NTBNP 194 (H) 04/26/2022    TROPONIN 0.14 (HH) 09/13/2018    TROPI 1.914 (HH) 09/18/2018    AST 17 08/29/2022    ALT 30 08/29/2022    ALKPHOS 58 08/29/2022    BILITOTAL 0.9 08/29/2022    INR 1.10 04/05/2021     Coronary angio:  9/13/18  -Both coronary arteries arise from their respective cusps.  -Dominance: Right  -LM has no evidence of coronary artery disease.  -LAD: Type 3 [LAD supplies the entire apex]. The LAD gives rise to septal perforators, multiple diagonal branches. The proximal LAD has an acute thrombotic lesion within the prior stent. Distal to the stent, there is a 70-80% stenosis. The distal vessel has HALIMA 2 flow. The D1 has thrombus at the ostium likely from embolization. The apical LAD also has thrombus from embolization.  -LCX gives rise to a large branching OM. The prox Cx has a 20% stenosis. The rest of the Cx system has mild disease.  -RCA (dominant) gives rise to PL branches and supplies PDA. There is minimal disease in the RCA system.     Echo:  9/14/18  Ischemic cardiomyopathy, LVEF=23% with extensive regional wall motion abnormalities as described below. Resting wall motion abnormalities and LV function are not significantly changed from the rest images on the 11/27/12 stress echocardiogram.  Mildly dilated LV with severely reduced LV function, LVEF=23%. There is akinesis involving the xugoi-xv-thk anteroseptal, ybe-zr-vfafee anterior, mid anterolateral, and all apical myocardial segments. The basal anterior, basal anterolateral, and basal inferolateral segments are relatively spared. RV size and function are normal. No significant valvular abnormalities. No pericardial effusion. Normal IVC with abnormal respiratory variation, estimated RA pressure 8 mmHg.     Echo 1/30/19   Ischemic cardiomyopathy with moderately dilated LV with moderately reduced  global LV function, LVEF=31%. Extensive regional wall motion abnormalities  as  described below, unchanged from 9/14/18.  This study was compared with the study from 9/14/18: There has been no  significant change. Specifically, LV function and wall motion abnormalities  have not changed significantly.  The right atria appears normal. Severe left atrial enlargement is present. This study was compared with the study from 9/14/18 . There has been no significant change.     TTE 1/22/2020:  Ischemic cardiomyopathy with moderately dilated LV with moderately reduced  global LV function, LVEF=29%. Extensive regional wall motion abnormalities as  described below, unchanged from the prior study.  This study was compared with the study from 8/28/19: There has been no  significant change. Specifically, LV function and wall motion abnormalities  have not changed significantly.      ASSESSMENT AND PLAN:  Mr. Anson Manriquez is a 66yr old male with a history of HTN, DMII, CAD (s/p STEMI in 2007 with pLAD stenting, and recent STEMI 9/2018), and ICM who presents for follow up visit.  Overall he has been doing very well without any new complaints feeling well no chest pain remains active no shortness of breath.  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  No medication problems.  Again he says that he sees the best he has felt for a long time.  Please note that the blood pressure is a little bit on the higher end today however this is something unusual for him usually it is running way lower than this.  As such we will not make any medication adjustments today.  However we will repeat an echocardiogram in 6 months when he comes back and sees me in clinic as he had not had any recheck for a while.  We are also giving him refills as he had difficulties obtaining medications at times.    #ICM  #HFrEF with an EF of 30-35%  Patient with NYHA Class II symptoms and overall doing well on OGDT. 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 From a symptoms perspective, he is at a good baseline. His BP is 130's today, as a result we have BP room to increase entresto further. He is left ventricular pacing at 98% based on his last device check on 9/15/2021.   - Coreg 25mg BID  - Aldactone 25mg qday  - Entresto 97 mg twice daily  - On Empagiflozin 25mg qday.   He has been tolerating this higher dose well without any complaints.  There is no evidence of infection or infections in the past.  If he does have these then we will consider consider either stopping or reducing the dose to 10 mg daily.  - SCD: s/p CRT-D on 9/2019     CAD s/p pLAD stent  -ASA 81mg qday continue  - Stop Brilinta Brillanta in the past  - Lipitor 40mg qday continue     We will see him back in 6 months with echo and labs.  I appreciate the opportunity to participate in the care of Anson Manriquez . Please do not hesitate to contact me with any further questions.     Sincerely,   Jason Matos MD  [facility] Division of Cardiology       Please do not hesitate to contact me if you have any questions/concerns.     Sincerely,     Jason Matos MD

## 2023-10-10 NOTE — PATIENT INSTRUCTIONS
Dr. Matos recommends:    Follow up clinic visit with Dr. Matos in 6 months with an echocardiogram and labs the same day.    Thank you for your visit today.  Please call me with any questions or concerns.   Vladimir Servin RN  Cardiology Care Coordinator  [phone]

## 2023-11-06 LAB
MDC_IDC_EPISODE_DTM: NORMAL
MDC_IDC_EPISODE_ID: NORMAL
MDC_IDC_EPISODE_TYPE: NORMAL
MDC_IDC_LEAD_CONNECTION_STATUS: NORMAL
MDC_IDC_LEAD_IMPLANT_DT: NORMAL
MDC_IDC_LEAD_LOCATION: NORMAL
MDC_IDC_LEAD_LOCATION_DETAIL_1: NORMAL
MDC_IDC_LEAD_MFG: NORMAL
MDC_IDC_LEAD_MODEL: NORMAL
MDC_IDC_LEAD_POLARITY_TYPE: NORMAL
MDC_IDC_LEAD_SERIAL: NORMAL
MDC_IDC_LEAD_SPECIAL_FUNCTION: NORMAL
MDC_IDC_MSMT_BATTERY_DTM: NORMAL
MDC_IDC_MSMT_BATTERY_REMAINING_LONGEVITY: 78 MO
MDC_IDC_MSMT_BATTERY_REMAINING_PERCENTAGE: 92 %
MDC_IDC_MSMT_BATTERY_STATUS: NORMAL
MDC_IDC_MSMT_CAP_CHARGE_DTM: NORMAL
MDC_IDC_MSMT_CAP_CHARGE_TIME: 10.9 S
MDC_IDC_MSMT_CAP_CHARGE_TYPE: NORMAL
MDC_IDC_MSMT_LEADCHNL_LV_IMPEDANCE_VALUE: 538 OHM
MDC_IDC_MSMT_LEADCHNL_LV_PACING_THRESHOLD_AMPLITUDE: 1.8 V
MDC_IDC_MSMT_LEADCHNL_LV_PACING_THRESHOLD_PULSEWIDTH: 0.5 MS
MDC_IDC_MSMT_LEADCHNL_RA_IMPEDANCE_VALUE: 739 OHM
MDC_IDC_MSMT_LEADCHNL_RV_IMPEDANCE_VALUE: 663 OHM
MDC_IDC_MSMT_LEADCHNL_RV_PACING_THRESHOLD_AMPLITUDE: 0.9 V
MDC_IDC_MSMT_LEADCHNL_RV_PACING_THRESHOLD_PULSEWIDTH: 0.4 MS
MDC_IDC_PG_IMPLANT_DTM: NORMAL
MDC_IDC_PG_MFG: NORMAL
MDC_IDC_PG_MODEL: NORMAL
MDC_IDC_PG_SERIAL: NORMAL
MDC_IDC_PG_TYPE: NORMAL
MDC_IDC_SESS_CLINIC_NAME: NORMAL
MDC_IDC_SESS_DTM: NORMAL
MDC_IDC_SESS_TYPE: NORMAL
MDC_IDC_SET_BRADY_AT_MODE_SWITCH_MODE: NORMAL
MDC_IDC_SET_BRADY_AT_MODE_SWITCH_RATE: 170 {BEATS}/MIN
MDC_IDC_SET_BRADY_LOWRATE: 60 {BEATS}/MIN
MDC_IDC_SET_BRADY_MAX_SENSOR_RATE: 130 {BEATS}/MIN
MDC_IDC_SET_BRADY_MAX_TRACKING_RATE: 130 {BEATS}/MIN
MDC_IDC_SET_BRADY_MODE: NORMAL
MDC_IDC_SET_BRADY_PAV_DELAY_LOW: 180 MS
MDC_IDC_SET_BRADY_SAV_DELAY_LOW: 140 MS
MDC_IDC_SET_CRT_PACED_CHAMBERS: NORMAL
MDC_IDC_SET_LEADCHNL_LV_PACING_AMPLITUDE: 2.8 V
MDC_IDC_SET_LEADCHNL_LV_PACING_ANODE_ELECTRODE_1: NORMAL
MDC_IDC_SET_LEADCHNL_LV_PACING_ANODE_LOCATION_1: NORMAL
MDC_IDC_SET_LEADCHNL_LV_PACING_CAPTURE_MODE: NORMAL
MDC_IDC_SET_LEADCHNL_LV_PACING_CATHODE_ELECTRODE_1: NORMAL
MDC_IDC_SET_LEADCHNL_LV_PACING_CATHODE_LOCATION_1: NORMAL
MDC_IDC_SET_LEADCHNL_LV_PACING_PULSEWIDTH: 0.5 MS
MDC_IDC_SET_LEADCHNL_LV_SENSING_ADAPTATION_MODE: NORMAL
MDC_IDC_SET_LEADCHNL_LV_SENSING_ANODE_ELECTRODE_1: NORMAL
MDC_IDC_SET_LEADCHNL_LV_SENSING_ANODE_LOCATION_1: NORMAL
MDC_IDC_SET_LEADCHNL_LV_SENSING_CATHODE_ELECTRODE_1: NORMAL
MDC_IDC_SET_LEADCHNL_LV_SENSING_CATHODE_LOCATION_1: NORMAL
MDC_IDC_SET_LEADCHNL_LV_SENSING_SENSITIVITY: 1 MV
MDC_IDC_SET_LEADCHNL_RA_PACING_AMPLITUDE: 2.5 V
MDC_IDC_SET_LEADCHNL_RA_PACING_CAPTURE_MODE: NORMAL
MDC_IDC_SET_LEADCHNL_RA_PACING_POLARITY: NORMAL
MDC_IDC_SET_LEADCHNL_RA_PACING_PULSEWIDTH: 0.5 MS
MDC_IDC_SET_LEADCHNL_RA_SENSING_ADAPTATION_MODE: NORMAL
MDC_IDC_SET_LEADCHNL_RA_SENSING_POLARITY: NORMAL
MDC_IDC_SET_LEADCHNL_RA_SENSING_SENSITIVITY: 0.25 MV
MDC_IDC_SET_LEADCHNL_RV_PACING_AMPLITUDE: 2 V
MDC_IDC_SET_LEADCHNL_RV_PACING_CAPTURE_MODE: NORMAL
MDC_IDC_SET_LEADCHNL_RV_PACING_POLARITY: NORMAL
MDC_IDC_SET_LEADCHNL_RV_PACING_PULSEWIDTH: 0.4 MS
MDC_IDC_SET_LEADCHNL_RV_SENSING_ADAPTATION_MODE: NORMAL
MDC_IDC_SET_LEADCHNL_RV_SENSING_POLARITY: NORMAL
MDC_IDC_SET_LEADCHNL_RV_SENSING_SENSITIVITY: 0.6 MV
MDC_IDC_SET_ZONE_DETECTION_INTERVAL: 250 MS
MDC_IDC_SET_ZONE_DETECTION_INTERVAL: 300 MS
MDC_IDC_SET_ZONE_DETECTION_INTERVAL: 353 MS
MDC_IDC_SET_ZONE_STATUS: NORMAL
MDC_IDC_SET_ZONE_TYPE: NORMAL
MDC_IDC_SET_ZONE_VENDOR_TYPE: NORMAL
MDC_IDC_STAT_AT_BURDEN_PERCENT: 0 %
MDC_IDC_STAT_AT_DTM_END: NORMAL
MDC_IDC_STAT_AT_DTM_START: NORMAL
MDC_IDC_STAT_BRADY_DTM_END: NORMAL
MDC_IDC_STAT_BRADY_DTM_START: NORMAL
MDC_IDC_STAT_BRADY_RA_PERCENT_PACED: 0 %
MDC_IDC_STAT_BRADY_RV_PERCENT_PACED: 21 %
MDC_IDC_STAT_CRT_DTM_END: NORMAL
MDC_IDC_STAT_CRT_DTM_START: NORMAL
MDC_IDC_STAT_CRT_LV_PERCENT_PACED: 99 %
MDC_IDC_STAT_EPISODE_RECENT_COUNT: 0
MDC_IDC_STAT_EPISODE_RECENT_COUNT_DTM_END: NORMAL
MDC_IDC_STAT_EPISODE_RECENT_COUNT_DTM_START: NORMAL
MDC_IDC_STAT_EPISODE_TYPE: NORMAL
MDC_IDC_STAT_EPISODE_VENDOR_TYPE: NORMAL
MDC_IDC_STAT_TACHYTHERAPY_ATP_DELIVERED_RECENT: 0
MDC_IDC_STAT_TACHYTHERAPY_ATP_DELIVERED_TOTAL: 0
MDC_IDC_STAT_TACHYTHERAPY_RECENT_DTM_END: NORMAL
MDC_IDC_STAT_TACHYTHERAPY_RECENT_DTM_START: NORMAL
MDC_IDC_STAT_TACHYTHERAPY_SHOCKS_ABORTED_RECENT: 0
MDC_IDC_STAT_TACHYTHERAPY_SHOCKS_ABORTED_TOTAL: 0
MDC_IDC_STAT_TACHYTHERAPY_SHOCKS_DELIVERED_RECENT: 0
MDC_IDC_STAT_TACHYTHERAPY_SHOCKS_DELIVERED_TOTAL: 0
MDC_IDC_STAT_TACHYTHERAPY_TOTAL_DTM_END: NORMAL
MDC_IDC_STAT_TACHYTHERAPY_TOTAL_DTM_START: NORMAL

## 2023-11-14 ENCOUNTER — VIRTUAL VISIT (OUTPATIENT)
Dept: ENDOCRINOLOGY | Facility: CLINIC | Age: 68
End: 2023-11-14
Payer: COMMERCIAL

## 2023-11-14 DIAGNOSIS — E11.65 TYPE 2 DIABETES MELLITUS WITH HYPERGLYCEMIA, WITHOUT LONG-TERM CURRENT USE OF INSULIN (H): Primary | ICD-10-CM

## 2023-11-14 DIAGNOSIS — E11.9 TYPE 2 DIABETES MELLITUS WITHOUT COMPLICATION, WITHOUT LONG-TERM CURRENT USE OF INSULIN (H): ICD-10-CM

## 2023-11-14 PROCEDURE — 99213 OFFICE O/P EST LOW 20 MIN: CPT | Mod: VID | Performed by: PHYSICIAN ASSISTANT

## 2023-11-14 RX ORDER — GLIPIZIDE 5 MG/1
5 TABLET, FILM COATED, EXTENDED RELEASE ORAL 2 TIMES DAILY
Qty: 180 TABLET | Refills: 0 | Status: SHIPPED | OUTPATIENT
Start: 2023-11-14 | End: 2024-05-10

## 2023-11-14 ASSESSMENT — PAIN SCALES - GENERAL: PAINLEVEL: NO PAIN (0)

## 2023-11-14 NOTE — PROGRESS NOTES
"  HPI:   Mr. Manriquez is a 69 yo man here for follow up of type 2 diabetes, diagnosed about 4-5 years ago. He usually sees Dr. Muller, last visit 8/2023. Today is the first time I am meeting him. He also has a history of CAD, MI 15 years ago and 3 years ago, HTN, dyslipidemia and the problems listed below.  He feels like he is doing great and does not want to make any changes.  He said he recently saw his heart doctor and he said all of his labs were \"tremendous.\"   A1c is down.  Ozempic is helping.  He does not drink pop now.  He is drinking coke zero.  20 glasses of water a day.      Current treatment strategy:   jardiance 25 mg daily  Glipizide 5 mg bid  Ozempic 1 mg weekly    Previous treatment:   Metformin- diarrhea.     Blood Glucose Monitoring:   He does not check his bg.  No low bg symptoms    Diet: 3 meals, not much snacks     Exercise: he is active all day at work. No routine exercise.    Family hx: DM2 in his father, brother and sister    Diabetes Control:   Lab Results   Component Value Date    A1C 8.7 10/10/2023    A1C 9.3 04/26/2022    A1C 9.0 10/26/2021    A1C 8.0 10/29/2020    A1C 8.0 02/11/2020    A1C 7.3 11/06/2019    A1C 7.5 06/18/2019    A1C 7.6 05/09/2019       Past Medical History:   Diagnosis Date    Anal fistula     Antiplatelet or antithrombotic long-term use     Coronary artery disease     Diabetes mellitus, type 2 (H)     Heart attack (H) 4/17/2007    Hyperlipidemia     Hypertension     Inguinal hernia     Ischemic cardiomyopathy     STEMI (ST elevation myocardial infarction) (H) 09/13/2018    s/p STEVO x2    Stented coronary artery 2007       Past Surgical History:   Procedure Laterality Date    EP ICD N/A 4/25/2019    Procedure: EP ICD [[id number]];  Surgeon: Mick Mckeon MD;  Location:  HEART CARDIAC CATH LAB    HERNIA REPAIR Right     Inguinal    HERNIORRHAPHY INGUINAL Left 2/20/2020    Procedure: Left HERNIORRHAPHY, INGUINAL, OPEN;  Surgeon: Flakito Massey MD;  Location:  OR "    HERNIORRHAPHY UMBILICAL N/A 10/16/2020    Procedure: HERNIORRHAPHY, UMBILICAL, OPEN, with mesh;  Surgeon: Flakito Massey MD;  Location: [facility]    IRRIGATION AND DEBRIDEMENT RECTUM, COMBINED      abcess near rectum     LAPAROSCOPIC CHOLECYSTECTOMY  3/29/2012    Procedure:LAPAROSCOPIC CHOLECYSTECTOMY; LAPAROSCOPIC CHOLECYSTECTOMY; Surgeon:MARILYNN PRESSLEY; Location:[facility] OR    REPAIR ECTROPION Bilateral 9/2/2022    Procedure: Both lower eyelid ectropion repair;  Surgeon: Cheyanne Francisco MD;  Location: [facility] OR    STENT, CORONARY, OLIVIA         Family History   Problem Relation Age of Onset    Hypertension Mother     Diabetes Father     Glaucoma No family hx of     Macular Degeneration No family hx of        Social History   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 He is  and has 3 children.   Socioeconomic History    Marital status:    Tobacco Use    Smoking status: Never    Smokeless tobacco: Never   Substance and Sexual Activity    Alcohol use: No    Drug use: No       Current Outpatient Medications   Medication    aspirin (ASA) 81 MG chewable tablet    atorvastatin (LIPITOR) 80 MG tablet    blood glucose (NO BRAND SPECIFIED) lancets standard    blood glucose (NO BRAND SPECIFIED) lancets standard    blood glucose (NO BRAND SPECIFIED) test strip    blood glucose (NO BRAND SPECIFIED) test strip    blood glucose monitoring (NO BRAND SPECIFIED) meter device kit    blood glucose monitoring (NO BRAND SPECIFIED) meter device kit    carvedilol (COREG) 25 MG tablet    empagliflozin (JARDIANCE) 25 MG TABS tablet    furosemide (LASIX) 40 MG tablet    glipiZIDE (GLUCOTROL XL) 5 MG 24 hr tablet    MULTIPLE VITAMIN PO    sacubitril-valsartan (ENTRESTO)  MG per tablet    Semaglutide, 1 MG/DOSE, (OZEMPIC) 4 MG/3ML pen    Semaglutide, 1 MG/DOSE, 4 MG/3ML SOPN    spironolactone (ALDACTONE) 25 MG tablet     No current facility-administered medications for this visit.     Facility-Administered Medications  Ordered in Other Visits   Medication    perflutren diluted 1mL to 2mL with saline (OPTISON) diluted injection 5 mL    sodium chloride (PF) 0.9% PF flush 10 mL    sodium chloride (PF) 0.9% PF flush 10 mL        No Known Allergies    Physical Exam  There were no vitals taken for this visit.  GENERAL: healthy, alert and no distress  RESP: no audible wheeze, cough, or visible cyanosis.  No visible retractions or increased work of breathing.  Able to speak fully in complete sentences.  PSYCH: mentation appears normal, affect normal/bright, judgement and insight intact, normal speech and appearance well-groomed      RESULTS  Lab Results   Component Value Date    A1C 8.7 (H) 10/10/2023    A1C 9.3 (H) 04/26/2022    A1C 9.0 (H) 10/26/2021    A1C 8.0 (H) 10/29/2020    A1C 8.0 (H) 02/11/2020    A1C 7.3 (H) 11/06/2019    A1C 7.5 (H) 06/18/2019    A1C 7.6 (H) 05/09/2019    HEMOGLOBINA1 9.6 09/07/2022       TSH   Date Value Ref Range Status   03/07/2017 2.44 0.40 - 4.00 mU/L Final   07/27/2016 2.273 0.358 - 3.740 mcU/mL Final   07/27/2016 2.273 mcU/mL Final       ALT   Date Value Ref Range Status   10/10/2023 25 0 - 70 U/L Final     Comment:     Reference intervals for this test were updated on 6/12/2023 to more accurately reflect our healthy population. There may be differences in the flagging of prior results with similar values performed with this method. Interpretation of those prior results can be made in the context of the updated reference intervals.     08/29/2022 30 0 - 70 U/L Final   04/05/2021 27 0 - 70 U/L Final   01/28/2021 32 0 - 70 U/L Final   ]    Recent Labs   Lab Test 04/26/22  0840 11/06/19  0831   CHOL 228* 198   HDL 40 41   * 93   TRIG 437* 319*       Lab Results   Component Value Date     10/10/2023     04/05/2021      Lab Results   Component Value Date    POTASSIUM 4.7 10/10/2023    POTASSIUM 4.3 08/29/2022    POTASSIUM 3.5 04/05/2021     Lab Results   Component Value Date    CHLORIDE 101  10/10/2023    CHLORIDE 106 08/29/2022    CHLORIDE 103 04/05/2021     Lab Results   Component Value Date    RENO 9.9 10/10/2023    RENO 9.5 04/05/2021     Lab Results   Component Value Date    CO2 26 10/10/2023    CO2 26 08/29/2022    CO2 25 04/05/2021     Lab Results   Component Value Date    BUN 18.1 10/10/2023    BUN 21 08/29/2022    BUN 21 04/05/2021     Lab Results   Component Value Date    CR 0.94 10/10/2023    CR 0.94 04/05/2021       GFR Estimate   Date Value Ref Range Status   10/10/2023 88 >60 mL/min/1.73m2 Final   08/29/2022 >90 >60 mL/min/1.73m2 Final     Comment:     Effective December 21, 2021 eGFRcr in adults is calculated using the 2021 CKD-EPI creatinine equation which includes age and gender (Cori et al., [facility], DOI: 10.1056/AMQJnw6121267)   04/26/2022 85 >60 mL/min/1.73m2 Final     Comment:     Effective December 21, 2021 eGFRcr in adults is calculated using the 2021 CKD-EPI creatinine equation which includes age and gender (Cori et al., [place], DOI: 10.1056/YOXRtt3595513)   04/05/2021 84 >60 mL/min/[1.73_m2] Final     Comment:     Non  GFR Calc  Starting 12/18/2018, serum creatinine based estimated GFR (eGFR) will be   calculated using the Chronic Kidney Disease Epidemiology Collaboration   (CKD-EPI) equation.     01/28/2021 >90 >60 mL/min/[1.73_m2] Final     Comment:     Non  GFR Calc  Starting 12/18/2018, serum creatinine based estimated GFR (eGFR) will be   calculated using the Chronic Kidney Disease Epidemiology Collaboration   (CKD-EPI) equation.     10/08/2020 86 >60 mL/min/[1.73_m2] Final     Comment:     Non  GFR Calc  Starting 12/18/2018, serum creatinine based estimated GFR (eGFR) will be   calculated using the Chronic Kidney Disease Epidemiology Collaboration   (CKD-EPI) equation.       GFR Estimate If Black   Date Value Ref Range Status   04/05/2021 >90 >60 mL/min/[1.73_m2] Final     Comment:      GFR Calc  Starting  "12/18/2018, serum creatinine based estimated GFR (eGFR) will be   calculated using the Chronic Kidney Disease Epidemiology Collaboration   (CKD-EPI) equation.     01/28/2021 >90 >60 mL/min/[1.73_m2] Final     Comment:      GFR Calc  Starting 12/18/2018, serum creatinine based estimated GFR (eGFR) will be   calculated using the Chronic Kidney Disease Epidemiology Collaboration   (CKD-EPI) equation.     10/08/2020 >90 >60 mL/min/[1.73_m2] Final     Comment:      GFR Calc  Starting 12/18/2018, serum creatinine based estimated GFR (eGFR) will be   calculated using the Chronic Kidney Disease Epidemiology Collaboration   (CKD-EPI) equation.         Lab Results   Component Value Date    MICROL <12.0 10/10/2023    MICROL <5 10/26/2021    MICROL <5 01/22/2020     No results found for: \"MICROALBUMIN\"  No results found for: \"CPEPT\", \"GADAB\", \"ISCAB\"    No results found for: \"B12\"]    Most recent eye exam date: : Not Found     Assessment/Plan:     1.  Type 2 diabetes- A1c is improving- down to 8.7%, but still too high (goal <8%). Will increase ozempic to 2 mg weekly.  Recheck A1c in 3 mos.  He really does not want to add any other medications/insulin, but may need to in the future.  No other changes today.     2.  Risk factors-     Retinopathy:  No.  Due for eye exam. Referral placed.   Nephropathy:  BP historically well controlled. No microalbuminuria.  Creatinine stable.   Neuropathy: No.    Feet: OK, no ulcers.   Lipids:  LDL above target.  Patient taking statin.  Will recheck.  May consider switching to rosuvastatin if LDL above target.     3.  F/U in 3 mos with Dr. Muller or myself. Would suggest graduation to primary care once A1c is <8%.     26 minutes spent on the date of the encounter doing chart review, review of test results,  patient visit and documentation, counseling/coordination of care, and discussion of follow up plan for worsening hyper and hypoglycemia.  The patient " understood and is satisfied with today's visit.          Madeline Chavarria PA-C, MPAS   [facility]  Department of Medicine  Division of Endocrinology and Diabetes

## 2023-11-14 NOTE — LETTER
"11/14/2023       RE: Anson Manriquez  [address]  [place] [address]     Dear Colleague,    Thank you for referring your patient, Anson Manriquez, to the [facility] [place] at [facility]. Please see a copy of my visit note below.      HPI:   Mr. Manriquez is a 67 yo man here for follow up of type 2 diabetes, diagnosed about 4-5 years ago. He usually sees Dr. Muller, last visit 8/2023. Today is the first time I am meeting him. He also has a history of CAD, MI 15 years ago and 3 years ago, HTN, dyslipidemia and the problems listed below.  He feels like he is doing great and does not want to make any changes.  He said he recently saw his heart doctor and he said all of his labs were \"tremendous.\"   A1c is down.  Ozempic is helping.  He does not drink pop now.  He is drinking coke zero.  20 glasses of water a day.      Current treatment strategy:   jardiance 25 mg daily  Glipizide 5 mg bid  Ozempic 1 mg weekly    Previous treatment:   Metformin- diarrhea.     Blood Glucose Monitoring:   He does not check his bg.  No low bg symptoms    Diet: 3 meals, not much snacks     Exercise: he is active all day at work. No routine exercise.    Family hx: DM2 in his father, brother and sister    Diabetes Control:   Lab Results   Component Value Date    A1C 8.7 10/10/2023    A1C 9.3 04/26/2022    A1C 9.0 10/26/2021    A1C 8.0 10/29/2020    A1C 8.0 02/11/2020    A1C 7.3 11/06/2019    A1C 7.5 06/18/2019    A1C 7.6 05/09/2019       Past Medical History:   Diagnosis Date    Anal fistula     Antiplatelet or antithrombotic long-term use     Coronary artery disease     Diabetes mellitus, type 2 (H)     Heart attack (H) 4/17/2007    Hyperlipidemia     Hypertension     Inguinal hernia     Ischemic cardiomyopathy     STEMI (ST elevation myocardial infarction) (H) 09/13/2018    s/p STEVO x2    Stented coronary artery 2007       Past Surgical History:   Procedure " Laterality Date    EP ICD N/A 4/25/2019    Procedure: EP ICD [[id number]];  Surgeon: Mick Mckeon MD;  Location: [facility]    HERNIA REPAIR Right     Inguinal    HERNIORRHAPHY INGUINAL Left 2/20/2020    Procedure: Left HERNIORRHAPHY, INGUINAL, OPEN;  Surgeon: Flakito Massey MD;  Location: [facility] OR    HERNIORRHAPHY UMBILICAL N/A 10/16/2020    Procedure: HERNIORRHAPHY, UMBILICAL, OPEN, with mesh;  Surgeon: Flakito Massey MD;  Location: [facility] OR    IRRIGATION AND DEBRIDEMENT RECTUM, COMBINED      abcess near rectum     LAPAROSCOPIC CHOLECYSTECTOMY  3/29/2012    Procedure:LAPAROSCOPIC CHOLECYSTECTOMY; LAPAROSCOPIC CHOLECYSTECTOMY; Surgeon:MARILYNN PRESSLEY; Location:[facility] OR    REPAIR ECTROPION Bilateral 9/2/2022    Procedure: Both lower eyelid ectropion repair;  Surgeon: Cheyanne Francisco MD;  Location: [facility] OR    STENT, CORONARY, OLIVIA         Family History   Problem Relation Age of Onset    Hypertension Mother     Diabetes Father     Glaucoma No family hx of     Macular Degeneration No family hx of        Social History   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 He is  and has 3 children.   Socioeconomic History    Marital status:    Tobacco Use    Smoking status: Never    Smokeless tobacco: Never   Substance and Sexual Activity    Alcohol use: No    Drug use: No       Current Outpatient Medications   Medication    aspirin (ASA) 81 MG chewable tablet    atorvastatin (LIPITOR) 80 MG tablet    blood glucose (NO BRAND SPECIFIED) lancets standard    blood glucose (NO BRAND SPECIFIED) lancets standard    blood glucose (NO BRAND SPECIFIED) test strip    blood glucose (NO BRAND SPECIFIED) test strip    blood glucose monitoring (NO BRAND SPECIFIED) meter device kit    blood glucose monitoring (NO BRAND SPECIFIED) meter device kit    carvedilol (COREG) 25 MG tablet    empagliflozin (JARDIANCE) 25 MG TABS tablet    furosemide (LASIX) 40 MG tablet    glipiZIDE (GLUCOTROL XL) 5 MG 24 hr tablet     MULTIPLE VITAMIN PO    sacubitril-valsartan (ENTRESTO)  MG per tablet    Semaglutide, 1 MG/DOSE, (OZEMPIC) 4 MG/3ML pen    Semaglutide, 1 MG/DOSE, 4 MG/3ML SOPN    spironolactone (ALDACTONE) 25 MG tablet     No current facility-administered medications for this visit.     Facility-Administered Medications Ordered in Other Visits   Medication    perflutren diluted 1mL to 2mL with saline (OPTISON) diluted injection 5 mL    sodium chloride (PF) 0.9% PF flush 10 mL    sodium chloride (PF) 0.9% PF flush 10 mL        No Known Allergies    Physical Exam  There were no vitals taken for this visit.  GENERAL: healthy, alert and no distress  RESP: no audible wheeze, cough, or visible cyanosis.  No visible retractions or increased work of breathing.  Able to speak fully in complete sentences.  PSYCH: mentation appears normal, affect normal/bright, judgement and insight intact, normal speech and appearance well-groomed      RESULTS  Lab Results   Component Value Date    A1C 8.7 (H) 10/10/2023    A1C 9.3 (H) 04/26/2022    A1C 9.0 (H) 10/26/2021    A1C 8.0 (H) 10/29/2020    A1C 8.0 (H) 02/11/2020    A1C 7.3 (H) 11/06/2019    A1C 7.5 (H) 06/18/2019    A1C 7.6 (H) 05/09/2019    HEMOGLOBINA1 9.6 09/07/2022       TSH   Date Value Ref Range Status   03/07/2017 2.44 0.40 - 4.00 mU/L Final   07/27/2016 2.273 0.358 - 3.740 mcU/mL Final   07/27/2016 2.273 mcU/mL Final       ALT   Date Value Ref Range Status   10/10/2023 25 0 - 70 U/L Final     Comment:     Reference intervals for this test were updated on 6/12/2023 to more accurately reflect our healthy population. There may be differences in the flagging of prior results with similar values performed with this method. Interpretation of those prior results can be made in the context of the updated reference intervals.     08/29/2022 30 0 - 70 U/L Final   04/05/2021 27 0 - 70 U/L Final   01/28/2021 32 0 - 70 U/L Final   ]    Recent Labs   Lab Test 04/26/22  0840 11/06/19  0831   CHOL  228* 198   HDL 40 41   * 93   TRIG 437* 319*       Lab Results   Component Value Date     10/10/2023     04/05/2021      Lab Results   Component Value Date    POTASSIUM 4.7 10/10/2023    POTASSIUM 4.3 08/29/2022    POTASSIUM 3.5 04/05/2021     Lab Results   Component Value Date    CHLORIDE 101 10/10/2023    CHLORIDE 106 08/29/2022    CHLORIDE 103 04/05/2021     Lab Results   Component Value Date    RENO 9.9 10/10/2023    RENO 9.5 04/05/2021     Lab Results   Component Value Date    CO2 26 10/10/2023    CO2 26 08/29/2022    CO2 25 04/05/2021     Lab Results   Component Value Date    BUN 18.1 10/10/2023    BUN 21 08/29/2022    BUN 21 04/05/2021     Lab Results   Component Value Date    CR 0.94 10/10/2023    CR 0.94 04/05/2021       GFR Estimate   Date Value Ref Range Status   10/10/2023 88 >60 mL/min/1.73m2 Final   08/29/2022 >90 >60 mL/min/1.73m2 Final     Comment:     Effective December 21, 2021 eGFRcr in adults is calculated using the 2021 CKD-EPI creatinine equation which includes age and gender (Cori et al., [place], DOI: 10.1056/CCMNgb0994011)   04/26/2022 85 >60 mL/min/1.73m2 Final     Comment:     Effective December 21, 2021 eGFRcr in adults is calculated using the 2021 CKD-EPI creatinine equation which includes age and gender (Cori et al., [facility], DOI: 10.1056/VHSAhp3420711)   04/05/2021 84 >60 mL/min/[1.73_m2] Final     Comment:     Non  GFR Calc  Starting 12/18/2018, serum creatinine based estimated GFR (eGFR) will be   calculated using the Chronic Kidney Disease Epidemiology Collaboration   (CKD-EPI) equation.     01/28/2021 >90 >60 mL/min/[1.73_m2] Final     Comment:     Non  GFR Calc  Starting 12/18/2018, serum creatinine based estimated GFR (eGFR) will be   calculated using the Chronic Kidney Disease Epidemiology Collaboration   (CKD-EPI) equation.     10/08/2020 86 >60 mL/min/[1.73_m2] Final     Comment:     Non  GFR Calc  Starting  "12/18/2018, serum creatinine based estimated GFR (eGFR) will be   calculated using the Chronic Kidney Disease Epidemiology Collaboration   (CKD-EPI) equation.       GFR Estimate If Black   Date Value Ref Range Status   04/05/2021 >90 >60 mL/min/[1.73_m2] Final     Comment:      GFR Calc  Starting 12/18/2018, serum creatinine based estimated GFR (eGFR) will be   calculated using the Chronic Kidney Disease Epidemiology Collaboration   (CKD-EPI) equation.     01/28/2021 >90 >60 mL/min/[1.73_m2] Final     Comment:      GFR Calc  Starting 12/18/2018, serum creatinine based estimated GFR (eGFR) will be   calculated using the Chronic Kidney Disease Epidemiology Collaboration   (CKD-EPI) equation.     10/08/2020 >90 >60 mL/min/[1.73_m2] Final     Comment:      GFR Calc  Starting 12/18/2018, serum creatinine based estimated GFR (eGFR) will be   calculated using the Chronic Kidney Disease Epidemiology Collaboration   (CKD-EPI) equation.         Lab Results   Component Value Date    MICROL <12.0 10/10/2023    MICROL <5 10/26/2021    MICROL <5 01/22/2020     No results found for: \"MICROALBUMIN\"  No results found for: \"CPEPT\", \"GADAB\", \"ISCAB\"    No results found for: \"B12\"]    Most recent eye exam date: : Not Found     Assessment/Plan:     1.  Type 2 diabetes- A1c is improving- down to 8.7%, but still too high (goal <8%). Will increase ozempic to 2 mg weekly.  Recheck A1c in 3 mos.  He really does not want to add any other medications/insulin, but may need to in the future.  No other changes today.     2.  Risk factors-     Retinopathy:  No.  Due for eye exam. Referral placed.   Nephropathy:  BP historically well controlled. No microalbuminuria.  Creatinine stable.   Neuropathy: No.    Feet: OK, no ulcers.   Lipids:  LDL above target.  Patient taking statin.  Will recheck.  May consider switching to rosuvastatin if LDL above target.     3.  F/U in 3 mos with Dr. Muller or " myself. Would suggest graduation to primary care once A1c is <8%.     26 minutes spent on the date of the encounter doing chart review, review of test results,  patient visit and documentation, counseling/coordination of care, and discussion of follow up plan for worsening hyper and hypoglycemia.  The patient understood and is satisfied with today's visit.          Madeline Chavarria PA-C, MPAS   [facility]  Department of Medicine  Division of Endocrinology and Diabetes

## 2023-11-14 NOTE — NURSING NOTE
Is the patient currently in the state of [place]? YES    Visit mode:VIDEO    If the visit is dropped, the patient can be reconnected by: VIDEO VISIT: Text to cell phone:   Telephone Information:   Mobile [phone]     Will anyone else be joining the visit? NO  (If patient encounters technical issues they should call [phone] :[phone])    How would you like to obtain your AVS? MyChart    Are changes needed to the allergy or medication list? No    Reason for visit: RECHECK    Pt states he does not want changes being done to medications. States he is feeling really good with how things are going currently.     Pt is requesting telephone call instead of video, states he cannot get on video.     Shelby Kocher VVF

## 2023-12-11 ENCOUNTER — IMMUNIZATION (OUTPATIENT)
Dept: FAMILY MEDICINE | Facility: CLINIC | Age: 68
End: 2023-12-11
Payer: COMMERCIAL

## 2023-12-11 DIAGNOSIS — Z23 NEED FOR PROPHYLACTIC VACCINATION AND INOCULATION AGAINST INFLUENZA: Primary | ICD-10-CM

## 2023-12-11 PROCEDURE — 90662 IIV NO PRSV INCREASED AG IM: CPT

## 2023-12-11 PROCEDURE — 99207 PR NO CHARGE NURSE ONLY: CPT

## 2023-12-11 PROCEDURE — 90471 IMMUNIZATION ADMIN: CPT

## 2024-01-10 ENCOUNTER — ANCILLARY PROCEDURE (OUTPATIENT)
Dept: CARDIOLOGY | Facility: CLINIC | Age: 69
End: 2024-01-10
Attending: INTERNAL MEDICINE
Payer: COMMERCIAL

## 2024-01-10 DIAGNOSIS — I25.5 ISCHEMIC CARDIOMYOPATHY: ICD-10-CM

## 2024-01-10 DIAGNOSIS — I42.9 CARDIOMYOPATHY, UNSPECIFIED TYPE (H): ICD-10-CM

## 2024-01-10 PROCEDURE — 93295 DEV INTERROG REMOTE 1/2/MLT: CPT | Performed by: INTERNAL MEDICINE

## 2024-01-10 PROCEDURE — 93296 REM INTERROG EVL PM/IDS: CPT

## 2024-01-13 ENCOUNTER — HEALTH MAINTENANCE LETTER (OUTPATIENT)
Age: 69
End: 2024-01-13

## 2024-01-25 LAB
MDC_IDC_EPISODE_DTM: NORMAL
MDC_IDC_EPISODE_DTM: NORMAL
MDC_IDC_EPISODE_ID: NORMAL
MDC_IDC_EPISODE_ID: NORMAL
MDC_IDC_EPISODE_TYPE: NORMAL
MDC_IDC_EPISODE_TYPE: NORMAL
MDC_IDC_LEAD_CONNECTION_STATUS: NORMAL
MDC_IDC_LEAD_IMPLANT_DT: NORMAL
MDC_IDC_LEAD_LOCATION: NORMAL
MDC_IDC_LEAD_LOCATION_DETAIL_1: NORMAL
MDC_IDC_LEAD_MFG: NORMAL
MDC_IDC_LEAD_MODEL: NORMAL
MDC_IDC_LEAD_POLARITY_TYPE: NORMAL
MDC_IDC_LEAD_SERIAL: NORMAL
MDC_IDC_LEAD_SPECIAL_FUNCTION: NORMAL
MDC_IDC_MSMT_BATTERY_DTM: NORMAL
MDC_IDC_MSMT_BATTERY_REMAINING_LONGEVITY: 78 MO
MDC_IDC_MSMT_BATTERY_REMAINING_PERCENTAGE: 91 %
MDC_IDC_MSMT_BATTERY_STATUS: NORMAL
MDC_IDC_MSMT_CAP_CHARGE_DTM: NORMAL
MDC_IDC_MSMT_CAP_CHARGE_TIME: 10.9 S
MDC_IDC_MSMT_CAP_CHARGE_TYPE: NORMAL
MDC_IDC_MSMT_LEADCHNL_LV_IMPEDANCE_VALUE: 564 OHM
MDC_IDC_MSMT_LEADCHNL_LV_PACING_THRESHOLD_AMPLITUDE: 1.4 V
MDC_IDC_MSMT_LEADCHNL_LV_PACING_THRESHOLD_PULSEWIDTH: 0.5 MS
MDC_IDC_MSMT_LEADCHNL_RA_IMPEDANCE_VALUE: 784 OHM
MDC_IDC_MSMT_LEADCHNL_RV_IMPEDANCE_VALUE: 680 OHM
MDC_IDC_MSMT_LEADCHNL_RV_PACING_THRESHOLD_AMPLITUDE: 0.8 V
MDC_IDC_MSMT_LEADCHNL_RV_PACING_THRESHOLD_PULSEWIDTH: 0.4 MS
MDC_IDC_PG_IMPLANT_DTM: NORMAL
MDC_IDC_PG_MFG: NORMAL
MDC_IDC_PG_MODEL: NORMAL
MDC_IDC_PG_SERIAL: NORMAL
MDC_IDC_PG_TYPE: NORMAL
MDC_IDC_SESS_CLINIC_NAME: NORMAL
MDC_IDC_SESS_DTM: NORMAL
MDC_IDC_SESS_TYPE: NORMAL
MDC_IDC_SET_BRADY_AT_MODE_SWITCH_MODE: NORMAL
MDC_IDC_SET_BRADY_AT_MODE_SWITCH_RATE: 170 {BEATS}/MIN
MDC_IDC_SET_BRADY_LOWRATE: 60 {BEATS}/MIN
MDC_IDC_SET_BRADY_MAX_SENSOR_RATE: 130 {BEATS}/MIN
MDC_IDC_SET_BRADY_MAX_TRACKING_RATE: 130 {BEATS}/MIN
MDC_IDC_SET_BRADY_MODE: NORMAL
MDC_IDC_SET_BRADY_PAV_DELAY_LOW: 180 MS
MDC_IDC_SET_BRADY_SAV_DELAY_LOW: 140 MS
MDC_IDC_SET_CRT_PACED_CHAMBERS: NORMAL
MDC_IDC_SET_LEADCHNL_LV_PACING_AMPLITUDE: 2.5 V
MDC_IDC_SET_LEADCHNL_LV_PACING_ANODE_ELECTRODE_1: NORMAL
MDC_IDC_SET_LEADCHNL_LV_PACING_ANODE_LOCATION_1: NORMAL
MDC_IDC_SET_LEADCHNL_LV_PACING_CAPTURE_MODE: NORMAL
MDC_IDC_SET_LEADCHNL_LV_PACING_CATHODE_ELECTRODE_1: NORMAL
MDC_IDC_SET_LEADCHNL_LV_PACING_CATHODE_LOCATION_1: NORMAL
MDC_IDC_SET_LEADCHNL_LV_PACING_PULSEWIDTH: 0.5 MS
MDC_IDC_SET_LEADCHNL_LV_SENSING_ADAPTATION_MODE: NORMAL
MDC_IDC_SET_LEADCHNL_LV_SENSING_ANODE_ELECTRODE_1: NORMAL
MDC_IDC_SET_LEADCHNL_LV_SENSING_ANODE_LOCATION_1: NORMAL
MDC_IDC_SET_LEADCHNL_LV_SENSING_CATHODE_ELECTRODE_1: NORMAL
MDC_IDC_SET_LEADCHNL_LV_SENSING_CATHODE_LOCATION_1: NORMAL
MDC_IDC_SET_LEADCHNL_LV_SENSING_SENSITIVITY: 1 MV
MDC_IDC_SET_LEADCHNL_RA_PACING_AMPLITUDE: 2.5 V
MDC_IDC_SET_LEADCHNL_RA_PACING_CAPTURE_MODE: NORMAL
MDC_IDC_SET_LEADCHNL_RA_PACING_POLARITY: NORMAL
MDC_IDC_SET_LEADCHNL_RA_PACING_PULSEWIDTH: 0.5 MS
MDC_IDC_SET_LEADCHNL_RA_SENSING_ADAPTATION_MODE: NORMAL
MDC_IDC_SET_LEADCHNL_RA_SENSING_POLARITY: NORMAL
MDC_IDC_SET_LEADCHNL_RA_SENSING_SENSITIVITY: 0.25 MV
MDC_IDC_SET_LEADCHNL_RV_PACING_AMPLITUDE: 2 V
MDC_IDC_SET_LEADCHNL_RV_PACING_CAPTURE_MODE: NORMAL
MDC_IDC_SET_LEADCHNL_RV_PACING_POLARITY: NORMAL
MDC_IDC_SET_LEADCHNL_RV_PACING_PULSEWIDTH: 0.4 MS
MDC_IDC_SET_LEADCHNL_RV_SENSING_ADAPTATION_MODE: NORMAL
MDC_IDC_SET_LEADCHNL_RV_SENSING_POLARITY: NORMAL
MDC_IDC_SET_LEADCHNL_RV_SENSING_SENSITIVITY: 0.6 MV
MDC_IDC_SET_ZONE_DETECTION_INTERVAL: 250 MS
MDC_IDC_SET_ZONE_DETECTION_INTERVAL: 300 MS
MDC_IDC_SET_ZONE_DETECTION_INTERVAL: 353 MS
MDC_IDC_SET_ZONE_STATUS: NORMAL
MDC_IDC_SET_ZONE_TYPE: NORMAL
MDC_IDC_SET_ZONE_VENDOR_TYPE: NORMAL
MDC_IDC_STAT_AT_BURDEN_PERCENT: 1 %
MDC_IDC_STAT_AT_DTM_END: NORMAL
MDC_IDC_STAT_AT_DTM_START: NORMAL
MDC_IDC_STAT_BRADY_DTM_END: NORMAL
MDC_IDC_STAT_BRADY_DTM_START: NORMAL
MDC_IDC_STAT_BRADY_RA_PERCENT_PACED: 0 %
MDC_IDC_STAT_BRADY_RV_PERCENT_PACED: 15 %
MDC_IDC_STAT_CRT_DTM_END: NORMAL
MDC_IDC_STAT_CRT_DTM_START: NORMAL
MDC_IDC_STAT_CRT_LV_PERCENT_PACED: 99 %
MDC_IDC_STAT_EPISODE_RECENT_COUNT: 0
MDC_IDC_STAT_EPISODE_RECENT_COUNT: 1
MDC_IDC_STAT_EPISODE_RECENT_COUNT_DTM_END: NORMAL
MDC_IDC_STAT_EPISODE_RECENT_COUNT_DTM_START: NORMAL
MDC_IDC_STAT_EPISODE_TYPE: NORMAL
MDC_IDC_STAT_EPISODE_VENDOR_TYPE: NORMAL
MDC_IDC_STAT_TACHYTHERAPY_ATP_DELIVERED_RECENT: 0
MDC_IDC_STAT_TACHYTHERAPY_ATP_DELIVERED_TOTAL: 0
MDC_IDC_STAT_TACHYTHERAPY_RECENT_DTM_END: NORMAL
MDC_IDC_STAT_TACHYTHERAPY_RECENT_DTM_START: NORMAL
MDC_IDC_STAT_TACHYTHERAPY_SHOCKS_ABORTED_RECENT: 0
MDC_IDC_STAT_TACHYTHERAPY_SHOCKS_ABORTED_TOTAL: 0
MDC_IDC_STAT_TACHYTHERAPY_SHOCKS_DELIVERED_RECENT: 0
MDC_IDC_STAT_TACHYTHERAPY_SHOCKS_DELIVERED_TOTAL: 0
MDC_IDC_STAT_TACHYTHERAPY_TOTAL_DTM_END: NORMAL
MDC_IDC_STAT_TACHYTHERAPY_TOTAL_DTM_START: NORMAL

## 2024-02-06 ENCOUNTER — OFFICE VISIT (OUTPATIENT)
Dept: INTERNAL MEDICINE | Facility: CLINIC | Age: 69
End: 2024-02-06
Payer: COMMERCIAL

## 2024-02-06 VITALS
HEART RATE: 99 BPM | DIASTOLIC BLOOD PRESSURE: 77 MMHG | OXYGEN SATURATION: 94 % | HEIGHT: 67 IN | WEIGHT: 179.3 LBS | SYSTOLIC BLOOD PRESSURE: 114 MMHG | BODY MASS INDEX: 28.14 KG/M2

## 2024-02-06 DIAGNOSIS — K60.30 ANAL FISTULA: Primary | ICD-10-CM

## 2024-02-06 PROCEDURE — 99214 OFFICE O/P EST MOD 30 MIN: CPT | Performed by: NURSE PRACTITIONER

## 2024-02-06 NOTE — PATIENT INSTRUCTIONS
Ibuprofen 400 mg alternating with Tylenol 500 mg.    Do not exceed 3000 mg tylenol per day    If you develop a fever, please go to urgent care to see if you need an antibiotic.

## 2024-02-06 NOTE — PROGRESS NOTES
"Mr. Manriquez is a 68 year old male with history of T2DM, CAD, hypertension, chronic systolic HF who presents for a lesion on his buttocks.      History of Present Illness:    Patient present's with complaints of a \"lump\" near the left side of his rectum for past 3 days. He has a history of anal fistula first presenting in 2005 and believes this is a recurrence of that. He previously followed with colorectal surgery, though denies symptoms for past 3 years. His wife had helped look at it, describing it as reddened without noticeable drainage. Patient describes it as painful with activity, not bothersome during rest. He took 400 mg Ibuprofen this morning with good effect. No other concerns today.    Past Medical History:  Past Medical History:   Diagnosis Date    Anal fistula     Antiplatelet or antithrombotic long-term use     Coronary artery disease     Diabetes mellitus, type 2 (H)     Heart attack (H) 4/17/2007    Hyperlipidemia     Hypertension     Inguinal hernia     Ischemic cardiomyopathy     STEMI (ST elevation myocardial infarction) (H) 09/13/2018    s/p STEVO x2    Stented coronary artery 2007     Active Meds:  Current Outpatient Medications   Medication    aspirin (ASA) 81 MG chewable tablet    atorvastatin (LIPITOR) 80 MG tablet    carvedilol (COREG) 25 MG tablet    empagliflozin (JARDIANCE) 25 MG TABS tablet    furosemide (LASIX) 40 MG tablet    glipiZIDE (GLUCOTROL XL) 5 MG 24 hr tablet    MULTIPLE VITAMIN PO    sacubitril-valsartan (ENTRESTO)  MG per tablet    Semaglutide, 1 MG/DOSE, (OZEMPIC) 4 MG/3ML pen    Semaglutide, 2 MG/DOSE, (OZEMPIC) 8 MG/3ML pen    spironolactone (ALDACTONE) 25 MG tablet     No current facility-administered medications for this visit.     Facility-Administered Medications Ordered in Other Visits   Medication    perflutren diluted 1mL to 2mL with saline (OPTISON) diluted injection 5 mL    sodium chloride (PF) 0.9% PF flush 10 mL    sodium chloride (PF) 0.9% PF flush 10 mL    " "  Review Of Systems  General: Negative for fever or chills  Skin: Positive for skin bump and redness to rectal area  Respiratory: No shortness of breath, dyspnea on exertion, cough, or hemoptysis  Cardiovascular: Negative for chest pain or palpitations  Gastrointestinal: Positive for intermittent diarrhea, negative for abdominal pain or nausea    Physical Exam:  Vitals: /77 (BP Location: Right arm, Patient Position: Sitting, Cuff Size: Adult Regular)   Pulse 99   Ht 1.704 m (5' 7.09\")   Wt 81.3 kg (179 lb 4.8 oz)   SpO2 94%   BMI 28.01 kg/m    Constitutional: Alert, oriented, pleasant, no acute distress  Neck: Supple, no lymphadenopathy, no thyromegaly, no JVD  Cardiovascular: Regular rate and rhythm, no murmurs, rubs or gallops  Respiratory: Good air movement bilaterally, lungs clear, no wheezes/rales/rhonchi  GI: Abdomen soft, rounded, bowel sounds present, no tenderness to palpation, small amount of liquid brown stool leaking from rectum; Inflamed abscess approximately 1 x 1 cm next to left side of rectum - reddened with pus visualized,   Skin: Erythema around rectal abscess, otherwise intact    Assessment and Plan:    1. Anal fistula  - Most likely due to past history of fistula without definitive treatment in 2021 at last recurrence.  -Site drained spontaneously at end of exam and patient was feeling some relief  -Instructed to alternate tylenol and ibuprofen for pain  -Monitor for fever and signs of infection - seek urgent care if present for consideration of antibiotics  -Plan to follow up with [facility] for fistula management  - [facility]); Future      Return to clinic:  As needed and with no improvement, worsening, or new symptom development.     Options for treatment and follow-up care were reviewed with the patient. He engaged in the decision making process and verbalized understanding of the options " discussed and agreed with the final plan.    I spent a total of 30 minutes in the care of this pt today, including time prior to, during, and following today's office visit. This time includes reviewing the patient's chart and prior history, external notes, obtaining a history, performing an examination, interpreting test results, and evaluation and counseling the patient. This time also includes ordering medications or tests necessary in addition to communication to other member's of the patient's health care team. Time spent in documentation and care coordination is included.    Mariah Clay RN - Student Nurse Practitioner      I was present with the NPP student who participated in the service and in the documentation of the services provided.  I have verified the history and personally performed the physical exam and medical decision making, as documented by the student and edited by me.      Olivia Renteria, FABIÁN, [facility]  Nurse Practitioner

## 2024-02-14 NOTE — TELEPHONE ENCOUNTER
Diagnosis, Referred by & from: Abscess next to Anal Fistula   Appt date: 3/6/2024   NOTES STATUS DETAILS   OFFICE NOTE from referring provider [facility]:  2/6/24 - PCC OV with Olivia Renteria NP   OFFICE NOTE from other specialist Received / [facility]:  6/18/21, 3/14/17 - CR OV with Karina Calle NP  4/16/21 - GEN SURG OV with Dr. Ortega  4/8/21, 10/29/20 - GEN SURG OV with Dr. Massey    [facility]:  9/9/13 - CR OV with Dr. Dey   DISCHARGE SUMMARY from hospital N/A    DISCHARGE REPORT from the ER Internal [facility]:  4/5/21 - ED OV with Dr. Vivas   OPERATIVE REPORT Care Everywhere / [facility]:  10/16/20 - OP Note for HERNIORRHAPHY, UMBILICAL, OPEN, with mesh with Dr. Massey  2/20/20 - OP Note for Left HERNIORRHAPHY, INGUINAL, OPEN with Dr. Massey  3/29/12 - OP Note for LAPAROSCOPIC CHOLECYSTECTOMY with Dr. Hernández  9/6/05 - OP Note for EUA, I&D OF LARGE HORSESHOE ABSCESS with Dr. Gus Cormier:  8/27/13 - OP Note for FISTULOTOMY with Dr. Dey  12/20/12 - OP Note for EUA, FISTULOTOMY with Dr. Dey   MEDICATION LIST Internal    LABS N/A    DIAGNOSTIC PROCEDURES     COLONOSCOPY (most recent all time after 5 years) Received [facility]:  12/5/12 - Colonoscopy   IMAGING (DISC & REPORT)      CT [facility]:  4/5/21 - CT Abd/Pelvis  9/14/18 - CT Chest/Abd/Pelvis

## 2024-02-19 ENCOUNTER — MYC MEDICAL ADVICE (OUTPATIENT)
Dept: SURGERY | Facility: CLINIC | Age: 69
End: 2024-02-19
Payer: COMMERCIAL

## 2024-03-06 ENCOUNTER — PRE VISIT (OUTPATIENT)
Dept: SURGERY | Facility: CLINIC | Age: 69
End: 2024-03-06

## 2024-03-06 ENCOUNTER — OFFICE VISIT (OUTPATIENT)
Dept: SURGERY | Facility: CLINIC | Age: 69
End: 2024-03-06
Payer: COMMERCIAL

## 2024-03-06 VITALS — SYSTOLIC BLOOD PRESSURE: 128 MMHG | HEART RATE: 94 BPM | OXYGEN SATURATION: 97 % | DIASTOLIC BLOOD PRESSURE: 76 MMHG

## 2024-03-06 DIAGNOSIS — Z12.11 ENCOUNTER FOR SCREENING COLONOSCOPY: Primary | ICD-10-CM

## 2024-03-06 PROCEDURE — 99213 OFFICE O/P EST LOW 20 MIN: CPT | Performed by: NURSE PRACTITIONER

## 2024-03-06 ASSESSMENT — PAIN SCALES - GENERAL: PAINLEVEL: NO PAIN (0)

## 2024-03-06 NOTE — NURSING NOTE
3020 ready bed      Niharika Conrad  12/03/22 2411 Chief Complaint   Patient presents with    Consult       Vitals:    03/06/24 0907   BP: 128/76   BP Location: Left arm   Patient Position: Sitting   Cuff Size: Adult Regular   Pulse: 94   SpO2: 97%       There is no height or weight on file to calculate BMI.    Andrew Huntley EMT-P

## 2024-03-06 NOTE — LETTER
3/6/2024       RE: Anson Manriquez  [address] [facility] [id number]       Dear Colleague,    Thank you for referring your patient, Anson Manriquez, to the [facility]ON [facility]ERY [facility]INIC [place] at [facility]. Please see a copy of my visit note below.    Colon and Rectal Surgery Consult Clinic Note    Referring provider:  Taryn Lou MD  [facility]  [address] [facility] [address]  [place] [address]     RE: Anson Manriquez  : 1955  CHOLO: 3/6/2024    Anson Manriquez is a very pleasant 65 year old male with long-standing anorectal fistula who presents today for second opinion.     Cedric initially presented with a large horseshoe abscess in . He underwent an examination under anesthesia with incision and drainage with Dr. Weber. He underwent a normal colonoscopy in . In  he continued to have drainage and was noted to have a transsphincteric fistula and underwent examination under anesthesia with seton drain placement with Dr. Dey. In  he then underwent a fistula plug procedure. He initially did well after this procedure but had continued drainage I met with him in  and  and he was not interested in a seton or any surgical intervention. I set him up to meet with a surgeon to discuss options but he did not follow up. He developed a recurrent abscess that drained spontaneously at the beginning of the month. No pain or drainage now.  Last colonoscopy was  and was normal.    Assessment/Plan: 61 year old male with a past medical history of DMII, CAD, MI in  and , and HTN with long-standing transsphincteric anorectal fistula. He has previously undergone seton drain placement and a fistula plug with Dr. Lo in  in . He has a persistent, well established fistula and had a recurrent abscess. He is not interested in any intervention at this time since his symptoms have since  improved. Will have him follow up if he becomes more symptomatic or wants to discuss surgical options with a surgeon. Overdue for colonoscopy and referral placed. Patient's questions were answered to his stated satisfaction and he is in agreement with this plan.    Medical history:  Past Medical History:   Diagnosis Date    Anal fistula     Antiplatelet or antithrombotic long-term use     Coronary artery disease     Diabetes mellitus, type 2 (H)     Heart attack (H) 4/17/2007    Hyperlipidemia     Hypertension     Inguinal hernia     Ischemic cardiomyopathy     STEMI (ST elevation myocardial infarction) (H) 09/13/2018    s/p STEVO x2    Stented coronary artery 2007       Surgical history:  Past Surgical History:   Procedure Laterality Date    EP ICD N/A 4/25/2019    Procedure: EP ICD [[id number]];  Surgeon: Mick Mckeon MD;  [facility]: [facility]    HERNIA REPAIR Right     Inguinal    HERNIORRHAPHY INGUINAL Left 2/20/2020    Procedure: Left HERNIORRHAPHY, INGUINAL, OPEN;  Surgeon: Flakito Massey MD;  Location: [facility] OR    HERNIORRHAPHY UMBILICAL N/A 10/16/2020    Procedure: HERNIORRHAPHY, UMBILICAL, OPEN, with mesh;  Surgeon: Flakito Massey MD;  Location: [facility] OR    IRRIGATION AND DEBRIDEMENT RECTUM, COMBINED      abcess near rectum     LAPAROSCOPIC CHOLECYSTECTOMY  3/29/2012    Procedure:LAPAROSCOPIC CHOLECYSTECTOMY; LAPAROSCOPIC CHOLECYSTECTOMY; Surgeon:MARILYNN PRESSLEY; Location:[facility] OR    REPAIR ECTROPION Bilateral 9/2/2022    Procedure: Both lower eyelid ectropion repair;  Surgeon: Cheyanne Francisco MD;  Location: [facility] OR    STENT, CORONARY, OLIVIA         Problem list:      Patient Active Problem List    Diagnosis Date Noted    Abdominal wall abscess 04/05/2021     Priority: Medium    Umbilical hernia without obstruction and without gangrene 09/02/2020     Priority: Medium     Added automatically from request for surgery [id number]      Left inguinal hernia 11/11/2019     Priority: Medium     Added  automatically from request for surgery [id number]      S/P ICD (internal cardiac defibrillator) procedure 04/25/2019     Priority: Medium    Chronic infection      Priority: Medium     near rectum still leaking      Ischemic cardiomyopathy 02/19/2019     Priority: Medium     Added automatically from request for surgery [id number]      STEMI (ST elevation myocardial infarction) (H) 09/14/2018     Priority: Medium    Coronary artery disease involving native coronary artery of native heart without angina pectoris 03/09/2017     Priority: Medium     MI in 2007      Type 2 diabetes mellitus without complication, without long-term current use of insulin (H) 03/09/2017     Priority: Medium    Benign essential hypertension 03/09/2017     Priority: Medium    Mixed hyperlipidemia 03/09/2017     Priority: Medium    Anal fistula 03/09/2017     Priority: Medium       Medications:  Current Outpatient Medications   Medication Sig Dispense Refill    aspirin (ASA) 81 MG chewable tablet Take 1 tablet (81 mg) by mouth daily 90 tablet 4    atorvastatin (LIPITOR) 80 MG tablet Take 1 tablet (80 mg) by mouth daily 90 tablet 4    carvedilol (COREG) 25 MG tablet Take 1 tablet (25 mg) by mouth 2 times daily (with meals) 180 tablet 4    empagliflozin (JARDIANCE) 25 MG TABS tablet Take 1 tablet (25 mg) by mouth daily 90 tablet 4    furosemide (LASIX) 40 MG tablet Take 1 tablet (40 mg) by mouth 2 times daily 180 tablet 4    glipiZIDE (GLUCOTROL XL) 5 MG 24 hr tablet Take 1 tablet (5 mg) by mouth 2 times daily 180 tablet 0    MULTIPLE VITAMIN PO Take 1 tablet by mouth every morning       sacubitril-valsartan (ENTRESTO)  MG per tablet Take 1 tablet by mouth 2 times daily 180 tablet 4    Semaglutide, 1 MG/DOSE, (OZEMPIC) 4 MG/3ML pen Inject 1 mg Subcutaneous every 7 days 6 mL 11    Semaglutide, 2 MG/DOSE, (OZEMPIC) 8 MG/3ML pen Inject 2 mg Subcutaneous every 7 days 9 mL 3    spironolactone (ALDACTONE) 25 MG tablet Take 1 tablet (25 mg) by mouth  daily 90 tablet 4       Allergies:  No Known Allergies    Family history:  Family History   Problem Relation Age of Onset    Hypertension Mother     Diabetes Father     Glaucoma No family hx of     Macular Degeneration No family hx of        Social history:  Social History     Tobacco Use    Smoking status: Never    Smokeless tobacco: Never   Substance Use Topics    Alcohol use: No    Marital status: .      Nursing Notes:   Andrew Huntley, EMT  3/6/2024  9:09 AM  Signed  Chief Complaint   Patient presents with    Consult       Vitals:    03/06/24 0907   BP: 128/76   BP Location: Left arm   Patient Position: Sitting   Cuff Size: Adult Regular   Pulse: 94   SpO2: 97%       There is no height or weight on file to calculate BMI.    Andrew Huntley EMT-P       Physical Examination:  /76 (BP Location: Left arm, Patient Position: Sitting, Cuff Size: Adult Regular)   Pulse 94   SpO2 97%   General: alert, oriented, in no acute distress, sitting comfortably  HEENT: mucous membranes moist  Perianal external examination:  Well established, epithelialized, external fistula opening in the left posterior position. Surrounding scarring but no induration or fluctuance.     15 minutes spent on the date of the encounter doing chart review, history and exam, documentation and further activities as noted above.     This note was created using speech recognition software and may contain unintended word substitutions.      Again, thank you for allowing me to participate in the care of your patient.      Sincerely,    LLOYD Gomez CNP

## 2024-03-06 NOTE — PROGRESS NOTES
Colon and Rectal Surgery Consult Clinic Note    Referring provider:  Taryn Lou MD  [address] [place] [id number]     RE: Anson Manriquez  : 1955  CHOLO: 3/6/2024    Anson Manriquez is a very pleasant 65 year old male with long-standing anorectal fistula who presents today for second opinion.     Cedric initially presented with a large horseshoe abscess in . He underwent an examination under anesthesia with incision and drainage with Dr. Weber. He underwent a normal colonoscopy in . In  he continued to have drainage and was noted to have a transsphincteric fistula and underwent examination under anesthesia with seton drain placement with Dr. Dey. In  he then underwent a fistula plug procedure. He initially did well after this procedure but had continued drainage I met with him in  and  and he was not interested in a seton or any surgical intervention. I set him up to meet with a surgeon to discuss options but he did not follow up. He developed a recurrent abscess that drained spontaneously at the beginning of the month. No pain or drainage now.  Last colonoscopy was  and was normal.    Assessment/Plan: 61 year old male with a past medical history of DMII, CAD, MI in  and , and HTN with long-standing transsphincteric anorectal fistula. He has previously undergone seton drain placement and a fistula plug with Dr. Lo in  in . He has a persistent, well established fistula and had a recurrent abscess. He is not interested in any intervention at this time since his symptoms have since improved. Will have him follow up if he becomes more symptomatic or wants to discuss surgical options with a surgeon. Overdue for colonoscopy and referral placed. Patient's questions were answered to his stated satisfaction and he is in agreement with this plan.    Medical history:  Past Medical History:   Diagnosis Date    Anal  fistula     Antiplatelet or antithrombotic long-term use     Coronary artery disease     Diabetes mellitus, type 2 (H)     Heart attack (H) 4/17/2007    Hyperlipidemia     Hypertension     Inguinal hernia     Ischemic cardiomyopathy     STEMI (ST elevation myocardial infarction) (H) 09/13/2018    s/p STEVO x2    Stented coronary artery 2007       Surgical history:  Past Surgical History:   Procedure Laterality Date    EP ICD N/A 4/25/2019    Procedure: EP ICD [[id number]];  Surgeon: Mick Mckeon MD;  [facility]: [facility]    HERNIA REPAIR Right     Inguinal    HERNIORRHAPHY INGUINAL Left 2/20/2020    Procedure: Left HERNIORRHAPHY, INGUINAL, OPEN;  Surgeon: Flakito Massey MD;  [facility]: [facility] OR    HERNIORRHAPHY UMBILICAL N/A 10/16/2020    Procedure: HERNIORRHAPHY, UMBILICAL, OPEN, with mesh;  Surgeon: Flakito Massey MD;  Location: [facility] OR    IRRIGATION AND DEBRIDEMENT RECTUM, COMBINED      abcess near rectum     LAPAROSCOPIC CHOLECYSTECTOMY  3/29/2012    Procedure:LAPAROSCOPIC CHOLECYSTECTOMY; LAPAROSCOPIC CHOLECYSTECTOMY; Surgeon:MARILYNN PRESSLEY; Location:[facility] OR    REPAIR ECTROPION Bilateral 9/2/2022    Procedure: Both lower eyelid ectropion repair;  Surgeon: Cheyanne Francisco MD;  Location: [facility] OR    STENT, CORONARY, OLIVIA         Problem list:      Patient Active Problem List    Diagnosis Date Noted    Abdominal wall abscess 04/05/2021     Priority: Medium    Umbilical hernia without obstruction and without gangrene 09/02/2020     Priority: Medium     Added automatically from request for surgery [id number]      Left inguinal hernia 11/11/2019     Priority: Medium     Added automatically from request for surgery [id number]      S/P ICD (internal cardiac defibrillator) procedure 04/25/2019     Priority: Medium    Chronic infection      Priority: Medium     near rectum still leaking      Ischemic cardiomyopathy 02/19/2019     Priority: Medium     Added automatically from request for surgery [id number]       STEMI (ST elevation myocardial infarction) (H) 09/14/2018     Priority: Medium    Coronary artery disease involving native coronary artery of native heart without angina pectoris 03/09/2017     Priority: Medium     MI in 2007      Type 2 diabetes mellitus without complication, without long-term current use of insulin (H) 03/09/2017     Priority: Medium    Benign essential hypertension 03/09/2017     Priority: Medium    Mixed hyperlipidemia 03/09/2017     Priority: Medium    Anal fistula 03/09/2017     Priority: Medium       Medications:  Current Outpatient Medications   Medication Sig Dispense Refill    aspirin (ASA) 81 MG chewable tablet Take 1 tablet (81 mg) by mouth daily 90 tablet 4    atorvastatin (LIPITOR) 80 MG tablet Take 1 tablet (80 mg) by mouth daily 90 tablet 4    carvedilol (COREG) 25 MG tablet Take 1 tablet (25 mg) by mouth 2 times daily (with meals) 180 tablet 4    empagliflozin (JARDIANCE) 25 MG TABS tablet Take 1 tablet (25 mg) by mouth daily 90 tablet 4    furosemide (LASIX) 40 MG tablet Take 1 tablet (40 mg) by mouth 2 times daily 180 tablet 4    glipiZIDE (GLUCOTROL XL) 5 MG 24 hr tablet Take 1 tablet (5 mg) by mouth 2 times daily 180 tablet 0    MULTIPLE VITAMIN PO Take 1 tablet by mouth every morning       sacubitril-valsartan (ENTRESTO)  MG per tablet Take 1 tablet by mouth 2 times daily 180 tablet 4    Semaglutide, 1 MG/DOSE, (OZEMPIC) 4 MG/3ML pen Inject 1 mg Subcutaneous every 7 days 6 mL 11    Semaglutide, 2 MG/DOSE, (OZEMPIC) 8 MG/3ML pen Inject 2 mg Subcutaneous every 7 days 9 mL 3    spironolactone (ALDACTONE) 25 MG tablet Take 1 tablet (25 mg) by mouth daily 90 tablet 4       Allergies:  No Known Allergies    Family history:  Family History   Problem Relation Age of Onset    Hypertension Mother     Diabetes Father     Glaucoma No family hx of     Macular Degeneration No family hx of        Social history:  Social History     Tobacco Use    Smoking status: Never    Smokeless  tobacco: Never   Substance Use Topics    Alcohol use: No    Marital status: .      Nursing Notes:   Andrew Huntley, EMT  3/6/2024  9:09 AM  Signed  Chief Complaint   Patient presents with    Consult       Vitals:    03/06/24 0907   BP: 128/76   BP Location: Left arm   Patient Position: Sitting   Cuff Size: Adult Regular   Pulse: 94   SpO2: 97%       There is no height or weight on file to calculate BMI.    Andrew Huntley EMT-P       Physical Examination:  /76 (BP Location: Left arm, Patient Position: Sitting, Cuff Size: Adult Regular)   Pulse 94   SpO2 97%   General: alert, oriented, in no acute distress, sitting comfortably  HEENT: mucous membranes moist  Perianal external examination:  Well established, epithelialized, external fistula opening in the left posterior position. Surrounding scarring but no induration or fluctuance.     15 minutes spent on the date of the encounter doing chart review, history and exam, documentation and further activities as noted above.     LLOYD Mortensen, NP-C  Colon and Rectal Surgery   [facility]    This note was created using speech recognition software and may contain unintended word substitutions.

## 2024-03-26 ENCOUNTER — TELEPHONE (OUTPATIENT)
Dept: CARDIOLOGY | Facility: CLINIC | Age: 69
End: 2024-03-26
Payer: COMMERCIAL

## 2024-03-26 NOTE — TELEPHONE ENCOUNTER
Patient Contacted for the patient to call back and schedule the following:    Appointment type: return hf  Provider: radha  Return date: 06/25/24  Specialty phone number: [phone] opt 1  Additional appointment(s) needed: n/a   Additonal Notes: n/a

## 2024-04-22 ENCOUNTER — TELEPHONE (OUTPATIENT)
Dept: GASTROENTEROLOGY | Facility: CLINIC | Age: 69
End: 2024-04-22
Payer: COMMERCIAL

## 2024-04-22 ENCOUNTER — HOSPITAL ENCOUNTER (OUTPATIENT)
Facility: CLINIC | Age: 69
End: 2024-04-22
Attending: SURGERY | Admitting: SURGERY
Payer: COMMERCIAL

## 2024-04-22 NOTE — TELEPHONE ENCOUNTER
"Endoscopy Scheduling Screen    Have you had a positive Covid test in the last 14 days?  No    What is your communication preference for Instructions and/or Bowel Prep?   MyChart    What insurance is in the chart?  Other:  [employer]    Ordering/Referring Provider: JULI FOFANA   (If ordering provider performs procedure, schedule with ordering provider unless otherwise instructed. )    BMI: Estimated body mass index is 28.01 kg/m  as calculated from the following:    Height as of 2/6/24: 1.704 m (5' 7.09\").    Weight as of 2/6/24: 81.3 kg (179 lb 4.8 oz).     Sedation Ordered  moderate sedation.   If patient BMI > 50 do not schedule in [facility].    If patient BMI > 45 do not schedule at [facility].    Are you taking methadone or Suboxone?  No    Have you had difficulties, pain, or discomfort during past endoscopy procedures?  No    Are you taking any prescription medications for pain 3 or more times per week?   NO, No RN review required.    Do you have a history of malignant hyperthermia?  No    (Females) Are you currently pregnant?   No     Have you been diagnosed or told you have pulmonary hypertension?   No    Do you have an LVAD?  No    Have you been told you have moderate to severe sleep apnea?  No    Have you been told you have COPD, asthma, or any other lung disease?  No    Do you have any heart conditions?  Yes     In the past year, have you had any hospitalizations for heart related issues including cardiomyopathy, heart attack, or stent placement?  No    Do you have any implantable devices in your body (pacemaker, ICD)?  Pacemaker (schedule colonoscopy in Hospital Only)    Do you take nitroglycerine?  No    Have you ever had or are you waiting for an organ transplant?  No. Continue scheduling, no site restrictions.    Have you had a stroke or transient ischemic attack (TIA aka \"mini stroke\" in the last 6 months?   No    Have you been diagnosed with or been told you have cirrhosis of the liver?   No    Are " "you currently on dialysis?   No    Do you need assistance transferring?   No    BMI: Estimated body mass index is 28.01 kg/m  as calculated from the following:    Height as of 2/6/24: 1.704 m (5' 7.09\").    Weight as of 2/6/24: 81.3 kg (179 lb 4.8 oz).     Is patients BMI > 40 and scheduling location [facility]?  No    Do you take an injectable medication for weight loss or diabetes (excluding insulin)?  Yes, hold time can be up to 7 days. Please consult with you prescribing provider to discuss endoscopy recommendations.     Do you take the medication Naltrexone?  No    Do you take blood thinners?  No       Prep   Are you currently on dialysis or do you have chronic kidney disease?  No    Do you have a diagnosis of diabetes?  Yes (Golytely Prep)    Do you have a diagnosis of cystic fibrosis (CF)?  No    On a regular basis do you go 3 -5 days between bowel movements?  No    BMI > 40?  No    Preferred Pharmacy:    [id number] - [address]  [address] [place] [address]  Phone: [phone] Fax: [phone]    Final Scheduling Details     Procedure scheduled  Colonoscopy    Surgeon:  IVANA     Date of procedure:  8/12/24     Pre-OP / PAC:   No - Not required for this site.    Location  [facility] - Per exclusion criteria.    Sedation   Moderate Sedation - Per order.      Patient Reminders:   You will receive a call from a Nurse to review instructions and health history.  This assessment must be completed prior to your procedure.  Failure to complete the Nurse assessment may result in the procedure being cancelled.      On the day of your procedure, please designate an adult(s) who can drive you home stay with you for the next 24 hours. The medicines used in the exam will make you sleepy. You will not be able to drive.      You cannot take public transportation, ride share services, or non-medical taxi service without a responsible " caregiver.  Medical transport services are allowed with the requirement that a responsible caregiver will receive you at your destination.  We require that drivers and caregivers are confirmed prior to your procedure.

## 2024-05-03 NOTE — TELEPHONE ENCOUNTER
FUTURE VISIT INFORMATION      FUTURE VISIT INFORMATION:  Date: 7/16/24  Time: 12:00pm  Location: [facility]  REFERRAL INFORMATION:  Referring provider:  Madeline Chavarria PA-C   Referring providers clinic:  [facility]   Reason for visit/diagnosis  Diabetic eye exam     RECORDS REQUESTED FROM:       Clinic name Comments Records Status Imaging Status   [facility]  Ov/Referral 11/14/23 epic    [facility] Ov/notes 9/13/22-10/4/17 epic

## 2024-05-10 DIAGNOSIS — E11.9 TYPE 2 DIABETES MELLITUS WITHOUT COMPLICATION, WITHOUT LONG-TERM CURRENT USE OF INSULIN (H): ICD-10-CM

## 2024-05-10 NOTE — TELEPHONE ENCOUNTER
Sulfonylurea Agents Qxpknn29/10/2024 10:39 AM   Protocol Details Patient has documented A1c within the specified period of time.

## 2024-05-10 NOTE — TELEPHONE ENCOUNTER
[facility] Call Center    Phone Message    May a detailed message be left on voicemail: yes     Reason for Call: Medication Refill Request    Has the patient contacted the pharmacy for the refill? Yes   Name of medication being requested:   glipiZIDE (GLUCOTROL XL) 5 MG 24 hr tablet [30308] (Order [id number])   Provider who prescribed the medication:     Madeline Chavarria PA-C     Pharmacy: [facility] #[id number] [address]  AT [facility]   Date medication is needed: as soon as possible.    Action Taken: Message routed to:  [facility] Endo    Travel Screening: Not Applicable

## 2024-05-10 NOTE — TELEPHONE ENCOUNTER
glipiZIDE (GLUCOTROL XL) 5 MG 24 hr tablet   Disp-180 tablet, R-0   Start: 11/14/2023 11/14/2023  [facility] [place]    Madeline Chavarria PA-C  Endocrinology, Diabetes, and Metabolism    Routed because: request per pt call.gap in med rf. Dosing alert

## 2024-05-12 RX ORDER — GLIPIZIDE 5 MG/1
5 TABLET, FILM COATED, EXTENDED RELEASE ORAL 2 TIMES DAILY
Qty: 180 TABLET | Refills: 0 | Status: SHIPPED | OUTPATIENT
Start: 2024-05-12 | End: 2024-08-23

## 2024-05-21 ENCOUNTER — DOCUMENTATION ONLY (OUTPATIENT)
Dept: AUDIOLOGY | Facility: CLINIC | Age: 69
End: 2024-05-21
Payer: COMMERCIAL

## 2024-05-21 ENCOUNTER — TELEPHONE (OUTPATIENT)
Dept: CARDIOLOGY | Facility: CLINIC | Age: 69
End: 2024-05-21
Payer: COMMERCIAL

## 2024-05-21 DIAGNOSIS — I50.20 HEART FAILURE WITH REDUCED EJECTION FRACTION, NYHA CLASS III (H): ICD-10-CM

## 2024-05-21 DIAGNOSIS — H90.3 SENSORINEURAL HEARING LOSS, BILATERAL: ICD-10-CM

## 2024-05-21 RX ORDER — SACUBITRIL AND VALSARTAN 97; 103 MG/1; MG/1
1 TABLET, FILM COATED ORAL 2 TIMES DAILY
Qty: 180 TABLET | Refills: 2 | Status: SHIPPED | OUTPATIENT
Start: 2024-05-21

## 2024-05-21 NOTE — PROGRESS NOTES
Walk-in hearing aid services on 5/21/24: The patient stated his hearing aids were not working well.  Examination found both domes appeared occluded with wax.  The hearing aids were cleaned and the  filters and domes were replaced.  Afterwards a listening check found the left hearing aid to be working properly but the right device was dead.  A new  did not resolve the issue with the right hearing aid.  The left hearing aid was returned to the patient and it was decided to send the right hearing aid to the  for repair.  The patient was made aware of the $350.00 repair charge.  The patient will be contacted via [employer] when the repaired hearing aid is back and available to be picked up and he will be billed the repair charge at that time.  He is being billed for an out of warranty clean & check for today's services.        I reviewed the procedures/testing performed by the audiology assistant, and agree with the assessment and plan as documented.      Lawanda Toledo  Audiologist  [place] License  #[id number]

## 2024-05-21 NOTE — TELEPHONE ENCOUNTER
sacubitril-valsartan (ENTRESTO)  MG per tablet  Take 1 tablet by mouth 2 times daily - Oral     Last Written Prescription Date:  10/10/23  Last Fill Quantity: 180,   # refills: 4   [facility] #[id number] - [place] [id number] [facility]  AT [facility]   Remaining refills sent to requested pharmacy.

## 2024-05-21 NOTE — TELEPHONE ENCOUNTER
[facility] Call Center    Phone Message    May a detailed message be left on voicemail: yes     Reason for Call: Medication Refill Request    Has the patient contacted the pharmacy for the refill? Yes   Name of medication being requested: sacubitril-valsartan (ENTRESTO)  MG per tablet   Provider who prescribed the medication: Dr. Matos  Pharmacy:    [facility] #[id number] [address]  AT [facility]    Date medication is needed: 5/23/24       Action Taken: Other: cardiology    Travel Screening: Not Applicable   Thank you!  [facility]

## 2024-06-01 ENCOUNTER — HEALTH MAINTENANCE LETTER (OUTPATIENT)
Age: 69
End: 2024-06-01

## 2024-06-05 ENCOUNTER — DOCUMENTATION ONLY (OUTPATIENT)
Dept: AUDIOLOGY | Facility: CLINIC | Age: 69
End: 2024-06-05
Payer: COMMERCIAL

## 2024-06-05 DIAGNOSIS — H90.3 SENSORINEURAL HEARING LOSS, BILATERAL: Primary | ICD-10-CM

## 2024-06-05 PROCEDURE — V5257 HEARING AID, DIGIT, MON, BTE: HCPCS | Mod: RB

## 2024-06-16 ENCOUNTER — NURSE TRIAGE (OUTPATIENT)
Dept: NURSING | Facility: CLINIC | Age: 69
End: 2024-06-16
Payer: COMMERCIAL

## 2024-06-16 NOTE — TELEPHONE ENCOUNTER
"Patient calling reports having redness and watering to both eyes. Reports having surgery to his lower eyelids a few years ago with patient thinking the sun dried out the lower lids and is causing the redness. Patient was cutting steel yesterday and is unsure if this could be related but denies feeling like something is in his eyes. Denies fever, pain and swelling. Home care advice provided per protocol. Call back instructions provided. Patient agreeable to plan.     Valencia Johnson RN 06/16/24 7:34 AM   [facility] Triage Nurse Advisor    Reason for Disposition   [1] Red eye caused by sunscreen, smoke, smog, chlorine, food, soap or other mild irritant AND [2] no blurred vision    Additional Information   Negative: Piece of something got in the eye   Negative: Chemical got in the eye   Negative: Eye redness followed an eye injury   Negative: Yellow or green pus in the eyes   Negative: [1] Eyelid is swollen AND [2] no redness of white of eye (sclera)   Negative: Caused by pollen or other allergy OR main symptom is itchy eyes   Negative: Red, widespread rash also present   Negative: SEVERE eye pain   Negative: [1] Eyelids are very swollen (shut or almost) AND [2] fever   Negative: [1] Eyelid (outer) is very red (or tender to touch) AND [2] fever   Negative: [1] Foreign body sensation (\"feels like something is in there\") AND [2] irrigation didn't help   Negative: Vomiting   Negative: [1] Recent eye surgery AND [2] increasing eye pain   Negative: Patient sounds very sick or weak to the triager   Negative: MODERATE eye pain or discomfort (e.g., interferes with normal activities or awakens from sleep; more than mild)   Negative: New or worsening blurred vision   Negative: Looking at light causes MODERATE to SEVERE eye pain (i.e., photophobia)   Negative: Cloudy spot or sore seen on the cornea (clear part of the eye)   Negative: Eyelid is very swollen (shut or almost)   Negative: Eyelid is red and painful (or tender to touch)   " Negative: Eye pain present > 24 hours   Negative: [1] Bleeding on white of the eye AND [2] taking Coumadin (warfarin) or other strong blood thinner, or known bleeding disorder (e.g., thrombocytopenia)   Negative: Bleeding on white of the eye   Negative: [1] Only 1 eye is red AND [2] present > 48 hours   Negative: Red eye present more than 7 days   Negative: [1] Mild eyelid irritation and eye redness are a recurrent problem AND [2] red and crusty eyelids    Protocols used: Eye - Red Without Pus-A-AH

## 2024-06-25 ENCOUNTER — LAB (OUTPATIENT)
Dept: LAB | Facility: CLINIC | Age: 69
End: 2024-06-25
Attending: INTERNAL MEDICINE
Payer: COMMERCIAL

## 2024-06-25 ENCOUNTER — ANCILLARY PROCEDURE (OUTPATIENT)
Dept: CARDIOLOGY | Facility: CLINIC | Age: 69
End: 2024-06-25
Attending: INTERNAL MEDICINE
Payer: COMMERCIAL

## 2024-06-25 VITALS
DIASTOLIC BLOOD PRESSURE: 63 MMHG | WEIGHT: 179.8 LBS | OXYGEN SATURATION: 95 % | HEART RATE: 85 BPM | BODY MASS INDEX: 28.09 KG/M2 | SYSTOLIC BLOOD PRESSURE: 104 MMHG

## 2024-06-25 DIAGNOSIS — I21.02 ST ELEVATION MYOCARDIAL INFARCTION INVOLVING LEFT ANTERIOR DESCENDING (LAD) CORONARY ARTERY (H): ICD-10-CM

## 2024-06-25 DIAGNOSIS — E78.2 MIXED HYPERLIPIDEMIA: ICD-10-CM

## 2024-06-25 DIAGNOSIS — I50.22 CHRONIC SYSTOLIC CONGESTIVE HEART FAILURE (H): ICD-10-CM

## 2024-06-25 DIAGNOSIS — I21.09 ST ELEVATION MYOCARDIAL INFARCTION (STEMI) INVOLVING OTHER CORONARY ARTERY OF ANTERIOR WALL (H): ICD-10-CM

## 2024-06-25 DIAGNOSIS — I42.9 CARDIOMYOPATHY, UNSPECIFIED TYPE (H): ICD-10-CM

## 2024-06-25 DIAGNOSIS — E11.9 TYPE 2 DIABETES MELLITUS WITHOUT COMPLICATION, WITHOUT LONG-TERM CURRENT USE OF INSULIN (H): ICD-10-CM

## 2024-06-25 DIAGNOSIS — I25.5 ISCHEMIC CARDIOMYOPATHY: ICD-10-CM

## 2024-06-25 DIAGNOSIS — I50.20 HEART FAILURE WITH REDUCED EJECTION FRACTION, NYHA CLASS III (H): ICD-10-CM

## 2024-06-25 DIAGNOSIS — I25.10 CORONARY ARTERY DISEASE INVOLVING NATIVE CORONARY ARTERY OF NATIVE HEART WITHOUT ANGINA PECTORIS: ICD-10-CM

## 2024-06-25 LAB
ALBUMIN SERPL BCG-MCNC: 4.6 G/DL (ref 3.5–5.2)
ALP SERPL-CCNC: 55 U/L (ref 40–150)
ALT SERPL W P-5'-P-CCNC: 18 U/L (ref 0–70)
ANION GAP SERPL CALCULATED.3IONS-SCNC: 14 MMOL/L (ref 7–15)
AST SERPL W P-5'-P-CCNC: 19 U/L (ref 0–45)
BILIRUB SERPL-MCNC: 1.2 MG/DL
BUN SERPL-MCNC: 21.4 MG/DL (ref 8–23)
CALCIUM SERPL-MCNC: 9.7 MG/DL (ref 8.8–10.2)
CHLORIDE SERPL-SCNC: 103 MMOL/L (ref 98–107)
CREAT SERPL-MCNC: 0.96 MG/DL (ref 0.67–1.17)
DEPRECATED HCO3 PLAS-SCNC: 22 MMOL/L (ref 22–29)
EGFRCR SERPLBLD CKD-EPI 2021: 86 ML/MIN/1.73M2
ERYTHROCYTE [DISTWIDTH] IN BLOOD BY AUTOMATED COUNT: 14.5 % (ref 10–15)
GLUCOSE SERPL-MCNC: 203 MG/DL (ref 70–99)
HCT VFR BLD AUTO: 46.9 % (ref 40–53)
HGB BLD-MCNC: 16.3 G/DL (ref 13.3–17.7)
LVEF ECHO: NORMAL
MCH RBC QN AUTO: 31.8 PG (ref 26.5–33)
MCHC RBC AUTO-ENTMCNC: 34.8 G/DL (ref 31.5–36.5)
MCV RBC AUTO: 91 FL (ref 78–100)
MDC_IDC_EPISODE_DTM: NORMAL
MDC_IDC_EPISODE_ID: NORMAL
MDC_IDC_EPISODE_TYPE: NORMAL
MDC_IDC_LEAD_CONNECTION_STATUS: NORMAL
MDC_IDC_LEAD_IMPLANT_DT: NORMAL
MDC_IDC_LEAD_LOCATION: NORMAL
MDC_IDC_LEAD_LOCATION_DETAIL_1: NORMAL
MDC_IDC_LEAD_MFG: NORMAL
MDC_IDC_LEAD_MODEL: NORMAL
MDC_IDC_LEAD_POLARITY_TYPE: NORMAL
MDC_IDC_LEAD_SERIAL: NORMAL
MDC_IDC_LEAD_SPECIAL_FUNCTION: NORMAL
MDC_IDC_MSMT_BATTERY_DTM: NORMAL
MDC_IDC_MSMT_BATTERY_REMAINING_LONGEVITY: 78 MO
MDC_IDC_MSMT_BATTERY_REMAINING_PERCENTAGE: 88 %
MDC_IDC_MSMT_BATTERY_STATUS: NORMAL
MDC_IDC_MSMT_CAP_CHARGE_DTM: NORMAL
MDC_IDC_MSMT_CAP_CHARGE_TIME: 11 S
MDC_IDC_MSMT_CAP_CHARGE_TYPE: NORMAL
MDC_IDC_MSMT_LEADCHNL_LV_IMPEDANCE_VALUE: 570 OHM
MDC_IDC_MSMT_LEADCHNL_LV_LEAD_CHANNEL_STATUS: NORMAL
MDC_IDC_MSMT_LEADCHNL_LV_PACING_THRESHOLD_AMPLITUDE: 0.9 V
MDC_IDC_MSMT_LEADCHNL_LV_PACING_THRESHOLD_PULSEWIDTH: 0.5 MS
MDC_IDC_MSMT_LEADCHNL_RA_IMPEDANCE_VALUE: 764 OHM
MDC_IDC_MSMT_LEADCHNL_RA_PACING_THRESHOLD_AMPLITUDE: 0.9 V
MDC_IDC_MSMT_LEADCHNL_RA_PACING_THRESHOLD_PULSEWIDTH: 0.5 MS
MDC_IDC_MSMT_LEADCHNL_RV_IMPEDANCE_VALUE: 685 OHM
MDC_IDC_MSMT_LEADCHNL_RV_PACING_THRESHOLD_AMPLITUDE: 0.8 V
MDC_IDC_MSMT_LEADCHNL_RV_PACING_THRESHOLD_PULSEWIDTH: 0.4 MS
MDC_IDC_PG_IMPLANT_DTM: NORMAL
MDC_IDC_PG_MFG: NORMAL
MDC_IDC_PG_MODEL: NORMAL
MDC_IDC_PG_SERIAL: NORMAL
MDC_IDC_PG_TYPE: NORMAL
MDC_IDC_SESS_CLINIC_NAME: NORMAL
MDC_IDC_SESS_DTM: NORMAL
MDC_IDC_SESS_TYPE: NORMAL
MDC_IDC_SET_BRADY_AT_MODE_SWITCH_MODE: NORMAL
MDC_IDC_SET_BRADY_AT_MODE_SWITCH_RATE: 170 {BEATS}/MIN
MDC_IDC_SET_BRADY_LOWRATE: 60 {BEATS}/MIN
MDC_IDC_SET_BRADY_MAX_SENSOR_RATE: 130 {BEATS}/MIN
MDC_IDC_SET_BRADY_MAX_TRACKING_RATE: 130 {BEATS}/MIN
MDC_IDC_SET_BRADY_MODE: NORMAL
MDC_IDC_SET_BRADY_PAV_DELAY_LOW: 180 MS
MDC_IDC_SET_BRADY_SAV_DELAY_LOW: 140 MS
MDC_IDC_SET_CRT_PACED_CHAMBERS: NORMAL
MDC_IDC_SET_LEADCHNL_LV_PACING_AMPLITUDE: 2.4 V
MDC_IDC_SET_LEADCHNL_LV_PACING_ANODE_ELECTRODE_1: NORMAL
MDC_IDC_SET_LEADCHNL_LV_PACING_ANODE_LOCATION_1: NORMAL
MDC_IDC_SET_LEADCHNL_LV_PACING_CAPTURE_MODE: NORMAL
MDC_IDC_SET_LEADCHNL_LV_PACING_CATHODE_ELECTRODE_1: NORMAL
MDC_IDC_SET_LEADCHNL_LV_PACING_CATHODE_LOCATION_1: NORMAL
MDC_IDC_SET_LEADCHNL_LV_PACING_PULSEWIDTH: 0.5 MS
MDC_IDC_SET_LEADCHNL_LV_SENSING_ADAPTATION_MODE: NORMAL
MDC_IDC_SET_LEADCHNL_LV_SENSING_ANODE_ELECTRODE_1: NORMAL
MDC_IDC_SET_LEADCHNL_LV_SENSING_ANODE_LOCATION_1: NORMAL
MDC_IDC_SET_LEADCHNL_LV_SENSING_CATHODE_ELECTRODE_1: NORMAL
MDC_IDC_SET_LEADCHNL_LV_SENSING_CATHODE_LOCATION_1: NORMAL
MDC_IDC_SET_LEADCHNL_LV_SENSING_SENSITIVITY: 1 MV
MDC_IDC_SET_LEADCHNL_RA_PACING_AMPLITUDE: 2.5 V
MDC_IDC_SET_LEADCHNL_RA_PACING_CAPTURE_MODE: NORMAL
MDC_IDC_SET_LEADCHNL_RA_PACING_POLARITY: NORMAL
MDC_IDC_SET_LEADCHNL_RA_PACING_PULSEWIDTH: 0.5 MS
MDC_IDC_SET_LEADCHNL_RA_SENSING_ADAPTATION_MODE: NORMAL
MDC_IDC_SET_LEADCHNL_RA_SENSING_POLARITY: NORMAL
MDC_IDC_SET_LEADCHNL_RA_SENSING_SENSITIVITY: 0.25 MV
MDC_IDC_SET_LEADCHNL_RV_PACING_AMPLITUDE: 2 V
MDC_IDC_SET_LEADCHNL_RV_PACING_CAPTURE_MODE: NORMAL
MDC_IDC_SET_LEADCHNL_RV_PACING_POLARITY: NORMAL
MDC_IDC_SET_LEADCHNL_RV_PACING_PULSEWIDTH: 0.4 MS
MDC_IDC_SET_LEADCHNL_RV_SENSING_ADAPTATION_MODE: NORMAL
MDC_IDC_SET_LEADCHNL_RV_SENSING_POLARITY: NORMAL
MDC_IDC_SET_LEADCHNL_RV_SENSING_SENSITIVITY: 0.6 MV
MDC_IDC_SET_ZONE_DETECTION_INTERVAL: 250 MS
MDC_IDC_SET_ZONE_DETECTION_INTERVAL: 300 MS
MDC_IDC_SET_ZONE_DETECTION_INTERVAL: 353 MS
MDC_IDC_SET_ZONE_STATUS: NORMAL
MDC_IDC_SET_ZONE_TYPE: NORMAL
MDC_IDC_SET_ZONE_VENDOR_TYPE: NORMAL
MDC_IDC_STAT_AT_BURDEN_PERCENT: 1 %
MDC_IDC_STAT_AT_DTM_END: NORMAL
MDC_IDC_STAT_AT_DTM_START: NORMAL
MDC_IDC_STAT_BRADY_DTM_END: NORMAL
MDC_IDC_STAT_BRADY_DTM_START: NORMAL
MDC_IDC_STAT_BRADY_RA_PERCENT_PACED: 0 %
MDC_IDC_STAT_BRADY_RV_PERCENT_PACED: 11 %
MDC_IDC_STAT_CRT_DTM_END: NORMAL
MDC_IDC_STAT_CRT_DTM_START: NORMAL
MDC_IDC_STAT_CRT_LV_PERCENT_PACED: 96 %
MDC_IDC_STAT_EPISODE_RECENT_COUNT: 0
MDC_IDC_STAT_EPISODE_RECENT_COUNT: 1
MDC_IDC_STAT_EPISODE_RECENT_COUNT_DTM_END: NORMAL
MDC_IDC_STAT_EPISODE_RECENT_COUNT_DTM_START: NORMAL
MDC_IDC_STAT_EPISODE_TYPE: NORMAL
MDC_IDC_STAT_EPISODE_VENDOR_TYPE: NORMAL
MDC_IDC_STAT_TACHYTHERAPY_ATP_DELIVERED_RECENT: 0
MDC_IDC_STAT_TACHYTHERAPY_ATP_DELIVERED_TOTAL: 0
MDC_IDC_STAT_TACHYTHERAPY_RECENT_DTM_END: NORMAL
MDC_IDC_STAT_TACHYTHERAPY_RECENT_DTM_START: NORMAL
MDC_IDC_STAT_TACHYTHERAPY_SHOCKS_ABORTED_RECENT: 0
MDC_IDC_STAT_TACHYTHERAPY_SHOCKS_ABORTED_TOTAL: 0
MDC_IDC_STAT_TACHYTHERAPY_SHOCKS_DELIVERED_RECENT: 0
MDC_IDC_STAT_TACHYTHERAPY_SHOCKS_DELIVERED_TOTAL: 0
MDC_IDC_STAT_TACHYTHERAPY_TOTAL_DTM_END: NORMAL
MDC_IDC_STAT_TACHYTHERAPY_TOTAL_DTM_START: NORMAL
NT-PROBNP SERPL-MCNC: 509 PG/ML (ref 0–900)
PLATELET # BLD AUTO: 172 10E3/UL (ref 150–450)
POTASSIUM SERPL-SCNC: 4.3 MMOL/L (ref 3.4–5.3)
PROT SERPL-MCNC: 7.4 G/DL (ref 6.4–8.3)
RBC # BLD AUTO: 5.13 10E6/UL (ref 4.4–5.9)
SODIUM SERPL-SCNC: 139 MMOL/L (ref 135–145)
WBC # BLD AUTO: 7.3 10E3/UL (ref 4–11)

## 2024-06-25 PROCEDURE — 85027 COMPLETE CBC AUTOMATED: CPT | Performed by: PATHOLOGY

## 2024-06-25 PROCEDURE — 83880 ASSAY OF NATRIURETIC PEPTIDE: CPT | Performed by: PATHOLOGY

## 2024-06-25 PROCEDURE — 36415 COLL VENOUS BLD VENIPUNCTURE: CPT | Performed by: PATHOLOGY

## 2024-06-25 PROCEDURE — 99215 OFFICE O/P EST HI 40 MIN: CPT | Mod: 25 | Performed by: INTERNAL MEDICINE

## 2024-06-25 PROCEDURE — 93284 PRGRMG EVAL IMPLANTABLE DFB: CPT | Performed by: INTERNAL MEDICINE

## 2024-06-25 PROCEDURE — 93306 TTE W/DOPPLER COMPLETE: CPT | Performed by: INTERNAL MEDICINE

## 2024-06-25 PROCEDURE — 80053 COMPREHEN METABOLIC PANEL: CPT | Performed by: PATHOLOGY

## 2024-06-25 PROCEDURE — 99214 OFFICE O/P EST MOD 30 MIN: CPT | Performed by: INTERNAL MEDICINE

## 2024-06-25 RX ADMIN — Medication 6 ML: at 08:19

## 2024-06-25 ASSESSMENT — PAIN SCALES - GENERAL: PAINLEVEL: NO PAIN (0)

## 2024-06-25 NOTE — PROGRESS NOTES
June 25th 2024:     Mr. Anson Manriquez is a 66yr old male with a history of HTN, DMII, CAD (s/p STEMI in 2007 with pLAD stenting, and recent STEMI 9/2018), and ICM complicated by cardiogenic shock requiring IABP support with slow wean.      Mr. Manriquez was was out with friends and developed acute chest pain and after going home his pain worsened and had syncope, then cardiac arrest. EMS was called, and on arrival provided ~30 seconds of CPR and AED delivered 1 shock.  He was then brought to [facility] in sinus rhythm with a pulse for most of the transport.  During the last 5 mins en route to the ED, he went into a wide-complex tachycardia at a rate of 220bpm, likely VT.  He maintained a pulse, acceptable BP, and mental status.  He received amiodarone 150mg in the ED, and converted to NSR as he was being prepared for cardioversion.  He was then sent to the cath lab, where he was found to have an acute thrombotic lesion of the pLAD within the prior stent.  He was treated with aspiration thrombectomy, and STEVO x2 to pLAD and mLAD, and POBA to D1.  His LVEDP was 40mmHg. Peak troponin was ~17.  Echo showed LVEF 23% with LAD territory akinesis, consistent with echo results from 2012.  Therefore, his LAD had limited viability from his prior MI, which explains his relatively low troponin and unchanged LVEF. He was ultimately started on DAPT with aspirin and Brilinta, lisinopril, carvedilol, and lasix, and discharged home on 9/20/18.   He has been seen in clinic a few times now, with EF still 30-35%.  Despite evidence based therapy and his EF remained reduced, therefore he went for CRT-D with Dr. Mckeon 4/25/19.      Since we last saw him on October 10 2023, he has been doing well with no ED visits and no HF hospitalizations. He underwent an opthalmo procedure and is doing well. 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 He denies any chest pain, dizziness, lightheadedness, palpitation, device shocks, and  SOB.    PAST MEDICAL HISTORY:  Past Medical History:   Diagnosis Date    Anal fistula     Antiplatelet or antithrombotic long-term use     Coronary artery disease     Diabetes mellitus, type 2 (H)     Heart attack (H) 4/17/2007    Hyperlipidemia     Hypertension     Inguinal hernia     Ischemic cardiomyopathy     STEMI (ST elevation myocardial infarction) (H) 09/13/2018    s/p STEVO x2    Stented coronary artery 2007     FAMILY HISTORY:  Family History   Problem Relation Age of Onset    Hypertension Mother     Diabetes Father     Glaucoma No family hx of     Macular Degeneration No family hx of      SOCIAL HISTORY:  Social History     Socioeconomic History    Marital status:    Tobacco Use    Smoking status: Never    Smokeless tobacco: Never   Substance and Sexual Activity    Alcohol use: No    Drug use: No     CURRENT MEDICATIONS:  Current Outpatient Medications   Medication Sig Dispense Refill    aspirin (ASA) 81 MG chewable tablet Take 1 tablet (81 mg) by mouth daily 90 tablet 4    atorvastatin (LIPITOR) 80 MG tablet Take 1 tablet (80 mg) by mouth daily 90 tablet 4    carvedilol (COREG) 25 MG tablet Take 1 tablet (25 mg) by mouth 2 times daily (with meals) 180 tablet 4    empagliflozin (JARDIANCE) 25 MG TABS tablet Take 1 tablet (25 mg) by mouth daily 90 tablet 4    furosemide (LASIX) 40 MG tablet Take 1 tablet (40 mg) by mouth 2 times daily 180 tablet 4    glipiZIDE (GLUCOTROL XL) 5 MG 24 hr tablet Take 1 tablet (5 mg) by mouth 2 times daily 180 tablet 0    MULTIPLE VITAMIN PO Take 1 tablet by mouth every morning       sacubitril-valsartan (ENTRESTO)  MG per tablet Take 1 tablet by mouth 2 times daily 180 tablet 2    Semaglutide, 1 MG/DOSE, (OZEMPIC) 4 MG/3ML pen Inject 1 mg Subcutaneous every 7 days 6 mL 11    Semaglutide, 2 MG/DOSE, (OZEMPIC) 8 MG/3ML pen Inject 2 mg Subcutaneous every 7 days 9 mL 3    spironolactone (ALDACTONE) 25 MG tablet Take 1 tablet (25 mg) by mouth daily 90 tablet 4     No  current facility-administered medications for this visit.     Facility-Administered Medications Ordered in Other Visits   Medication Dose Route Frequency Provider Last Rate Last Admin    perflutren diluted 1mL to 2mL with saline (OPTISON) diluted injection 5 mL  5 mL Intravenous Once Chris Reece MD        sodium chloride (PF) 0.9% PF flush 10 mL  10 mL Intravenous Once Julissa Rodriguez MD        sodium chloride (PF) 0.9% PF flush 10 mL  10 mL Intracatheter Once Jason Matos MD         ROS:   Constitutional: No fever, chills, or sweats.   ENT: No visual disturbance, ear ache, epistaxis, sore throat.   Allergies/Immunologic: Negative.   Respiratory: No cough, hemoptysis.   Cardiovascular: As per HPI.   GI: No nausea, vomiting, hematemesis, melena, or hematochezia.   : No urinary frequency, dysuria, or hematuria.   Integument: Negative.   Psychiatric: Pleasant, no major depression noted  Neuro: No focal neurological deficits noted  Endocrinology: Negative.   Musculoskeletal: As per HPI.      EXAM:  There were no vitals taken for this visit.  General: appears comfortable, alert and oriented  Head: normocephalic, atraumatic  Eyes: anicteric sclera, EOMI , PERRL  Neck: no adenopathy  Orophyarynx: moist mucosa, no lesions noted  Heart: regular, S1/S2, no murmurs, rubs or gallop. Estimated JVP at 5 cmH2O  Lungs: CTAB, No wheezing.   Abdomen: soft, non-tender, bowel sounds present, no hepatosplenomegaly  Extremities: No LE edema today  Skin: no open lesions noted  Neuro: grossly non-focal     Labs:  Lab Results   Component Value Date    WBC 7.8 10/10/2023    HGB 17.0 10/10/2023    HCT 49.7 10/10/2023     10/10/2023     10/10/2023    POTASSIUM 4.7 10/10/2023    CHLORIDE 101 10/10/2023    CO2 26 10/10/2023    BUN 18.1 10/10/2023    CR 0.94 10/10/2023     (H) 10/10/2023    NTBNP 336 10/10/2023    TROPONIN 0.14 (HH) 09/13/2018    TROPI 1.914 (HH) 09/18/2018    AST 17 10/10/2023    ALT 25  10/10/2023    ALKPHOS 60 10/10/2023    BILITOTAL 1.0 10/10/2023    INR 1.10 04/05/2021     Coronary angio:  9/13/18  -Both coronary arteries arise from their respective cusps.  -Dominance: Right  -LM has no evidence of coronary artery disease.  -LAD: Type 3 [LAD supplies the entire apex]. The LAD gives rise to septal perforators, multiple diagonal branches. The proximal LAD has an acute thrombotic lesion within the prior stent. Distal to the stent, there is a 70-80% stenosis. The distal vessel has HALIMA 2 flow. The D1 has thrombus at the ostium likely from embolization. The apical LAD also has thrombus from embolization.  -LCX gives rise to a large branching OM. The prox Cx has a 20% stenosis. The rest of the Cx system has mild disease.  -RCA (dominant) gives rise to PL branches and supplies PDA. There is minimal disease in the RCA system.     Echo:  9/14/18  Ischemic cardiomyopathy, LVEF=23% with extensive regional wall motion abnormalities as described below. Resting wall motion abnormalities and LV function are not significantly changed from the rest images on the 11/27/12 stress echocardiogram.  Mildly dilated LV with severely reduced LV function, LVEF=23%. There is akinesis involving the rsmre-tf-vyc anteroseptal, zei-re-rgzqgt anterior, mid anterolateral, and all apical myocardial segments. The basal anterior, basal anterolateral, and basal inferolateral segments are relatively spared. RV size and function are normal. No significant valvular abnormalities. No pericardial effusion. Normal IVC with abnormal respiratory variation, estimated RA pressure 8 mmHg.     Echo 1/30/19   Ischemic cardiomyopathy with moderately dilated LV with moderately reduced  global LV function, LVEF=31%. Extensive regional wall motion abnormalities as  described below, unchanged from 9/14/18.  This study was compared with the study from 9/14/18: There has been no  significant change. Specifically, LV function and wall motion  abnormalities  have not changed significantly.  The right atria appears normal. Severe left atrial enlargement is present. This study was compared with the study from 9/14/18 . There has been no significant change.     TTE 1/22/2020:  Ischemic cardiomyopathy with moderately dilated LV with moderately reduced  global LV function, LVEF=29%. Extensive regional wall motion abnormalities as  described below, unchanged from the prior study.  This study was compared with the study from 8/28/19: There has been no  significant change. Specifically, LV function and wall motion abnormalities  have not changed significantly.    Remote ICD transmission received and reviewed. Device transmission sent per MD orders.      1/25/2024 device:    Device: [employer] G447 RESONATE X4 CRT-D  Normal Device Function  Mode: DDD  bpm  AP: 0%  RVP: 15%  LVP: 99%  Presenting EGM: AS-LVP @ 105 bpm  Lead Trends Appear Stable: Yes  Estimated battery longevity to RRT = 6.5 years.  Atrial Arrhythmia: 1 AT/AF episode recorded - 8 seconds.  AF Carlin: <1%  Anticoagulant: None  Ventricular Arrhythmia: 0  Pt Notified of Transmission Results: MyChart     Cardiac Device 6/25/2024:  [employer] G447 RESONATE X4 CRT-D (Programmed LV only)  Normal device function.   Mode: DDD  bpm  AP: <1%  LVP: 96%  Intrinsic Rhythm:  bpm  Lead Trends Appear Stable.   Estimated battery longevity to RRT = 6.5 years.    Atrial Arrhythmia: 2 AT lasting 2-6 seconds.  AF Carlin: <1%  Anticoagulant: None  Ventricular Arrhythmia: 1 NSVT lasting 6 seconds at 180 bpm on 3/28/24.  Setting Changes: None  Patient has an appointment to see Dr. Matos today.     ASSESSMENT AND PLAN:  Mr. Anson Manriquez is a 66yr old male with a history of HTN, DMII, CAD (s/p STEMI in 2007 with pLAD stenting, and recent STEMI 9/2018), and ICM who presents for follow up visit.  Overall he has been doing very well without any new complaints feeling well no chest pain remains  active no shortness of breath.  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  No medication problems.  Again he says that he sees the best he has felt for a long time.  Please note that the blood pressure is a little bit on the higher end today however this is something unusual for him usually it is running way lower than this.  As such we will not make any medication adjustments today.  However we will repeat an echocardiogram in 6 months when he comes back and sees me in clinic as he had not had any recheck for a while.  We are also giving him refills as he had difficulties obtaining medications at times.    #ICM  #HFrEF with an EF of 30-35%  Patient with NYHA Class II symptoms and overall doing well on OGDT. 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 From a symptoms perspective, he is at a good baseline. His BP is 100's today, He is on GDMT appropriate doses and tolerating them well. His left ventricular pacing at 96% based on his device check today and only 1 run of NSVT lasting 6 seconds in march.   - Coreg 25mg BID  - Aldactone 25mg qday  - Entresto 97 mg twice daily  - On Empagiflozin 25mg qday.   He has been tolerating this higher dose well without any complaints.  There is no evidence of infection or infections in the past.  If he does have these then we will consider consider either stopping or reducing the dose to 10 mg daily.  - SCD: s/p CRT-D on 9/2019  - He has warm dry hemodynamic profile.      CAD s/p pLAD stent  -ASA 81mg qday continue  - Stop Brilinta Brillanta in the past  - Lipitor 80mg qday continue     We will see him back in 6 months with labs.  I appreciate the opportunity to participate in the care of Anson Manriquez . Please do not hesitate to contact me with any further questions.     Sincerely,   Andrea Singletary MD  Cardiology fellow     Patient seen and discussed with  Jason Matos MD  [facility] Division of Cardiology     I have seen and evaluated the patient and agree with the assessment and plan.  I appreciate the opportunity to participate in the care of Corin Durant . Please do not hesitate to contact me with any further questions.  I have spent a total of 40 minutes today reviewing labs, imaging studies, discussing with colleagues, face-to-face time with patient and documentation in the medical record.  The longitudinal plan of care for the diagnosis(es)/condition(s) as documented were addressed during this visit. Due to the added complexity in care, I will continue to support Corin in the subsequent management and with ongoing continuity of care.

## 2024-06-25 NOTE — LETTER
6/25/2024      RE: Anson Manriquez  [address]  [facility] [address]       Dear Colleague,    Thank you for the opportunity to participate in the care of your patient, Anson Manriquez, at the [facility] HEART [facility]. Please see a copy of my visit note below.    June 25th 2024:     Mr. Anson Manriquez is a 66yr old male with a history of HTN, DMII, CAD (s/p STEMI in 2007 with pLAD stenting, and recent STEMI 9/2018), and ICM complicated by cardiogenic shock requiring IABP support with slow wean.      Mr. Manriquez was was out with friends and developed acute chest pain and after going home his pain worsened and had syncope, then cardiac arrest. EMS was called, and on arrival provided ~30 seconds of CPR and AED delivered 1 shock.  He was then brought to [facility] in sinus rhythm with a pulse for most of the transport.  During the last 5 mins en route to the ED, he went into a wide-complex tachycardia at a rate of 220bpm, likely VT.  He maintained a pulse, acceptable BP, and mental status.  He received amiodarone 150mg in the ED, and converted to NSR as he was being prepared for cardioversion.  He was then sent to the cath lab, where he was found to have an acute thrombotic lesion of the pLAD within the prior stent.  He was treated with aspiration thrombectomy, and STEVO x2 to pLAD and mLAD, and POBA to D1.  His LVEDP was 40mmHg. Peak troponin was ~17.  Echo showed LVEF 23% with LAD territory akinesis, consistent with echo results from 2012.  Therefore, his LAD had limited viability from his prior MI, which explains his relatively low troponin and unchanged LVEF. He was ultimately started on DAPT with aspirin and Brilinta, lisinopril, carvedilol, and lasix, and discharged home on 9/20/18.   He has been seen in clinic a few times now, with EF still 30-35%.  Despite evidence based therapy and his EF remained reduced, therefore he went for CRT-D with   Linda 4/25/19.      Since we last saw him on October 10 2023, he has been doing well with no ED visits and no HF hospitalizations. He underwent an opthalmo procedure and is doing well. 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 He denies any chest pain, dizziness, lightheadedness, palpitation, device shocks, and SOB.    PAST MEDICAL HISTORY:  Past Medical History:   Diagnosis Date     Anal fistula      Antiplatelet or antithrombotic long-term use      Coronary artery disease      Diabetes mellitus, type 2 (H)      Heart attack (H) 4/17/2007     Hyperlipidemia      Hypertension      Inguinal hernia      Ischemic cardiomyopathy      STEMI (ST elevation myocardial infarction) (H) 09/13/2018    s/p STEVO x2     Stented coronary artery 2007     FAMILY HISTORY:  Family History   Problem Relation Age of Onset     Hypertension Mother      Diabetes Father      Glaucoma No family hx of      Macular Degeneration No family hx of      SOCIAL HISTORY:  Social History     Socioeconomic History     Marital status:    Tobacco Use     Smoking status: Never     Smokeless tobacco: Never   Substance and Sexual Activity     Alcohol use: No     Drug use: No     CURRENT MEDICATIONS:  Current Outpatient Medications   Medication Sig Dispense Refill     aspirin (ASA) 81 MG chewable tablet Take 1 tablet (81 mg) by mouth daily 90 tablet 4     atorvastatin (LIPITOR) 80 MG tablet Take 1 tablet (80 mg) by mouth daily 90 tablet 4     carvedilol (COREG) 25 MG tablet Take 1 tablet (25 mg) by mouth 2 times daily (with meals) 180 tablet 4     empagliflozin (JARDIANCE) 25 MG TABS tablet Take 1 tablet (25 mg) by mouth daily 90 tablet 4     furosemide (LASIX) 40 MG tablet Take 1 tablet (40 mg) by mouth 2 times daily 180 tablet 4     glipiZIDE (GLUCOTROL XL) 5 MG 24 hr tablet Take 1 tablet (5 mg) by mouth 2 times daily 180 tablet 0     MULTIPLE VITAMIN PO Take 1 tablet by mouth every morning        sacubitril-valsartan  (ENTRESTO)  MG per tablet Take 1 tablet by mouth 2 times daily 180 tablet 2     Semaglutide, 1 MG/DOSE, (OZEMPIC) 4 MG/3ML pen Inject 1 mg Subcutaneous every 7 days 6 mL 11     Semaglutide, 2 MG/DOSE, (OZEMPIC) 8 MG/3ML pen Inject 2 mg Subcutaneous every 7 days 9 mL 3     spironolactone (ALDACTONE) 25 MG tablet Take 1 tablet (25 mg) by mouth daily 90 tablet 4     No current facility-administered medications for this visit.     Facility-Administered Medications Ordered in Other Visits   Medication Dose Route Frequency Provider Last Rate Last Admin     perflutren diluted 1mL to 2mL with saline (OPTISON) diluted injection 5 mL  5 mL Intravenous Once Chris Reece MD         sodium chloride (PF) 0.9% PF flush 10 mL  10 mL Intravenous Once Julissa Rodriguez MD         sodium chloride (PF) 0.9% PF flush 10 mL  10 mL Intracatheter Once Jason Matos MD         ROS:   Constitutional: No fever, chills, or sweats.   ENT: No visual disturbance, ear ache, epistaxis, sore throat.   Allergies/Immunologic: Negative.   Respiratory: No cough, hemoptysis.   Cardiovascular: As per HPI.   GI: No nausea, vomiting, hematemesis, melena, or hematochezia.   : No urinary frequency, dysuria, or hematuria.   Integument: Negative.   Psychiatric: Pleasant, no major depression noted  Neuro: No focal neurological deficits noted  Endocrinology: Negative.   Musculoskeletal: As per HPI.      EXAM:  There were no vitals taken for this visit.  General: appears comfortable, alert and oriented  Head: normocephalic, atraumatic  Eyes: anicteric sclera, EOMI , PERRL  Neck: no adenopathy  Orophyarynx: moist mucosa, no lesions noted  Heart: regular, S1/S2, no murmurs, rubs or gallop. Estimated JVP at 5 cmH2O  Lungs: CTAB, No wheezing.   Abdomen: soft, non-tender, bowel sounds present, no hepatosplenomegaly  Extremities: No LE edema today  Skin: no open lesions noted  Neuro: grossly non-focal     Labs:  Lab Results   Component Value Date    WBC  7.8 10/10/2023    HGB 17.0 10/10/2023    HCT 49.7 10/10/2023     10/10/2023     10/10/2023    POTASSIUM 4.7 10/10/2023    CHLORIDE 101 10/10/2023    CO2 26 10/10/2023    BUN 18.1 10/10/2023    CR 0.94 10/10/2023     (H) 10/10/2023    NTBNP 336 10/10/2023    TROPONIN 0.14 (HH) 09/13/2018    TROPI 1.914 (HH) 09/18/2018    AST 17 10/10/2023    ALT 25 10/10/2023    ALKPHOS 60 10/10/2023    BILITOTAL 1.0 10/10/2023    INR 1.10 04/05/2021     Coronary angio:  9/13/18  -Both coronary arteries arise from their respective cusps.  -Dominance: Right  -LM has no evidence of coronary artery disease.  -LAD: Type 3 [LAD supplies the entire apex]. The LAD gives rise to septal perforators, multiple diagonal branches. The proximal LAD has an acute thrombotic lesion within the prior stent. Distal to the stent, there is a 70-80% stenosis. The distal vessel has HALIMA 2 flow. The D1 has thrombus at the ostium likely from embolization. The apical LAD also has thrombus from embolization.  -LCX gives rise to a large branching OM. The prox Cx has a 20% stenosis. The rest of the Cx system has mild disease.  -RCA (dominant) gives rise to PL branches and supplies PDA. There is minimal disease in the RCA system.     Echo:  9/14/18  Ischemic cardiomyopathy, LVEF=23% with extensive regional wall motion abnormalities as described below. Resting wall motion abnormalities and LV function are not significantly changed from the rest images on the 11/27/12 stress echocardiogram.  Mildly dilated LV with severely reduced LV function, LVEF=23%. There is akinesis involving the pytrc-wl-edd anteroseptal, pua-wh-jtwhbt anterior, mid anterolateral, and all apical myocardial segments. The basal anterior, basal anterolateral, and basal inferolateral segments are relatively spared. RV size and function are normal. No significant valvular abnormalities. No pericardial effusion. Normal IVC with abnormal respiratory variation, estimated RA  pressure 8 mmHg.     Echo 1/30/19   Ischemic cardiomyopathy with moderately dilated LV with moderately reduced  global LV function, LVEF=31%. Extensive regional wall motion abnormalities as  described below, unchanged from 9/14/18.  This study was compared with the study from 9/14/18: There has been no  significant change. Specifically, LV function and wall motion abnormalities  have not changed significantly.  The right atria appears normal. Severe left atrial enlargement is present. This study was compared with the study from 9/14/18 . There has been no significant change.     TTE 1/22/2020:  Ischemic cardiomyopathy with moderately dilated LV with moderately reduced  global LV function, LVEF=29%. Extensive regional wall motion abnormalities as  described below, unchanged from the prior study.  This study was compared with the study from 8/28/19: There has been no  significant change. Specifically, LV function and wall motion abnormalities  have not changed significantly.    Remote ICD transmission received and reviewed. Device transmission sent per MD orders.      1/25/2024 device:    Device: [employer] G447 RESONATE X4 CRT-D  Normal Device Function  Mode: DDD  bpm  AP: 0%  RVP: 15%  LVP: 99%  Presenting EGM: AS-LVP @ 105 bpm  Lead Trends Appear Stable: Yes  Estimated battery longevity to RRT = 6.5 years.  Atrial Arrhythmia: 1 AT/AF episode recorded - 8 seconds.  AF [place]: <1%  Anticoagulant: None  Ventricular Arrhythmia: 0  Pt Notified of Transmission Results: MyChart     Cardiac Device 6/25/2024:  [employer] G447 RESONATE X4 CRT-D (Programmed LV only)  Normal device function.   Mode: DDD  bpm  AP: <1%  LVP: 96%  Intrinsic Rhythm:  bpm  Lead Trends Appear Stable.   Estimated battery longevity to RRT = 6.5 years.    Atrial Arrhythmia: 2 AT lasting 2-6 seconds.  AF [place]: <1%  Anticoagulant: None  Ventricular Arrhythmia: 1 NSVT lasting 6 seconds at 180 bpm on 3/28/24.  Setting  Changes: None  Patient has an appointment to see Dr. Matos today.     ASSESSMENT AND PLAN:  Mr. Anson Manriquez is a 66yr old male with a history of HTN, DMII, CAD (s/p STEMI in 2007 with pLAD stenting, and recent STEMI 9/2018), and ICM who presents for follow up visit.  Overall he has been doing very well without any new complaints feeling well no chest pain remains active no shortness of breath.  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  No medication problems.  Again he says that he sees the best he has felt for a long time.  Please note that the blood pressure is a little bit on the higher end today however this is something unusual for him usually it is running way lower than this.  As such we will not make any medication adjustments today.  However we will repeat an echocardiogram in 6 months when he comes back and sees me in clinic as he had not had any recheck for a while.  We are also giving him refills as he had difficulties obtaining medications at times.    #ICM  #HFrEF with an EF of 30-35%  Patient with NYHA Class II symptoms and overall doing well on OGDT. 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 From a symptoms perspective, he is at a good baseline. His BP is 100's today, He is on GDMT appropriate doses and tolerating them well. His left ventricular pacing at 96% based on his device check today and only 1 run of NSVT lasting 6 seconds in march.   - Coreg 25mg BID  - Aldactone 25mg qday  - Entresto 97 mg twice daily  - On Empagiflozin 25mg qday.   He has been tolerating this higher dose well without any complaints.  There is no evidence of infection or infections in the past.  If he does have these then we will consider consider either stopping or reducing the dose to 10 mg daily.  - SCD: s/p CRT-D on 9/2019  - He has warm dry hemodynamic profile.      CAD s/p pLAD stent  -ASA 81mg  qday continue  - Stop Edson Nationmartinevanessa in the past  - Lipitor 80mg qday continue     We will see him back in 6 months with labs.  I appreciate the opportunity to participate in the care of Anson Manriquez . Please do not hesitate to contact me with any further questions.     Sincerely,   Andrea Singletary MD  Cardiology fellow     Patient seen and discussed with Jason Matos MD  [facility] Division of Cardiology     I have seen and evaluated the patient and agree with the assessment and plan.  I appreciate the opportunity to participate in the care of Corin Durant . Please do not hesitate to contact me with any further questions.  I have spent a total of 40 minutes today reviewing labs, imaging studies, discussing with colleagues, face-to-face time with patient and documentation in the medical record.  The longitudinal plan of care for the diagnosis(es)/condition(s) as documented were addressed during this visit. Due to the added complexity in care, I will continue to support Corin in the subsequent management and with ongoing continuity of care.      Please do not hesitate to contact me if you have any questions/concerns.     Sincerely,     Jason Matos MD

## 2024-06-25 NOTE — PATIENT INSTRUCTIONS
It was a pleasure to see you in clinic today. Please do not hesitate to call with any questions or concerns.     Malka Bradford, RN  Electrophysiology Nurse Clinician  [facility]  During business hours call:  [phone]  Urgent needs after hours- please call: [phone]- select option #4 and ask for job code [id number].

## 2024-06-25 NOTE — PATIENT INSTRUCTIONS
Dr. Matos recommends:    Follow up clinic visit with Dr. Matos in 6 months with labs the same day.    Thank you for your visit today.  Please call me with any questions or concerns.   Vladimir Servin RN  Cardiology Care Coordinator  [phone]

## 2024-06-25 NOTE — NURSING NOTE
Chief Complaint   Patient presents with    Follow Up     RETURN HEART FAILURE - lab device check and echo prior. 6 month follow up       Vitals were taken, medications reconciled.    Marly Liang, Clinic Assistant   8:51 AM

## 2024-07-11 ENCOUNTER — TELEPHONE (OUTPATIENT)
Dept: OPHTHALMOLOGY | Facility: CLINIC | Age: 69
End: 2024-07-11
Payer: COMMERCIAL

## 2024-07-16 ENCOUNTER — OFFICE VISIT (OUTPATIENT)
Dept: OPHTHALMOLOGY | Facility: CLINIC | Age: 69
End: 2024-07-16
Attending: PHYSICIAN ASSISTANT
Payer: COMMERCIAL

## 2024-07-16 ENCOUNTER — PRE VISIT (OUTPATIENT)
Dept: OPHTHALMOLOGY | Facility: CLINIC | Age: 69
End: 2024-07-16

## 2024-07-16 DIAGNOSIS — H52.4 PRESBYOPIA OF BOTH EYES: ICD-10-CM

## 2024-07-16 DIAGNOSIS — E11.3293 MILD NONPROLIFERATIVE DIABETIC RETINOPATHY OF BOTH EYES WITHOUT MACULAR EDEMA ASSOCIATED WITH TYPE 2 DIABETES MELLITUS (H): Primary | ICD-10-CM

## 2024-07-16 DIAGNOSIS — E11.65 TYPE 2 DIABETES MELLITUS WITH HYPERGLYCEMIA, WITHOUT LONG-TERM CURRENT USE OF INSULIN (H): ICD-10-CM

## 2024-07-16 DIAGNOSIS — H25.13 NUCLEAR SCLEROTIC CATARACT OF BOTH EYES: ICD-10-CM

## 2024-07-16 DIAGNOSIS — H52.223 REGULAR ASTIGMATISM OF BOTH EYES: ICD-10-CM

## 2024-07-16 PROCEDURE — 92014 COMPRE OPH EXAM EST PT 1/>: CPT | Performed by: OPHTHALMOLOGY

## 2024-07-16 ASSESSMENT — CONF VISUAL FIELD
COMMENTS: SOME PEAKING
OD_INFERIOR_NASAL_RESTRICTION: 0
METHOD: COUNTING FINGERS
OS_INFERIOR_NASAL_RESTRICTION: 0
OS_SUPERIOR_TEMPORAL_RESTRICTION: 0
OS_NORMAL: 1
OD_INFERIOR_TEMPORAL_RESTRICTION: 0
OD_SUPERIOR_NASAL_RESTRICTION: 0
OD_SUPERIOR_TEMPORAL_RESTRICTION: 0
OS_INFERIOR_TEMPORAL_RESTRICTION: 0
OD_NORMAL: 1
OS_SUPERIOR_NASAL_RESTRICTION: 0

## 2024-07-16 ASSESSMENT — EXTERNAL EXAM - LEFT EYE: OS_EXAM: NORMAL

## 2024-07-16 ASSESSMENT — REFRACTION_MANIFEST
OD_CYLINDER: +0.50
OS_CYLINDER: +0.50
OS_ADD: +2.50
OS_AXIS: 121
OD_AXIS: 015
OD_SPHERE: -0.25
OS_SPHERE: PLANO
OD_ADD: +2.50

## 2024-07-16 ASSESSMENT — VISUAL ACUITY
OS_SC+: -1
OD_SC+: -2
OD_SC: 20/20
OS_SC: 20/20
OS_SC: J7
METHOD: SNELLEN - LINEAR
OD_SC: J10

## 2024-07-16 ASSESSMENT — CUP TO DISC RATIO: OS_RATIO: 0.5

## 2024-07-16 ASSESSMENT — TONOMETRY
OD_IOP_MMHG: 15
OS_IOP_MMHG: 13
IOP_METHOD: ICARE

## 2024-07-16 ASSESSMENT — EXTERNAL EXAM - RIGHT EYE: OD_EXAM: NORMAL

## 2024-07-16 NOTE — PROGRESS NOTES
HPI:  Anson Manriquez is a 68 year old male here for comprehensive eye exam for diabetes mellitus.   HPI       COMPREHENSIVE EYE EXAM    In both eyes.  Associated symptoms include Negative for dryness.  Treatments tried include no treatments.  Pain was noted as 0/10. Additional comments: Comprehensive exam/ Diabetic              Comments    Considering glasses to sharpen up vision when shooting pool.   Lab Results       Component                Value               Date                       A1C                      8.7                 10/10/2023                 A1C                      9.3                 04/26/2022                 A1C                      9.0                 10/26/2021                 A1C                      8.0                 10/29/2020                 A1C                      8.0                 02/11/2020                 A1C                      7.3                 11/06/2019                 A1C                      7.5                 06/18/2019                 A1C                      7.6                 05/09/2019            Vision has remained  stable with each eye the past year.   OTC readers working fine for near.     Some watering with each eye with being in the sun a lot.     TEX Issa COT 12:04 PM July 16, 2024                 Last edited by Autumn Hansen COT on 7/16/2024 12:04 PM.            PMH:    Past Medical History:   Diagnosis Date    Anal fistula     Antiplatelet or antithrombotic long-term use     Coronary artery disease     Diabetes mellitus, type 2 (H)     Heart attack (H) 4/17/2007    Hyperlipidemia     Hypertension     Inguinal hernia     Ischemic cardiomyopathy     STEMI (ST elevation myocardial infarction) (H) 09/13/2018    s/p STEVO x2    Stented coronary artery 2007      POH:  Glasses for reading, mild cataract, mild drusen, no surgery, no trauma, tearing  Oc Meds:  none  FH:  Denies any glaucoma, age related macular degeneration, or other known eye diseases        Assessment & Plan      1. Mild nonproliferative diabetic retinopathy of both eyes without macular edema associated with type 2 diabetes mellitus (H) - Both Eyes    2. Type 2 diabetes mellitus with hyperglycemia, without long-term current use of insulin (H) - Both Eyes    3. Nuclear sclerotic cataract of both eyes - Both Eyes    4. Presbyopia of both eyes - Both Eyes    5. Regular astigmatism of both eyes - Both Eyes       Comment; A1c up, Discussed the importance of tighter blood glucose levels; blood pressure and cholesterol control in the prevention of diabetic retinopathy.    Plan:  Recommend yearly dilated eye exam.        (H25.13) Nuclear sclerotic cataract of both eyes - Both Eyes  Comment: mild not visually significant   Plan: follow     Regular astigmatism of both eyes - Right Eye  (H52.4) Presbyopia of both eyes - Both Eyes  Comment: small correction improves visual acuity slightly, good visual acuity without correction   Plan: manifest refraction done and prescription for glasses given or can use over the counter readers       Return in about 1 year (around 7/16/2025) for Comprehensive Exam, OCT macula OU.     Complete documentation of historical and exam elements from today's encounter can be found in the full encounter summary report (not reduplicated in this progress note). I personally obtained the chief complaint(s) and history of present illness.  I have confirmed and edited as necessary the CC, HPI, PMH/PSH, social history, FMH, ROS, and exam/neuro findings as obtained by the technician or others. I have examined this patient myself and I personally viewed the image(s) and studies listed above and the documentation reflects my findings and interpretation.  I formulated and edited as necessary the assessment and plan and discussed the findings and management plan with the patient and family.     Marielena Munroe MD

## 2024-07-16 NOTE — NURSING NOTE
Chief Complaints and History of Present Illnesses   Patient presents with    COMPREHENSIVE EYE EXAM     Comprehensive exam/ Diabetic      Chief Complaint(s) and History of Present Illness(es)       COMPREHENSIVE EYE EXAM              Laterality: both eyes    Associated symptoms: Negative for dryness    Treatments tried: no treatments    Pain scale: 0/10    Comments: Comprehensive exam/ Diabetic               Comments    Considering glasses to sharpen up vision when shooting pool.   Lab Results       Component                Value               Date                       A1C                      8.7                 10/10/2023                 A1C                      9.3                 04/26/2022                 A1C                      9.0                 10/26/2021                 A1C                      8.0                 10/29/2020                 A1C                      8.0                 02/11/2020                 A1C                      7.3                 11/06/2019                 A1C                      7.5                 06/18/2019                 A1C                      7.6                 05/09/2019            Vision has remained  stable with each eye the past year.   OTC readers working fine for near.     Some watering with each eye with being in the sun a lot.     TEX Issa COT 12:04 PM July 16, 2024

## 2024-07-29 RX ORDER — LIDOCAINE 40 MG/G
CREAM TOPICAL
Status: CANCELLED | OUTPATIENT
Start: 2024-07-29

## 2024-07-29 RX ORDER — ONDANSETRON 2 MG/ML
4 INJECTION INTRAMUSCULAR; INTRAVENOUS
Status: CANCELLED | OUTPATIENT
Start: 2024-07-29

## 2024-07-30 ASSESSMENT — CUP TO DISC RATIO: OD_RATIO: 0.4

## 2024-08-02 ENCOUNTER — TELEPHONE (OUTPATIENT)
Dept: GASTROENTEROLOGY | Facility: CLINIC | Age: 69
End: 2024-08-02
Payer: COMMERCIAL

## 2024-08-02 DIAGNOSIS — Z12.11 ENCOUNTER FOR SCREENING COLONOSCOPY: Primary | ICD-10-CM

## 2024-08-02 RX ORDER — POLYETHYLENE GLYCOL 3350 17 G/17G
POWDER, FOR SOLUTION ORAL
Qty: 238 G | Refills: 0 | Status: SHIPPED | OUTPATIENT
Start: 2024-08-02

## 2024-08-02 RX ORDER — BISACODYL 5 MG/1
TABLET, DELAYED RELEASE ORAL
Qty: 4 TABLET | Refills: 0 | Status: SHIPPED | OUTPATIENT
Start: 2024-08-02

## 2024-08-02 NOTE — TELEPHONE ENCOUNTER
Attempted to contact patient in order to complete pre assessment questions.     No answer. Left message to return call to [phone] option 4    Callback required communication sent via [employer].    Nancy Ku RN  Endoscopy Procedure Pre Assessment

## 2024-08-02 NOTE — TELEPHONE ENCOUNTER
Pre visit planning completed.      Procedure details:    Patient scheduled for Colonoscopy on 8/12/2024.     Arrival time: 1300. Procedure time 1400    Facility location: [facility] [place], [place]; [address] [place] [address], [address] Floor, [place] [address]. Check in location: Main entrance at registration desk.    Sedation type: Conscious sedation     Pre op exam needed? No.    Indication for procedure: Encounter for screening colonoscopy [Z12.11]       Chart review:     Electronic implanted devices? Yes: ICD, [employer]    Recent diagnosis of diverticulitis within the last 6 weeks? No      Medication review:    Diabetic? Yes. Diabetic medication HOLDING recommendations: Oral diabetic medications: Glipizide (glucotrol): HOLD day of procedure.  Jardiance (empagliflozin): HOLD  3 days before procedure.   Diabetic injectables: Ozempic (Semaglutide). Weekly dosing of medication.  Hold 7 days before procedure.  Follow up with managing provider.     Anticoagulants? No    Weight loss medication/injectable? Yes - Ozempic (Semaglutide). Weekly dosing of medication.  Hold 7 days before procedure.  Follow up with managing provider.     NSAIDS? No NSAID medications per patient's medication list.  RN will verify with pre-assessment call.    Other medication HOLDING recommendations:  N/A      Prep for procedure:     Bowel prep recommendation: Low Volume Extended Golytely. Bowel prep prescription sent to    [id number] [address] [facility]    Due to: GLP-1 agonist medication noted in chart.     Prep instructions sent via [employer]         Nancy Ku RN  Endoscopy Procedure Pre Assessment RN  [phone] option 4

## 2024-08-05 ENCOUNTER — TELEPHONE (OUTPATIENT)
Dept: GASTROENTEROLOGY | Facility: CLINIC | Age: 69
End: 2024-08-05
Payer: COMMERCIAL

## 2024-08-05 NOTE — TELEPHONE ENCOUNTER
Caller: No Call Made    Reason for Reschedule/Cancellation   (please be detailed, any staff messages or encounters to note?): Per RN pt informed them that he would like to cancel and will call to reschedule      Prior to reschedule please review:  Ordering Provider: Karina Calle APRN CNP  Sedation Determined: Moderate  Does patient have any ASC Exclusions, please identify?: Yes, Pacemaker      Notes on Cancelled Procedure:  Procedure: Lower Endoscopy [Colonoscopy]   Date: 08/12/2024  Location: [facility]; [address] [place] [address], 3rd Floor, [place] [id number]   Surgeon: IVANA      Rescheduled: No, Pt will call to reschedule. CASE IN DEPOT       Did you cancel or rescheduled an EUS procedure? No.

## 2024-08-05 NOTE — TELEPHONE ENCOUNTER
Per pt, will need to reschedule due to having too much going on right now with building a deck. Writer advised pt that will have canceled. Message sent to scheduling. Nancy Ku RN

## 2024-08-23 DIAGNOSIS — E11.9 TYPE 2 DIABETES MELLITUS WITHOUT COMPLICATION, WITHOUT LONG-TERM CURRENT USE OF INSULIN (H): ICD-10-CM

## 2024-08-23 NOTE — TELEPHONE ENCOUNTER
[facility] Call Center    Phone Message    May a detailed message be left on voicemail: yes     Reason for Call: Medication Refill Request    Has the patient contacted the pharmacy for the refill? Yes   Name of medication being requested: glipiZIDE (GLUCOTROL XL) 5 MG 24 hr tablet   Provider who prescribed the medication: Deon  Pharmacy:    [facility][id number] [address] AT [facility]    Date medication is needed: ASAP, will be out next week.     Action Taken: Message routed to:  [facility] Endo    Travel Screening: Not Applicable     Date of Service:

## 2024-08-23 NOTE — TELEPHONE ENCOUNTER
glipiZIDE (GLUCOTROL XL) 5 MG 24 hr tablet   Disp-180 tablet, R-0,   Start: 05/12/2024 11/14/2023  [facility] [place]      Madeline Chavarria PA-C  Endocrinology, Diabetes, and Metabolism    Routed because: request per  pt call. Dosing alert

## 2024-08-26 RX ORDER — GLIPIZIDE 5 MG/1
5 TABLET, FILM COATED, EXTENDED RELEASE ORAL 2 TIMES DAILY
Qty: 180 TABLET | Refills: 0 | Status: SHIPPED | OUTPATIENT
Start: 2024-08-26

## 2024-09-25 ENCOUNTER — ANCILLARY PROCEDURE (OUTPATIENT)
Dept: CARDIOLOGY | Facility: CLINIC | Age: 69
End: 2024-09-25
Attending: INTERNAL MEDICINE
Payer: COMMERCIAL

## 2024-09-25 DIAGNOSIS — I25.5 ISCHEMIC CARDIOMYOPATHY: ICD-10-CM

## 2024-09-25 DIAGNOSIS — I42.9 CARDIOMYOPATHY, UNSPECIFIED TYPE (H): ICD-10-CM

## 2024-09-25 PROCEDURE — 93295 DEV INTERROG REMOTE 1/2/MLT: CPT | Performed by: INTERNAL MEDICINE

## 2024-09-25 PROCEDURE — 93296 REM INTERROG EVL PM/IDS: CPT

## 2024-10-12 LAB
MDC_IDC_EPISODE_DTM: NORMAL
MDC_IDC_EPISODE_ID: NORMAL
MDC_IDC_EPISODE_TYPE: NORMAL
MDC_IDC_LEAD_CONNECTION_STATUS: NORMAL
MDC_IDC_LEAD_IMPLANT_DT: NORMAL
MDC_IDC_LEAD_LOCATION: NORMAL
MDC_IDC_LEAD_LOCATION_DETAIL_1: NORMAL
MDC_IDC_LEAD_MFG: NORMAL
MDC_IDC_LEAD_MODEL: NORMAL
MDC_IDC_LEAD_POLARITY_TYPE: NORMAL
MDC_IDC_LEAD_SERIAL: NORMAL
MDC_IDC_LEAD_SPECIAL_FUNCTION: NORMAL
MDC_IDC_MSMT_BATTERY_DTM: NORMAL
MDC_IDC_MSMT_BATTERY_REMAINING_LONGEVITY: 78 MO
MDC_IDC_MSMT_BATTERY_REMAINING_PERCENTAGE: 87 %
MDC_IDC_MSMT_BATTERY_STATUS: NORMAL
MDC_IDC_MSMT_CAP_CHARGE_DTM: NORMAL
MDC_IDC_MSMT_CAP_CHARGE_TIME: 11 S
MDC_IDC_MSMT_CAP_CHARGE_TYPE: NORMAL
MDC_IDC_MSMT_LEADCHNL_LV_IMPEDANCE_VALUE: 530 OHM
MDC_IDC_MSMT_LEADCHNL_LV_PACING_THRESHOLD_AMPLITUDE: 0.9 V
MDC_IDC_MSMT_LEADCHNL_LV_PACING_THRESHOLD_PULSEWIDTH: 0.5 MS
MDC_IDC_MSMT_LEADCHNL_RA_IMPEDANCE_VALUE: 776 OHM
MDC_IDC_MSMT_LEADCHNL_RV_IMPEDANCE_VALUE: 651 OHM
MDC_IDC_MSMT_LEADCHNL_RV_PACING_THRESHOLD_AMPLITUDE: 0.9 V
MDC_IDC_MSMT_LEADCHNL_RV_PACING_THRESHOLD_PULSEWIDTH: 0.4 MS
MDC_IDC_PG_IMPLANT_DTM: NORMAL
MDC_IDC_PG_MFG: NORMAL
MDC_IDC_PG_MODEL: NORMAL
MDC_IDC_PG_SERIAL: NORMAL
MDC_IDC_PG_TYPE: NORMAL
MDC_IDC_SESS_CLINIC_NAME: NORMAL
MDC_IDC_SESS_DTM: NORMAL
MDC_IDC_SESS_TYPE: NORMAL
MDC_IDC_SET_BRADY_AT_MODE_SWITCH_MODE: NORMAL
MDC_IDC_SET_BRADY_AT_MODE_SWITCH_RATE: 170 {BEATS}/MIN
MDC_IDC_SET_BRADY_LOWRATE: 60 {BEATS}/MIN
MDC_IDC_SET_BRADY_MAX_SENSOR_RATE: 130 {BEATS}/MIN
MDC_IDC_SET_BRADY_MAX_TRACKING_RATE: 130 {BEATS}/MIN
MDC_IDC_SET_BRADY_MODE: NORMAL
MDC_IDC_SET_BRADY_PAV_DELAY_LOW: 180 MS
MDC_IDC_SET_BRADY_SAV_DELAY_LOW: 140 MS
MDC_IDC_SET_CRT_PACED_CHAMBERS: NORMAL
MDC_IDC_SET_LEADCHNL_LV_PACING_AMPLITUDE: 2.1 V
MDC_IDC_SET_LEADCHNL_LV_PACING_ANODE_ELECTRODE_1: NORMAL
MDC_IDC_SET_LEADCHNL_LV_PACING_ANODE_LOCATION_1: NORMAL
MDC_IDC_SET_LEADCHNL_LV_PACING_CAPTURE_MODE: NORMAL
MDC_IDC_SET_LEADCHNL_LV_PACING_CATHODE_ELECTRODE_1: NORMAL
MDC_IDC_SET_LEADCHNL_LV_PACING_CATHODE_LOCATION_1: NORMAL
MDC_IDC_SET_LEADCHNL_LV_PACING_PULSEWIDTH: 0.5 MS
MDC_IDC_SET_LEADCHNL_LV_SENSING_ADAPTATION_MODE: NORMAL
MDC_IDC_SET_LEADCHNL_LV_SENSING_ANODE_ELECTRODE_1: NORMAL
MDC_IDC_SET_LEADCHNL_LV_SENSING_ANODE_LOCATION_1: NORMAL
MDC_IDC_SET_LEADCHNL_LV_SENSING_CATHODE_ELECTRODE_1: NORMAL
MDC_IDC_SET_LEADCHNL_LV_SENSING_CATHODE_LOCATION_1: NORMAL
MDC_IDC_SET_LEADCHNL_LV_SENSING_SENSITIVITY: 1 MV
MDC_IDC_SET_LEADCHNL_RA_PACING_AMPLITUDE: 2.5 V
MDC_IDC_SET_LEADCHNL_RA_PACING_CAPTURE_MODE: NORMAL
MDC_IDC_SET_LEADCHNL_RA_PACING_POLARITY: NORMAL
MDC_IDC_SET_LEADCHNL_RA_PACING_PULSEWIDTH: 0.5 MS
MDC_IDC_SET_LEADCHNL_RA_SENSING_ADAPTATION_MODE: NORMAL
MDC_IDC_SET_LEADCHNL_RA_SENSING_POLARITY: NORMAL
MDC_IDC_SET_LEADCHNL_RA_SENSING_SENSITIVITY: 0.25 MV
MDC_IDC_SET_LEADCHNL_RV_PACING_AMPLITUDE: 2 V
MDC_IDC_SET_LEADCHNL_RV_PACING_CAPTURE_MODE: NORMAL
MDC_IDC_SET_LEADCHNL_RV_PACING_POLARITY: NORMAL
MDC_IDC_SET_LEADCHNL_RV_PACING_PULSEWIDTH: 0.4 MS
MDC_IDC_SET_LEADCHNL_RV_SENSING_ADAPTATION_MODE: NORMAL
MDC_IDC_SET_LEADCHNL_RV_SENSING_POLARITY: NORMAL
MDC_IDC_SET_LEADCHNL_RV_SENSING_SENSITIVITY: 0.6 MV
MDC_IDC_SET_ZONE_DETECTION_INTERVAL: 250 MS
MDC_IDC_SET_ZONE_DETECTION_INTERVAL: 300 MS
MDC_IDC_SET_ZONE_DETECTION_INTERVAL: 353 MS
MDC_IDC_SET_ZONE_STATUS: NORMAL
MDC_IDC_SET_ZONE_TYPE: NORMAL
MDC_IDC_SET_ZONE_VENDOR_TYPE: NORMAL
MDC_IDC_STAT_AT_BURDEN_PERCENT: 0 %
MDC_IDC_STAT_AT_DTM_END: NORMAL
MDC_IDC_STAT_AT_DTM_START: NORMAL
MDC_IDC_STAT_BRADY_DTM_END: NORMAL
MDC_IDC_STAT_BRADY_DTM_START: NORMAL
MDC_IDC_STAT_BRADY_RA_PERCENT_PACED: 0 %
MDC_IDC_STAT_BRADY_RV_PERCENT_PACED: 13 %
MDC_IDC_STAT_CRT_DTM_END: NORMAL
MDC_IDC_STAT_CRT_DTM_START: NORMAL
MDC_IDC_STAT_CRT_LV_PERCENT_PACED: 87 %
MDC_IDC_STAT_EPISODE_RECENT_COUNT: 0
MDC_IDC_STAT_EPISODE_RECENT_COUNT_DTM_END: NORMAL
MDC_IDC_STAT_EPISODE_RECENT_COUNT_DTM_START: NORMAL
MDC_IDC_STAT_EPISODE_TYPE: NORMAL
MDC_IDC_STAT_EPISODE_VENDOR_TYPE: NORMAL
MDC_IDC_STAT_TACHYTHERAPY_ATP_DELIVERED_RECENT: 0
MDC_IDC_STAT_TACHYTHERAPY_ATP_DELIVERED_TOTAL: 0
MDC_IDC_STAT_TACHYTHERAPY_RECENT_DTM_END: NORMAL
MDC_IDC_STAT_TACHYTHERAPY_RECENT_DTM_START: NORMAL
MDC_IDC_STAT_TACHYTHERAPY_SHOCKS_ABORTED_RECENT: 0
MDC_IDC_STAT_TACHYTHERAPY_SHOCKS_ABORTED_TOTAL: 0
MDC_IDC_STAT_TACHYTHERAPY_SHOCKS_DELIVERED_RECENT: 0
MDC_IDC_STAT_TACHYTHERAPY_SHOCKS_DELIVERED_TOTAL: 0
MDC_IDC_STAT_TACHYTHERAPY_TOTAL_DTM_END: NORMAL
MDC_IDC_STAT_TACHYTHERAPY_TOTAL_DTM_START: NORMAL

## 2024-10-29 ENCOUNTER — MYC MEDICAL ADVICE (OUTPATIENT)
Dept: CARDIOLOGY | Facility: CLINIC | Age: 69
End: 2024-10-29
Payer: COMMERCIAL

## 2024-10-29 ENCOUNTER — TELEPHONE (OUTPATIENT)
Dept: CARDIOLOGY | Facility: CLINIC | Age: 69
End: 2024-10-29
Payer: COMMERCIAL

## 2024-10-29 DIAGNOSIS — I50.22 CHRONIC SYSTOLIC CONGESTIVE HEART FAILURE (H): ICD-10-CM

## 2024-10-29 DIAGNOSIS — I21.02 ST ELEVATION MYOCARDIAL INFARCTION INVOLVING LEFT ANTERIOR DESCENDING (LAD) CORONARY ARTERY (H): ICD-10-CM

## 2024-10-29 DIAGNOSIS — I25.5 ISCHEMIC CARDIOMYOPATHY: ICD-10-CM

## 2024-10-29 DIAGNOSIS — I25.10 CORONARY ARTERY DISEASE INVOLVING NATIVE CORONARY ARTERY OF NATIVE HEART WITHOUT ANGINA PECTORIS: ICD-10-CM

## 2024-10-29 DIAGNOSIS — I50.20 HEART FAILURE WITH REDUCED EJECTION FRACTION, NYHA CLASS III (H): ICD-10-CM

## 2024-10-29 DIAGNOSIS — E11.9 TYPE 2 DIABETES MELLITUS WITHOUT COMPLICATION, WITHOUT LONG-TERM CURRENT USE OF INSULIN (H): ICD-10-CM

## 2024-10-29 DIAGNOSIS — I21.09 ST ELEVATION MYOCARDIAL INFARCTION (STEMI) INVOLVING OTHER CORONARY ARTERY OF ANTERIOR WALL (H): ICD-10-CM

## 2024-10-29 DIAGNOSIS — E78.2 MIXED HYPERLIPIDEMIA: ICD-10-CM

## 2024-10-29 NOTE — TELEPHONE ENCOUNTER
[facility] Call Center    Phone Message    May a detailed message be left on voicemail: yes     Reason for Call: Other: Pt called and stated they are still waiting on their medication refill. Pt is out of meds and upset. Please review. Call pt to further discuss if needed.      Action Taken: Other: Cardiology     Travel Screening: Not Applicable     Thank you!  [facility]

## 2024-10-29 NOTE — TELEPHONE ENCOUNTER
Medication Requested:   Disp Refills Start End MABEL   empagliflozin (JARDIANCE) 25 MG TABS tablet 90 tablet 4 10/10/2023 -- No   Sig - Route: Take 1 tablet (25 mg) by mouth daily - Oral      Disp Refills Start End MABEL   aspirin (ASA) 81 MG chewable tablet 90 tablet 4 10/10/2023 -- No   Sig - Route: Take 1 tablet (81 mg) by mouth daily - Oral      Disp Refills Start End MABEL   spironolactone (ALDACTONE) 25 MG tablet 90 tablet 4 10/10/2023 -- No   Sig - Route: Take 1 tablet (25 mg) by mouth daily - Oral     ----------------------  Last Office Visit : 6/25/2024  [facility] Office visit:  Selected Appointment   12/17/2024 8:00 AM (30 min)  Karen   Arrive by:  7:45 AM   RETURN HEART FAILURE   Rfl Status: Pending Review   [facility] ([facility])   Jason Matos MD     ----------------------      Refused request - refills on file through 1/10/25. Pt notified via [id number].

## 2024-10-29 NOTE — TELEPHONE ENCOUNTER
[facility] Call Center    Phone Message    May a detailed message be left on voicemail: yes     Reason for Call: Other: The Pharmacy called requesting to speak with Dr. Matos or Cedric's care team about his prescriptions stating patient called them yelling about not getting his medications. See note that medications were refused due to having refills through January. Please reach out to the pharmacy to discuss. Thank you!     Action Taken: Other: Cardiology    Travel Screening: Not Applicable    Thank you!  [facility]       Date of Service:

## 2024-10-29 NOTE — TELEPHONE ENCOUNTER
[facility] Call Center    Phone Message    May a detailed message be left on voicemail: yes     Reason for Call: Medication Refill Request    Has the patient contacted the pharmacy for the refill? Yes   Name of medication being requested:     empagliflozin (JARDIANCE) 25 MG TABS tablet     aspirin (ASA) 81 MG chewable tablet     spironolactone (ALDACTONE) 25 MG tablet     Provider who prescribed the medication: Jason Matos MD   Pharmacy: [facility] #[id number] [address]  AT [facility]  Date medication is needed: ASAP, pt is out       Action Taken: Other: cardio    Travel Screening: Not Applicable    Thank you!  [facility]       Date of Service:                                                                     empagliflozin (JARDIANCE) 25 MG TABS tablet   aspirin (ASA) 81 MG chewable tablet   spironolactone (ALDACTONE) 25 MG tablet

## 2024-10-30 RX ORDER — ATORVASTATIN CALCIUM 80 MG/1
80 TABLET, FILM COATED ORAL DAILY
Qty: 90 TABLET | Refills: 4 | Status: SHIPPED | OUTPATIENT
Start: 2024-10-30

## 2024-10-30 RX ORDER — SACUBITRIL AND VALSARTAN 97; 103 MG/1; MG/1
1 TABLET, FILM COATED ORAL 2 TIMES DAILY
Qty: 180 TABLET | Refills: 4 | Status: SHIPPED | OUTPATIENT
Start: 2024-10-30

## 2024-10-30 RX ORDER — FUROSEMIDE 40 MG/1
40 TABLET ORAL 2 TIMES DAILY
Qty: 180 TABLET | Refills: 4 | Status: SHIPPED | OUTPATIENT
Start: 2024-10-30

## 2024-10-30 RX ORDER — SPIRONOLACTONE 25 MG/1
25 TABLET ORAL DAILY
Qty: 90 TABLET | Refills: 3 | Status: SHIPPED | OUTPATIENT
Start: 2024-10-30

## 2024-10-30 RX ORDER — ASPIRIN 81 MG/1
81 TABLET, CHEWABLE ORAL DAILY
Qty: 90 TABLET | Refills: 4 | Status: SHIPPED | OUTPATIENT
Start: 2024-10-30

## 2024-10-30 RX ORDER — CARVEDILOL 25 MG/1
25 TABLET ORAL 2 TIMES DAILY WITH MEALS
Qty: 180 TABLET | Refills: 4 | Status: SHIPPED | OUTPATIENT
Start: 2024-10-30

## 2024-10-30 NOTE — TELEPHONE ENCOUNTER
Prescriptions sent to pharmacy. Called and spoke to pharmacy who state they received the prescriptions and notified the patient this morning.

## 2024-10-30 NOTE — TELEPHONE ENCOUNTER
My wife called Darshan ervin there's over a week and a half before anybody replied and one of your people said oh we must must not have got the text and now you tell me that we're not supposed to check to see who gets the text is that how that works who do I check when you never got the text arcadio the pharmacy says got to call your position is that all that works       Sent today:     empagliflozin (JARDIANCE) 25 MG TABS [id number]/30/2024    aspirin (ASA) 81 MG chewable [id number]/30/2024    90 [id number]/10/2023    [facility] #[id number] [address]  AT [facility]    spironolactone (ALDACTONE) 25 MG tablet  Last Written Prescription Date:  10/10/23  Last Fill Quantity: 90,   # refills: 4  Last Office Visit : 6/25/24  Future Office visit:  12/17/24    Refilled per protocol/ message sent to patient.

## 2024-12-02 DIAGNOSIS — E11.65 TYPE 2 DIABETES MELLITUS WITH HYPERGLYCEMIA, WITHOUT LONG-TERM CURRENT USE OF INSULIN (H): ICD-10-CM

## 2024-12-05 NOTE — TELEPHONE ENCOUNTER
Semaglutide, 2 MG/DOSE, (OZEMPIC) 8 MG/3ML pen       Last Written Prescription Date:  11-14-23  Last Fill Quantity: 9 ml,   # refills: 3  Last Office Visit : 11-14-23 ( RTC 3 M)  Future Office visit:  none    Routing refill request to provider for review/approval because:  Overdue lab: A1C, and appt

## 2024-12-16 NOTE — PROGRESS NOTES
December 17, 2024    Mr. Anson Manriquez is a 69yr old male with a history of HTN, DMII, CAD (s/p STEMI in 2007 with pLAD stenting, and recent STEMI 9/2018), and ICM complicated by cardiogenic shock requiring IABP support with slow wean.      Mr. Manriquez was was out with friends and developed acute chest pain and after going home his pain worsened and had syncope, then cardiac arrest. EMS was called, and on arrival provided ~30 seconds of CPR and AED delivered 1 shock.  He was then brought to [facility] in sinus rhythm with a pulse for most of the transport.  During the last 5 mins en route to the ED, he went into a wide-complex tachycardia at a rate of 220bpm, likely VT.  He maintained a pulse, acceptable BP, and mental status.  He received amiodarone 150mg in the ED, and converted to NSR as he was being prepared for cardioversion.  He was then sent to the cath lab, where he was found to have an acute thrombotic lesion of the pLAD within the prior stent.  He was treated with aspiration thrombectomy, and STEVO x2 to pLAD and mLAD, and POBA to D1.  His LVEDP was 40mmHg. Peak troponin was ~17.  Echo showed LVEF 23% with LAD territory akinesis, consistent with echo results from 2012.  Therefore, his LAD had limited viability from his prior MI, which explains his relatively low troponin and unchanged LVEF. He was ultimately started on DAPT with aspirin and Brilinta, lisinopril, carvedilol, and lasix, and discharged home on 9/20/18.   He has been seen in clinic a few times now, with EF still 30-35%.  Despite evidence based therapy and his EF remained reduced, therefore he went for CRT-D with Dr. Mckeon 4/25/19.       Since we last saw him he has been doing very well. Denies any issues, continues to work, no chest pain or other issues.      PAST MEDICAL HISTORY:  Past Medical History:   Diagnosis Date    Anal fistula     Antiplatelet or antithrombotic long-term use     Coronary artery disease     Diabetes mellitus, type 2 (H)      Heart attack (H) 4/17/2007    Hyperlipidemia     Hypertension     Inguinal hernia     Ischemic cardiomyopathy     STEMI (ST elevation myocardial infarction) (H) 09/13/2018    s/p STEVO x2    Stented coronary artery 2007     FAMILY HISTORY:  Family History   Problem Relation Age of Onset    Hypertension Mother     Diabetes Father     Glaucoma No family hx of     Macular Degeneration No family hx of      SOCIAL HISTORY:  Social History     Socioeconomic History    Marital status:    Tobacco Use    Smoking status: Never    Smokeless tobacco: Never   Substance and Sexual Activity    Alcohol use: No    Drug use: No     CURRENT MEDICATIONS:  Current Outpatient Medications   Medication Sig Dispense Refill    aspirin (ASA) 81 MG chewable tablet Take 1 tablet (81 mg) by mouth daily. 90 tablet 4    atorvastatin (LIPITOR) 80 MG tablet Take 1 tablet (80 mg) by mouth daily. 90 tablet 4    bisacodyl (DULCOLAX) 5 MG EC tablet Two days prior to procedure take two (2) tablets at 10am. One day prior to procedure take two (2) tablets at 10am 4 tablet 0    carvedilol (COREG) 25 MG tablet Take 1 tablet (25 mg) by mouth 2 times daily (with meals). 180 tablet 1    empagliflozin (JARDIANCE) 25 MG TABS tablet Take 1 tablet (25 mg) by mouth daily. 90 tablet 4    furosemide (LASIX) 40 MG tablet Take 1 tablet (40 mg) by mouth 2 times daily. 180 tablet 4    glipiZIDE (GLUCOTROL XL) 5 MG 24 hr tablet Take 1 tablet (5 mg) by mouth 2 times daily. 180 tablet 0    MULTIPLE VITAMIN PO Take 1 tablet by mouth every morning       polyethylene glycol (GOLYTELY) 236 g suspension One day prior to procedure at 3 pm fill container with water. Cover and shake until mixed well. Drink an 8 oz. glass of mixture every 10-15 minutes until the 1st half of the jug is gone. Place the remainder of the Golytely in the refrigerator. At 8 pm, drink the 2nd half of the jug of Golytely bowel prep. Drink an 8 oz. glass of Golytely every 10-15 minutes until the  container of Golytely is gone. 4000 mL 0    polyethylene glycol (MIRALAX) 17 GM/Dose powder Take 1 capful (17g) of Miralax mixed with 8 oz of a clear liquid twice daily for 7 days prior to your scheduled procedure. 238 g 0    sacubitril-valsartan (ENTRESTO)  MG per tablet Take 1 tablet by mouth 2 times daily. 180 tablet 4    Semaglutide, 1 MG/DOSE, (OZEMPIC) 4 MG/3ML pen Inject 1 mg Subcutaneous every 7 days 6 mL 11    Semaglutide, 2 MG/DOSE, (OZEMPIC) 8 MG/3ML pen Inject 2 mg subcutaneously every 7 days. For additional refills, please schedule a follow-up appointment. 9 mL 1    spironolactone (ALDACTONE) 25 MG tablet Take 1 tablet (25 mg) by mouth daily. 90 tablet 3     No current facility-administered medications for this visit.     Facility-Administered Medications Ordered in Other Visits   Medication Dose Route Frequency Provider Last Rate Last Admin    perflutren diluted 1mL to 2mL with saline (OPTISON) diluted injection 5 mL  5 mL Intravenous Once Chris Reece MD        sodium chloride (PF) 0.9% PF flush 10 mL  10 mL Intravenous Once Julissa Rodriguez MD        sodium chloride (PF) 0.9% PF flush 10 mL  10 mL Intracatheter Once Jason Matos MD         EXAM:  /79 (BP Location: Right arm, Patient Position: Chair, Cuff Size: Adult Regular)   Pulse 94   Wt 79.4 kg (175 lb)   SpO2 94%   BMI 27.34 kg/m    General: appears comfortable, alert and oriented  Head: normocephalic, atraumatic  Eyes: anicteric sclera, EOMI , PERRL  Neck: no adenopathy  Orophyarynx: moist mucosa, no lesions noted  Heart: regular, S1/S2, no murmurs, rubs or gallop. Estimated JVP at 5 cmH2O  Lungs: CTAB, No wheezing.   Abdomen: soft, non-tender, bowel sounds present, no hepatosplenomegaly  Extremities: No LE edema today  Skin: no open lesions noted  Neuro: grossly non-focal     Labs:  Lab Results   Component Value Date    WBC 7.3 06/25/2024    HGB 16.3 06/25/2024    HCT 46.9 06/25/2024     06/25/2024      06/25/2024    POTASSIUM 4.3 06/25/2024    CHLORIDE 103 06/25/2024    CO2 22 06/25/2024    BUN 21.4 06/25/2024    CR 0.96 06/25/2024     (H) 06/25/2024    NTBNP 509 06/25/2024    TROPONIN 0.14 (HH) 09/13/2018    TROPI 1.914 (HH) 09/18/2018    AST 19 06/25/2024    ALT 18 06/25/2024    ALKPHOS 55 06/25/2024    BILITOTAL 1.2 06/25/2024    INR 1.10 04/05/2021      Coronary angio:  9/13/18  -Both coronary arteries arise from their respective cusps.  -Dominance: Right  -LM has no evidence of coronary artery disease.  -LAD: Type 3 [LAD supplies the entire apex]. The LAD gives rise to septal perforators, multiple diagonal branches. The proximal LAD has an acute thrombotic lesion within the prior stent. Distal to the stent, there is a 70-80% stenosis. The distal vessel has HALIMA 2 flow. The D1 has thrombus at the ostium likely from embolization. The apical LAD also has thrombus from embolization.  -LCX gives rise to a large branching OM. The prox Cx has a 20% stenosis. The rest of the Cx system has mild disease.  -RCA (dominant) gives rise to PL branches and supplies PDA. There is minimal disease in the RCA system.     Echo:  9/14/18  Ischemic cardiomyopathy, LVEF=23% with extensive regional wall motion abnormalities as described below. Resting wall motion abnormalities and LV function are not significantly changed from the rest images on the 11/27/12 stress echocardiogram.  Mildly dilated LV with severely reduced LV function, LVEF=23%. There is akinesis involving the owger-xa-ewo anteroseptal, eov-bs-[id number] anterior, mid anterolateral, and all apical myocardial segments. The basal anterior, basal anterolateral, and basal inferolateral segments are relatively spared. RV size and function are normal. No significant valvular abnormalities. No pericardial effusion. Normal IVC with abnormal respiratory variation, estimated RA pressure 8 mmHg.     Echo 1/30/19   Ischemic cardiomyopathy with moderately dilated LV with  moderately reduced  global LV function, LVEF=31%. Extensive regional wall motion abnormalities as  described below, unchanged from 9/14/18.  This study was compared with the study from 9/14/18: There has been no  significant change. Specifically, LV function and wall motion abnormalities  have not changed significantly.  The right atria appears normal. Severe left atrial enlargement is present. This study was compared with the study from 9/14/18 . There has been no significant change.     TTE 1/22/2020:  Ischemic cardiomyopathy with moderately dilated LV with moderately reduced  global LV function, LVEF=29%. Extensive regional wall motion abnormalities as  described below, unchanged from the prior study.  This study was compared with the study from 8/28/19: There has been no  significant change. Specifically, LV function and wall motion abnormalities  have not changed significantly.    Remote ICD transmission received and reviewed. Device transmission sent per MD orders.      1/25/2024 device:  Device: [employer] G447 RESONATE X4 CRT-D  Normal Device Function  Mode: DDD  bpm  AP: 0%  RVP: 15%  LVP: 99%  Presenting EGM: AS-LVP @ 105 bpm  Lead Trends Appear Stable: Yes  Estimated battery longevity to RRT = 6.5 years.  Atrial Arrhythmia: 1 AT/AF episode recorded - 8 seconds.  AF Force: <1%  Anticoagulant: None  Ventricular Arrhythmia: 0  Pt Notified of Transmission Results: MyChart     Cardiac Device 6/25/2024:  [employer] G447 RESONATE X4 CRT-D (Programmed LV only)  Normal device function.   Mode: DDD  bpm  AP: <1%  LVP: 96%  Intrinsic Rhythm:  bpm  Lead Trends Appear Stable.   Estimated battery longevity to RRT = 6.5 years.    Atrial Arrhythmia: 2 AT lasting 2-6 seconds.  AF Force: <1%  Anticoagulant: None  Ventricular Arrhythmia: 1 NSVT lasting 6 seconds at 180 bpm on 3/28/24.  Setting Changes: None    TTE 6/25/24:  Severe left ventricular dilation is present. Left ventricular  function is decreased. The ejection fraction is 20-25% (severely reduced). Anteroseptal wall akinesis is present. Apical wall akinesis is present. There is no thrombus seen in the left ventricle. Left ventricular diastolic function is indeterminate.  Mild right ventricular dilation is present. Global right ventricular function is mildly reduced.  The inferior vena cava was normal in size with preserved respiratory variability.  No significant valvular abnormalities present.    This study was compared with the study from 1/22/2020 .No significant changes noted.    ASSESSMENT AND PLAN:  Mr. Anson Manriquez is a 69yr old male with a history of HTN, DMII, CAD (s/p STEMI in 2007 with pLAD stenting, and recent STEMI 9/2018), and ICM who presents for follow up visit.  Overall he has been doing very well without any new complaints feeling well no chest pain remains active no shortness of breath.   NO changes needed today. Doing very well, no complaints. Thinking about retiring soon.  Labs normal. Will get repeat tte and labs on return in a year     #ICM  #HFrEF with an EF of 30-35%  Patient with NYHA Class II symptoms and overall doing well on OGDT. 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 From a symptoms perspective, he is at a good baseline. His BP is 100's today, He is on GDMT appropriate doses and tolerating them well. His left ventricular pacing at 96% based on his device check today and only 1 run of NSVT lasting 6 seconds in march.   - Coreg 25mg BID  - Aldactone 25mg qday  - Entresto 97 mg twice daily  - On Empagiflozin 25mg qday.   He has been tolerating this higher dose well without any complaints.  There is no evidence of infection or infections in the past.  If he does have these then we will consider consider either stopping or reducing the dose to 10 mg daily.  - SCD: s/p CRT-D on 9/2019  - He has warm dry hemodynamic profile.       CAD  s/p pLAD stent  -ASA 81mg qday continue  - Stop Brilinta Brillanta in the past  - Lipitor 80mg qday continue     I appreciate the opportunity to participate in the care of Anson GREG Declan . Please do not hesitate to contact me with any further questions.  I have spent a total of 40 minutes today reviewing labs, imaging studies, discussing with colleagues, face-to-face time with patient and documentation in the medical record.  The longitudinal plan of care for the diagnosis(es)/condition(s) as documented were addressed during this visit. Due to the added complexity in care, I will continue to support Cedric in the subsequent management and with ongoing continuity of care.    Sincerely,   Jason Matos MD  [facility] Division of Cardiology

## 2024-12-17 ENCOUNTER — LAB (OUTPATIENT)
Dept: LAB | Facility: CLINIC | Age: 69
End: 2024-12-17
Attending: INTERNAL MEDICINE
Payer: COMMERCIAL

## 2024-12-17 ENCOUNTER — OFFICE VISIT (OUTPATIENT)
Dept: CARDIOLOGY | Facility: CLINIC | Age: 69
End: 2024-12-17
Attending: INTERNAL MEDICINE
Payer: COMMERCIAL

## 2024-12-17 VITALS
DIASTOLIC BLOOD PRESSURE: 79 MMHG | SYSTOLIC BLOOD PRESSURE: 114 MMHG | OXYGEN SATURATION: 94 % | WEIGHT: 175 LBS | BODY MASS INDEX: 27.34 KG/M2 | HEART RATE: 94 BPM

## 2024-12-17 DIAGNOSIS — I25.5 ISCHEMIC CARDIOMYOPATHY: ICD-10-CM

## 2024-12-17 DIAGNOSIS — I42.9 CARDIOMYOPATHY, UNSPECIFIED TYPE (H): ICD-10-CM

## 2024-12-17 DIAGNOSIS — I25.10 CORONARY ARTERY DISEASE INVOLVING NATIVE CORONARY ARTERY OF NATIVE HEART WITHOUT ANGINA PECTORIS: ICD-10-CM

## 2024-12-17 DIAGNOSIS — I50.20 HEART FAILURE WITH REDUCED EJECTION FRACTION, NYHA CLASS III (H): Primary | ICD-10-CM

## 2024-12-17 DIAGNOSIS — E78.2 MIXED HYPERLIPIDEMIA: ICD-10-CM

## 2024-12-17 DIAGNOSIS — I50.22 CHRONIC SYSTOLIC CONGESTIVE HEART FAILURE (H): ICD-10-CM

## 2024-12-17 DIAGNOSIS — I21.09 ST ELEVATION MYOCARDIAL INFARCTION (STEMI) INVOLVING OTHER CORONARY ARTERY OF ANTERIOR WALL (H): ICD-10-CM

## 2024-12-17 DIAGNOSIS — I21.02 ST ELEVATION MYOCARDIAL INFARCTION INVOLVING LEFT ANTERIOR DESCENDING (LAD) CORONARY ARTERY (H): ICD-10-CM

## 2024-12-17 LAB
ALBUMIN SERPL BCG-MCNC: 4.2 G/DL (ref 3.5–5.2)
ALP SERPL-CCNC: 60 U/L (ref 40–150)
ALT SERPL W P-5'-P-CCNC: 25 U/L (ref 0–70)
ANION GAP SERPL CALCULATED.3IONS-SCNC: 14 MMOL/L (ref 7–15)
AST SERPL W P-5'-P-CCNC: 26 U/L (ref 0–45)
BILIRUB SERPL-MCNC: 1.2 MG/DL
BUN SERPL-MCNC: 24.5 MG/DL (ref 8–23)
CALCIUM SERPL-MCNC: 9.4 MG/DL (ref 8.8–10.4)
CHLORIDE SERPL-SCNC: 102 MMOL/L (ref 98–107)
CREAT SERPL-MCNC: 1 MG/DL (ref 0.67–1.17)
EGFRCR SERPLBLD CKD-EPI 2021: 81 ML/MIN/1.73M2
ERYTHROCYTE [DISTWIDTH] IN BLOOD BY AUTOMATED COUNT: 14.3 % (ref 10–15)
GLUCOSE SERPL-MCNC: 184 MG/DL (ref 70–99)
HCO3 SERPL-SCNC: 23 MMOL/L (ref 22–29)
HCT VFR BLD AUTO: 45.6 % (ref 40–53)
HGB BLD-MCNC: 15.3 G/DL (ref 13.3–17.7)
MCH RBC QN AUTO: 31.2 PG (ref 26.5–33)
MCHC RBC AUTO-ENTMCNC: 33.6 G/DL (ref 31.5–36.5)
MCV RBC AUTO: 93 FL (ref 78–100)
NT-PROBNP SERPL-MCNC: 515 PG/ML (ref 0–900)
PLATELET # BLD AUTO: 166 10E3/UL (ref 150–450)
POTASSIUM SERPL-SCNC: 4.1 MMOL/L (ref 3.4–5.3)
PROT SERPL-MCNC: 7.3 G/DL (ref 6.4–8.3)
RBC # BLD AUTO: 4.91 10E6/UL (ref 4.4–5.9)
SODIUM SERPL-SCNC: 139 MMOL/L (ref 135–145)
WBC # BLD AUTO: 6.9 10E3/UL (ref 4–11)

## 2024-12-17 PROCEDURE — 85027 COMPLETE CBC AUTOMATED: CPT | Performed by: PATHOLOGY

## 2024-12-17 PROCEDURE — 36415 COLL VENOUS BLD VENIPUNCTURE: CPT | Performed by: PATHOLOGY

## 2024-12-17 PROCEDURE — 99213 OFFICE O/P EST LOW 20 MIN: CPT | Performed by: INTERNAL MEDICINE

## 2024-12-17 PROCEDURE — 93284 PRGRMG EVAL IMPLANTABLE DFB: CPT | Performed by: INTERNAL MEDICINE

## 2024-12-17 PROCEDURE — 80053 COMPREHEN METABOLIC PANEL: CPT | Performed by: PATHOLOGY

## 2024-12-17 PROCEDURE — 83880 ASSAY OF NATRIURETIC PEPTIDE: CPT | Performed by: PATHOLOGY

## 2024-12-17 ASSESSMENT — PAIN SCALES - GENERAL: PAINLEVEL_OUTOF10: NO PAIN (0)

## 2024-12-17 NOTE — NURSING NOTE
Chief Complaint   Patient presents with    Follow Up     RETURN HEART FAILURE     Vitals were taken and medications reconciled.    Kalen Jain, EMT  8:05 AM

## 2024-12-17 NOTE — LETTER
12/17/2024      RE: Anson Manriquez  [address]  [facility] [address]       Dear Colleague,    Thank you for the opportunity to participate in the care of your patient, Anson Manriquez, at the [facility] at [facility]. Please see a copy of my visit note below.    December 17, 2024    Mr. Anson Manriquez is a 69yr old male with a history of HTN, DMII, CAD (s/p STEMI in 2007 with pLAD stenting, and recent STEMI 9/2018), and ICM complicated by cardiogenic shock requiring IABP support with slow wean.      Mr. Manriquez was was out with friends and developed acute chest pain and after going home his pain worsened and had syncope, then cardiac arrest. EMS was called, and on arrival provided ~30 seconds of CPR and AED delivered 1 shock.  He was then brought to [facility] in sinus rhythm with a pulse for most of the transport.  During the last 5 mins en route to the ED, he went into a wide-complex tachycardia at a rate of 220bpm, likely VT.  He maintained a pulse, acceptable BP, and mental status.  He received amiodarone 150mg in the ED, and converted to NSR as he was being prepared for cardioversion.  He was then sent to the cath lab, where he was found to have an acute thrombotic lesion of the pLAD within the prior stent.  He was treated with aspiration thrombectomy, and STEVO x2 to pLAD and mLAD, and POBA to D1.  His LVEDP was 40mmHg. Peak troponin was ~17.  Echo showed LVEF 23% with LAD territory akinesis, consistent with echo results from 2012.  Therefore, his LAD had limited viability from his prior MI, which explains his relatively low troponin and unchanged LVEF. He was ultimately started on DAPT with aspirin and Brilinta, lisinopril, carvedilol, and lasix, and discharged home on 9/20/18.   He has been seen in clinic a few times now, with EF still 30-35%.  Despite evidence based therapy and his EF remained reduced, therefore he went for CRT-D with   Linda 4/25/19.       Since we last saw him he has been doing very well. Denies any issues, continues to work, no chest pain or other issues.      PAST MEDICAL HISTORY:  Past Medical History:   Diagnosis Date     Anal fistula      Antiplatelet or antithrombotic long-term use      Coronary artery disease      Diabetes mellitus, type 2 (H)      Heart attack (H) 4/17/2007     Hyperlipidemia      Hypertension      Inguinal hernia      Ischemic cardiomyopathy      STEMI (ST elevation myocardial infarction) (H) 09/13/2018    s/p STEVO x2     Stented coronary artery 2007     FAMILY HISTORY:  Family History   Problem Relation Age of Onset     Hypertension Mother      Diabetes Father      Glaucoma No family hx of      Macular Degeneration No family hx of      SOCIAL HISTORY:  Social History     Socioeconomic History     Marital status:    Tobacco Use     Smoking status: Never     Smokeless tobacco: Never   Substance and Sexual Activity     Alcohol use: No     Drug use: No     CURRENT MEDICATIONS:  Current Outpatient Medications   Medication Sig Dispense Refill     aspirin (ASA) 81 MG chewable tablet Take 1 tablet (81 mg) by mouth daily. 90 tablet 4     atorvastatin (LIPITOR) 80 MG tablet Take 1 tablet (80 mg) by mouth daily. 90 tablet 4     bisacodyl (DULCOLAX) 5 MG EC tablet Two days prior to procedure take two (2) tablets at 10am. One day prior to procedure take two (2) tablets at 10am 4 tablet 0     carvedilol (COREG) 25 MG tablet Take 1 tablet (25 mg) by mouth 2 times daily (with meals). 180 tablet 1     empagliflozin (JARDIANCE) 25 MG TABS tablet Take 1 tablet (25 mg) by mouth daily. 90 tablet 4     furosemide (LASIX) 40 MG tablet Take 1 tablet (40 mg) by mouth 2 times daily. 180 tablet 4     glipiZIDE (GLUCOTROL XL) 5 MG 24 hr tablet Take 1 tablet (5 mg) by mouth 2 times daily. 180 tablet 0     MULTIPLE VITAMIN PO Take 1 tablet by mouth every morning        polyethylene glycol (GOLYTELY) 236 g suspension One day  prior to procedure at 3 pm fill container with water. Cover and shake until mixed well. Drink an 8 oz. glass of mixture every 10-15 minutes until the 1st half of the jug is gone. Place the remainder of the Golytely in the refrigerator. At 8 pm, drink the 2nd half of the jug of Golytely bowel prep. Drink an 8 oz. glass of Golytely every 10-15 minutes until the container of Golytely is gone. 4000 mL 0     polyethylene glycol (MIRALAX) 17 GM/Dose powder Take 1 capful (17g) of Miralax mixed with 8 oz of a clear liquid twice daily for 7 days prior to your scheduled procedure. 238 g 0     sacubitril-valsartan (ENTRESTO)  MG per tablet Take 1 tablet by mouth 2 times daily. 180 tablet 4     Semaglutide, 1 MG/DOSE, (OZEMPIC) 4 MG/3ML pen Inject 1 mg Subcutaneous every 7 days 6 mL 11     Semaglutide, 2 MG/DOSE, (OZEMPIC) 8 MG/3ML pen Inject 2 mg subcutaneously every 7 days. For additional refills, please schedule a follow-up appointment. 9 mL 1     spironolactone (ALDACTONE) 25 MG tablet Take 1 tablet (25 mg) by mouth daily. 90 tablet 3     No current facility-administered medications for this visit.     Facility-Administered Medications Ordered in Other Visits   Medication Dose Route Frequency Provider Last Rate Last Admin     perflutren diluted 1mL to 2mL with saline (OPTISON) diluted injection 5 mL  5 mL Intravenous Once Chris Reece MD         sodium chloride (PF) 0.9% PF flush 10 mL  10 mL Intravenous Once Julissa Rodriguez MD         sodium chloride (PF) 0.9% PF flush 10 mL  10 mL Intracatheter Once Jason Matos MD         EXAM:  /79 (BP Location: Right arm, Patient Position: Chair, Cuff Size: Adult Regular)   Pulse 94   Wt 79.4 kg (175 lb)   SpO2 94%   BMI 27.34 kg/m    General: appears comfortable, alert and oriented  Head: normocephalic, atraumatic  Eyes: anicteric sclera, EOMI , PERRL  Neck: no adenopathy  Orophyarynx: moist mucosa, no lesions noted  Heart: regular, S1/S2, no murmurs, rubs  or gallop. Estimated JVP at 5 cmH2O  Lungs: CTAB, No wheezing.   Abdomen: soft, non-tender, bowel sounds present, no hepatosplenomegaly  Extremities: No LE edema today  Skin: no open lesions noted  Neuro: grossly non-focal     Labs:  Lab Results   Component Value Date    WBC 7.3 06/25/2024    HGB 16.3 06/25/2024    HCT 46.9 06/25/2024     06/25/2024     06/25/2024    POTASSIUM 4.3 06/25/2024    CHLORIDE 103 06/25/2024    CO2 22 06/25/2024    BUN 21.4 06/25/2024    CR 0.96 06/25/2024     (H) 06/25/2024    NTBNP 509 06/25/2024    TROPONIN 0.14 () 09/13/2018    TROPI 1.914 () 09/18/2018    AST 19 06/25/2024    ALT 18 06/25/2024    ALKPHOS 55 06/25/2024    BILITOTAL 1.2 06/25/2024    INR 1.10 04/05/2021      Coronary angio:  9/13/18  -Both coronary arteries arise from their respective cusps.  -Dominance: Right  -LM has no evidence of coronary artery disease.  -LAD: Type 3 [LAD supplies the entire apex]. The LAD gives rise to septal perforators, multiple diagonal branches. The proximal LAD has an acute thrombotic lesion within the prior stent. Distal to the stent, there is a 70-80% stenosis. The distal vessel has HALIMA 2 flow. The D1 has thrombus at the ostium likely from embolization. The apical LAD also has thrombus from embolization.  -LCX gives rise to a large branching OM. The prox Cx has a 20% stenosis. The rest of the Cx system has mild disease.  -RCA (dominant) gives rise to PL branches and supplies PDA. There is minimal disease in the RCA system.     Echo:  9/14/18  Ischemic cardiomyopathy, LVEF=23% with extensive regional wall motion abnormalities as described below. Resting wall motion abnormalities and LV function are not significantly changed from the rest images on the 11/27/12 stress echocardiogram.  Mildly dilated LV with severely reduced LV function, LVEF=23%. There is akinesis involving the [id number]-sb-yji anteroseptal, azx-nx-ogtmvc anterior, mid anterolateral, and all apical  myocardial segments. The basal anterior, basal anterolateral, and basal inferolateral segments are relatively spared. RV size and function are normal. No significant valvular abnormalities. No pericardial effusion. Normal IVC with abnormal respiratory variation, estimated RA pressure 8 mmHg.     Echo 1/30/19   Ischemic cardiomyopathy with moderately dilated LV with moderately reduced  global LV function, LVEF=31%. Extensive regional wall motion abnormalities as  described below, unchanged from 9/14/18.  This study was compared with the study from 9/14/18: There has been no  significant change. Specifically, LV function and wall motion abnormalities  have not changed significantly.  The right atria appears normal. Severe left atrial enlargement is present. This study was compared with the study from 9/14/18 . There has been no significant change.     TTE 1/22/2020:  Ischemic cardiomyopathy with moderately dilated LV with moderately reduced  global LV function, LVEF=29%. Extensive regional wall motion abnormalities as  described below, unchanged from the prior study.  This study was compared with the study from 8/28/19: There has been no  significant change. Specifically, LV function and wall motion abnormalities  have not changed significantly.    Remote ICD transmission received and reviewed. Device transmission sent per MD orders.      1/25/2024 device:  Device: [employer] G447 RESONATE X4 CRT-D  Normal Device Function  Mode: DDD  bpm  AP: 0%  RVP: 15%  LVP: 99%  Presenting EGM: AS-LVP @ 105 bpm  Lead Trends Appear Stable: Yes  Estimated battery longevity to RRT = 6.5 years.  Atrial Arrhythmia: 1 AT/AF episode recorded - 8 seconds.  AF [place]: <1%  Anticoagulant: None  Ventricular Arrhythmia: 0  Pt Notified of Transmission Results: [employer]     Cardiac Device 6/25/2024:  [employer] G447 RESONATE X4 CRT-D (Programmed LV only)  Normal device function.   Mode: DDD  bpm  AP: <1%  LVP:  96%  Intrinsic Rhythm:  bpm  Lead Trends Appear Stable.   Estimated battery longevity to RRT = 6.5 years.    Atrial Arrhythmia: 2 AT lasting 2-6 seconds.  AF [place]: <1%  Anticoagulant: None  Ventricular Arrhythmia: 1 NSVT lasting 6 seconds at 180 bpm on 3/28/24.  Setting Changes: None    TTE 6/25/24:  Severe left ventricular dilation is present. Left ventricular function is decreased. The ejection fraction is 20-25% (severely reduced). Anteroseptal wall akinesis is present. Apical wall akinesis is present. There is no thrombus seen in the left ventricle. Left ventricular diastolic function is indeterminate.  Mild right ventricular dilation is present. Global right ventricular function is mildly reduced.  The inferior vena cava was normal in size with preserved respiratory variability.  No significant valvular abnormalities present.    This study was compared with the study from 1/22/2020 .No significant changes noted.    ASSESSMENT AND PLAN:  Mr. Anson Manriquez is a 69yr old male with a history of HTN, DMII, CAD (s/p STEMI in 2007 with pLAD stenting, and recent STEMI 9/2018), and ICM who presents for follow up visit.  Overall he has been doing very well without any new complaints feeling well no chest pain remains active no shortness of breath.   NO changes needed today. Doing very well, no complaints. Thinking about retiring soon.  Labs normal. Will get repeat tte and labs on return in a year     #ICM  #HFrEF with an EF of 30-35%  Patient with NYHA Class II symptoms and overall doing well on OGDT. 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 From a symptoms perspective, he is at a good baseline. His BP is 100's today, He is on GDMT appropriate doses and tolerating them well. His left ventricular pacing at 96% based on his device check today and only 1 run of NSVT lasting 6 seconds in march.   - Coreg 25mg BID  - Aldactone 25mg qday  -  Entresto 97 mg twice daily  - On Empagiflozin 25mg qday.   He has been tolerating this higher dose well without any complaints.  There is no evidence of infection or infections in the past.  If he does have these then we will consider consider either stopping or reducing the dose to 10 mg daily.  - SCD: s/p CRT-D on 9/2019  - He has warm dry hemodynamic profile.       CAD s/p pLAD stent  -ASA 81mg qday continue  - Stop Brilinta Brillanta in the past  - Lipitor 80mg qday continue     I appreciate the opportunity to participate in the care of Anson Manriquez . Please do not hesitate to contact me with any further questions.  I have spent a total of 40 minutes today reviewing labs, imaging studies, discussing with colleagues, face-to-face time with patient and documentation in the medical record.  The longitudinal plan of care for the diagnosis(es)/condition(s) as documented were addressed during this visit. Due to the added complexity in care, I will continue to support Cedric in the subsequent management and with ongoing continuity of care.    Sincerely,   Jason Matos MD  [facility] Division of Cardiology         Please do not hesitate to contact me if you have any questions/concerns.     Sincerely,     Jason Matos MD

## 2024-12-17 NOTE — PATIENT INSTRUCTIONS
Dr. Matos recommends:    Follow up clinic visit with Dr. Matos in one year with an echocardiogram and labs the same day.    Thank you for your visit today.  Please call me with any questions or concerns.   Vladimir Servin RN  Cardiology Care Coordinator  [phone]

## 2024-12-17 NOTE — PATIENT INSTRUCTIONS
It was a pleasure to see you in clinic today, please do not hesitate to call us for questions or concerns.  Your next automatic remote ICD check from home is scheduled for 3/19/2025.    Aimee Henriquez RN    Electrophysiology Nurse Clinician  [facility]    During Business Hours Please Call:  [phone]  After Hours Please Call:  [phone] - select option #4 and ask for job code [phone]

## 2024-12-18 DIAGNOSIS — E11.9 TYPE 2 DIABETES MELLITUS WITHOUT COMPLICATION, WITHOUT LONG-TERM CURRENT USE OF INSULIN (H): ICD-10-CM

## 2024-12-18 LAB
MDC_IDC_LEAD_CONNECTION_STATUS: NORMAL
MDC_IDC_LEAD_IMPLANT_DT: NORMAL
MDC_IDC_LEAD_LOCATION: NORMAL
MDC_IDC_LEAD_LOCATION_DETAIL_1: NORMAL
MDC_IDC_LEAD_MFG: NORMAL
MDC_IDC_LEAD_MODEL: NORMAL
MDC_IDC_LEAD_POLARITY_TYPE: NORMAL
MDC_IDC_LEAD_SERIAL: NORMAL
MDC_IDC_LEAD_SPECIAL_FUNCTION: NORMAL
MDC_IDC_MSMT_BATTERY_DTM: NORMAL
MDC_IDC_MSMT_BATTERY_REMAINING_LONGEVITY: 60 MO
MDC_IDC_MSMT_BATTERY_REMAINING_PERCENTAGE: 71 %
MDC_IDC_MSMT_BATTERY_STATUS: NORMAL
MDC_IDC_MSMT_CAP_CHARGE_DTM: NORMAL
MDC_IDC_MSMT_CAP_CHARGE_TIME: 11.2 S
MDC_IDC_MSMT_CAP_CHARGE_TYPE: NORMAL
MDC_IDC_MSMT_LEADCHNL_LV_IMPEDANCE_VALUE: 503 OHM
MDC_IDC_MSMT_LEADCHNL_LV_LEAD_CHANNEL_STATUS: NORMAL
MDC_IDC_MSMT_LEADCHNL_LV_PACING_THRESHOLD_AMPLITUDE: 0.8 V
MDC_IDC_MSMT_LEADCHNL_LV_PACING_THRESHOLD_PULSEWIDTH: 0.5 MS
MDC_IDC_MSMT_LEADCHNL_RA_IMPEDANCE_VALUE: 651 OHM
MDC_IDC_MSMT_LEADCHNL_RA_PACING_THRESHOLD_AMPLITUDE: 0.7 V
MDC_IDC_MSMT_LEADCHNL_RA_PACING_THRESHOLD_PULSEWIDTH: 0.5 MS
MDC_IDC_MSMT_LEADCHNL_RV_IMPEDANCE_VALUE: 690 OHM
MDC_IDC_MSMT_LEADCHNL_RV_PACING_THRESHOLD_AMPLITUDE: 0.9 V
MDC_IDC_MSMT_LEADCHNL_RV_PACING_THRESHOLD_PULSEWIDTH: 0.4 MS
MDC_IDC_PG_IMPLANT_DTM: NORMAL
MDC_IDC_PG_MFG: NORMAL
MDC_IDC_PG_MODEL: NORMAL
MDC_IDC_PG_SERIAL: NORMAL
MDC_IDC_PG_TYPE: NORMAL
MDC_IDC_SESS_CLINIC_NAME: NORMAL
MDC_IDC_SESS_DTM: NORMAL
MDC_IDC_SESS_TYPE: NORMAL
MDC_IDC_SET_BRADY_AT_MODE_SWITCH_MODE: NORMAL
MDC_IDC_SET_BRADY_AT_MODE_SWITCH_RATE: 170 {BEATS}/MIN
MDC_IDC_SET_BRADY_LOWRATE: 60 {BEATS}/MIN
MDC_IDC_SET_BRADY_MAX_SENSOR_RATE: 130 {BEATS}/MIN
MDC_IDC_SET_BRADY_MAX_TRACKING_RATE: 130 {BEATS}/MIN
MDC_IDC_SET_BRADY_MODE: NORMAL
MDC_IDC_SET_BRADY_PAV_DELAY_LOW: 180 MS
MDC_IDC_SET_BRADY_SAV_DELAY_LOW: 140 MS
MDC_IDC_SET_CRT_PACED_CHAMBERS: NORMAL
MDC_IDC_SET_LEADCHNL_LV_PACING_AMPLITUDE: 2.2 V
MDC_IDC_SET_LEADCHNL_LV_PACING_ANODE_ELECTRODE_1: NORMAL
MDC_IDC_SET_LEADCHNL_LV_PACING_ANODE_LOCATION_1: NORMAL
MDC_IDC_SET_LEADCHNL_LV_PACING_CAPTURE_MODE: NORMAL
MDC_IDC_SET_LEADCHNL_LV_PACING_CATHODE_ELECTRODE_1: NORMAL
MDC_IDC_SET_LEADCHNL_LV_PACING_CATHODE_LOCATION_1: NORMAL
MDC_IDC_SET_LEADCHNL_LV_PACING_PULSEWIDTH: 0.5 MS
MDC_IDC_SET_LEADCHNL_LV_SENSING_ADAPTATION_MODE: NORMAL
MDC_IDC_SET_LEADCHNL_LV_SENSING_ANODE_ELECTRODE_1: NORMAL
MDC_IDC_SET_LEADCHNL_LV_SENSING_ANODE_LOCATION_1: NORMAL
MDC_IDC_SET_LEADCHNL_LV_SENSING_CATHODE_ELECTRODE_1: NORMAL
MDC_IDC_SET_LEADCHNL_LV_SENSING_CATHODE_LOCATION_1: NORMAL
MDC_IDC_SET_LEADCHNL_LV_SENSING_SENSITIVITY: 1 MV
MDC_IDC_SET_LEADCHNL_RA_PACING_AMPLITUDE: 2.5 V
MDC_IDC_SET_LEADCHNL_RA_PACING_CAPTURE_MODE: NORMAL
MDC_IDC_SET_LEADCHNL_RA_PACING_POLARITY: NORMAL
MDC_IDC_SET_LEADCHNL_RA_PACING_PULSEWIDTH: 0.5 MS
MDC_IDC_SET_LEADCHNL_RA_SENSING_ADAPTATION_MODE: NORMAL
MDC_IDC_SET_LEADCHNL_RA_SENSING_POLARITY: NORMAL
MDC_IDC_SET_LEADCHNL_RA_SENSING_SENSITIVITY: 0.25 MV
MDC_IDC_SET_LEADCHNL_RV_PACING_AMPLITUDE: 2 V
MDC_IDC_SET_LEADCHNL_RV_PACING_CAPTURE_MODE: NORMAL
MDC_IDC_SET_LEADCHNL_RV_PACING_POLARITY: NORMAL
MDC_IDC_SET_LEADCHNL_RV_PACING_PULSEWIDTH: 0.4 MS
MDC_IDC_SET_LEADCHNL_RV_SENSING_ADAPTATION_MODE: NORMAL
MDC_IDC_SET_LEADCHNL_RV_SENSING_POLARITY: NORMAL
MDC_IDC_SET_LEADCHNL_RV_SENSING_SENSITIVITY: 0.6 MV
MDC_IDC_SET_ZONE_DETECTION_INTERVAL: 250 MS
MDC_IDC_SET_ZONE_DETECTION_INTERVAL: 300 MS
MDC_IDC_SET_ZONE_DETECTION_INTERVAL: 353 MS
MDC_IDC_SET_ZONE_STATUS: NORMAL
MDC_IDC_SET_ZONE_TYPE: NORMAL
MDC_IDC_SET_ZONE_VENDOR_TYPE: NORMAL
MDC_IDC_STAT_AT_BURDEN_PERCENT: 0 %
MDC_IDC_STAT_AT_DTM_END: NORMAL
MDC_IDC_STAT_AT_DTM_START: NORMAL
MDC_IDC_STAT_BRADY_DTM_END: NORMAL
MDC_IDC_STAT_BRADY_DTM_START: NORMAL
MDC_IDC_STAT_BRADY_RA_PERCENT_PACED: 0 %
MDC_IDC_STAT_BRADY_RV_PERCENT_PACED: 15 %
MDC_IDC_STAT_CRT_DTM_END: NORMAL
MDC_IDC_STAT_CRT_DTM_START: NORMAL
MDC_IDC_STAT_CRT_LV_PERCENT_PACED: 88 %
MDC_IDC_STAT_EPISODE_RECENT_COUNT: 0
MDC_IDC_STAT_EPISODE_RECENT_COUNT_DTM_END: NORMAL
MDC_IDC_STAT_EPISODE_RECENT_COUNT_DTM_START: NORMAL
MDC_IDC_STAT_EPISODE_TYPE: NORMAL
MDC_IDC_STAT_EPISODE_VENDOR_TYPE: NORMAL
MDC_IDC_STAT_TACHYTHERAPY_ATP_DELIVERED_RECENT: 0
MDC_IDC_STAT_TACHYTHERAPY_ATP_DELIVERED_TOTAL: 0
MDC_IDC_STAT_TACHYTHERAPY_RECENT_DTM_END: NORMAL
MDC_IDC_STAT_TACHYTHERAPY_RECENT_DTM_START: NORMAL
MDC_IDC_STAT_TACHYTHERAPY_SHOCKS_ABORTED_RECENT: 0
MDC_IDC_STAT_TACHYTHERAPY_SHOCKS_ABORTED_TOTAL: 0
MDC_IDC_STAT_TACHYTHERAPY_SHOCKS_DELIVERED_RECENT: 0
MDC_IDC_STAT_TACHYTHERAPY_SHOCKS_DELIVERED_TOTAL: 0
MDC_IDC_STAT_TACHYTHERAPY_TOTAL_DTM_END: NORMAL
MDC_IDC_STAT_TACHYTHERAPY_TOTAL_DTM_START: NORMAL

## 2024-12-18 NOTE — TELEPHONE ENCOUNTER
[facility] Call Center    Phone Message    May a detailed message be left on voicemail: yes     Reason for Call: Medication Refill Request    Has the patient contacted the pharmacy for the refill? Yes   Name of medication being requested: glipiZIDE (GLUCOTROL XL) 5 MG 24 hr tablet   Provider who prescribed the medication: Alameddine  Pharmacy:  [facility] #[id number] [address]  Date medication is needed: 12/18/2024       Action Taken: Message routed to:  [facility] Endo    Travel Screening: Not Applicable     Date of Service:

## 2024-12-18 NOTE — TELEPHONE ENCOUNTER
glipiZIDE (GLUCOTROL XL) 5 MG 24 hr tablet   180 tablet 0 8/26/2024       Last Office Visit : 11- ---F/U in 3 mos with Dr. Muller or myself. Would suggest graduation to primary care once A1c is <8%      Future Office visit:  none    Sulfonylurea Agents [id number]/18/2024 10:43 AM   Protocol Details Patient has documented A1c within the specified period of time.  Labs completed on :    10-  Hemoglobin A1C  <5.7 % 8.7 High        Recent (6 mo) or future (90 days) visit within the authorizing provider's specialty   Labs completed on :  12-  Creatinine  0.67 - 1.17 mg/dL 1.00

## 2024-12-19 RX ORDER — GLIPIZIDE 5 MG/1
5 TABLET, FILM COATED, EXTENDED RELEASE ORAL 2 TIMES DAILY
Qty: 180 TABLET | Refills: 0 | Status: SHIPPED | OUTPATIENT
Start: 2024-12-19

## 2024-12-19 NOTE — TELEPHONE ENCOUNTER
Patient is out of medication and yelled at the tech at the pharmacy about why the medication is not there and etc. Please respond to the refill request. Thank you.

## 2024-12-20 DIAGNOSIS — I50.22 CHRONIC SYSTOLIC CONGESTIVE HEART FAILURE (H): ICD-10-CM

## 2024-12-22 ENCOUNTER — HEALTH MAINTENANCE LETTER (OUTPATIENT)
Age: 69
End: 2024-12-22

## 2024-12-29 RX ORDER — FUROSEMIDE 40 MG/1
40 TABLET ORAL 2 TIMES DAILY
Qty: 180 TABLET | Refills: 4 | OUTPATIENT
Start: 2024-12-29

## 2024-12-29 NOTE — TELEPHONE ENCOUNTER
furosemide (LASIX) 40 MG tablet   Disp-180 tablet, R-4   Start: 10/30/2024   Duplicate. Confirmed. Refused.

## 2025-01-25 ENCOUNTER — HEALTH MAINTENANCE LETTER (OUTPATIENT)
Age: 70
End: 2025-01-25

## 2025-02-20 ENCOUNTER — TELEPHONE (OUTPATIENT)
Dept: CARDIOLOGY | Facility: CLINIC | Age: 70
End: 2025-02-20
Payer: COMMERCIAL

## 2025-02-20 NOTE — TELEPHONE ENCOUNTER
[facility] Call Center    Phone Message    May a detailed message be left on voicemail: yes    Reason for Call: Other: Patient called requesting to speak with a member of his care team in regards to obtaining a handicapped sticker for parking. Please call back to further discuss.     Thank you!  [facility]                         Called patient, no answer, voice message left stating that Dr. Matos would be notified to see if he was agreeable to requesting a handicapped parking permit. Message also encouraged patient to reach out to his primary care provider. Will contact patient with reply from Dr. Matos.

## 2025-02-20 NOTE — TELEPHONE ENCOUNTER
[facility] Call Center    Phone Message    May a detailed message be left on voicemail: yes     Reason for Call: Other: Cedric called back after receiving a call from Vladimir. Informed Cedric his request was sent to Dr. Matos. Please reach out to Cedric with an update as soon as one is available. Thank you!     Action Taken: Other: Cardiology    Travel Screening: Not Applicable    Thank you!  [facility]       Date of Service:

## 2025-03-19 ENCOUNTER — ANCILLARY PROCEDURE (OUTPATIENT)
Dept: CARDIOLOGY | Facility: CLINIC | Age: 70
End: 2025-03-19
Attending: INTERNAL MEDICINE
Payer: COMMERCIAL

## 2025-03-19 DIAGNOSIS — I42.9 CARDIOMYOPATHY, UNSPECIFIED TYPE (H): ICD-10-CM

## 2025-03-19 DIAGNOSIS — I25.5 ISCHEMIC CARDIOMYOPATHY: ICD-10-CM

## 2025-03-19 PROCEDURE — 93296 REM INTERROG EVL PM/IDS: CPT

## 2025-03-19 PROCEDURE — 93295 DEV INTERROG REMOTE 1/2/MLT: CPT | Performed by: INTERNAL MEDICINE

## 2025-03-21 LAB
MDC_IDC_EPISODE_DTM: NORMAL
MDC_IDC_EPISODE_DURATION: 15 S
MDC_IDC_EPISODE_ID: NORMAL
MDC_IDC_EPISODE_TYPE: NORMAL
MDC_IDC_EPISODE_TYPE_INDUCED: NO
MDC_IDC_LEAD_CONNECTION_STATUS: NORMAL
MDC_IDC_LEAD_IMPLANT_DT: NORMAL
MDC_IDC_LEAD_LOCATION: NORMAL
MDC_IDC_LEAD_LOCATION_DETAIL_1: NORMAL
MDC_IDC_LEAD_MFG: NORMAL
MDC_IDC_LEAD_MODEL: NORMAL
MDC_IDC_LEAD_POLARITY_TYPE: NORMAL
MDC_IDC_LEAD_SERIAL: NORMAL
MDC_IDC_LEAD_SPECIAL_FUNCTION: NORMAL
MDC_IDC_MSMT_BATTERY_DTM: NORMAL
MDC_IDC_MSMT_BATTERY_REMAINING_LONGEVITY: 66 MO
MDC_IDC_MSMT_BATTERY_REMAINING_PERCENTAGE: 78 %
MDC_IDC_MSMT_BATTERY_STATUS: NORMAL
MDC_IDC_MSMT_CAP_CHARGE_DTM: NORMAL
MDC_IDC_MSMT_CAP_CHARGE_TIME: 11.2 S
MDC_IDC_MSMT_CAP_CHARGE_TYPE: NORMAL
MDC_IDC_MSMT_LEADCHNL_LV_IMPEDANCE_VALUE: 475 OHM
MDC_IDC_MSMT_LEADCHNL_LV_PACING_THRESHOLD_AMPLITUDE: 1.2 V
MDC_IDC_MSMT_LEADCHNL_LV_PACING_THRESHOLD_PULSEWIDTH: 0.5 MS
MDC_IDC_MSMT_LEADCHNL_RA_IMPEDANCE_VALUE: 808 OHM
MDC_IDC_MSMT_LEADCHNL_RV_IMPEDANCE_VALUE: 653 OHM
MDC_IDC_MSMT_LEADCHNL_RV_PACING_THRESHOLD_AMPLITUDE: 0.9 V
MDC_IDC_MSMT_LEADCHNL_RV_PACING_THRESHOLD_PULSEWIDTH: 0.4 MS
MDC_IDC_PG_IMPLANT_DTM: NORMAL
MDC_IDC_PG_MFG: NORMAL
MDC_IDC_PG_MODEL: NORMAL
MDC_IDC_PG_SERIAL: NORMAL
MDC_IDC_PG_TYPE: NORMAL
MDC_IDC_SESS_CLINIC_NAME: NORMAL
MDC_IDC_SESS_DTM: NORMAL
MDC_IDC_SESS_TYPE: NORMAL
MDC_IDC_SET_BRADY_AT_MODE_SWITCH_MODE: NORMAL
MDC_IDC_SET_BRADY_AT_MODE_SWITCH_RATE: 170 {BEATS}/MIN
MDC_IDC_SET_BRADY_LOWRATE: 60 {BEATS}/MIN
MDC_IDC_SET_BRADY_MAX_SENSOR_RATE: 130 {BEATS}/MIN
MDC_IDC_SET_BRADY_MAX_TRACKING_RATE: 130 {BEATS}/MIN
MDC_IDC_SET_BRADY_MODE: NORMAL
MDC_IDC_SET_BRADY_PAV_DELAY_LOW: 180 MS
MDC_IDC_SET_BRADY_SAV_DELAY_LOW: 140 MS
MDC_IDC_SET_CRT_PACED_CHAMBERS: NORMAL
MDC_IDC_SET_LEADCHNL_LV_PACING_AMPLITUDE: 2.2 V
MDC_IDC_SET_LEADCHNL_LV_PACING_ANODE_ELECTRODE_1: NORMAL
MDC_IDC_SET_LEADCHNL_LV_PACING_ANODE_LOCATION_1: NORMAL
MDC_IDC_SET_LEADCHNL_LV_PACING_CAPTURE_MODE: NORMAL
MDC_IDC_SET_LEADCHNL_LV_PACING_CATHODE_ELECTRODE_1: NORMAL
MDC_IDC_SET_LEADCHNL_LV_PACING_CATHODE_LOCATION_1: NORMAL
MDC_IDC_SET_LEADCHNL_LV_PACING_PULSEWIDTH: 0.5 MS
MDC_IDC_SET_LEADCHNL_LV_SENSING_ADAPTATION_MODE: NORMAL
MDC_IDC_SET_LEADCHNL_LV_SENSING_ANODE_ELECTRODE_1: NORMAL
MDC_IDC_SET_LEADCHNL_LV_SENSING_ANODE_LOCATION_1: NORMAL
MDC_IDC_SET_LEADCHNL_LV_SENSING_CATHODE_ELECTRODE_1: NORMAL
MDC_IDC_SET_LEADCHNL_LV_SENSING_CATHODE_LOCATION_1: NORMAL
MDC_IDC_SET_LEADCHNL_LV_SENSING_SENSITIVITY: 1 MV
MDC_IDC_SET_LEADCHNL_RA_PACING_AMPLITUDE: 2.5 V
MDC_IDC_SET_LEADCHNL_RA_PACING_CAPTURE_MODE: NORMAL
MDC_IDC_SET_LEADCHNL_RA_PACING_POLARITY: NORMAL
MDC_IDC_SET_LEADCHNL_RA_PACING_PULSEWIDTH: 0.5 MS
MDC_IDC_SET_LEADCHNL_RA_SENSING_ADAPTATION_MODE: NORMAL
MDC_IDC_SET_LEADCHNL_RA_SENSING_POLARITY: NORMAL
MDC_IDC_SET_LEADCHNL_RA_SENSING_SENSITIVITY: 0.25 MV
MDC_IDC_SET_LEADCHNL_RV_PACING_AMPLITUDE: 2 V
MDC_IDC_SET_LEADCHNL_RV_PACING_CAPTURE_MODE: NORMAL
MDC_IDC_SET_LEADCHNL_RV_PACING_POLARITY: NORMAL
MDC_IDC_SET_LEADCHNL_RV_PACING_PULSEWIDTH: 0.4 MS
MDC_IDC_SET_LEADCHNL_RV_SENSING_ADAPTATION_MODE: NORMAL
MDC_IDC_SET_LEADCHNL_RV_SENSING_POLARITY: NORMAL
MDC_IDC_SET_LEADCHNL_RV_SENSING_SENSITIVITY: 0.6 MV
MDC_IDC_SET_ZONE_DETECTION_INTERVAL: 250 MS
MDC_IDC_SET_ZONE_DETECTION_INTERVAL: 300 MS
MDC_IDC_SET_ZONE_DETECTION_INTERVAL: 353 MS
MDC_IDC_SET_ZONE_STATUS: NORMAL
MDC_IDC_SET_ZONE_TYPE: NORMAL
MDC_IDC_SET_ZONE_VENDOR_TYPE: NORMAL
MDC_IDC_STAT_AT_BURDEN_PERCENT: 0 %
MDC_IDC_STAT_AT_DTM_END: NORMAL
MDC_IDC_STAT_AT_DTM_START: NORMAL
MDC_IDC_STAT_BRADY_DTM_END: NORMAL
MDC_IDC_STAT_BRADY_DTM_START: NORMAL
MDC_IDC_STAT_BRADY_RA_PERCENT_PACED: 0 %
MDC_IDC_STAT_BRADY_RV_PERCENT_PACED: 21 %
MDC_IDC_STAT_CRT_DTM_END: NORMAL
MDC_IDC_STAT_CRT_DTM_START: NORMAL
MDC_IDC_STAT_CRT_LV_PERCENT_PACED: 94 %
MDC_IDC_STAT_EPISODE_RECENT_COUNT: 0
MDC_IDC_STAT_EPISODE_RECENT_COUNT: 1
MDC_IDC_STAT_EPISODE_RECENT_COUNT_DTM_END: NORMAL
MDC_IDC_STAT_EPISODE_RECENT_COUNT_DTM_START: NORMAL
MDC_IDC_STAT_EPISODE_TYPE: NORMAL
MDC_IDC_STAT_EPISODE_VENDOR_TYPE: NORMAL
MDC_IDC_STAT_TACHYTHERAPY_ATP_DELIVERED_RECENT: 0
MDC_IDC_STAT_TACHYTHERAPY_ATP_DELIVERED_TOTAL: 0
MDC_IDC_STAT_TACHYTHERAPY_RECENT_DTM_END: NORMAL
MDC_IDC_STAT_TACHYTHERAPY_RECENT_DTM_START: NORMAL
MDC_IDC_STAT_TACHYTHERAPY_SHOCKS_ABORTED_RECENT: 0
MDC_IDC_STAT_TACHYTHERAPY_SHOCKS_ABORTED_TOTAL: 0
MDC_IDC_STAT_TACHYTHERAPY_SHOCKS_DELIVERED_RECENT: 0
MDC_IDC_STAT_TACHYTHERAPY_SHOCKS_DELIVERED_TOTAL: 0
MDC_IDC_STAT_TACHYTHERAPY_TOTAL_DTM_END: NORMAL
MDC_IDC_STAT_TACHYTHERAPY_TOTAL_DTM_START: NORMAL

## 2025-04-15 ENCOUNTER — TELEPHONE (OUTPATIENT)
Dept: ENDOCRINOLOGY | Facility: CLINIC | Age: 70
End: 2025-04-15
Payer: COMMERCIAL

## 2025-04-15 DIAGNOSIS — E11.9 TYPE 2 DIABETES MELLITUS WITHOUT COMPLICATION, WITHOUT LONG-TERM CURRENT USE OF INSULIN (H): ICD-10-CM

## 2025-04-15 NOTE — TELEPHONE ENCOUNTER
Left Voicemail (1st Attempt) for the patient to call back and schedule the following:    Appointment type: RETURN DIABETES  Provider: Li Muller MD  Return date: Next Available with Yohana or Deon  Specialty phone number: [phone]  Additional appointment(s) needed: N/A  Additonal Notes:LVM, MyCx1    Hien Pineda, RN to Li Muller MD  [id number]         4/15/25 10:48 AM  Request per pt call. Lv 11/23. Past due for 3 mth appt. dosing alert. labs ordered not drawn    Petros Argueta on 4/15/2025 at 1:57 PM

## 2025-04-15 NOTE — TELEPHONE ENCOUNTER
[facility] Call Center    Phone Message    May a detailed message be left on voicemail: yes     Reason for Call: Medication Refill Request    Has the patient contacted the pharmacy for the refill? Yes   Name of medication being requested:   glipiZIDE (GLUCOTROL XL) 5 MG 24 hr tablet    Provider who prescribed the medication: Dr. Muller  Pharmacy:   [facility] #[id number] [address]     Date medication is needed: patient is taking last pill today 4/15/25       Action Taken: Message routed to:  [facility]      Travel Screening: Not Applicable     Date of Service:

## 2025-04-15 NOTE — TELEPHONE ENCOUNTER
glipiZIDE (GLUCOTROL XL) 5 MG 24 hr tablet   Start: 12/19/2024   Disp-180 tablet, R-0   Take 1 tablet (5 mg) by mouth 2 times daily.     Madeline Chavarria PA-C  Endocrinology, Diabetes, and Metabolism  Lv 11/14/23  Nv  none    F/U in 3 mos with Dr. Muller or myself. Would suggest graduation to primary care once A1c is <8%.     Scheduling has been notified to contact the pt for appointment.    Refill decision: Medication unable to be refilled by RN due to: Other: dosing alert,  past due for appt.      Request from pharmacy:  Requested Prescriptions   Pending Prescriptions Disp Refills    glipiZIDE (GLUCOTROL XL) 5 MG 24 hr tablet 180 tablet 0     Sig: Take 1 tablet (5 mg) by mouth 2 times daily.       Sulfonylurea Agents Failed - 4/15/2025 10:40 AM        Failed - Patient has documented A1c within the specified period of time.     If HgbA1C is 8 or greater, it needs to be on file within the past 3 months.  If less than 8, must be on file within the past 6 months.     Recent Labs   Lab Test 10/10/23  0737 09/07/22  0810   A1C 8.7*  --    HEMOGLOBINA1  --  9.6             Failed - Recent (6 mo) or future (90 days) visit within the authorizing provider's specialty     The patient must have completed an in-person or virtual visit within the past 6 months or has a future visit scheduled within the next 90 days with the authorizing provider s specialty.  Urgent care and e-visits do not quality as an office visit for this protocol.          Passed - Medication is active on med list and the sig matches. RN to manually verify dose and sig if red X/fail.     If the protocol passes (green check), you do not need to verify med dose and sig.    A prescription matches if they are the same clinical intention.    For Example: once daily and every morning are the same.    The protocol can not identify upper and lower case letters as matching and will fail.     For Example: Take 1 tablet (50 mg) by mouth daily     TAKE 1 TABLET (50  MG) BY MOUTH DAILY    For all fails (red x), verify dose and sig.    If the refill does match what is on file, the RN can still proceed to approve the refill request.       If they do not match, route to the appropriate provider.             Passed - Has GFR on file in past 12 months and most recent value is normal        Passed - Medication indicated for associated diagnosis     Medication is associated with one or more of the following diagnoses:  Maturity-onset diabetes of the young   Peripheral circulatory disorder due to type 2 diabetes mellitus   Type 2 diabetes mellitus           Passed - Patient is age 18 or older        Passed - Patient has a recent creatinine (normal) within the past 12 mos.     Recent Labs   Lab Test 12/17/24  0722 09/13/18  2230 09/13/18  2228   CR 1.00   < >  --    CREAT  --   --  1.4*    < > = values in this interval not displayed.

## 2025-04-16 ENCOUNTER — APPOINTMENT (OUTPATIENT)
Dept: ULTRASOUND IMAGING | Facility: CLINIC | Age: 70
DRG: 261 | End: 2025-04-16
Attending: STUDENT IN AN ORGANIZED HEALTH CARE EDUCATION/TRAINING PROGRAM
Payer: COMMERCIAL

## 2025-04-16 ENCOUNTER — HOSPITAL ENCOUNTER (INPATIENT)
Facility: CLINIC | Age: 70
DRG: 261 | End: 2025-04-16
Attending: STUDENT IN AN ORGANIZED HEALTH CARE EDUCATION/TRAINING PROGRAM | Admitting: STUDENT IN AN ORGANIZED HEALTH CARE EDUCATION/TRAINING PROGRAM
Payer: COMMERCIAL

## 2025-04-16 ENCOUNTER — APPOINTMENT (OUTPATIENT)
Dept: CARDIOLOGY | Facility: CLINIC | Age: 70
DRG: 261 | End: 2025-04-16
Attending: STUDENT IN AN ORGANIZED HEALTH CARE EDUCATION/TRAINING PROGRAM
Payer: COMMERCIAL

## 2025-04-16 ENCOUNTER — APPOINTMENT (OUTPATIENT)
Dept: CT IMAGING | Facility: CLINIC | Age: 70
DRG: 261 | End: 2025-04-16
Attending: STUDENT IN AN ORGANIZED HEALTH CARE EDUCATION/TRAINING PROGRAM
Payer: COMMERCIAL

## 2025-04-16 DIAGNOSIS — T82.837S: ICD-10-CM

## 2025-04-16 DIAGNOSIS — I82.C13 INTERNAL JUGULAR VEIN THROMBOSIS, BILATERAL (H): ICD-10-CM

## 2025-04-16 DIAGNOSIS — Z95.0 CARDIAC PACEMAKER IN SITU: ICD-10-CM

## 2025-04-16 DIAGNOSIS — T82.7XXA INFECTION AND INFLAMMATORY REACTION DUE TO CARDIAC DEVICE, IMPLANT, AND GRAFT, INITIAL ENCOUNTER: Primary | ICD-10-CM

## 2025-04-16 DIAGNOSIS — I25.5 ISCHEMIC CARDIOMYOPATHY: ICD-10-CM

## 2025-04-16 DIAGNOSIS — R22.2 LOCALIZED SWELLING OF CHEST WALL: ICD-10-CM

## 2025-04-16 LAB
ANION GAP SERPL CALCULATED.3IONS-SCNC: 13 MMOL/L (ref 7–15)
BASOPHILS # BLD AUTO: 0.1 10E3/UL (ref 0–0.2)
BASOPHILS NFR BLD AUTO: 1 %
BI-PLANE LVEF ECHO: NORMAL
BUN SERPL-MCNC: 23.6 MG/DL (ref 8–23)
CALCIUM SERPL-MCNC: 9.3 MG/DL (ref 8.8–10.4)
CHLORIDE SERPL-SCNC: 103 MMOL/L (ref 98–107)
CREAT SERPL-MCNC: 1.04 MG/DL (ref 0.67–1.17)
EGFRCR SERPLBLD CKD-EPI 2021: 78 ML/MIN/1.73M2
EOSINOPHIL # BLD AUTO: 0.1 10E3/UL (ref 0–0.7)
EOSINOPHIL NFR BLD AUTO: 1 %
ERYTHROCYTE [DISTWIDTH] IN BLOOD BY AUTOMATED COUNT: 14.8 % (ref 10–15)
ERYTHROCYTE [SEDIMENTATION RATE] IN BLOOD BY WESTERGREN METHOD: 12 MM/HR (ref 0–20)
EST. AVERAGE GLUCOSE BLD GHB EST-MCNC: 226 MG/DL
GLUCOSE BLDC GLUCOMTR-MCNC: 160 MG/DL (ref 70–99)
GLUCOSE SERPL-MCNC: 254 MG/DL (ref 70–99)
HBA1C MFR BLD: 9.5 %
HCO3 SERPL-SCNC: 22 MMOL/L (ref 22–29)
HCT VFR BLD AUTO: 46.7 % (ref 40–53)
HGB BLD-MCNC: 15.8 G/DL (ref 13.3–17.7)
IMM GRANULOCYTES # BLD: 0 10E3/UL
IMM GRANULOCYTES NFR BLD: 0 %
LVEF ECHO: NORMAL
LYMPHOCYTES # BLD AUTO: 1.7 10E3/UL (ref 0.8–5.3)
LYMPHOCYTES NFR BLD AUTO: 20 %
MCH RBC QN AUTO: 30.7 PG (ref 26.5–33)
MCHC RBC AUTO-ENTMCNC: 33.8 G/DL (ref 31.5–36.5)
MCV RBC AUTO: 91 FL (ref 78–100)
MONOCYTES # BLD AUTO: 0.6 10E3/UL (ref 0–1.3)
MONOCYTES NFR BLD AUTO: 7 %
NEUTROPHILS # BLD AUTO: 6.2 10E3/UL (ref 1.6–8.3)
NEUTROPHILS NFR BLD AUTO: 71 %
NRBC # BLD AUTO: 0 10E3/UL
NRBC BLD AUTO-RTO: 0 /100
PLATELET # BLD AUTO: 211 10E3/UL (ref 150–450)
POTASSIUM SERPL-SCNC: 4.3 MMOL/L (ref 3.4–5.3)
RADIOLOGIST FLAGS: ABNORMAL
RBC # BLD AUTO: 5.15 10E6/UL (ref 4.4–5.9)
SODIUM SERPL-SCNC: 138 MMOL/L (ref 135–145)
WBC # BLD AUTO: 8.8 10E3/UL (ref 4–11)

## 2025-04-16 PROCEDURE — 80048 BASIC METABOLIC PNL TOTAL CA: CPT | Performed by: STUDENT IN AN ORGANIZED HEALTH CARE EDUCATION/TRAINING PROGRAM

## 2025-04-16 PROCEDURE — 71260 CT THORAX DX C+: CPT

## 2025-04-16 PROCEDURE — 93971 EXTREMITY STUDY: CPT

## 2025-04-16 PROCEDURE — 71260 CT THORAX DX C+: CPT | Mod: 26 | Performed by: RADIOLOGY

## 2025-04-16 PROCEDURE — 250N000011 HC RX IP 250 OP 636

## 2025-04-16 PROCEDURE — 99285 EMERGENCY DEPT VISIT HI MDM: CPT | Performed by: STUDENT IN AN ORGANIZED HEALTH CARE EDUCATION/TRAINING PROGRAM

## 2025-04-16 PROCEDURE — 83036 HEMOGLOBIN GLYCOSYLATED A1C: CPT

## 2025-04-16 PROCEDURE — 36415 COLL VENOUS BLD VENIPUNCTURE: CPT | Performed by: STUDENT IN AN ORGANIZED HEALTH CARE EDUCATION/TRAINING PROGRAM

## 2025-04-16 PROCEDURE — 99285 EMERGENCY DEPT VISIT HI MDM: CPT | Mod: 25 | Performed by: STUDENT IN AN ORGANIZED HEALTH CARE EDUCATION/TRAINING PROGRAM

## 2025-04-16 PROCEDURE — 99233 SBSQ HOSP IP/OBS HIGH 50: CPT | Mod: GC | Performed by: INTERNAL MEDICINE

## 2025-04-16 PROCEDURE — 85652 RBC SED RATE AUTOMATED: CPT | Performed by: STUDENT IN AN ORGANIZED HEALTH CARE EDUCATION/TRAINING PROGRAM

## 2025-04-16 PROCEDURE — 99223 1ST HOSP IP/OBS HIGH 75: CPT | Mod: 25 | Performed by: INTERNAL MEDICINE

## 2025-04-16 PROCEDURE — 85004 AUTOMATED DIFF WBC COUNT: CPT | Performed by: STUDENT IN AN ORGANIZED HEALTH CARE EDUCATION/TRAINING PROGRAM

## 2025-04-16 PROCEDURE — 120N000005 HC R&B MS OVERFLOW UMMC

## 2025-04-16 PROCEDURE — 82962 GLUCOSE BLOOD TEST: CPT

## 2025-04-16 PROCEDURE — 93971 EXTREMITY STUDY: CPT | Mod: 26 | Performed by: STUDENT IN AN ORGANIZED HEALTH CARE EDUCATION/TRAINING PROGRAM

## 2025-04-16 PROCEDURE — 255N000002 HC RX 255 OP 636: Performed by: INTERNAL MEDICINE

## 2025-04-16 PROCEDURE — 999N000208 ECHOCARDIOGRAM COMPLETE

## 2025-04-16 PROCEDURE — 93306 TTE W/DOPPLER COMPLETE: CPT | Mod: 26 | Performed by: INTERNAL MEDICINE

## 2025-04-16 PROCEDURE — 87205 SMEAR GRAM STAIN: CPT | Performed by: STUDENT IN AN ORGANIZED HEALTH CARE EDUCATION/TRAINING PROGRAM

## 2025-04-16 PROCEDURE — 250N000011 HC RX IP 250 OP 636: Performed by: STUDENT IN AN ORGANIZED HEALTH CARE EDUCATION/TRAINING PROGRAM

## 2025-04-16 PROCEDURE — 87040 BLOOD CULTURE FOR BACTERIA: CPT | Performed by: STUDENT IN AN ORGANIZED HEALTH CARE EDUCATION/TRAINING PROGRAM

## 2025-04-16 RX ORDER — CEFAZOLIN SODIUM 1 G/50ML
1750 SOLUTION INTRAVENOUS ONCE
Status: COMPLETED | OUTPATIENT
Start: 2025-04-16 | End: 2025-04-17

## 2025-04-16 RX ORDER — LIDOCAINE 40 MG/G
CREAM TOPICAL
OUTPATIENT
Start: 2025-04-16

## 2025-04-16 RX ORDER — NICOTINE POLACRILEX 4 MG
15-30 LOZENGE BUCCAL
Status: DISCONTINUED | OUTPATIENT
Start: 2025-04-16 | End: 2025-04-23

## 2025-04-16 RX ORDER — ATORVASTATIN CALCIUM 80 MG/1
80 TABLET, FILM COATED ORAL DAILY
Status: DISCONTINUED | OUTPATIENT
Start: 2025-04-17 | End: 2025-04-23 | Stop reason: HOSPADM

## 2025-04-16 RX ORDER — ONDANSETRON 2 MG/ML
4 INJECTION INTRAMUSCULAR; INTRAVENOUS EVERY 6 HOURS PRN
Status: DISCONTINUED | OUTPATIENT
Start: 2025-04-16 | End: 2025-04-23 | Stop reason: HOSPADM

## 2025-04-16 RX ORDER — AMOXICILLIN 250 MG
2 CAPSULE ORAL 2 TIMES DAILY PRN
Status: DISCONTINUED | OUTPATIENT
Start: 2025-04-16 | End: 2025-04-23 | Stop reason: HOSPADM

## 2025-04-16 RX ORDER — POLYETHYLENE GLYCOL 3350 17 G/17G
17 POWDER, FOR SOLUTION ORAL DAILY
Status: DISCONTINUED | OUTPATIENT
Start: 2025-04-17 | End: 2025-04-23 | Stop reason: HOSPADM

## 2025-04-16 RX ORDER — CARVEDILOL 25 MG/1
25 TABLET ORAL 2 TIMES DAILY WITH MEALS
Status: DISCONTINUED | OUTPATIENT
Start: 2025-04-16 | End: 2025-04-23 | Stop reason: HOSPADM

## 2025-04-16 RX ORDER — IOPAMIDOL 755 MG/ML
85 INJECTION, SOLUTION INTRAVASCULAR ONCE
Status: COMPLETED | OUTPATIENT
Start: 2025-04-16 | End: 2025-04-16

## 2025-04-16 RX ORDER — AMOXICILLIN 250 MG
1 CAPSULE ORAL 2 TIMES DAILY PRN
Status: DISCONTINUED | OUTPATIENT
Start: 2025-04-16 | End: 2025-04-23 | Stop reason: HOSPADM

## 2025-04-16 RX ORDER — SPIRONOLACTONE 25 MG/1
25 TABLET ORAL DAILY
Status: DISCONTINUED | OUTPATIENT
Start: 2025-04-17 | End: 2025-04-20

## 2025-04-16 RX ORDER — ACETAMINOPHEN 650 MG/1
650 SUPPOSITORY RECTAL EVERY 4 HOURS PRN
Status: DISCONTINUED | OUTPATIENT
Start: 2025-04-16 | End: 2025-04-23 | Stop reason: HOSPADM

## 2025-04-16 RX ORDER — DEXTROSE MONOHYDRATE 25 G/50ML
25-50 INJECTION, SOLUTION INTRAVENOUS
Status: DISCONTINUED | OUTPATIENT
Start: 2025-04-16 | End: 2025-04-23

## 2025-04-16 RX ORDER — LIDOCAINE 40 MG/G
CREAM TOPICAL
Status: DISCONTINUED | OUTPATIENT
Start: 2025-04-16 | End: 2025-04-23 | Stop reason: HOSPADM

## 2025-04-16 RX ORDER — FUROSEMIDE 20 MG/1
40 TABLET ORAL 2 TIMES DAILY
Status: DISCONTINUED | OUTPATIENT
Start: 2025-04-16 | End: 2025-04-16

## 2025-04-16 RX ORDER — ONDANSETRON 4 MG/1
4 TABLET, ORALLY DISINTEGRATING ORAL EVERY 6 HOURS PRN
Status: DISCONTINUED | OUTPATIENT
Start: 2025-04-16 | End: 2025-04-23 | Stop reason: HOSPADM

## 2025-04-16 RX ORDER — ASPIRIN 81 MG/1
81 TABLET, CHEWABLE ORAL DAILY
Status: DISCONTINUED | OUTPATIENT
Start: 2025-04-17 | End: 2025-04-23 | Stop reason: HOSPADM

## 2025-04-16 RX ORDER — FUROSEMIDE 40 MG/1
40 TABLET ORAL
Status: DISCONTINUED | OUTPATIENT
Start: 2025-04-17 | End: 2025-04-23 | Stop reason: HOSPADM

## 2025-04-16 RX ORDER — CALCIUM CARBONATE 500 MG/1
1000 TABLET, CHEWABLE ORAL 4 TIMES DAILY PRN
Status: DISCONTINUED | OUTPATIENT
Start: 2025-04-16 | End: 2025-04-23 | Stop reason: HOSPADM

## 2025-04-16 RX ORDER — HEPARIN SODIUM 10000 [USP'U]/100ML
0-5000 INJECTION, SOLUTION INTRAVENOUS CONTINUOUS
Status: DISCONTINUED | OUTPATIENT
Start: 2025-04-16 | End: 2025-04-22

## 2025-04-16 RX ORDER — ACETAMINOPHEN 325 MG/1
650 TABLET ORAL EVERY 4 HOURS PRN
Status: DISCONTINUED | OUTPATIENT
Start: 2025-04-16 | End: 2025-04-23 | Stop reason: HOSPADM

## 2025-04-16 RX ADMIN — HEPARIN SODIUM 1450 UNITS/HR: 10000 INJECTION, SOLUTION INTRAVENOUS at 23:27

## 2025-04-16 RX ADMIN — IOPAMIDOL 85 ML: 755 INJECTION, SOLUTION INTRAVENOUS at 16:41

## 2025-04-16 RX ADMIN — HUMAN ALBUMIN MICROSPHERES AND PERFLUTREN 5 ML: 10; .22 INJECTION, SOLUTION INTRAVENOUS at 16:42

## 2025-04-16 ASSESSMENT — COLUMBIA-SUICIDE SEVERITY RATING SCALE - C-SSRS
6. HAVE YOU EVER DONE ANYTHING, STARTED TO DO ANYTHING, OR PREPARED TO DO ANYTHING TO END YOUR LIFE?: NO
2. HAVE YOU ACTUALLY HAD ANY THOUGHTS OF KILLING YOURSELF IN THE PAST MONTH?: NO
1. IN THE PAST MONTH, HAVE YOU WISHED YOU WERE DEAD OR WISHED YOU COULD GO TO SLEEP AND NOT WAKE UP?: NO

## 2025-04-16 ASSESSMENT — ACTIVITIES OF DAILY LIVING (ADL)
ADLS_ACUITY_SCORE: 42

## 2025-04-16 NOTE — ED TRIAGE NOTES
PT arrives c/o mass on L side of chest with intermittent drainage. First noticed swelling 5 days ago.      Triage Assessment (Adult)       Row Name 04/16/25 1443          Triage Assessment    Airway WDL WDL        Respiratory WDL    Respiratory WDL WDL        Skin Circulation/Temperature WDL    Skin Circulation/Temperature WDL WDL        Cardiac WDL    Cardiac WDL WDL        Peripheral/Neurovascular WDL    Peripheral Neurovascular WDL WDL        Cognitive/Neuro/Behavioral WDL    Cognitive/Neuro/Behavioral WDL WDL
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
## 2025-04-16 NOTE — PHARMACY-VANCOMYCIN DOSING SERVICE
Pharmacy Vancomycin Initial Note  Date of Service 2025  Patient's  1955  69 year old, male    Indication: Sepsis    Current estimated CrCl = CrCl cannot be calculated (Unknown ideal weight.).    Creatinine for last 3 days  2025:  3:36 PM Creatinine 1.04 mg/dL    Recent Vancomycin Level(s) for last 3 days  No results found for requested labs within last 3 days.      Vancomycin IV Administrations (past 72 hours)        No vancomycin orders with administrations in past 72 hours.                    Nephrotoxins and other renal medications (From now, onward)      Start     Dose/Rate Route Frequency Ordered Stop    25  vancomycin (VANCOCIN) 1,500 mg in 0.9% NaCl 250 mL intermittent infusion         1,500 mg  over 90 Minutes Intravenous EVERY 24 HOURS 25 08  empagliflozin (JARDIANCE) tablet 25 mg        Note to Pharmacy: PTA Sig:Take 1 tablet (25 mg) by mouth daily.      25 mg Oral DAILY 25  [Held by provider]  furosemide (LASIX) tablet 40 mg        (On hold since today at 1822 until manually unheld; held by Felipe Pinto MDHold Reason: Other)   Note to Pharmacy: PTA Sig:Take 1 tablet (40 mg) by mouth 2 times daily.      40 mg Oral 2 TIMES DAILY 25  vancomycin (VANCOCIN) 1,750 mg in sodium chloride 0.9 % 567.5 mL intermittent infusion         1,750 mg  over 2 Hours Intravenous ONCE 25              Contrast Orders - past 72 hours (72h ago, onward)      Start     Dose/Rate Route Frequency Stop    25 164  perflutren diluted 1mL to 2mL with saline (OPTISON) diluted injection 5 mL         5 mL Intravenous ONCE 25 1630  iopamidol (ISOVUE-370) solution 85 mL         85 mL Intravenous ONCE 25 164            [employer] Prediction of Planned Initial Vancomycin Regimen  Loading dose: 1750 mg at 18:45 2025.  Regimen: 1500 mg IV every 24 hours.  Start time:  18:45 on 04/17/2025  Exposure target: AUC24 (range)400-600 mg/L.hr   AUC24,ss: 473 mg/L.hr  Probability of AUC24 > 400: 69 %  Ctrough,ss: 13.2 mg/L  Probability of Ctrough,ss > 20: 18 %  Probability of nephrotoxicity (Lodise VIC 2009): 8 %          Plan:  Give vancomycin 1750 mg IV once followed by 1500 mg IV q24h.   Vancomycin monitoring method: AUC  Vancomycin therapeutic monitoring goal: 400-600 mg*h/L  Pharmacy will check vancomycin levels as appropriate in 1-3 Days.    Serum creatinine levels will be ordered daily for the first week of therapy and at least twice weekly for subsequent weeks.      Valencia Castro RPH

## 2025-04-17 ENCOUNTER — APPOINTMENT (OUTPATIENT)
Dept: CARDIOLOGY | Facility: CLINIC | Age: 70
DRG: 261 | End: 2025-04-17
Attending: STUDENT IN AN ORGANIZED HEALTH CARE EDUCATION/TRAINING PROGRAM
Payer: COMMERCIAL

## 2025-04-17 ENCOUNTER — ANCILLARY PROCEDURE (OUTPATIENT)
Dept: CARDIOLOGY | Facility: CLINIC | Age: 70
DRG: 261 | End: 2025-04-17
Payer: COMMERCIAL

## 2025-04-17 VITALS
RESPIRATION RATE: 18 BRPM | BODY MASS INDEX: 28.66 KG/M2 | DIASTOLIC BLOOD PRESSURE: 70 MMHG | TEMPERATURE: 98.1 F | SYSTOLIC BLOOD PRESSURE: 103 MMHG | HEIGHT: 66 IN | WEIGHT: 178.3 LBS | OXYGEN SATURATION: 93 % | HEART RATE: 92 BPM

## 2025-04-17 LAB
ANION GAP SERPL CALCULATED.3IONS-SCNC: 12 MMOL/L (ref 7–15)
BACTERIA ABSC ANAEROBE+AEROBE CULT: NORMAL
BACTERIA BLD CULT: NORMAL
BACTERIA BLD CULT: NORMAL
BUN SERPL-MCNC: 18.7 MG/DL (ref 8–23)
CALCIUM SERPL-MCNC: 8.9 MG/DL (ref 8.8–10.4)
CHLORIDE SERPL-SCNC: 110 MMOL/L (ref 98–107)
CREAT SERPL-MCNC: 0.71 MG/DL (ref 0.67–1.17)
EGFRCR SERPLBLD CKD-EPI 2021: >90 ML/MIN/1.73M2
ERYTHROCYTE [DISTWIDTH] IN BLOOD BY AUTOMATED COUNT: 14.9 % (ref 10–15)
GLUCOSE BLDC GLUCOMTR-MCNC: 126 MG/DL (ref 70–99)
GLUCOSE BLDC GLUCOMTR-MCNC: 158 MG/DL (ref 70–99)
GLUCOSE BLDC GLUCOMTR-MCNC: 164 MG/DL (ref 70–99)
GLUCOSE BLDC GLUCOMTR-MCNC: 190 MG/DL (ref 70–99)
GLUCOSE SERPL-MCNC: 167 MG/DL (ref 70–99)
GRAM STAIN RESULT: NORMAL
GRAM STAIN RESULT: NORMAL
HCO3 SERPL-SCNC: 19 MMOL/L (ref 22–29)
HCT VFR BLD AUTO: 44.7 % (ref 40–53)
HGB BLD-MCNC: 15.2 G/DL (ref 13.3–17.7)
MCH RBC QN AUTO: 30.7 PG (ref 26.5–33)
MCHC RBC AUTO-ENTMCNC: 34 G/DL (ref 31.5–36.5)
MCV RBC AUTO: 90 FL (ref 78–100)
MDC_IDC_LEAD_CONNECTION_STATUS: NORMAL
MDC_IDC_LEAD_IMPLANT_DT: NORMAL
MDC_IDC_LEAD_LOCATION: NORMAL
MDC_IDC_LEAD_LOCATION_DETAIL_1: NORMAL
MDC_IDC_LEAD_MFG: NORMAL
MDC_IDC_LEAD_MODEL: NORMAL
MDC_IDC_LEAD_POLARITY_TYPE: NORMAL
MDC_IDC_LEAD_SERIAL: NORMAL
MDC_IDC_LEAD_SPECIAL_FUNCTION: NORMAL
MDC_IDC_MSMT_BATTERY_DTM: NORMAL
MDC_IDC_MSMT_BATTERY_REMAINING_LONGEVITY: 66 MO
MDC_IDC_MSMT_BATTERY_REMAINING_PERCENTAGE: 77 %
MDC_IDC_MSMT_BATTERY_STATUS: NORMAL
MDC_IDC_MSMT_CAP_CHARGE_DTM: NORMAL
MDC_IDC_MSMT_CAP_CHARGE_TIME: 11.3 S
MDC_IDC_MSMT_CAP_CHARGE_TYPE: NORMAL
MDC_IDC_MSMT_LEADCHNL_LV_IMPEDANCE_VALUE: 501 OHM
MDC_IDC_MSMT_LEADCHNL_LV_PACING_THRESHOLD_AMPLITUDE: 1 V
MDC_IDC_MSMT_LEADCHNL_LV_PACING_THRESHOLD_PULSEWIDTH: 0.5 MS
MDC_IDC_MSMT_LEADCHNL_RA_IMPEDANCE_VALUE: 745 OHM
MDC_IDC_MSMT_LEADCHNL_RA_PACING_THRESHOLD_AMPLITUDE: 0.9 V
MDC_IDC_MSMT_LEADCHNL_RA_PACING_THRESHOLD_PULSEWIDTH: 0.5 MS
MDC_IDC_MSMT_LEADCHNL_RV_IMPEDANCE_VALUE: 648 OHM
MDC_IDC_MSMT_LEADCHNL_RV_PACING_THRESHOLD_AMPLITUDE: 0.9 V
MDC_IDC_MSMT_LEADCHNL_RV_PACING_THRESHOLD_PULSEWIDTH: 0.4 MS
MDC_IDC_PG_IMPLANT_DTM: NORMAL
MDC_IDC_PG_MFG: NORMAL
MDC_IDC_PG_MODEL: NORMAL
MDC_IDC_PG_SERIAL: NORMAL
MDC_IDC_PG_TYPE: NORMAL
MDC_IDC_SESS_CLINIC_NAME: NORMAL
MDC_IDC_SESS_DTM: NORMAL
MDC_IDC_SESS_TYPE: NORMAL
MDC_IDC_SET_BRADY_AT_MODE_SWITCH_MODE: NORMAL
MDC_IDC_SET_BRADY_AT_MODE_SWITCH_RATE: 170 {BEATS}/MIN
MDC_IDC_SET_BRADY_LOWRATE: 60 {BEATS}/MIN
MDC_IDC_SET_BRADY_MAX_SENSOR_RATE: 130 {BEATS}/MIN
MDC_IDC_SET_BRADY_MAX_TRACKING_RATE: 130 {BEATS}/MIN
MDC_IDC_SET_BRADY_MODE: NORMAL
MDC_IDC_SET_BRADY_PAV_DELAY_LOW: 180 MS
MDC_IDC_SET_BRADY_SAV_DELAY_LOW: 140 MS
MDC_IDC_SET_CRT_PACED_CHAMBERS: NORMAL
MDC_IDC_SET_LEADCHNL_LV_PACING_AMPLITUDE: 2.3 V
MDC_IDC_SET_LEADCHNL_LV_PACING_ANODE_ELECTRODE_1: NORMAL
MDC_IDC_SET_LEADCHNL_LV_PACING_ANODE_LOCATION_1: NORMAL
MDC_IDC_SET_LEADCHNL_LV_PACING_CAPTURE_MODE: NORMAL
MDC_IDC_SET_LEADCHNL_LV_PACING_CATHODE_ELECTRODE_1: NORMAL
MDC_IDC_SET_LEADCHNL_LV_PACING_CATHODE_LOCATION_1: NORMAL
MDC_IDC_SET_LEADCHNL_LV_PACING_PULSEWIDTH: 0.5 MS
MDC_IDC_SET_LEADCHNL_LV_SENSING_ADAPTATION_MODE: NORMAL
MDC_IDC_SET_LEADCHNL_LV_SENSING_ANODE_ELECTRODE_1: NORMAL
MDC_IDC_SET_LEADCHNL_LV_SENSING_ANODE_LOCATION_1: NORMAL
MDC_IDC_SET_LEADCHNL_LV_SENSING_CATHODE_ELECTRODE_1: NORMAL
MDC_IDC_SET_LEADCHNL_LV_SENSING_CATHODE_LOCATION_1: NORMAL
MDC_IDC_SET_LEADCHNL_LV_SENSING_SENSITIVITY: 1 MV
MDC_IDC_SET_LEADCHNL_RA_PACING_AMPLITUDE: 2.5 V
MDC_IDC_SET_LEADCHNL_RA_PACING_CAPTURE_MODE: NORMAL
MDC_IDC_SET_LEADCHNL_RA_PACING_POLARITY: NORMAL
MDC_IDC_SET_LEADCHNL_RA_PACING_PULSEWIDTH: 0.5 MS
MDC_IDC_SET_LEADCHNL_RA_SENSING_ADAPTATION_MODE: NORMAL
MDC_IDC_SET_LEADCHNL_RA_SENSING_POLARITY: NORMAL
MDC_IDC_SET_LEADCHNL_RA_SENSING_SENSITIVITY: 0.25 MV
MDC_IDC_SET_LEADCHNL_RV_PACING_AMPLITUDE: 2 V
MDC_IDC_SET_LEADCHNL_RV_PACING_CAPTURE_MODE: NORMAL
MDC_IDC_SET_LEADCHNL_RV_PACING_POLARITY: NORMAL
MDC_IDC_SET_LEADCHNL_RV_PACING_PULSEWIDTH: 0.4 MS
MDC_IDC_SET_LEADCHNL_RV_SENSING_ADAPTATION_MODE: NORMAL
MDC_IDC_SET_LEADCHNL_RV_SENSING_POLARITY: NORMAL
MDC_IDC_SET_LEADCHNL_RV_SENSING_SENSITIVITY: 0.6 MV
MDC_IDC_SET_ZONE_DETECTION_INTERVAL: 250 MS
MDC_IDC_SET_ZONE_DETECTION_INTERVAL: 300 MS
MDC_IDC_SET_ZONE_DETECTION_INTERVAL: 353 MS
MDC_IDC_SET_ZONE_STATUS: NORMAL
MDC_IDC_SET_ZONE_TYPE: NORMAL
MDC_IDC_SET_ZONE_VENDOR_TYPE: NORMAL
MDC_IDC_STAT_AT_BURDEN_PERCENT: 0 %
MDC_IDC_STAT_AT_DTM_END: NORMAL
MDC_IDC_STAT_AT_DTM_START: NORMAL
MDC_IDC_STAT_BRADY_DTM_END: NORMAL
MDC_IDC_STAT_BRADY_DTM_START: NORMAL
MDC_IDC_STAT_BRADY_RA_PERCENT_PACED: 0 %
MDC_IDC_STAT_BRADY_RV_PERCENT_PACED: 21 %
MDC_IDC_STAT_CRT_DTM_END: NORMAL
MDC_IDC_STAT_CRT_DTM_START: NORMAL
MDC_IDC_STAT_CRT_LV_PERCENT_PACED: 94 %
MDC_IDC_STAT_EPISODE_RECENT_COUNT: 0
MDC_IDC_STAT_EPISODE_RECENT_COUNT: 1
MDC_IDC_STAT_EPISODE_RECENT_COUNT_DTM_END: NORMAL
MDC_IDC_STAT_EPISODE_RECENT_COUNT_DTM_START: NORMAL
MDC_IDC_STAT_EPISODE_TYPE: NORMAL
MDC_IDC_STAT_EPISODE_VENDOR_TYPE: NORMAL
MDC_IDC_STAT_TACHYTHERAPY_ATP_DELIVERED_RECENT: 0
MDC_IDC_STAT_TACHYTHERAPY_ATP_DELIVERED_TOTAL: 0
MDC_IDC_STAT_TACHYTHERAPY_RECENT_DTM_END: NORMAL
MDC_IDC_STAT_TACHYTHERAPY_RECENT_DTM_START: NORMAL
MDC_IDC_STAT_TACHYTHERAPY_SHOCKS_ABORTED_RECENT: 0
MDC_IDC_STAT_TACHYTHERAPY_SHOCKS_ABORTED_TOTAL: 0
MDC_IDC_STAT_TACHYTHERAPY_SHOCKS_DELIVERED_RECENT: 0
MDC_IDC_STAT_TACHYTHERAPY_SHOCKS_DELIVERED_TOTAL: 0
MDC_IDC_STAT_TACHYTHERAPY_TOTAL_DTM_END: NORMAL
MDC_IDC_STAT_TACHYTHERAPY_TOTAL_DTM_START: NORMAL
NT-PROBNP SERPL-MCNC: 359 PG/ML (ref 0–900)
PLATELET # BLD AUTO: 167 10E3/UL (ref 150–450)
POTASSIUM SERPL-SCNC: 4 MMOL/L (ref 3.4–5.3)
RBC # BLD AUTO: 4.95 10E6/UL (ref 4.4–5.9)
SODIUM SERPL-SCNC: 141 MMOL/L (ref 135–145)
UFH PPP CHRO-ACNC: 0.55 IU/ML
UFH PPP CHRO-ACNC: 0.63 IU/ML
WBC # BLD AUTO: 7.8 10E3/UL (ref 4–11)

## 2025-04-17 PROCEDURE — 250N000011 HC RX IP 250 OP 636

## 2025-04-17 PROCEDURE — 250N000009 HC RX 250: Performed by: INTERNAL MEDICINE

## 2025-04-17 PROCEDURE — 85520 HEPARIN ASSAY: CPT

## 2025-04-17 PROCEDURE — 250N000011 HC RX IP 250 OP 636: Performed by: INTERNAL MEDICINE

## 2025-04-17 PROCEDURE — 83880 ASSAY OF NATRIURETIC PEPTIDE: CPT

## 2025-04-17 PROCEDURE — 258N000003 HC RX IP 258 OP 636: Performed by: INTERNAL MEDICINE

## 2025-04-17 PROCEDURE — 36415 COLL VENOUS BLD VENIPUNCTURE: CPT | Performed by: INTERNAL MEDICINE

## 2025-04-17 PROCEDURE — 93284 PRGRMG EVAL IMPLANTABLE DFB: CPT | Mod: 26 | Performed by: INTERNAL MEDICINE

## 2025-04-17 PROCEDURE — 36415 COLL VENOUS BLD VENIPUNCTURE: CPT

## 2025-04-17 PROCEDURE — 93312 ECHO TRANSESOPHAGEAL: CPT | Mod: 26 | Performed by: INTERNAL MEDICINE

## 2025-04-17 PROCEDURE — 120N000005 HC R&B MS OVERFLOW UMMC

## 2025-04-17 PROCEDURE — 250N000013 HC RX MED GY IP 250 OP 250 PS 637

## 2025-04-17 PROCEDURE — 99233 SBSQ HOSP IP/OBS HIGH 50: CPT | Mod: FS

## 2025-04-17 PROCEDURE — 93320 DOPPLER ECHO COMPLETE: CPT | Mod: 26 | Performed by: INTERNAL MEDICINE

## 2025-04-17 PROCEDURE — 93325 DOPPLER ECHO COLOR FLOW MAPG: CPT | Mod: 26 | Performed by: INTERNAL MEDICINE

## 2025-04-17 PROCEDURE — 80048 BASIC METABOLIC PNL TOTAL CA: CPT

## 2025-04-17 PROCEDURE — 93325 DOPPLER ECHO COLOR FLOW MAPG: CPT

## 2025-04-17 PROCEDURE — 99231 SBSQ HOSP IP/OBS SF/LOW 25: CPT | Mod: 25 | Performed by: INTERNAL MEDICINE

## 2025-04-17 PROCEDURE — 999N000248 HC STATISTIC IV INSERT WITH US BY RN

## 2025-04-17 PROCEDURE — 93284 PRGRMG EVAL IMPLANTABLE DFB: CPT

## 2025-04-17 PROCEDURE — 85027 COMPLETE CBC AUTOMATED: CPT

## 2025-04-17 PROCEDURE — 250N000013 HC RX MED GY IP 250 OP 250 PS 637: Performed by: STUDENT IN AN ORGANIZED HEALTH CARE EDUCATION/TRAINING PROGRAM

## 2025-04-17 PROCEDURE — 99152 MOD SED SAME PHYS/QHP 5/>YRS: CPT | Mod: GC | Performed by: INTERNAL MEDICINE

## 2025-04-17 PROCEDURE — 85520 HEPARIN ASSAY: CPT | Performed by: INTERNAL MEDICINE

## 2025-04-17 RX ORDER — FLUMAZENIL 0.1 MG/ML
0.2 INJECTION, SOLUTION INTRAVENOUS
Status: ACTIVE | OUTPATIENT
Start: 2025-04-17 | End: 2025-04-19

## 2025-04-17 RX ORDER — LIDOCAINE 40 MG/G
CREAM TOPICAL
Status: CANCELLED | OUTPATIENT
Start: 2025-04-17

## 2025-04-17 RX ORDER — ACYCLOVIR 200 MG/1
9.5 CAPSULE ORAL
Status: DISCONTINUED | OUTPATIENT
Start: 2025-04-17 | End: 2025-04-22 | Stop reason: HOSPADM

## 2025-04-17 RX ORDER — LIDOCAINE HYDROCHLORIDE 20 MG/ML
15 SOLUTION OROPHARYNGEAL ONCE
Status: COMPLETED | OUTPATIENT
Start: 2025-04-17 | End: 2025-04-17

## 2025-04-17 RX ORDER — FENTANYL CITRATE 50 UG/ML
25 INJECTION, SOLUTION INTRAMUSCULAR; INTRAVENOUS
Status: DISCONTINUED | OUTPATIENT
Start: 2025-04-17 | End: 2025-04-17

## 2025-04-17 RX ORDER — MAGNESIUM HYDROXIDE/ALUMINUM HYDROXICE/SIMETHICONE 120; 1200; 1200 MG/30ML; MG/30ML; MG/30ML
30 SUSPENSION ORAL EVERY 8 HOURS PRN
Status: ACTIVE | OUTPATIENT
Start: 2025-04-17 | End: 2025-04-19

## 2025-04-17 RX ORDER — NALOXONE HYDROCHLORIDE 0.4 MG/ML
0.4 INJECTION, SOLUTION INTRAMUSCULAR; INTRAVENOUS; SUBCUTANEOUS
Status: ACTIVE | OUTPATIENT
Start: 2025-04-17 | End: 2025-04-19

## 2025-04-17 RX ORDER — LIDOCAINE 40 MG/G
CREAM TOPICAL
Status: DISCONTINUED | OUTPATIENT
Start: 2025-04-17 | End: 2025-04-23 | Stop reason: HOSPADM

## 2025-04-17 RX ORDER — SODIUM CHLORIDE 9 MG/ML
1000 INJECTION, SOLUTION INTRAVENOUS CONTINUOUS
Status: DISCONTINUED | OUTPATIENT
Start: 2025-04-17 | End: 2025-04-22

## 2025-04-17 RX ORDER — SODIUM CHLORIDE 9 MG/ML
INJECTION, SOLUTION INTRAVENOUS CONTINUOUS
Status: CANCELLED | OUTPATIENT
Start: 2025-04-17

## 2025-04-17 RX ORDER — ACETAMINOPHEN 325 MG/1
650 TABLET ORAL EVERY 4 HOURS PRN
Status: DISCONTINUED | OUTPATIENT
Start: 2025-04-17 | End: 2025-04-17

## 2025-04-17 RX ORDER — GLIPIZIDE 5 MG/1
5 TABLET, FILM COATED, EXTENDED RELEASE ORAL 2 TIMES DAILY
Qty: 180 TABLET | Refills: 0 | Status: SHIPPED | OUTPATIENT
Start: 2025-04-17

## 2025-04-17 RX ORDER — NALOXONE HYDROCHLORIDE 0.4 MG/ML
0.2 INJECTION, SOLUTION INTRAMUSCULAR; INTRAVENOUS; SUBCUTANEOUS
Status: ACTIVE | OUTPATIENT
Start: 2025-04-17 | End: 2025-04-19

## 2025-04-17 RX ADMIN — HEPARIN SODIUM 1450 UNITS/HR: 10000 INJECTION, SOLUTION INTRAVENOUS at 11:32

## 2025-04-17 RX ADMIN — MIDAZOLAM 1 MG: 1 INJECTION INTRAMUSCULAR; INTRAVENOUS at 10:05

## 2025-04-17 RX ADMIN — SACUBITRIL AND VALSARTAN 1 TABLET: 97; 103 TABLET, FILM COATED ORAL at 00:46

## 2025-04-17 RX ADMIN — SPIRONOLACTONE 25 MG: 25 TABLET, FILM COATED ORAL at 08:52

## 2025-04-17 RX ADMIN — EMPAGLIFLOZIN 25 MG: 25 TABLET, FILM COATED ORAL at 08:52

## 2025-04-17 RX ADMIN — Medication 3 MG: at 00:46

## 2025-04-17 RX ADMIN — Medication 1250 MG: at 17:54

## 2025-04-17 RX ADMIN — FUROSEMIDE 40 MG: 40 TABLET ORAL at 13:06

## 2025-04-17 RX ADMIN — SACUBITRIL AND VALSARTAN 1 TABLET: 97; 103 TABLET, FILM COATED ORAL at 20:32

## 2025-04-17 RX ADMIN — Medication 3 MG: at 22:17

## 2025-04-17 RX ADMIN — CARVEDILOL 25 MG: 25 TABLET, FILM COATED ORAL at 00:46

## 2025-04-17 RX ADMIN — FENTANYL CITRATE 25 MCG: 50 INJECTION, SOLUTION INTRAMUSCULAR; INTRAVENOUS at 10:06

## 2025-04-17 RX ADMIN — SODIUM CHLORIDE 120 ML: 0.9 INJECTION, SOLUTION INTRAVENOUS at 11:00

## 2025-04-17 RX ADMIN — TOPICAL ANESTHETIC 0.5 ML: 200 SPRAY DENTAL; PERIODONTAL at 09:59

## 2025-04-17 RX ADMIN — SACUBITRIL AND VALSARTAN 1 TABLET: 97; 103 TABLET, FILM COATED ORAL at 08:53

## 2025-04-17 RX ADMIN — FUROSEMIDE 40 MG: 40 TABLET ORAL at 17:54

## 2025-04-17 RX ADMIN — ATORVASTATIN CALCIUM 80 MG: 80 TABLET, FILM COATED ORAL at 08:52

## 2025-04-17 RX ADMIN — CARVEDILOL 25 MG: 25 TABLET, FILM COATED ORAL at 08:52

## 2025-04-17 RX ADMIN — ASPIRIN 81 MG CHEWABLE TABLET 81 MG: 81 TABLET CHEWABLE at 08:52

## 2025-04-17 RX ADMIN — LIDOCAINE HYDROCHLORIDE 30 ML: 20 SOLUTION ORAL at 09:52

## 2025-04-17 RX ADMIN — VANCOMYCIN HYDROCHLORIDE 1750 MG: 1 INJECTION, POWDER, LYOPHILIZED, FOR SOLUTION INTRAVENOUS at 05:15

## 2025-04-17 RX ADMIN — MIDAZOLAM 1 MG: 1 INJECTION INTRAMUSCULAR; INTRAVENOUS at 10:01

## 2025-04-17 RX ADMIN — CARVEDILOL 25 MG: 25 TABLET, FILM COATED ORAL at 17:53

## 2025-04-17 RX ADMIN — MIDAZOLAM 0.5 MG: 1 INJECTION INTRAMUSCULAR; INTRAVENOUS at 10:12

## 2025-04-17 RX ADMIN — FENTANYL CITRATE 25 MCG: 50 INJECTION, SOLUTION INTRAMUSCULAR; INTRAVENOUS at 10:02

## 2025-04-17 ASSESSMENT — ACTIVITIES OF DAILY LIVING (ADL)
ADLS_ACUITY_SCORE: 50
ADLS_ACUITY_SCORE: 27
ADLS_ACUITY_SCORE: 50

## 2025-04-17 NOTE — PRE-PROCEDURE
GENERAL PRE-PROCEDURE:   Procedure:  EDMUND  Date/Time:  4/17/2025 11:00 AM    Verbal consent obtained?: Yes    Written consent obtained?: No    Risks and benefits: Risks, benefits and alternatives were discussed    Consent given by:  Patient  Patient states understanding of procedure being performed: Yes    Patient's understanding of procedure matches consent: Yes    Procedure consent matches procedure scheduled: Yes    Expected level of sedation:  Moderate  Appropriately NPO:  Yes  ASA Class:  4  Mallampati  :  Grade 1- soft palate, uvula, tonsillar pillars, and posterior pharyngeal wall visible  Lungs:  Lungs clear with good breath sounds bilaterally  Heart:  Normal heart sounds and rate  History & Physical reviewed:  History and physical reviewed and no updates needed  Statement of review:  I have reviewed the lab findings, diagnostic data, medications, and the plan for sedation

## 2025-04-17 NOTE — PROGRESS NOTES
[facility]    Cardiology Progress Note- Cardiology 1        Date of Admission:  4/16/2025     Assessment & Plan: HVSL   Mr Anson Manriquez is a 69 year old gentleman with a history of CAD c/b STEMI (2007 and 2018) with ischemic cardiomyopathy (LVEF 20%) s/p CRT-D implant 2019 who presents with concern of pacemaker pocket infection. The patient is free of infectious symptoms, well-appearing, and in no distress.     #C/f CRT-D Pocket Infection   Presented with edema, erythema, and purulent drainage from CRT-D site. Blood cultures and drainage culture performed 04/16 with no growth to date. The patient also has no systemic infectious findings. EDMUND performed 04/17/25 concerning for vegetation of the device leads.   The patient continues on vancomycin monotherapy for presumed SST origin of infection. Infectious disease consultation pending culture speciation. Anticipate device extraction with electrophysiology.   - Electrophysiology consulted, appreciate assistance  - Anticipate device extraction, timing TBD with electrophysiology  - Infectious disease consultation pending culture results   - Continue vancomycin monotherapy     #Coronary Artery Disease  #History of STEMI x2  Continue home aspirin and statin       #Chronic Heart Failure with Reduced Ejection Fraction  #Ischemic Cardiomyopathy   Clinically euvolemic with normal BNP. No evidence of clinical heart failure.   - Continue home GDMT with Carvedilol 25 mg BID, Sacubitril Valsartan 97/103 mg BID, Spironolactone 25 mg daily, and Empagliflozin 15 mg daily  - Continue maintenance diuretic with furosemide 40 mg BID    #DVT, Bilateral Internal Jugular Thrombi   Noted on CTA.   - Heparin infusion     #T2DM  Homd home semaglutide. Sliding scale insulin while inpatient.     Diet: Regular  PPX: heparin infusion for DVT  Code: FCFT    This patient was seen and discussed with Dr Ignacio Spencer, attending cardiologist, who  "agrees with the assessment and plan unless otherwise indicated.    Wild Lee MD [facility], PGY-6  Fellow, Cardiovascular Disease             Clinically Significant Risk Factors Present on Admission          # Hyperchloremia: Highest Cl = 110 mmol/L in last 2 days, will monitor as appropriate            # Drug Induced Platelet Defect: home medication list includes an antiplatelet medication   # Hypertension: Noted on problem list  # Chronic heart failure with reduced ejection fraction: last echo with EF <40%         # DMII: A1C = 9.5 % (Ref range: <5.7 %) within past 6 months    # Overweight: Estimated body mass index is 28.78 kg/m  as calculated from the following:    Height as of this encounter: 1.676 m (5' 6\").    Weight as of this encounter: 80.9 kg (178 lb 4.8 oz).        # ICD device present       Social Drivers of Health          Disposition Plan     Entered: Wild Lee MD 04/17/2025, 7:46 AM     I very much appreciated the opportunity to see and assess Anson Manriquez in the hospital with CV Fellow Dr Lee  The note above summarizes my findings and current recommendations.  Please do not hesitate to contact my office if you have any questions or concerns.      Ignacio Spencer MD  Cardiac Arrhythmia Service  [facility]  [phone]   ______________________________________________________________________    Interval History   No acute events overnight. Feeling well today. Thinks his CRT pocket site has reduced in size, no longer erythematous, no pain.     No chest pain, dyspnea on exertion, orthopnea, PND, edema, palpitations, lightheadedness, syncope.     Physical Exam   Vital Signs: Temp: 97.9  F (36.6  C) Temp src: Oral BP: 123/84 Pulse: 90   Resp: 16 SpO2: 94 % O2 Device: None (Room air)    Weight: 178 lbs 4.8 oz  Exam:  Constitutional: healthy, alert, and no distress  Head: Normocephalic. No masses, lesions, or abnormalities  Neck: Normal appearance, JVP estimated at clavicle at 30 " degrees.  ENT: EOMI, no scleral icterus or injection, external nose and ears are normal  Cardiovascular: Regular rate and rhythm, no murmur appreciated, no rub, radial pulses 2+ and equal bilaterally  Respiratory: Clear to auscultation bilaterally, normal work of breathing, no wheeze, no rales  Gastrointestinal: Abdomen soft, non-tender, non-distended. No masses.   : Deferred  Musculoskeletal: PPM implant site in left upper chest with fluctuance, no erythema or tenderness to palpation. Extremities normal with no gross deformities noted, normal muscle tone  Skin: no suspicious lesions or rashes  Neurologic: Alert and responsive, grossly non-focal, moves all extremities, sensation grossly intact.   Psychiatric: mentation appears normal and affect normal/bright      Medical Decision Making       Please see A&P for additional details of medical decision making.      Data   I personally reviewed all laboratory and imaging data from the last 24 hours in addition to all available cardiology data.
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
## 2025-04-17 NOTE — PLAN OF CARE
"[id number]    Pt admit and oriented to unit. First admin of vancomycin initiated. ICD site outputting scant/small purulent drainage-bandage secured. Pt npo since midnight.    Goal Outcome Evaluation:      Plan of Care Reviewed With: patient    Overall Patient Progress: improvingOverall Patient Progress: improving    Outcome Evaluation: NPO since midnight      Problem: Adult Inpatient Plan of Care  Goal: Plan of Care Review  Description: The Plan of Care Review/Shift note should be completed every shift.  The Outcome Evaluation is a brief statement about your assessment that the patient is improving, declining, or no change.  This information will be displayed automatically on your shiftnote.  Outcome: Progressing  Flowsheets (Taken 4/17/2025 0529)  Outcome Evaluation: NPO since midnight  Plan of Care Reviewed With: patient  Overall Patient Progress: improving     Problem: Adult Inpatient Plan of Care  Goal: Patient-Specific Goal (Individualized)  Description: You can add care plan individualizations to a care plan. Examples of Individualization might be:  \"Parent requests to be called daily at 9am for status\", \"I have a hard time hearing out of my right ear\", or \"Do not touch me to wake me up as it startlesme\".  Outcome: Progressing     Problem: Adult Inpatient Plan of Care  Goal: Absence of Hospital-Acquired Illness or Injury  Intervention: Prevent Skin Injury  Recent Flowsheet Documentation  Taken 4/17/2025 0250 by Dwayne Sams, RN  Body Position: position changed independently  Taken 4/17/2025 0015 by Dwayne Sams, RN  Body Position: position changed independently     Problem: Adult Inpatient Plan of Care  Goal: Absence of Hospital-Acquired Illness or Injury  Intervention: Prevent Infection  Recent Flowsheet Documentation  Taken 4/17/2025 0250 by Dwayne Sams, RN  Infection Prevention:   personal protective equipment utilized   environmental surveillance performed   equipment surfaces disinfected   hand hygiene " promoted   rest/sleep promoted   single patient room provided  Taken 4/17/2025 0015 by Dwayne Sams, RN  Infection Prevention:   personal protective equipment utilized   environmental surveillance performed   equipment surfaces disinfected   hand hygiene promoted   rest/sleep promoted   single patient room provided

## 2025-04-17 NOTE — PHARMACY-VANCOMYCIN DOSING SERVICE
Pharmacy Vancomycin Note  Date of Service 2025  Patient's  1955   69 year old, male    Indication: Sepsis/CRTD pocket infection  Day of Therapy: 1  Current vancomycin regimen:  1500 mg IV q24h  Current vancomycin monitoring method: AUC  Current vancomycin therapeutic monitoring goal: 400-600 mg*h/L    InsightRX Prediction of Current Vancomycin Regimen  Loading dose: N/A  Regimen: 1500 mg IV every 24 hours.  Start time: 05:15 on 2025  Exposure target: AUC24 (range)400-600 mg/L.hr   AUC24,ss: 357 mg/L.hr  Probability of AUC24 > 400: 38 %  Ctrough,ss: 8.7 mg/L  Probability of Ctrough,ss > 20: 6 %  Probability of nephrotoxicity (Lodise VIC ): 5 %      Current estimated CrCl = Estimated Creatinine Clearance: 98.1 mL/min (based on SCr of 0.71 mg/dL).    Creatinine for last 3 days  2025:  3:36 PM Creatinine 1.04 mg/dL  2025:  6:42 AM Creatinine 0.71 mg/dL    Recent Vancomycin Levels (past 3 days)  No results found for requested labs within last 3 days.    Vancomycin IV Administrations (past 72 hours)                     vancomycin (VANCOCIN) 1,750 mg in sodium chloride 0.9 % 567.5 mL intermittent infusion (mg) 1,750 mg Given 25 0515                    Nephrotoxins and other renal medications (From now, onward)      Start     Dose/Rate Route Frequency Ordered Stop    25 0000  vancomycin (VANCOCIN) 1,500 mg in 0.9% NaCl 250 mL intermittent infusion         1,500 mg  over 90 Minutes Intravenous EVERY 24 HOURS 25 0800  empagliflozin (JARDIANCE) tablet 25 mg        Note to Pharmacy: PTA Sig:Take 1 tablet (25 mg) by mouth daily.      25 mg Oral DAILY 25 0800  furosemide (LASIX) tablet 40 mg        Note to Pharmacy: PTA Sig:Take 1 tablet (40 mg) by mouth 2 times daily.      40 mg Oral 2 TIMES DAILY (Diuretics and Nitrates) 25 2248                 Contrast Orders - past 72 hours (72h ago, onward)      Start     Dose/Rate Route  Frequency Stop    04/16/25 1645  perflutren diluted 1mL to 2mL with saline (OPTISON) diluted injection 5 mL         5 mL Intravenous ONCE 04/16/25 1642    04/16/25 1630  iopamidol (ISOVUE-370) solution 85 mL         85 mL Intravenous ONCE 04/16/25 1641            Interpretation of levels and current regimen:  Vancomycin initiation delayed. Patient received first dose this AM, and current prescribed regimen projected to not achieve target AUC. Will empirically increase dose with updated patient parameters to achieve goal AUC of 400-600 within first 24-48 hours of treatment per guidelines.    Has serum creatinine changed greater than 50% in last 72 hours: No    Urine output:  unable to determine (no output recorded)    Renal Function: Stable    InsightRX Prediction of Planned New Vancomycin Regimen  Loading dose: N/A  Regimen: 1250 mg IV every 12 hours.  Start time: 17:00 on 04/17/2025  Exposure target: AUC24 (range)400-600 mg/L.hr   AUC24,ss: 591 mg/L.hr  Probability of AUC24 > 400: 86 %  Ctrough,ss: 18.5 mg/L  Probability of Ctrough,ss > 20: 44 %  Probability of nephrotoxicity (Lodise VIC 2009): 15 %        Plan:  Increase Dose to 1250 mg IV every 12 hours starting today at 1700  Vancomycin monitoring method: AUC  Vancomycin therapeutic monitoring goal: 400-600 mg*h/L  Pharmacy will check vancomycin levels as appropriate in 1-3 Days.  Serum creatinine levels will be ordered daily for the first week of therapy and at least twice weekly for subsequent weeks.    Shruthi Wood, PharmD, BCPS

## 2025-04-17 NOTE — MEDICATION SCRIBE - ADMISSION MEDICATION HISTORY
Medication Scribe Admission Medication History    Admission medication history is complete. The information provided in this note is only as accurate as the sources available at the time of the update.    Information Source(s): Patient via in-person    Pertinent Information: Cedric reports that he ran out the aspirin (ASA) 81 MG chewable tablets a week ago. He states no changes in current medications. Patient denies taking any other medications. Dispense report and outside medication reconciliation list have been reviewed.     Changes made to PTA medication list:  Added: None  Deleted:   bisacodyl (DULCOLAX) 5 MG EC tablet  polyethylene glycol (GOLYTELY) 236 g suspension  polyethylene glycol (MIRALAX) 17 GM/Dose powder  Changed: None    Allergies reviewed with patient and updates made in EHR: yes    Medication History Completed By: Javier Jackson 4/16/2025 7:50 PM    PTA Med List   Medication Sig Last Dose/Taking    aspirin (ASA) 81 MG chewable tablet Take 1 tablet (81 mg) by mouth daily. Unknown    atorvastatin (LIPITOR) 80 MG tablet Take 1 tablet (80 mg) by mouth daily. 4/15/2025 Morning    carvedilol (COREG) 25 MG tablet Take 1 tablet (25 mg) by mouth 2 times daily (with meals). 4/15/2025 Evening    empagliflozin (JARDIANCE) 25 MG TABS tablet Take 1 tablet (25 mg) by mouth daily. 4/15/2025 Morning    furosemide (LASIX) 40 MG tablet Take 1 tablet (40 mg) by mouth 2 times daily. 4/15/2025 Morning    glipiZIDE (GLUCOTROL XL) 5 MG 24 hr tablet Take 1 tablet (5 mg) by mouth 2 times daily. 4/15/2025 Evening    MULTIPLE VITAMIN PO Take 1 tablet by mouth every morning  4/15/2025 Morning    sacubitril-valsartan (ENTRESTO)  MG per tablet Take 1 tablet by mouth 2 times daily. 4/15/2025 Evening    Semaglutide, 2 MG/DOSE, (OZEMPIC) 8 MG/3ML pen Inject 2 mg subcutaneously every 7 days. For additional refills, please schedule a follow-up appointment. 4/6/2025    spironolactone (ALDACTONE) 25 MG tablet Take 1 tablet (25  mg) by mouth daily. 4/15/2025 Morning

## 2025-04-17 NOTE — PLAN OF CARE
Pt transfer [facility] ED17, report received from AD Hutchison. Double skin check completed with AD AMBROCIO Pt oriented to unit and settled in room.

## 2025-04-17 NOTE — SEDATION DOCUMENTATION
Pt arrived in ECHO department for scheduled EDMUND.   Procedure explained, questions answered and consent signed. Discharge instructions discussed with patient.  Pt's throat sprayed at 0955, therefore pt will not be able to eat or drink until 2 hours after at 1155. Informed pt of this time and encouraged to start with warm fluids and soft foods.    Pt tolerated procedure well, and was given a total of 50 mcg IV fentanyl and 2.5 mg IV versed for conscious sedation. Pt denied throat or chest pain after EDMUND complete.   EDMUND probe 68 used for procedure.  Pt denied pain after procedure and was transferred back to  after awake and VSS.    Report given to ZAMZAM Dickey RN.     Shania Kumar, RN

## 2025-04-18 LAB
ANION GAP SERPL CALCULATED.3IONS-SCNC: 13 MMOL/L (ref 7–15)
BUN SERPL-MCNC: 14.5 MG/DL (ref 8–23)
CALCIUM SERPL-MCNC: 8.8 MG/DL (ref 8.8–10.4)
CHLORIDE SERPL-SCNC: 108 MMOL/L (ref 98–107)
CREAT SERPL-MCNC: 0.77 MG/DL (ref 0.67–1.17)
EGFRCR SERPLBLD CKD-EPI 2021: >90 ML/MIN/1.73M2
GLUCOSE BLDC GLUCOMTR-MCNC: 155 MG/DL (ref 70–99)
GLUCOSE BLDC GLUCOMTR-MCNC: 156 MG/DL (ref 70–99)
GLUCOSE BLDC GLUCOMTR-MCNC: 234 MG/DL (ref 70–99)
GLUCOSE BLDC GLUCOMTR-MCNC: 287 MG/DL (ref 70–99)
GLUCOSE SERPL-MCNC: 154 MG/DL (ref 70–99)
HCO3 SERPL-SCNC: 19 MMOL/L (ref 22–29)
MAGNESIUM SERPL-MCNC: 2.2 MG/DL (ref 1.7–2.3)
MDC_IDC_LEAD_CONNECTION_STATUS: NORMAL
MDC_IDC_LEAD_IMPLANT_DT: NORMAL
MDC_IDC_LEAD_LOCATION: NORMAL
MDC_IDC_LEAD_LOCATION_DETAIL_1: NORMAL
MDC_IDC_LEAD_MFG: NORMAL
MDC_IDC_LEAD_MODEL: NORMAL
MDC_IDC_LEAD_POLARITY_TYPE: NORMAL
MDC_IDC_LEAD_SERIAL: NORMAL
MDC_IDC_LEAD_SPECIAL_FUNCTION: NORMAL
MDC_IDC_MSMT_BATTERY_DTM: NORMAL
MDC_IDC_MSMT_BATTERY_REMAINING_LONGEVITY: 66 MO
MDC_IDC_MSMT_BATTERY_REMAINING_PERCENTAGE: 77 %
MDC_IDC_MSMT_BATTERY_STATUS: NORMAL
MDC_IDC_MSMT_CAP_CHARGE_DTM: NORMAL
MDC_IDC_MSMT_CAP_CHARGE_TIME: 11.3 S
MDC_IDC_MSMT_CAP_CHARGE_TYPE: NORMAL
MDC_IDC_MSMT_LEADCHNL_LV_IMPEDANCE_VALUE: 501 OHM
MDC_IDC_MSMT_LEADCHNL_LV_PACING_THRESHOLD_AMPLITUDE: 1 V
MDC_IDC_MSMT_LEADCHNL_LV_PACING_THRESHOLD_PULSEWIDTH: 0.5 MS
MDC_IDC_MSMT_LEADCHNL_RA_IMPEDANCE_VALUE: 745 OHM
MDC_IDC_MSMT_LEADCHNL_RA_PACING_THRESHOLD_AMPLITUDE: 0.9 V
MDC_IDC_MSMT_LEADCHNL_RA_PACING_THRESHOLD_PULSEWIDTH: 0.5 MS
MDC_IDC_MSMT_LEADCHNL_RV_IMPEDANCE_VALUE: 648 OHM
MDC_IDC_MSMT_LEADCHNL_RV_PACING_THRESHOLD_AMPLITUDE: 0.9 V
MDC_IDC_MSMT_LEADCHNL_RV_PACING_THRESHOLD_PULSEWIDTH: 0.4 MS
MDC_IDC_PG_IMPLANT_DTM: NORMAL
MDC_IDC_PG_MFG: NORMAL
MDC_IDC_PG_MODEL: NORMAL
MDC_IDC_PG_SERIAL: NORMAL
MDC_IDC_PG_TYPE: NORMAL
MDC_IDC_SESS_CLINIC_NAME: NORMAL
MDC_IDC_SESS_DTM: NORMAL
MDC_IDC_SESS_TYPE: NORMAL
MDC_IDC_SET_BRADY_AT_MODE_SWITCH_MODE: NORMAL
MDC_IDC_SET_BRADY_AT_MODE_SWITCH_RATE: 170 {BEATS}/MIN
MDC_IDC_SET_BRADY_LOWRATE: 60 {BEATS}/MIN
MDC_IDC_SET_BRADY_MAX_SENSOR_RATE: 130 {BEATS}/MIN
MDC_IDC_SET_BRADY_MAX_TRACKING_RATE: 130 {BEATS}/MIN
MDC_IDC_SET_BRADY_MODE: NORMAL
MDC_IDC_SET_BRADY_PAV_DELAY_LOW: 180 MS
MDC_IDC_SET_BRADY_SAV_DELAY_LOW: 140 MS
MDC_IDC_SET_CRT_PACED_CHAMBERS: NORMAL
MDC_IDC_SET_LEADCHNL_LV_PACING_AMPLITUDE: 2.3 V
MDC_IDC_SET_LEADCHNL_LV_PACING_ANODE_ELECTRODE_1: NORMAL
MDC_IDC_SET_LEADCHNL_LV_PACING_ANODE_LOCATION_1: NORMAL
MDC_IDC_SET_LEADCHNL_LV_PACING_CAPTURE_MODE: NORMAL
MDC_IDC_SET_LEADCHNL_LV_PACING_CATHODE_ELECTRODE_1: NORMAL
MDC_IDC_SET_LEADCHNL_LV_PACING_CATHODE_LOCATION_1: NORMAL
MDC_IDC_SET_LEADCHNL_LV_PACING_PULSEWIDTH: 0.5 MS
MDC_IDC_SET_LEADCHNL_LV_SENSING_ADAPTATION_MODE: NORMAL
MDC_IDC_SET_LEADCHNL_LV_SENSING_ANODE_ELECTRODE_1: NORMAL
MDC_IDC_SET_LEADCHNL_LV_SENSING_ANODE_LOCATION_1: NORMAL
MDC_IDC_SET_LEADCHNL_LV_SENSING_CATHODE_ELECTRODE_1: NORMAL
MDC_IDC_SET_LEADCHNL_LV_SENSING_CATHODE_LOCATION_1: NORMAL
MDC_IDC_SET_LEADCHNL_LV_SENSING_SENSITIVITY: 1 MV
MDC_IDC_SET_LEADCHNL_RA_PACING_AMPLITUDE: 2.5 V
MDC_IDC_SET_LEADCHNL_RA_PACING_CAPTURE_MODE: NORMAL
MDC_IDC_SET_LEADCHNL_RA_PACING_POLARITY: NORMAL
MDC_IDC_SET_LEADCHNL_RA_PACING_PULSEWIDTH: 0.5 MS
MDC_IDC_SET_LEADCHNL_RA_SENSING_ADAPTATION_MODE: NORMAL
MDC_IDC_SET_LEADCHNL_RA_SENSING_POLARITY: NORMAL
MDC_IDC_SET_LEADCHNL_RA_SENSING_SENSITIVITY: 0.25 MV
MDC_IDC_SET_LEADCHNL_RV_PACING_AMPLITUDE: 2 V
MDC_IDC_SET_LEADCHNL_RV_PACING_CAPTURE_MODE: NORMAL
MDC_IDC_SET_LEADCHNL_RV_PACING_POLARITY: NORMAL
MDC_IDC_SET_LEADCHNL_RV_PACING_PULSEWIDTH: 0.4 MS
MDC_IDC_SET_LEADCHNL_RV_SENSING_ADAPTATION_MODE: NORMAL
MDC_IDC_SET_LEADCHNL_RV_SENSING_POLARITY: NORMAL
MDC_IDC_SET_LEADCHNL_RV_SENSING_SENSITIVITY: 0.6 MV
MDC_IDC_SET_ZONE_DETECTION_INTERVAL: 250 MS
MDC_IDC_SET_ZONE_DETECTION_INTERVAL: 300 MS
MDC_IDC_SET_ZONE_DETECTION_INTERVAL: 353 MS
MDC_IDC_SET_ZONE_STATUS: NORMAL
MDC_IDC_SET_ZONE_TYPE: NORMAL
MDC_IDC_SET_ZONE_VENDOR_TYPE: NORMAL
MDC_IDC_STAT_AT_BURDEN_PERCENT: 0 %
MDC_IDC_STAT_AT_DTM_END: NORMAL
MDC_IDC_STAT_AT_DTM_START: NORMAL
MDC_IDC_STAT_BRADY_DTM_END: NORMAL
MDC_IDC_STAT_BRADY_DTM_START: NORMAL
MDC_IDC_STAT_BRADY_RA_PERCENT_PACED: 0 %
MDC_IDC_STAT_BRADY_RV_PERCENT_PACED: 21 %
MDC_IDC_STAT_CRT_DTM_END: NORMAL
MDC_IDC_STAT_CRT_DTM_START: NORMAL
MDC_IDC_STAT_CRT_LV_PERCENT_PACED: 94 %
MDC_IDC_STAT_EPISODE_RECENT_COUNT: 0
MDC_IDC_STAT_EPISODE_RECENT_COUNT: 1
MDC_IDC_STAT_EPISODE_RECENT_COUNT_DTM_END: NORMAL
MDC_IDC_STAT_EPISODE_RECENT_COUNT_DTM_START: NORMAL
MDC_IDC_STAT_EPISODE_TYPE: NORMAL
MDC_IDC_STAT_EPISODE_VENDOR_TYPE: NORMAL
MDC_IDC_STAT_TACHYTHERAPY_ATP_DELIVERED_RECENT: 0
MDC_IDC_STAT_TACHYTHERAPY_ATP_DELIVERED_TOTAL: 0
MDC_IDC_STAT_TACHYTHERAPY_RECENT_DTM_END: NORMAL
MDC_IDC_STAT_TACHYTHERAPY_RECENT_DTM_START: NORMAL
MDC_IDC_STAT_TACHYTHERAPY_SHOCKS_ABORTED_RECENT: 0
MDC_IDC_STAT_TACHYTHERAPY_SHOCKS_ABORTED_TOTAL: 0
MDC_IDC_STAT_TACHYTHERAPY_SHOCKS_DELIVERED_RECENT: 0
MDC_IDC_STAT_TACHYTHERAPY_SHOCKS_DELIVERED_TOTAL: 0
MDC_IDC_STAT_TACHYTHERAPY_TOTAL_DTM_END: NORMAL
MDC_IDC_STAT_TACHYTHERAPY_TOTAL_DTM_START: NORMAL
MRSA DNA SPEC QL NAA+PROBE: NEGATIVE
POTASSIUM SERPL-SCNC: 3.9 MMOL/L (ref 3.4–5.3)
SA TARGET DNA: POSITIVE
SODIUM SERPL-SCNC: 140 MMOL/L (ref 135–145)
UFH PPP CHRO-ACNC: 0.41 IU/ML

## 2025-04-18 PROCEDURE — 250N000013 HC RX MED GY IP 250 OP 250 PS 637

## 2025-04-18 PROCEDURE — 250N000013 HC RX MED GY IP 250 OP 250 PS 637: Performed by: STUDENT IN AN ORGANIZED HEALTH CARE EDUCATION/TRAINING PROGRAM

## 2025-04-18 PROCEDURE — 250N000011 HC RX IP 250 OP 636: Performed by: INTERNAL MEDICINE

## 2025-04-18 PROCEDURE — 36415 COLL VENOUS BLD VENIPUNCTURE: CPT | Performed by: INTERNAL MEDICINE

## 2025-04-18 PROCEDURE — 80048 BASIC METABOLIC PNL TOTAL CA: CPT | Performed by: INTERNAL MEDICINE

## 2025-04-18 PROCEDURE — 83735 ASSAY OF MAGNESIUM: CPT | Performed by: INTERNAL MEDICINE

## 2025-04-18 PROCEDURE — 99255 IP/OBS CONSLTJ NEW/EST HI 80: CPT | Performed by: INTERNAL MEDICINE

## 2025-04-18 PROCEDURE — 120N000005 HC R&B MS OVERFLOW UMMC

## 2025-04-18 PROCEDURE — 250N000011 HC RX IP 250 OP 636

## 2025-04-18 PROCEDURE — 85520 HEPARIN ASSAY: CPT | Performed by: INTERNAL MEDICINE

## 2025-04-18 PROCEDURE — 99233 SBSQ HOSP IP/OBS HIGH 50: CPT | Mod: FS | Performed by: INTERNAL MEDICINE

## 2025-04-18 PROCEDURE — 87641 MR-STAPH DNA AMP PROBE: CPT | Performed by: INTERNAL MEDICINE

## 2025-04-18 RX ADMIN — HEPARIN SODIUM 1450 UNITS/HR: 10000 INJECTION, SOLUTION INTRAVENOUS at 22:32

## 2025-04-18 RX ADMIN — SPIRONOLACTONE 25 MG: 25 TABLET, FILM COATED ORAL at 08:49

## 2025-04-18 RX ADMIN — ASPIRIN 81 MG CHEWABLE TABLET 81 MG: 81 TABLET CHEWABLE at 08:49

## 2025-04-18 RX ADMIN — FUROSEMIDE 40 MG: 40 TABLET ORAL at 08:49

## 2025-04-18 RX ADMIN — Medication 1250 MG: at 04:27

## 2025-04-18 RX ADMIN — HEPARIN SODIUM 1450 UNITS/HR: 10000 INJECTION, SOLUTION INTRAVENOUS at 05:44

## 2025-04-18 RX ADMIN — FUROSEMIDE 40 MG: 40 TABLET ORAL at 16:47

## 2025-04-18 RX ADMIN — ATORVASTATIN CALCIUM 80 MG: 80 TABLET, FILM COATED ORAL at 08:49

## 2025-04-18 RX ADMIN — SACUBITRIL AND VALSARTAN 1 TABLET: 97; 103 TABLET, FILM COATED ORAL at 20:01

## 2025-04-18 RX ADMIN — Medication 3 MG: at 22:11

## 2025-04-18 RX ADMIN — CARVEDILOL 25 MG: 25 TABLET, FILM COATED ORAL at 17:56

## 2025-04-18 RX ADMIN — Medication 1250 MG: at 16:47

## 2025-04-18 RX ADMIN — CARVEDILOL 25 MG: 25 TABLET, FILM COATED ORAL at 08:49

## 2025-04-18 RX ADMIN — SACUBITRIL AND VALSARTAN 1 TABLET: 97; 103 TABLET, FILM COATED ORAL at 08:49

## 2025-04-18 ASSESSMENT — ACTIVITIES OF DAILY LIVING (ADL)
ADLS_ACUITY_SCORE: 33
ADLS_ACUITY_SCORE: 27
ADLS_ACUITY_SCORE: 31
ADLS_ACUITY_SCORE: 33
ADLS_ACUITY_SCORE: 27
ADLS_ACUITY_SCORE: 33
ADLS_ACUITY_SCORE: 31
ADLS_ACUITY_SCORE: 27
ADLS_ACUITY_SCORE: 33
ADLS_ACUITY_SCORE: 31
ADLS_ACUITY_SCORE: 33
ADLS_ACUITY_SCORE: 27
ADLS_ACUITY_SCORE: 33
ADLS_ACUITY_SCORE: 33
ADLS_ACUITY_SCORE: 31
ADLS_ACUITY_SCORE: 27

## 2025-04-18 NOTE — CONSULTS
Discharge Pharmacy Test Claim    Patient's commercial UPlan covers Eliquis, Xarelto, and dabigatran. Expected monthly copay for each is listed below.    Test Claim Copay   Eliquis 30.00   Xarelto 30.00   Dabigatran 10.00     [place] pharmacy has one-time use 30-day free trial vouchers available for eliquis or xarelto.    Patient is eligible to use copay savings cards which reduces the copay of eliquis or xarelto to $10/month. Visit the link below to print a copay card. Patient must call the phone number on the copay card or visit the 's website to activate.      Copay Card Link   eliquis https://Medical [email or web address]/landingPage.[email or web address]?src=[email or web address]#   xarelto [email or web address]         Simin OkeefeScott  [facility] Liaison (A - L)  Phone: [phone] Fax: [phone]  Available on Teams & Vocera  Disclaimer: Pharmacy test claims are extimates and may not reflect final costs. Suggested alternatives aim to be cost-effective but may not be therapeutically equivalent as this consult is informational and does not constitute medical advice. Clinical decisions should be made by qualified healthcare providers.

## 2025-04-18 NOTE — PLAN OF CARE
"Changes during this shift: No acute events overnight.     Running: Heparin 1450 unit(s)/hr    PRN Med: None      Vitals:  BP 90/59 (BP Location: Right arm)   Pulse 75   Temp 97.5  F (36.4  C) (Oral)   Resp 18   Ht 1.676 m (5' 6\")   Wt 80.9 kg (178 lb 4.8 oz)   SpO2 94%   BMI 28.78 kg/m     Neuro: A&O x 4 Denies Headache, dizziness,  Lightheadedness, numbness and tingling. calls appropriately   Cardiac: V-paced  Afebrile, VSS.  Denies chest pain.   Respiratory: sating >95 ON RA. denies SOB. LS clear bilaterally.   Diet/appetite: regular Diet. Good appetite.   Endocrine: BS check ACHS. Pt on sliding scale  insulin, no corrections given overnight.   GI/:  No BM this shift. Denies abdominal pain. Adequate urine output.   Activity:  Moves independetly  Pain: Denies  Skin: WNL, Warm, dry, normal color.L chest ICD pocket.   "

## 2025-04-18 NOTE — PLAN OF CARE
Goal Outcome Evaluation:      Plan of Care Reviewed With: patient    Overall Patient Progress: no change    [id number]     Neuro: A&O x4, forgetful. Able to make needs known.   Cardiac: Vpaced (HR 80s). Denied chest pain, dizziness, palpitations. VSS, afebrile.   Respiratory: RA, denied SOB.   GI/: WDL.   Diet/Appetite: Tolerating regular diet  Skin: Changed L chest dressing.   LDA: Left PIV infusing heparin gtt at 1450 units/hr.   Activity: Up ad troy   Pain: Denies pain.   Plan: Continue with POC.  Notify primary team of pertinent changes.
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
## 2025-04-18 NOTE — PLAN OF CARE
Goal Outcome Evaluation:             Neuro: A&Ox4. Resighini  Cardiac: Afebrile, VSS. Low BP post EDMUND, recovered without intervention, V paced 80   Respiratory: RA   GI/: Voiding spontaneously. BM x1 today  Diet/appetite: Tolerating  diet. Denies nausea   Activity: Up with SBA  Pain: . Denies   Skin: Left chest CRT-D site swollen and draining, primapore on  Lines: PIV  Drains:none  Replacement:    Plan- continue IV abx, CRT-D removal on Tuesday
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
## 2025-04-18 NOTE — PROGRESS NOTES
[facility]   Cardiology   Progress Note     ASSESSMENT/PLAN:  Anson Manriquez is a 69 year old male with PMH significant for HTN, DMII, LBBB, CAD (s/p STEMI in 2007 with pLAD stenting, and STEMI 9/2018) s/p CRT-D (2019), ICM LVEF 20-25% who presented on 4/16/25 with concern for pacemaker pocket infection.      Interval History:  No acute events overnight. Cedric denies any cardiac symptoms on exam. He feels like the pocket site drainage has decreased since his arrival. States he is aware for plan for device extraction on Tuesday with EP.     Changes Today:  - Continue heparin drip  - Plan for device extraction on Tuesday, 4/22 as scheduled  - Start Jardiance hold for pre-procedure  - ID consult, appreciate recs    # ICM s/p CRT-D implantation (2019)   # CRT-D infection  Patient has drainage from his pocket site after trauma to the site approximately 1 month ago, suspicious for device infection. No systemic symptoms suggestive of bacteremia or sepsis. Not pacer dependent per device interrogation. No evidence of vegetation on device leads/valves on CT chest.     EDMUND 4/17 with mobile lesions/stranding on the leads suspicious for thrombus vs vegetation     - Blood cultures with NGTD  - Aerobic wound culture positive for staph capitis and staph epidermidis  - On empiric vancomycin  - Appreciate ID input for antibiotic recommendations  - EP consult  - Device extraction planned for Tuesday 4/22. Patient will need to be NPO with heparin held 4 hours prior to procedure.  - Given that patient has no history of appropriate shocks from his device, patient does not require LifeVest prior to discharge. Will coordinate device reimplant as outpatient after patient completes recommended antibiotic course.   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 No recent CVC placement. Denies IVDU, trauma to head/neck, ear/neck/throat/oral pain. Had dental procedures in 12/2024. Ddx includes septic emboli/Lemierre, malignancy, hypercoagulable state.  - Heparin gtt given upcoming procedures  - Pharm consult for DOAC  - Needs age-appropriate malignancy screening as outpatient    # HFrEF (EF 20-25%) 2/2 ICM  Denies sxs of ADHF. Appears euvolemic on admission. Taking GDMT as prescribed. NTproBNP 359.  - GDMT:  - ACE/ARB/ARNI: continue PTA sacubitril-valsartan 97-103mg BID   - MRA: continue PTA spironolactone 25mg daily  - BB: continue PTA carvedilol 25mg BID  - SGLT2: hold PTA empagliflozin 25mg daily for upcoming procedure  - Continue PTA furosemide 40mg BID     # CAD s/p pLAD stent  - Continue PTA ASA 81mg daily, atorvastatin 80mg daily     # T2DM (A1c 9.5%)  - Hold PTA semaglutide  - Jardiance as above  - MD sliding scale insulin      FEN: Regular  Code status: Full  Prophylaxis: Heparin gtt  Isolation: None  Disposition: pending device extraction  Medically Ready for Discharge: Anticipated in 5+ Days     Patient seen and discussed with Dr. Spencer, who agrees with above plan.      Giovanna DESAI, CNP  [facility] Cardiology Team    I saw and evaluated Mr Manriquez as part of a shared APRN/PA visit.    I personally reviewed the clinical symptoms and IPG pocket drainage.     Key management decisions made by me and carried out under my direction include: Time for extraction    Counseling and/or coordination of care performed by me:    I spent 20 minutes face-to-face and/or coordinating care. Over 50% of the time on the unit was spent counseling the patient and/or coordinating care as documented above. Date of service 4/18/25  OSCAR Rivas MD        Physical Exam:  Temp:  [97.5  F (36.4  C)-98.1  F (36.7  C)] 98  F (36.7  C)  Pulse:  [75-95] 81  Resp:  [10-26] 18  BP: ()/(53-84) 111/75  SpO2:  [90 %-98 %] 92 %    I/O:  Intake/Output Summary (Last 24 hours) at 4/18/2025  0820  Last data filed at 4/18/2025 0600  Gross per 24 hour   Intake 587.98 ml   Output --   Net 587.98 ml        Wt:   Wt Readings from Last 5 Encounters:   04/16/25 80.9 kg (178 lb 4.8 oz)   12/17/24 79.4 kg (175 lb)   06/25/24 81.6 kg (179 lb 12.8 oz)   02/06/24 81.3 kg (179 lb 4.8 oz)   10/10/23 83.6 kg (184 lb 3.2 oz)         General: NAD  HEENT:  PERRLA, EOMI.   Neck: JVD not elevated.   CV: RRR. No murmur appreciated. No rubs or gallops. Peripheral radial pulse intact.  Resp: No increased work of breathing or use of accessory muscles, breathing comfortably on room air.  Lung sounds clear throughout/bilaterally  Abdomen: Normal active bowel sounds. Abdomen is rounded, non-tender to palpation.    Extremities: Warm. Capillary refill less than 3 sec. 2/4 pedal pulses bilaterally. No pre-tibial edema. No cyanosis or clubbing.  Skin:  Warm and dry. Dressing to RUBIA chest with serous drainage.  Neuro: Alert and oriented x3.      Medications:  Current Facility-Administered Medications   Medication Dose Route Frequency Provider Last Rate Last Admin    aspirin (ASA) chewable tablet 81 mg  81 mg Oral Daily Felipe Pinto MD   81 mg at 04/17/25 0852    atorvastatin (LIPITOR) tablet 80 mg  80 mg Oral Daily Felipe Pinto MD   80 mg at 04/17/25 0852    carvedilol (COREG) tablet 25 mg  25 mg Oral BID w/meals Felipe Pinto MD   25 mg at 04/17/25 1753    [Held by provider] empagliflozin (JARDIANCE) tablet 25 mg  25 mg Oral Daily Felipe Pinto MD   25 mg at 04/17/25 0852    furosemide (LASIX) tablet 40 mg  40 mg Oral BID Melissa Griggs MD   40 mg at 04/17/25 1754    insulin aspart (NovoLOG) injection (RAPID ACTING)  1-7 Units Subcutaneous TID AC Melissa Griggs MD   2 Units at 04/17/25 1758    insulin aspart (NovoLOG) injection (RAPID ACTING)  1-5 Units Subcutaneous At Bedtime Melissa Griggs MD        melatonin tablet 3 mg  3 mg Oral At Bedtime Felipe Pinto MD   3 mg at 04/17/25 7372 [id number]  glycol (MIRALAX) Packet 17 g  17 g Oral Daily Felipe Pinto MD        sacubitril-valsartan (ENTRESTO)  MG per tablet 1 tablet  1 tablet Oral BID Felipe Pinto MD   1 tablet at 04/17/25 2032    sodium chloride (PF) 0.9% PF flush 3 mL  3 mL Intracatheter Q8H [facility] Ignacio Spencer MD   3 mL at 04/18/25 0544    sodium chloride (PF) 0.9% PF flush 3 mL  3 mL Intracatheter Q8H [facility] Felipe Pinto MD   3 mL at 04/17/25 0516    spironolactone (ALDACTONE) tablet 25 mg  25 mg Oral Daily Felipe Pinto MD   25 mg at 04/17/25 0852    vancomycin (VANCOCIN) 1,250 mg in 0.9% NaCl 250 mL intermittent infusion  1,250 mg Intravenous Q12H Ignacio Spencer MD   1,250 mg at 04/18/25 0427     Current Facility-Administered Medications   Medication Dose Route Frequency Provider Last Rate Last Admin    heparin 25,000 units in 0.45% NaCl 250 mL ANTICOAGULANT infusion  0-5,000 Units/hr Intravenous Continuous Melissa Griggs MD 14.5 mL/hr at 04/18/25 0544 1,450 Units/hr at 04/18/25 0544    Patient is already receiving anticoagulation with heparin, enoxaparin (LOVENOX), warfarin (COUMADIN)  or other anticoagulant medication   Does not apply Continuous PRN Felipe Pinto MD        sodium chloride 0.9 % infusion  1,000 mL Intravenous Continuous Chris Reece MD 30 mL/hr at 04/17/25 1100 120 mL at 04/17/25 1100       Labs:   CMP  Recent Labs   Lab 04/17/25  2214 04/17/25  1730 04/17/25  1328 04/17/25  0901 04/17/25  0642 04/16/25  2318 04/16/25  1536   NA  --   --   --   --  141  --  138   POTASSIUM  --   --   --   --  4.0  --  4.3   CHLORIDE  --   --   --   --  110*  --  103   CO2  --   --   --   --  19*  --  22   ANIONGAP  --   --   --   --  12  --  13   * 190* 126* 158* 167*   < > 254*   BUN  --   --   --   --  18.7  --  23.6*   CR  --   --   --   --  0.71  --  1.04   GFRESTIMATED  --   --   --   --  >90  --  78   RENO  --   --   --   --  8.9  --  9.3    < > = values in this interval not displayed.      CBC  Recent Labs   Lab 04/17/25  0642 04/16/25  1536   WBC 7.8 8.8   RBC 4.95 5.15   HGB 15.2 15.8   HCT 44.7 46.7   MCV 90 91   MCH 30.7 30.7   MCHC 34.0 33.8   RDW 14.9 14.8    211     INRNo lab results found in last 7 days.  Arterial Blood GasNo lab results found in last 7 days.    Diagnostics:  ECG:    Echo 4/16/25  The tricuspid valve leaflets are not well visualized. No obvious vegetations  are seen on the tricuspid valve or the ICD leads on limited views. Recommend  EDMUND if clinical suspicion for infective endocarditis warrants.     Ischemic cardiomyopathy with severely reduced LVEF=21%. Regional wall motion  abnormalities as described below, unchanged from the prior study.  Global right ventricular function is normal.     This study was compared with the study from 6/25/24: No significant changes  noted.    TTE 4/17/25  Pacemaker leads noted in the RA and RV. There is a mobile lesion with  stranding on the CS lead (Image 65) and the RA lead (Image 7)     There is a mobile strand measuring 2.2 cm that appears to extend from the RA  to RV lead (Image 26)     These may represent thrombus or vegetation in the appropriate clinical  context.    Device Check 4/17/25  Device: Anesiva G447 RESONATE X4 CRT-D  Normal device function  Mode: DDD  bpm  AP: <1%  LVP: 94%  Intrinsic rhythm: AS-VS 85 bpm  Lead Trends Appear Stable  Estimated battery longevity to RRT = 5.5  years.   Atrial Arrhythmia: none  Ventricular Arrhythmia: none  Setting Changes: none    Ultrasound 4/16/25  Nonocclusive thrombus in the cranial portion of the right internal jugular veins. Occlusive versus nearly occlusive thrombus in the cranial left internal jugular vein. The bilateral internal jugular veins are patent and compressible caudally. This is similar to recent CT chest.    CT Chest 4/16/25  1.  No suspicious fluid collection, malpositioning or swelling about  the left chest pacemaker to suggest intrathoracic  visualization.  2. Tubular transmural filling defects within the proximal internal  jugular vein bilaterally likely represent mixing artifact although DVT  cannot be excluded. Can confirm with ultrasound.  2. Moderate cardiomegaly with coronary calcifications and mild  bibasilar pulmonary edema.    Recent Results (from the past 24 hours)   Transesophageal Echocardiogram    Narrative    [id number]  [id number]  [id number]  [id number]^JODI^OZZIE     [facility],[place]  Echocardiography Laboratory  [address] [place] [address]     Name: KELSY NOLAN  MRN: [id number]  : 1955  Study Date: 2025 09:40 AM  Age: 69 yrs  Gender: Male  [place] [facility] [place]  Ordering Physician: OZZIE ZAPATA  Performed By: Ace Medina Dr     Attestation  I was present during EDMUND probe placement by the fellow, Ace Medina. I personally  viewed the imaging and agree with the interpretation and report as documented  by the fellow.  ______________________________________________________________________________  Interpretation Summary  Pacemaker leads noted in the RA and RV. There is a mobile lesion with  stranding on the CS lead (Image 65) and the RA lead (Image 7)     There is a mobile strand measuring 2.2 cm that appears to extend from the RA  to RV lead (Image 26)     These may represent thrombus or vegetation in the appropriate clinical  context.  ______________________________________________________________________________  Procedure  Transesophageal Echocardiogram with two-dimensional, color and spectral  Doppler. The procedure was performed in the Echo Lab. Informed consent for  Transesophegeal echo obtained. EDMUND Probe #68 was used during the procedure.  Patient was sedated using Fentanyl 50 mcg. Patient was sedated using Versed  2.5 mg. The heart rate, respiratory rate, oxygen saturations, blood pressure,  and response to care were monitored throughout the procedure with the  assistance  of the nurse. The patient tolerated the procedure well.  Complications None. I determined this patient to be an appropriate candidate  for the planned sedation and procedure and have reassessed the patient  immediately prior to sedation and procedure. Total sedation time: 28 minutes  of continuous bedside 1:1 monitoring. ECG shows normal sinus rhythm. Good  quality two-dimensional was performed and interpreted. Good quality color and  spectral Doppler were performed and interpreted.     Left Ventricle  Left ventricular function is decreased. Severe LV dilation is present. The  ejection fraction is 20-25% (severely reduced).     Right Ventricle  Pacemaker leads noted in the RA and RV. There is a mobile lesion with  stranding on the CS lead (Image 65) and the RA lead (Image 7)     There is a mobile strand measuring 2.2 cm that appears to extend from the RA  to RV lead (Image 26).     Atria  The left atrial appendage is normal. It is free of spontaneous echo contrast  and thrombus. The atrial septum is intact as assessed by color Doppler .  Chiari network (normal variant) is noted.     Mitral Valve  The mitral valve is normal. Mild mitral insufficiency is present.     Aortic Valve  Mild to moderate aortic valve sclerosis is present. The aortic valve is  tricuspid.     Tricuspid Valve  The tricuspid valve is normal. Trace tricuspid insufficiency is present.     Pulmonic Valve  The pulmonic valve is normal. No KS.     Vessels  The aorta root is normal. The thoracic aorta is normal.     Pericardium  No pericardial effusion is present.     ______________________________________________________________________________  MMode/2D Measurements & Calculations  asc Aorta Diam: 3.2 cm     ______________________________________________________________________________  Report approved by: Chris Haq MD on 04/17/2025 02:22 PM         Cardiac Device Check - Inpatient   Result Value    Date Time Interrogation Session  [id number]    Implantable Pulse Generator  [employer]    Implantable Pulse Generator Model G447 RESONATE X4 CRT-D    Implantable Pulse Generator Serial Number [id number]    Type Interrogation Session In Clinic    Clinic Name [facility]    Implantable Pulse Generator Type Cardiac Resynchronization Therapy - Defibrillator    Implantable Pulse Generator Implant Date 20190425    Implantable Lead  [employer]    Implantable Lead Model 0292 [place]otak [place] 4-Site SG    Implantable Lead Serial Number [id number]    Implantable Lead Implant Date 20190425    Implantable Lead Polarity Type Bipolar Lead    Implantable Lead Location Detail 1 UNKNOWN    Implantable Lead Special Function Length 59 cm    Implantable Lead Location Right Ventricle    Implantable Lead Connection Status Connected    Implantable Lead  [employer]    Implantable Lead Model 4671 Acuity X4 Straight    Implantable Lead Serial Number [id number]    Implantable Lead Implant Date 20190425    Implantable Lead Polarity Type Quadripolar Lead    Implantable Lead Location Detail 1 UNKNOWN    Implantable Lead Special Function Length 86 cm    Implantable Lead Location Left Ventricle    Implantable Lead Connection Status Connected    Implantable Lead  [employer]    Implantable Lead Model 7740 Ingevity MRI    Implantable Lead Serial Number [id number]    Implantable Lead Implant Date 20190425    Implantable Lead Polarity Type Bipolar Lead    Implantable Lead Location Detail 1 UNKNOWN    Implantable Lead Special Function Length 45 cm    Implantable Lead Location Right Atrium    Implantable Lead Connection Status Connected    Marcelo Setting Mode (NBG Code) DDD    Marcelo Setting Lower Rate Limit 60    Marcelo Setting Maximum Tracking Rate 130    Marcelo Setting Maximum Sensor Rate 130    Marcelo Setting TIGIST Delay Low 140    Marcelo Setting PAV Delay Low 180    Marcelo Setting AT Mode Switch Rate 170     Marcelo Setting AT Mode Switch Mode VDIR    Lead Channel Setting Sensing Polarity Bipolar    Lead Channel Setting Sensing Sensitivity 0.25    Lead Channel Setting Sensing Adaptation Mode Adaptive    Lead Channel Setting Sensing Polarity Bipolar    Lead Channel Setting Sensing Sensitivity 0.6    Lead Channel Setting Sensing Adaptation Mode Adaptive    Lead Channel Setting Sensing Anode Location Left Ventricle    Lead Channel Setting Sensing Anode Terminal Ring2    Lead Channel Setting Sensing Cathode Location Left Ventricle    Lead Channel Setting Sensing Cathode Terminal Tip    Lead Channel Setting Sensing Sensitivity 1.0    Lead Channel Setting Sensing Adaptation Mode Adaptive    Ventricular chambers paced during CRT pacing. LVOnly    Lead Channel Setting Pacing Polarity Bipolar    Lead Channel Setting Pacing Pulse Width 0.5    Lead Channel Setting Pacing Amplitude 2.5    Lead Channel Setting Pacing Capture Mode Fixed Pacing    Lead Channel Setting Pacing Polarity Bipolar    Lead Channel Setting Pacing Pulse Width 0.4    Lead Channel Setting Pacing Amplitude 2.0    Lead Channel Setting Pacing Capture Mode Adaptive    Lead Channel Setting Pacing Anode Location Other    Lead Channel Setting Pacing Anode Terminal Can    Lead Channel Setting Sensing Cathode Location Left Ventricle    Lead Channel Setting Sensing Cathode Terminal Ring3    Lead Channel Setting Pacing Pulse Width 0.5    Lead Channel Setting Pacing Amplitude 2.3    Lead Channel Setting Pacing Capture Mode Adaptive    Zone Setting Type Category VF    Zone Setting Vendor Type Category VF    Zone Setting Status Active    Zone Setting Detection Interval 250    Zone Setting Type Category VT    Zone Setting Vendor Type Category VT    Zone Setting Status Active    Zone Setting Detection Interval 300    Zone Setting Type Category VT    Zone Setting Vendor Type Category VT-1    Zone Setting Status Monitor    Zone Setting Detection Interval 353    Lead Channel  Impedance Value 501    Lead Channel Pacing Threshold Amplitude 1.0    Lead Channel Pacing Threshold Pulse Width 0.5    Lead Channel Impedance Value 745    Lead Channel Pacing Threshold Amplitude 0.9    Lead Channel Pacing Threshold Pulse Width 0.5    Lead Channel Impedance Value 648    Lead Channel Pacing Threshold Amplitude 0.9    Lead Channel Pacing Threshold Pulse Width 0.4    Battery Date Time of Measurements [id number]    Battery Status Middle of Service    Battery Remaining Longevity 66    Battery Remaining Percentage 77    Capacitor Charge Type Reformation    Capacitor Last Charge Date Time [id number]    Capacitor Charge Time 11.3    Marcelo Statistic Date Time Start 31740653472710    Marcelo Statistic Date Time End 79354040353209    Marcelo Statistic RA Percent Paced 0    Marcelo Statistic RV Percent Paced 21    CRT Statistic LV Percent Paced 94    CRT Statistic Date Time Start 20241217000000    CRT Statistic Date Time End [id number]    Atrial Tachy Statistic Date Time Start 72993782592631    Atrial Tachy Statistic Date Time End 15292952639057    Atrial Tachy Statistic AT/AF [place] Percent 0    Therapy Statistic Recent Shocks Delivered 0    Therapy Statistic Recent Shocks Aborted 0    Therapy Statistic Recent ATP Delivered 0    Therapy Statistic Recent Date Time Start 20241217000000    Therapy Statistic Recent Date Time End 88945902314266    Therapy Statistic Total Shocks Delivered 0    Therapy Statistic Total Shocks Aborted 0    Therapy Statistic Total ATP Delivered 0    Therapy Statistic Total  Date Time Start 20190425000000    Therapy Statistic Total  Date Time End 25731518981802    Episode Statistic Recent Count 0    Episode Statistic Type Category Other    Episode Statistic Recent Count 1    Episode Statistic Type Category VT    Episode Statistic Vendor Type Category NSVT    Episode Statistic Recent Count 0    Episode Statistic Type Category VT    Episode Statistic Vendor Type Category VT    Episode  Statistic Recent Count 0    Episode Statistic Type Category VT    Episode Statistic Vendor Type Category VT-1    Episode Statistic Recent Date Time Start [id number]    Episode Statistic Recent Date Time End 20250417000000    Episode Statistic Recent Date Time Start 20241217000000    Episode Statistic Recent Date Time End 20250417000000    Episode Statistic Recent Date Time Start 20241217000000    Episode Statistic Recent Date Time End 20250417000000    Episode Statistic Recent Date Time Start 20241217000000    Episode Statistic Recent Date Time End 20250417000000    Narrative    Patient seen [facility] for evaluation and iterative programming of their ICD   per MD orders.     Device: reQall G447 RESONATE X4 CRT-D  Normal device function  Mode: DDD  bpm  AP: <1%  LVP: 94%  Intrinsic rhythm: AS-VS 85 bpm  Lead Trends Appear Stable  Estimated battery longevity to RRT = 5.5  years.   Atrial Arrhythmia: none  Ventricular Arrhythmia: none  Setting Changes: none    Plan: Will continue to follow patient during inpt stay  Luna Solares/Malka Bradford RN    Multi lead ICD    I have reviewed and interpreted the device interrogation, settings,   programming and nurse's summary. The device is functioning within normal   device parameters. I agree with the current findings, assessment and plan.       Medical Decision Making   50 MINUTES SPENT BY ME on the date of service doing chart review, history, exam, documentation & further activities per the note.

## 2025-04-19 LAB
ANION GAP SERPL CALCULATED.3IONS-SCNC: 13 MMOL/L (ref 7–15)
BUN SERPL-MCNC: 12.7 MG/DL (ref 8–23)
CALCIUM SERPL-MCNC: 9 MG/DL (ref 8.8–10.4)
CHLORIDE SERPL-SCNC: 108 MMOL/L (ref 98–107)
CREAT SERPL-MCNC: 0.77 MG/DL (ref 0.67–1.17)
EGFRCR SERPLBLD CKD-EPI 2021: >90 ML/MIN/1.73M2
ERYTHROCYTE [DISTWIDTH] IN BLOOD BY AUTOMATED COUNT: 14.8 % (ref 10–15)
GLUCOSE BLDC GLUCOMTR-MCNC: 167 MG/DL (ref 70–99)
GLUCOSE BLDC GLUCOMTR-MCNC: 218 MG/DL (ref 70–99)
GLUCOSE BLDC GLUCOMTR-MCNC: 269 MG/DL (ref 70–99)
GLUCOSE BLDC GLUCOMTR-MCNC: 331 MG/DL (ref 70–99)
GLUCOSE SERPL-MCNC: 159 MG/DL (ref 70–99)
HCO3 SERPL-SCNC: 20 MMOL/L (ref 22–29)
HCT VFR BLD AUTO: 45.6 % (ref 40–53)
HGB BLD-MCNC: 15.4 G/DL (ref 13.3–17.7)
MAGNESIUM SERPL-MCNC: 2.2 MG/DL (ref 1.7–2.3)
MCH RBC QN AUTO: 31 PG (ref 26.5–33)
MCHC RBC AUTO-ENTMCNC: 33.8 G/DL (ref 31.5–36.5)
MCV RBC AUTO: 92 FL (ref 78–100)
PLATELET # BLD AUTO: 169 10E3/UL (ref 150–450)
POTASSIUM SERPL-SCNC: 3.6 MMOL/L (ref 3.4–5.3)
RBC # BLD AUTO: 4.97 10E6/UL (ref 4.4–5.9)
SODIUM SERPL-SCNC: 141 MMOL/L (ref 135–145)
UFH PPP CHRO-ACNC: 0.61 IU/ML
VANCOMYCIN SERPL-MCNC: 12.5 UG/ML
WBC # BLD AUTO: 7 10E3/UL (ref 4–11)

## 2025-04-19 PROCEDURE — 250N000011 HC RX IP 250 OP 636

## 2025-04-19 PROCEDURE — 36415 COLL VENOUS BLD VENIPUNCTURE: CPT | Performed by: INTERNAL MEDICINE

## 2025-04-19 PROCEDURE — 83735 ASSAY OF MAGNESIUM: CPT | Performed by: INTERNAL MEDICINE

## 2025-04-19 PROCEDURE — 120N000005 HC R&B MS OVERFLOW UMMC

## 2025-04-19 PROCEDURE — 250N000013 HC RX MED GY IP 250 OP 250 PS 637: Performed by: STUDENT IN AN ORGANIZED HEALTH CARE EDUCATION/TRAINING PROGRAM

## 2025-04-19 PROCEDURE — 99232 SBSQ HOSP IP/OBS MODERATE 35: CPT | Mod: FS | Performed by: INTERNAL MEDICINE

## 2025-04-19 PROCEDURE — 85520 HEPARIN ASSAY: CPT | Performed by: INTERNAL MEDICINE

## 2025-04-19 PROCEDURE — 250N000013 HC RX MED GY IP 250 OP 250 PS 637

## 2025-04-19 PROCEDURE — 85018 HEMOGLOBIN: CPT

## 2025-04-19 PROCEDURE — 80202 ASSAY OF VANCOMYCIN: CPT | Performed by: INTERNAL MEDICINE

## 2025-04-19 PROCEDURE — 250N000011 HC RX IP 250 OP 636: Performed by: INTERNAL MEDICINE

## 2025-04-19 PROCEDURE — 80048 BASIC METABOLIC PNL TOTAL CA: CPT | Performed by: INTERNAL MEDICINE

## 2025-04-19 RX ADMIN — ATORVASTATIN CALCIUM 80 MG: 80 TABLET, FILM COATED ORAL at 09:01

## 2025-04-19 RX ADMIN — CARVEDILOL 25 MG: 25 TABLET, FILM COATED ORAL at 09:01

## 2025-04-19 RX ADMIN — ASPIRIN 81 MG CHEWABLE TABLET 81 MG: 81 TABLET CHEWABLE at 09:01

## 2025-04-19 RX ADMIN — Medication 1250 MG: at 17:09

## 2025-04-19 RX ADMIN — SACUBITRIL AND VALSARTAN 1 TABLET: 97; 103 TABLET, FILM COATED ORAL at 09:04

## 2025-04-19 RX ADMIN — SACUBITRIL AND VALSARTAN 1 TABLET: 97; 103 TABLET, FILM COATED ORAL at 20:27

## 2025-04-19 RX ADMIN — FUROSEMIDE 40 MG: 40 TABLET ORAL at 15:37

## 2025-04-19 RX ADMIN — CARVEDILOL 25 MG: 25 TABLET, FILM COATED ORAL at 17:13

## 2025-04-19 RX ADMIN — SPIRONOLACTONE 25 MG: 25 TABLET, FILM COATED ORAL at 09:01

## 2025-04-19 RX ADMIN — HEPARIN SODIUM 1450 UNITS/HR: 10000 INJECTION, SOLUTION INTRAVENOUS at 16:08

## 2025-04-19 RX ADMIN — Medication 1250 MG: at 09:04

## 2025-04-19 RX ADMIN — Medication 3 MG: at 21:52

## 2025-04-19 RX ADMIN — FUROSEMIDE 40 MG: 40 TABLET ORAL at 09:01

## 2025-04-19 ASSESSMENT — ACTIVITIES OF DAILY LIVING (ADL)
ADLS_ACUITY_SCORE: 33

## 2025-04-19 NOTE — PLAN OF CARE
Goal Outcome Evaluation:      Plan of Care Reviewed With: patient    Overall Patient Progress: no changeOverall Patient Progress: no change      A:   Neuro: A/Ox4. Calls appropriately. Pleasant and cooperative. Denies headache, dizziness, and lightheadedness.  Cardiac/Tele: VSS. Vpaced 70s. Denies chest pain and palpitations. Afebrile.  Respiratory: Sats >95% on room air. Denies SOB.  GI/: Continent. Voiding spontaneously.  Adequate urine output. Denies nausea.  Diet/Appetite: RD ACHS BG, no insulin correction given  Skin: No new deficits noted. L ICD chest pocket WDL.  LDAs: PIV- infusing hep at 1450 units/hr  Activity: independent  Pain: denies     P: Continue to monitor and notify team with changes.    Shift: [id number]

## 2025-04-19 NOTE — PROGRESS NOTES
[facility]   Cardiology   Progress Note     ASSESSMENT/PLAN:  Anson Manriquez is a 69 year old male with PMH significant for HTN, DMII, LBBB, CAD (s/p STEMI in 2007 with pLAD stenting, and STEMI 9/2018) s/p CRT-D (2019), ICM LVEF 20-25% who presented on 4/16/25 with concern for pacemaker pocket infection.      Interval History:  No acute events overnight. Patient eating breakfast with wife at bedside at time of exam. He denies any cardiac symptoms and reports feeling well. Patient updated that there are no changes to plan for device extraction on Tuesday, and no changes to current IV antibiotic therapy.    Changes Today:  - Continue heparin drip  - Plan for device extraction on Tuesday, 4/22 as scheduled      # ICM s/p CRT-D implantation (2019)   # CRT-D infection  Patient has drainage from his pocket site after trauma to the site approximately 1 month ago, suspicious for device infection. No systemic symptoms suggestive of bacteremia or sepsis. Not pacer dependent per device interrogation. No evidence of vegetation on device leads/valves on CT chest.     EDMUND 4/17 with mobile lesions/stranding on the leads suspicious for thrombus vs vegetation     - Blood cultures with NGTD  - Aerobic wound culture positive for staph capitis and staph epidermidis  - On empiric vancomycin  - Appreciate ID input for recommendations:  Continue empiric IV vancomycin (dosed by pharmacy)  Agree with planned upcoming EP device removal  Please send tissue/fluid cultures from the procedure at time of device extraction if able   Follow up pending blood cultures - prelim with no growth  Follow up pending wound swab cultures - coag-neg staph susceptibilities (pending)   - EP consult  - Device extraction planned for Tuesday 4/22. Patient will need to be NPO with heparin held 4 hours prior to procedure.  - Telemetry  - Daily BMP, CBC  - Electrolyte replacement protocols for K and Mag    # Bilateral IJV thrombosis  (nonocclusive in RIJ, 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 No recent CVC placement. Denies IVDU, trauma to head/neck, ear/neck/throat/oral pain. Had dental procedures in 12/2024. Ddx includes septic emboli/Lemierre, malignancy, hypercoagulable state.  - Heparin gtt given upcoming procedures  - Pharm consult for DOAC (Eliquis and Xarelto $30/month, Dabigatran $10/month)  - Needs age-appropriate malignancy screening as outpatient    # HFrEF (EF 20-25%) 2/2 ICM  Denies sxs of ADHF. Appears euvolemic on admission. Taking GDMT as prescribed. NTproBNP 359.  - GDMT:  - ACE/ARB/ARNI: continue PTA sacubitril-valsartan 97-103mg BID   - MRA: continue PTA spironolactone 25mg daily  - BB: continue PTA carvedilol 25mg BID  - SGLT2: hold PTA empagliflozin 25mg daily for upcoming procedure  - Continue PTA furosemide 40mg BID     # CAD s/p pLAD stent  - Continue PTA ASA 81mg daily, atorvastatin 80mg daily     # T2DM (A1c 9.5%)  - Hold PTA semaglutide  - Jardiance as above  - MD sliding scale insulin      FEN: Regular  Code status: Full  Prophylaxis: Heparin gtt  Isolation: None  Disposition: pending device extraction  Medically Ready for Discharge: Anticipated in 2-4 Days     Patient seen and discussed with Dr. Spencer, who agrees with above plan.      Giovanna DESAI, CNP  [facility] Cardiology Team    I saw and evaluated Mr Manriquez as part of a shared APRN/PA visit.    I personally reviewed the infection status, symptoms, labs.     Key management decisions made by me and carried out under my direction include: Plan for extraction    Counseling and/or coordination of care performed by me:    I spent 30 minutes face-to-face and/or coordinating care. Over 50% of the time on the unit was spent counseling the patient and/or coordinating care as documented above. Date of service 4/19/25  OSCAR Spencer MD      Physical Exam:  Temp:  [96.5  F (35.8  C)-98.1  F (36.7  C)] 98.1  F (36.7  C)  Pulse:  [85-97] 86  Resp:   [18] 18  BP: ()/(74-83) 111/83  SpO2:  [93 %-97 %] 95 %    I/O:  Intake/Output Summary (Last 24 hours) at 4/19/2025 0813  Last data filed at 4/18/2025 1300  Gross per 24 hour   Intake 600 ml   Output --   Net 600 ml           Wt:   Wt Readings from Last 5 Encounters:   04/19/25 79.8 kg (175 lb 14.4 oz)   12/17/24 79.4 kg (175 lb)   06/25/24 81.6 kg (179 lb 12.8 oz)   02/06/24 81.3 kg (179 lb 4.8 oz)   10/10/23 83.6 kg (184 lb 3.2 oz)         General: NAD  HEENT:  PERRLA, EOMI.   Neck: JVD not elevated.   CV: RRR. No murmur appreciated. No rubs or gallops. Peripheral radial pulse intact.  Resp: No increased work of breathing or use of accessory muscles, breathing comfortably on room air.  Lung sounds clear throughout/bilaterally  Abdomen: Normal active bowel sounds. Abdomen is rounded, non-tender to palpation.    Extremities: Warm. Capillary refill less than 3 sec. 2/4 pedal pulses bilaterally. No pre-tibial edema. No cyanosis or clubbing.  Skin:  Warm and dry. Dressing to RUBIA chest with serous drainage.  Neuro: Alert and oriented x3.      Medications:  Current Facility-Administered Medications   Medication Dose Route Frequency Provider Last Rate Last Admin    aspirin (ASA) chewable tablet 81 mg  81 mg Oral Daily Felipe Pinto MD   81 mg at 04/18/25 0849    atorvastatin (LIPITOR) tablet 80 mg  80 mg Oral Daily Felipe Pinto MD   80 mg at 04/18/25 0849    carvedilol (COREG) tablet 25 mg  25 mg Oral BID w/meals Felipe Pinto MD   25 mg at 04/18/25 1756    [Held by provider] empagliflozin (JARDIANCE) tablet 25 mg  25 mg Oral Daily Felipe Pinto MD   25 mg at 04/17/25 0852    furosemide (LASIX) tablet 40 mg  40 mg Oral BID Melissa Griggs MD   40 mg at 04/18/25 1647    insulin aspart (NovoLOG) injection (RAPID ACTING)  1-7 Units Subcutaneous TID AC Melissa Griggs MD   2 Units at 04/18/25 1756    insulin aspart (NovoLOG) injection (RAPID ACTING)  1-5 Units Subcutaneous At Bedtime Anson  MD Melissa        melatonin tablet 3 mg  3 mg Oral At Bedtime Felipe Pinto MD   3 mg at 04/18/25 2211    polyethylene glycol (MIRALAX) Packet 17 g  17 g Oral Daily Felipe Pinto MD        sacubitril-valsartan (ENTRESTO)  MG per tablet 1 tablet  1 tablet Oral BID Felipe Pinto MD   1 tablet at 04/18/25 2001    sodium chloride (PF) 0.9% PF flush 3 mL  3 mL Intracatheter Q8H [facility] Ignacio Spencer MD   3 mL at 04/19/25 0614    sodium chloride (PF) 0.9% PF flush 3 mL  3 mL Intracatheter Q8H [facility] Felipe Pinto MD   3 mL at 04/19/25 0614    spironolactone (ALDACTONE) tablet 25 mg  25 mg Oral Daily Felipe Pinto MD   25 mg at 04/18/25 0849    vancomycin (VANCOCIN) 1,250 mg in 0.9% NaCl 250 mL intermittent infusion  1,250 mg Intravenous Q12H Ignacio Spencer MD   1,250 mg at 04/18/25 1647     Current Facility-Administered Medications   Medication Dose Route Frequency Provider Last Rate Last Admin    heparin 25,000 units in 0.45% NaCl 250 mL ANTICOAGULANT infusion  0-5,000 Units/hr Intravenous Continuous Melissa Griggs MD 14.5 mL/hr at 04/18/25 2232 1,450 Units/hr at 04/18/25 2232    Patient is already receiving anticoagulation with heparin, enoxaparin (LOVENOX), warfarin (COUMADIN)  or other anticoagulant medication   Does not apply Continuous PRN Felipe Pinto MD        sodium chloride 0.9 % infusion  1,000 mL Intravenous Continuous Chris Reece MD 30 mL/hr at 04/17/25 1100 120 mL at 04/17/25 1100       Labs:   CMP  Recent Labs   Lab 04/19/25  0606 04/18/25  2217 04/18/25  1724 04/18/25  1258 04/18/25  0845 04/18/25  0800 04/17/25  0901 04/17/25  0642 04/16/25  2318 04/16/25  1536     --   --   --   --  140  --  141  --  138   POTASSIUM 3.6  --   --   --   --  3.9  --  4.0  --  4.3   CHLORIDE 108*  --   --   --   --  108*  --  110*  --  103   CO2 20*  --   --   --   --  19*  --  19*  --  22   ANIONGAP 13  --   --   --   --  13  --  12  --  13   * 156* 234* 287*    < > 154*   < > 167*   < > 254*   BUN 12.7  --   --   --   --  14.5  --  18.7  --  23.6*   CR 0.77  --   --   --   --  0.77  --  0.71  --  1.04   GFRESTIMATED >90  --   --   --   --  >90  --  >90  --  78   RENO 9.0  --   --   --   --  8.8  --  8.9  --  9.3   MAG 2.2  --   --   --   --  2.2  --   --   --   --     < > = values in this interval not displayed.     CBC  Recent Labs   Lab 04/19/25  0606 04/17/25  0642 04/16/25  1536   WBC 7.0 7.8 8.8   RBC 4.97 4.95 5.15   HGB 15.4 15.2 15.8   HCT 45.6 44.7 46.7   MCV 92 90 91   MCH 31.0 30.7 30.7   MCHC 33.8 34.0 33.8   RDW 14.8 14.9 14.8    167 211     INRNo lab results found in last 7 days.  Arterial Blood GasNo lab results found in last 7 days.    Diagnostics:  ECG:    Echo 4/16/25  The tricuspid valve leaflets are not well visualized. No obvious vegetations  are seen on the tricuspid valve or the ICD leads on limited views. Recommend  EDMUND if clinical suspicion for infective endocarditis warrants.     Ischemic cardiomyopathy with severely reduced LVEF=21%. Regional wall motion  abnormalities as described below, unchanged from the prior study.  Global right ventricular function is normal.     This study was compared with the study from 6/25/24: No significant changes  noted.    TTE 4/17/25  Pacemaker leads noted in the RA and RV. There is a mobile lesion with  stranding on the CS lead (Image 65) and the RA lead (Image 7)     There is a mobile strand measuring 2.2 cm that appears to extend from the RA  to RV lead (Image 26)     These may represent thrombus or vegetation in the appropriate clinical  context.    Device Check 4/17/25  Device: [employer] G447 RESONATE X4 CRT-D  Normal device function  Mode: DDD  bpm  AP: <1%  LVP: 94%  Intrinsic rhythm: AS-VS 85 bpm  Lead Trends Appear Stable  Estimated battery longevity to RRT = 5.5  years.   Atrial Arrhythmia: none  Ventricular Arrhythmia: none  Setting Changes: none    Ultrasound 4/16/25  Nonocclusive  thrombus in the cranial portion of the right internal jugular veins. Occlusive versus nearly occlusive thrombus in the cranial left internal jugular vein. The bilateral internal jugular veins are patent and compressible caudally. This is similar to recent CT chest.    CT Chest 4/16/25  1.  No suspicious fluid collection, malpositioning or swelling about  the left chest pacemaker to suggest intrathoracic visualization.  2. Tubular transmural filling defects within the proximal internal  jugular vein bilaterally likely represent mixing artifact although DVT  cannot be excluded. Can confirm with ultrasound.  2. Moderate cardiomegaly with coronary calcifications and mild  bibasilar pulmonary edema.    No results found for this or any previous visit (from the past 24 hours).      Medical Decision Making   45 MINUTES SPENT BY ME on the date of service doing chart review, history, exam, documentation & further activities per the note.
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
## 2025-04-19 NOTE — PHARMACY-VANCOMYCIN DOSING SERVICE
Pharmacy Vancomycin Note  Date of Service 2025  Patient's  1955   69 year old, male    Indication:  CRT-D pocket infection  Day of Therapy: 3  Current vancomycin regimen: 1250 mg IV q12h  Current vancomycin monitoring method: AUC  Current vancomycin therapeutic monitoring goal: 400-600 mg*h/L    InsightRX Prediction of Current Vancomycin Regimen  Regimen: 1250 mg IV every 12 hours.  Exposure target: AUC24 (range)400-600 mg/L.hr   AUC24,ss: 516 mg/L.hr  Probability of AUC24 > 400: 96 %  Ctrough,ss: 14.8 mg/L  Probability of Ctrough,ss > 20: 4 %  Probability of nephrotoxicity (Lodise VIC ): 10 %    Current estimated CrCl = Estimated Creatinine Clearance: 89.9 mL/min (based on SCr of 0.77 mg/dL).    Creatinine for last 3 days  2025:  3:36 PM Creatinine 1.04 mg/dL  2025:  6:42 AM Creatinine 0.71 mg/dL  2025:  8:00 AM Creatinine 0.77 mg/dL  2025:  6:06 AM Creatinine 0.77 mg/dL    Recent Vancomycin Levels (past 3 days)  2025:  6:06 AM Vancomycin 12.5 ug/mL    Vancomycin IV Administrations (past 72 hours)                     vancomycin (VANCOCIN) 1,250 mg in 0.9% NaCl 250 mL intermittent infusion (mg) 1,250 mg New Bag 25 1647     1,250 mg New Bag  0427     1,250 mg New Bag 25 1754    vancomycin (VANCOCIN) 1,750 mg in sodium chloride 0.9 % 567.5 mL intermittent infusion (mg) 1,750 mg Given 25 0515                    Nephrotoxins and other renal medications (From now, onward)      Start     Dose/Rate Route Frequency Ordered Stop    25 1700  vancomycin (VANCOCIN) 1,250 mg in 0.9% NaCl 250 mL intermittent infusion         1,250 mg  over 90 Minutes Intravenous EVERY 12 HOURS 25 0749      25 0800  [Held by provider]  empagliflozin (JARDIANCE) tablet 25 mg        (On hold since yesterday at 0819 until manually unheld; held by Giovanna Gonzalez APRN CNPHold Reason: OtherHold Comments: Upcoming procedure)   Note to Pharmacy: PTA Sig:Take 1 tablet (25  mg) by mouth daily.      25 mg Oral DAILY 04/16/25 1822      04/17/25 0800  furosemide (LASIX) tablet 40 mg        Note to Pharmacy: PTA Sig:Take 1 tablet (40 mg) by mouth 2 times daily.      40 mg Oral 2 TIMES DAILY (Diuretics and Nitrates) 04/16/25 2248                 Contrast Orders - past 72 hours (72h ago, onward)      Start     Dose/Rate Route Frequency Stop    04/16/25 1645  perflutren diluted 1mL to 2mL with saline (OPTISON) diluted injection 5 mL         5 mL Intravenous ONCE 04/16/25 1642    04/16/25 1630  iopamidol (ISOVUE-370) solution 85 mL         85 mL Intravenous ONCE 04/16/25 1641            Interpretation of levels and current regimen:  Vancomycin level is reflective of -600    Has serum creatinine changed greater than 50% in last 72 hours: No    Urine output:  unable to fully determine as multiple unmeasured outputs recorded    Renal Function:  appears stable with SCr near baseline      Plan:  Continue Current Dose of 1250 mg IV every 12 hours  Vancomycin monitoring method: AUC  Vancomycin therapeutic monitoring goal: 400-600 mg*h/L  Pharmacy will check vancomycin levels as appropriate in 3-5 Days.  Serum creatinine levels will be ordered  at least every other day .    Florencio Regan [facility]

## 2025-04-20 LAB
ANION GAP SERPL CALCULATED.3IONS-SCNC: 10 MMOL/L (ref 7–15)
BACTERIA ABSC ANAEROBE+AEROBE CULT: ABNORMAL
BACTERIA ABSC ANAEROBE+AEROBE CULT: ABNORMAL
BUN SERPL-MCNC: 12.9 MG/DL (ref 8–23)
CALCIUM SERPL-MCNC: 8.6 MG/DL (ref 8.8–10.4)
CHLORIDE SERPL-SCNC: 109 MMOL/L (ref 98–107)
CREAT SERPL-MCNC: 0.69 MG/DL (ref 0.67–1.17)
EGFRCR SERPLBLD CKD-EPI 2021: >90 ML/MIN/1.73M2
ERYTHROCYTE [DISTWIDTH] IN BLOOD BY AUTOMATED COUNT: 14.8 % (ref 10–15)
GLUCOSE BLDC GLUCOMTR-MCNC: 142 MG/DL (ref 70–99)
GLUCOSE BLDC GLUCOMTR-MCNC: 187 MG/DL (ref 70–99)
GLUCOSE BLDC GLUCOMTR-MCNC: 195 MG/DL (ref 70–99)
GLUCOSE BLDC GLUCOMTR-MCNC: 253 MG/DL (ref 70–99)
GLUCOSE SERPL-MCNC: 204 MG/DL (ref 70–99)
GRAM STAIN RESULT: ABNORMAL
GRAM STAIN RESULT: ABNORMAL
HCO3 SERPL-SCNC: 21 MMOL/L (ref 22–29)
HCT VFR BLD AUTO: 43 % (ref 40–53)
HGB BLD-MCNC: 14.6 G/DL (ref 13.3–17.7)
MAGNESIUM SERPL-MCNC: 1.9 MG/DL (ref 1.7–2.3)
MCH RBC QN AUTO: 31.2 PG (ref 26.5–33)
MCHC RBC AUTO-ENTMCNC: 34 G/DL (ref 31.5–36.5)
MCV RBC AUTO: 92 FL (ref 78–100)
PLATELET # BLD AUTO: 153 10E3/UL (ref 150–450)
POTASSIUM SERPL-SCNC: 3.6 MMOL/L (ref 3.4–5.3)
RBC # BLD AUTO: 4.68 10E6/UL (ref 4.4–5.9)
SODIUM SERPL-SCNC: 140 MMOL/L (ref 135–145)
UFH PPP CHRO-ACNC: 0.44 IU/ML
WBC # BLD AUTO: 6.8 10E3/UL (ref 4–11)

## 2025-04-20 PROCEDURE — 36415 COLL VENOUS BLD VENIPUNCTURE: CPT | Performed by: INTERNAL MEDICINE

## 2025-04-20 PROCEDURE — 250N000011 HC RX IP 250 OP 636

## 2025-04-20 PROCEDURE — 85520 HEPARIN ASSAY: CPT | Performed by: INTERNAL MEDICINE

## 2025-04-20 PROCEDURE — 250N000011 HC RX IP 250 OP 636: Performed by: INTERNAL MEDICINE

## 2025-04-20 PROCEDURE — 85027 COMPLETE CBC AUTOMATED: CPT

## 2025-04-20 PROCEDURE — 250N000013 HC RX MED GY IP 250 OP 250 PS 637

## 2025-04-20 PROCEDURE — 99232 SBSQ HOSP IP/OBS MODERATE 35: CPT | Mod: FS | Performed by: INTERNAL MEDICINE

## 2025-04-20 PROCEDURE — 250N000013 HC RX MED GY IP 250 OP 250 PS 637: Performed by: INTERNAL MEDICINE

## 2025-04-20 PROCEDURE — 83735 ASSAY OF MAGNESIUM: CPT | Performed by: INTERNAL MEDICINE

## 2025-04-20 PROCEDURE — 80048 BASIC METABOLIC PNL TOTAL CA: CPT | Performed by: INTERNAL MEDICINE

## 2025-04-20 PROCEDURE — 250N000013 HC RX MED GY IP 250 OP 250 PS 637: Performed by: STUDENT IN AN ORGANIZED HEALTH CARE EDUCATION/TRAINING PROGRAM

## 2025-04-20 PROCEDURE — 999N000248 HC STATISTIC IV INSERT WITH US BY RN

## 2025-04-20 PROCEDURE — 120N000005 HC R&B MS OVERFLOW UMMC

## 2025-04-20 RX ORDER — LOPERAMIDE HYDROCHLORIDE 2 MG/1
2 CAPSULE ORAL 4 TIMES DAILY PRN
Status: DISCONTINUED | OUTPATIENT
Start: 2025-04-20 | End: 2025-04-23 | Stop reason: HOSPADM

## 2025-04-20 RX ORDER — SPIRONOLACTONE 25 MG/1
50 TABLET ORAL DAILY
Status: DISCONTINUED | OUTPATIENT
Start: 2025-04-20 | End: 2025-04-23 | Stop reason: HOSPADM

## 2025-04-20 RX ORDER — MAGNESIUM OXIDE 400 MG/1
400 TABLET ORAL EVERY 4 HOURS
Status: COMPLETED | OUTPATIENT
Start: 2025-04-20 | End: 2025-04-20

## 2025-04-20 RX ORDER — POTASSIUM CHLORIDE 750 MG/1
20 TABLET, EXTENDED RELEASE ORAL ONCE
Status: COMPLETED | OUTPATIENT
Start: 2025-04-20 | End: 2025-04-20

## 2025-04-20 RX ADMIN — FUROSEMIDE 40 MG: 40 TABLET ORAL at 18:21

## 2025-04-20 RX ADMIN — SPIRONOLACTONE 50 MG: 25 TABLET, FILM COATED ORAL at 09:57

## 2025-04-20 RX ADMIN — SACUBITRIL AND VALSARTAN 1 TABLET: 97; 103 TABLET, FILM COATED ORAL at 21:35

## 2025-04-20 RX ADMIN — CARVEDILOL 25 MG: 25 TABLET, FILM COATED ORAL at 18:21

## 2025-04-20 RX ADMIN — Medication 3 MG: at 21:34

## 2025-04-20 RX ADMIN — HEPARIN SODIUM 1450 UNITS/HR: 10000 INJECTION, SOLUTION INTRAVENOUS at 09:38

## 2025-04-20 RX ADMIN — Medication 1250 MG: at 18:25

## 2025-04-20 RX ADMIN — ASPIRIN 81 MG CHEWABLE TABLET 81 MG: 81 TABLET CHEWABLE at 09:58

## 2025-04-20 RX ADMIN — SACUBITRIL AND VALSARTAN 1 TABLET: 97; 103 TABLET, FILM COATED ORAL at 09:58

## 2025-04-20 RX ADMIN — ATORVASTATIN CALCIUM 80 MG: 80 TABLET, FILM COATED ORAL at 09:58

## 2025-04-20 RX ADMIN — FUROSEMIDE 40 MG: 40 TABLET ORAL at 09:57

## 2025-04-20 RX ADMIN — Medication 1250 MG: at 05:21

## 2025-04-20 RX ADMIN — POTASSIUM CHLORIDE 20 MEQ: 750 TABLET, EXTENDED RELEASE ORAL at 09:57

## 2025-04-20 RX ADMIN — MAGNESIUM OXIDE TAB 400 MG (241.3 MG ELEMENTAL MG) 400 MG: 400 (241.3 MG) TAB at 09:58

## 2025-04-20 RX ADMIN — CARVEDILOL 25 MG: 25 TABLET, FILM COATED ORAL at 09:58

## 2025-04-20 RX ADMIN — MAGNESIUM OXIDE TAB 400 MG (241.3 MG ELEMENTAL MG) 400 MG: 400 (241.3 MG) TAB at 14:10

## 2025-04-20 ASSESSMENT — ACTIVITIES OF DAILY LIVING (ADL)
ADLS_ACUITY_SCORE: 33
ADLS_ACUITY_SCORE: 33
ADLS_ACUITY_SCORE: 37
ADLS_ACUITY_SCORE: 37
ADLS_ACUITY_SCORE: 33
ADLS_ACUITY_SCORE: 37
ADLS_ACUITY_SCORE: 37
ADLS_ACUITY_SCORE: 33
ADLS_ACUITY_SCORE: 37
ADLS_ACUITY_SCORE: 33
ADLS_ACUITY_SCORE: 37
ADLS_ACUITY_SCORE: 33
ADLS_ACUITY_SCORE: 37
ADLS_ACUITY_SCORE: 33

## 2025-04-20 NOTE — PROGRESS NOTES
"    [facility]   Cardiology   Progress Note     ASSESSMENT/PLAN:  Anson Manriquez is a 69 year old male with PMH significant for HTN, DMII, LBBB, CAD (s/p STEMI in 2007 with pLAD stenting, and STEMI 9/2018) s/p CRT-D (2019), ICM LVEF 20-25% who presented on 4/16/25 with concern for pacemaker pocket infection.      Interval History:  - No acute events overnight.   - Wife at bedside during exam  - Denies cardiac symptoms  - Reported more drainage from device site today, serous/yellow in color; RN changed dressing just prior to exam  - Patient reports chronic diarrhea (\"happens 6+ days every month\") that he normally takes imodium for    Changes Today:  - Continue heparin drip  - Plan for device extraction on Tuesday, 4/22 as scheduled  - Imodium PRN for chronic diarrhea      # ICM s/p CRT-D implantation (2019)   # CRT-D infection  Patient has drainage from his pocket site after trauma to the site approximately 1 month ago, suspicious for device infection. No systemic symptoms suggestive of bacteremia or sepsis. Not pacer dependent per device interrogation. No evidence of vegetation on device leads/valves on CT chest.     EDMUND 4/17 with mobile lesions/stranding on the leads suspicious for thrombus vs vegetation     - Blood cultures with NGTD  - Aerobic wound culture positive for staph capitis and staph epidermidis  - On empiric vancomycin  - Appreciate ID input for recommendations:  Continue empiric IV vancomycin (dosed by pharmacy)  Agree with planned upcoming EP device removal  Please send tissue/fluid cultures from the procedure at time of device extraction if able   Follow up pending blood cultures - prelim with no growth  Follow up pending wound swab cultures - coag-neg staph susceptibilities (pending)   - EP consult  - Device extraction planned for Tuesday 4/22. Patient will need to be NPO with heparin held 4 hours prior to procedure.  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 No recent CVC placement. Denies IVDU, trauma to head/neck, ear/neck/throat/oral pain. Had dental procedures in 12/2024. Ddx includes septic emboli/Lemierre, malignancy, hypercoagulable state.  - Heparin gtt given upcoming procedures  - Pharm consult for DOAC (Eliquis and Xarelto $30/month, Dabigatran $10/month)  - Needs age-appropriate malignancy screening as outpatient    # HFrEF (EF 20-25%) 2/2 ICM  Denies sxs of ADHF. Appears euvolemic on admission. Taking GDMT as prescribed. NTproBNP 359.  - GDMT:  - ACE/ARB/ARNI: continue PTA sacubitril-valsartan 97-103mg BID   - MRA: increase PTA spironolactone to 50 mg daily as K is borderline low consistently  - BB: continue PTA carvedilol 25mg BID  - SGLT2: hold PTA empagliflozin 25mg daily for upcoming procedure  - Continue PTA furosemide 40mg BID     # CAD s/p pLAD stent  - Continue PTA ASA 81mg daily, atorvastatin 80mg daily     # T2DM (A1c 9.5%)  - Hold PTA semaglutide  - Jardiance as above  - MD sliding scale insulin    # Chronic Diarrhea  Patient reports chronic diarrhea (\"happens 6+ days every month\") that he normally takes imodium for. Patient says he is overdue for a colonoscopy.  - PRN Imodium  - Recommend PCP/GI follow up after discharge      FEN: Regular  Code status: Full  Prophylaxis: Heparin gtt  Isolation: None  Disposition: pending device extraction  Medically Ready for Discharge: Anticipated in 2-4 Days     Patient seen and discussed with Dr. Spencer, who agrees with above plan.    Giovanna Gonzalez APRN, CNP  [facility] Cardiology Team     I saw and evaluated Mr Manriquez as part of a shared APRN/PA visit.    I personally reviewed the Clinical status.     Key management decisions made by me and carried out under my direction include: Plan for intervention    Counseling and/or coordination of care performed by me:  "   I spent 20 minutes face-to-face and/or coordinating care. Over 50% of the time on the unit was spent counseling the patient and/or coordinating care as documented above. Date of service 4/20/25  OSCAR Spencer MD      _____________________________________________________    Physical Exam:  Temp:  [97.9  F (36.6  C)-98.8  F (37.1  C)] 97.9  F (36.6  C)  Pulse:  [81-92] 92  Resp:  [18-20] 20  BP: (109-122)/(72-83) 115/83  SpO2:  [95 %-96 %] 96 %    I/O:  Intake/Output Summary (Last 24 hours) at 4/20/2025 1607  Last data filed at 4/20/2025 1400  Gross per 24 hour   Intake 948 ml   Output --   Net 948 ml        Wt:   Wt Readings from Last 5 Encounters:   04/20/25 80.2 kg (176 lb 14.4 oz)   12/17/24 79.4 kg (175 lb)   06/25/24 81.6 kg (179 lb 12.8 oz)   02/06/24 81.3 kg (179 lb 4.8 oz)   10/10/23 83.6 kg (184 lb 3.2 oz)         General: NAD  HEENT:  PERRLA, EOMI.   Neck: JVD not elevated.   CV: RRR. No murmur appreciated. No rubs or gallops. Peripheral radial pulse intact.  Resp: No increased work of breathing or use of accessory muscles, breathing comfortably on room air.  Lung sounds clear throughout/bilaterally  Abdomen: Normal active bowel sounds. Abdomen is rounded, non-tender to palpation.    Extremities: Warm. Capillary refill less than 3 sec. 2/4 pedal pulses bilaterally. No pre-tibial edema. No cyanosis or clubbing.  Skin: Warm and dry. Dressing to RUBIA chest with serous drainage.  Neuro: Alert and oriented x3.      Medications:  Current Facility-Administered Medications   Medication Dose Route Frequency Provider Last Rate Last Admin    aspirin (ASA) chewable tablet 81 mg  81 mg Oral Daily Felipe Pinto MD   81 mg at 04/20/25 0958    atorvastatin (LIPITOR) tablet 80 mg  80 mg Oral Daily Felipe Pinto MD   80 mg at 04/20/25 0958    carvedilol (COREG) tablet 25 mg  25 mg Oral BID w/meals Felipe Pinto MD   25 mg at 04/20/25 0958    [Held by provider] empagliflozin (JARDIANCE) tablet 25 mg  25 mg Oral Daily  Felipe Pinto MD   25 mg at 04/17/25 0852    furosemide (LASIX) tablet 40 mg  40 mg Oral BID Melissa Griggs MD   40 mg at 04/20/25 0957    insulin aspart (NovoLOG) injection (RAPID ACTING)  1-7 Units Subcutaneous TID AC Melissa Griggs MD   3 Units at 04/20/25 1408    insulin aspart (NovoLOG) injection (RAPID ACTING)  1-5 Units Subcutaneous At Bedtime Melissa Griggs MD   1 Units at 04/19/25 2152    melatonin tablet 3 mg  3 mg Oral At Bedtime Felipe Pinto MD   3 mg at 04/19/25 2152    polyethylene glycol (MIRALAX) Packet 17 g  17 g Oral Daily Felipe Pinto MD        sacubitril-valsartan (ENTRESTO)  MG per tablet 1 tablet  1 tablet Oral BID Felipe Pinto MD   1 tablet at 04/20/25 0958    sodium chloride (PF) 0.9% PF flush 3 mL  3 mL Intracatheter Q8H [facility] Ignacio Spencer MD   3 mL at 04/19/25 2152    sodium chloride (PF) 0.9% PF flush 3 mL  3 mL Intracatheter Q8H [facility] Felipe Pinto MD   3 mL at 04/19/25 0614    spironolactone (ALDACTONE) tablet 50 mg  50 mg Oral Daily Giovanna Gonzalez APRN CNP   50 mg at 04/20/25 0957    vancomycin (VANCOCIN) 1,250 mg in 0.9% NaCl 250 mL intermittent infusion  1,250 mg Intravenous Q12H Ignacio Spencer MD   1,250 mg at 04/20/25 0521     Current Facility-Administered Medications   Medication Dose Route Frequency Provider Last Rate Last Admin    heparin 25,000 units in 0.45% NaCl 250 mL ANTICOAGULANT infusion  0-5,000 Units/hr Intravenous Continuous Melissa Griggs MD 14.5 mL/hr at 04/20/25 1500 1,450 Units/hr at 04/20/25 1500    Patient is already receiving anticoagulation with heparin, enoxaparin (LOVENOX), warfarin (COUMADIN)  or other anticoagulant medication   Does not apply Continuous PRN Felipe Pinto MD        sodium chloride 0.9 % infusion  1,000 mL Intravenous Continuous Van'T Hobhavna, Chris, MD 30 mL/hr at 04/17/25 1100 120 mL at 04/17/25 1100       Labs:   CMP  Recent Labs   Lab 04/20/25  1403 04/20/25  1003 04/20/25  0659  04/19/25  2143 04/19/25  0854 04/19/25  0606 04/18/25  0845 04/18/25  0800 04/17/25  0901 04/17/25  0642   NA  --   --  140  --   --  141  --  140  --  141   POTASSIUM  --   --  3.6  --   --  3.6  --  3.9  --  4.0   CHLORIDE  --   --  109*  --   --  108*  --  108*  --  110*   CO2  --   --  21*  --   --  20*  --  19*  --  19*   ANIONGAP  --   --  10  --   --  13  --  13  --  12   * 142* 204* 218*   < > 159*   < > 154*   < > 167*   BUN  --   --  12.9  --   --  12.7  --  14.5  --  18.7   CR  --   --  0.69  --   --  0.77  --  0.77  --  0.71   GFRESTIMATED  --   --  >90  --   --  >90  --  >90  --  >90   RENO  --   --  8.6*  --   --  9.0  --  8.8  --  8.9   MAG  --   --  1.9  --   --  2.2  --  2.2  --   --     < > = values in this interval not displayed.     CBC  Recent Labs   Lab 04/20/25  0659 04/19/25  0606 04/17/25  0642 04/16/25  1536   WBC 6.8 7.0 7.8 8.8   RBC 4.68 4.97 4.95 5.15   HGB 14.6 15.4 15.2 15.8   HCT 43.0 45.6 44.7 46.7   MCV 92 92 90 91   MCH 31.2 31.0 30.7 30.7   MCHC 34.0 33.8 34.0 33.8   RDW 14.8 14.8 14.9 14.8    169 167 211     INRNo lab results found in last 7 days.  Arterial Blood GasNo lab results found in last 7 days.    Diagnostics:  ECG:    Echo 4/16/25  The tricuspid valve leaflets are not well visualized. No obvious vegetations  are seen on the tricuspid valve or the ICD leads on limited views. Recommend  EDMUND if clinical suspicion for infective endocarditis warrants.     Ischemic cardiomyopathy with severely reduced LVEF=21%. Regional wall motion  abnormalities as described below, unchanged from the prior study.  Global right ventricular function is normal.     This study was compared with the study from 6/25/24: No significant changes  noted.    TTE 4/17/25  Pacemaker leads noted in the RA and RV. There is a mobile lesion with  stranding on the CS lead (Image 65) and the RA lead (Image 7)     There is a mobile strand measuring 2.2 cm that appears to extend from the RA  to RV  lead (Image 26)     These may represent thrombus or vegetation in the appropriate clinical  context.    Device Check 4/17/25  Device: [employer] G447 RESONATE X4 CRT-D  Normal device function  Mode: DDD  bpm  AP: <1%  LVP: 94%  Intrinsic rhythm: AS-VS 85 bpm  Lead Trends Appear Stable  Estimated battery longevity to RRT = 5.5  years.   Atrial Arrhythmia: none  Ventricular Arrhythmia: none  Setting Changes: none    Ultrasound 4/16/25  Nonocclusive thrombus in the cranial portion of the right internal jugular veins. Occlusive versus nearly occlusive thrombus in the cranial left internal jugular vein. The bilateral internal jugular veins are patent and compressible caudally. This is similar to recent CT chest.    CT Chest 4/16/25  1.  No suspicious fluid collection, malpositioning or swelling about  the left chest pacemaker to suggest intrathoracic visualization.  2. Tubular transmural filling defects within the proximal internal  jugular vein bilaterally likely represent mixing artifact although DVT  cannot be excluded. Can confirm with ultrasound.  2. Moderate cardiomegaly with coronary calcifications and mild  bibasilar pulmonary edema.    No results found for this or any previous visit (from the past 24 hours).      Medical Decision Making   45 MINUTES SPENT BY ME on the date of service doing chart review, history, exam, documentation & further activities per the note.

## 2025-04-20 NOTE — PLAN OF CARE
Goal Outcome Evaluation:      Plan of Care Reviewed With: patient    Overall Patient Progress: no changeOverall Patient Progress: no change       A:   Neuro: A/Ox4. [place], forgetful Calls appropriately.   Cardiac/Tele: VSS. Vpaced 70s, bigem pvc overnight. Denies chest pain and palpitations. Afebrile.  Respiratory: Sats >95% on room air. Denies SOB   GI/: Continent. Voiding spontaneously.  Adequate urine output. Denies nausea.  Diet/Appetite: RD ACHS BG, 1 unit given overnight.   Skin: No new deficits noted. L ICD chest pocket WDL.  LDAs: PIV- infusing hep at 1450 units/hr  Activity: independent  Pain: denies     P: Continue to monitor and notify team with changes.     Shift: 1569-1955

## 2025-04-21 ENCOUNTER — ANESTHESIA EVENT (OUTPATIENT)
Dept: CARDIOLOGY | Facility: CLINIC | Age: 70
DRG: 261 | End: 2025-04-21
Payer: COMMERCIAL

## 2025-04-21 LAB
ANION GAP SERPL CALCULATED.3IONS-SCNC: 10 MMOL/L (ref 7–15)
BACTERIA BLD CULT: NO GROWTH
BACTERIA BLD CULT: NO GROWTH
BUN SERPL-MCNC: 10 MG/DL (ref 8–23)
CALCIUM SERPL-MCNC: 8.7 MG/DL (ref 8.8–10.4)
CHLORIDE SERPL-SCNC: 107 MMOL/L (ref 98–107)
CREAT SERPL-MCNC: 0.72 MG/DL (ref 0.67–1.17)
EGFRCR SERPLBLD CKD-EPI 2021: >90 ML/MIN/1.73M2
ERYTHROCYTE [DISTWIDTH] IN BLOOD BY AUTOMATED COUNT: 14.6 % (ref 10–15)
GLUCOSE BLDC GLUCOMTR-MCNC: 167 MG/DL (ref 70–99)
GLUCOSE BLDC GLUCOMTR-MCNC: 225 MG/DL (ref 70–99)
GLUCOSE BLDC GLUCOMTR-MCNC: 248 MG/DL (ref 70–99)
GLUCOSE BLDC GLUCOMTR-MCNC: 256 MG/DL (ref 70–99)
GLUCOSE SERPL-MCNC: 194 MG/DL (ref 70–99)
HCO3 SERPL-SCNC: 22 MMOL/L (ref 22–29)
HCT VFR BLD AUTO: 44.3 % (ref 40–53)
HGB BLD-MCNC: 14.8 G/DL (ref 13.3–17.7)
MAGNESIUM SERPL-MCNC: 1.8 MG/DL (ref 1.7–2.3)
MCH RBC QN AUTO: 31 PG (ref 26.5–33)
MCHC RBC AUTO-ENTMCNC: 33.4 G/DL (ref 31.5–36.5)
MCV RBC AUTO: 93 FL (ref 78–100)
PLATELET # BLD AUTO: 153 10E3/UL (ref 150–450)
POTASSIUM SERPL-SCNC: 3.7 MMOL/L (ref 3.4–5.3)
RBC # BLD AUTO: 4.78 10E6/UL (ref 4.4–5.9)
SODIUM SERPL-SCNC: 139 MMOL/L (ref 135–145)
UFH PPP CHRO-ACNC: 0.18 IU/ML
UFH PPP CHRO-ACNC: 0.45 IU/ML
UFH PPP CHRO-ACNC: 0.65 IU/ML
WBC # BLD AUTO: 7.1 10E3/UL (ref 4–11)

## 2025-04-21 PROCEDURE — 80048 BASIC METABOLIC PNL TOTAL CA: CPT | Performed by: INTERNAL MEDICINE

## 2025-04-21 PROCEDURE — 250N000013 HC RX MED GY IP 250 OP 250 PS 637

## 2025-04-21 PROCEDURE — 36415 COLL VENOUS BLD VENIPUNCTURE: CPT | Performed by: INTERNAL MEDICINE

## 2025-04-21 PROCEDURE — 250N000013 HC RX MED GY IP 250 OP 250 PS 637: Performed by: STUDENT IN AN ORGANIZED HEALTH CARE EDUCATION/TRAINING PROGRAM

## 2025-04-21 PROCEDURE — 250N000013 HC RX MED GY IP 250 OP 250 PS 637: Performed by: NURSE PRACTITIONER

## 2025-04-21 PROCEDURE — 99232 SBSQ HOSP IP/OBS MODERATE 35: CPT | Mod: FS | Performed by: INTERNAL MEDICINE

## 2025-04-21 PROCEDURE — 85520 HEPARIN ASSAY: CPT | Performed by: NURSE PRACTITIONER

## 2025-04-21 PROCEDURE — 83735 ASSAY OF MAGNESIUM: CPT | Performed by: INTERNAL MEDICINE

## 2025-04-21 PROCEDURE — 250N000011 HC RX IP 250 OP 636

## 2025-04-21 PROCEDURE — 85520 HEPARIN ASSAY: CPT | Performed by: INTERNAL MEDICINE

## 2025-04-21 PROCEDURE — 120N000005 HC R&B MS OVERFLOW UMMC

## 2025-04-21 PROCEDURE — 36415 COLL VENOUS BLD VENIPUNCTURE: CPT | Performed by: NURSE PRACTITIONER

## 2025-04-21 PROCEDURE — 85027 COMPLETE CBC AUTOMATED: CPT

## 2025-04-21 PROCEDURE — 250N000011 HC RX IP 250 OP 636: Performed by: INTERNAL MEDICINE

## 2025-04-21 RX ORDER — POTASSIUM CHLORIDE 750 MG/1
20 TABLET, EXTENDED RELEASE ORAL ONCE
Status: COMPLETED | OUTPATIENT
Start: 2025-04-21 | End: 2025-04-21

## 2025-04-21 RX ORDER — NALOXONE HYDROCHLORIDE 0.4 MG/ML
0.1 INJECTION, SOLUTION INTRAMUSCULAR; INTRAVENOUS; SUBCUTANEOUS
Status: CANCELLED | OUTPATIENT
Start: 2025-04-21

## 2025-04-21 RX ORDER — ROPIVACAINE IN 0.9% SOD CHL/PF 0.1 %
.01-.2 PLASTIC BAG, INJECTION (ML) EPIDURAL CONTINUOUS
Status: DISCONTINUED | OUTPATIENT
Start: 2025-04-21 | End: 2025-04-21

## 2025-04-21 RX ORDER — OXYCODONE HYDROCHLORIDE 5 MG/1
5 TABLET ORAL
Status: CANCELLED | OUTPATIENT
Start: 2025-04-21

## 2025-04-21 RX ORDER — DEXAMETHASONE SODIUM PHOSPHATE 4 MG/ML
4 INJECTION, SOLUTION INTRA-ARTICULAR; INTRALESIONAL; INTRAMUSCULAR; INTRAVENOUS; SOFT TISSUE
Status: CANCELLED | OUTPATIENT
Start: 2025-04-21

## 2025-04-21 RX ORDER — OXYCODONE HYDROCHLORIDE 10 MG/1
10 TABLET ORAL
Status: CANCELLED | OUTPATIENT
Start: 2025-04-21

## 2025-04-21 RX ORDER — MAGNESIUM OXIDE 400 MG/1
400 TABLET ORAL EVERY 4 HOURS
Status: COMPLETED | OUTPATIENT
Start: 2025-04-21 | End: 2025-04-21

## 2025-04-21 RX ADMIN — FUROSEMIDE 40 MG: 40 TABLET ORAL at 08:12

## 2025-04-21 RX ADMIN — HEPARIN SODIUM 1600 UNITS/HR: 10000 INJECTION, SOLUTION INTRAVENOUS at 21:39

## 2025-04-21 RX ADMIN — Medication 1250 MG: at 04:16

## 2025-04-21 RX ADMIN — HEPARIN SODIUM 1600 UNITS/HR: 10000 INJECTION, SOLUTION INTRAVENOUS at 08:17

## 2025-04-21 RX ADMIN — MAGNESIUM OXIDE TAB 400 MG (241.3 MG ELEMENTAL MG) 400 MG: 400 (241.3 MG) TAB at 08:12

## 2025-04-21 RX ADMIN — ASPIRIN 81 MG CHEWABLE TABLET 81 MG: 81 TABLET CHEWABLE at 08:12

## 2025-04-21 RX ADMIN — SPIRONOLACTONE 50 MG: 25 TABLET, FILM COATED ORAL at 08:12

## 2025-04-21 RX ADMIN — SACUBITRIL AND VALSARTAN 1 TABLET: 97; 103 TABLET, FILM COATED ORAL at 09:25

## 2025-04-21 RX ADMIN — CARVEDILOL 25 MG: 25 TABLET, FILM COATED ORAL at 08:13

## 2025-04-21 RX ADMIN — CARVEDILOL 25 MG: 25 TABLET, FILM COATED ORAL at 17:09

## 2025-04-21 RX ADMIN — MAGNESIUM OXIDE TAB 400 MG (241.3 MG ELEMENTAL MG) 400 MG: 400 (241.3 MG) TAB at 13:28

## 2025-04-21 RX ADMIN — POTASSIUM CHLORIDE 20 MEQ: 750 TABLET, EXTENDED RELEASE ORAL at 08:12

## 2025-04-21 RX ADMIN — FUROSEMIDE 40 MG: 40 TABLET ORAL at 17:09

## 2025-04-21 RX ADMIN — POLYETHYLENE GLYCOL 3350 17 G: 17 POWDER, FOR SOLUTION ORAL at 08:13

## 2025-04-21 RX ADMIN — Medication 3 MG: at 21:32

## 2025-04-21 RX ADMIN — SACUBITRIL AND VALSARTAN 1 TABLET: 97; 103 TABLET, FILM COATED ORAL at 20:14

## 2025-04-21 RX ADMIN — Medication 1250 MG: at 17:09

## 2025-04-21 RX ADMIN — ATORVASTATIN CALCIUM 80 MG: 80 TABLET, FILM COATED ORAL at 08:12

## 2025-04-21 ASSESSMENT — ACTIVITIES OF DAILY LIVING (ADL)
ADLS_ACUITY_SCORE: 37
ADLS_ACUITY_SCORE: 38
ADLS_ACUITY_SCORE: 37
ADLS_ACUITY_SCORE: 38
ADLS_ACUITY_SCORE: 37
ADLS_ACUITY_SCORE: 38
ADLS_ACUITY_SCORE: 37
ADLS_ACUITY_SCORE: 38
ADLS_ACUITY_SCORE: 38
ADLS_ACUITY_SCORE: 37
ADLS_ACUITY_SCORE: 38

## 2025-04-21 ASSESSMENT — ENCOUNTER SYMPTOMS: SEIZURES: 0

## 2025-04-21 NOTE — ANESTHESIA PREPROCEDURE EVALUATION
Anesthesia Pre-Procedure Evaluation    Patient: Anson Manriquez   MRN: [id number] : 1955        Procedure : Procedure(s):  EXTRACTION, ELECTRODE LEAD, MYOCARDIAL, USING LASER, WITH ANESTHESIA          Past Medical History:   Diagnosis Date    Anal fistula     Antiplatelet or antithrombotic long-term use     Coronary artery disease     Diabetes mellitus, type 2 (H)     Heart attack (H) 2007    Hyperlipidemia     Hypertension     Inguinal hernia     Ischemic cardiomyopathy     STEMI (ST elevation myocardial infarction) (H) 2018    s/p STEVO x2    Stented coronary artery       Past Surgical History:   Procedure Laterality Date    EP ICD N/A 2019    Procedure: EP ICD [[id number]];  Surgeon: Mick Mckeon MD;  Location: [facility]    HERNIA REPAIR Right     Inguinal    HERNIORRHAPHY INGUINAL Left 2020    Procedure: Left HERNIORRHAPHY, INGUINAL, OPEN;  Surgeon: Flakito Massey MD;  Location: [facility] OR    HERNIORRHAPHY UMBILICAL N/A 10/16/2020    Procedure: HERNIORRHAPHY, UMBILICAL, OPEN, with mesh;  Surgeon: Flakito Massey MD;  Location: [facility] OR    IRRIGATION AND DEBRIDEMENT RECTUM, COMBINED      abcess near rectum     LAPAROSCOPIC CHOLECYSTECTOMY  3/29/2012    Procedure:LAPAROSCOPIC CHOLECYSTECTOMY; LAPAROSCOPIC CHOLECYSTECTOMY; Surgeon:MARILYNN PRESSLEY; Location:[facility] OR    REPAIR ECTROPION Bilateral 2022    Procedure: Both lower eyelid ectropion repair;  Surgeon: Cheyanne Francisco MD;  Location: [facility] OR    STENT, CORONARY, OLIVIA        No Known Allergies   Social History     Tobacco Use    Smoking status: Never    Smokeless tobacco: Never   Substance Use Topics    Alcohol use: No      Wt Readings from Last 1 Encounters:   25 81.3 kg (179 lb 3.2 oz)        Anesthesia Evaluation   Pt has had prior anesthetic. Type: General and MAC.        ROS/MED HX  ENT/Pulmonary:     (+) sleep apnea, doesn't use CPAP,                                   (-) recent URI   Neurologic:     (-) no seizures and no CVA   Cardiovascular: Comment: LBBB  CAD (s/p STEMI in 2007 with pLAD stenting, and STEMI 9/2018) s/p CRT-D (2019)  ICM LVEF 20-25%    Presented on 4/16/25 with c/f pacemaker pocket infection, drainage from pocket site 2/2 trauma to site 1 month ago. EDMUND 4/17 with mobile lesions/stranding on leads suspicious for thrombus vs vegetation     Device Interrogation (4/17/25):  Device: [employer] G447 RESONATE X4 CRT-D  Normal device function  Mode: DDD  bpm  AP: <1%  LVP: 94%  Intrinsic rhythm: AS-VS 85 bpm  Lead Trends Appear Stable  Estimated battery longevity to RRT = 5.5  years.   Atrial Arrhythmia: none  Ventricular Arrhythmia: none  Setting Changes: none       (+) Dyslipidemia hypertension- -  CAD - past MI - stent-      CHF etiology: Ischemic cardiomyopathy; Last EF: 29%      pacemaker,                   Previous cardiac testing   Echo: Date: 4/17/25 Results:  LV function severely decreased, EF 20-25%, severe LV dilation.   Pacemaker leads noted in the RA and RV. Mobile lesion 2.2 cm extending from RA  to RV lead with stranding on the CS lead (Image 65) and the RA lead (Image 7).These may represent thrombus or vegetation in the appropriate clinical  context.  Mild mitral insufficiency is present.  Mild to moderate aortic valve sclerosis.    Stress Test:  Date: Results:    ECG Reviewed:  Date: 8/29/22 Results:  Atrial-sensed ventricular-paced rhythm  Cath:  Date: Results:      METS/Exercise Tolerance:     Hematologic: Comments: Bilateral IJV thrombosis (nonocclusive in RIJ, occlusive in KATALINA) seen incidentally on CT chest and confirmed on US. No recent CVC placement, denies IVDU, no trauma. Ddx includes septic emboli/Lemierre, malignancy, hypercoagulable state. Heparin gtt given upcoming procedures        Musculoskeletal:  - neg musculoskeletal ROS     GI/Hepatic: Comment: Chronic Diarrhea   (-) GERD   Renal/Genitourinary:    (-) renal disease   Endo:     (+)  type II DM, Last  HgA1c: 9.5, date: 4/16/25, Not using insulin,                 Psychiatric/Substance Use:    (-) chronic opioid use history   Infectious Disease: Comment: Per ID, on empiric IV vancomycin, BCx NGTD; wound Cx coag-neg staph      Malignancy:       Other:            Physical Exam    Airway  airway exam normal      Mallampati: II   TM distance: > 3 FB   Neck ROM: full   Mouth opening: > 3 cm    Respiratory Devices and Support         Dental     Comment: Minor chips and irregularities on several teeth, including incisors and premolars    (+) Modest Abnormalities - crowns, retainers, 1 or 2 missing teeth      Cardiovascular   cardiovascular exam normal          Pulmonary   pulmonary exam normal                OUTSIDE LABS:  CBC:   Lab Results   Component Value Date    WBC 7.1 04/21/2025    WBC 6.8 04/20/2025    HGB 14.8 04/21/2025    HGB 14.6 04/20/2025    HCT 44.3 04/21/2025    HCT 43.0 04/20/2025     04/21/2025     04/20/2025     BMP:   Lab Results   Component Value Date     04/21/2025     04/20/2025    POTASSIUM 3.7 04/21/2025    POTASSIUM 3.6 04/20/2025    CHLORIDE 107 04/21/2025    CHLORIDE 109 (H) 04/20/2025    CO2 22 04/21/2025    CO2 21 (L) 04/20/2025    BUN 10.0 04/21/2025    BUN 12.9 04/20/2025    CR 0.72 04/21/2025    CR 0.69 04/20/2025     (H) 04/21/2025     (H) 04/21/2025     COAGS:   Lab Results   Component Value Date    PTT 29 12/06/2018    INR 1.10 04/05/2021    FIBR 226 09/14/2018     POC:   Lab Results   Component Value Date     (H) 04/06/2021     HEPATIC:   Lab Results   Component Value Date    ALBUMIN 4.2 12/17/2024    PROTTOTAL 7.3 12/17/2024    ALT 25 12/17/2024    AST 26 12/17/2024    ALKPHOS 60 12/17/2024    BILITOTAL 1.2 12/17/2024     OTHER:   Lab Results   Component Value Date    PH 7.50 (H) 09/17/2018    LACT 0.9 04/05/2021    A1C 9.5 (H) 04/16/2025    RENO 8.7 (L) 04/21/2025    PHOS 4.0 09/16/2018    MAG 1.8 04/21/2025    LIPASE 310 09/14/2018     "AMYLASE 38 09/14/2018    TSH 2.44 03/07/2017    SED 12 04/16/2025       Anesthesia Plan    ASA Status:  4    NPO Status:  NPO Appropriate    Anesthesia Type: General.     - Airway: ETT   Induction: Intravenous.   Maintenance: Balanced.   Techniques and Equipment:     - Lines/Monitors: 2nd IV, Arterial Line, EDMUND            EDMUND Absolute Contra-indication: NONE            EDMUND Relative Contra-indication: NONE     - Blood: Blood in Room, PRBC     Consents    Anesthesia Plan(s) and associated risks, benefits, and realistic alternatives discussed. Questions answered and patient/representative(s) expressed understanding.     - Discussed:     - Discussed with:  Patient      - Extended Intubation/Ventilatory Support Discussed: Yes.      - Patient is DNR/DNI Status: No     Use of blood products discussed: Yes.     - Discussed with: Patient.     - Consented: consented to blood products     Postoperative Care    Pain management: Multi-modal analgesia, IV analgesics, Oral pain medications.   PONV prophylaxis: Background Propofol Infusion, Ondansetron (or other 5HT-3), Dexamethasone or Solumedrol     Comments:               Edward Pablo MD    Clinically Significant Risk Factors          # Hyperchloremia: Highest Cl = 109 mmol/L in last 2 days, will monitor as appropriate              # Hypertension: Noted on problem list  # Chronic heart failure with reduced ejection fraction: last echo with EF <40%          # DMII: A1C = 9.5 % (Ref range: <5.7 %) within past 6 months   # Overweight: Estimated body mass index is 28.92 kg/m  as calculated from the following:    Height as of this encounter: 1.676 m (5' 6\").    Weight as of this encounter: 81.3 kg (179 lb 3.2 oz).       # ICD device present         "

## 2025-04-21 NOTE — PLAN OF CARE
NURSING PROGRESS NOTE  Shift Summary      Date: April 21, 2025     Neuro/Musculoskeletal:  A&Ox4.   Cardiac:  100% V-Paced.  VSS.     Respiratory:  Sating in the 90s on RA.  GI/:  Adequate urine output.  LBM: Yesterday  Diet/Appetite:  Tolerating Regular diet.  Activity: Independent   Pain:  Denies.   Skin:  No new deficits noted.   LDAs + Drips/IVF:  L+R PIV running Heparin at 1450 unit(s)/min  Protocols/Labs:  K+, Mag    Pertinent Shift Updates:  Uneventful night with no acute changes.       Plan: Infected ICD replacement on Tuesday. Continue with POC and report changes to team.      Carlos Wong RN  .................................................... April 21, 2025   5:46 AM  [facility] ([facility]): [facility] ICU (Unit 6D)    Problem: Adult Inpatient Plan of Care  Goal: Optimal Comfort and Wellbeing  Intervention: Provide Person-Centered Care  Recent Flowsheet Documentation  Taken 4/21/2025 0423 by Carlos Wong RN  Trust Relationship/Rapport: care explained  Taken 4/21/2025 0000 by Carlos Wong RN  Trust Relationship/Rapport: care explained  Taken 4/20/2025 1923 by Carlos Wong RN  Trust Relationship/Rapport: care explained   Goal Outcome Evaluation:      Plan of Care Reviewed With: patient    Overall Patient Progress: improvingOverall Patient Progress: improving

## 2025-04-21 NOTE — PROGRESS NOTES
Cardiology Progress Note      Assessment & Plan:  Anson Manriquez is a 69 year old male with a PMH significant for HTN, DMII, LBBB, CAD (s/p STEMI in 2007 with pLAD stenting, and STEMI 9/2018) s/p CRT-D (2019), ICM LVEF 20-25% who presented on 4/16/25 with concern for pacemaker pocket infection.       Today's Update:  - NPO @ midnight for EP procedure    # ICM s/p CRT-D implantation (2019)   # CRT-D infection  Patient has drainage from his pocket site after trauma to the site approximately 1 month ago, suspicious for device infection. No systemic symptoms suggestive of bacteremia or sepsis. Not pacer dependent per device interrogation. No evidence of vegetation on device leads/valves on CT chest.      EDMUND 4/17 with mobile lesions/stranding on the leads suspicious for thrombus vs vegetation      - Blood cultures with NGTD  - Aerobic wound culture positive for staph capitis and staph epidermidis  - On empiric vancomycin  - Appreciate ID input for recommendations:  Continue empiric IV vancomycin (dosed by pharmacy)  Agree with planned upcoming EP device removal  Please send tissue/fluid cultures from the procedure at time of device extraction if able   Follow up pending blood cultures - prelim with no growth  Follow up pending wound swab cultures - coag-neg staph susceptibilities (pending)   - EP consult  - Device extraction planned for Tuesday 4/22. Patient will need to be NPO with heparin held 4 hours prior to procedure.  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 No recent CVC placement. Denies IVDU, trauma to head/neck, ear/neck/throat/oral pain. Had dental procedures in 12/2024. Ddx includes septic emboli/Lemierre, malignancy, hypercoagulable state.  - Heparin gtt given upcoming procedures  - Pharm consult for DOAC (Eliquis and Xarelto $30/month,  "Dabigatran $10/month)  - Needs age-appropriate malignancy screening as outpatient     # Chronic heart failure with reduced ejection fraction (EF 20-25%) 2/2 ICM  Denies sxs of ADHF. Appears euvolemic on admission. Taking GDMT as prescribed. NTproBNP 359.  - GDMT:   - fluid status: euvolemic, continue PTA furosemide 40mg BID  - BB: continue PTA carvedilol 25mg BID  - ACE/ARB/ARNI: continue PTA sacubitril-valsartan 97-103mg BID   - SGLT2: hold PTA empagliflozin 25mg daily for upcoming procedure  - MRA: increase PTA spironolactone to 50 mg daily as K is borderline low consistently     # CAD s/p pLAD stent  - Continue PTA ASA 81mg daily, atorvastatin 80mg daily     # T2DM (A1c 9.5%)  - Hold PTA semaglutide  - Jardiance as above  - MD sliding scale insulin     # Chronic Diarrhea  Patient reports chronic diarrhea (\"happens 6+ days every month\") that he normally takes imodium for. Patient says he is overdue for a colonoscopy.  - PRN Imodium  - Recommend PCP/GI follow up after discharge      FEN: Regular  Code status: Full  Prophylaxis: Heparin gtt  Isolation: None  Disposition: pending device extraction  Medically Ready for Discharge: Anticipated in 2-4 Days     Patient discussed w/ Dr. Mae, who agrees with above plan.    Medically Ready for Discharge: Anticipated in 2-4 Days    LLOYD Hamilton, CNP  [facility]  General Cardiology  Vocera     Medical Decision Making       40 MINUTES SPENT BY ME on the date of service doing chart review, history, exam, documentation & further activities per the note.      Interval History:  NAEO, telemetry, labs, vital signs and nursing notes reviewed.  On exam, patient feels well, denies any cardiopulmonary symptoms.     Most recent vital signs:  BP 99/63 (BP Location: Right arm)   Pulse 79   Temp 98.5  F (36.9  C) (Oral)   Resp 16   Ht 1.676 m (5' 6\")   Wt 81.3 kg (179 lb 3.2 oz)   SpO2 95%   BMI 28.92 kg/m    Temp:  [97.9  F (36.6  C)-98.6  F " (37  C)] 98.5  F (36.9  C)  Pulse:  [79-92] 79  Resp:  [16-20] 16  BP: ()/(55-83) 99/63  SpO2:  [94 %-96 %] 95 %  Wt Readings from Last 2 Encounters:   04/21/25 81.3 kg (179 lb 3.2 oz)   12/17/24 79.4 kg (175 lb)       Intake/Output Summary (Last 24 hours) at 4/21/2025 0737  Last data filed at 4/20/2025 1900  Gross per 24 hour   Intake 828 ml   Output --   Net 828 ml       Physical exam:  General: Pleasant elderly male. Appears comfortable and in no acute distress. Alert and interactive  HEENT: Normocephalic, atraumatic. No scleral icterus or injection  Neck: JVP not elevated  CARDIAC: Regular rate and rhythm, no m/r/g appreciated. Peripheral pulses 2+  RESP: Normal work of breathing on room air without use of accessory breathing muscles. Clear to auscultation in all fields. No wheezes, rhonchi or crackles appreciated.  GI: No abdominal distention. Soft and nontender.   EXTREMITIES: no lower extremity edema. No cyanosis or clubbing. Warm and well perfused. No venous stasis changes.   SKIN: No acute lesions appreciated. Warm and dry to touch. Left chest wall incision with dressing, CDI, no surrounding erythema   NEURO: Alert and oriented X3, CN II-XII grossly intact, no focal neurological deficits noted, normal speech  PSYCH: Mood and affect are appropriate    Labs (Past three days):  CBC  Recent Labs   Lab 04/21/25  0554 04/20/25  0659 04/19/25  0606 04/17/25  0642   WBC 7.1 6.8 7.0 7.8   RBC 4.78 4.68 4.97 4.95   HGB 14.8 14.6 15.4 15.2   HCT 44.3 43.0 45.6 44.7   MCV 93 92 92 90   MCH 31.0 31.2 31.0 30.7   MCHC 33.4 34.0 33.8 34.0   RDW 14.6 14.8 14.8 14.9    153 169 167     BMP  Recent Labs   Lab 04/21/25  0554 04/20/25  2110 04/20/25  1820 04/20/25  1403 04/20/25  1003 04/20/25  0659 04/19/25  0854 04/19/25  0606 04/18/25  0845 04/18/25  0800     --   --   --   --  140  --  141  --  140   POTASSIUM 3.7  --   --   --   --  3.6  --  3.6  --  3.9   CHLORIDE 107  --   --   --   --  109*  --  108*   --  108*   CO2 22  --   --   --   --  21*  --  20*  --  19*   ANIONGAP 10  --   --   --   --  10  --  13  --  13   * 187* 195* 253*   < > 204*   < > 159*   < > 154*   BUN 10.0  --   --   --   --  12.9  --  12.7  --  14.5   CR 0.72  --   --   --   --  0.69  --  0.77  --  0.77   GFRESTIMATED >90  --   --   --   --  >90  --  >90  --  >90   RENO 8.7*  --   --   --   --  8.6*  --  9.0  --  8.8   MAG 1.8  --   --   --   --  1.9  --  2.2  --  2.2    < > = values in this interval not displayed.     Troponins:   Lab Results   Component Value Date    TROPI 1.914 () 09/18/2018    TROPI 2.542 () 09/17/2018    TROPI 3.324 () 09/17/2018    TROPI 4.280 () 09/16/2018    TROPI 4.700 () 09/16/2018    TROPONIN 0.14 () 09/13/2018        INRNo lab results found in last 7 days.  Liver panelNo lab results found in last 7 days.    Imaging/procedure results:  Echo 4/16/25  The tricuspid valve leaflets are not well visualized. No obvious vegetations  are seen on the tricuspid valve or the ICD leads on limited views. Recommend  EDMUND if clinical suspicion for infective endocarditis warrants.     Ischemic cardiomyopathy with severely reduced LVEF=21%. Regional wall motion  abnormalities as described below, unchanged from the prior study.  Global right ventricular function is normal.     This study was compared with the study from 6/25/24: No significant changes  noted.     TTE 4/17/25  Pacemaker leads noted in the RA and RV. There is a mobile lesion with  stranding on the CS lead (Image 65) and the RA lead (Image 7)     There is a mobile strand measuring 2.2 cm that appears to extend from the RA  to RV lead (Image 26)     These may represent thrombus or vegetation in the appropriate clinical  context.    Device Check 4/17/25  Device: [employer] G447 RESONATE X4 CRT-D  Normal device function  Mode: DDD  bpm  AP: <1%  LVP: 94%  Intrinsic rhythm: AS-VS 85 bpm  Lead Trends Appear Stable  Estimated battery longevity to  RRT = 5.5  years.   Atrial Arrhythmia: none  Ventricular Arrhythmia: none  Setting Changes: none     Ultrasound 4/16/25  Nonocclusive thrombus in the cranial portion of the right internal jugular veins. Occlusive versus nearly occlusive thrombus in the cranial left internal jugular vein. The bilateral internal jugular veins are patent and compressible caudally. This is similar to recent CT chest.     CT Chest 4/16/25  1.  No suspicious fluid collection, malpositioning or swelling about  the left chest pacemaker to suggest intrathoracic visualization.  2. Tubular transmural filling defects within the proximal internal  jugular vein bilaterally likely represent mixing artifact although DVT  cannot be excluded. Can confirm with ultrasound.  2. Moderate cardiomegaly with coronary calcifications and mild  bibasilar pulmonary edema.     No results found for this or any previous visit (from the past 24 hours).

## 2025-04-21 NOTE — PLAN OF CARE
Goal Outcome Evaluation:      Plan of Care Reviewed With: patient    Overall Patient Progress: improvingOverall Patient Progress: improving    Outcome Evaluation: Patient was more compliant with his glucose checks at meal time.    Temp: 98.6  F (37  C) Temp src: Oral BP: 124/76 Pulse: 92   Resp: 18 SpO2: 95 % O2 Device: None (Room air)       Cardiac Pacemaker in situ    Neuro: A&O x4  Cardiac: Tele in place, V-paced. Afebrile  Resp: VSS on RA  GI/: Spontaneous urination without difficulty. LBM: 4/20/25  Skin: No new deficits noted  LDAs: Bilateral PIVs in place infusing heparin and vanco.  Activity: Up independently      Plan: Continue to monitor and follow POC. Notify Cards 1 with changes

## 2025-04-21 NOTE — DISCHARGE SUMMARY
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 Patient underwent device extraction with Dr. Mckeon 4/22/25, course was uncomplicated with leads removed. OP EP follow up for re-implantation. ID consulted with plan for PO antibiotics x 2 weeks.    Hospital Course by Diagnosis:  # ICM s/p CRT-D implantation (2019)   # CRT-D infection  Patient has drainage from his pocket site after trauma to the site approximately 1 month ago, suspicious for device infection. No systemic symptoms suggestive of bacteremia or sepsis. Not pacer dependent per device interrogation. No evidence of vegetation on device leads/valves on CT chest.      EDMUND 4/17 with mobile lesions/stranding on the leads suspicious for thrombus vs vegetation      - Blood cultures with NGTD  - Aerobic wound culture positive for staph capitis and staph epidermidis  - Appreciate ID input for recommendations:  Start oral linezolid 600mg 2x a day, continue until May 6, 2025.  (4/23 update post-extraction)  ID will continue to follow cultures and adjust the antibiotic regimen as needed based on results.   - EP consult  - s/p device extraction 4/22  - Antibiotics per ID  - Follow up pocket and lead tip  "cultures  - Plan for reimplant of new CRTD on contralateral side in 6-8 weeks and with ID clearance  - OK to discharge from EP standpoint with outpatient wound care  - LifeVest as bridge to reimplant per Dr. Mckeon  - [facility] RN consult  Left chest wound BID and PRN  Moisten roll gauze with Vashe wound cleanser solution, squeeze out excess solution.  Loosely pack wound with moistened gauze.  Cover with dry gauze and secure with tape.    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 No recent CVC placement. Denies IVDU, trauma to head/neck, ear/neck/throat/oral pain. Had dental procedures in 12/2024. Ddx includes septic emboli/Lemierre, malignancy, hypercoagulable state.  - Pharm consult for DOAC (Eliquis and Xarelto $30/month, Dabigatran $10/month),   - start Eliquis 10 mg BID X 7 days then 5 mg BID for 3 months.  - Needs age-appropriate malignancy screening as outpatient, hem/onc referral placed     # Chronic heart failure with reduced ejection fraction (EF 20-25%) 2/2 ICM  Denies sxs of ADHF. Appears euvolemic on admission. Taking GDMT as prescribed. NTproBNP 359.  - GDMT:              - fluid status: euvolemic, continue PTA furosemide 40mg BID, weight at discharge 179 lbs  - BB: continue PTA carvedilol 25mg BID  - ACE/ARB/ARNI: continue PTA sacubitril-valsartan 97-103mg BID   - SGLT2: continue PTA empagliflozin 25mg daily   - MRA: increase PTA spironolactone to 50 mg daily as K is borderline low consistently  - CORE follow up with Jazmin Baig NP 5/2025     # CAD s/p pLAD stent  - Continue PTA ASA 81mg daily, atorvastatin 80mg daily     # T2DM (A1c 9.5%)  - Resume PTA semaglutide  - Jardiance as above  - PCP follow up     # Chronic Diarrhea  Patient reports chronic diarrhea (\"happens 6+ days every month\") that he normally takes imodium for. Patient says he is overdue for a colonoscopy.  - PRN Imodium  - Recommend PCP/GI follow up after " discharge    Pertinent Procedures:  1. ICD extraction    Consults:  Electrophysiology  Infectious Disease  Pharmacy    Medication Changes:  See below     Discharge medications:   Current Discharge Medication List        START taking these medications    Details   apixaban ANTICOAGULANT (ELIQUIS) 5 MG tablet Take 2 tablets (10 mg) by mouth 2 times daily for 7 days, THEN 1 tablet (5 mg) 2 times daily.  Qty: 388 tablet, Refills: 0    Associated Diagnoses: Internal jugular vein thrombosis, bilateral (H)      linezolid (ZYVOX) 600 MG tablet Take 1 tablet (600 mg) by mouth 2 times daily for 14 days.  Qty: 28 tablet, Refills: 0    Associated Diagnoses: Infection and inflammatory reaction due to cardiac device, implant, and graft, initial encounter           CONTINUE these medications which have CHANGED    Details   spironolactone (ALDACTONE) 50 MG tablet Take 1 tablet (50 mg) by mouth daily.  Qty: 90 tablet, Refills: 3    Associated Diagnoses: Ischemic cardiomyopathy           CONTINUE these medications which have NOT CHANGED    Details   aspirin (ASA) 81 MG chewable tablet Take 1 tablet (81 mg) by mouth daily.  Qty: 90 tablet, Refills: 4    Associated Diagnoses: ST elevation myocardial infarction involving left anterior descending (LAD) coronary artery (H)      atorvastatin (LIPITOR) 80 MG tablet Take 1 tablet (80 mg) by mouth daily.  Qty: 90 tablet, Refills: 4    Associated Diagnoses: Type 2 diabetes mellitus without complication, without long-term current use of insulin (H); Coronary artery disease involving native coronary artery of native heart without angina pectoris; Mixed hyperlipidemia; ST elevation myocardial infarction (STEMI) involving other coronary artery of anterior wall (H); Ischemic cardiomyopathy      carvedilol (COREG) 25 MG tablet Take 1 tablet (25 mg) by mouth 2 times daily (with meals).  Qty: 180 tablet, Refills: 1    Associated Diagnoses: ST elevation myocardial infarction involving left anterior  descending (LAD) coronary artery (H); Heart failure with reduced ejection fraction, NYHA class III (H)      empagliflozin (JARDIANCE) 25 MG TABS tablet Take 1 tablet (25 mg) by mouth daily.  Qty: 90 tablet, Refills: 4    Associated Diagnoses: Type 2 diabetes mellitus without complication, without long-term current use of insulin (H)      furosemide (LASIX) 40 MG tablet Take 1 tablet (40 mg) by mouth 2 times daily.  Qty: 180 tablet, Refills: 4    Associated Diagnoses: Chronic systolic congestive heart failure (H)      MULTIPLE VITAMIN PO Take 1 tablet by mouth every morning       sacubitril-valsartan (ENTRESTO)  MG per tablet Take 1 tablet by mouth 2 times daily.  Qty: 180 tablet, Refills: 4    Associated Diagnoses: Heart failure with reduced ejection fraction, NYHA class III (H)      Semaglutide, 2 MG/DOSE, (OZEMPIC) 8 MG/3ML pen Inject 2 mg subcutaneously every 7 days. For additional refills, please schedule a follow-up appointment.  Qty: 9 mL, Refills: 1    Associated Diagnoses: Type 2 diabetes mellitus with hyperglycemia, without long-term current use of insulin (H)      glipiZIDE (GLUCOTROL XL) 5 MG 24 hr tablet Take 1 tablet (5 mg) by mouth 2 times daily.  Qty: 180 tablet, Refills: 0    Associated Diagnoses: Type 2 diabetes mellitus without complication, without long-term current use of insulin (H)      Semaglutide, 1 MG/DOSE, (OZEMPIC) 4 MG/3ML pen Inject 1 mg Subcutaneous every 7 days  Qty: 6 mL, Refills: 11    Associated Diagnoses: Type 2 diabetes mellitus without complication, without long-term current use of insulin (H)             Follow-up:  PCP 1 week  EP in 6-8 weeks for new device placement    Labs or imaging requiring follow-up after discharge:  CBC, BMP    Code status:  Full    Condition on discharge  Temp:  [98  F (36.7  C)-98.8  F (37.1  C)] 98.1  F (36.7  C)  Pulse:  [] 84  Resp:  [14-22] 20  BP: ()/(54-77) 97/66  SpO2:  [92 %-94 %] 93 %  General: Alert, interactive, no acute  distress  HEENT: Normocephalic, atraumatic. Sclera anicteric.   Neck: JVP not elevated  Cardiovascular: Regular rate and rhythm, normal S1 and S2, no murmurs, gallops, or rubs. Radial and pedal pulses palpable bilaterally.   Resp: Regular work of breathing on room air. Clear to auscultation bilaterally, no rales, wheezes, or rhonchi  GI: Soft, nontender, nondistended.   Extremities: No edema, no cyanosis or clubbing, warm and well perfused  Skin: Warm and dry, no jaundice or rash; left upper chest incision with sanguinous drainage  Neuro: Alert and oriented x 3. CN 2-12 intact, moves all extremities equally, normal speech  Psych: Mood and affect are appropriate    Imaging with results:  Echo 4/16/25  The tricuspid valve leaflets are not well visualized. No obvious vegetations  are seen on the tricuspid valve or the ICD leads on limited views. Recommend  EDMUND if clinical suspicion for infective endocarditis warrants.     Ischemic cardiomyopathy with severely reduced LVEF=21%. Regional wall motion  abnormalities as described below, unchanged from the prior study.  Global right ventricular function is normal.     This study was compared with the study from 6/25/24: No significant changes  noted.     TTE 4/17/25  Pacemaker leads noted in the RA and RV. There is a mobile lesion with  stranding on the CS lead (Image 65) and the RA lead (Image 7)     There is a mobile strand measuring 2.2 cm that appears to extend from the RA  to RV lead (Image 26)     These may represent thrombus or vegetation in the appropriate clinical  context.    Device Check 4/17/25  Device: [employer] G447 RESONATE X4 CRT-D  Normal device function  Mode: DDD  bpm  AP: <1%  LVP: 94%  Intrinsic rhythm: AS-VS 85 bpm  Lead Trends Appear Stable  Estimated battery longevity to RRT = 5.5  years.   Atrial Arrhythmia: none  Ventricular Arrhythmia: none  Setting Changes: none     Ultrasound 4/16/25  Nonocclusive thrombus in the cranial portion of  the right internal jugular veins. Occlusive versus nearly occlusive thrombus in the cranial left internal jugular vein. The bilateral internal jugular veins are patent and compressible caudally. This is similar to recent CT chest.     CT Chest 4/16/25  1.  No suspicious fluid collection, malpositioning or swelling about  the left chest pacemaker to suggest intrathoracic visualization.  2. Tubular transmural filling defects within the proximal internal  jugular vein bilaterally likely represent mixing artifact although DVT  cannot be excluded. Can confirm with ultrasound.  2. Moderate cardiomegaly with coronary calcifications and mild  bibasilar pulmonary edema.     No results found for this or any previous visit (from the past 24 hours).      Recent Labs   Lab 04/23/25  1022 04/23/25  0809 04/23/25  0706 04/23/25  0617 04/22/25  1304 04/22/25  1215 04/22/25  1042 04/22/25  0725 04/22/25  0544 04/21/25  0757 04/21/25  0554 04/20/25  1003 04/20/25  0659   NA  --   --   --  142  --  140 140  --  141  --  139  --  140   POTASSIUM  --   --   --  3.5  --  4.5 4.4  --  3.9  --  3.7  --  3.6   CHLORIDE  --   --   --  109*  --   --   --   --  107  --  107  --  109*   CO2  --   --   --  24  --   --   --   --  22  --  22  --  21*   ANIONGAP  --   --   --  9  --   --   --   --  12  --  10  --  10   * 129* 98 105*   < > 216* 251*   < > 192*   < > 194*   < > 204*   BUN  --   --   --  11.2  --   --   --   --  11.3  --  10.0  --  12.9   CR  --   --   --  0.90  --   --   --   --  0.75  --  0.72  --  0.69   GFRESTIMATED  --   --   --  >90  --   --   --   --  >90  --  >90  --  >90   RENO  --   --   --  8.7*  --   --   --   --  8.8  --  8.7*  --  8.6*   MAG  --   --   --  2.0  --   --   --   --  1.9  --  1.8  --  1.9    < > = values in this interval not displayed.         Patient Care Team:  Ignacio Beard MD as PCP - General (Internal Medicine)  Jason Matos MD as MD (Internal Medicine)  Mick Mckeon MD as MD (Clinical  Cardiac Electrophysiology)  Temi Scherer, RN as Specialty Care Coordinator (Cardiology)  Vladimir Servin, AD as Specialty Care Coordinator (Cardiology)  Kalen De La Cruz OD as MD (Optometry)  Ignacio Beard MD as MD (Internal Medicine)  Flakito Massey MD as MD (Surgery)  Lisa Dean [facility] as Pharmacist (Pharmacist Clinician- Clinical Pharmacy Specialist)  Amanda Victoria MD as MD (Endocrinology, Diabetes, and Metabolism)  Li Mullre MD as MD (Endocrinology, Diabetes, and Metabolism)  Cheyanne Francisco MD as MD (Ophthalmology)  Karina Calle APRN CNP as Nurse Practitioner (Colon & Rectal)  Jason Matos MD as Assigned Heart and Vascular Provider  Olivia Renteria NP as Assigned PCP  Paulina Christine AuD as Audiologist (Audiology)  Marielena Munroe MD as MD (Ophthalmology)  Barbra Velasquez AuD (Audiology)  Madeline Chavarria PA-C as Assigned Endocrinology Provider  Marielena Munroe MD as Assigned Surgical Provider  Kofi Champion, RN as I Resource Team    Time Spent on this Encounter   I, LLOYD Palomo CNP, personally saw the patient today and spent greater than 30 minutes discharging this patient.    >30 minutes spent in discharge, including >50% in counseling and coordination of care, medication review and plan of care recommended on follow up. Questions were answered.   It was our pleasure to care for Anson Manriquez during this hospitalization. Please do not hesitate to contact me should there be questions regarding the hospital course or discharge plan.    Patient discussed with staff cardiologist, Dr. Mae, who agrees with the above documentation and plan. Documentation represents joint decision making.     Giovanna DESAI, CNP  [facility] Cardiology

## 2025-04-21 NOTE — PLAN OF CARE
"Goal Outcome Evaluation:      Plan of Care Reviewed With: patient    Overall Patient Progress: improvingOverall Patient Progress: improving          presented on 4/16/25 with concern for pacemaker pocket infection.       Vital signs:  Temp: 98.1  F (36.7  C) Temp src: Oral BP: 96/67 Pulse: 87   Resp: 16 SpO2: 97 % O2 Device: None (Room air)    A:   Neuro: Ax4 Denies Headache, dizziness,  Lightheadedness, numbness and tingling. calls appropriately   Cardiac: ventricular paced  Afebrile, VSS.  Denies chest pain.   Respiratory: sating >95 ON RA. denies SOB. LS clear bilaterally.   Diet/appetite: regular Diet. Good appetite.   Endocrine: BS check Q4hr. Pt on sliding scale  insulin   GI/:  no BM this shift. Denies abdominal pain. Good urine output.   Activity:  Moves independetly  Pain: Denies  Skin: WNL, Warm, dry, normal color      Problem: Adult Inpatient Plan of Care  Goal: Plan of Care Review  Description: The Plan of Care Review/Shift note should be completed every shift.  The Outcome Evaluation is a brief statement about your assessment that the patient is improving, declining, or no change.  This information will be displayed automatically on your shiftnote.  Outcome: Progressing  Flowsheets (Taken 4/21/2025 5286)  Plan of Care Reviewed With: patient  Overall Patient Progress: improving  Goal: Patient-Specific Goal (Individualized)  Description: You can add care plan individualizations to a care plan. Examples of Individualization might be:  \"Parent requests to be called daily at 9am for status\", \"I have a hard time hearing out of my right ear\", or \"Do not touch me to wake me up as it startlesme\".  Outcome: Progressing  Goal: Absence of Hospital-Acquired Illness or Injury  Outcome: Progressing  Intervention: Prevent and Manage VTE (Venous Thromboembolism) Risk  Recent Flowsheet Documentation  Taken 4/21/2025 0084 by Mariah Barker RN  VTE Prevention/Management: SCDs off (sequential compression " devices)  Goal: Optimal Comfort and Wellbeing  Outcome: Progressing  Intervention: Provide Person-Centered Care  Recent Flowsheet Documentation  Taken 4/21/2025 0840 by Mariah Barker RN  Trust Relationship/Rapport: care explained  Goal: Readiness for Transition of Care  Outcome: Progressing     Problem: Delirium  Goal: Optimal Coping  Outcome: Progressing  Intervention: Optimize Psychosocial Adjustment to Delirium  Recent Flowsheet Documentation  Taken 4/21/2025 0840 by Mariah Barker RN  Family/Support System Care: self-care encouraged  Goal: Improved Behavioral Control  Outcome: Progressing  Intervention: Minimize Safety Risk  Recent Flowsheet Documentation  Taken 4/21/2025 0840 by Mariah Barker, RN  Trust Relationship/Rapport: care explained  Goal: Improved Attention and Thought Clarity  Outcome: Progressing  Goal: Improved Sleep  Outcome: Progressing

## 2025-04-22 ENCOUNTER — ANESTHESIA (OUTPATIENT)
Dept: CARDIOLOGY | Facility: CLINIC | Age: 70
DRG: 261 | End: 2025-04-22
Payer: COMMERCIAL

## 2025-04-22 ENCOUNTER — ANCILLARY PROCEDURE (OUTPATIENT)
Dept: CARDIOLOGY | Facility: CLINIC | Age: 70
DRG: 261 | End: 2025-04-22
Attending: INTERNAL MEDICINE
Payer: COMMERCIAL

## 2025-04-22 ENCOUNTER — ENROLLMENT (OUTPATIENT)
Dept: HOME HEALTH SERVICES | Facility: HOME HEALTH | Age: 70
End: 2025-04-22
Payer: COMMERCIAL

## 2025-04-22 ENCOUNTER — APPOINTMENT (OUTPATIENT)
Dept: GENERAL RADIOLOGY | Facility: CLINIC | Age: 70
DRG: 261 | End: 2025-04-22
Attending: NURSE PRACTITIONER
Payer: COMMERCIAL

## 2025-04-22 DIAGNOSIS — Z45.02 FITTING AND ADJUSTMENT OF AUTOMATIC IMPLANTABLE CARDIOVERTER-DEFIBRILLATOR: ICD-10-CM

## 2025-04-22 DIAGNOSIS — Z45.02 FITTING AND ADJUSTMENT OF AUTOMATIC IMPLANTABLE CARDIOVERTER-DEFIBRILLATOR: Primary | ICD-10-CM

## 2025-04-22 LAB
ABO + RH BLD: NORMAL
ACT BLD: 133 SECONDS (ref 74–150)
ANION GAP SERPL CALCULATED.3IONS-SCNC: 12 MMOL/L (ref 7–15)
BASE EXCESS BLDA CALC-SCNC: -0.4 MMOL/L (ref -3–3)
BASE EXCESS BLDA CALC-SCNC: -1.1 MMOL/L (ref -3–3)
BLD GP AB SCN SERPL QL: NEGATIVE
BLD PROD TYP BPU: NORMAL
BLD PROD TYP BPU: NORMAL
BLOOD COMPONENT TYPE: NORMAL
BLOOD COMPONENT TYPE: NORMAL
BUN SERPL-MCNC: 11.3 MG/DL (ref 8–23)
CA-I BLD-MCNC: 4.6 MG/DL (ref 4.4–5.2)
CA-I BLD-MCNC: 4.6 MG/DL (ref 4.4–5.2)
CALCIUM SERPL-MCNC: 8.8 MG/DL (ref 8.8–10.4)
CHLORIDE SERPL-SCNC: 107 MMOL/L (ref 98–107)
CODING SYSTEM: NORMAL
CODING SYSTEM: NORMAL
CREAT SERPL-MCNC: 0.75 MG/DL (ref 0.67–1.17)
CROSSMATCH: NORMAL
CROSSMATCH: NORMAL
EGFRCR SERPLBLD CKD-EPI 2021: >90 ML/MIN/1.73M2
ERYTHROCYTE [DISTWIDTH] IN BLOOD BY AUTOMATED COUNT: 14.7 % (ref 10–15)
GLUCOSE BLD-MCNC: 216 MG/DL (ref 70–99)
GLUCOSE BLD-MCNC: 251 MG/DL (ref 70–99)
GLUCOSE BLDC GLUCOMTR-MCNC: 150 MG/DL (ref 70–99)
GLUCOSE BLDC GLUCOMTR-MCNC: 166 MG/DL (ref 70–99)
GLUCOSE BLDC GLUCOMTR-MCNC: 191 MG/DL (ref 70–99)
GLUCOSE BLDC GLUCOMTR-MCNC: 192 MG/DL (ref 70–99)
GLUCOSE BLDC GLUCOMTR-MCNC: 207 MG/DL (ref 70–99)
GLUCOSE BLDC GLUCOMTR-MCNC: 214 MG/DL (ref 70–99)
GLUCOSE BLDC GLUCOMTR-MCNC: 237 MG/DL (ref 70–99)
GLUCOSE BLDC GLUCOMTR-MCNC: 239 MG/DL (ref 70–99)
GLUCOSE BLDC GLUCOMTR-MCNC: 247 MG/DL (ref 70–99)
GLUCOSE BLDC GLUCOMTR-MCNC: 268 MG/DL (ref 70–99)
GLUCOSE SERPL-MCNC: 192 MG/DL (ref 70–99)
HCO3 BLDA-SCNC: 25 MMOL/L (ref 21–28)
HCO3 BLDA-SCNC: 25 MMOL/L (ref 21–28)
HCO3 SERPL-SCNC: 22 MMOL/L (ref 22–29)
HCT VFR BLD AUTO: 43.1 % (ref 40–53)
HGB BLD-MCNC: 14.5 G/DL (ref 13.3–17.7)
HGB BLD-MCNC: 14.7 G/DL (ref 13.3–17.7)
HGB BLD-MCNC: 14.9 G/DL (ref 13.3–17.7)
HGB BLD-MCNC: 15 G/DL (ref 13.3–17.7)
ISSUE DATE AND TIME: NORMAL
ISSUE DATE AND TIME: NORMAL
LACTATE BLD-SCNC: 0.8 MMOL/L (ref 0.7–2)
LACTATE BLD-SCNC: 1.8 MMOL/L (ref 0.7–2)
MAGNESIUM SERPL-MCNC: 1.9 MG/DL (ref 1.7–2.3)
MCH RBC QN AUTO: 31.1 PG (ref 26.5–33)
MCHC RBC AUTO-ENTMCNC: 33.6 G/DL (ref 31.5–36.5)
MCV RBC AUTO: 93 FL (ref 78–100)
O2/TOTAL GAS SETTING VFR VENT: 75 %
O2/TOTAL GAS SETTING VFR VENT: 80 %
OXYHGB MFR BLDA: 95 % (ref 92–100)
OXYHGB MFR BLDA: 95 % (ref 92–100)
OXYHGB MFR BLDA: 98 % (ref 92–100)
PCO2 BLDA: 45 MM HG (ref 35–45)
PCO2 BLDA: 45 MM HG (ref 35–45)
PH BLDA: 7.35 [PH] (ref 7.35–7.45)
PH BLDA: 7.36 [PH] (ref 7.35–7.45)
PLATELET # BLD AUTO: 152 10E3/UL (ref 150–450)
PO2 BLDA: 158 MM HG (ref 80–105)
PO2 BLDA: 82 MM HG (ref 80–105)
POTASSIUM BLD-SCNC: 4.4 MMOL/L (ref 3.4–5.3)
POTASSIUM BLD-SCNC: 4.5 MMOL/L (ref 3.4–5.3)
POTASSIUM SERPL-SCNC: 3.9 MMOL/L (ref 3.4–5.3)
RBC # BLD AUTO: 4.66 10E6/UL (ref 4.4–5.9)
SAO2 % BLDA: 100 % (ref 95–96)
SAO2 % BLDA: 96 % (ref 95–96)
SODIUM BLD-SCNC: 140 MMOL/L (ref 135–145)
SODIUM BLD-SCNC: 140 MMOL/L (ref 135–145)
SODIUM SERPL-SCNC: 141 MMOL/L (ref 135–145)
SPECIMEN EXP DATE BLD: NORMAL
UFH PPP CHRO-ACNC: 0.16 IU/ML
UNIT ABO/RH: NORMAL
UNIT ABO/RH: NORMAL
UNIT NUMBER: NORMAL
UNIT NUMBER: NORMAL
UNIT STATUS: NORMAL
UNIT STATUS: NORMAL
UNIT TYPE ISBT: 6200
UNIT TYPE ISBT: 6200
WBC # BLD AUTO: 6.8 10E3/UL (ref 4–11)

## 2025-04-22 PROCEDURE — 02PA3MZ REMOVAL OF CARDIAC LEAD FROM HEART, PERCUTANEOUS APPROACH: ICD-10-PCS | Performed by: THORACIC SURGERY (CARDIOTHORACIC VASCULAR SURGERY)

## 2025-04-22 PROCEDURE — 86923 COMPATIBILITY TEST ELECTRIC: CPT

## 2025-04-22 PROCEDURE — 250N000013 HC RX MED GY IP 250 OP 250 PS 637

## 2025-04-22 PROCEDURE — 250N000011 HC RX IP 250 OP 636: Performed by: INTERNAL MEDICINE

## 2025-04-22 PROCEDURE — 250N000009 HC RX 250: Performed by: STUDENT IN AN ORGANIZED HEALTH CARE EDUCATION/TRAINING PROGRAM

## 2025-04-22 PROCEDURE — 93287 PERI-PX DEVICE EVAL & PRGR: CPT | Mod: 26 | Performed by: INTERNAL MEDICINE

## 2025-04-22 PROCEDURE — 999N000065 XR CHEST PORT 1 VIEW

## 2025-04-22 PROCEDURE — 82810 BLOOD GASES O2 SAT ONLY: CPT

## 2025-04-22 PROCEDURE — 88300 SURGICAL PATH GROSS: CPT | Mod: 26 | Performed by: STUDENT IN AN ORGANIZED HEALTH CARE EDUCATION/TRAINING PROGRAM

## 2025-04-22 PROCEDURE — 0JPT0PZ REMOVAL OF CARDIAC RHYTHM RELATED DEVICE FROM TRUNK SUBCUTANEOUS TISSUE AND FASCIA, OPEN APPROACH: ICD-10-PCS | Performed by: THORACIC SURGERY (CARDIOTHORACIC VASCULAR SURGERY)

## 2025-04-22 PROCEDURE — 71045 X-RAY EXAM CHEST 1 VIEW: CPT | Mod: 26 | Performed by: RADIOLOGY

## 2025-04-22 PROCEDURE — 36415 COLL VENOUS BLD VENIPUNCTURE: CPT | Performed by: INTERNAL MEDICINE

## 2025-04-22 PROCEDURE — 250N000011 HC RX IP 250 OP 636: Mod: JZ | Performed by: STUDENT IN AN ORGANIZED HEALTH CARE EDUCATION/TRAINING PROGRAM

## 2025-04-22 PROCEDURE — 87070 CULTURE OTHR SPECIMN AEROBIC: CPT | Performed by: INTERNAL MEDICINE

## 2025-04-22 PROCEDURE — 86900 BLOOD TYPING SEROLOGIC ABO: CPT | Performed by: INTERNAL MEDICINE

## 2025-04-22 PROCEDURE — 93005 ELECTROCARDIOGRAM TRACING: CPT

## 2025-04-22 PROCEDURE — P9016 RBC LEUKOCYTES REDUCED: HCPCS

## 2025-04-22 PROCEDURE — 88300 SURGICAL PATH GROSS: CPT | Mod: TC | Performed by: INTERNAL MEDICINE

## 2025-04-22 PROCEDURE — 83735 ASSAY OF MAGNESIUM: CPT | Performed by: NURSE PRACTITIONER

## 2025-04-22 PROCEDURE — 85014 HEMATOCRIT: CPT

## 2025-04-22 PROCEDURE — 258N000003 HC RX IP 258 OP 636: Performed by: STUDENT IN AN ORGANIZED HEALTH CARE EDUCATION/TRAINING PROGRAM

## 2025-04-22 PROCEDURE — 99233 SBSQ HOSP IP/OBS HIGH 50: CPT | Mod: 25 | Performed by: NURSE PRACTITIONER

## 2025-04-22 PROCEDURE — 250N000013 HC RX MED GY IP 250 OP 250 PS 637: Performed by: STUDENT IN AN ORGANIZED HEALTH CARE EDUCATION/TRAINING PROGRAM

## 2025-04-22 PROCEDURE — 87102 FUNGUS ISOLATION CULTURE: CPT | Performed by: INTERNAL MEDICINE

## 2025-04-22 PROCEDURE — 85347 COAGULATION TIME ACTIVATED: CPT

## 2025-04-22 PROCEDURE — 99207 PR NO BILLABLE SERVICE THIS VISIT: CPT | Performed by: THORACIC SURGERY (CARDIOTHORACIC VASCULAR SURGERY)

## 2025-04-22 PROCEDURE — 83605 ASSAY OF LACTIC ACID: CPT

## 2025-04-22 PROCEDURE — 85520 HEPARIN ASSAY: CPT | Performed by: INTERNAL MEDICINE

## 2025-04-22 PROCEDURE — 99207 PR NO BILLABLE SERVICE THIS VISIT: CPT | Performed by: INTERNAL MEDICINE

## 2025-04-22 PROCEDURE — 120N000003 HC R&B IMCU UMMC

## 2025-04-22 PROCEDURE — 87075 CULTR BACTERIA EXCEPT BLOOD: CPT | Performed by: INTERNAL MEDICINE

## 2025-04-22 PROCEDURE — 93287 PERI-PX DEVICE EVAL & PRGR: CPT

## 2025-04-22 PROCEDURE — 80048 BASIC METABOLIC PNL TOTAL CA: CPT | Performed by: INTERNAL MEDICINE

## 2025-04-22 PROCEDURE — 250N000009 HC RX 250: Performed by: INTERNAL MEDICINE

## 2025-04-22 PROCEDURE — 85018 HEMOGLOBIN: CPT

## 2025-04-22 RX ORDER — LIDOCAINE 40 MG/G
CREAM TOPICAL
Status: DISCONTINUED | OUTPATIENT
Start: 2025-04-22 | End: 2025-04-22 | Stop reason: HOSPADM

## 2025-04-22 RX ORDER — OXYCODONE AND ACETAMINOPHEN 5; 325 MG/1; MG/1
1 TABLET ORAL EVERY 4 HOURS PRN
Status: DISCONTINUED | OUTPATIENT
Start: 2025-04-22 | End: 2025-04-23 | Stop reason: HOSPADM

## 2025-04-22 RX ORDER — ROPIVACAINE IN 0.9% SOD CHL/PF 0.1 %
.01-.2 PLASTIC BAG, INJECTION (ML) EPIDURAL CONTINUOUS
Status: DISCONTINUED | OUTPATIENT
Start: 2025-04-22 | End: 2025-04-22 | Stop reason: HOSPADM

## 2025-04-22 RX ORDER — EPINEPHRINE IN 0.9 % SOD CHLOR 5 MG/250ML
.01-.2 PLASTIC BAG, INJECTION (ML) INTRAVENOUS CONTINUOUS
Status: DISCONTINUED | OUTPATIENT
Start: 2025-04-22 | End: 2025-04-22 | Stop reason: HOSPADM

## 2025-04-22 RX ORDER — NALOXONE HYDROCHLORIDE 0.4 MG/ML
0.1 INJECTION, SOLUTION INTRAMUSCULAR; INTRAVENOUS; SUBCUTANEOUS
Status: DISCONTINUED | OUTPATIENT
Start: 2025-04-22 | End: 2025-04-22 | Stop reason: HOSPADM

## 2025-04-22 RX ORDER — DEXTROSE MONOHYDRATE 100 MG/ML
INJECTION, SOLUTION INTRAVENOUS CONTINUOUS PRN
Status: DISCONTINUED | OUTPATIENT
Start: 2025-04-22 | End: 2025-04-23 | Stop reason: HOSPADM

## 2025-04-22 RX ORDER — LIDOCAINE HYDROCHLORIDE 20 MG/ML
INJECTION, SOLUTION INFILTRATION; PERINEURAL PRN
Status: DISCONTINUED | OUTPATIENT
Start: 2025-04-22 | End: 2025-04-22

## 2025-04-22 RX ORDER — ONDANSETRON 2 MG/ML
INJECTION INTRAMUSCULAR; INTRAVENOUS PRN
Status: DISCONTINUED | OUTPATIENT
Start: 2025-04-22 | End: 2025-04-22

## 2025-04-22 RX ORDER — CEFAZOLIN SODIUM/WATER 2 G/20 ML
SYRINGE (ML) INTRAVENOUS PRN
Status: DISCONTINUED | OUTPATIENT
Start: 2025-04-22 | End: 2025-04-22

## 2025-04-22 RX ORDER — LIDOCAINE HYDROCHLORIDE 10 MG/ML
INJECTION, SOLUTION EPIDURAL; INFILTRATION; INTRACAUDAL; PERINEURAL PRN
Status: DISCONTINUED | OUTPATIENT
Start: 2025-04-22 | End: 2025-04-22 | Stop reason: HOSPADM

## 2025-04-22 RX ORDER — SODIUM CHLORIDE, SODIUM LACTATE, POTASSIUM CHLORIDE, CALCIUM CHLORIDE 600; 310; 30; 20 MG/100ML; MG/100ML; MG/100ML; MG/100ML
INJECTION, SOLUTION INTRAVENOUS CONTINUOUS
Status: DISCONTINUED | OUTPATIENT
Start: 2025-04-22 | End: 2025-04-22 | Stop reason: HOSPADM

## 2025-04-22 RX ORDER — FENTANYL CITRATE 50 UG/ML
INJECTION, SOLUTION INTRAMUSCULAR; INTRAVENOUS PRN
Status: DISCONTINUED | OUTPATIENT
Start: 2025-04-22 | End: 2025-04-22

## 2025-04-22 RX ORDER — ACETAMINOPHEN 325 MG/1
975 TABLET ORAL ONCE
Status: DISCONTINUED | OUTPATIENT
Start: 2025-04-22 | End: 2025-04-22 | Stop reason: HOSPADM

## 2025-04-22 RX ORDER — PROPOFOL 10 MG/ML
INJECTION, EMULSION INTRAVENOUS PRN
Status: DISCONTINUED | OUTPATIENT
Start: 2025-04-22 | End: 2025-04-22

## 2025-04-22 RX ORDER — LABETALOL HYDROCHLORIDE 5 MG/ML
10 INJECTION, SOLUTION INTRAVENOUS
Status: DISCONTINUED | OUTPATIENT
Start: 2025-04-22 | End: 2025-04-22 | Stop reason: HOSPADM

## 2025-04-22 RX ORDER — DEXAMETHASONE SODIUM PHOSPHATE 4 MG/ML
INJECTION, SOLUTION INTRA-ARTICULAR; INTRALESIONAL; INTRAMUSCULAR; INTRAVENOUS; SOFT TISSUE PRN
Status: DISCONTINUED | OUTPATIENT
Start: 2025-04-22 | End: 2025-04-22

## 2025-04-22 RX ORDER — DEXTROSE MONOHYDRATE 25 G/50ML
25-50 INJECTION, SOLUTION INTRAVENOUS
Status: DISCONTINUED | OUTPATIENT
Start: 2025-04-22 | End: 2025-04-23 | Stop reason: HOSPADM

## 2025-04-22 RX ORDER — DEXAMETHASONE SODIUM PHOSPHATE 4 MG/ML
4 INJECTION, SOLUTION INTRA-ARTICULAR; INTRALESIONAL; INTRAMUSCULAR; INTRAVENOUS; SOFT TISSUE
Status: DISCONTINUED | OUTPATIENT
Start: 2025-04-22 | End: 2025-04-22 | Stop reason: HOSPADM

## 2025-04-22 RX ORDER — NICOTINE POLACRILEX 4 MG
15-30 LOZENGE BUCCAL
Status: DISCONTINUED | OUTPATIENT
Start: 2025-04-22 | End: 2025-04-23 | Stop reason: HOSPADM

## 2025-04-22 RX ORDER — FENTANYL CITRATE 50 UG/ML
50 INJECTION, SOLUTION INTRAMUSCULAR; INTRAVENOUS EVERY 5 MIN PRN
Status: DISCONTINUED | OUTPATIENT
Start: 2025-04-22 | End: 2025-04-22 | Stop reason: HOSPADM

## 2025-04-22 RX ORDER — SODIUM CHLORIDE, SODIUM GLUCONATE, SODIUM ACETATE, POTASSIUM CHLORIDE AND MAGNESIUM CHLORIDE 526; 502; 368; 37; 30 MG/100ML; MG/100ML; MG/100ML; MG/100ML; MG/100ML
INJECTION, SOLUTION INTRAVENOUS CONTINUOUS PRN
Status: DISCONTINUED | OUTPATIENT
Start: 2025-04-22 | End: 2025-04-22

## 2025-04-22 RX ORDER — HYDROMORPHONE HCL IN WATER/PF 6 MG/30 ML
0.4 PATIENT CONTROLLED ANALGESIA SYRINGE INTRAVENOUS EVERY 5 MIN PRN
Status: DISCONTINUED | OUTPATIENT
Start: 2025-04-22 | End: 2025-04-22 | Stop reason: HOSPADM

## 2025-04-22 RX ORDER — HYDROMORPHONE HCL IN WATER/PF 6 MG/30 ML
0.2 PATIENT CONTROLLED ANALGESIA SYRINGE INTRAVENOUS EVERY 5 MIN PRN
Status: DISCONTINUED | OUTPATIENT
Start: 2025-04-22 | End: 2025-04-22 | Stop reason: HOSPADM

## 2025-04-22 RX ORDER — SODIUM CHLORIDE, SODIUM LACTATE, POTASSIUM CHLORIDE, CALCIUM CHLORIDE 600; 310; 30; 20 MG/100ML; MG/100ML; MG/100ML; MG/100ML
INJECTION, SOLUTION INTRAVENOUS CONTINUOUS PRN
Status: DISCONTINUED | OUTPATIENT
Start: 2025-04-22 | End: 2025-04-22

## 2025-04-22 RX ORDER — NOREPINEPHRINE BITARTRATE 0.06 MG/ML
.01-.6 INJECTION, SOLUTION INTRAVENOUS CONTINUOUS
Status: DISCONTINUED | OUTPATIENT
Start: 2025-04-22 | End: 2025-04-22

## 2025-04-22 RX ORDER — FENTANYL CITRATE 50 UG/ML
25 INJECTION, SOLUTION INTRAMUSCULAR; INTRAVENOUS EVERY 5 MIN PRN
Status: DISCONTINUED | OUTPATIENT
Start: 2025-04-22 | End: 2025-04-22 | Stop reason: HOSPADM

## 2025-04-22 RX ADMIN — SODIUM CHLORIDE, SODIUM LACTATE, POTASSIUM CHLORIDE, AND CALCIUM CHLORIDE: .6; .31; .03; .02 INJECTION, SOLUTION INTRAVENOUS at 08:34

## 2025-04-22 RX ADMIN — Medication 100 MG: at 08:34

## 2025-04-22 RX ADMIN — SODIUM CHLORIDE, SODIUM GLUCONATE, SODIUM ACETATE, POTASSIUM CHLORIDE AND MAGNESIUM CHLORIDE: 526; 502; 368; 37; 30 INJECTION, SOLUTION INTRAVENOUS at 08:21

## 2025-04-22 RX ADMIN — ASPIRIN 81 MG CHEWABLE TABLET 81 MG: 81 TABLET CHEWABLE at 17:37

## 2025-04-22 RX ADMIN — ONDANSETRON 4 MG: 2 INJECTION INTRAMUSCULAR; INTRAVENOUS at 12:21

## 2025-04-22 RX ADMIN — Medication 3 MG: at 22:13

## 2025-04-22 RX ADMIN — SACUBITRIL AND VALSARTAN 1 TABLET: 97; 103 TABLET, FILM COATED ORAL at 20:05

## 2025-04-22 RX ADMIN — CARVEDILOL 25 MG: 25 TABLET, FILM COATED ORAL at 17:33

## 2025-04-22 RX ADMIN — FUROSEMIDE 40 MG: 40 TABLET ORAL at 17:33

## 2025-04-22 RX ADMIN — INSULIN HUMAN 3 UNITS/HR: 1 INJECTION, SOLUTION INTRAVENOUS at 11:09

## 2025-04-22 RX ADMIN — FENTANYL CITRATE 100 MCG: 50 INJECTION INTRAMUSCULAR; INTRAVENOUS at 08:30

## 2025-04-22 RX ADMIN — HYDROMORPHONE HYDROCHLORIDE 0.5 MG: 1 INJECTION, SOLUTION INTRAMUSCULAR; INTRAVENOUS; SUBCUTANEOUS at 11:00

## 2025-04-22 RX ADMIN — Medication 10 MG: at 10:54

## 2025-04-22 RX ADMIN — Medication 10 MG: at 11:42

## 2025-04-22 RX ADMIN — SPIRONOLACTONE 50 MG: 25 TABLET, FILM COATED ORAL at 17:37

## 2025-04-22 RX ADMIN — SODIUM CHLORIDE, SODIUM LACTATE, POTASSIUM CHLORIDE, AND CALCIUM CHLORIDE: .6; .31; .03; .02 INJECTION, SOLUTION INTRAVENOUS at 09:13

## 2025-04-22 RX ADMIN — Medication 2 G: at 09:01

## 2025-04-22 RX ADMIN — Medication 20 MG: at 10:10

## 2025-04-22 RX ADMIN — DEXAMETHASONE SODIUM PHOSPHATE 4 MG: 4 INJECTION, SOLUTION INTRA-ARTICULAR; INTRALESIONAL; INTRAMUSCULAR; INTRAVENOUS; SOFT TISSUE at 09:41

## 2025-04-22 RX ADMIN — NOREPINEPHRINE BITARTRATE 0.02 MCG/KG/MIN: 1 INJECTION, SOLUTION, CONCENTRATE INTRAVENOUS at 11:57

## 2025-04-22 RX ADMIN — Medication 100 MG: at 12:25

## 2025-04-22 RX ADMIN — LIDOCAINE HYDROCHLORIDE 100 MG: 20 INJECTION, SOLUTION INFILTRATION; PERINEURAL at 08:30

## 2025-04-22 RX ADMIN — Medication 1250 MG: at 17:28

## 2025-04-22 RX ADMIN — Medication 1250 MG: at 04:04

## 2025-04-22 RX ADMIN — FENTANYL CITRATE 150 MCG: 50 INJECTION INTRAMUSCULAR; INTRAVENOUS at 10:13

## 2025-04-22 RX ADMIN — PROPOFOL 50 MG: 10 INJECTION, EMULSION INTRAVENOUS at 08:34

## 2025-04-22 RX ADMIN — INSULIN HUMAN 2 UNITS/HR: 1 INJECTION, SOLUTION INTRAVENOUS at 13:22

## 2025-04-22 RX ADMIN — ACETAMINOPHEN 650 MG: 325 TABLET, FILM COATED ORAL at 07:12

## 2025-04-22 ASSESSMENT — ACTIVITIES OF DAILY LIVING (ADL)
ADLS_ACUITY_SCORE: 38
DEPENDENT_IADLS:: INDEPENDENT
ADLS_ACUITY_SCORE: 38

## 2025-04-22 ASSESSMENT — VISUAL ACUITY: OU: BASELINE

## 2025-04-22 NOTE — BRIEF OP NOTE
[facility]    Brief Operative Note    Pre-operative diagnosis: CRT-D infection  Post-operative diagnosis Same    Procedure: EXTRACTION, ELECTRODE LEAD, MYOCARDIAL, USING LASER, WITH ANESTHESIA, N/A - Heart    Surgeon: Surgeons and Role:     * Mick Mckeon MD - Primary     * Bel Flores MD  Anesthesia: General   Estimated Blood Loss: 50 mL    Drains: None  Specimens:   ID Type Source Tests Collected by Time Destination   1 : Explanted Implantable Cardioverter Defibrillator and leads Implant Explant OR DOCUMENTATION ONLY, SURGICAL PATHOLOGY EXAM Mick Mckeon MD 4/22/2025 11:52 AM    A : ICD Pocket Wound Chest ANAEROBIC BACTERIAL CULTURE ROUTINE, FUNGAL OR YEAST CULTURE ROUTINE, AEROBIC BACTERIAL CULTURE ROUTINE Mick Mckeon MD 4/22/2025 10:20 AM      Findings: Leads were removed in their entirety without difficulty..  Complications:  None.  Implants:   Implant Name Type Inv. Item Serial No.  Lot No. LRB No. Used Action   CRT-D RESONATE X4 IS4 DF4 G447 ICD CRT-D RESONATE X4 IS4 DF4 G447 [id number] [employer] [id number]  1 Explanted   86CM, ACUITY X4 LV PACING LEAD, MODEL 4671, TINES FIXATION Leads  [id number] [employer] [id number] N/A 1 Explanted   45CM INGEVITY MRI ACTIVE FIXATION PACING LEAD Leads  [id number] [employer] [id number] N/A 1 Explanted   LEAD RELIANCE GORE DF4 0292 Leads LEAD RELIANCE GORE DF4 0292 [id number] [employer] [id number] N/A 1 Explanted

## 2025-04-22 NOTE — OP NOTE
OPERATING ROOM:  Room 5 in the Cath Lab  DATE OF OPERATION:  April 22, 2025  PROCEDURE: Stand by for Pacing lead extractions x 3  ATTENDING SURGEON:  Bel Flores MD  ELECTROPHYSIOLOGY CARDIOLOGIST:  Mick Mckeon MD  ANESTHESIA:  General endotracheal  SKIN PREP:  Betadine  INCISIONS:  Left infraclavicular  DRAINS:  None  SPECIMENS:  Leads x 3 Right atrial, right ventricular and coronary sinus  CULTURES:  Routine and fungal  CLOSURE:  Wound was packed by Dr. Mckeon    PREOPERATIVE DIAGNOSES:  1.  Ischemic cardiomyopathy S/P CRT-D implantation 2019  2.  CRT-D infection    POSTOPERATIVE DIAGNOSES:  Same    BRIEF HISTORY:  Mr. Manriquez is a 70 yearold gentleman who presented on 4- with drainage from his pacemaker pocket.  He has an ischemic cardiomyopathy and had a STEMI in 2007.  In 2019 he underwent placement of a CRT-D.  The above procedure was planned.    FINDINGS AT OPERATION:  The leads came out without difficulty.  The pocket of the pacemaker generator was cultured.  Obvious infection was present.     PROCEDURE:  I was present in the Cath Lab from 9:30 am until 12:15 pm.  The leads were removed one by one.  First the right ventricular lead, then the coronary sinus lead and finally the right atrial lead.  The patient remained hemodynamically stable and there was no pericardial effusion.

## 2025-04-22 NOTE — ANESTHESIA POSTPROCEDURE EVALUATION
Patient: Anson Manriquez    Procedure: Procedure(s):  EXTRACTION, ELECTRODE LEAD, MYOCARDIAL, USING tight rail, WITH ANESTHESIA       Anesthesia Type:  General    Note:  Disposition: Inpatient   Postop Pain Control: Uneventful            Sign Out: Well controlled pain   PONV: No   Neuro/Psych: Uneventful            Sign Out: Acceptable/Baseline neuro status   Airway/Respiratory: Uneventful            Sign Out: Acceptable/Baseline resp. status   CV/Hemodynamics: Uneventful            Sign Out: Acceptable CV status; No obvious hypovolemia; No obvious fluid overload   Other NRE: NONE   DID A NON-ROUTINE EVENT OCCUR? No           Last vitals:  Vitals Value Taken Time   /69 04/22/25 1330   Temp 36.4  C (97.6  F) 04/22/25 1245   Pulse 85 04/22/25 1341   Resp 10 04/22/25 1341   SpO2 92 % 04/22/25 1341   Vitals shown include unfiled device data.    Electronically Signed By: Zainab Aguilar MD  April 22, 2025  1:42 PM

## 2025-04-22 NOTE — PROVIDER NOTIFICATION
Writer messaged Maeve James that ICD inscision site has been actively bleeding since coming from pacu and to come to bedside for assessment.

## 2025-04-22 NOTE — CONSULTS
Care Management Initial Consult    General Information  Assessment completed with: Spouse or significant other, Jolly  Type of CM/SW Visit: Initial Assessment    Primary Care Provider verified and updated as needed: Yes   Readmission within the last 30 days: no previous admission in last 30 days      Reason for Consult: discharge planning  Advance Care Planning: Advance Care Planning Reviewed: no concerns identified          Communication Assessment  Patient's communication style: spoken language (English or Bilingual)    Hearing Difficulty or Deaf: yes   Wear Glasses or Blind: no    Cognitive  Cognitive/Neuro/Behavioral: unchanged from my previous assessment  Level of Consciousness: alert                   Living Environment:   People in home: spouse     Current living Arrangements: house      Able to return to prior arrangements:         Family/Social Support:  Care provided by:    Provides care for:    Marital Status:   Support system:            Description of Support System:           Current Resources:   Patient receiving home care services: No        Community Resources: None  Equipment currently used at home: none  Supplies currently used at home: None    Employment/Financial:  Employment Status: employed part-time        Financial Concerns:             Does the patient's insurance plan have a 3 day qualifying hospital stay waiver?  No    Lifestyle & Psychosocial Needs:  Social Drivers of Health     Food Insecurity: Low Risk  (4/17/2025)    Food Insecurity     Within the past 12 months, did you worry that your food would run out before you got money to buy more?: No     Within the past 12 months, did the food you bought just not last and you didn t have money to get more?: No   Depression: Not at risk (6/25/2024)    PHQ-2     PHQ-2 Score: 0   Housing Stability: High Risk (4/17/2025)    Housing Stability     Do you have housing? : No     Are you worried about losing your housing?: No   Tobacco Use: Low  Risk  (12/17/2024)    Patient History     Smoking Tobacco Use: Never     Smokeless Tobacco Use: Never     Passive Exposure: Not on file   Financial Resource Strain: Low Risk  (4/17/2025)    Financial Resource Strain     Within the past 12 months, have you or your family members you live with been unable to get utilities (heat, electricity) when it was really needed?: No   Alcohol Use: Not on file   Transportation Needs: Low Risk  (4/17/2025)    Transportation Needs     Within the past 12 months, has lack of transportation kept you from medical appointments, getting your medicines, non-medical meetings or appointments, work, or from getting things that you need?: No   Physical Activity: Not on file   Interpersonal Safety: Unknown (4/22/2025)    Interpersonal Safety     Do you feel physically and emotionally safe where you currently live?: Patient unable to answer     Within the past 12 months, have you been hit, slapped, kicked or otherwise physically hurt by someone?: Patient unable to answer     Within the past 12 months, have you been humiliated or emotionally abused in other ways by your partner or ex-partner?: Patient unable to answer   Stress: Not on file   Social Connections: Not on file   Health Literacy: Not on file       Functional Status:  Prior to admission patient needed assistance:   Dependent ADLs:: Independent  Dependent IADLs:: Independent       Mental Health Status:          Chemical Dependency Status:                Values/Beliefs:  Spiritual, Cultural Beliefs, Adventism Practices, Values that affect care: no  Description of Beliefs that Will Affect Care: eRnu            Discussed  Partnership in Safe Discharge Planning  document with patient/family: Yes: spouse Jolly    Additional Information:  RNCC spoke with pt's wife Jolly for case management assessment as pt in procedure currently ofr ICD lead extraction.     Patient comes from home with spouse, Jolly, lives in daughter's home  "\"mother-in-law suite\" has 15 stairs to navigate in home.    RNCC added daughter Georgie uBsby to face sheet, ph: [phone].    Patient is on IV abx, has not done HC or HI in past, referral made to [facility] as open to any agency, for benefits check at this pt as ID plan is pending.    Patient has [employer] through wife's insurance and Medicare Part A, [facility] liaisons updated.    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     Next Steps: Follow for needs, pending ID plan, follow up w/I on plan, would need PICC if discharge on IV abx.    [facility]  Phone # [phone]  Fax # [phone]   Status: Pending benefits check sent 4/22      DIVINA MirandaN, BA, RN, CMSRN, RNCC  [facility]  Covering Units 6[address] Beds 1113[address]43[address], 64[address]  Phone: [phone]  Available on [employer] search 6C 6502-14 RNCC, 2015-83 RNCC  After Hours [phone]     6C SW Ph: [phone]     6B/CRNCC Weekend/holiday on [employer] or [phone]     [facility] RNCC ED/5 Ortho/5 Med/Surg [phone]     [facility] RNCC 6 Med/Surg 8A, [phone]     Observation SW and weekend/after hours phone: [phone]        "

## 2025-04-22 NOTE — PLAN OF CARE
NURSING PROGRESS NOTE  Shift Summary      Date: April 22, 2025     Neuro/Musculoskeletal:  A&Ox4.   Cardiac:  100% V-Paced.  VSS.     Respiratory:  Sating in the 90s on RA.  GI/:  Adequate urine output.  LBM: Yesterday  Diet/Appetite: NPO after midnight  Activity: Independent   Pain:  Denies.   Skin:  No new deficits noted. Left upper chest pacer site dressed.  LDAs + Drips/IVF:  L+R PIV SL  Protocols/Labs:  K+, Mag,     Pertinent Shift Updates:  Heparin infusion stopped for pacer extraction today.Shower and CHG this morning.       Plan:  Infected pacer extraction today at 0800.       Carlos Wong RN  .................................................... April 22, 2025   5:51 AM  [facility] ([facility]): [facility] ICU (Unit 6D)    Problem: Adult Inpatient Plan of Care  Goal: Absence of Hospital-Acquired Illness or Injury  Intervention: Prevent Skin Injury  Recent Flowsheet Documentation  Taken 4/22/2025 0400 by Carlos Wong RN  Body Position:   position changed independently   weight shifting  Taken 4/22/2025 0023 by Carlos Wong RN  Body Position:   position changed independently   weight shifting  Taken 4/21/2025 1915 by Carlos Wong RN  Body Position:   position changed independently   weight shifting   Goal Outcome Evaluation:      Plan of Care Reviewed With: patient    Overall Patient Progress: improvingOverall Patient Progress: improving

## 2025-04-22 NOTE — ANESTHESIA CARE TRANSFER NOTE
Patient: Anson Manriquez    Procedure: Procedure(s):  EXTRACTION, ELECTRODE LEAD, MYOCARDIAL, USING tight rail, WITH ANESTHESIA       Diagnosis: CRT-D infection  Diagnosis Additional Information: No value filed.    Anesthesia Type:   General     Note:    Oropharynx: oropharynx clear of all foreign objects and spontaneously breathing  Level of Consciousness: awake  Oxygen Supplementation: face mask  Level of Supplemental Oxygen (L/min / FiO2): 4  Independent Airway: airway patency satisfactory and stable  Dentition: dentition unchanged  Vital Signs Stable: post-procedure vital signs reviewed and stable  Report to RN Given: handoff report given  Patient transferred to: PACU    Handoff Report: Identifed the Patient, Identified the Reponsible Provider, Reviewed the pertinent medical history, Discussed the surgical course, Reviewed Intra-OP anesthesia mangement and issues during anesthesia, Set expectations for post-procedure period and Allowed opportunity for questions and acknowledgement of understanding      Vitals:  Vitals Value Taken Time   /63 04/22/25 1245   Temp     Pulse     Resp     SpO2     Vitals shown include unfiled device data.    Electronically Signed By: Edward Pablo MD  April 22, 2025  12:53 PM

## 2025-04-22 NOTE — ANESTHESIA PROCEDURE NOTES
Perioperative EDMUND Procedure Note    Staff -        Anesthesiologist:  Jon Hi MD       Performed By: anesthesiologist      EDMUND Probe Insertion    Probe Status PRE Insertion: NO obvious damage  Probe type:  Adult 3D  Bite block used:   Soft  Insertion Technique: Easy, no oropharyngeal manipulation  Insertion complications: None obvious  Billing Report:EDMUND report by Anesthesiologist (See Separate Report note)  Probe Status POST Removal: NO obvious damage    EDMUND Report  General Procedure Information  Images for this study have been archived.    Post Intervention Findings  Procedure(s) performed:  Other (see comments) (lead extraction).  Regional wall motion:. Surgeon(s) notified of all postintervention findings: Yes.                 Echocardiogram Comments  Echocardiogram comments: Normal RV and mild RV dysystolic function by visual assessment (TAPSE 175mm). LV is dilated. There is global hypokinesis with anteroseptal and septal akinesis at the base and mid segments. LVEF 31% by Jimenez's biplane. Mild MR. No aortic stenosis or regurgitation. There is a small PFO with left to right shunt. No aortic dissection. There is mild TR. RVSP 30+RAP. RV, CS and RA leads are seen with multiple clots vs vegetations. Most clots/vegetation were no longer visible following removal of leads; however residual mobile 1x1.8cm mass remained, appeared near the junction of the right atrium and inferior vena cava/eustacian valve. This remained following removal of all leads and wires. Communicated this to our cardiology colleagues and Dr Mckeon. The patient has been on heparin infusion as inpatient for bilateral internal jugular clots. Heparin has been held gwen-procedure, but to be resumed when safe. He may be at increased risk of paradoxical embolism given known thrombus and PFO (although at present exclusively left to right). Trace pericardial effusion was unchanged at the end of the case. .

## 2025-04-22 NOTE — OR NURSING
Page sent to device RN:  Good morning!  I am contacting re: Anson Manriquez MRN [id number] in Pre op [place] 8 scheduled for extraction of electrode lead.  Please advise.  Lupe Menendez [phone].    Update:  Received call back from Monique device RN who reports they will see the pt in the room.

## 2025-04-22 NOTE — ANESTHESIA PROCEDURE NOTES
Airway       Patient location during procedure: OR       Procedure Start/Stop Times: 4/22/2025 8:36 AM  Staff -        Anesthesiologist:  Jon Hi MD       Resident/Fellow: Edward Pablo MD       Performed By: resident  Consent for Airway        Urgency: elective  Indications and Patient Condition       Indications for airway management: gwen-procedural       Induction type:intravenous       Mask difficulty assessment: 1 - vent by mask    Final Airway Details       Final airway type: endotracheal airway       Successful airway: ETT - single and Oral  Endotracheal Airway Details        ETT size (mm): 7.0       Cuffed: yes       Successful intubation technique: direct laryngoscopy       DL Blade Type: MAC 4       Grade View of Cords: 1       Adjucts: stylet       Position: Right       Measured from: gums/teeth       Secured at (cm): 23       Bite block used: None    Post intubation assessment        Placement verified by: capnometry, equal breath sounds and chest rise        Number of attempts at approach: 1       Number of other approaches attempted: 0       Secured with: pink tape       Ease of procedure: easy       Dentition: Intact    Medication(s) Administered   Medication Administration Time: 4/22/2025 8:36 AM

## 2025-04-22 NOTE — BRIEF OP NOTE
EP PROCEDURE NOTE    PREOPERATIVE DIAGNOSIS  Infected CRT-D system    POSTOPERATIVE DIAGNOSIS  S/p successful extraction of CRT-D generator and leads    NAME OF PROCEDURE:  1.  Removal of CRT-D generator  2.  Manual extraction of RV lead, RA lead and CS lead with tight-rail  3.  Pocket washout  4.  Arterial (and venous) access for possible cardiopulmonary bypass  5.  Intracardiac echocardiography      PROCEDURE PERFORMED IN:  Cath lab 5    EP STAFF:  Dr. Mckeon   EP FELLOW:  Aldo Stoner  CV SURGERY STAFF:  Dr. Flores    COMPLICATIONS:  None apparent  ESTIMATED BLOOD LOSS:  cc  TOTAL FLUOROSCOPY TIME:  See log    CLINICAL PROFILE:  Anson Manriquez is a 69 year old male with a history of HTN, DMII, LBBB, CAD (s/p STEMI in 2007 with pLAD stenting, and STEMI 9/2018) s/p CRT-D (2019), ICM LVEF 20-25%, not pacemaker dependent, who presented on 4/16/25 with concern for pacemaker pocket infection for which he presents today for device and leads extraction under GA and surgical support.     Lead  & Model       Implant Date   RV [employer] [id number] [employer] [place] 4-Site SG   4/25/2019   LV [employer] 4671 Acuity X4 Straight    4/25/2019    RA [employer] [id number] Ingevity MRI    4/25/2019    PROCEDURE:  Patient was brought to the operating room.  Informed consent had been obtained prior to the procedure.  Risks discussed included bleeding, infection, vascular tear, cardiac perforation, complete heart block (if not already present),  emergency thoracotomy, emergency sternotomy, and death.  The patient was prepped and draped in the usual, sterile manner.  General anesthesia was administered by the Anesthesia Service.    Using modified Seldinger technique, the following catheters were placed:  A 4 Fr sidearm sheath in the left femoral artery and a 4 Fr sidearm sheath in the left femoral vein (placed for arterial BP recording and for access in case of a need for emergent cardiopulmonary bypass), a 6  Fr sidearm sheath in the right femoral vein for central access and for snaring if needed, and a 12 Fr sidearm sheath in the right femoral vein for intracardiac ultrasound, and for an emergency occlusion balloon.      Under fluoroscopic guidance a [employer] Intracardiac Echo (ICE) probe was advanced from the right femoral vein to the high right atrium and SVC.  The lead course was examined with ICE and showed a binding site between the 3 leads and the lateral RA below the SVC junction.    Under fluoroscopic guidance an [employer] Superstiff guidewire was advanced from the right femoral vein to the right internal jugular vein to provide delivery of an emergency balloon occlusion catheter.      The device to be explanted was in a left pectoral position.  The pocket was opened through the previous surgical scar.  The subcutaneous tissue was dissected until the generator was identified.  The generator was dissected free of the pocket.  The pocket was swabbed and a sample was sent for culture.  The leads were dissected free of scar tissue and the suture sleeves were identified.  The generator was detached from the leads.  The helices were retracted from the RV lead.  We attempted to retract the helix from the RA lead but it did not retract,    The lead ends were cut and the leads were prepared in the usual manner.    A [employer] model  lead locking device was advanced to the end of the RV lead and the locking mechanism was deployed.  The lead insulation was secured with fiber-wire lead tie.    A [employer] model  lead locking device was advanced to the end of the RA lead and the locking mechanism was deployed.  The lead insulation was secured with fiber-wire lead tie.    A [employer] model  lead locking device was advanced to the end of the CS lead but it did not advance al the way. The lead was prepped and the insulation was secured with fiber-wire lead tie.    Dr. Flores was present in cath lab 5  throughout the extraction process to provide emergency surgical back-up.    We began with the RV lead.  A [employer] 11 Fr tight-rail was advanced over the lead and the lead was extracted fully.    We moved to the CS lead.  A [employer] 11 Fr tight-rail was advanced over the lead which was also extracted fully.     We then moved to the RA lead.  A [employer] 11 Fr tight-rail was advanced over the lead and the lead was extracted.       The pocket was washed out and packed for secondary healing intention.    The patient remained hemodynamically stable after the extraction.   TE echo after the extraction showed no change in the small pericardial effusion noted prior to the extraction. It did show an oscillating material, likely fibrinous, at the RA-IVC junction.     The lead tips were sent for culture.    The femoral sheathes were removed and hemostasis was secured.     The patient tolerated the procedure well and there were no apparent complications.  Patient left the cath lab 5 in satisfactory condition and was taken to the PACU.    Recommendations:  - Bedrest for 2 hours followed by groin sutures removal  - Wound care consult  - Antibiotics as per primary team and ID team  - Follow up pocket and lead tips cultures  - Re-implant of a new CRT-D on the contra-lateral side. Timing TBD (mostly outpatient) and pending ID clearance

## 2025-04-22 NOTE — PROGRESS NOTES
[facility]   Cardiology   Progress Note     ASSESSMENT/PLAN:  Anson Manriquez is a 69 year old male with a PMH significant for HTN, DMII, LBBB, CAD (s/p STEMI in 2007 with pLAD stenting, and STEMI 9/2018) s/p CRT-D (2019), ICM LVEF 20-25% who presented on 4/16/25 with concern for pacemaker pocket infection.       # ICM s/p CRT-D implantation (2019), s/p extraction 4/22/25  # CRT-D infection  Patient has drainage from his pocket site after trauma to the site approximately 1 month ago, suspicious for device infection. No systemic symptoms suggestive of bacteremia or sepsis. Not pacer dependent per device interrogation. No evidence of vegetation on device leads/valves on CT chest.    EDMUND 4/17 with mobile lesions/stranding on the leads suspicious for thrombus vs vegetation      - Blood cultures with NGTD  - Aerobic wound culture positive for staph capitis and staph epidermidis  - ID consulted/following:  Continue empiric IV vancomycin (dosed by pharmacy)  Agree with planned upcoming EP device removal  Please send tissue/fluid cultures from the procedure at time of device extraction if able   Follow up pending blood cultures - prelim with no growth  Follow up pending wound swab cultures - coag-neg staph susceptibilities (pending)   - EP consult- s/p device extraction 4/22; per EP hold DOAC/heparin gtt tonight  - Telemetry  - Daily BMP, CBC  - Electrolyte replacement protocols for K and Mag     # 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 No recent CVC placement. Denies IVDU, trauma to head/neck, ear/neck/throat/oral pain. Had dental procedures in 12/2024. Ddx includes septic emboli/Lemierre, malignancy, hypercoagulable state.  - as listed above, hold heparin gtt tonight, plan on starting DOAC tomorrow if no bleeding issues overnight  - Pharm consult for DOAC (Eliquis and Xarelto $30/month, Dabigatran $10/month)  -  "Needs age-appropriate malignancy screening as outpatient     # Chronic heart failure with reduced ejection fraction (EF 20-25%) 2/2 ICM  Denies sxs of ADHF. Appears euvolemic on admission. Taking GDMT as prescribed. NTproBNP 359.  - GDMT:              - fluid status: euvolemic, continue PTA furosemide 40mg BID  - BB: continue PTA carvedilol 25mg BID  - ACE/ARB/ARNI: continue PTA sacubitril-valsartan 97-103mg BID   - SGLT2: resume PTA Jardiance  - MRA: PTA spironolactone increased to 50 mg daily 2/2 chronic hypokalemia     # CAD s/p pLAD stent  - Continue PTA ASA 81mg daily, atorvastatin 80mg daily     # T2DM (A1c 9.5%)  - Hold PTA semaglutide  - Jardiance as above  - MD sliding scale insulin     # Chronic Diarrhea  Patient reports chronic diarrhea (\"happens 6+ days every month\") that he normally takes imodium for. Patient says he is overdue for a colonoscopy.  - PRN Imodium  - Recommend PCP/GI follow up after discharge      FEN: Regular  Code status: Full  Prophylaxis: Heparin gtt  Isolation: None  Disposition: pending device extraction  Medically Ready for Discharge: Anticipated in 2-4 Days     Medically Ready for Discharge: Anticipated in 2-4 Days       Patient seen and discussed with Dr. Mae, who agrees with above plan.    Tiffanie DESAI, CNP  [facility] Cardiology Team    Interval History:  - s/p CRT-D explant  - pt reports feeling well, denies any pain post procedure, is looking forward to hospital discharge    Physical Exam:  Temp:  [97.6  F (36.4  C)-98.1  F (36.7  C)] 97.6  F (36.4  C)  Pulse:  [73-91] 80  Resp:  [16-22] 20  BP: ()/(54-82) 113/63  SpO2:  [90 %-98 %] 93 %      Wt:   Wt Readings from Last 5 Encounters:   04/22/25 81 kg (178 lb 8 oz)   12/17/24 79.4 kg (175 lb)   06/25/24 81.6 kg (179 lb 12.8 oz)   02/06/24 81.3 kg (179 lb 4.8 oz)   10/10/23 83.6 kg (184 lb 3.2 oz)       General: NAD  HEENT:  YOHAN EOMI.   Neck: no JVD  CV: RRR. No murmur appreciated. No rubs or gallops. Peripheral radial " pulse intact.  Resp: No increased work of breathing or use of accessory muscles, breathing comfortably on room air.  Lung sounds clear throughout/bilaterally  Abdomen:  Normal active bowel sounds.  Abdomen is soft. No distension, non-tender to palpation.    Extremities: Warm. Capillary refill less than 3 sec. 2/4 radial pulses bilaterally.  2/4 pedal pulses bilaterally. No pre-tibial edema. No cyanosis or clubbing.  Skin:  Warm and dry. No erythema, rashes, ulceration or diaphoresis. Drainage noted around left chest incision, area marked, soft, non-tender to touch  Neuro: Alert and oriented x3.      Medications:  Current Facility-Administered Medications   Medication Dose Route Frequency Provider Last Rate Last Admin    [Auto Hold] aspirin (ASA) chewable tablet 81 mg  81 mg Oral Daily Felipe Pinto MD   81 mg at 04/21/25 0812    [Auto Hold] atorvastatin (LIPITOR) tablet 80 mg  80 mg Oral Daily Felipe Pinto MD   80 mg at 04/21/25 0812    [Auto Hold] carvedilol (COREG) tablet 25 mg  25 mg Oral BID w/meals Felipe Pinto MD   25 mg at 04/21/25 1709    [Held by provider] empagliflozin (JARDIANCE) tablet 25 mg  25 mg Oral Daily Felipe Pinto MD   25 mg at 04/17/25 0852    [Auto Hold] furosemide (LASIX) tablet 40 mg  40 mg Oral BID Melissa Griggs MD   40 mg at 04/21/25 1709    [Held by provider] insulin aspart (NovoLOG) injection (RAPID ACTING)  1-7 Units Subcutaneous TID AC Melissa Griggs MD   2 Units at 04/21/25 1855    [Held by provider] insulin aspart (NovoLOG) injection (RAPID ACTING)  1-5 Units Subcutaneous At Bedtime Melissa Griggs MD   1 Units at 04/21/25 2135    [Auto Hold] melatonin tablet 3 mg  3 mg Oral At Bedtime Felipe Pinto MD   3 mg at 04/21/25 2132    [Auto Hold] polyethylene glycol (MIRALAX) Packet 17 g  17 g Oral Daily Felipe Pinto MD   17 g at 04/21/25 0813    [Auto Hold] sacubitril-valsartan (ENTRESTO)  MG per tablet 1 tablet  1 tablet Oral BID Blair  MD Felipe   1 tablet at 04/21/25 2014    [Auto Hold] sodium chloride (PF) 0.9% PF flush 3 mL  3 mL Intracatheter Q8H Ignacio Aquino MD   3 mL at 04/22/25 0535    [Auto Hold] sodium chloride (PF) 0.9% PF flush 3 mL  3 mL Intracatheter Q8H Felipe Nava MD   3 mL at 04/22/25 0535    [Auto Hold] spironolactone (ALDACTONE) tablet 50 mg  50 mg Oral Daily Giovanna Gonzalez APRN CNP   50 mg at 04/21/25 0812    [Auto Hold] vancomycin (VANCOCIN) 1,250 mg in 0.9% NaCl 250 mL intermittent infusion  1,250 mg Intravenous Q12H Ignacio Spencer  mL/hr at 04/21/25 1709 1,250 mg at 04/22/25 0404     Current Facility-Administered Medications   Medication Dose Route Frequency Provider Last Rate Last Admin    [Held by provider] heparin 25,000 units in 0.45% NaCl 250 mL ANTICOAGULANT infusion  0-5,000 Units/hr Intravenous Continuous Melissa Griggs MD   Stopped at 04/22/25 0359    lactated ringers infusion   Intravenous Continuous Edward Pablo MD        Patient is already receiving anticoagulation with heparin, enoxaparin (LOVENOX), warfarin (COUMADIN)  or other anticoagulant medication   Does not apply Continuous PRN Felipe Pinto MD           Labs:   CMP  Recent Labs   Lab 04/22/25  1215 04/22/25  1042 04/22/25  0725 04/22/25  0544 04/21/25  0757 04/21/25  0554 04/20/25  1003 04/20/25  0659 04/19/25  0854 04/19/25  0606    140  --  141  --  139  --  140  --  141   POTASSIUM 4.5 4.4  --  3.9  --  3.7  --  3.6  --  3.6   CHLORIDE  --   --   --  107  --  107  --  109*  --  108*   CO2  --   --   --  22  --  22  --  21*  --  20*   ANIONGAP  --   --   --  12  --  10  --  10  --  13   * 251* 191* 192*   < > 194*   < > 204*   < > 159*   BUN  --   --   --  11.3  --  10.0  --  12.9  --  12.7   CR  --   --   --  0.75  --  0.72  --  0.69  --  0.77   GFRESTIMATED  --   --   --  >90  --  >90  --  >90  --  >90   RENO  --   --   --  8.8  --  8.7*  --  8.6*  --  9.0   MAG  --   --   --  1.9  --  1.8  --  " 1.9  --  2.2    < > = values in this interval not displayed.     CBC  Recent Labs   Lab 04/22/25  1215 04/22/25  1042 04/22/25  1030 04/22/25  0544 04/21/25  0554 04/20/25  0659 04/19/25  0606   WBC  --   --   --  6.8 7.1 6.8 7.0   RBC  --   --   --  4.66 4.78 4.68 4.97   HGB 14.7 14.9 15.0 14.5 14.8 14.6 15.4   HCT  --   --   --  43.1 44.3 43.0 45.6   MCV  --   --   --  93 93 92 92   MCH  --   --   --  31.1 31.0 31.2 31.0   MCHC  --   --   --  33.6 33.4 34.0 33.8   RDW  --   --   --  14.7 14.6 14.8 14.8   PLT  --   --   --  152 153 153 169     INRNo lab results found in last 7 days.  Arterial Blood Gas  Recent Labs   Lab 04/22/25  1215 04/22/25  1042   PH 7.35 7.36   PCO2 45 45   PO2 158* 82   HCO3 25 25   O2PER 80.0 75.0     Troponin T High Sensitivity No results found for: \"CTROPT\"    Diagnostics:  Echo 4/16/25  The tricuspid valve leaflets are not well visualized. No obvious vegetations  are seen on the tricuspid valve or the ICD leads on limited views. Recommend  EDMUND if clinical suspicion for infective endocarditis warrants.     Ischemic cardiomyopathy with severely reduced LVEF=21%. Regional wall motion  abnormalities as described below, unchanged from the prior study.  Global right ventricular function is normal.     This study was compared with the study from 6/25/24: No significant changes  noted.     TTE 4/17/25  Pacemaker leads noted in the RA and RV. There is a mobile lesion with  stranding on the CS lead (Image 65) and the RA lead (Image 7)     There is a mobile strand measuring 2.2 cm that appears to extend from the RA  to RV lead (Image 26)     These may represent thrombus or vegetation in the appropriate clinical  context.    Device Check 4/17/25  Device: [employer] G447 RESONATE X4 CRT-D  Normal device function  Mode: DDD  bpm  AP: <1%  LVP: 94%  Intrinsic rhythm: AS-VS 85 bpm  Lead Trends Appear Stable  Estimated battery longevity to RRT = 5.5  years.   Atrial Arrhythmia: " none  Ventricular Arrhythmia: none  Setting Changes: none     Ultrasound 4/16/25  Nonocclusive thrombus in the cranial portion of the right internal jugular veins. Occlusive versus nearly occlusive thrombus in the cranial left internal jugular vein. The bilateral internal jugular veins are patent and compressible caudally. This is similar to recent CT chest.     CT Chest 4/16/25  1.  No suspicious fluid collection, malpositioning or swelling about  the left chest pacemaker to suggest intrathoracic visualization.  2. Tubular transmural filling defects within the proximal internal  jugular vein bilaterally likely represent mixing artifact although DVT  cannot be excluded. Can confirm with ultrasound.  2. Moderate cardiomegaly with coronary calcifications and mild  bibasilar pulmonary edema.    Recent Results (from the past 24 hours)   EDMUND with Report    Narrative    Edward Pablo MD     4/22/2025  9:00 AM  Procedures     Cardiac Device Check - Inpatient   Result Value    Date Time Interrogation Session [id number]    Implantable Pulse Generator  [employer]    Implantable Pulse Generator Model G447 RESONATE X4 CRT-D    Implantable Pulse Generator Serial Number [id number]    Type Interrogation Session In Clinic    Clinic Name [facility]    Implantable Pulse Generator Type Cardiac Resynchronization Therapy - Defibrillator    Implantable Pulse Generator Implant Date 20190425    Implantable Lead  [employer]    Implantable Lead Model 0292 [place] 4-Site SG    Implantable Lead Serial Number [id number]    Implantable Lead Implant Date 20190425    Implantable Lead Polarity Type Bipolar Lead    Implantable Lead Location Detail 1 UNKNOWN    Implantable Lead Special Function Length 59 cm    Implantable Lead Location Right Ventricle    Implantable Lead Connection Status Connected    Implantable Lead  [employer]    Implantable Lead Model 4671  Acuity X4 Straight    Implantable Lead Serial Number [id number]    Implantable Lead Implant Date 20190425    Implantable Lead Polarity Type Quadripolar Lead    Implantable Lead Location Detail 1 UNKNOWN    Implantable Lead Special Function Length 86 cm    Implantable Lead Location Left Ventricle    Implantable Lead Connection Status Connected    Implantable Lead  [employer]    Implantable Lead Model 7740 Ingevity MRI    Implantable Lead Serial Number [id number]    Implantable Lead Implant Date 20190425    Implantable Lead Polarity Type Bipolar Lead    Implantable Lead Location Detail 1 UNKNOWN    Implantable Lead Special Function Length 45 cm    Implantable Lead Location Right Atrium    Implantable Lead Connection Status Connected    Marcelo Setting Mode (NBG Code) DDD    Marcelo Setting Lower Rate Limit 60    Marcelo Setting Maximum Tracking Rate 130    Marcelo Setting Maximum Sensor Rate 130    Marcelo Setting TIGIST Delay Low 140    Marcelo Setting PAV Delay Low 180    Marcelo Setting AT Mode Switch Rate 170    Marcelo Setting AT Mode Switch Mode VDIR    Lead Channel Setting Sensing Polarity Bipolar    Lead Channel Setting Sensing Sensitivity 0.25    Lead Channel Setting Sensing Adaptation Mode Adaptive    Lead Channel Setting Sensing Polarity Bipolar    Lead Channel Setting Sensing Sensitivity 0.6    Lead Channel Setting Sensing Adaptation Mode Adaptive    Lead Channel Setting Sensing Anode Location Left Ventricle    Lead Channel Setting Sensing Anode Terminal Ring2    Lead Channel Setting Sensing Cathode Location Left Ventricle    Lead Channel Setting Sensing Cathode Terminal Tip    Lead Channel Setting Sensing Sensitivity 1.0    Lead Channel Setting Sensing Adaptation Mode Adaptive    Ventricular chambers paced during CRT pacing. LVOnly    Lead Channel Setting Pacing Polarity Bipolar    Lead Channel Setting Pacing Pulse Width 0.5    Lead Channel Setting Pacing Amplitude 2.5    Lead Channel Setting Pacing Capture Mode  Fixed Pacing    Lead Channel Setting Pacing Polarity Bipolar    Lead Channel Setting Pacing Pulse Width 0.4    Lead Channel Setting Pacing Amplitude 2.0    Lead Channel Setting Pacing Capture Mode Adaptive    Lead Channel Setting Pacing Anode Location Other    Lead Channel Setting Pacing Anode Terminal Can    Lead Channel Setting Sensing Cathode Location Left Ventricle    Lead Channel Setting Sensing Cathode Terminal Ring3    Lead Channel Setting Pacing Pulse Width 0.5    Lead Channel Setting Pacing Amplitude 2.2    Lead Channel Setting Pacing Capture Mode Adaptive    Zone Setting Type Category VF    Zone Setting Vendor Type Category VF    Zone Setting Status Inactive    Zone Setting Type Category VT    Zone Setting Vendor Type Category VT    Zone Setting Status Inactive    Zone Setting Type Category VT    Zone Setting Vendor Type Category VT-1    Zone Setting Status Inactive    Lead Channel Impedance Value 493    Lead Channel Pacing Threshold Amplitude 1.0    Lead Channel Pacing Threshold Pulse Width 0.5    Lead Channel Impedance Value 782    Lead Channel Pacing Threshold Amplitude 1.0    Lead Channel Pacing Threshold Pulse Width 0.5    Lead Channel Impedance Value 647    Lead Channel Pacing Threshold Amplitude 1.1    Lead Channel Pacing Threshold Pulse Width 0.4    Battery Date Time of Measurements [id number]    Battery Status Beginning of Service    Battery Remaining Longevity 66    Battery Remaining Percentage 74    Capacitor Charge Type Reformation    Capacitor Last Charge Date Time [id number]    Capacitor Charge Time 11.3    Marcelo Statistic Date Time Start 16504444964828    Marcelo Statistic Date Time End 85051511877404    Marcelo Statistic RA Percent Paced 0    Marcelo Statistic RV Percent Paced 19    CRT Statistic LV Percent Paced 97    CRT Statistic Date Time Start 78961747432267    CRT Statistic Date Time End 20209747634029    Atrial Tachy Statistic Date Time Start [id number]    Atrial Tachy  Statistic Date Time End 20250422000000    Atrial Tachy Statistic AT/AF [place] Percent 0    Therapy Statistic Recent Shocks Delivered 0    Therapy Statistic Recent Shocks Aborted 0    Therapy Statistic Recent ATP Delivered 0    Therapy Statistic Recent Date Time Start 20250417000000    Therapy Statistic Recent Date Time End 20250422000000    Therapy Statistic Total Shocks Delivered 0    Therapy Statistic Total Shocks Aborted 0    Therapy Statistic Total ATP Delivered 0    Therapy Statistic Total  Date Time Start 20190425000000    Therapy Statistic Total  Date Time End 20250422000000    Episode Statistic Recent Count 0    Episode Statistic Type Category Other    Episode Statistic Recent Count 0    Episode Statistic Type Category VT    Episode Statistic Vendor Type Category NSVT    Episode Statistic Recent Count 0    Episode Statistic Type Category VT    Episode Statistic Vendor Type Category VT    Episode Statistic Recent Count 0    Episode Statistic Type Category VT    Episode Statistic Vendor Type Category VT-1    Episode Statistic Recent Date Time Start [id number]    Episode Statistic Recent Date Time End 20250422000000    Episode Statistic Recent Date Time Start [id number]    Episode Statistic Recent Date Time End 20250422000000    Episode Statistic Recent Date Time Start 71003328042143    Episode Statistic Recent Date Time End 20250422000000    Episode Statistic Recent Date Time Start 62676807646666    Episode Statistic Recent Date Time End 20250422000000    Narrative    Patient seen in [facility] for pre-extraction evaluation and iterative programming of their ICD per MD orders.     Device: [employer] G447 RESONATE X4 CRT-D  Normal device function  Mode: DDD  bpm  AP: <1%  LVP: 97%  Intrinsic rhythm: AS-VS 77 bpm  Lead Trends Appear Stable  Estimated battery longevity to RRT = 5.5 years.   Atrial Arrhythmia: none  Ventricular Arrhythmia: none  Setting Changes: ICD/Tachy therapies turned  OFF    Plan: Please page device nurse for further programming needs [id number]  Luna Solares/Shanice Ruiz, RN    Multi lead ICD    I have reviewed and interpreted the device interrogation, settings, programming and nurse's summary. The device is functioning within normal device parameters. I agree with the current findings, assessment and plan.       Medical Decision Making   60 MINUTES SPENT BY ME on the date of service doing chart review, history, exam, documentation & further activities per the note.    Wife present at bedside

## 2025-04-22 NOTE — PROGRESS NOTES
"CLINICAL NUTRITION SERVICES - ASSESSMENT NOTE    RECOMMENDATIONS FOR MDs/PROVIDERS TO ORDER:  None at this time    Registered Dietitian Interventions:  Left oral supplement menu in pt's room. Pt may order these if desired.    Future/Additional Recommendations:  -Rec continue current diet, as ordered. Encourage pt to self-select tolerated foods/beverages. Rec small, frequent meals and use of oral supplements. Pt may request oral supplements without a formal snack/supplement order. Pt with increased protein needs.  -Monitor need for sodium and fluid restrictions vs cardiac diet with a fluid restriction once eating adequately.  -Monitor stooling patterns and potential need to adjust bowel regimen (on scheduled miralax, often held).  -Monitor BG control. Hgb A1c of 9.5 on 4/16/25. BG was 192 on 4/22. T2DM. Monitor need for consistent carb diet. Endo is not following at this time.  -Order a multivitamin with minerals to help meet micronutrient needs, if inadequate intake continues.      REASON FOR ASSESSMENT  LOS    INFORMATION OBTAINED  Patient not available for interview due to pt not in room during attempts x3    NUTRITION HISTORY  Pt not known to this service PTA.     CURRENT NUTRITION ORDERS  Diet: NPO for possible infected pacer lead extraction 4/22. Previously on a regular diet. NPO at times for tests/procedures.    CURRENT INTAKE/TOLERANCE  Per nursing flowsheets (% intake), pt consistently consuming 100% with a good appetite with the exception of 75% once on 4/20. [employer] (meal ordering system) indicates food/beverages sent 4/19-4/21 totaled 1435 kcals and 60 g protein daily on average (ordering two meals daily) and does not meet estimated needs below. Unknown if eating any outside food (did not see food in pt's room). Per RNs, good appetite.     LABS  Nutrition-relevant labs: Reviewed    MEDICATIONS  Nutrition-relevant medications: Reviewed    ANTHROPOMETRICS  Height: 167.6 cm (5' 6\")  Admission Weight: " "80.9 kg (178 lb 4.8 oz) (04/16/25 8540)   Most Recent Weight: 81 kg (178 lb 8 oz) (04/22/25 0400)  IBW: 64.5 kg   BMI: Body mass index is 28.81 kg/m .   Weight History: 81.3 kg (2/6/2024), 81.6 kg (6/25/2024), 79.4 kg (12/17/2024), 80.9 kg (4/16/2025, admit), 81 kg (4/22) - Wt likely affected by fluid status changes. Diuresis this admission but no significant/severe wt loss.Per provider 4/21, euvolemic. Note, on jardiance PTA.   Dosing Weight: 68 kg (adjusted, based on lowest wt so far this admission of 79.8 kg on 4/19/25)    ASSESSED NUTRITION NEEDS (for inpatient hospital stay)  Estimated Energy Needs: 3179-1040 kcals/day (25 - 30 kcals/kg)  Justification: Maintenance  Estimated Protein Needs: 75-95 grams protein/day (1.1 - 1.4 grams of pro/kg)  Justification: Increased needs  Estimated Fluid Needs: 1282-6070 mL/day (25 - 30 mL/kg)  Justification: Maintenance needs or per provider, pending fluid status    SYSTEM AND PHYSICAL FINDINGS    GI symptoms: Having zero to one stool daily. Last stool noted on 4/20. Per provider note 4/21, \"Patient reports chronic diarrhea (\"happens 6+ days every month\") that he normally takes imodium for. Patient says he is overdue for a colonoscopy. PRN Imodium. Recommend PCP/GI follow up after discharge\"  Skin/wounds: No indication of pressure injury    MALNUTRITION  % Intake: Decreased intake does not meet criteria  % Weight Loss: Weight loss does not meet criteria   Subcutaneous Fat Loss: Unable to assess  Muscle Loss: Unable to assess  Fluid Accumulation/Edema: None noted  Malnutrition Diagnosis: Unable to determine due to but not in room during attempts to see.   Malnutrition Present on Admission: Unable to assess but not in room during attempts to see.     NUTRITION DIAGNOSIS  Inadequate oral intake related to possible decreased appetite and NPO at times and as evidenced by [employer] (meal ordering system) indicates food/beverages sent 4/19-4/21 totaled 1435 kcals and 60 g protein " "daily on average while estimated needs are 0621-4255 kcals/day (25 - 30 kcals/kg) and 75-95 grams protein/day (1.1 - 1.4 grams of pro/kg).    INTERVENTIONS  See nutrition interventions above    GOALS  Patient to consume % of nutritionally adequate meal trays TID, or the equivalent with supplements/snacks.     MONITORING/EVALUATION  Progress toward goals will be monitored and evaluated per policy.     Loree Mei, MS, RD, LD, CNSC      No longer available via pager  6C (beds 8430-8137 and [phone]): Vocera \"6C Clinical Dietitian\"   Weekend/Holiday: Vocera \"Weekend Clinical Dietitian\"   "

## 2025-04-23 ENCOUNTER — DOCUMENTATION ONLY (OUTPATIENT)
Dept: INTERNAL MEDICINE | Facility: CLINIC | Age: 70
End: 2025-04-23

## 2025-04-23 VITALS
DIASTOLIC BLOOD PRESSURE: 66 MMHG | RESPIRATION RATE: 20 BRPM | BODY MASS INDEX: 28.87 KG/M2 | HEART RATE: 84 BPM | HEIGHT: 66 IN | SYSTOLIC BLOOD PRESSURE: 97 MMHG | WEIGHT: 179.6 LBS | TEMPERATURE: 98.1 F | OXYGEN SATURATION: 93 %

## 2025-04-23 DIAGNOSIS — T82.837S: Primary | ICD-10-CM

## 2025-04-23 DIAGNOSIS — L02.211 ABDOMINAL WALL ABSCESS: ICD-10-CM

## 2025-04-23 LAB
ANION GAP SERPL CALCULATED.3IONS-SCNC: 9 MMOL/L (ref 7–15)
ATRIAL RATE - MUSE: 89 BPM
BUN SERPL-MCNC: 11.2 MG/DL (ref 8–23)
CALCIUM SERPL-MCNC: 8.7 MG/DL (ref 8.8–10.4)
CHLORIDE SERPL-SCNC: 109 MMOL/L (ref 98–107)
CREAT SERPL-MCNC: 0.9 MG/DL (ref 0.67–1.17)
DIASTOLIC BLOOD PRESSURE - MUSE: NORMAL MMHG
EGFRCR SERPLBLD CKD-EPI 2021: >90 ML/MIN/1.73M2
ERYTHROCYTE [DISTWIDTH] IN BLOOD BY AUTOMATED COUNT: 15.3 % (ref 10–15)
GLUCOSE BLDC GLUCOMTR-MCNC: 101 MG/DL (ref 70–99)
GLUCOSE BLDC GLUCOMTR-MCNC: 102 MG/DL (ref 70–99)
GLUCOSE BLDC GLUCOMTR-MCNC: 104 MG/DL (ref 70–99)
GLUCOSE BLDC GLUCOMTR-MCNC: 115 MG/DL (ref 70–99)
GLUCOSE BLDC GLUCOMTR-MCNC: 129 MG/DL (ref 70–99)
GLUCOSE BLDC GLUCOMTR-MCNC: 138 MG/DL (ref 70–99)
GLUCOSE BLDC GLUCOMTR-MCNC: 161 MG/DL (ref 70–99)
GLUCOSE BLDC GLUCOMTR-MCNC: 182 MG/DL (ref 70–99)
GLUCOSE BLDC GLUCOMTR-MCNC: 88 MG/DL (ref 70–99)
GLUCOSE BLDC GLUCOMTR-MCNC: 98 MG/DL (ref 70–99)
GLUCOSE SERPL-MCNC: 105 MG/DL (ref 70–99)
HCO3 SERPL-SCNC: 24 MMOL/L (ref 22–29)
HCT VFR BLD AUTO: 39.4 % (ref 40–53)
HGB BLD-MCNC: 13.4 G/DL (ref 13.3–17.7)
INTERPRETATION ECG - MUSE: NORMAL
MAGNESIUM SERPL-MCNC: 2 MG/DL (ref 1.7–2.3)
MCH RBC QN AUTO: 30.8 PG (ref 26.5–33)
MCHC RBC AUTO-ENTMCNC: 34 G/DL (ref 31.5–36.5)
MCV RBC AUTO: 91 FL (ref 78–100)
MDC_IDC_LEAD_CONNECTION_STATUS: NORMAL
MDC_IDC_LEAD_IMPLANT_DT: NORMAL
MDC_IDC_LEAD_LOCATION: NORMAL
MDC_IDC_LEAD_LOCATION_DETAIL_1: NORMAL
MDC_IDC_LEAD_MFG: NORMAL
MDC_IDC_LEAD_MODEL: NORMAL
MDC_IDC_LEAD_POLARITY_TYPE: NORMAL
MDC_IDC_LEAD_SERIAL: NORMAL
MDC_IDC_LEAD_SPECIAL_FUNCTION: NORMAL
MDC_IDC_MSMT_BATTERY_DTM: NORMAL
MDC_IDC_MSMT_BATTERY_REMAINING_LONGEVITY: 66 MO
MDC_IDC_MSMT_BATTERY_REMAINING_PERCENTAGE: 74 %
MDC_IDC_MSMT_BATTERY_STATUS: NORMAL
MDC_IDC_MSMT_CAP_CHARGE_DTM: NORMAL
MDC_IDC_MSMT_CAP_CHARGE_TIME: 11.3 S
MDC_IDC_MSMT_CAP_CHARGE_TYPE: NORMAL
MDC_IDC_MSMT_LEADCHNL_LV_IMPEDANCE_VALUE: 493 OHM
MDC_IDC_MSMT_LEADCHNL_LV_PACING_THRESHOLD_AMPLITUDE: 1 V
MDC_IDC_MSMT_LEADCHNL_LV_PACING_THRESHOLD_PULSEWIDTH: 0.5 MS
MDC_IDC_MSMT_LEADCHNL_RA_IMPEDANCE_VALUE: 782 OHM
MDC_IDC_MSMT_LEADCHNL_RA_PACING_THRESHOLD_AMPLITUDE: 1 V
MDC_IDC_MSMT_LEADCHNL_RA_PACING_THRESHOLD_PULSEWIDTH: 0.5 MS
MDC_IDC_MSMT_LEADCHNL_RV_IMPEDANCE_VALUE: 647 OHM
MDC_IDC_MSMT_LEADCHNL_RV_PACING_THRESHOLD_AMPLITUDE: 1.1 V
MDC_IDC_MSMT_LEADCHNL_RV_PACING_THRESHOLD_PULSEWIDTH: 0.4 MS
MDC_IDC_PG_IMPLANT_DTM: NORMAL
MDC_IDC_PG_MFG: NORMAL
MDC_IDC_PG_MODEL: NORMAL
MDC_IDC_PG_SERIAL: NORMAL
MDC_IDC_PG_TYPE: NORMAL
MDC_IDC_SESS_CLINIC_NAME: NORMAL
MDC_IDC_SESS_DTM: NORMAL
MDC_IDC_SESS_TYPE: NORMAL
MDC_IDC_SET_BRADY_AT_MODE_SWITCH_MODE: NORMAL
MDC_IDC_SET_BRADY_AT_MODE_SWITCH_RATE: 170 {BEATS}/MIN
MDC_IDC_SET_BRADY_LOWRATE: 60 {BEATS}/MIN
MDC_IDC_SET_BRADY_MAX_SENSOR_RATE: 130 {BEATS}/MIN
MDC_IDC_SET_BRADY_MAX_TRACKING_RATE: 130 {BEATS}/MIN
MDC_IDC_SET_BRADY_MODE: NORMAL
MDC_IDC_SET_BRADY_PAV_DELAY_LOW: 180 MS
MDC_IDC_SET_BRADY_SAV_DELAY_LOW: 140 MS
MDC_IDC_SET_CRT_PACED_CHAMBERS: NORMAL
MDC_IDC_SET_LEADCHNL_LV_PACING_AMPLITUDE: 2.2 V
MDC_IDC_SET_LEADCHNL_LV_PACING_ANODE_ELECTRODE_1: NORMAL
MDC_IDC_SET_LEADCHNL_LV_PACING_ANODE_LOCATION_1: NORMAL
MDC_IDC_SET_LEADCHNL_LV_PACING_CAPTURE_MODE: NORMAL
MDC_IDC_SET_LEADCHNL_LV_PACING_CATHODE_ELECTRODE_1: NORMAL
MDC_IDC_SET_LEADCHNL_LV_PACING_CATHODE_LOCATION_1: NORMAL
MDC_IDC_SET_LEADCHNL_LV_PACING_PULSEWIDTH: 0.5 MS
MDC_IDC_SET_LEADCHNL_LV_SENSING_ADAPTATION_MODE: NORMAL
MDC_IDC_SET_LEADCHNL_LV_SENSING_ANODE_ELECTRODE_1: NORMAL
MDC_IDC_SET_LEADCHNL_LV_SENSING_ANODE_LOCATION_1: NORMAL
MDC_IDC_SET_LEADCHNL_LV_SENSING_CATHODE_ELECTRODE_1: NORMAL
MDC_IDC_SET_LEADCHNL_LV_SENSING_CATHODE_LOCATION_1: NORMAL
MDC_IDC_SET_LEADCHNL_LV_SENSING_SENSITIVITY: 1 MV
MDC_IDC_SET_LEADCHNL_RA_PACING_AMPLITUDE: 2.5 V
MDC_IDC_SET_LEADCHNL_RA_PACING_CAPTURE_MODE: NORMAL
MDC_IDC_SET_LEADCHNL_RA_PACING_POLARITY: NORMAL
MDC_IDC_SET_LEADCHNL_RA_PACING_PULSEWIDTH: 0.5 MS
MDC_IDC_SET_LEADCHNL_RA_SENSING_ADAPTATION_MODE: NORMAL
MDC_IDC_SET_LEADCHNL_RA_SENSING_POLARITY: NORMAL
MDC_IDC_SET_LEADCHNL_RA_SENSING_SENSITIVITY: 0.25 MV
MDC_IDC_SET_LEADCHNL_RV_PACING_AMPLITUDE: 2 V
MDC_IDC_SET_LEADCHNL_RV_PACING_CAPTURE_MODE: NORMAL
MDC_IDC_SET_LEADCHNL_RV_PACING_POLARITY: NORMAL
MDC_IDC_SET_LEADCHNL_RV_PACING_PULSEWIDTH: 0.4 MS
MDC_IDC_SET_LEADCHNL_RV_SENSING_ADAPTATION_MODE: NORMAL
MDC_IDC_SET_LEADCHNL_RV_SENSING_POLARITY: NORMAL
MDC_IDC_SET_LEADCHNL_RV_SENSING_SENSITIVITY: 0.6 MV
MDC_IDC_SET_ZONE_STATUS: NORMAL
MDC_IDC_SET_ZONE_TYPE: NORMAL
MDC_IDC_SET_ZONE_VENDOR_TYPE: NORMAL
MDC_IDC_STAT_AT_BURDEN_PERCENT: 0 %
MDC_IDC_STAT_AT_DTM_END: NORMAL
MDC_IDC_STAT_AT_DTM_START: NORMAL
MDC_IDC_STAT_BRADY_DTM_END: NORMAL
MDC_IDC_STAT_BRADY_DTM_START: NORMAL
MDC_IDC_STAT_BRADY_RA_PERCENT_PACED: 0 %
MDC_IDC_STAT_BRADY_RV_PERCENT_PACED: 19 %
MDC_IDC_STAT_CRT_DTM_END: NORMAL
MDC_IDC_STAT_CRT_DTM_START: NORMAL
MDC_IDC_STAT_CRT_LV_PERCENT_PACED: 97 %
MDC_IDC_STAT_EPISODE_RECENT_COUNT: 0
MDC_IDC_STAT_EPISODE_RECENT_COUNT_DTM_END: NORMAL
MDC_IDC_STAT_EPISODE_RECENT_COUNT_DTM_START: NORMAL
MDC_IDC_STAT_EPISODE_TYPE: NORMAL
MDC_IDC_STAT_EPISODE_VENDOR_TYPE: NORMAL
MDC_IDC_STAT_TACHYTHERAPY_ATP_DELIVERED_RECENT: 0
MDC_IDC_STAT_TACHYTHERAPY_ATP_DELIVERED_TOTAL: 0
MDC_IDC_STAT_TACHYTHERAPY_RECENT_DTM_END: NORMAL
MDC_IDC_STAT_TACHYTHERAPY_RECENT_DTM_START: NORMAL
MDC_IDC_STAT_TACHYTHERAPY_SHOCKS_ABORTED_RECENT: 0
MDC_IDC_STAT_TACHYTHERAPY_SHOCKS_ABORTED_TOTAL: 0
MDC_IDC_STAT_TACHYTHERAPY_SHOCKS_DELIVERED_RECENT: 0
MDC_IDC_STAT_TACHYTHERAPY_SHOCKS_DELIVERED_TOTAL: 0
MDC_IDC_STAT_TACHYTHERAPY_TOTAL_DTM_END: NORMAL
MDC_IDC_STAT_TACHYTHERAPY_TOTAL_DTM_START: NORMAL
P AXIS - MUSE: 58 DEGREES
PLATELET # BLD AUTO: 148 10E3/UL (ref 150–450)
POTASSIUM SERPL-SCNC: 3.5 MMOL/L (ref 3.4–5.3)
PR INTERVAL - MUSE: 204 MS
QRS DURATION - MUSE: 158 MS
QT - MUSE: 450 MS
QTC - MUSE: 547 MS
R AXIS - MUSE: -73 DEGREES
RBC # BLD AUTO: 4.35 10E6/UL (ref 4.4–5.9)
SODIUM SERPL-SCNC: 142 MMOL/L (ref 135–145)
SYSTOLIC BLOOD PRESSURE - MUSE: NORMAL MMHG
T AXIS - MUSE: 131 DEGREES
VENTRICULAR RATE- MUSE: 89 BPM
WBC # BLD AUTO: 11.3 10E3/UL (ref 4–11)

## 2025-04-23 PROCEDURE — 250N000009 HC RX 250: Performed by: STUDENT IN AN ORGANIZED HEALTH CARE EDUCATION/TRAINING PROGRAM

## 2025-04-23 PROCEDURE — 36415 COLL VENOUS BLD VENIPUNCTURE: CPT | Performed by: INTERNAL MEDICINE

## 2025-04-23 PROCEDURE — 250N000013 HC RX MED GY IP 250 OP 250 PS 637

## 2025-04-23 PROCEDURE — 250N000011 HC RX IP 250 OP 636: Performed by: INTERNAL MEDICINE

## 2025-04-23 PROCEDURE — G0463 HOSPITAL OUTPT CLINIC VISIT: HCPCS

## 2025-04-23 PROCEDURE — 250N000013 HC RX MED GY IP 250 OP 250 PS 637: Performed by: STUDENT IN AN ORGANIZED HEALTH CARE EDUCATION/TRAINING PROGRAM

## 2025-04-23 PROCEDURE — 83735 ASSAY OF MAGNESIUM: CPT | Performed by: INTERNAL MEDICINE

## 2025-04-23 PROCEDURE — 99239 HOSP IP/OBS DSCHRG MGMT >30: CPT | Mod: FS | Performed by: INTERNAL MEDICINE

## 2025-04-23 PROCEDURE — 99418 PROLNG IP/OBS E/M EA 15 MIN: CPT | Performed by: INTERNAL MEDICINE

## 2025-04-23 PROCEDURE — G0545 PR INHRENT VISIT TO INPT/OBS W CNFRM/SUSPCT INFCT DIS BY INFCT DIS SPCIALST: HCPCS | Performed by: INTERNAL MEDICINE

## 2025-04-23 PROCEDURE — 250N000013 HC RX MED GY IP 250 OP 250 PS 637: Performed by: INTERNAL MEDICINE

## 2025-04-23 PROCEDURE — 85014 HEMATOCRIT: CPT

## 2025-04-23 PROCEDURE — 80048 BASIC METABOLIC PNL TOTAL CA: CPT | Performed by: INTERNAL MEDICINE

## 2025-04-23 PROCEDURE — 999N000248 HC STATISTIC IV INSERT WITH US BY RN

## 2025-04-23 PROCEDURE — 250N000013 HC RX MED GY IP 250 OP 250 PS 637: Performed by: NURSE PRACTITIONER

## 2025-04-23 PROCEDURE — 99233 SBSQ HOSP IP/OBS HIGH 50: CPT | Performed by: INTERNAL MEDICINE

## 2025-04-23 RX ORDER — NALOXONE HYDROCHLORIDE 0.4 MG/ML
0.2 INJECTION, SOLUTION INTRAMUSCULAR; INTRAVENOUS; SUBCUTANEOUS
Status: DISCONTINUED | OUTPATIENT
Start: 2025-04-23 | End: 2025-04-23 | Stop reason: HOSPADM

## 2025-04-23 RX ORDER — NALOXONE HYDROCHLORIDE 0.4 MG/ML
0.4 INJECTION, SOLUTION INTRAMUSCULAR; INTRAVENOUS; SUBCUTANEOUS
Status: DISCONTINUED | OUTPATIENT
Start: 2025-04-23 | End: 2025-04-23 | Stop reason: HOSPADM

## 2025-04-23 RX ORDER — SPIRONOLACTONE 50 MG/1
50 TABLET, FILM COATED ORAL DAILY
Qty: 90 TABLET | Refills: 3 | Status: SHIPPED | OUTPATIENT
Start: 2025-04-24

## 2025-04-23 RX ORDER — MAGNESIUM OXIDE 400 MG/1
400 TABLET ORAL EVERY 4 HOURS
Status: COMPLETED | OUTPATIENT
Start: 2025-04-23 | End: 2025-04-23

## 2025-04-23 RX ORDER — LINEZOLID 600 MG/1
600 TABLET, FILM COATED ORAL 2 TIMES DAILY
Qty: 28 TABLET | Refills: 0 | Status: ACTIVE | OUTPATIENT
Start: 2025-04-23 | End: 2025-05-07

## 2025-04-23 RX ORDER — POTASSIUM CHLORIDE 750 MG/1
20 TABLET, EXTENDED RELEASE ORAL ONCE
Status: COMPLETED | OUTPATIENT
Start: 2025-04-23 | End: 2025-04-23

## 2025-04-23 RX ADMIN — ASPIRIN 81 MG CHEWABLE TABLET 81 MG: 81 TABLET CHEWABLE at 08:13

## 2025-04-23 RX ADMIN — INSULIN HUMAN 3 UNITS/HR: 1 INJECTION, SOLUTION INTRAVENOUS at 08:12

## 2025-04-23 RX ADMIN — CARVEDILOL 25 MG: 25 TABLET, FILM COATED ORAL at 08:13

## 2025-04-23 RX ADMIN — SPIRONOLACTONE 50 MG: 25 TABLET, FILM COATED ORAL at 08:13

## 2025-04-23 RX ADMIN — FUROSEMIDE 40 MG: 40 TABLET ORAL at 08:13

## 2025-04-23 RX ADMIN — EMPAGLIFLOZIN 25 MG: 25 TABLET, FILM COATED ORAL at 10:19

## 2025-04-23 RX ADMIN — APIXABAN 10 MG: 5 TABLET, FILM COATED ORAL at 11:59

## 2025-04-23 RX ADMIN — POTASSIUM CHLORIDE 20 MEQ: 750 TABLET, EXTENDED RELEASE ORAL at 08:13

## 2025-04-23 RX ADMIN — ATORVASTATIN CALCIUM 80 MG: 80 TABLET, FILM COATED ORAL at 08:13

## 2025-04-23 RX ADMIN — INSULIN HUMAN 3 UNITS/HR: 1 INJECTION, SOLUTION INTRAVENOUS at 02:10

## 2025-04-23 RX ADMIN — POLYETHYLENE GLYCOL 3350 17 G: 17 POWDER, FOR SOLUTION ORAL at 08:14

## 2025-04-23 RX ADMIN — Medication 1250 MG: at 05:18

## 2025-04-23 RX ADMIN — MAGNESIUM OXIDE TAB 400 MG (241.3 MG ELEMENTAL MG) 400 MG: 400 (241.3 MG) TAB at 10:19

## 2025-04-23 RX ADMIN — SACUBITRIL AND VALSARTAN 1 TABLET: 97; 103 TABLET, FILM COATED ORAL at 08:14

## 2025-04-23 ASSESSMENT — ACTIVITIES OF DAILY LIVING (ADL)
ADLS_ACUITY_SCORE: 38
ADLS_ACUITY_SCORE: 37
ADLS_ACUITY_SCORE: 38
ADLS_ACUITY_SCORE: 38
ADLS_ACUITY_SCORE: 37
ADLS_ACUITY_SCORE: 37
ADLS_ACUITY_SCORE: 38
ADLS_ACUITY_SCORE: 38
ADLS_ACUITY_SCORE: 37
ADLS_ACUITY_SCORE: 38
ADLS_ACUITY_SCORE: 37
ADLS_ACUITY_SCORE: 38
ADLS_ACUITY_SCORE: 38

## 2025-04-23 NOTE — PLAN OF CARE
Temp: 98  F (36.7  C) Temp src: Oral BP: 106/77 Pulse: 106   Resp: 16 SpO2: 92 % O2 Device: None (Room air) Oxygen Delivery: 2 LPM     Hours of care: 7228-8431    D: Presented on 4/16/25 with concern for pacemaker pocket infection. Now s/p pacemaker extraction on 4/22/25    I/A: Cedric is A/O x4, [place] and forgetful. Calls appropriately, calm and cooperative. Tele in place, SR, HR 70s-80s. VSS on RA. 2 L PIV in place infusing. Pacemaker dressing site is intact but soaked and leaking with serosanguineous drainage. Dressing was reinforced and MD was notified. No new skin deficits noted. Tolerating regular diet. Up independently. Voiding in toilet, no BM this shift. Appeared to sleep poorly overnight d/t hourly BG checks.    Changed: pacemaker dressing site is soaked and leaking serosanguineous drainage. Dressing was reinforced. MD is aware.  Running: insulin gtt on algorithm 3, currently off. Last BG was 98, recheck at 0800.  PRN:    P: Continue to monitor and follow POC. Notify CARDS 1 with changes.    Goal Outcome Evaluation:    Plan of Care Reviewed With: patient  Overall Patient Progress: no change  Outcome Evaluation: VSS on RA. Denies pain. Insulin gtt. Pacemaker dressing site is soaked and leaking blood - MD is aware. Reinforced dressing for now.

## 2025-04-23 NOTE — PROGRESS NOTES
"  Electrophysiology Post Procedure Follow Up Note  Date of Procedure: 4/22/25  DOS: 4/23/2025   PREOPERATIVE DIAGNOSIS  Infected CRT-D system     POSTOPERATIVE DIAGNOSIS  S/p successful extraction of CRT-D generator and leads  Hospital Course:  Anson Manriquez is a 69 year old male with a history of HTN, DMII, LBBB, CAD (s/p STEMI in 2007 with pLAD stenting, and STEMI 9/2018) s/p CRT-D (2019), ICM LVEF 20-25%, not pacemaker dependent, who presented on 4/16/25 with concern for pacemaker pocket infection for which he presents today for device and leads extraction under GA and surgical support.   Patient underwent a successful device extraction and removal yesterday. He had an uneventful overnight stay. Groin sites are soft, CDI, no bruit, no bleeding, and no hematoma. Left chest site CDI, no bleeding, and no hematoma. Patient is tolerating oral intake, ambulating at baseline, and voiding without difficulties. Patient remains hemodynamically stable.     ROS:   10 point ROS neg other than the symptoms noted above.   Exam:  /74 (BP Location: Right arm)   Pulse 86   Temp 98.8  F (37.1  C) (Oral)   Resp 22   Ht 1.676 m (5' 6\")   Wt 81.5 kg (179 lb 9.6 oz)   SpO2 92%   BMI 28.99 kg/m      Constitutional: healthy, alert, and no distress  Head: Normocephalic.   Neck: Neck supple.   ENT: ENT exam normal, no neck nodes or sinus tenderness  Cardiovascular: RRR. No murmurs, clicks gallops or rub  Respiratory: Lungs clear  Gastrointestinal: Abdomen soft, non-tender. BS normal. No masses, organomegaly  : Deferred  Musculoskeletal: extremities normal- no gross deformities noted, gait normal, and normal muscle tone  Skin: no suspicious lesions or rashes  Neurologic: Gait normal. Reflexes normal and symmetric. Sensation grossly WNL.  Psychiatric: mentation appears normal and affect normal/bright  Hematologic/Lymphatic/Immunologic: Normal cervical lymph nodes    Medications:  Current Facility-Administered Medications "   Medication Dose Route Frequency Provider Last Rate Last Admin    acetaminophen (TYLENOL) tablet 650 mg  650 mg Oral Q4H PRN Felipe Pinto MD   650 mg at 04/22/25 0712    Or    acetaminophen (TYLENOL) Suppository 650 mg  650 mg Rectal Q4H PRN Felipe Pinto MD        apixaban ANTICOAGULANT (ELIQUIS) tablet 10 mg  10 mg Oral BID Giovanna Gonzalez APRN CNP   10 mg at 04/23/25 1159    Followed by    [START ON 4/30/2025] apixaban ANTICOAGULANT (ELIQUIS) tablet 5 mg  5 mg Oral BID Giovanna Gonzalez APRN CNP        aspirin (ASA) chewable tablet 81 mg  81 mg Oral Daily Felipe Pinto MD   81 mg at 04/23/25 0813    atorvastatin (LIPITOR) tablet 80 mg  80 mg Oral Daily Felipe Pinto MD   80 mg at 04/23/25 0813    calcium carbonate (TUMS) chewable tablet 1,000 mg  1,000 mg Oral 4x Daily PRN Felipe Pinto MD        carvedilol (COREG) tablet 25 mg  25 mg Oral BID w/meals Felipe Pinto MD   25 mg at 04/23/25 0813    dextrose 10% infusion   Intravenous Continuous PRN Zainab Aguilar MD        glucose gel 15-30 g  15-30 g Oral Q15 Min PRN Zainab Aguilar MD        Or    dextrose 50 % injection 25-50 mL  25-50 mL Intravenous Q15 Min PRN Zainab Aguilar MD        Or    glucagon injection 1 mg  1 mg Subcutaneous Q15 Min PRN Zainab Aguilar MD        empagliflozin (JARDIANCE) tablet 25 mg  25 mg Oral Daily Maeve James CNP   25 mg at 04/23/25 1019    furosemide (LASIX) tablet 40 mg  40 mg Oral BID Melissa Griggs MD   40 mg at 04/23/25 0813    HOLD: enoxaparin (LOVENOX) Post Procedure   Does not apply HOLD Aimee Roberts APRN CNP        HOLD: heparin (IV or Subcutaneous) Post Procedure   Does not apply HOLD Aimee Roberts APRN CNP        HOLD: metformin and metformin containing medications on day of procedure and 48 hours after IV contrast given   Does not apply HOLD Aimee Roberts APRN CNP        lidocaine (LMX4) cream   Topical Q1H PRN Ignacio Spencer MD        lidocaine (LMX4)  cream   Topical Q1H PRN Felipe Pinto MD        lidocaine 1 % 0.1-1 mL  0.1-1 mL Other Q1H PRN Ignacio Spencer MD        lidocaine 1 % 0.1-1 mL  0.1-1 mL Other Q1H PRN Felipe Pinto MD        loperamide (IMODIUM) capsule 2 mg  2 mg Oral 4x Daily PRN Giovanna Gonzalez APRN CNP        melatonin tablet 3 mg  3 mg Oral At Bedtime Felipe Pinto MD   3 mg at 04/22/25 2213    ondansetron (ZOFRAN ODT) ODT tab 4 mg  4 mg Oral Q6H PRN Felipe Pinto MD        Or    ondansetron (ZOFRAN) injection 4 mg  4 mg Intravenous Q6H PRN Felipe Pinto MD        oxyCODONE-acetaminophen (PERCOCET) 5-325 MG per tablet 1 tablet  1 tablet Oral Q4H PRN Aimee Roberts APRN CNP        Patient is already receiving anticoagulation with heparin, enoxaparin (LOVENOX), warfarin (COUMADIN)  or other anticoagulant medication   Does not apply Continuous PRN Felipe Pinto MD        polyethylene glycol (MIRALAX) Packet 17 g  17 g Oral Daily Felipe Pinto MD   17 g at 04/23/25 0814    sacubitril-valsartan (ENTRESTO)  MG per tablet 1 tablet  1 tablet Oral BID Felipe Pinto MD   1 tablet at 04/23/25 0814    senna-docusate (SENOKOT-S/PERICOLACE) 8.6-50 MG per tablet 1 tablet  1 tablet Oral BID PRN Felipe Pinto MD        Or    senna-docusate (SENOKOT-S/PERICOLACE) 8.6-50 MG per tablet 2 tablet  2 tablet Oral BID PRN Felipe Pinto MD        sodium chloride (PF) 0.9% PF flush 3 mL  3 mL Intracatheter Q8H [facility] Ignacio Spencer MD   3 mL at 04/23/25 0518    sodium chloride (PF) 0.9% PF flush 3 mL  3 mL Intracatheter q1 min prn Ignacio Spencer MD   3 mL at 04/17/25 2006    sodium chloride (PF) 0.9% PF flush 3 mL  3 mL Intracatheter Q8H [facility] Felipe Pinto MD   3 mL at 04/22/25 0535    sodium chloride (PF) 0.9% PF flush 3 mL  3 mL Intracatheter q1 min prn Felipe Pinto MD        spironolactone (ALDACTONE) tablet 50 mg  50 mg Oral Daily Giovanna Gonzalez APRN CNP   50 mg at 04/23/25 0813    vancomycin  (VANCOCIN) 1,250 mg in 0.9% NaCl 250 mL intermittent infusion  1,250 mg Intravenous Q12H Ignacio Spencer  mL/hr at 04/21/25 1709 1,250 mg at 04/23/25 0518     Facility-Administered Medications Ordered in Other Encounters   Medication Dose Route Frequency Provider Last Rate Last Admin    perflutren diluted 1mL to 2mL with saline (OPTISON) diluted injection 5 mL  5 mL Intravenous Once Chris Reece MD        sodium chloride (PF) 0.9% PF flush 10 mL  10 mL Intravenous Once Julissa Rodriguez MD        sodium chloride (PF) 0.9% PF flush 10 mL  10 mL Intracatheter Once Jason Matos MD           ROUTINE IP LABS (Last four results)  BMP  Recent Labs   Lab 04/23/25  1022 04/23/25  0809 04/23/25  0706 04/23/25  0617 04/22/25  1304 04/22/25  1215 04/22/25  1042 04/22/25  0725 04/22/25  0544 04/21/25  0757 04/21/25  0554 04/20/25  1003 04/20/25  0659   NA  --   --   --  142  --  140 140  --  141  --  139  --  140   POTASSIUM  --   --   --  3.5  --  4.5 4.4  --  3.9  --  3.7  --  3.6   CHLORIDE  --   --   --  109*  --   --   --   --  107  --  107  --  109*   RENO  --   --   --  8.7*  --   --   --   --  8.8  --  8.7*  --  8.6*   CO2  --   --   --  24  --   --   --   --  22  --  22  --  21*   BUN  --   --   --  11.2  --   --   --   --  11.3  --  10.0  --  12.9   CR  --   --   --  0.90  --   --   --   --  0.75  --  0.72  --  0.69   * 129* 98 105*   < > 216* 251*   < > 192*   < > 194*   < > 204*    < > = values in this interval not displayed.     CBC  Recent Labs   Lab 04/23/25  0617 04/22/25  1215 04/22/25  1042 04/22/25  1030 04/22/25  0544 04/21/25  0554 04/20/25  0659   WBC 11.3*  --   --   --  6.8 7.1 6.8   RBC 4.35*  --   --   --  4.66 4.78 4.68   HGB 13.4 14.7 14.9 15.0 14.5 14.8 14.6   HCT 39.4*  --   --   --  43.1 44.3 43.0   MCV 91  --   --   --  93 93 92   MCH 30.8  --   --   --  31.1 31.0 31.2   MCHC 34.0  --   --   --  33.6 33.4 34.0   RDW 15.3*  --   --   --  14.7 14.6 14.8   *  --   --    --  152 153 153       Patient Instructions:    Care of groin site:        Remove the Band-Aid after 24 hours. If there is minor oozing, apply another Band-aid and remove it after 12 hours.         Do NOT take a bath, use a hot tub, pool, or submerse in water for at least 3 days You may shower.         It is normal to have a small bruise or lump at the site.        Do not scrub the site.        Do not use lotion or powder near the puncture site for 3 days.        For the first 2 days: Do not stoop or squat. When you cough, sneeze or move your bowels, hold your hand over the puncture site and press gently.        Do not lift more than 10 pounds or exertional activity for 10 days.      If you start bleeding from the site in your groin:  Lie down flat and press firmly on the site.  Call your physician immediately, or, come to the emergency room.    Call 911 right away if you have bleeding that is heavy or does not stop.     Call your doctor/provider if:        You have a large or growing hard lump around the site.        The site is red, swollen, hot or tender.        Blood or fluid is draining from the site.        You have chills or a fever greater than 101 F (38 C).        Your leg or arm turns bluish, feels numb or cool.        You have hives, a rash or unusual itching.     Plan & Follow up:  Antibiotics per ID  Follow up pocket and lead tip cultures  Plan for reimplant of new CRTD on contralateral side in 6-8 weeks and with ID clearance.   OK to discharge from EP standpoint with outpatient wound care.   May use LifeVest as bridge to reimplant    Aldo Stoner MD  Electrophysiology Consult Service  Securely message with [email or web address]   Text page via [facility] Paging/Directory

## 2025-04-23 NOTE — CONSULTS
[facility]  WOC Nurse Inpatient Assessment     Consulted for: Left upper chest wound    WOC nurse follow-up plan: weekly- if inpatient    Patient History (according to provider note(s):      Anson Manriquez is a 69 year old male with a PMH significant for HTN, DMII, LBBB, CAD (s/p STEMI in  with pLAD stenting, and STEMI 2018) s/p CRT-D (), ICM LVEF 20-25% who presented on 25 with concern for pacemaker pocket infection.     Assessment:      Areas visualized during today's visit: Chest   Wound location: Left upper chest      Last photo: 2025  Wound due to: Surgical Wound  Wound history/plan of care: Pacemaker extraction site. Extracted  due to infection.  Wound base: 50% Granulation tissue, 50 % No granulation buds noted     Palpation of the wound bed: normal      Drainage: moderate     Description of drainage: serosanguinous     Measurements (length x width x depth, in cm): 0.3 x 5.5 x 1.8 cm      Tunnelin.6 cm at 12 o clock     Undermining: N/A  Periwound skin: Intact      Color: normal and consistent with surrounding tissue      Temperature: normal   Odor: none  Pain: mild and tension to hands, feet and body, tender  Pain interventions prior to dressing change: patient tolerated well and slow and gentle cares   Treatment goal: Drainage control, Infection control/prevention, Increase granulation, and Simplify plan of care  STATUS: initial assessment  Supplies ordered: gathered, at bedside, and discussed with patient  and family       Treatment Plan:     Left chest wound BID and PRN  Moisten roll gauze with Vashe wound cleanser solution, squeeze out excess solution.  Loosely pack wound with moistened gauze.  Cover with dry gauze and secure with tape.      Orders: Written    RECOMMEND PRIMARY TEAM ORDER: None, at this time  Education provided: plan of care and wound progress  Discussed plan of care with: Patient and Family  Notify WOC if wound(s)  deteriorate.  Nursing to notify the Provider(s) and re-consult the [facility] Nurse if new skin concern.    DATA:     Current support surface: Standard  Standard gel mattress (Isoflex)  Containment of urine/stool: Incontinent pad in bed  BMI: Body mass index is 28.99 kg/m .   Active diet order: Orders Placed This Encounter      Advance Diet as Tolerated: Regular Diet Adult     Output: I/O last 3 completed shifts:  In: 1775.98 [P.O.:240; I.V.:1535.98]  Out: 700 [Urine:700]     Labs:   Recent Labs   Lab 04/23/25  0617 04/17/25  0642 04/16/25  1536   HGB 13.4   < > 15.8   WBC 11.3*   < > 8.8   A1C  --   --  9.5*    < > = values in this interval not displayed.     Pressure injury risk assessment:   Sensory Perception: 4-->no impairment  Moisture: 3-->occasionally moist  Activity: 3-->walks occasionally  Mobility: 4-->no limitation  Nutrition: 4-->excellent  Friction and Shear: 3-->no apparent problem  Hemant Score: 21    Nelly Law RN, CWOCN  Pager no longer is use, please contact through [employer] group: [facility] Nurse [place]  Dept. Office Number: [phone]

## 2025-04-23 NOTE — PROGRESS NOTES
Type of Form Received: [employer] Referral Form    Form Received (Date) 4/23/25   Form Filled out Yes, faxed 4/24   Placed in provider folder Yes

## 2025-04-23 NOTE — PROGRESS NOTES
[facility]   Cardiology   Progress Note     ASSESSMENT/PLAN:  Anson Manriquez is a 69 year old male with a PMH significant for HTN, DMII, LBBB, CAD (s/p STEMI in 2007 with pLAD stenting, and STEMI 9/2018) s/p CRT-D (2019), ICM LVEF 20-25% who presented on 4/16/25 with concern for pacemaker pocket infection.       Interval History:  - s/p CRT-D explant  - pt reports feeling well, denies any pain post procedure, is looking forward to hospital discharge    # ICM s/p CRT-D implantation (2019), s/p extraction 4/22/25  # CRT-D infection  Patient has drainage from his pocket site after trauma to the site approximately 1 month ago, suspicious for device infection. No systemic symptoms suggestive of bacteremia or sepsis. Not pacer dependent per device interrogation. No evidence of vegetation on device leads/valves on CT chest.    EDMUND 4/17 with mobile lesions/stranding on the leads suspicious for thrombus vs vegetation      - Blood cultures with NGTD  - Aerobic wound culture positive for staph capitis and staph epidermidis  - ID consulted/following:  Continue empiric IV vancomycin (dosed by pharmacy)  Agree with planned upcoming EP device removal  Please send tissue/fluid cultures from the procedure at time of device extraction if able   Follow up pending blood cultures - prelim with no growth  Follow up pending wound swab cultures - coag-neg staph susceptibilities (pending)   - EP consult- s/p device extraction 4/22; per EP hold DOAC/heparin gtt tonight  - Telemetry  - Daily BMP, CBC  - Electrolyte replacement protocols for K and Mag     # 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 No recent CVC placement. Denies IVDU, trauma to head/neck, ear/neck/throat/oral pain. Had dental procedures in 12/2024. Ddx includes septic emboli/Lemierre, malignancy, hypercoagulable state.  - as listed above, hold heparin gtt tonight, plan  "on starting DOAC tomorrow if no bleeding issues overnight  - Pharm consult for DOAC (Eliquis and Xarelto $30/month, Dabigatran $10/month)  - Needs age-appropriate malignancy screening as outpatient     # Chronic heart failure with reduced ejection fraction (EF 20-25%) 2/2 ICM  Denies sxs of ADHF. Appears euvolemic on admission. Taking GDMT as prescribed. NTproBNP 359.  - GDMT:              - fluid status: euvolemic, continue PTA furosemide 40mg BID  - BB: continue PTA carvedilol 25mg BID  - ACE/ARB/ARNI: continue PTA sacubitril-valsartan 97-103mg BID   - SGLT2: resume PTA Jardiance  - MRA: PTA spironolactone increased to 50 mg daily 2/2 chronic hypokalemia     # CAD s/p pLAD stent  - Continue PTA ASA 81mg daily, atorvastatin 80mg daily     # T2DM (A1c 9.5%)  - Hold PTA semaglutide  - Jardiance as above  - MD sliding scale insulin     # Chronic Diarrhea  Patient reports chronic diarrhea (\"happens 6+ days every month\") that he normally takes imodium for. Patient says he is overdue for a colonoscopy.  - PRN Imodium  - Recommend PCP/GI follow up after discharge      FEN: Regular  Code status: Full  Prophylaxis: Heparin gtt  Isolation: None  Disposition: pending device extraction  Medically Ready for Discharge: {TIME; MEDICALLY READY FOR DISCHARGE:[id number]}     Patient seen and discussed with Dr. Mae, who agrees with above plan.    Giovanna Gonzalez APRN, CNP  [facility] Cardiology Team      Physical Exam:  Temp:  [97.6  F (36.4  C)-98.1  F (36.7  C)] 98.1  F (36.7  C)  Pulse:  [] 81  Resp:  [10-20] 20  BP: ()/(54-81) 96/54  SpO2:  [92 %-94 %] 93 %      Intake/Output Summary (Last 24 hours) at 4/23/2025 0803  Last data filed at 4/23/2025 0706  Gross per 24 hour   Intake 1814.64 ml   Output 700 ml   Net 1114.64 ml        Wt:   Wt Readings from Last 5 Encounters:   04/23/25 81.5 kg (179 lb 9.6 oz)   12/17/24 79.4 kg (175 lb)   06/25/24 81.6 kg (179 lb 12.8 oz)   02/06/24 81.3 kg (179 lb 4.8 oz)   10/10/23 83.6 kg (184 lb " 3.2 oz)       General: NAD  HEENT:  PERRLA, EOMI.   Neck: no JVD  CV: RRR. No murmur appreciated. No rubs or gallops. Peripheral radial pulse intact.  Resp: No increased work of breathing or use of accessory muscles, breathing comfortably on room air.  Lung sounds clear throughout/bilaterally  Abdomen:  Normal active bowel sounds.  Abdomen is soft. No distension, non-tender to palpation.    Extremities: Warm. Capillary refill less than 3 sec. 2/4 radial pulses bilaterally.  2/4 pedal pulses bilaterally. No pre-tibial edema. No cyanosis or clubbing.  Skin:  Warm and dry. No erythema, rashes, ulceration or diaphoresis. Drainage noted around left chest incision, area marked, soft, non-tender to touch  Neuro: Alert and oriented x3.      Medications:  Current Facility-Administered Medications   Medication Dose Route Frequency Provider Last Rate Last Admin    aspirin (ASA) chewable tablet 81 mg  81 mg Oral Daily Felipe Pinto MD   81 mg at 04/22/25 1737    atorvastatin (LIPITOR) tablet 80 mg  80 mg Oral Daily Felipe Pinto MD   80 mg at 04/21/25 0812    carvedilol (COREG) tablet 25 mg  25 mg Oral BID w/meals Felipe Pinto MD   25 mg at 04/22/25 1733    empagliflozin (JARDIANCE) tablet 25 mg  25 mg Oral Daily Maeve James CNP   25 mg at 04/17/25 0852    furosemide (LASIX) tablet 40 mg  40 mg Oral BID Melissa Griggs MD   40 mg at 04/22/25 1733    magnesium oxide (MAG-OX) tablet 400 mg  400 mg Oral Q4H Ignacio Spencer MD        melatonin tablet 3 mg  3 mg Oral At Bedtime Felipe Pinto MD   3 mg at 04/22/25 2213    polyethylene glycol (MIRALAX) Packet 17 g  17 g Oral Daily Felipe Pinto MD   17 g at 04/21/25 0813    potassium chloride delta ER (KLOR-CON M10) CR tablet 20 mEq  20 mEq Oral Once Ignacio Spencer MD        sacubitril-valsartan (ENTRESTO)  MG per tablet 1 tablet  1 tablet Oral BID Felipe Pinto MD   1 tablet at 04/22/25 2005    sodium chloride (PF) 0.9% PF flush 3  mL  3 mL Intracatheter Q8H Ignacio Aquino MD   3 mL at 04/23/25 0518    sodium chloride (PF) 0.9% PF flush 3 mL  3 mL Intracatheter Q8H Felipe Nava MD   3 mL at 04/22/25 0535    spironolactone (ALDACTONE) tablet 50 mg  50 mg Oral Daily CarlosPriyankayLLOYD CNP   50 mg at 04/22/25 1737    vancomycin (VANCOCIN) 1,250 mg in 0.9% NaCl 250 mL intermittent infusion  1,250 mg Intravenous Q12H Ignacio Spencer  mL/hr at 04/21/25 1709 1,250 mg at 04/23/25 0518     Current Facility-Administered Medications   Medication Dose Route Frequency Provider Last Rate Last Admin    dextrose 10% infusion   Intravenous Continuous PRN Zainab Aguilar MD        insulin regular (MYXREDLIN) 1 unit/mL infusion  0-24 Units/hr Intravenous Continuous Zainab Aguilar MD   Paused at 04/23/25 0706    Patient is already receiving anticoagulation with heparin, enoxaparin (LOVENOX), warfarin (COUMADIN)  or other anticoagulant medication   Does not apply Continuous PRN Felipe Pinto MD           Labs:   [facility]  Recent Labs   Lab 04/23/25  0706 04/23/25  0617 04/23/25  0610 04/23/25  0516 04/22/25  1304 04/22/25  1215 04/22/25  1042 04/22/25  0725 04/22/25  0544 04/21/25  0757 04/21/25  0554 04/20/25  1003 04/20/25  0659   NA  --  142  --   --   --  140 140  --  141  --  139  --  140   POTASSIUM  --  3.5  --   --   --  4.5 4.4  --  3.9  --  3.7  --  3.6   CHLORIDE  --  109*  --   --   --   --   --   --  107  --  107  --  109*   CO2  --  24  --   --   --   --   --   --  22  --  22  --  21*   ANIONGAP  --  9  --   --   --   --   --   --  12  --  10  --  10   GLC 98 105* 104* 101*   < > 216* 251*   < > 192*   < > 194*   < > 204*   BUN  --  11.2  --   --   --   --   --   --  11.3  --  10.0  --  12.9   CR  --  0.90  --   --   --   --   --   --  0.75  --  0.72  --  0.69   GFRESTIMATED  --  >90  --   --   --   --   --   --  >90  --  >90  --  >90   RENO  --  8.7*  --   --   --   --   --   --  8.8  --  8.7*  --  8.6*   MAG  --  2.0   "--   --   --   --   --   --  1.9  --  1.8  --  1.9    < > = values in this interval not displayed.     CBC  Recent Labs   Lab 04/23/25  0617 04/22/25  1215 04/22/25  1042 04/22/25  1030 04/22/25  0544 04/21/25  0554 04/20/25  0659   WBC 11.3*  --   --   --  6.8 7.1 6.8   RBC 4.35*  --   --   --  4.66 4.78 4.68   HGB 13.4 14.7 14.9 15.0 14.5 14.8 14.6   HCT 39.4*  --   --   --  43.1 44.3 43.0   MCV 91  --   --   --  93 93 92   MCH 30.8  --   --   --  31.1 31.0 31.2   MCHC 34.0  --   --   --  33.6 33.4 34.0   RDW 15.3*  --   --   --  14.7 14.6 14.8   *  --   --   --  152 153 153     INRNo lab results found in last 7 days.  Arterial Blood Gas  Recent Labs   Lab 04/22/25  1215 04/22/25  1042   PH 7.35 7.36   PCO2 45 45   PO2 158* 82   HCO3 25 25   O2PER 80.0 75.0     Troponin T High Sensitivity No results found for: \"CTROPT\"    Diagnostics:  Echo 4/16/25  The tricuspid valve leaflets are not well visualized. No obvious vegetations  are seen on the tricuspid valve or the ICD leads on limited views. Recommend  EDMUND if clinical suspicion for infective endocarditis warrants.     Ischemic cardiomyopathy with severely reduced LVEF=21%. Regional wall motion  abnormalities as described below, unchanged from the prior study.  Global right ventricular function is normal.     This study was compared with the study from 6/25/24: No significant changes  noted.     TTE 4/17/25  Pacemaker leads noted in the RA and RV. There is a mobile lesion with  stranding on the CS lead (Image 65) and the RA lead (Image 7)     There is a mobile strand measuring 2.2 cm that appears to extend from the RA  to RV lead (Image 26)     These may represent thrombus or vegetation in the appropriate clinical  context.    Device Check 4/17/25  Device: [employer] G447 RESONATE X4 CRT-D  Normal device function  Mode: DDD  bpm  AP: <1%  LVP: 94%  Intrinsic rhythm: AS-VS 85 bpm  Lead Trends Appear Stable  Estimated battery longevity to RRT = " 5.5  years.   Atrial Arrhythmia: none  Ventricular Arrhythmia: none  Setting Changes: none     Ultrasound 4/16/25  Nonocclusive thrombus in the cranial portion of the right internal jugular veins. Occlusive versus nearly occlusive thrombus in the cranial left internal jugular vein. The bilateral internal jugular veins are patent and compressible caudally. This is similar to recent CT chest.     CT Chest 4/16/25  1.  No suspicious fluid collection, malpositioning or swelling about  the left chest pacemaker to suggest intrathoracic visualization.  2. Tubular transmural filling defects within the proximal internal  jugular vein bilaterally likely represent mixing artifact although DVT  cannot be excluded. Can confirm with ultrasound.  2. Moderate cardiomegaly with coronary calcifications and mild  bibasilar pulmonary edema.    Recent Results (from the past 24 hours)   EDMUND with Report    Narrative    Jon Hi MD     4/22/2025  1:12 PM  Perioperative EDMUND Procedure Note    Staff -        Anesthesiologist:  Jon Hi MD       Performed By: anesthesiologist      EDMUND Probe Insertion    Probe Status PRE Insertion: NO obvious damage  Probe type:  Adult 3D  Bite block used:   Soft  Insertion Technique: Easy, no oropharyngeal manipulation  Insertion complications: None obvious  Billing Report:EDMUND report by Anesthesiologist (See Separate Report note)  Probe Status POST Removal: NO obvious damage    EDMUND Report  General Procedure Information  Images for this study have been archived.    Post Intervention Findings  Procedure(s) performed:  Other (see comments) (lead extraction).  Regional   wall motion:. Surgeon(s) notified of all postintervention findings: Yes.                   Echocardiogram Comments  Echocardiogram comments: Normal RV and mild RV dysystolic function by   visual assessment (TAPSE 175mm). LV is dilated. There is global   hypokinesis with anteroseptal and septal akinesis at the base and mid   segments.  LVEF 31% by Jimenez's biplane. Mild MR. No aortic stenosis or   regurgitation. There is a small PFO with left to right shunt. No aortic   dissection. There is mild TR. RVSP 30+RAP. RV, CS and RA leads are seen   with multiple clots vs vegetations. Most clots/vegetation were no longer   visible following removal of leads; however residual mobile 1x1.8cm mass   remained, appeared near the junction of the right atrium and inferior vena   cava/eustacian valve. This remained following removal of all leads and   wires. Communicated this to our cardiology colleagues and Dr Mckeon. The   patient has been on heparin infusion as inpatient for bilateral internal   jugular clots. Heparin has been held gwen-procedure, but to be resumed   when safe. He may be at increased risk of paradoxical embolism given known   thrombus and PFO (although at present exclusively left to right). Trace   pericardial effusion was unchanged at the end of the case. .   Cardiac Device Check - Inpatient   Result Value    Date Time Interrogation Session [id number]    Implantable Pulse Generator  [employer]    Implantable Pulse Generator Model G447 RESONATE X4 CRT-D    Implantable Pulse Generator Serial Number [id number]    Type Interrogation Session In Clinic    Clinic Name [facility]    Implantable Pulse Generator Type Cardiac Resynchronization Therapy - Defibrillator    Implantable Pulse Generator Implant Date 20190425    Implantable Lead  [employer]    Implantable Lead Model 0292 [employer] 4-Site SG    Implantable Lead Serial Number [id number]    Implantable Lead Implant Date 20190425    Implantable Lead Polarity Type Bipolar Lead    Implantable Lead Location Detail 1 UNKNOWN    Implantable Lead Special Function Length 59 cm    Implantable Lead Location Right Ventricle    Implantable Lead Connection Status Connected    Implantable Lead  [employer]    Implantable Lead  Model 4671 Acuity X4 Straight    Implantable Lead Serial Number [id number]    Implantable Lead Implant Date 20190425    Implantable Lead Polarity Type Quadripolar Lead    Implantable Lead Location Detail 1 UNKNOWN    Implantable Lead Special Function Length 86 cm    Implantable Lead Location Left Ventricle    Implantable Lead Connection Status Connected    Implantable Lead  [employer]    Implantable Lead Model 7740 Ingevity MRI    Implantable Lead Serial Number [id number]    Implantable Lead Implant Date 20190425    Implantable Lead Polarity Type Bipolar Lead    Implantable Lead Location Detail 1 UNKNOWN    Implantable Lead Special Function Length 45 cm    Implantable Lead Location Right Atrium    Implantable Lead Connection Status Connected    Marcelo Setting Mode (NBG Code) DDD    Marcelo Setting Lower Rate Limit 60    Marcelo Setting Maximum Tracking Rate 130    Marcelo Setting Maximum Sensor Rate 130    Marcelo Setting TIGIST Delay Low 140    Marcelo Setting PAV Delay Low 180    Marcelo Setting AT Mode Switch Rate 170    Marcelo Setting AT Mode Switch Mode VDIR    Lead Channel Setting Sensing Polarity Bipolar    Lead Channel Setting Sensing Sensitivity 0.25    Lead Channel Setting Sensing Adaptation Mode Adaptive    Lead Channel Setting Sensing Polarity Bipolar    Lead Channel Setting Sensing Sensitivity 0.6    Lead Channel Setting Sensing Adaptation Mode Adaptive    Lead Channel Setting Sensing Anode Location Left Ventricle    Lead Channel Setting Sensing Anode Terminal Ring2    Lead Channel Setting Sensing Cathode Location Left Ventricle    Lead Channel Setting Sensing Cathode Terminal Tip    Lead Channel Setting Sensing Sensitivity 1.0    Lead Channel Setting Sensing Adaptation Mode Adaptive    Ventricular chambers paced during CRT pacing. LVOnly    Lead Channel Setting Pacing Polarity Bipolar    Lead Channel Setting Pacing Pulse Width 0.5    Lead Channel Setting Pacing Amplitude 2.5    Lead Channel Setting Pacing  Capture Mode Fixed Pacing    Lead Channel Setting Pacing Polarity Bipolar    Lead Channel Setting Pacing Pulse Width 0.4    Lead Channel Setting Pacing Amplitude 2.0    Lead Channel Setting Pacing Capture Mode Adaptive    Lead Channel Setting Pacing Anode Location Other    Lead Channel Setting Pacing Anode Terminal Can    Lead Channel Setting Sensing Cathode Location Left Ventricle    Lead Channel Setting Sensing Cathode Terminal Ring3    Lead Channel Setting Pacing Pulse Width 0.5    Lead Channel Setting Pacing Amplitude 2.2    Lead Channel Setting Pacing Capture Mode Adaptive    Zone Setting Type Category VF    Zone Setting Vendor Type Category VF    Zone Setting Status Inactive    Zone Setting Type Category VT    Zone Setting Vendor Type Category VT    Zone Setting Status Inactive    Zone Setting Type Category VT    Zone Setting Vendor Type Category VT-1    Zone Setting Status Inactive    Lead Channel Impedance Value 493    Lead Channel Pacing Threshold Amplitude 1.0    Lead Channel Pacing Threshold Pulse Width 0.5    Lead Channel Impedance Value 782    Lead Channel Pacing Threshold Amplitude 1.0    Lead Channel Pacing Threshold Pulse Width 0.5    Lead Channel Impedance Value 647    Lead Channel Pacing Threshold Amplitude 1.1    Lead Channel Pacing Threshold Pulse Width 0.4    Battery Date Time of Measurements [id number]    Battery Status Beginning of Service    Battery Remaining Longevity 66    Battery Remaining Percentage 74    Capacitor Charge Type Reformation    Capacitor Last Charge Date Time [id number]    Capacitor Charge Time 11.3    Marcelo Statistic Date Time Start 02448537739508    Marcelo Statistic Date Time End [id number]    Marcelo Statistic RA Percent Paced 0    Marcelo Statistic RV Percent Paced 19    CRT Statistic LV Percent Paced 97    CRT Statistic Date Time Start 14281783470914    CRT Statistic Date Time End [id number]    Atrial Tachy Statistic Date Time Start 97094839713400    Atrial  Tachy Statistic Date Time End 20250422000000    Atrial Tachy Statistic AT/AF [place] Percent 0    Therapy Statistic Recent Shocks Delivered 0    Therapy Statistic Recent Shocks Aborted 0    Therapy Statistic Recent ATP Delivered 0    Therapy Statistic Recent Date Time Start 20250417000000    Therapy Statistic Recent Date Time End 20250422000000    Therapy Statistic Total Shocks Delivered 0    Therapy Statistic Total Shocks Aborted 0    Therapy Statistic Total ATP Delivered 0    Therapy Statistic Total  Date Time Start 20190425000000    Therapy Statistic Total  Date Time End 20250422000000    Episode Statistic Recent Count 0    Episode Statistic Type Category Other    Episode Statistic Recent Count 0    Episode Statistic Type Category VT    Episode Statistic Vendor Type Category NSVT    Episode Statistic Recent Count 0    Episode Statistic Type Category VT    Episode Statistic Vendor Type Category VT    Episode Statistic Recent Count 0    Episode Statistic Type Category VT    Episode Statistic Vendor Type Category VT-1    Episode Statistic Recent Date Time Start [id number]    Episode Statistic Recent Date Time End 20250422000000    Episode Statistic Recent Date Time Start [id number]    Episode Statistic Recent Date Time End 20250422000000    Episode Statistic Recent Date Time Start 20250417000000    Episode Statistic Recent Date Time End 20250422000000    Episode Statistic Recent Date Time Start [id number]    Episode Statistic Recent Date Time End 20250422000000    Narrative    Patient seen in [facility] for pre-extraction evaluation and iterative programming of their ICD per MD orders.     Device: [employer] G447 RESONATE X4 CRT-D  Normal device function  Mode: DDD  bpm  AP: <1%  LVP: 97%  Intrinsic rhythm: AS-VS 77 bpm  Lead Trends Appear Stable  Estimated battery longevity to RRT = 5.5 years.   Atrial Arrhythmia: none  Ventricular Arrhythmia: none  Setting Changes: ICD/Tachy therapies turned  OFF    Plan: Please page device nurse for further programming needs 0210  Luna Solares/Shanice Ruiz, AD    Multi lead ICD    I have reviewed and interpreted the device interrogation, settings, programming and nurse's summary. The device is functioning within normal device parameters. I agree with the current findings, assessment and plan.   X-ray Chest 1 vw port    Narrative    Portable chest    INDICATION: Post pacer/implantable cardiac defibrillator    COMPARISON: CT 4/16/2025. The previous radiopaque left subclavian  transvenous approach implantable cardiac defibrillator is no longer  visible. No pneumothorax. Heart size normal. Linear subsegmental  atelectasis right upper line. Bony structures well mineralized.      Impression    IMPRESSION: Removal left subclavian transvenous approach implantable  cardiac defibrillator without pneumothorax    ARMIN ARDON MD         SYSTEM ID:  [id number]       Medical Decision Making   60 MINUTES SPENT BY ME on the date of service doing chart review, history, exam, documentation & further activities per the note.

## 2025-04-23 NOTE — PLAN OF CARE
Hours of care [id number]    Neuro: A&Ox4.   Cardiac: SR/ST. VSS.   Respiratory: Sating >92% on RA.  GI/: Adequate urine output.   Diet/appetite: Tolerating reg diet. Insulin gtt @ 8.5 units/hr Algorithm 3.  Activity:  Independent.  Pain: At acceptable level on current regimen.   Skin: No new deficits noted.  LDA's: L PIV: Insulin gtt and NS.     Plan: Continue with POC. Notify primary team with changes.

## 2025-04-23 NOTE — PROGRESS NOTES
Endocrinology Clinic Visit 4/30/2025      Video-Visit Details    Type of service:  Video Visit  Joined the call at 4/30/2025, 8:37:04 am.  Left the call at 4/30/2025, 8:57:36 am.    Originating Location (pt. Location): Home        Distant Location (provider location):  Off-site    Mode of Communication:  Video Conference via Xeebel    Physician has received verbal consent for a Video Visit from the patient? Yes    I spent a total of 40 minutes on the date of encounter reviewing medical records, evaluating the patient, coordinating care and documenting in the EHR, as detailed above.       NAME:  Anson Manriquez  PCP:  Ignacio Beard  MRN:  [id number]  Reason for Consult:  DM2  Requesting Provider:  Ignacio Beard    Chief Complaint     Chief Complaint   Patient presents with    RECHECK     Follow-up - medication review        History of Present Illness     Anson Manriquez is a 66 year old male who is seen in clinic for DM2. Last seen in 2023.    The patient was diagnosed with type 2 diabetes more than 10 years ago on lab routine screening. He was started on metformin, he had diarrhea. He was switched to glipizide then jardiance was added.  visit 9/2022 we started ozempic. He is up to 2 mg weekly His wt was 193 lbs before ozempic down to 174 lbs and remained stable over the past 2 years. Since he lost his wt he decided to take ozempic less frequently. He had no A1c check since 10/2023 until recently when he got admitted 4/16/25 with concern for pacemaker pocket infection and his A1c was up to 9.5%. he denied any hyperglycemia sx, he feels ok and back to work.    He took his ozempic on 4/27/25. No side effects.    Previous A1C in records reviewed.   Lab Results   Component Value Date    A1C 9.5 (H) 04/16/2025    A1C 8.7 (H) 10/10/2023    A1C 9.3 (H) 04/26/2022    A1C 9.0 (H) 10/26/2021    A1C 8.0 (H) 10/29/2020    A1C 8.0 (H) 02/11/2020    A1C 7.3 (H) 11/06/2019    A1C 7.5 (H) 06/18/2019    A1C 7.6 (H)  05/09/2019    HEMOGLOBINA1 9.6 09/07/2022     Weight is stable  Wt Readings from Last 4 Encounters:   04/23/25 81.5 kg (179 lb 9.6 oz)   12/17/24 79.4 kg (175 lb)   06/25/24 81.6 kg (179 lb 12.8 oz)   02/06/24 81.3 kg (179 lb 4.8 oz)     He is down by 12 lbs since starting ozempic.     Diabetes Care/Complications:   Eyes: no diabetic retinopathy, last eye exam 5/2021  Kidneys: no DN, negative urine microalb on 10/2023.   Nerves: no neuropathy. DM foot exam was done by me on 9/2022  Smoking: no  Blood Pressure: controlled on coreg, entresto  Lipids: on statin  Macrovascular: CAD  Hypoglycemia: no      Current treatment strategy:   jardiance 25 mg daily  Glipizide 5 mg bid  Ozmepic 2 mg , not compliant with it, taking it every 3 weeks    Blood Glucose Monitoring:   He does not check his bg.    Diet: 3 meals, not much snacks     Exercise: he is active all day at work. No routine exercise.    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 He is  and has 3 children.     Family hx: DM2 in his father, brother and sister    Problem List     Patient Active Problem List   Diagnosis    Coronary artery disease involving native coronary artery of native heart without angina pectoris    Type 2 diabetes mellitus without complication, without long-term current use of insulin (H)    Benign essential hypertension    Mixed hyperlipidemia    Anal fistula    STEMI (ST elevation myocardial infarction) (H)    Ischemic cardiomyopathy    S/P ICD (internal cardiac defibrillator) procedure    Chronic infection    Left inguinal hernia    Umbilical hernia without obstruction and without gangrene    Abdominal wall abscess    Cardiac pacemaker in situ    Pacemaker pocket hematoma, sequela    Localized swelling of chest wall        Medications     Current Outpatient Medications   Medication Sig Dispense Refill    tirzepatide (MOUNJARO) 2.5 MG/0.5ML SOAJ auto-injector pen Inject 0.5 mLs (2.5 mg) subcutaneously every 7 days. 2 mL 0     apixaban ANTICOAGULANT (ELIQUIS) 5 MG tablet Take 2 tablets (10 mg) by mouth 2 times daily for 7 days, THEN 1 tablet (5 mg) 2 times daily. 388 tablet 0    aspirin (ASA) 81 MG chewable tablet Take 1 tablet (81 mg) by mouth daily. 90 tablet 4    atorvastatin (LIPITOR) 80 MG tablet Take 1 tablet (80 mg) by mouth daily. 90 tablet 4    carvedilol (COREG) 25 MG tablet Take 1 tablet (25 mg) by mouth 2 times daily (with meals). 180 tablet 1    empagliflozin (JARDIANCE) 25 MG TABS tablet Take 1 tablet (25 mg) by mouth daily. 90 tablet 4    furosemide (LASIX) 40 MG tablet Take 1 tablet (40 mg) by mouth 2 times daily. 180 tablet 4    glipiZIDE (GLUCOTROL XL) 5 MG 24 hr tablet Take 1 tablet (5 mg) by mouth 2 times daily. 180 tablet 0    linezolid (ZYVOX) 600 MG tablet Take 1 tablet (600 mg) by mouth 2 times daily for 14 days. 28 tablet 0    MULTIPLE VITAMIN PO Take 1 tablet by mouth every morning       sacubitril-valsartan (ENTRESTO)  MG per tablet Take 1 tablet by mouth 2 times daily. 180 tablet 4    Semaglutide, 1 MG/DOSE, (OZEMPIC) 4 MG/3ML pen Inject 1 mg Subcutaneous every 7 days 6 mL 11    Semaglutide, 2 MG/DOSE, (OZEMPIC) 8 MG/3ML pen Inject 2 mg subcutaneously every 7 days. For additional refills, please schedule a follow-up appointment. 9 mL 1    spironolactone (ALDACTONE) 50 MG tablet Take 1 tablet (50 mg) by mouth daily. 90 tablet 3     No current facility-administered medications for this visit.     Facility-Administered Medications Ordered in Other Visits   Medication Dose Route Frequency Provider Last Rate Last Admin    perflutren diluted 1mL to 2mL with saline (OPTISON) diluted injection 5 mL  5 mL Intravenous Once Chris Reece MD        sodium chloride (PF) 0.9% PF flush 10 mL  10 mL Intravenous Once Julissa Rodriguez MD        sodium chloride (PF) 0.9% PF flush 10 mL  10 mL Intracatheter Once Jason Matos MD            Allergies     No Known Allergies    Medical / Surgical History     Past  Medical History:   Diagnosis Date    Anal fistula     Antiplatelet or antithrombotic long-term use     Coronary artery disease     Diabetes mellitus, type 2 (H)     Heart attack (H) 4/17/2007    Hyperlipidemia     Hypertension     Inguinal hernia     Ischemic cardiomyopathy     STEMI (ST elevation myocardial infarction) (H) 09/13/2018    s/p STEVO x2    Stented coronary artery 2007     Past Surgical History:   Procedure Laterality Date    EP ICD N/A 4/25/2019    Procedure: EP ICD [[id number]];  Surgeon: Mick Mckeon MD;  Location: [facility]    HERNIA REPAIR Right     Inguinal    HERNIORRHAPHY INGUINAL Left 2/20/2020    Procedure: Left HERNIORRHAPHY, INGUINAL, OPEN;  Surgeon: Flakito Massey MD;  Location: [facility]    HERNIORRHAPHY UMBILICAL N/A 10/16/2020    Procedure: HERNIORRHAPHY, UMBILICAL, OPEN, with mesh;  Surgeon: Flakito aMssey MD;  Location: [facility]    IRRIGATION AND DEBRIDEMENT RECTUM, COMBINED      abcess near rectum     LAPAROSCOPIC CHOLECYSTECTOMY  3/29/2012    Procedure:LAPAROSCOPIC CHOLECYSTECTOMY; LAPAROSCOPIC CHOLECYSTECTOMY; Surgeon:MARILYNN PRESSLEY; Location:[facility] OR    REPAIR ECTROPION Bilateral 9/2/2022    Procedure: Both lower eyelid ectropion repair;  Surgeon: Cheyanne Francisco MD;  Location: [facility] OR    STENT, CORONARY, OLIVIA         Social History     Social History     Socioeconomic History    Marital status:      Spouse name: Not on file    Number of children: Not on file    Years of education: Not on file    Highest education level: Not on file   Occupational History    Not on file   Tobacco Use    Smoking status: Never    Smokeless tobacco: Never   Substance and Sexual Activity    Alcohol use: No    Drug use: No    Sexual activity: Not on file   Other Topics Concern    Not on file   Social History Narrative    Not on file     Social Drivers of Health     Financial Resource Strain: Low Risk  (4/17/2025)    Financial Resource Strain     Within the past 12 months, have  you or your family members you live with been unable to get utilities (heat, electricity) when it was really needed?: No   Food Insecurity: Low Risk  (4/17/2025)    Food Insecurity     Within the past 12 months, did you worry that your food would run out before you got money to buy more?: No     Within the past 12 months, did the food you bought just not last and you didn t have money to get more?: No   Transportation Needs: Low Risk  (4/17/2025)    Transportation Needs     Within the past 12 months, has lack of transportation kept you from medical appointments, getting your medicines, non-medical meetings or appointments, work, or from getting things that you need?: No   Physical Activity: Not on file   Stress: Not on file   Social Connections: Not on file   Interpersonal Safety: Unknown (4/22/2025)    Interpersonal Safety     Do you feel physically and emotionally safe where you currently live?: Patient unable to answer     Within the past 12 months, have you been hit, slapped, kicked or otherwise physically hurt by someone?: Patient unable to answer     Within the past 12 months, have you been humiliated or emotionally abused in other ways by your partner or ex-partner?: Patient unable to answer   Housing Stability: High Risk (4/17/2025)    Housing Stability     Do you have housing? : No     Are you worried about losing your housing?: No       Family History     Family History   Problem Relation Age of Onset    Hypertension Mother     Diabetes Father     Glaucoma No family hx of     Macular Degeneration No family hx of        ROS     limited ROS completed, pertinent positive and negative in HPI    Physical Exam   There were no vitals taken for this visit.   n/a  Labs/Imaging     Pertinent Labs were reviewed and updated in EPIC and discussed briefly.  Radiology Results were  reviewed and updated in EPIC and discussed briefly.    Summary of recent findings:   Lab Results   Component Value Date    A1C 9.3 04/26/2022  "   A1C 9.0 10/26/2021    A1C 8.0 10/29/2020    A1C 8.0 02/11/2020    A1C 7.3 11/06/2019    A1C 7.5 06/18/2019    A1C 7.6 05/09/2019       TSH   Date Value Ref Range Status   03/07/2017 2.44 0.40 - 4.00 mU/L Final   07/27/2016 2.273 0.358 - 3.740 mcU/mL Final   07/27/2016 2.273 mcU/mL Final       Creatinine   Date Value Ref Range Status   04/23/2025 0.90 0.67 - 1.17 mg/dL Final   04/05/2021 0.94 0.66 - 1.25 mg/dL Final       Recent Labs   Lab Test 04/26/22  0840 11/06/19  0831   CHOL 228* 198   HDL 40 41   * 93   TRIG 437* 319*       No results found for: \"IGIO28ZKAII\", \"[id number]\", \"[id number]\"    I personally reviewed the patient's outside records from [facility] EMR. Summary of pertinent findings in HPI.    Impression / Plan     1. Diabetes Mellitus: Type 2  Uncontrolled with A1c of 9.5, target A1c <7 %. He is currently on jardiance, glipizide and ozempic with poor compliance ( was taking it monthly instead of weekly). He has no hyperglycemia sx, we will hold off on basal insulin. We discussed switching to mounjaro, he has 2 more doses of ozempic he is planning to take them then switch to mounjaro.       Plan:     - continue jardiance  - continue glipizide for now.  - finish supply of ozempic then switch to mounjaro  - follow-up monthly with endocrinology pharmD   - check BG at least daily  - A1c in 3month     2. Diabetes Complications: none. Due for eye exam and labs below     3. Blood Pressure Management: Blood pressure is controlled . Currently is  on pharmacotherapy for this.     4.Lipid Management: Per the new ACC/TERI/NHLBI guidelines, statins are recommended for individuals with diabetes aged 40-75 with LDL  without ASCVD, and for any individual with ASCVD. Currently the patient is on a statin.      5. Smoking Status: Patient Pt is smoke free..            Test and/or medications prescribed today:  Orders Placed This Encounter   Procedures    Lipid panel reflex to direct LDL Fasting    Albumin Random " Urine Quantitative with Creat Ratio    Comprehensive metabolic panel    Hemoglobin A1c    Med Therapy Management Referral         Follow up: 3 MONTH    The longitudinal plan of care for the diagnosis(es)/condition(s) as documented were addressed during this visit. Due to the added complexity in care, I will continue to support Cedric in the subsequent management and with ongoing continuity of care.    Li Muller MD  Endocrinology, Diabetes and Metabolism  [facility]

## 2025-04-23 NOTE — PLAN OF CARE
"Goal Outcome Evaluation:      Plan of Care Reviewed With: patient    Overall Patient Progress: improvingOverall Patient Progress: improving     Patient planning on discharging this afternoon when life vest is put on by staff. Wound care nurse consulted for at home wound care instructions. Tele remains on patient. Denies pain. VSS. Up independently.    IV: removed  AVS: printed and went over with patient about follow up appointments. Wound care, medication changes, and additional care instructions. Patient verbalized understanding. Spouse present. Waiting for life vest attendant to arrive. Will be drove home by spouse and medications picked up prior to pharmacy closing.  Wound: Dressing to chest changed by wound care nurse.      Problem: Adult Inpatient Plan of Care  Goal: Plan of Care Review  Description: The Plan of Care Review/Shift note should be completed every shift.  The Outcome Evaluation is a brief statement about your assessment that the patient is improving, declining, or no change.  This information will be displayed automatically on your shiftnote.  Outcome: Met  Flowsheets (Taken 4/23/2025 1736)  Plan of Care Reviewed With: patient  Overall Patient Progress: improving  Goal: Patient-Specific Goal (Individualized)  Description: You can add care plan individualizations to a care plan. Examples of Individualization might be:  \"Parent requests to be called daily at 9am for status\", \"I have a hard time hearing out of my right ear\", or \"Do not touch me to wake me up as it startlesme\".  Outcome: Met  Goal: Absence of Hospital-Acquired Illness or Injury  Outcome: Met  Intervention: Prevent Skin Injury  Recent Flowsheet Documentation  Taken 4/23/2025 0730 by Mariah Barker RN  Body Position: supine  Intervention: Prevent and Manage VTE (Venous Thromboembolism) Risk  Recent Flowsheet Documentation  Taken 4/23/2025 0730 by Mariah Barker RN  VTE Prevention/Management: SCDs off (sequential compression " devices)  Goal: Optimal Comfort and Wellbeing  Outcome: Met  Intervention: Provide Person-Centered Care  Recent Flowsheet Documentation  Taken 4/23/2025 0730 by Mariah Barker RN  Trust Relationship/Rapport: care explained  Goal: Readiness for Transition of Care  Outcome: Met     Problem: Delirium  Goal: Optimal Coping  Outcome: Met  Intervention: Optimize Psychosocial Adjustment to Delirium  Recent Flowsheet Documentation  Taken 4/23/2025 0730 by Mariah Barker RN  Family/Support System Care: self-care encouraged  Goal: Improved Behavioral Control  Outcome: Met  Intervention: Minimize Safety Risk  Recent Flowsheet Documentation  Taken 4/23/2025 0730 by Mariah Barker RN  Trust Relationship/Rapport: care explained  Goal: Improved Attention and Thought Clarity  Outcome: Met  Goal: Improved Sleep  Outcome: Met     Problem: Diabetes  Goal: Optimal Coping  Outcome: Met  Intervention: Support Wellbeing and Self-Management Success  Recent Flowsheet Documentation  Taken 4/23/2025 0730 by Mariah Barker RN  Family/Support System Care: self-care encouraged  Goal: Optimal Functional Ability  Outcome: Met  Intervention: Optimize Functional Ability  Recent Flowsheet Documentation  Taken 4/23/2025 0730 by Mariah Barker RN  Activity Management: bedrest  Goal: Blood Glucose Level Within Target Range  Outcome: Met  Goal: Minimize Risk of Hypoglycemia  Outcome: Met     Problem: Heart Failure  Goal: Optimal Coping  Outcome: Met  Intervention: Support Psychosocial Response  Recent Flowsheet Documentation  Taken 4/23/2025 0730 by Mariah Barker RN  Family/Support System Care: self-care encouraged  Goal: Optimal Cardiac Output  Outcome: Met  Goal: Stable Heart Rate and Rhythm  Outcome: Met  Goal: Fluid and Electrolyte Balance  Outcome: Met  Goal: Optimal Functional Ability  Outcome: Met  Intervention: Optimize Functional Ability  Recent Flowsheet Documentation  Taken 4/23/2025 0730 by Mariah Barker RN  Activity  Management: bedrest  Goal: Improved Oral Intake  Outcome: Met  Goal: Effective Oxygenation and Ventilation  Outcome: Met  Intervention: Promote Airway Secretion Clearance  Recent Flowsheet Documentation  Taken 4/23/2025 5236 by Mariah Barker RN  Cough And Deep Breathing: done independently per patient  Activity Management: bedrest  Goal: Effective Breathing Pattern During Sleep  Outcome: Met

## 2025-04-23 NOTE — PROGRESS NOTES
[facility] PROGRESS NOTE     Patient:  Anson Manriquez   YOB: 1955, MRN: [id number]  Date of Visit: 04/23/2025  Date of Admission: 4/16/2025          ASSESSMENT AND PLAN     Anson Manriquez is a 69 year old male with CRT-D in place (2019) and admitted 4/16 due to fluctuance and purulent drainage from CRTD pocket. Does seem have mobile lesions with stranding on the device leads on EDMUND. Blood cultures are negative.    He has a pocket infection that has been treated for two weeks. I plan to start linezolid due to its excellent gram-positive coverage. I will continue to monitor his cultures and adjust the antibiotic regimen as needed based on results.    IMPRESSION  CIED pocket infection, s/p removal Apr 22, 2025   Thrombus in right and left internal jugular veins  CAD  ICM  T2DM     RECOMMENDATIONS:  Start oral linezolid 600mg 2x a day, continue until May 6, 2025.    ID will sign off. Please check [facility] for staff covering the [email or web address] ID service.     Chika Pelletier MD  Infectious Diseases  Vocera ronda    70 MINUTES SPENT BY ME on the date of service doing chart review, history, exam, documentation & further activities per the note.     antibiotic therapy reviewed with him and wife.          SUBJECTIVE      Interval History and Events:  Feeling well. No chest pain or fevers. Underwent explantation.    Antimicrobial Treatment:  Vanco 4/17-         OBJECTIVE       Physical Examination:    Temp:  [97.6  F (36.4  C)-98.1  F (36.7  C)] 98.1  F (36.7  C)  Pulse:  [] 81  Resp:  [10-20] 20  BP: ()/(54-81) 96/54  SpO2:  [92 %-94 %] 93 %    I/O last 3 completed shifts:  In: 1775.98 [P.O.:240; I.V.:1535.98]  Out: 700 [Urine:700]    Vitals:    04/19/25 0540 04/20/25 0510 04/21/25 0600 04/22/25 0400   Weight: 79.8 kg (175 lb 14.4 oz) 80.2 kg (176 lb 14.4 oz) 81.3 kg (179 lb 3.2 oz) 81 kg (178 lb 8 oz)    04/23/25 0529   Weight: 81.5 kg (179 lb 9.6 oz)       Constitutional: Awake, alert,  interactive.  Normal rhythm, no murmur   Clear breath sounds   Left chest wound with minimal tenderness, no purulence   Soft abdomen     I reviewed the following Laboratory Data:    Microbiology:  4/22 Wcx -   4/18 MRSA - neg   4/16 Chest abscess - Staphylococcus capitis, MSSE   4/16 Bcx- no growth

## 2025-04-23 NOTE — PROGRESS NOTES
"SPIRITUAL HEALTH SERVICES - Progress Note  [facility] [address]    Referral Source/Reason for Visit: Saw pt Anson Manriquez per length of stay. Pt is Worship.    Summary and Recommendations -  Cedric is accompanied by his spouse, Jolly, seated at the bedside.  Pt shared context of admission and details of medical history.  He is awaiting information about plan of care.  Cedric and Jolly receive robust support and love from their three children and eight grandchildren.  Members of [facility] [employer]. Welcomed a time of prayer.    Plan: Pt oriented to [facility] and availability of chaplains for emotional/spiritual support.  ___________________________    Assessment    Patient/Family Understanding of Illness and Goals of Care -  Cedric described medical history and explained how he believed his pacemaker may have malfunctioned.  He and Jolly are awaiting test results which will identify infection and inform plan of care.    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  Cedric showed photos of his grandchildren and beamed as he talked about how they are motivating him to recover and \"get on with it\".  Jolly and Cedric live in a \"daughter apartment\" adjacent to his daughter. They designed and built the home.    Meaning, Beliefs, and Spirituality -   Jolly was raised [employer] and joined Cedric in the Worship tradition. They now attend a non-[employer] near their new home.  Cedric was intentional about noting his optimism and gratitude. He is deliberate about \"not complaining\" about things in life that are challenging.    Plan of Care - Pt/family oriented to [facility] and will request follow up as needed.    Nathanael Virgen MA  Associate   [facility] remains available 24/7 for emergent requests/referrals, either by having the on-call "  paged   or by entering an ASAP/STAT consult in Epic (this will also page the on-call ).   Routine Epic consults receive an initial response within 24 hours.

## 2025-04-23 NOTE — DISCHARGE INSTRUCTIONS
Left chest wound Dressing: Twice a day and as needed  (Ok to decrease down to once a day if drainage decreases and if dressing is not getting too dry when removing)  Moisten about 2 feet of roll gauze with Vashe wound cleanser solution, squeeze out excess solution.  Loosely pack wound with moistened gauze and cut off excess gauze leaving a small tail outside of the wound for easy removal. (Goal is to provide a single layer to wick drainage from wound, no need to overfill wound pocket) (Do not pack multiple pieces of gauze into the wound, only one continuous piece.)  Cover with dry gauze and secure with tape.  If you run out of the bottle of Vashe provided ok to switch to using Normal Saline solution.

## 2025-04-23 NOTE — CONSULTS
Discharge Pharmacy Test Claim    Patient's commercial UPlan covers linezolid with an expected copay of $10.    Test Claim Copay   Linezolid 10.00       Simin Chavez  [facility] Pharmacy Liaison (A - L)  Phone: [phone] Fax: [phone]  Available on Teams & Vocera  Disclaimer: Pharmacy test claims are extimates and may not reflect final costs. Suggested alternatives aim to be cost-effective but may not be therapeutically equivalent as this consult is informational and does not constitute medical advice. Clinical decisions should be made by qualified healthcare providers.

## 2025-04-23 NOTE — PROGRESS NOTES
Care Management Discharge Note    Discharge Date: 04/24/2025       Discharge Disposition: Home    Discharge Services:  [employer] Vest    Discharge DME:  Life Vest    Discharge Transportation: family or friend will provide    Private pay costs discussed: Not applicable    Does the patient's insurance plan have a 3 day qualifying hospital stay waiver?  No    PAS Confirmation Code:  n/a  Patient/family educated on Medicare website which has current facility and service quality ratings: yes    Education Provided on the Discharge Plan: yes    Persons Notified of Discharge Plans: patient  Patient/Family in Agreement with the Plan: yes    Handoff Referral Completed: Yes, [facility] PCP: Internal handoff referral completed    Additional Information:  Patient to discharge home today oral abx at discharge, RNCC cancelled referral to [facility]. Additionally pt to discharge w/Life Vest, RNCC called to [employer] to initiate and they have someone available to teach pt/fit pt w/Life Vest once get orders/get Life Vest approved. RNCC submitting paperwork to [employer] once filled out. RNCC to cease following after discharge.     [employer] [facility]  Ph: [phone]  Fax: [phone]  PDF: [email or web address]   Ph: [phone] (cell)  Signed order and documentation faxed at 1400h.   Status: Accepted. Susie will be here between 5 and 6 p.m. tonight to apply [employer] [facility].    Dylan Pineda BSN, BA, RN, CMSRN, RNCC  [facility]  Covering Units 6C Beds 9422-8215, 6420  Phone: [phone]  Available on Vocera search 6C 6502-14 RNCC, 6515-19 RNCC  After Hours [phone]     6C SW Ph: [phone]     6B/CRNCC Weekend/holiday on Vocera or [phone]     [facility] RNCC ED/5 Ortho/5 Med/Surg [phone]     [facility] RNCC 6 Med/Surg 8A, [phone]     Observation SW and weekend/after hours phone: [phone]

## 2025-04-24 ENCOUNTER — CARE COORDINATION (OUTPATIENT)
Dept: CARDIOLOGY | Facility: CLINIC | Age: 70
End: 2025-04-24
Payer: COMMERCIAL

## 2025-04-24 DIAGNOSIS — I50.20 HEART FAILURE WITH REDUCED EJECTION FRACTION, NYHA CLASS III (H): ICD-10-CM

## 2025-04-24 DIAGNOSIS — I25.5 ISCHEMIC CARDIOMYOPATHY: Primary | ICD-10-CM

## 2025-04-24 DIAGNOSIS — I42.9 CARDIOMYOPATHY, UNSPECIFIED TYPE (H): ICD-10-CM

## 2025-04-24 DIAGNOSIS — I50.22 CHRONIC SYSTOLIC CONGESTIVE HEART FAILURE (H): ICD-10-CM

## 2025-04-24 LAB
BACTERIA WND CULT: ABNORMAL
BACTERIA WND CULT: NORMAL
BACTERIA WND CULT: NORMAL
PATH REPORT.COMMENTS IMP SPEC: NORMAL
PATH REPORT.COMMENTS IMP SPEC: NORMAL
PATH REPORT.FINAL DX SPEC: NORMAL
PATH REPORT.GROSS SPEC: NORMAL
PATH REPORT.MICROSCOPIC SPEC OTHER STN: NORMAL
PATH REPORT.RELEVANT HX SPEC: NORMAL
PHOTO IMAGE: NORMAL

## 2025-04-24 RX ORDER — CEFAZOLIN SODIUM 2 G/50ML
2 SOLUTION INTRAVENOUS
OUTPATIENT
Start: 2025-04-24

## 2025-04-24 RX ORDER — LIDOCAINE 40 MG/G
CREAM TOPICAL
OUTPATIENT
Start: 2025-04-24

## 2025-04-24 RX ORDER — SODIUM CHLORIDE 9 MG/ML
INJECTION, SOLUTION INTRAVENOUS CONTINUOUS
OUTPATIENT
Start: 2025-04-24

## 2025-04-24 NOTE — PROGRESS NOTES
Orders placed, letter started. EP  notified via staff message.     Message  Received: Yesterday  Aimee Roberts APRN CNP Gutzman, Debra; Temi Scherer, RN  Hello,  Can you please arrange reimplant of right sided CRTD ([employer]) in 8 weeks.    Thanks,  LVW

## 2025-04-24 NOTE — LETTER
April 24, 2025      TO: Anson Manriquez  [address]  [facility] [address]         Dear Anson,    You are scheduled for a Cardiac Resynchronization Therapy Defibrillator (CRT-D) implant, at [facility] [place]. The hospital is located at [address] [place] [address] on the [place] of the [place].  If you need to cancel this procedure, please call [phone].       Visitor Policy: Two visitors.      Date:______  Time: _______________To the [facility] Waiting Room at the [facility]    1. Please review the attached instructions on showering before your procedure at the end of this letter.  2. Your history and physical will be completed by our advanced practice provider when you arrive.  3. Do not eat for 8 hours prior to arrival, you can drink water up until 2 hours prior.  4. Medications to continue:  - Anticoagulant (Eliquis).  - Take all meds as prescribed, except for those noted below.  5. Medications to hold:    - Morning of: spironolactone, glipizide   - 1 week prior, if weekly dosing: semaglutide (Ozempic)  6. You will likely discharge the same day and need a .        Post-Procedure Instructions  Wound Care  Keep your incision (surgery wound) dry for 3 days.  After 3 days, you may remove the outer bandage.  Keep the strips of tape on.  They will be removed at your clinic visit.  Check for signs of infection each day.  These include increased redness, swelling, drainage or a fever over 101 F (38.3 C).  Call us immediately if you see any of   these signs.  If there are no signs of infection, you may shower in 3 days.  Do not submerge the incision (in a bath tub, hot tub, or swimming pool) until fully healed.    Pain  You may have mild to moderate pain for 3 to 5 days.  Take acetaminophen (Tylenol) or ibuprofen (Advil) for the pain.  Call us if the pain is severe or lasts more than 5 days.  Activity  You should slowly go back to your normal activities after 24 hours.  Healing will  take 4 to 6 weeks.  No driving for 3 days  Avoid climbing a ladder alone.  It is best to stay within 4 feet of the ground.  Avoid anything that may cause rough contact or a hard hit to your chest.  This includes football, hockey, and other contact sports.  Do not go swimming or boating alone.    FOR ATLEAST 4 WEEKS:  Do not raise your affected arm above your shoulder.  Do not use your affected arm to push, pull, or lift anything over 10 pounds.  Avoid repetitive upper body activities for 6 weeks (ie: golf, swimming, and weight lifting)      Follow Up Appointments Date & Time   7-10 day incision check with device clinic     3 month follow up visit with ECHO, device check, & EP MD                If you have further questions, please utilize DEQ to contact us.   If your question concerns the above instructions, contact:  Teim Scherer RN   Electrophysiology Nurse Coordinator.  [phone]    If your question concerns the schedule/appointment times, contact:  SANDRA Gallow[facility] Procedure   [phone]    Device Clinic (Pacemakers, Defibrillators, Loop Recorders)  [phone]        Showering Before Surgery   Your surgeon has asked you to take 2 showers before surgery.  Why is this important?  It is normal for bacteria (germs) to be on your skin. The skin protects us from these germs. When you have surgery, we cut the skin. Sometimes germs get into the cuts and cause infection (illness caused by germs). By following the instructions below and using special soap, you will lower the number of germs on your skin. This decreases your chance of infection.  Special soap  Buy or get 8 ounces of antiseptic surgical soap called 4% CHG. Common name brands of this soap are Hibiclens and Exidine.   You can find it at your local pharmacy, clinic or retail store. If you have trouble, ask your pharmacist to help you find the right substitute.   A note about shaving:  Do not shave within 12 inches of your incision  (surgical cut) area for at least 3 days before surgery. Shaving can make small cuts in the skin. This puts you at a higher risk of infection.  Items you will need for each shower:   1 newly washed towel   4 ounces of one of the above soaps  Follow these instructions:  The evening before surgery   1. Wash your hair and body with your regular shampoo and soap. Make sure you rinse the shampoo and soap from your hair and body.   2. Using clean hands, apply about 2 ounces of soap gently on your skin from the neck to your toes. Use on your groin area last. Do not use this soap on your face or head. If you get any soap in your eyes, ears or mouth, rinse right away.   3. Repeat step 2. It is very important to let the soap stay on your skin for at least 1 minute.   4. Rinse well and dry off using a clean towel.If you feel any tingling, itching or other irritation, rinse right away. It is normal to feel some coolness on the skin after using the antiseptic soap. Your skin may feel a bit dry after the shower, but do not use any lotions, creams or moisturizers. Do not use hair spray or other products in your hair.  5. Dress in freshly washed clothes or pajamas. Use fresh pillowcases and sheets on your bed.  The morning of surgery  1. Wash your hair and body with your regular shampoo and soap. Make sure you rinse the shampoo and soap from your hair and body.   2. Using clean hands, apply about 2 ounces of soap gently on your skin from the neck to your toes. Use on your groin area last. Do not use this soap on your face or head. If you get any soap in your eyes, ears or mouth, rinse right away.   3. Repeat step 2. It is very important to let the soap stay on your skin for at least 1 minute.   4. Rinse well and dry off using a clean towel.If you feel any tingling, itching or other irritation, rinse right away. It is normal to feel some coolness on the skin after using the antiseptic soap. Your skin may feel a bit dry after the  shower, but do not use any lotions, creams or moisturizers. Do not use hair spray or other products in your hair.  5. Dress in clean clothes.  If you have any questions about showering or an allergy to CHG soap, please call the Preadmissions Nursing Department at the hospital where you are having your surgery.  [facility]: [phone]  [facility]: [phone]  [facility]: [phone] or [phone]  [facility]: [phone].  [facility]: [phone]  [place]: [phone]  [facility]): [phone]  [facility] [phone]  This phone number will be answered between the hours of 8:00 a.m. and 6:30 p.m. Monday through Friday.

## 2025-04-29 LAB — BACTERIA WND CULT: NORMAL

## 2025-04-30 ENCOUNTER — VIRTUAL VISIT (OUTPATIENT)
Dept: ENDOCRINOLOGY | Facility: CLINIC | Age: 70
End: 2025-04-30
Payer: COMMERCIAL

## 2025-04-30 ENCOUNTER — TELEPHONE (OUTPATIENT)
Dept: ENDOCRINOLOGY | Facility: CLINIC | Age: 70
End: 2025-04-30

## 2025-04-30 DIAGNOSIS — E11.65 TYPE 2 DIABETES MELLITUS WITH HYPERGLYCEMIA, WITHOUT LONG-TERM CURRENT USE OF INSULIN (H): Primary | ICD-10-CM

## 2025-04-30 NOTE — NURSING NOTE
Current patient location: [address]  [facility] [address]    Is the patient currently in the state of [place]? YES    Visit mode: VIDEO    If the visit is dropped, the patient can be reconnected by:VIDEO VISIT: Text to cell phone:   Telephone Information:   Mobile [phone]       Will anyone else be joining the visit? NO  (If patient encounters technical issues they should call [phone])    Are changes needed to the allergy or medication list? Pt stated no med changes- pt states he was just discharged from hospital- all medications are up to date     Are refills needed on medications prescribed by this physician? NO    Rooming Documentation:  Patient declined to complete questionnaire(s)- pt declined to take the mental health questionnaires    Reason for visit: RECHECK (Follow-up - medication review )    Palma MARTINF      Pt was in the hospital recently- pts pacemaker had an infection around it, pt states there was an abscess.  Pt is at home recovering and feeling good, states no pain.

## 2025-04-30 NOTE — PATIENT INSTRUCTIONS
It was nice seeing you:    - continue jardiance  - continue glipizide for now.  - finish your supply of ozempic then switch to mounjaro  - you should follow-up monthly with endocrinology pharmD to increase your mounjaro dose  - check your blood glucose at least once a day before meals

## 2025-04-30 NOTE — LETTER
4/30/2025      Anson Manriquez  [address] [facility] [address]      Dear Colleague,    Thank you for referring your patient, Anson Manriquez, to the [facility]. Please see a copy of my visit note below.    Endocrinology Clinic Visit 4/30/2025      Video-Visit Details    Type of service:  Video Visit  Joined the call at 4/30/2025, 8:37:04 am.  Left the call at 4/30/2025, 8:57:36 am.    Originating Location (pt. Location): Home    {PROVIDER LOCATION On-site should be selected for visits conducted from your clinic location or adjoining [facility] hospital, academic office, or other nearby [facility] building. Off-site should be selected for all other provider locations, including home:[id number]}    Distant Location (provider location):  Off-site    Mode of Communication:  Video Conference via [facility]    Physician has received verbal consent for a Video Visit from the patient? Yes    I spent a total of 40 minutes on the date of encounter reviewing medical records, evaluating the patient, coordinating care and documenting in the EHR, as detailed above.       NAME:  Anson Manriquez  PCP:  Ignacio Beard  MRN:  [id number]  Reason for Consult:  DM2  Requesting Provider:  Ignacio Beard    Chief Complaint     Chief Complaint   Patient presents with     RECHECK     Follow-up - medication review        History of Present Illness     Anson Manriquez is a 66 year old male who is seen in clinic for DM2. Last seen in 2023.    The patient was diagnosed with type 2 diabetes more than 10 years ago on lab routine screening. He was started on metformin, he had diarrhea. He was switched to glipizide then jardiance was added.  visit 9/2022 we started ozempic. He is up to 2 mg weekly His wt was 193 lbs before ozempic down to 174 lbs and remained stable over the past 2 years. Since he lost his wt he decided to take ozempic less frequently. He had no A1c check since 10/2023 until recently when he got admitted  4/16/25 with concern for pacemaker pocket infection and his A1c was up to 9.5%. he denied any hyperglycemia sx, he feels ok and back to work.    He took his ozempic on 4/27/25. No side effects.    Previous A1C in records reviewed.   Lab Results   Component Value Date    A1C 9.5 (H) 04/16/2025    A1C 8.7 (H) 10/10/2023    A1C 9.3 (H) 04/26/2022    A1C 9.0 (H) 10/26/2021    A1C 8.0 (H) 10/29/2020    A1C 8.0 (H) 02/11/2020    A1C 7.3 (H) 11/06/2019    A1C 7.5 (H) 06/18/2019    A1C 7.6 (H) 05/09/2019    HEMOGLOBINA1 9.6 09/07/2022     Weight is stable  Wt Readings from Last 4 Encounters:   04/23/25 81.5 kg (179 lb 9.6 oz)   12/17/24 79.4 kg (175 lb)   06/25/24 81.6 kg (179 lb 12.8 oz)   02/06/24 81.3 kg (179 lb 4.8 oz)     He is down by 12 lbs since starting ozempic.     Diabetes Care/Complications:   Eyes: no diabetic retinopathy, last eye exam 5/2021  Kidneys: no DN, negative urine microalb on 10/2023.   Nerves: no neuropathy. DM foot exam was done by me on 9/2022  Smoking: no  Blood Pressure: controlled on coreg, entresto  Lipids: on statin  Macrovascular: CAD  Hypoglycemia: no      Current treatment strategy:   jardiance 25 mg daily  Glipizide 5 mg bid  Ozmepic 2 mg , not compliant with it, taking it every 3 weeks    Blood Glucose Monitoring:   He does not check his bg.    Diet: 3 meals, not much snacks     Exercise: he is active all day at work. No routine exercise.    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 He is  and has 3 children.     Family hx: DM2 in his father, brother and sister    Problem List     Patient Active Problem List   Diagnosis     Coronary artery disease involving native coronary artery of native heart without angina pectoris     Type 2 diabetes mellitus without complication, without long-term current use of insulin (H)     Benign essential hypertension     Mixed hyperlipidemia     Anal fistula     STEMI (ST elevation myocardial infarction) (H)     Ischemic cardiomyopathy     S/P  ICD (internal cardiac defibrillator) procedure     Chronic infection     Left inguinal hernia     Umbilical hernia without obstruction and without gangrene     Abdominal wall abscess     Cardiac pacemaker in situ     Pacemaker pocket hematoma, sequela     Localized swelling of chest wall        Medications     Current Outpatient Medications   Medication Sig Dispense Refill     tirzepatide (MOUNJARO) 2.5 MG/0.5ML SOAJ auto-injector pen Inject 0.5 mLs (2.5 mg) subcutaneously every 7 days. 2 mL 0     apixaban ANTICOAGULANT (ELIQUIS) 5 MG tablet Take 2 tablets (10 mg) by mouth 2 times daily for 7 days, THEN 1 tablet (5 mg) 2 times daily. 388 tablet 0     aspirin (ASA) 81 MG chewable tablet Take 1 tablet (81 mg) by mouth daily. 90 tablet 4     atorvastatin (LIPITOR) 80 MG tablet Take 1 tablet (80 mg) by mouth daily. 90 tablet 4     carvedilol (COREG) 25 MG tablet Take 1 tablet (25 mg) by mouth 2 times daily (with meals). 180 tablet 1     empagliflozin (JARDIANCE) 25 MG TABS tablet Take 1 tablet (25 mg) by mouth daily. 90 tablet 4     furosemide (LASIX) 40 MG tablet Take 1 tablet (40 mg) by mouth 2 times daily. 180 tablet 4     glipiZIDE (GLUCOTROL XL) 5 MG 24 hr tablet Take 1 tablet (5 mg) by mouth 2 times daily. 180 tablet 0     linezolid (ZYVOX) 600 MG tablet Take 1 tablet (600 mg) by mouth 2 times daily for 14 days. 28 tablet 0     MULTIPLE VITAMIN PO Take 1 tablet by mouth every morning        sacubitril-valsartan (ENTRESTO)  MG per tablet Take 1 tablet by mouth 2 times daily. 180 tablet 4     Semaglutide, 1 MG/DOSE, (OZEMPIC) 4 MG/3ML pen Inject 1 mg Subcutaneous every 7 days 6 mL 11     Semaglutide, 2 MG/DOSE, (OZEMPIC) 8 MG/3ML pen Inject 2 mg subcutaneously every 7 days. For additional refills, please schedule a follow-up appointment. 9 mL 1     spironolactone (ALDACTONE) 50 MG tablet Take 1 tablet (50 mg) by mouth daily. 90 tablet 3     No current facility-administered medications for this visit.      Facility-Administered Medications Ordered in Other Visits   Medication Dose Route Frequency Provider Last Rate Last Admin     perflutren diluted 1mL to 2mL with saline (OPTISON) diluted injection 5 mL  5 mL Intravenous Once Chris Reece MD         sodium chloride (PF) 0.9% PF flush 10 mL  10 mL Intravenous Once Julissa Rodriguez MD         sodium chloride (PF) 0.9% PF flush 10 mL  10 mL Intracatheter Once Jason Matos MD            Allergies     No Known Allergies    Medical / Surgical History     Past Medical History:   Diagnosis Date     Anal fistula      Antiplatelet or antithrombotic long-term use      Coronary artery disease      Diabetes mellitus, type 2 (H)      Heart attack (H) 4/17/2007     Hyperlipidemia      Hypertension      Inguinal hernia      Ischemic cardiomyopathy      STEMI (ST elevation myocardial infarction) (H) 09/13/2018    s/p STEVO x2     Stented coronary artery 2007     Past Surgical History:   Procedure Laterality Date     EP ICD N/A 4/25/2019    Procedure: EP ICD [[id number]];  Surgeon: Mick Mckeon MD;  Location: [facility]     HERNIA REPAIR Right     Inguinal     HERNIORRHAPHY INGUINAL Left 2/20/2020    Procedure: Left HERNIORRHAPHY, INGUINAL, OPEN;  Surgeon: Flakito Massey MD;  Location: [facility] OR     HERNIORRHAPHY UMBILICAL N/A 10/16/2020    Procedure: HERNIORRHAPHY, UMBILICAL, OPEN, with mesh;  Surgeon: Flakito Massey MD;  Location: [facility] OR     IRRIGATION AND DEBRIDEMENT RECTUM, COMBINED      abcess near rectum      LAPAROSCOPIC CHOLECYSTECTOMY  3/29/2012    Procedure:LAPAROSCOPIC CHOLECYSTECTOMY; LAPAROSCOPIC CHOLECYSTECTOMY; Surgeon:MARILYNN PRESSLEY; Location:[facility] OR     REPAIR ECTROPION Bilateral 9/2/2022    Procedure: Both lower eyelid ectropion repair;  Surgeon: Cheyanne Francisco MD;  Location: [facility] OR     STENT, CORONARY, OLIVIA         Social History     Social History     Socioeconomic History     Marital status:      Spouse name: Not on file      Number of children: Not on file     Years of education: Not on file     Highest education level: Not on file   Occupational History     Not on file   Tobacco Use     Smoking status: Never     Smokeless tobacco: Never   Substance and Sexual Activity     Alcohol use: No     Drug use: No     Sexual activity: Not on file   Other Topics Concern     Not on file   Social History Narrative     Not on file     Social Drivers of Health     Financial Resource Strain: Low Risk  (4/17/2025)    Financial Resource Strain      Within the past 12 months, have you or your family members you live with been unable to get utilities (heat, electricity) when it was really needed?: No   Food Insecurity: Low Risk  (4/17/2025)    Food Insecurity      Within the past 12 months, did you worry that your food would run out before you got money to buy more?: No      Within the past 12 months, did the food you bought just not last and you didn t have money to get more?: No   Transportation Needs: Low Risk  (4/17/2025)    Transportation Needs      Within the past 12 months, has lack of transportation kept you from medical appointments, getting your medicines, non-medical meetings or appointments, work, or from getting things that you need?: No   Physical Activity: Not on file   Stress: Not on file   Social Connections: Not on file   Interpersonal Safety: Unknown (4/22/2025)    Interpersonal Safety      Do you feel physically and emotionally safe where you currently live?: Patient unable to answer      Within the past 12 months, have you been hit, slapped, kicked or otherwise physically hurt by someone?: Patient unable to answer      Within the past 12 months, have you been humiliated or emotionally abused in other ways by your partner or ex-partner?: Patient unable to answer   Housing Stability: High Risk (4/17/2025)    Housing Stability      Do you have housing? : No      Are you worried about losing your housing?: No       Family History  "    Family History   Problem Relation Age of Onset     Hypertension Mother      Diabetes Father      Glaucoma No family hx of      Macular Degeneration No family hx of        ROS     limited ROS completed, pertinent positive and negative in HPI    Physical Exam   There were no vitals taken for this visit.   n/a  Labs/Imaging     Pertinent Labs were reviewed and updated in EPIC and discussed briefly.  Radiology Results were  reviewed and updated in EPIC and discussed briefly.    Summary of recent findings:   Lab Results   Component Value Date    A1C 9.3 04/26/2022    A1C 9.0 10/26/2021    A1C 8.0 10/29/2020    A1C 8.0 02/11/2020    A1C 7.3 11/06/2019    A1C 7.5 06/18/2019    A1C 7.6 05/09/2019       TSH   Date Value Ref Range Status   03/07/2017 2.44 0.40 - 4.00 mU/L Final   07/27/2016 2.273 0.358 - 3.740 mcU/mL Final   07/27/2016 2.273 mcU/mL Final       Creatinine   Date Value Ref Range Status   04/23/2025 0.90 0.67 - 1.17 mg/dL Final   04/05/2021 0.94 0.66 - 1.25 mg/dL Final       Recent Labs   Lab Test 04/26/22  0840 11/06/19  0831   CHOL 228* 198   HDL 40 41   * 93   TRIG 437* 319*       No results found for: \"[id number]\", \"[id number]\", \"[id number]\"    I personally reviewed the patient's outside records from [facility] EMR. Summary of pertinent findings in [facility].    Impression / Plan     1. Diabetes Mellitus: Type 2  Uncontrolled with A1c of 9.5, target A1c <7 %. He is currently on jardiance, glipizide and ozempic with poor compliance ( was taking it monthly instead of weekly). He has no hyperglycemia sx, we will hold off on basal insulin. We discussed switching to mounjaro, he has 2 more doses of ozempic he is planning to take them then switch to mounjaro.       Plan:     - continue jardiance  - continue glipizide for now.  - finish supply of ozempic then switch to mounjaro  - follow-up monthly with endocrinology pharmD   - check BG at least daily  - A1c in 3month     2. Diabetes Complications: none. Due for " eye exam and labs below     3. Blood Pressure Management: Blood pressure is controlled . Currently is  on pharmacotherapy for this.     4.Lipid Management: Per the new ACC/TERI/NHLBI guidelines, statins are recommended for individuals with diabetes aged 40-75 with LDL  without ASCVD, and for any individual with ASCVD. Currently the patient is on a statin.      5. Smoking Status: Patient Pt is smoke free..            Test and/or medications prescribed today:  Orders Placed This Encounter   Procedures     Lipid panel reflex to direct LDL Fasting     Albumin Random Urine Quantitative with Creat Ratio     Comprehensive metabolic panel     Hemoglobin A1c     Med Therapy Management Referral         Follow up: 3 MONTH    The longitudinal plan of care for the diagnosis(es)/condition(s) as documented were addressed during this visit. Due to the added complexity in care, I will continue to support Cedric in the subsequent management and with ongoing continuity of care.    Li Muller MD  Endocrinology, Diabetes and Metabolism  [facility]         Again, thank you for allowing me to participate in the care of your patient.        Sincerely,        Li Muller MD    Electronically signed

## 2025-04-30 NOTE — TELEPHONE ENCOUNTER
Left Voicemail (1st Attempt) for the patient to call back and schedule the following:    Appointment type: return  Provider: dr. glez  Return date: 7/30/2025  Specialty phone number: [phone]   Additonal Notes:  Return in about 3 months (around 7/30/2025).      [facility]   Orthopedics, Podiatry, Sports Medicine, Ent ,Eye , Audiology, Adult Endocrine & Diabetes, Nutrition & Medication Therapy Management Specialties   [facility]

## 2025-05-01 ENCOUNTER — TELEPHONE (OUTPATIENT)
Dept: CARDIOLOGY | Facility: CLINIC | Age: 70
End: 2025-05-01
Payer: COMMERCIAL

## 2025-05-01 LAB — BACTERIA WND CULT: NORMAL

## 2025-05-01 NOTE — TELEPHONE ENCOUNTER
----- Message from Shaista Kate sent at 4/23/2025 12:36 PM CDT -----  Hello,   Can you please arrange reimplant of right sided CRTD ([employer]) in 8 weeks.     Thanks,  LVCHARLES

## 2025-05-01 NOTE — TELEPHONE ENCOUNTER
EP  called the patient and attempted to book the CRTD implant. The patient states that his infectious disease doctor had cautioned him about getting another device if he hasn't really needed it.     The patient stated that he thought he was ok not to have another device placed. He would like to have more conversation with the provider about the need and the plan.     Currently, Tarsha 10 is being held for the possible implant.     Sending to the team for coordination of a conversation with the patient.       Suzanne Gillis  Periop Electrophysiology   [phone]

## 2025-05-05 ENCOUNTER — TELEPHONE (OUTPATIENT)
Dept: ENDOCRINOLOGY | Facility: CLINIC | Age: 70
End: 2025-05-05

## 2025-05-05 NOTE — TELEPHONE ENCOUNTER
Per 4/30/25 progress note from Dr. Muller:  Plan:      - continue jardiance  - continue glipizide for now.  - finish supply of ozempic then switch to mounjaro  - follow-up monthly with endocrinology pharmD   - check BG at least daily  - A1c in 3month      Per chart review patient is scheduled with [facility] on 5/30/25. Please confirm with patient if this is what he is referring to.    Please also note Dr. Muller is out of the office this week.         Emmie Kenny RN  Endocrine Care Coordinator  [facility]

## 2025-05-05 NOTE — TELEPHONE ENCOUNTER
Spoke with pt who stated that he does not need the appointment with  on 5/7. Pt said that he was recently seen by the provider at the hospital and he fixed up the infected site.

## 2025-05-05 NOTE — TELEPHONE ENCOUNTER
05/05/25 Writer called and spoke to pt. Pt stated he will start this Sunday and will  Mounjaro at pharmacy.    Per message :       Per 4/30/25 progress note from Dr. Muller:  Plan:      - continue jardiance  - continue glipizide for now.  - finish supply of ozempic then switch to mounjaro  - follow-up monthly with endocrinology pharmD   - check BG at least daily  - A1c in 3month        Per chart review patient is scheduled with [facility]TM on 5/30/25. Please confirm with patient if this is what he is referring to.     Please also note Dr. Muller is out of the office this week.            Emmie Kenny RN  Endocrine Care Coordinator  [facility]

## 2025-05-05 NOTE — TELEPHONE ENCOUNTER
[facility] Call Center    Phone Message    May a detailed message be left on voicemail: yes     Reason for Call: Other: Per pt would like someone to call him back about starting manjouro. Please and thank you.      Action Taken: Message routed to:  [facility] ENDO    Travel Screening: Not Applicable     Date of Service:

## 2025-05-06 ENCOUNTER — PRE VISIT (OUTPATIENT)
Dept: CARDIOLOGY | Facility: CLINIC | Age: 70
End: 2025-05-06
Payer: COMMERCIAL

## 2025-05-06 DIAGNOSIS — I50.22 CHRONIC SYSTOLIC CONGESTIVE HEART FAILURE (H): Primary | ICD-10-CM

## 2025-05-06 NOTE — TELEPHONE ENCOUNTER
EP  called and left a message for the patient requesting a call back. OR time is being held for the patient awaiting his decision.     Suzanne Gillis  Periop Electrophysiology   [phone]

## 2025-05-06 NOTE — TELEPHONE ENCOUNTER
"Patient called EP  back. He states that he and his wife have had a discussion and he wants to get his \"sack healed\" and then discuss what to do next. He states that he didn't have a good experience at the hospital and he's feeling good and doesn't feel it necessary to implant a new device at this time and risk another infection.    He made multiple comments about the cost and suggested that we were wanting to put in the device to make more money.     He says that he will call back when he's ready to move forward if he decides that he wants a new implant.      Suzanne Gillis  Periop Electrophysiology   [phone]    "

## 2025-05-07 ENCOUNTER — TELEPHONE (OUTPATIENT)
Dept: CARDIOLOGY | Facility: CLINIC | Age: 70
End: 2025-05-07
Payer: COMMERCIAL

## 2025-05-07 NOTE — TELEPHONE ENCOUNTER
An appointment will be made with Dr. Mckeon per Aimee Wheat NP.     Suzanne Gillis  Periop Electrophysiology   [phone]

## 2025-05-07 NOTE — TELEPHONE ENCOUNTER
----- Message from Shaista Kate sent at 5/6/2025  8:49 PM CDT -----  Regarding: RE: CRTD ([employer]) w/HR  Ok thank you Suzanne. Can we just have him do a follow-up visit with Dr. Mckeon in clinic in a few months then?  Thanks,  LVW  ----- Message -----  From: Ida Fernandez  Sent: 5/6/2025   1:40 PM CDT  To: Ida Fernandez; Aimee Roberts, LLOYD CNP  Subject: FW: CRTD ([employer]) w/HR                He really gave me a piece of his mind but tried to be gentle. He's very frustrated with the confusing communication he's had throughout.. says Carlo told him he has a very strong heart. He feels he doesn't need a new device. 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  At this time, he's saying he wants to get the infection cleared up and then decide how he wants to move forward. Basically was trying to say HR just wants him to get this device so we can make more money. I said absolutely not... Dr. Mckeon doesn't do procedures unless he feels they're necessary based on factual data collected from the tests.     Anyway.. no go.  ----- Message -----  From: Ida Fernandez  Sent: 5/8/2025  12:00 AM CDT  To: Ida Fernandez  Subject: FW: CRTD ([employer]) w/HR                Is he going to use the space held?  ----- Message -----  From: Ida Fernandez  Sent: 5/5/2025  12:00 AM CDT  To: Ida Fernandez  Subject: FW: CRTD ([employer]) w/HR                Patient wants a conversation with the team.  ----- Message -----  From: Aimee Roberts, LLOYD CNP  Sent: 4/29/2025   3:15 PM CDT  To: Ida Fernandez  Subject: RE: CRTD ([employer]) w/HR                That should be OK  Thanks  LVW  ----- Message -----  From: Ida Fernandez  Sent: 4/28/2025   5:06 PM CDT  To: Ida Fernandez; LLOYD Finney CNP  Subject: RE: CRTD ([employer]) w/HR                Aimee would Tarsha 10 work??  ----- Message -----  From: Ida Fernandez  Sent: 4/24/2025   9:58 AM CDT  To: Ida Ace  Oliver; #  Subject: RE: CRTD ([employer]) w/HR                I never heard back from Aimee if that day works she needed to leave for HRS.. it appears that he hasn't left the hospital yet. I'll call him when I return on Monday.  ----- Message -----  From: Temi Scherer, RN  Sent: 4/24/2025   9:45 AM CDT  To: Ida Fernandez; Malka Boyd; #  Subject: RE: CRTD ([employer]) w/HR                Orders placed. Letter started. Please let me know the date.     Thx  MK  ----- Message -----  From: Ida Fernandez  Sent: 4/23/2025   1:28 PM CDT  To: Ida Boyd; #  Subject: CRTD ([employer]) w/HR                    I could do Tarsha 16 10 arrival.. is he currently inhouse and discharging?  We'll need a case request but I'll have Malka hold off until he's discharged and perhaps just hold the space for now.  ----- Message -----  From: Aimee Roberts APRN CNP  Sent: 4/23/2025  12:38 PM CDT  To: Ida Scherer, RN    Hello,   Can you please arrange reimplant of right sided CRTD ([employer]) in 8 weeks.     Thanks,  LVW

## 2025-05-07 NOTE — TELEPHONE ENCOUNTER
EP  called the patient and attempted to make a follow up appointment with Dr. Mckeon. The patient became very upset and started yelling and using inappropriate language toward the . He says that we just want him to see Dr Mckeon so he can try to talk the patient into getting another device. He says he has an appointment next week. He was informed that the appointment was with Jazmin Moreno NP and not with EP. He wanted to know what that appointment was for and when the  couldn't give an answer he started yelling that we don't know what we're doing and we don't have answers.     EP  will alert the EP team and will not call the patient back. He wants Jazmin to explain to him why he needs to see EP and then he'll decide if he wants an appointment.     Suzanne Gillis  [facility] ED Electrophysiology   [phone]

## 2025-05-08 LAB — BACTERIA WND CULT: NORMAL

## 2025-05-09 NOTE — PROGRESS NOTES
[facility]    HPI:   Mr. Anson Manriquez is a 69 year old male with a history including CAD (s/p STEMI in 2007 with pLAD stenting, and STEMI 9/2018) s/p CRT-D (2019), pocket infection s/p device extraction (4/22/25), ICM LVEF 20-25%, LBBB, DM2, b/l internal jugular thrombi, and HTN. He presents to clinic for  new CORE visit.    Regarding his cardiac course, Scot as recently hospitalized 4/16-23 for pacemaker pocket infection s/p device extraction 4/22/25 with Dr. Mckeon, ID consulted and patient discharged with antibiotics thru 5/6.     Today in clinic he feels well. His wife has been packing his wound at home and he reports that it's healing well, denies new swelling, tenderness, or drainage. He has not been wearing the LifeVest, he has worn it in the past and it bothers him. No palpitations, chest pain, shortness of breath, orthopnea, or edema. Home weights have been 175 lbs.   His endocrinologist recently switched him from Ozempic to Mounjaro       PAST MEDICAL HISTORY:  Past Medical History:   Diagnosis Date    Anal fistula     Antiplatelet or antithrombotic long-term use     Coronary artery disease     Diabetes mellitus, type 2 (H)     Heart attack (H) 4/17/2007    Hyperlipidemia     Hypertension     Inguinal hernia     Ischemic cardiomyopathy     STEMI (ST elevation myocardial infarction) (H) 09/13/2018    s/p STEVO x2    Stented coronary artery 2007       FAMILY HISTORY:  Family History   Problem Relation Age of Onset    Hypertension Mother     Diabetes Father     Glaucoma No family hx of     Macular Degeneration No family hx of        SOCIAL HISTORY:  Social History     Socioeconomic History    Marital status:    Tobacco Use    Smoking status: Never    Smokeless tobacco: Never   Substance and Sexual Activity    Alcohol use: No    Drug use: No     Social Drivers of Health     Financial Resource Strain: Low Risk  (5/4/2025)    Financial Resource Strain     Within the past 12 months, have you or your  family members you live with been unable to get utilities (heat, electricity) when it was really needed?: No   Food Insecurity: Low Risk  (5/4/2025)    Food Insecurity     Within the past 12 months, did you worry that your food would run out before you got money to buy more?: No     Within the past 12 months, did the food you bought just not last and you didn t have money to get more?: No   Transportation Needs: Low Risk  (5/4/2025)    Transportation Needs     Within the past 12 months, has lack of transportation kept you from medical appointments, getting your medicines, non-medical meetings or appointments, work, or from getting things that you need?: No   Physical Activity: Sufficiently Active (5/4/2025)    Exercise Vital Sign     Days of Exercise per Week: 5 days     Minutes of Exercise per Session: 30 min   Stress: No Stress Concern Present (5/4/2025)    [place] [employer] - Occupational Stress Questionnaire     Feeling of Stress : Not at all   Social Connections: Unknown (5/4/2025)    Social Connection and Isolation Panel [NHANES]     Frequency of Social Gatherings with Friends and Family: More than three times a week   Interpersonal Safety: Unknown (4/22/2025)    Interpersonal Safety     Do you feel physically and emotionally safe where you currently live?: Patient unable to answer     Within the past 12 months, have you been hit, slapped, kicked or otherwise physically hurt by someone?: Patient unable to answer     Within the past 12 months, have you been humiliated or emotionally abused in other ways by your partner or ex-partner?: Patient unable to answer   Housing Stability: High Risk (5/4/2025)    Housing Stability     Do you have housing? : No     Are you worried about losing your housing?: No       CURRENT MEDICATIONS:  Current Outpatient Medications   Medication Sig Dispense Refill    apixaban ANTICOAGULANT (ELIQUIS) 5 MG tablet Take 2 tablets (10 mg) by mouth 2 times daily for 7  days, THEN 1 tablet (5 mg) 2 times daily. 388 tablet 0    aspirin (ASA) 81 MG chewable tablet Take 1 tablet (81 mg) by mouth daily. 90 tablet 4    atorvastatin (LIPITOR) 80 MG tablet Take 1 tablet (80 mg) by mouth daily. 90 tablet 4    carvedilol (COREG) 25 MG tablet Take 1 tablet (25 mg) by mouth 2 times daily (with meals). 180 tablet 1    empagliflozin (JARDIANCE) 25 MG TABS tablet Take 1 tablet (25 mg) by mouth daily. 90 tablet 4    furosemide (LASIX) 40 MG tablet Take 1 tablet (40 mg) by mouth 2 times daily. 180 tablet 4    glipiZIDE (GLUCOTROL XL) 5 MG 24 hr tablet Take 1 tablet (5 mg) by mouth 2 times daily. 180 tablet 0    MULTIPLE VITAMIN PO Take 1 tablet by mouth every morning       sacubitril-valsartan (ENTRESTO)  MG per tablet Take 1 tablet by mouth 2 times daily. 180 tablet 4    spironolactone (ALDACTONE) 50 MG tablet Take 1 tablet (50 mg) by mouth daily. 90 tablet 3    tirzepatide (MOUNJARO) 2.5 MG/0.5ML SOAJ auto-injector pen Inject 0.5 mLs (2.5 mg) subcutaneously every 7 days. 2 mL 0    Semaglutide, 1 MG/DOSE, (OZEMPIC) 4 MG/3ML pen Inject 1 mg Subcutaneous every 7 days (Patient not taking: Reported on 5/13/2025) 6 mL 11    Semaglutide, 2 MG/DOSE, (OZEMPIC) 8 MG/3ML pen Inject 2 mg subcutaneously every 7 days. For additional refills, please schedule a follow-up appointment. (Patient not taking: Reported on 5/13/2025) 9 mL 1     No current facility-administered medications for this visit.     Facility-Administered Medications Ordered in Other Visits   Medication Dose Route Frequency Provider Last Rate Last Admin    perflutren diluted 1mL to 2mL with saline (OPTISON) diluted injection 5 mL  5 mL Intravenous Once Chris Reece MD        sodium chloride (PF) 0.9% PF flush 10 mL  10 mL Intravenous Once Julissa Rodriguez MD        sodium chloride (PF) 0.9% PF flush 10 mL  10 mL Intracatheter Once Jason Matos MD           ROS:   Refer to HPI    EXAM:  /85 (BP Location: Right arm, Patient  Position: Chair, Cuff Size: Adult Regular)   Pulse 95   Wt 79.8 kg (176 lb)   SpO2 97%   BMI 28.41 kg/m    GENERAL: Appears comfortable, in no acute distress.   HEENT: Eye symmetrical, no discharge or icterus bilaterally. Mucous membranes moist and without lesions.  CV: RRR, +S1S2, no murmur, rub, or gallop. JVD at midclavicular line upright  RESPIRATORY: Respirations regular, even, and unlabored. Lungs CTA throughout.   GI: Soft and non distended with normoactive bowel sounds present in all quadrants. No tenderness, rebound, guarding. No hepatomegaly.   EXTREMITIES: no peripheral edema. 2+ bilateral pedal pulses.   NEUROLOGIC: Alert and oriented x 3. No focal deficits.   MUSCULOSKELETAL: No joint swelling or tenderness.   SKIN: L upper chest incision covered with clean bandage. No jaundice. No rashes or lesions.     Labs, reviewed with patient in clinic today:  CBC RESULTS:  Lab Results   Component Value Date    WBC 11.3 (H) 04/23/2025    WBC 10.3 04/05/2021    RBC 4.35 (L) 04/23/2025    RBC 4.93 04/05/2021    HGB 13.4 04/23/2025    HGB 15.0 04/05/2021    HCT 39.4 (L) 04/23/2025    HCT 44.8 04/05/2021    MCV 91 04/23/2025    MCV 91 04/05/2021    MCH 30.8 04/23/2025    MCH 30.4 04/05/2021    MCHC 34.0 04/23/2025    MCHC 33.5 04/05/2021    RDW 15.3 (H) 04/23/2025    RDW 15.3 (H) 04/05/2021     (L) 04/23/2025     04/05/2021       CMP RESULTS:  Lab Results   Component Value Date     05/13/2025     04/05/2021    POTASSIUM 4.3 05/13/2025    POTASSIUM 4.5 04/22/2025    POTASSIUM 4.3 08/29/2022    POTASSIUM 3.5 04/05/2021    CHLORIDE 102 05/13/2025    CHLORIDE 106 08/29/2022    CHLORIDE 103 04/05/2021    CO2 22 05/13/2025    CO2 26 08/29/2022    CO2 25 04/05/2021    ANIONGAP 14 05/13/2025    ANIONGAP 7 08/29/2022    ANIONGAP 8 04/05/2021     (H) 05/13/2025     (H) 04/23/2025     (H) 08/29/2022     (H) 04/05/2021    BUN 14.6 05/13/2025    BUN 21 08/29/2022    BUN 21  2021    CR 0.91 2025    CR 0.94 2021    GFRESTIMATED >90 2025    GFRESTIMATED 84 2021    GFRESTBLACK >90 2021    RENO 9.3 2025    RENO 9.5 2021    BILITOTAL 0.9 2025    BILITOTAL 1.5 (H) 2021    ALBUMIN 4.5 2025    ALBUMIN 4.1 2022    ALBUMIN 4.1 2021    ALKPHOS 63 2025    ALKPHOS 74 2021    ALT 23 2025    ALT 27 2021    AST 20 2025    AST 18 2021        INR RESULTS:  Lab Results   Component Value Date    INR 1.10 2021       Lab Results   Component Value Date    MAG 2.0 2025    MAG 2.5 (H) 2018     Lab Results   Component Value Date    NTBNPI 359 2025     Lab Results   Component Value Date    NTBNP 515 2024    NTBNP 307 (H) 2021       Diagnostics:    Echocardiogram:  Recent Results (from the past 4320 hours)   Transesophageal Echocardiogram    Narrative    [id number]  [id number]  [id number]  [id number]^JODI^OZZIE     [facility],[place]  Echocardiography Laboratory  [address] [place] [id number]     Name: KELSY NOLAN  MRN: [id number]  : 1955  Study Date: 2025 09:40 AM  Age: 69 yrs  Gender: Male  Patient Location: [facility]  Ordering Physician: OZZIE ZAPATA  Performed By: Ace Medina Dr     Attestation  I was present during EDMUND probe placement by the fellow, Ace Medina. I personally  viewed the imaging and agree with the interpretation and report as documented  by the fellow.  ______________________________________________________________________________  Interpretation Summary  Pacemaker leads noted in the RA and RV. There is a mobile lesion with  stranding on the CS lead (Image 65) and the RA lead (Image 7)     There is a mobile strand measuring 2.2 cm that appears to extend from the RA  to RV lead (Image 26)     These may represent thrombus or vegetation in the appropriate  clinical  context.  ______________________________________________________________________________  Procedure  Transesophageal Echocardiogram with two-dimensional, color and spectral  Doppler. The procedure was performed in the Echo Lab. Informed consent for  Transesophegeal echo obtained. EDMUND Probe #68 was used during the procedure.  Patient was sedated using Fentanyl 50 mcg. Patient was sedated using Versed  2.5 mg. The heart rate, respiratory rate, oxygen saturations, blood pressure,  and response to care were monitored throughout the procedure with the  assistance of the nurse. The patient tolerated the procedure well.  Complications None. I determined this patient to be an appropriate candidate  for the planned sedation and procedure and have reassessed the patient  immediately prior to sedation and procedure. Total sedation time: 28 minutes  of continuous bedside 1:1 monitoring. ECG shows normal sinus rhythm. Good  quality two-dimensional was performed and interpreted. Good quality color and  spectral Doppler were performed and interpreted.     Left Ventricle  Left ventricular function is decreased. Severe LV dilation is present. The  ejection fraction is 20-25% (severely reduced).     Right Ventricle  Pacemaker leads noted in the RA and RV. There is a mobile lesion with  stranding on the CS lead (Image 65) and the RA lead (Image 7)     There is a mobile strand measuring 2.2 cm that appears to extend from the RA  to RV lead (Image 26).     Atria  The left atrial appendage is normal. It is free of spontaneous echo contrast  and thrombus. The atrial septum is intact as assessed by color Doppler .  Chiari network (normal variant) is noted.     Mitral Valve  The mitral valve is normal. Mild mitral insufficiency is present.     Aortic Valve  Mild to moderate aortic valve sclerosis is present. The aortic valve is  tricuspid.     Tricuspid Valve  The tricuspid valve is normal. Trace tricuspid insufficiency is  present.     Pulmonic Valve  The pulmonic valve is normal. No UT.     Vessels  The aorta root is normal. The thoracic aorta is normal.     Pericardium  No pericardial effusion is present.     ______________________________________________________________________________  MMode/2D Measurements & Calculations  asc Aorta Diam: 3.2 cm     ______________________________________________________________________________  Report approved by: Chris Haq MD on 2025 02:22 PM         Echocardiogram Complete   Result Value    Biplane LVEF 21%    LVEF  20-25% (severely reduced)    Narrative    [id number]  [id number]  [id number]  [id number]^YOHANNES^MELISSA     [facility],[place]  Echocardiography Laboratory  [address] [place] [id number]     Name: KELSY NOLAN  MRN: [id number]  : 1955  Study Date: 2025 04:16 PM  Age: 69 yrs  Gender: Male  Patient [facility]: [facility]  Reason For Study: Pacemaker and/or lead complications  Ordering Physician: MELISSA NASH  Performed By: Leslie Rene RDCS     BSA: 1.9 m2  Height: 67 in  Weight: 175 lb  BP: 136/81 mmHg  ______________________________________________________________________________  Procedure  Echocardiogram with two-dimensional, color and spectral Doppler. Contrast  Optison. Optison (NDC #4180-2258-38) given intravenously. Patient was given 5  ml mixture of 3 ml Optison and 6 ml saline. 4 ml wasted. The final echo  results were communicated to Dr. Nash. The final echo results were  communicated to the ordering physician by phone .  ______________________________________________________________________________  Interpretation Summary  The tricuspid valve leaflets are not well visualized. No obvious vegetations  are seen on the tricuspid valve or the ICD leads on limited views. Recommend  EDMUND if clinical suspicion for infective endocarditis warrants.     Ischemic cardiomyopathy with severely reduced LVEF=21%. Regional wall  motion  abnormalities as described below, unchanged from the prior study.  Global right ventricular function is normal.     This study was compared with the study from 6/25/24: No significant changes  noted.  ______________________________________________________________________________  Left Ventricle  Biplane LVEF is 21%. Moderate to severe left ventricular dilation is present.  Left ventricular function is decreased. The ejection fraction is 20-25%  (severely reduced). Left ventricular diastolic function is abnormal. There is  akinesis involving the [id number]-[id number] anteroseptal, [id number]-fp-awdptc anterior, mid  anterolateral, and all apical myocardial segments. There is no thrombus seen  in the left ventricle.     Right Ventricle  The right ventricle is normal size. Global right ventricular function is  normal. A pacemaker lead is noted in the right ventricle.     Atria  The right atria appears normal. Moderate left atrial enlargement is present.     Mitral Valve  The mitral valve is normal. Trace mitral insufficiency is present.     Aortic Valve  Aortic valve sclerosis is present.     Tricuspid Valve  The valve leaflets are not well visualized. The peak velocity of the tricuspid  regurgitant jet is not obtainable. Pulmonary artery systolic pressure cannot  be assessed.     Pulmonic Valve  The valve leaflets are not well visualized. On Doppler interrogation, there is  no significant stenosis or regurgitation.     Vessels  The aorta root is normal. The inferior vena cava was normal in size with  preserved respiratory variability. IVC diameter <2.1 cm collapsing >50% with  sniff suggests a normal RA pressure of 3 mmHg.     Pericardium  No pericardial effusion is present.     Compared to Previous Study  This study was compared with the study from 6/25/24 . No significant changes  noted.  ______________________________________________________________________________  MMode/2D Measurements & Calculations  IVSd: 0.70  cm  LVIDd: 6.5 cm  LVIDs: 5.3 cm  LVPWd: 1.2 cm  FS: 18.3 %  LV mass(C)d: 257.7 grams  LV mass(C)dI: 134.9 grams/m2  Ao root diam: 3.2 cm  asc Aorta Diam: 3.5 cm  LVOT diam: 2.6 cm  LVOT area: 5.5 cm2  Ao root diam index Ht(cm/m): 1.9  Ao root diam index BSA (cm/m2): 1.7  Asc Ao diam index BSA (cm/m2): 1.8  Asc Ao diam index Ht(cm/m): 2.0  EF Biplane: 21.1 %  LA Volume (BP): 81.0 ml     LA Volume Index (BP): 42.4 ml/m2  RV Base: 4.2 cm  RWT: 0.36     Doppler Measurements & Calculations  MV E max kye: 103.0 cm/sec  MV dec slope: 1006 cm/sec2  MV dec time: 0.10 sec  LV V1 max P.9 mmHg  LV V1 max: 69.2 cm/sec  LV V1 VTI: 10.7 cm  SV(LVOT): 58.6 ml  SI(LVOT): 30.7 ml/m2     ______________________________________________________________________________  Report approved by: Chris Parson MD on 2025 05:42 PM             Assessment and Plan:   Mr. Manriquez is a 69 year old male with a PMH of CAD (s/p STEMI in  with pLAD stenting, and STEMI 2018) s/p CRT-D (), pocket infection s/p device extraction (25), ICM LVEF 20-25%, LBBB, DM2, b/l internal jugular thrombi, and HTN.     # Chronic heart failure with reduced ejection fraction (EF 20-25%) 2/2 ICM   - Primary cardiologist: Dr. Matos  - GDMT:              - fluid status: euvolemic, continue furosemide 40mg BID, weight at discharge 179 lbs, home weights 175 lbs   - BB: continue carvedilol 25mg BID  - ACE/ARB/ARNI: continue sacubitril-valsartan 97-103mg BID   - SGLT2: continue empagliflozin 25mg daily   - MRA: spironolactone 50 mg daily     # Coronary artery disease (s/p STEMI in  with pLAD stenting, and STEMI 2018) s/p CRT-D ()  - continue asa 81 mg daily  - continue statin    # Pocket infection s/p device extraction (25)  EDMUND  with mobile lesions/stranding on the leads suspicious for thrombus vs vegetation. Blood cultures with NGTD. Aerobic wound culture positive for staph capitis and staph epidermidis.   - ID consulted dar  inpatient: continue oral linezolid 600mg BID thru 5/6/25, has completed course  - Recommend lifevest as bridge: patient has not been wearing d/t discomfort. 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 Only precipitating event coud be dental procedure 12/2024  -continue Eliquis 5mg BID  - hematology appt in July     # T2DM (A1c 9.5%)  - Recently switched from semaglutide to tirzepatide  - Jardiance as above  - PCP follow up    Follow up: 6 months   Chart review time: 10 min   Patient visit time: 20 min  Total time: 30 min    Jazmin Baig CNP  [facility]  May 13, 2025

## 2025-05-13 ENCOUNTER — RESULTS FOLLOW-UP (OUTPATIENT)
Dept: CARDIOLOGY | Facility: CLINIC | Age: 70
End: 2025-05-13

## 2025-05-13 ENCOUNTER — OFFICE VISIT (OUTPATIENT)
Dept: CARDIOLOGY | Facility: CLINIC | Age: 70
End: 2025-05-13
Attending: CASE MANAGER/CARE COORDINATOR
Payer: COMMERCIAL

## 2025-05-13 ENCOUNTER — LAB (OUTPATIENT)
Dept: LAB | Facility: CLINIC | Age: 70
End: 2025-05-13
Payer: COMMERCIAL

## 2025-05-13 VITALS
BODY MASS INDEX: 28.41 KG/M2 | HEART RATE: 95 BPM | OXYGEN SATURATION: 97 % | WEIGHT: 176 LBS | DIASTOLIC BLOOD PRESSURE: 85 MMHG | SYSTOLIC BLOOD PRESSURE: 120 MMHG

## 2025-05-13 DIAGNOSIS — Z12.5 SCREENING FOR PROSTATE CANCER: ICD-10-CM

## 2025-05-13 DIAGNOSIS — E11.65 TYPE 2 DIABETES MELLITUS WITH HYPERGLYCEMIA, WITHOUT LONG-TERM CURRENT USE OF INSULIN (H): ICD-10-CM

## 2025-05-13 DIAGNOSIS — I50.22 CHRONIC SYSTOLIC CONGESTIVE HEART FAILURE (H): Primary | ICD-10-CM

## 2025-05-13 DIAGNOSIS — I50.22 CHRONIC SYSTOLIC CONGESTIVE HEART FAILURE (H): ICD-10-CM

## 2025-05-13 DIAGNOSIS — I25.5 ISCHEMIC CARDIOMYOPATHY: ICD-10-CM

## 2025-05-13 DIAGNOSIS — I82.C13 INTERNAL JUGULAR VEIN THROMBOSIS, BILATERAL (H): ICD-10-CM

## 2025-05-13 DIAGNOSIS — I50.20 HEART FAILURE WITH REDUCED EJECTION FRACTION, NYHA CLASS III (H): ICD-10-CM

## 2025-05-13 DIAGNOSIS — I25.10 CORONARY ARTERY DISEASE INVOLVING NATIVE CORONARY ARTERY OF NATIVE HEART WITHOUT ANGINA PECTORIS: ICD-10-CM

## 2025-05-13 LAB
ALBUMIN SERPL BCG-MCNC: 4.5 G/DL (ref 3.5–5.2)
ALP SERPL-CCNC: 63 U/L (ref 40–150)
ALT SERPL W P-5'-P-CCNC: 23 U/L (ref 0–70)
ANION GAP SERPL CALCULATED.3IONS-SCNC: 14 MMOL/L (ref 7–15)
AST SERPL W P-5'-P-CCNC: 20 U/L (ref 0–45)
BILIRUB SERPL-MCNC: 0.9 MG/DL
BUN SERPL-MCNC: 14.6 MG/DL (ref 8–23)
CALCIUM SERPL-MCNC: 9.3 MG/DL (ref 8.8–10.4)
CHLORIDE SERPL-SCNC: 102 MMOL/L (ref 98–107)
CHOLEST SERPL-MCNC: 166 MG/DL
CREAT SERPL-MCNC: 0.91 MG/DL (ref 0.67–1.17)
CREAT UR-MCNC: 70.6 MG/DL
EGFRCR SERPLBLD CKD-EPI 2021: >90 ML/MIN/1.73M2
FASTING STATUS PATIENT QL REPORTED: YES
FASTING STATUS PATIENT QL REPORTED: YES
GLUCOSE SERPL-MCNC: 169 MG/DL (ref 70–99)
HCO3 SERPL-SCNC: 22 MMOL/L (ref 22–29)
HDLC SERPL-MCNC: 37 MG/DL
LDLC SERPL CALC-MCNC: ABNORMAL MG/DL
LDLC SERPL DIRECT ASSAY-MCNC: 79 MG/DL
MICROALBUMIN UR-MCNC: <12 MG/L
MICROALBUMIN/CREAT UR: NORMAL MG/G{CREAT}
NONHDLC SERPL-MCNC: 129 MG/DL
NT-PROBNP SERPL-MCNC: 292 PG/ML (ref 0–229)
POTASSIUM SERPL-SCNC: 4.3 MMOL/L (ref 3.4–5.3)
PROT SERPL-MCNC: 7.3 G/DL (ref 6.4–8.3)
PSA SERPL DL<=0.01 NG/ML-MCNC: 1.45 NG/ML (ref 0–4.5)
SODIUM SERPL-SCNC: 138 MMOL/L (ref 135–145)
TRIGL SERPL-MCNC: 418 MG/DL

## 2025-05-13 PROCEDURE — 99211 OFF/OP EST MAY X REQ PHY/QHP: CPT | Performed by: CASE MANAGER/CARE COORDINATOR

## 2025-05-13 PROCEDURE — 80053 COMPREHEN METABOLIC PANEL: CPT | Performed by: PATHOLOGY

## 2025-05-13 PROCEDURE — 3079F DIAST BP 80-89 MM HG: CPT | Performed by: CASE MANAGER/CARE COORDINATOR

## 2025-05-13 PROCEDURE — 82043 UR ALBUMIN QUANTITATIVE: CPT | Performed by: STUDENT IN AN ORGANIZED HEALTH CARE EDUCATION/TRAINING PROGRAM

## 2025-05-13 PROCEDURE — 99000 SPECIMEN HANDLING OFFICE-LAB: CPT | Performed by: PATHOLOGY

## 2025-05-13 PROCEDURE — 83721 ASSAY OF BLOOD LIPOPROTEIN: CPT | Performed by: STUDENT IN AN ORGANIZED HEALTH CARE EDUCATION/TRAINING PROGRAM

## 2025-05-13 PROCEDURE — 1126F AMNT PAIN NOTED NONE PRSNT: CPT | Performed by: CASE MANAGER/CARE COORDINATOR

## 2025-05-13 PROCEDURE — 80061 LIPID PANEL: CPT | Performed by: PATHOLOGY

## 2025-05-13 PROCEDURE — 3074F SYST BP LT 130 MM HG: CPT | Performed by: CASE MANAGER/CARE COORDINATOR

## 2025-05-13 PROCEDURE — 36415 COLL VENOUS BLD VENIPUNCTURE: CPT | Performed by: PATHOLOGY

## 2025-05-13 PROCEDURE — 83880 ASSAY OF NATRIURETIC PEPTIDE: CPT | Performed by: PATHOLOGY

## 2025-05-13 PROCEDURE — 99214 OFFICE O/P EST MOD 30 MIN: CPT | Mod: 24 | Performed by: CASE MANAGER/CARE COORDINATOR

## 2025-05-13 PROCEDURE — G0103 PSA SCREENING: HCPCS | Performed by: PATHOLOGY

## 2025-05-13 ASSESSMENT — PAIN SCALES - GENERAL: PAINLEVEL_OUTOF10: NO PAIN (0)

## 2025-05-13 NOTE — NURSING NOTE
"Chief Complaint   Patient presents with    Follow Up     68 yo male presents for hospital Follow-up for infected pacemaker and HFrEF EF 20-25% with labs prior.        BP Readings from Last 1 Encounters:   05/13/25 120/85      Pulse Readings from Last 1 Encounters:   05/13/25 95      Ht Readings from Last 2 Encounters:   04/22/25 1.676 m (5' 6\")   02/06/24 1.704 m (5' 7.09\")      Wt Readings from Last 2 Encounters:   05/13/25 79.8 kg (176 lb)   04/23/25 81.5 kg (179 lb 9.6 oz)      Body mass index is 28.41 kg/m .    Vitals were taken, medications reconciled.     Driss Pineda, Clinic Assistant    7:03 AM    "

## 2025-05-13 NOTE — PATIENT INSTRUCTIONS
Take your medicines every day, as directed     Changes made today:    - no heart medicine changes    Monitor Your Weight and Symptoms     Contact us if you:     Gain 2 pounds in one day or 5 pounds in one week  Feel more short of breath  Notice more leg swelling  Feel lightheadeded    Change your lifestyle     Limit Salt or Sodium:  2000 mg  Limit Fluids:  2000 mL or approximately 64 ounces  Eat a Heart Healthy Diet  Low in saturated fats  Stay Active:  Aim to move at least 150 minutes every  week            To Contact us     During Business Hours:  [phone], option # 1      After hours, weekends or holidays:   [phone], Option #4  Ask to speak to the On-Call Cardiologist. Inform them you are a CORE/heart failure patient at the [facility].       Use [employer] allows you to communicate directly with your heart team through secure messaging.  [employer] can be accessed any time on your phone, computer, or tablet.  If you need assistance, we'd be happy to help!             Keep your Heart Appointments:     - we will call you to make an appoint with the pacemaker heart doctors  -Follow-up with CORE in 6 months with labs prior.        Please consider attending our virtual support group which is held monthly. Please reach out to David at [phone] for more information if you are interested in attending.

## 2025-05-13 NOTE — TELEPHONE ENCOUNTER
EP  called the patient and offered July 8 at 8:15 for an appointment with Dr. Mckeon and patient accepted.     Suzanne Gillis  Periop Electrophysiology   [phone]

## 2025-05-13 NOTE — LETTER
5/13/2025      RE: Anson Manriquez  [address]  Located within [facility] [id number]       Dear Colleague,    Thank you for the opportunity to participate in the care of your patient, Anson Manriquez, at the [facility] [place] at [facility]. Please see a copy of my visit note below.    [facility]    HPI:   Mr. Anson Manriquez is a 69 year old male with a history including CAD (s/p STEMI in 2007 with pLAD stenting, and STEMI 9/2018) s/p CRT-D (2019), pocket infection s/p device extraction (4/22/25), ICM LVEF 20-25%, LBBB, DM2, b/l internal jugular thrombi, and HTN. He presents to clinic for  new CORE visit.    Regarding his cardiac course, Scot as recently hospitalized 4/16-23 for pacemaker pocket infection s/p device extraction 4/22/25 with Dr. Mckeon, ID consulted and patient discharged with antibiotics thru 5/6.     Today in clinic he feels well. His wife has been packing his wound at home and he reports that it's healing well, denies new swelling, tenderness, or drainage. He has not been wearing the LifeVest, he has worn it in the past and it bothers him. No palpitations, chest pain, shortness of breath, orthopnea, or edema. Home weights have been 175 lbs.   His endocrinologist recently switched him from Ozempic to Mounjaro       PAST MEDICAL HISTORY:  Past Medical History:   Diagnosis Date     Anal fistula      Antiplatelet or antithrombotic long-term use      Coronary artery disease      Diabetes mellitus, type 2 (H)      Heart attack (H) 4/17/2007     Hyperlipidemia      Hypertension      Inguinal hernia      Ischemic cardiomyopathy      STEMI (ST elevation myocardial infarction) (H) 09/13/2018    s/p STEVO x2     Stented coronary artery 2007       FAMILY HISTORY:  Family History   Problem Relation Age of Onset     Hypertension Mother      Diabetes Father      Glaucoma No family hx of      Macular Degeneration No family hx of        SOCIAL HISTORY:  Social  History     Socioeconomic History     Marital status:    Tobacco Use     Smoking status: Never     Smokeless tobacco: Never   Substance and Sexual Activity     Alcohol use: No     Drug use: No     Social Drivers of Health     Financial Resource Strain: Low Risk  (5/4/2025)    Financial Resource Strain      Within the past 12 months, have you or your family members you live with been unable to get utilities (heat, electricity) when it was really needed?: No   Food Insecurity: Low Risk  (5/4/2025)    Food Insecurity      Within the past 12 months, did you worry that your food would run out before you got money to buy more?: No      Within the past 12 months, did the food you bought just not last and you didn t have money to get more?: No   Transportation Needs: Low Risk  (5/4/2025)    Transportation Needs      Within the past 12 months, has lack of transportation kept you from medical appointments, getting your medicines, non-medical meetings or appointments, work, or from getting things that you need?: No   Physical Activity: Sufficiently Active (5/4/2025)    Exercise Vital Sign      Days of Exercise per Week: 5 days      Minutes of Exercise per Session: 30 min   Stress: No Stress Concern Present (5/4/2025)    [place] [employer] - Occupational Stress Questionnaire      Feeling of Stress : Not at all   Social Connections: Unknown (5/4/2025)    Social Connection and Isolation Panel [NHANES]      Frequency of Social Gatherings with Friends and Family: More than three times a week   Interpersonal Safety: Unknown (4/22/2025)    Interpersonal Safety      Do you feel physically and emotionally safe where you currently live?: Patient unable to answer      Within the past 12 months, have you been hit, slapped, kicked or otherwise physically hurt by someone?: Patient unable to answer      Within the past 12 months, have you been humiliated or emotionally abused in other ways by your partner or  ex-partner?: Patient unable to answer   Housing Stability: High Risk (5/4/2025)    Housing Stability      Do you have housing? : No      Are you worried about losing your housing?: No       CURRENT MEDICATIONS:  Current Outpatient Medications   Medication Sig Dispense Refill     apixaban ANTICOAGULANT (ELIQUIS) 5 MG tablet Take 2 tablets (10 mg) by mouth 2 times daily for 7 days, THEN 1 tablet (5 mg) 2 times daily. 388 tablet 0     aspirin (ASA) 81 MG chewable tablet Take 1 tablet (81 mg) by mouth daily. 90 tablet 4     atorvastatin (LIPITOR) 80 MG tablet Take 1 tablet (80 mg) by mouth daily. 90 tablet 4     carvedilol (COREG) 25 MG tablet Take 1 tablet (25 mg) by mouth 2 times daily (with meals). 180 tablet 1     empagliflozin (JARDIANCE) 25 MG TABS tablet Take 1 tablet (25 mg) by mouth daily. 90 tablet 4     furosemide (LASIX) 40 MG tablet Take 1 tablet (40 mg) by mouth 2 times daily. 180 tablet 4     glipiZIDE (GLUCOTROL XL) 5 MG 24 hr tablet Take 1 tablet (5 mg) by mouth 2 times daily. 180 tablet 0     MULTIPLE VITAMIN PO Take 1 tablet by mouth every morning        sacubitril-valsartan (ENTRESTO)  MG per tablet Take 1 tablet by mouth 2 times daily. 180 tablet 4     spironolactone (ALDACTONE) 50 MG tablet Take 1 tablet (50 mg) by mouth daily. 90 tablet 3     tirzepatide (MOUNJARO) 2.5 MG/0.5ML SOAJ auto-injector pen Inject 0.5 mLs (2.5 mg) subcutaneously every 7 days. 2 mL 0     Semaglutide, 1 MG/DOSE, (OZEMPIC) 4 MG/3ML pen Inject 1 mg Subcutaneous every 7 days (Patient not taking: Reported on 5/13/2025) 6 mL 11     Semaglutide, 2 MG/DOSE, (OZEMPIC) 8 MG/3ML pen Inject 2 mg subcutaneously every 7 days. For additional refills, please schedule a follow-up appointment. (Patient not taking: Reported on 5/13/2025) 9 mL 1     No current facility-administered medications for this visit.     Facility-Administered Medications Ordered in Other Visits   Medication Dose Route Frequency Provider Last Rate Last Admin      perflutren diluted 1mL to 2mL with saline (OPTISON) diluted injection 5 mL  5 mL Intravenous Once Chris Reece MD         sodium chloride (PF) 0.9% PF flush 10 mL  10 mL Intravenous Once Julissa Rodriguez MD         sodium chloride (PF) 0.9% PF flush 10 mL  10 mL Intracatheter Once Jason Matos MD           ROS:   Refer to HPI    EXAM:  /85 (BP Location: Right arm, Patient Position: Chair, Cuff Size: Adult Regular)   Pulse 95   Wt 79.8 kg (176 lb)   SpO2 97%   BMI 28.41 kg/m    GENERAL: Appears comfortable, in no acute distress.   HEENT: Eye symmetrical, no discharge or icterus bilaterally. Mucous membranes moist and without lesions.  CV: RRR, +S1S2, no murmur, rub, or gallop. JVD at midclavicular line upright  RESPIRATORY: Respirations regular, even, and unlabored. Lungs CTA throughout.   GI: Soft and non distended with normoactive bowel sounds present in all quadrants. No tenderness, rebound, guarding. No hepatomegaly.   EXTREMITIES: no peripheral edema. 2+ bilateral pedal pulses.   NEUROLOGIC: Alert and oriented x 3. No focal deficits.   MUSCULOSKELETAL: No joint swelling or tenderness.   SKIN: L upper chest incision covered with clean bandage. No jaundice. No rashes or lesions.     Labs, reviewed with patient in clinic today:  CBC RESULTS:  Lab Results   Component Value Date    WBC 11.3 (H) 04/23/2025    WBC 10.3 04/05/2021    RBC 4.35 (L) 04/23/2025    RBC 4.93 04/05/2021    HGB 13.4 04/23/2025    HGB 15.0 04/05/2021    HCT 39.4 (L) 04/23/2025    HCT 44.8 04/05/2021    MCV 91 04/23/2025    MCV 91 04/05/2021    MCH 30.8 04/23/2025    MCH 30.4 04/05/2021    MCHC 34.0 04/23/2025    MCHC 33.5 04/05/2021    RDW 15.3 (H) 04/23/2025    RDW 15.3 (H) 04/05/2021     (L) 04/23/2025     04/05/2021       CMP RESULTS:  Lab Results   Component Value Date     05/13/2025     04/05/2021    POTASSIUM 4.3 05/13/2025    POTASSIUM 4.5 04/22/2025    POTASSIUM 4.3 08/29/2022     POTASSIUM 3.5 2021    CHLORIDE 102 2025    CHLORIDE 106 2022    CHLORIDE 103 2021    CO2 22 2025    CO2 26 2022    CO2 25 2021    ANIONGAP 14 2025    ANIONGAP 7 2022    ANIONGAP 8 2021     (H) 2025     (H) 2025     (H) 2022     (H) 2021    BUN 14.6 2025    BUN 21 2022    BUN 21 2021    CR 0.91 2025    CR 0.94 2021    GFRESTIMATED >90 2025    GFRESTIMATED 84 2021    GFRESTBLACK >90 2021    RENO 9.3 2025    RENO 9.5 2021    BILITOTAL 0.9 2025    BILITOTAL 1.5 (H) 2021    ALBUMIN 4.5 2025    ALBUMIN 4.1 2022    ALBUMIN 4.1 2021    ALKPHOS 63 2025    ALKPHOS 74 2021    ALT 23 2025    ALT 27 2021    AST 20 2025    AST 18 2021        INR RESULTS:  Lab Results   Component Value Date    INR 1.10 2021       Lab Results   Component Value Date    MAG 2.0 2025    MAG 2.5 (H) 2018     Lab Results   Component Value Date    NTBNPI 359 2025     Lab Results   Component Value Date    NTBNP 515 2024    NTBNP 307 (H) 2021       Diagnostics:    Echocardiogram:  Recent Results (from the past 4320 hours)   Transesophageal Echocardiogram    Narrative    [id number]  [id number]  [id number]  [id number]^JODI^[facility],[place]  Echocardiography Laboratory  [address] [place] [id number]     Name: KELSY NOLAN  MRN: [id number]  : 1955  Study Date: 2025 09:40 AM  Age: 69 yrs  Gender: Male  Patient Location: [facility]  Ordering Physician: OZZIE ZAPATA  Performed By: Ace Medina Dr     Attestation  I was present during EDMUND probe placement by the fellow, Ace Medina. I personally  viewed the imaging and agree with the interpretation and report as documented  by the  fellow.  ______________________________________________________________________________  Interpretation Summary  Pacemaker leads noted in the RA and RV. There is a mobile lesion with  stranding on the CS lead (Image 65) and the RA lead (Image 7)     There is a mobile strand measuring 2.2 cm that appears to extend from the RA  to RV lead (Image 26)     These may represent thrombus or vegetation in the appropriate clinical  context.  ______________________________________________________________________________  Procedure  Transesophageal Echocardiogram with two-dimensional, color and spectral  Doppler. The procedure was performed in the Echo Lab. Informed consent for  Transesophegeal echo obtained. EDMUND Probe #68 was used during the procedure.  Patient was sedated using Fentanyl 50 mcg. Patient was sedated using Versed  2.5 mg. The heart rate, respiratory rate, oxygen saturations, blood pressure,  and response to care were monitored throughout the procedure with the  assistance of the nurse. The patient tolerated the procedure well.  Complications None. I determined this patient to be an appropriate candidate  for the planned sedation and procedure and have reassessed the patient  immediately prior to sedation and procedure. Total sedation time: 28 minutes  of continuous bedside 1:1 monitoring. ECG shows normal sinus rhythm. Good  quality two-dimensional was performed and interpreted. Good quality color and  spectral Doppler were performed and interpreted.     Left Ventricle  Left ventricular function is decreased. Severe LV dilation is present. The  ejection fraction is 20-25% (severely reduced).     Right Ventricle  Pacemaker leads noted in the RA and RV. There is a mobile lesion with  stranding on the CS lead (Image 65) and the RA lead (Image 7)     There is a mobile strand measuring 2.2 cm that appears to extend from the RA  to RV lead (Image 26).     Atria  The left atrial appendage is normal. It is free of  spontaneous echo contrast  and thrombus. The atrial septum is intact as assessed by color Doppler .  Chiari network (normal variant) is noted.     Mitral Valve  The mitral valve is normal. Mild mitral insufficiency is present.     Aortic Valve  Mild to moderate aortic valve sclerosis is present. The aortic valve is  tricuspid.     Tricuspid Valve  The tricuspid valve is normal. Trace tricuspid insufficiency is present.     Pulmonic Valve  The pulmonic valve is normal. No NH.     Vessels  The aorta root is normal. The thoracic aorta is normal.     Pericardium  No pericardial effusion is present.     ______________________________________________________________________________  MMode/2D Measurements & Calculations  asc Aorta Diam: 3.2 cm     ______________________________________________________________________________  Report approved by: Chris Haq MD on 2025 02:22 PM         Echocardiogram Complete   Result Value    Biplane LVEF 21%    LVEF  20-25% (severely reduced)    Narrative    [id number]  [id number]  [id number]  [id number]^YOHANNES^MELISSA     [facility],[place]  Echocardiography Laboratory  [address] [place] [id number]     Name: KELSY NOLAN  MRN: [id number]  : 1955  Study Date: 2025 04:16 PM  Age: 69 yrs  Gender: Male  Patient Location: [facility]  Reason For Study: Pacemaker and/or lead complications  Ordering Physician: MELISSA NASH  Performed By: Leslie Rene RDCS     BSA: 1.9 m2  Height: 67 in  Weight: 175 lb  BP: 136/81 mmHg  ______________________________________________________________________________  Procedure  Echocardiogram with two-dimensional, color and spectral Doppler. Contrast  Optison. Optison (NDC #1123-6579-26) given intravenously. Patient was given 5  ml mixture of 3 ml Optison and 6 ml saline. 4 ml wasted. The final echo  results were communicated to Dr. Nash. The final echo results were  communicated to the ordering physician by  phone .  ______________________________________________________________________________  Interpretation Summary  The tricuspid valve leaflets are not well visualized. No obvious vegetations  are seen on the tricuspid valve or the ICD leads on limited views. Recommend  EDMUND if clinical suspicion for infective endocarditis warrants.     Ischemic cardiomyopathy with severely reduced LVEF=21%. Regional wall motion  abnormalities as described below, unchanged from the prior study.  Global right ventricular function is normal.     This study was compared with the study from 6/25/24: No significant changes  noted.  ______________________________________________________________________________  Left Ventricle  Biplane LVEF is 21%. Moderate to severe left ventricular dilation is present.  Left ventricular function is decreased. The ejection fraction is 20-25%  (severely reduced). Left ventricular diastolic function is abnormal. There is  akinesis involving the ayndf-xl-akp anteroseptal, tpb-le-boobox anterior, mid  anterolateral, and all apical myocardial segments. There is no thrombus seen  in the left ventricle.     Right Ventricle  The right ventricle is normal size. Global right ventricular function is  normal. A pacemaker lead is noted in the right ventricle.     Atria  The right atria appears normal. Moderate left atrial enlargement is present.     Mitral Valve  The mitral valve is normal. Trace mitral insufficiency is present.     Aortic Valve  Aortic valve sclerosis is present.     Tricuspid Valve  The valve leaflets are not well visualized. The peak velocity of the tricuspid  regurgitant jet is not obtainable. Pulmonary artery systolic pressure cannot  be assessed.     Pulmonic Valve  The valve leaflets are not well visualized. On Doppler interrogation, there is  no significant stenosis or regurgitation.     Vessels  The aorta root is normal. The inferior vena cava was normal in size with  preserved respiratory  variability. IVC diameter <2.1 cm collapsing >50% with  sniff suggests a normal RA pressure of 3 mmHg.     Pericardium  No pericardial effusion is present.     Compared to Previous Study  This study was compared with the study from 24 . No significant changes  noted.  ______________________________________________________________________________  MMode/2D Measurements & Calculations  IVSd: 0.70 cm  LVIDd: 6.5 cm  LVIDs: 5.3 cm  LVPWd: 1.2 cm  FS: 18.3 %  LV mass(C)d: 257.7 grams  LV mass(C)dI: 134.9 grams/m2  Ao root diam: 3.2 cm  asc Aorta Diam: 3.5 cm  LVOT diam: 2.6 cm  LVOT area: 5.5 cm2  Ao root diam index Ht(cm/m): 1.9  Ao root diam index BSA (cm/m2): 1.7  Asc Ao diam index BSA (cm/m2): 1.8  Asc Ao diam index Ht(cm/m): 2.0  EF Biplane: 21.1 %  LA Volume (BP): 81.0 ml     LA Volume Index (BP): 42.4 ml/m2  RV Base: 4.2 cm  RWT: 0.36     Doppler Measurements & Calculations  MV E max kye: 103.0 cm/sec  MV dec slope: 1006 cm/sec2  MV dec time: 0.10 sec  LV V1 max P.9 mmHg  LV V1 max: 69.2 cm/sec  LV V1 VTI: 10.7 cm  SV(LVOT): 58.6 ml  SI(LVOT): 30.7 ml/m2     ______________________________________________________________________________  Report approved by: Chris Parson MD on 2025 05:42 PM             Assessment and Plan:   Mr. Manriquez is a 69 year old male with a PMH of CAD (s/p STEMI in  with pLAD stenting, and STEMI 2018) s/p CRT-D (), pocket infection s/p device extraction (25), ICM LVEF 20-25%, LBBB, DM2, b/l internal jugular thrombi, and HTN.     # Chronic heart failure with reduced ejection fraction (EF 20-25%) 2/2 ICM   - Primary cardiologist: Dr. Matos  - GDMT:              - fluid status: euvolemic, continue furosemide 40mg BID, weight at discharge 179 lbs, home weights 175 lbs   - BB: continue carvedilol 25mg BID  - ACE/ARB/ARNI: continue sacubitril-valsartan 97-103mg BID   - SGLT2: continue empagliflozin 25mg daily   - MRA: spironolactone 50 mg daily     # Coronary  artery disease (s/p STEMI in 2007 with pLAD stenting, and STEMI 9/2018) s/p CRT-D (2019)  - continue asa 81 mg daily  - continue statin    # Pocket infection s/p device extraction (4/22/25)  EDMUND 4/17 with mobile lesions/stranding on the leads suspicious for thrombus vs vegetation. Blood cultures with NGTD. Aerobic wound culture positive for staph capitis and staph epidermidis.   - ID consulted ranie inpatient: continue oral linezolid 600mg BID thru 5/6/25, has completed course  - Recommend lifevest as bridge: patient has not been wearing d/t discomfort. 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 Only precipitating event coud be dental procedure 12/2024  -continue Eliquis 5mg BID  - hematology appt in July     # T2DM (A1c 9.5%)  - Recently switched from semaglutide to tirzepatide  - Jardiance as above  - PCP follow up    Follow up: 6 months   Chart review time: 10 min   Patient visit time: 20 min  Total time: 30 min    Jazmin Baig CNP  [facility]  May 13, 2025        Please do not hesitate to contact me if you have any questions/concerns.     Sincerely,     LLOYD WOODS CNP

## 2025-05-13 NOTE — NURSING NOTE
Return Appointment:   -Follow-up with Aimee Wheat next available  -Follow-up with [facility] in 6 months with labs prior.   Patient given instructions regarding scheduling next clinic visit. Patient demonstrated understanding of this information and agreed to call with further questions or concerns.    Patient stated he understood all health information given and agreed to call with further questions or concerns.     Nhi Estevez RN

## 2025-05-15 LAB — BACTERIA WND CULT: NORMAL

## 2025-05-17 ENCOUNTER — RESULTS FOLLOW-UP (OUTPATIENT)
Dept: INTERNAL MEDICINE | Facility: CLINIC | Age: 70
End: 2025-05-17

## 2025-05-20 LAB — BACTERIA WND CULT: NO GROWTH

## 2025-06-17 ENCOUNTER — TELEPHONE (OUTPATIENT)
Dept: ENDOCRINOLOGY | Facility: CLINIC | Age: 70
End: 2025-06-17
Payer: COMMERCIAL

## 2025-06-17 DIAGNOSIS — E11.65 TYPE 2 DIABETES MELLITUS WITH HYPERGLYCEMIA, WITHOUT LONG-TERM CURRENT USE OF INSULIN (H): ICD-10-CM

## 2025-06-17 DIAGNOSIS — E11.65 TYPE 2 DIABETES MELLITUS WITH HYPERGLYCEMIA, WITHOUT LONG-TERM CURRENT USE OF INSULIN (H): Primary | ICD-10-CM

## 2025-06-17 NOTE — TELEPHONE ENCOUNTER
[facility] Call Center    Phone Message    May a detailed message be left on voicemail: yes     Reason for Call: Medication Refill Request    Has the patient contacted the pharmacy for the refill? Yes   Name of medication being requested: tirzepatide (MOUNJARO) 2.5 MG/0.5ML SOAJ auto-injector pen [[id number]]  Provider who prescribed the medication: Li Muller MD  Pharmacy: [facility][id number] [address]  AT [facility]  Date medication is needed: asap       Action Taken: Other: endo    Travel Screening: Not Applicable     Date of Service:

## 2025-06-17 NOTE — TELEPHONE ENCOUNTER
The patient has filled the allotted amount of 2.5mg pens per their insurance plan. Do you want to increase them to 5mg or complete a qty limits exceeded PA?    Monique Sarkar [facility]  Endo Clinic Liaison  [facility]  [email or web address]   [email or web address]  Phone: [phone]  Fax: [phone]

## 2025-06-17 NOTE — TELEPHONE ENCOUNTER
Disp Refills Start End MABEL   tirzepatide (MOUNJARO) 2.5 MG/0.5ML SOAJ auto-injector pen 2 mL 0 4/30/2025 -- No   Sig - Route: Inject 0.5 mLs (2.5 mg) subcutaneously every 7 days. - Subcutaneous     LOV: 4/30/2025  [facility]      FOV: 0      Refill decision: Medication refilled per  Medication Refill in Ambulatory Care  policy. 28 day supply given, A1C due 7/16    Last Comprehensive Metabolic Panel:  Lab Results   Component Value Date     05/13/2025    POTASSIUM 4.3 05/13/2025    CHLORIDE 102 05/13/2025    CO2 22 05/13/2025    ANIONGAP 14 05/13/2025     (H) 05/13/2025    BUN 14.6 05/13/2025    CR 0.91 05/13/2025    GFRESTIMATED >90 05/13/2025    RENO 9.3 05/13/2025     Hemoglobin A1C   Date Value Ref Range Status   04/16/2025 9.5 (H) <5.7 % Final     Comment:     Normal <5.7%   Prediabetes 5.7-6.4%    Diabetes 6.5% or higher     Note: Adopted from ADA consensus guidelines.   10/29/2020 8.0 (H) 0 - 5.6 % Final     Comment:     Normal <5.7% Prediabetes 5.7-6.4%  Diabetes 6.5% or higher - adopted from ADA   consensus guidelines.

## 2025-06-19 NOTE — TELEPHONE ENCOUNTER
Per Dr. Muller:  Yes. I placed the order. Thank you.        Disp Refills Start End MABEL   tirzepatide (MOUNJARO) 5 MG/0.5ML SOAJ auto-injector pen 2 mL 0 6/18/2025 -- No   Sig - Route: Inject 0.5 mLs (5 mg) subcutaneously every 7 days. - Subcutaneous   Sent to pharmacy as: Tirzepatide 5 MG/0.5ML Subcutaneous Solution Auto-injector (MOUNJARO)   Class: E-Prescribe   Order: [id number]   E-Prescribing Status: Receipt confirmed by pharmacy (6/18/2025  1:33 PM CDT)       Writer advised of Dr. Muller's recommendations. Patient verbalizes understanding and agrees to plan. Patient noted he was supposed to get a call to discuss his Mounjaro and if he will need to stop his glipizide. Per Dr. Muller's 4/30/25 progress note:  It was nice seeing you:     - continue jardiance  - continue glipizide for now.  - finish your supply of ozempic then switch to mounjaro  - you should follow-up monthly with endocrinology pharmD to increase your mounjaro dose  - check your blood glucose at least once a day before meals             Patient was scheduled with [facility] PharmD on 5/30/25 but appointment was canceled. Patient is now scheduled for 7/1/25. Writer explained to patient that we would need to know if he is having any low blood sugars and he should call us if he is, so adjustments could be made in medications. Patient can discuss this further with JOSELO on 7/1/25 but writer advised patient if he is having low blood sugars before that visit, he is to call our clinic right away. Patient verbalizes understanding and agrees to plan.         Emmie Kenny RN  Endocrine Care Coordinator  [facility]

## 2025-07-01 ENCOUNTER — VIRTUAL VISIT (OUTPATIENT)
Dept: PHARMACY | Facility: CLINIC | Age: 70
End: 2025-07-01
Attending: STUDENT IN AN ORGANIZED HEALTH CARE EDUCATION/TRAINING PROGRAM
Payer: MEDICARE

## 2025-07-01 DIAGNOSIS — E11.65 TYPE 2 DIABETES MELLITUS WITH HYPERGLYCEMIA, WITHOUT LONG-TERM CURRENT USE OF INSULIN (H): Primary | ICD-10-CM

## 2025-07-01 NOTE — PATIENT INSTRUCTIONS
"Recommendations from today's MTM visit:                                                      Start checking your blood sugars once daily, you can either check in the morning before eating or 2 hours after a meal.   Goal before eatin-130 mg/dL & Goal 2 hours after a meal: <180 mg/dL    Test strips sent to pharmacy, make sure that they match meter before picking up from the pharmacy     Continue on Mounjaro 5mg weekly, glipizide 5mg twice daily, and Jardiance 25mg daily for now, will adjust doses at next follow up based on blood sugars     Follow-up: 25 at 9AM    It was great speaking with you today.  I value your experience and would be very thankful for your time in providing feedback in our clinic survey. In the next few days, you may receive an email or text message from [facility] with a link to a survey related to your  clinical pharmacist.\"     To schedule another MTM appointment, please call the clinic directly or you may call the MTM scheduling line at [phone].    My Clinical Pharmacist's contact information:                                                      Please feel free to contact me with any questions or concerns you have.      Nevaeh Davis), PharmD  Endocrine & Diabetes Medication Therapy Management Pharmacist   [address] [place] [id number]     Contact information:   To schedule a MTM appointment: [phone]  For questions or concerns, please send a [employer] message or call the clinic at [phone].    For more urgent concerns that do not require 682, please call [phone] after hours/weekends and ask to speak with the Endocrinologist on call.     "

## 2025-07-01 NOTE — PROGRESS NOTES
Medication Therapy Management (MTM) Encounter    ASSESSMENT:                            Medication Adherence/Access: No issues identified.    Diabetes: Patient has tolerated transition from Ozempic to Mounjaro. Patient has not been able to monitor blood sugars to assess safety and efficacy of current regimen thus recommend patient start monitoring blood sugars and provided refill for test strips in order to do so. Since patient declines any symptoms of hypoglycemia, recommend patient continue current medication regimen for now including glipizide therapy and will adjust medications and doses at next follow up visit after patient is able to start monitoring blood sugars.     Due to time constraints and patient about to board a flight, I was only able to assess the above with the patient today. Will follow-up on other disease states in future encounters    PLAN:                            Start checking your blood sugars once daily, you can either check in the morning before eating or 2 hours after a meal.   Goal before eatin-130 mg/dL & Goal 2 hours after a meal: <180 mg/dL    Test strips sent to pharmacy, make sure that they match meter before picking up from the pharmacy     Continue on Mounjaro 5mg weekly, glipizide 5mg twice daily, and Jardiance 25mg daily for now, will adjust doses at next follow up based on blood sugars     Follow-up: 25 at 9AM    SUBJECTIVE/OBJECTIVE:                          Cedric Manriquez is a 69 year old male called for an initial visit. He was referred to me from Dr. Muller.      Reason for visit: Mounjaro follow up    Allergies/ADRs: Reviewed in chart  Past Medical History: Reviewed in chart  Tobacco: He reports that he has never smoked. He has never used smokeless tobacco.  Alcohol: not able to assess today due to time, will do so at follow up    Medication Adherence/Access: no issues reported.    Diabetes   Current Medications:   Mounjaro 5mg weekly on - patient  completed 4 doses of 2.5mg dose and most recently has increased to the 5mg dose. Patient has completed 1 dose at 5mg. Patient confirms that they have not used Ozempic when starting Mounjaro. Patient declines any GI related side effects since changing to Mounjaro.   Glipizide XL 5mg twice daily   Jardiance 25mg daily   Blood sugar monitoring: patient has not been monitoring, has a meter at home and knows how to use, requests refill for test strips   Symptoms: patient declines any symptoms of hypoglycemia      Today's Vitals: There were no vitals taken for this visit.  ----------------    I spent 14 minutes with this patient today. All changes were made via collaborative practice agreement with Li Muller.     A summary of these recommendations was sent via [employer].    Nevaeh ClancyHarry S. Truman Memorial Veterans' Hospitalramsey), PharmD  Endocrine & Diabetes Medication Therapy Management Pharmacist   [address] [place] [id number]     Contact information:   To schedule a MTM appointment: [phone]  For questions or concerns, please send a [employer] message or call the clinic at [phone].    For more urgent concerns that do not require 876, please call [phone] after hours/weekends and ask to speak with the Endocrinologist on call.      Telemedicine Visit Details  The patient's medications can be safely assessed via a telemedicine encounter.  Type of service:  Telephone visit  Originating Location (pt. Location): Home    Distant Location (provider location):  Off-site  Start Time: 9:03 AM  End Time: 9:17 AM     Medication Therapy Recommendations  No medication therapy recommendations to display

## 2025-07-09 ENCOUNTER — TELEPHONE (OUTPATIENT)
Dept: CARDIOLOGY | Facility: CLINIC | Age: 70
End: 2025-07-09
Payer: COMMERCIAL

## 2025-07-09 NOTE — TELEPHONE ENCOUNTER
Left Voicemail (1st Attempt) and Sent Mychart (1st Attempt) for the patient to call back and schedule the following:    Appointment type: Return EP  Provider: Humble Noonan  Return date: Next lynda  Specialty phone number: [phone] opt 1  Additional appointment(s) needed: NA  Additonal Notes: NA

## 2025-07-09 NOTE — TELEPHONE ENCOUNTER
Left Voicemail (1st Attempt) and Sent Mychart (1st Attempt) for the patient to call back and schedule the following:    Appointment type: Return Heart Failure  Provider: Carlo  Return date: January  Specialty phone number: [phone] opt 1  Additional appointment(s) needed: LABS & ECHO  Additonal Notes: NA

## 2025-07-09 NOTE — TELEPHONE ENCOUNTER
Left Voicemail (1st Attempt) and Sent Mychart (1st Attempt) for the patient to call back and schedule the following:    Appointment type: Return CORE  Provider: Jovanni  Return date: November  Specialty phone number: [phone] opt 1  Additional appointment(s) needed: LAB & ECHO  Additonal Notes: NA

## 2025-07-28 ENCOUNTER — TELEPHONE (OUTPATIENT)
Dept: CARDIOLOGY | Facility: CLINIC | Age: 70
End: 2025-07-28
Payer: COMMERCIAL

## 2025-07-28 NOTE — TELEPHONE ENCOUNTER
Patient Contacted for the patient to call back and schedule the following:    Appointment type: Return CORE  Provider: Jovanni  Return date: 11/13/2025  Specialty phone number: [phone] opt 1  Additional appointment(s) needed: Labs  Additonal Notes: NA

## 2025-08-04 DIAGNOSIS — E11.9 TYPE 2 DIABETES MELLITUS WITHOUT COMPLICATION, WITHOUT LONG-TERM CURRENT USE OF INSULIN (H): ICD-10-CM

## 2025-08-06 ENCOUNTER — TELEPHONE (OUTPATIENT)
Dept: CARDIOLOGY | Facility: CLINIC | Age: 70
End: 2025-08-06
Payer: COMMERCIAL

## 2025-08-06 DIAGNOSIS — I25.5 ISCHEMIC CARDIOMYOPATHY: ICD-10-CM

## 2025-08-06 RX ORDER — SPIRONOLACTONE 50 MG/1
50 TABLET, FILM COATED ORAL DAILY
Qty: 90 TABLET | Refills: 1 | Status: SHIPPED | OUTPATIENT
Start: 2025-08-06

## 2025-08-07 RX ORDER — GLIPIZIDE 5 MG/1
5 TABLET, FILM COATED, EXTENDED RELEASE ORAL 2 TIMES DAILY
Qty: 180 TABLET | Refills: 1 | Status: SHIPPED | OUTPATIENT
Start: 2025-08-07

## 2025-08-25 ENCOUNTER — TELEPHONE (OUTPATIENT)
Dept: CARDIOLOGY | Facility: CLINIC | Age: 70
End: 2025-08-25
Payer: COMMERCIAL

## 2025-08-28 ENCOUNTER — TELEPHONE (OUTPATIENT)
Dept: CARDIOLOGY | Facility: CLINIC | Age: 70
End: 2025-08-28
Payer: COMMERCIAL

## 2025-08-28 DIAGNOSIS — Z95.810 S/P ICD (INTERNAL CARDIAC DEFIBRILLATOR) PROCEDURE: Primary | ICD-10-CM

## (undated) DEVICE — BLADE CLIPPER SGL USE 9680

## (undated) DEVICE — GUIDEWIRE VASCULAR 0.035IN DIA 145CML 15CML/6CML STAINLESS

## (undated) DEVICE — SOL WATER IRRIG 1000ML BOTTLE 2F7114

## (undated) DEVICE — DRAIN PENROSE 1/4X18" LATEX

## (undated) DEVICE — DRSG PRIMAPORE 03 1/8X6" 66000318

## (undated) DEVICE — SOL ADH LIQUID BENZOIN SWAB 0.6ML C1544

## (undated) DEVICE — SOL NACL 0.9% IRRIG 1000ML BOTTLE 2F7124

## (undated) DEVICE — ESU GROUND PAD ADULT W/CORD E7507

## (undated) DEVICE — SU VICRYL 0 CT-1 27" UND J260H

## (undated) DEVICE — PAD DEFIBRILLATOR ELECTRODE 4.25INX6IN ADULT 2001M-C

## (undated) DEVICE — 5FR X 75CM WORLEY VEIN SELECTOR CATHETER, VERTEBRAL CURVED

## (undated) DEVICE — INTRO SHEATH MICRO PLATINUM TIP 4FRX40CM 7274

## (undated) DEVICE — GLOVE PROTEXIS POWDER FREE SMT 7.5  2D72PT75X

## (undated) DEVICE — SU VICRYL 0 CT-2 27" J334H

## (undated) DEVICE — DRAPE LAP W/ARMBOARD 29410

## (undated) DEVICE — SU MONOCRYL 4-0 PS-2 27" UND Y426H

## (undated) DEVICE — SU PDS II 2-0 SH 27" Z317H

## (undated) DEVICE — LINEN TOWEL PACK X5 5464

## (undated) DEVICE — BASIN SET SINGLE STERILE 13752-624

## (undated) DEVICE — EYE PREP BETADINE 5% SOLUTION 30ML 0065-0411-30

## (undated) DEVICE — 0.014IN X 190CM HI-TORQUE WHISPER WIRE VIEW, EDS CS-J W/HYDROCOAT HYDROPHILIC COATING

## (undated) DEVICE — SPONGE LAP 18X18" X8435

## (undated) DEVICE — HEADREST FOAM 9" PINK

## (undated) DEVICE — SUCTION TIP YANKAUER W/O VENT K86

## (undated) DEVICE — ESU PENCIL SMOKE EVAC W/ROCKER SWITCH 0703-047-000

## (undated) DEVICE — DRSG STERI STRIP 1/2X4" R1547

## (undated) DEVICE — NDL 25GA 1.5" 305127

## (undated) DEVICE — PREP CHLORAPREP 26ML TINTED ORANGE  260815

## (undated) DEVICE — GLOVE PROTEXIS W/NEU-THERA 7.5  2D73TE75

## (undated) DEVICE — ESU NDL COLORADO MICRO 3CM STR N103A

## (undated) DEVICE — ESU EYE HIGH TEMP 65410-183

## (undated) DEVICE — PACK OCULOPLATIC SEN15OCFSD

## (undated) DEVICE — Device

## (undated) DEVICE — DRSG GAUZE 4X4" TRAY 6939

## (undated) DEVICE — .096IN ROTATING HEMOSTATIC VALVE

## (undated) DEVICE — SHEATH WORLEY STD BRAIDED 40CM

## (undated) DEVICE — ESU PENCIL W/SMOKE EVAC ROCKER E2350HS

## (undated) DEVICE — DRAPE SHEET REV FOLD 3/4 9349

## (undated) DEVICE — SPONGE KITTNER 30-101

## (undated) DEVICE — SU VICRYL 3-0 CT-1 36" J344H

## (undated) DEVICE — ESU PENCIL W/SMOKE EVAC CVPLP2000

## (undated) DEVICE — 9FR X 13CM SAFESHEATH PEEL AWAY HEMOSTATIC INTRODUCER FOR VASCULAR ACCESS, MODEL 7463 / PART # 667463-001 (MFR'D BY PRESSURE PRODUCTS)

## (undated) DEVICE — SU VICRYL 2-0 CT-2 27" UND J269H

## (undated) DEVICE — SU VICRYL 3-0 SH 27" UND J416H

## (undated) DEVICE — ESU PENCIL W/HOLSTER

## (undated) RX ORDER — GLYCOPYRROLATE 0.2 MG/ML
INJECTION, SOLUTION INTRAMUSCULAR; INTRAVENOUS
Status: DISPENSED
Start: 2022-09-02

## (undated) RX ORDER — FENTANYL CITRATE 50 UG/ML
INJECTION, SOLUTION INTRAMUSCULAR; INTRAVENOUS
Status: DISPENSED
Start: 2020-02-20

## (undated) RX ORDER — LIDOCAINE HYDROCHLORIDE 10 MG/ML
INJECTION, SOLUTION EPIDURAL; INFILTRATION; INTRACAUDAL; PERINEURAL
Status: DISPENSED
Start: 2018-09-13

## (undated) RX ORDER — NOREPINEPHRINE BITARTRATE 1 MG/ML
INJECTION, SOLUTION INTRAVENOUS
Status: DISPENSED
Start: 2018-09-13

## (undated) RX ORDER — CEFAZOLIN SODIUM 2 G/100ML
INJECTION, SOLUTION INTRAVENOUS
Status: DISPENSED
Start: 2019-04-25

## (undated) RX ORDER — FENTANYL CITRATE 50 UG/ML
INJECTION, SOLUTION INTRAMUSCULAR; INTRAVENOUS
Status: DISPENSED
Start: 2019-04-25

## (undated) RX ORDER — ONDANSETRON 2 MG/ML
INJECTION INTRAMUSCULAR; INTRAVENOUS
Status: DISPENSED
Start: 2020-02-20

## (undated) RX ORDER — PROPOFOL 10 MG/ML
INJECTION, EMULSION INTRAVENOUS
Status: DISPENSED
Start: 2022-09-02

## (undated) RX ORDER — ESMOLOL HYDROCHLORIDE 10 MG/ML
INJECTION INTRAVENOUS
Status: DISPENSED
Start: 2022-09-02

## (undated) RX ORDER — LIDOCAINE HYDROCHLORIDE 20 MG/ML
INJECTION, SOLUTION EPIDURAL; INFILTRATION; INTRACAUDAL; PERINEURAL
Status: DISPENSED
Start: 2020-02-20

## (undated) RX ORDER — HEPARIN SODIUM 1000 [USP'U]/ML
INJECTION, SOLUTION INTRAVENOUS; SUBCUTANEOUS
Status: DISPENSED
Start: 2018-09-13

## (undated) RX ORDER — ONDANSETRON 2 MG/ML
INJECTION INTRAMUSCULAR; INTRAVENOUS
Status: DISPENSED
Start: 2025-04-22

## (undated) RX ORDER — ASPIRIN 325 MG
TABLET ORAL
Status: DISPENSED
Start: 2018-09-13

## (undated) RX ORDER — HEPARIN SODIUM 1000 [USP'U]/ML
INJECTION, SOLUTION INTRAVENOUS; SUBCUTANEOUS
Status: DISPENSED
Start: 2025-04-22

## (undated) RX ORDER — EPHEDRINE SULFATE 50 MG/ML
INJECTION, SOLUTION INTRAMUSCULAR; INTRAVENOUS; SUBCUTANEOUS
Status: DISPENSED
Start: 2020-02-20

## (undated) RX ORDER — FENTANYL CITRATE 50 UG/ML
INJECTION, SOLUTION INTRAMUSCULAR; INTRAVENOUS
Status: DISPENSED
Start: 2025-04-17

## (undated) RX ORDER — CEFAZOLIN SODIUM 2 G/100ML
INJECTION, SOLUTION INTRAVENOUS
Status: DISPENSED
Start: 2020-02-20

## (undated) RX ORDER — GLYCOPYRROLATE 0.2 MG/ML
INJECTION, SOLUTION INTRAMUSCULAR; INTRAVENOUS
Status: DISPENSED
Start: 2020-02-20

## (undated) RX ORDER — LIDOCAINE HYDROCHLORIDE 20 MG/ML
SOLUTION OROPHARYNGEAL
Status: DISPENSED
Start: 2025-04-17

## (undated) RX ORDER — ACETAMINOPHEN 325 MG/1
TABLET ORAL
Status: DISPENSED
Start: 2025-04-22

## (undated) RX ORDER — ESMOLOL HYDROCHLORIDE 10 MG/ML
INJECTION INTRAVENOUS
Status: DISPENSED
Start: 2020-02-20

## (undated) RX ORDER — PROPOFOL 10 MG/ML
INJECTION, EMULSION INTRAVENOUS
Status: DISPENSED
Start: 2025-04-22

## (undated) RX ORDER — CEFAZOLIN SODIUM 1 G/3ML
INJECTION, POWDER, FOR SOLUTION INTRAMUSCULAR; INTRAVENOUS
Status: DISPENSED
Start: 2025-04-22

## (undated) RX ORDER — CEFAZOLIN SODIUM 1 G/3ML
INJECTION, POWDER, FOR SOLUTION INTRAMUSCULAR; INTRAVENOUS
Status: DISPENSED
Start: 2019-04-25

## (undated) RX ORDER — KETOROLAC TROMETHAMINE 30 MG/ML
INJECTION, SOLUTION INTRAMUSCULAR; INTRAVENOUS
Status: DISPENSED
Start: 2022-09-02

## (undated) RX ORDER — FENTANYL CITRATE 50 UG/ML
INJECTION, SOLUTION INTRAMUSCULAR; INTRAVENOUS
Status: DISPENSED
Start: 2020-10-16

## (undated) RX ORDER — NEOSTIGMINE METHYLSULFATE 1 MG/ML
VIAL (ML) INJECTION
Status: DISPENSED
Start: 2022-09-02

## (undated) RX ORDER — SODIUM CHLORIDE 9 MG/ML
INJECTION, SOLUTION INTRAVENOUS
Status: DISPENSED
Start: 2019-04-25

## (undated) RX ORDER — DEXAMETHASONE SODIUM PHOSPHATE 4 MG/ML
INJECTION, SOLUTION INTRA-ARTICULAR; INTRALESIONAL; INTRAMUSCULAR; INTRAVENOUS; SOFT TISSUE
Status: DISPENSED
Start: 2020-02-20

## (undated) RX ORDER — BUPIVACAINE HYDROCHLORIDE 5 MG/ML
INJECTION, SOLUTION EPIDURAL; INTRACAUDAL
Status: DISPENSED
Start: 2020-02-20

## (undated) RX ORDER — FENTANYL CITRATE 50 UG/ML
INJECTION, SOLUTION INTRAMUSCULAR; INTRAVENOUS
Status: DISPENSED
Start: 2025-04-22

## (undated) RX ORDER — FENTANYL CITRATE 50 UG/ML
INJECTION, SOLUTION INTRAMUSCULAR; INTRAVENOUS
Status: DISPENSED
Start: 2018-09-13

## (undated) RX ORDER — SODIUM CHLORIDE 9 MG/ML
INJECTION, SOLUTION INTRAVENOUS
Status: DISPENSED
Start: 2025-04-22

## (undated) RX ORDER — CEFAZOLIN SODIUM 2 G/100ML
INJECTION, SOLUTION INTRAVENOUS
Status: DISPENSED
Start: 2020-10-16

## (undated) RX ORDER — PROPOFOL 10 MG/ML
INJECTION, EMULSION INTRAVENOUS
Status: DISPENSED
Start: 2020-02-20

## (undated) RX ORDER — FUROSEMIDE 10 MG/ML
INJECTION INTRAMUSCULAR; INTRAVENOUS
Status: DISPENSED
Start: 2018-09-13

## (undated) RX ORDER — DEXAMETHASONE SODIUM PHOSPHATE 4 MG/ML
INJECTION, SOLUTION INTRA-ARTICULAR; INTRALESIONAL; INTRAMUSCULAR; INTRAVENOUS; SOFT TISSUE
Status: DISPENSED
Start: 2025-04-22

## (undated) RX ORDER — FENTANYL CITRATE-0.9 % NACL/PF 10 MCG/ML
PLASTIC BAG, INJECTION (ML) INTRAVENOUS
Status: DISPENSED
Start: 2025-04-22

## (undated) RX ORDER — BUPIVACAINE HYDROCHLORIDE 5 MG/ML
INJECTION, SOLUTION EPIDURAL; INTRACAUDAL
Status: DISPENSED
Start: 2020-10-16

## (undated) RX ORDER — HYDROMORPHONE HYDROCHLORIDE 1 MG/ML
INJECTION, SOLUTION INTRAMUSCULAR; INTRAVENOUS; SUBCUTANEOUS
Status: DISPENSED
Start: 2025-04-22